# Patient Record
Sex: MALE | Race: WHITE | NOT HISPANIC OR LATINO | Employment: OTHER | URBAN - NONMETROPOLITAN AREA
[De-identification: names, ages, dates, MRNs, and addresses within clinical notes are randomized per-mention and may not be internally consistent; named-entity substitution may affect disease eponyms.]

---

## 2018-06-14 ENCOUNTER — APPOINTMENT (OUTPATIENT)
Dept: CT IMAGING | Facility: HOSPITAL | Age: 78
End: 2018-06-14

## 2018-06-14 ENCOUNTER — HOSPITAL ENCOUNTER (EMERGENCY)
Facility: HOSPITAL | Age: 78
Discharge: HOME | End: 2018-06-14
Attending: EMERGENCY MEDICINE | Admitting: EMERGENCY MEDICINE

## 2018-06-14 VITALS
RESPIRATION RATE: 18 BRPM | DIASTOLIC BLOOD PRESSURE: 83 MMHG | HEIGHT: 69 IN | WEIGHT: 185 LBS | SYSTOLIC BLOOD PRESSURE: 142 MMHG | TEMPERATURE: 97.8 F | BODY MASS INDEX: 27.4 KG/M2 | HEART RATE: 73 BPM | OXYGEN SATURATION: 96 %

## 2018-06-14 DIAGNOSIS — S32.030A CLOSED COMPRESSION FRACTURE OF THIRD LUMBAR VERTEBRA, INITIAL ENCOUNTER: Primary | ICD-10-CM

## 2018-06-14 PROCEDURE — 99283 EMERGENCY DEPT VISIT LOW MDM: CPT

## 2018-06-14 PROCEDURE — 72131 CT LUMBAR SPINE W/O DYE: CPT

## 2018-06-14 RX ORDER — HYDROCODONE BITARTRATE AND ACETAMINOPHEN 7.5; 325 MG/1; MG/1
1 TABLET ORAL EVERY 4 HOURS PRN
Qty: 20 TABLET | Refills: 0 | Status: ON HOLD | OUTPATIENT
Start: 2018-06-14 | End: 2018-07-17

## 2018-06-14 NOTE — ED NOTES
Pt states last monday he bent over to lift a trailer and felt a pop in his back. Pt had x rays a pcp and was told he needed an MRI. Pt is in great pain, and there is note bruising to his lower lumbar region. Tender to touch.      Joi Francois RN  06/14/18 [id number]

## 2018-06-15 NOTE — ED PROVIDER NOTES
"Subjective   Patient says he was lifting tongue of trailer 5 days ago when he felt something \"snap in his lower back and felt like something rolled over\".  Had x-rays by PCP and told they were normal and he may need MRI.        History provided by:  Patient   used: No    Back Pain   Location:  Lumbar spine  Quality:  Aching  Radiates to:  Does not radiate  Pain severity:  Moderate  Pain is:  Same all the time  Onset quality:  Sudden  Duration:  5 days  Timing:  Constant  Progression:  Unchanged  Chronicity:  New  Context: lifting heavy objects    Context: not emotional stress, not falling, not jumping from heights, not MCA, not MVA, not occupational injury, not pedestrian accident, not physical stress, not recent illness, not recent injury and not twisting    Relieved by:  Nothing  Worsened by:  Nothing  Ineffective treatments:  None tried  Associated symptoms: no abdominal pain, no bladder incontinence, no bowel incontinence, no chest pain, no fever, no headaches, no leg pain, no numbness, no paresthesias, no pelvic pain, no perianal numbness, no weakness and no weight loss    Risk factors: no hx of cancer, no hx of osteoporosis, no menopause, not obese, not pregnant, no recent surgery and no steroid use        Review of Systems   Constitutional: Negative.  Negative for fever and weight loss.   HENT: Negative.    Respiratory: Negative.    Cardiovascular: Negative.  Negative for chest pain.   Gastrointestinal: Negative.  Negative for abdominal pain and bowel incontinence.   Genitourinary: Negative.  Negative for bladder incontinence and pelvic pain.   Musculoskeletal: Positive for back pain.   Skin: Negative.    Neurological: Negative.  Negative for weakness, numbness, headaches and paresthesias.   Hematological: Negative.    Psychiatric/Behavioral: Negative.    All other systems reviewed and are negative.      No past medical history on file.    No Known Allergies    No past surgical history on " file.    No family history on file.    Social History     Social History   • Marital status:      Social History Main Topics   • Drug use: Unknown     Other Topics Concern   • Not on file       Prior to Admission medications    Not on File       Medications - No data to display    Vitals:    06/14/18 1819   BP: (!) 144/108   Pulse: 92   Resp: 18   Temp: 98.7 °F (37.1 °C)   SpO2: 97%         Objective   Physical Exam   Constitutional: He is oriented to person, place, and time. He appears well-developed and well-nourished.   Neck: Normal range of motion. Neck supple.   Cardiovascular: Normal rate and regular rhythm.    Pulmonary/Chest: Effort normal and breath sounds normal.   Musculoskeletal: Normal range of motion. He exhibits tenderness.   Tender in LS area of back in midline but no deformity.  SLR neg.   Neurological: He is alert and oriented to person, place, and time.   Psychiatric: He has a normal mood and affect. His behavior is normal.   Nursing note and vitals reviewed.      Procedures         Lab Results (last 24 hours)     ** No results found for the last 24 hours. **          CT Lumbar Spine Without Contrast   Final Result   Impression:    1. Mild superior endplate compression fracturing at L3, this is likely   acute.           This report was finalized on 06/14/2018 20:59 by Dr Willi Stephenson, .          ED Course  ED Course as of Jun 14 2131   Thu Jun 14, 2018 2130 Told patient of compression fracture but it is mild and no immediate treatment needed except pain relief.  [TR]      ED Course User Index  [TR] Jhonny Iniguez Jr., MD          MDM  Number of Diagnoses or Management Options  Closed compression fracture of third lumbar vertebra, initial encounter: new and requires workup     Amount and/or Complexity of Data Reviewed  Tests in the radiology section of CPT®: ordered and reviewed    Risk of Complications, Morbidity, and/or Mortality  Presenting problems: moderate  Diagnostic procedures:  moderate  Management options: moderate    Patient Progress  Patient progress: stable      Final diagnoses:   Closed compression fracture of third lumbar vertebra, initial encounter          Jhonny Iniguez Jr., MD  06/14/18 7098

## 2018-06-25 ENCOUNTER — OFFICE VISIT (OUTPATIENT)
Dept: NEUROSURGERY | Facility: CLINIC | Age: 78
End: 2018-06-25

## 2018-06-25 ENCOUNTER — PREP FOR SURGERY (OUTPATIENT)
Dept: OTHER | Facility: HOSPITAL | Age: 78
End: 2018-06-25

## 2018-06-25 VITALS
DIASTOLIC BLOOD PRESSURE: 60 MMHG | WEIGHT: 185 LBS | HEIGHT: 69 IN | SYSTOLIC BLOOD PRESSURE: 118 MMHG | BODY MASS INDEX: 27.4 KG/M2

## 2018-06-25 DIAGNOSIS — S32.000A LUMBAR COMPRESSION FRACTURE, CLOSED, INITIAL ENCOUNTER (HCC): Primary | ICD-10-CM

## 2018-06-25 DIAGNOSIS — S32.010A CLOSED COMPRESSION FRACTURE OF FIRST LUMBAR VERTEBRA, INITIAL ENCOUNTER: Primary | ICD-10-CM

## 2018-06-25 DIAGNOSIS — Z78.9 CURRENT NON-SMOKER: ICD-10-CM

## 2018-06-25 PROCEDURE — 99203 OFFICE O/P NEW LOW 30 MIN: CPT | Performed by: NEUROLOGICAL SURGERY

## 2018-06-25 NOTE — PATIENT INSTRUCTIONS

## 2018-06-25 NOTE — PROGRESS NOTES
"    Chief complaint   Chief Complaint   Patient presents with   • Back Pain     Compression fx        Subjective     HPI:     This patient is an active 77-year-old male who injured his back on June 11 while lifting a 12 foot trailer torque.  The quality of his pain is constant, the severity is 10 out 10.  Timing is all the time.  It is associated with numbness in his right thigh region.  He has been treated with an LSO brace and oxycodone without relief of the symptoms.  He presents for discussion of kyphoplasty.  His wife is present with him.    Review of Systems     A 12 point review of systems is obtained and is negative except for as described in HPI    Past Medical History:   Diagnosis Date   • Arthritis    • Cancer    • Hypertension      No past surgical history on file.  History reviewed. No pertinent family history.  Social History   Substance Use Topics   • Smoking status: Never Smoker   • Smokeless tobacco: Never Used   • Alcohol use Yes       (Not in a hospital admission)  Allergies:  Patient has no known allergies.    Objective      Vital Signs  /60   Ht 175.3 cm (69\")   Wt 83.9 kg (185 lb)   BMI 27.32 kg/m²     Physical Exam    No acute distress  Awake alert oriented ×3  HEENT atraumatic normocephalic  Neck supple  Neurologic exam  Cranial nerves II through XII grossly intact  No pronator drift  Patient moves all extremities 5 out 5 strength  Sensation is intact light touch in upper and lower extremities  No long tract signs  Gait normal  Lumbar spine tender to palpation in the mid lumbar region    Results Review:     Ct Lumbar Spine Without Contrast    Result Date: 6/14/2018  Narrative: CT lumbar spine without contrast 6/14/2018 8:23 PM CDT  History: Low back pain, minor trauma. Lifting injury.  Comparison: CT scan dated 3/2/2009  DLP: 695 mGy cm  Technique: Serial helical tomographic images of the lumbar spine were obtained without the use of intravenous contrast. Additionally, sagittal and " coronal reformatted images were provided for review.  Findings:  Counting assumes 5 lumbar-type vertebrae. There appears to be a lumbarized S1 with S1-S2 disc. There is a mild L3 superior endplate compression deformity with fracture lines identified along the endplate. Vertebral body heights are otherwise well-maintained. The bones are diffusely osteopenic. Posterior elements are intact. Facet arthropathy with neuroforaminal narrowing at L5-S1. Paraspinal soft tissues are unremarkable. The included portion of the osseous pelvis is intact.      Impression: Impression: 1. Mild superior endplate compression fracturing at L3, this is likely acute.   This report was finalized on 06/14/2018 20:59 by Dr Willi Stephenson, .              Assessment/Plan:     77-year-old male with osteoporotic L3 compression fracture that causes severe pain refractory to LSO brace and oxycodone treatment.  I discuss risks, benefits, alternatives of a L3 kyphoplasty with the patient.  I discuss risks include but are not limited to infection, hematoma, nerve injury, paralysis, spinal fluid leak, need for reoperation, death.  Patient voices understanding and wishes to proceed.  We will obtain a MRI prior to surgery.  Thank you for this consultation.    I discussed the patients findings and my recommendations with patient    Vega Pandey MD  06/25/18  10:47 AM

## 2018-06-26 ENCOUNTER — HOSPITAL ENCOUNTER (OUTPATIENT)
Dept: MRI IMAGING | Facility: HOSPITAL | Age: 78
Discharge: HOME | End: 2018-06-26
Attending: NEUROLOGICAL SURGERY

## 2018-06-26 ENCOUNTER — APPOINTMENT (OUTPATIENT)
Dept: MRI IMAGING | Facility: HOSPITAL | Age: 78
End: 2018-06-26
Attending: NEUROLOGICAL SURGERY

## 2018-06-26 DIAGNOSIS — S32.010A CLOSED COMPRESSION FRACTURE OF FIRST LUMBAR VERTEBRA, INITIAL ENCOUNTER: ICD-10-CM

## 2018-06-26 NOTE — TELEPHONE ENCOUNTER
Patient was scheduled today for mri, however he was unable to complete the test due to anxiety. I sent in a script for  Xanax. Patient stated that he would go back to get the testing resc.

## 2018-06-27 RX ORDER — ALPRAZOLAM 1 MG/1
TABLET ORAL
Qty: 2 TABLET | Refills: 0 | Status: SHIPPED | OUTPATIENT
Start: 2018-06-27 | End: 2018-07-06

## 2018-06-28 ENCOUNTER — HOSPITAL ENCOUNTER (OUTPATIENT)
Dept: MRI IMAGING | Facility: HOSPITAL | Age: 78
Discharge: HOME | End: 2018-06-28
Attending: NEUROLOGICAL SURGERY | Admitting: NEUROLOGICAL SURGERY

## 2018-06-28 PROCEDURE — 72148 MRI LUMBAR SPINE W/O DYE: CPT

## 2018-07-06 ENCOUNTER — APPOINTMENT (OUTPATIENT)
Dept: PREADMISSION TESTING | Facility: HOSPITAL | Age: 78
End: 2018-07-06

## 2018-07-06 ENCOUNTER — HOSPITAL ENCOUNTER (OUTPATIENT)
Dept: GENERAL RADIOLOGY | Facility: HOSPITAL | Age: 78
Discharge: HOME | End: 2018-07-06
Admitting: NEUROLOGICAL SURGERY

## 2018-07-06 VITALS
DIASTOLIC BLOOD PRESSURE: 53 MMHG | SYSTOLIC BLOOD PRESSURE: 118 MMHG | RESPIRATION RATE: 20 BRPM | HEART RATE: 83 BPM | WEIGHT: 180.34 LBS | OXYGEN SATURATION: 96 % | BODY MASS INDEX: 30.05 KG/M2 | HEIGHT: 65 IN

## 2018-07-06 DIAGNOSIS — S32.000A LUMBAR COMPRESSION FRACTURE, CLOSED, INITIAL ENCOUNTER (HCC): ICD-10-CM

## 2018-07-06 LAB
ANION GAP SERPL CALCULATED.3IONS-SCNC: 13 MMOL/L (ref 4–13)
BILIRUB UR QL STRIP: NEGATIVE
BUN BLD-MCNC: 42 MG/DL (ref 5–21)
BUN/CREAT SERPL: 26.8 (ref 7–25)
CALCIUM SPEC-SCNC: 10 MG/DL (ref 8.4–10.4)
CHLORIDE SERPL-SCNC: 100 MMOL/L (ref 98–110)
CLARITY UR: CLEAR
CO2 SERPL-SCNC: 27 MMOL/L (ref 24–31)
COLOR UR: ABNORMAL
CREAT BLD-MCNC: 1.57 MG/DL (ref 0.5–1.4)
DEPRECATED RDW RBC AUTO: 41.1 FL (ref 40–54)
ERYTHROCYTE [DISTWIDTH] IN BLOOD BY AUTOMATED COUNT: 12.1 % (ref 12–15)
GFR SERPL CREATININE-BSD FRML MDRD: 43 ML/MIN/1.73
GLUCOSE BLD-MCNC: 92 MG/DL (ref 70–100)
GLUCOSE UR STRIP-MCNC: NEGATIVE MG/DL
HCT VFR BLD AUTO: 41.6 % (ref 40–52)
HGB BLD-MCNC: 14.2 G/DL (ref 14–18)
HGB UR QL STRIP.AUTO: NEGATIVE
INR PPP: 1 (ref 0.91–1.09)
KETONES UR QL STRIP: ABNORMAL
LEUKOCYTE ESTERASE UR QL STRIP.AUTO: NEGATIVE
MCH RBC QN AUTO: 31.6 PG (ref 28–32)
MCHC RBC AUTO-ENTMCNC: 34.1 G/DL (ref 33–36)
MCV RBC AUTO: 92.4 FL (ref 82–95)
NITRITE UR QL STRIP: NEGATIVE
PH UR STRIP.AUTO: 5.5 [PH] (ref 5–8)
PLATELET # BLD AUTO: 257 10*3/MM3 (ref 130–400)
PMV BLD AUTO: 10.1 FL (ref 6–12)
POTASSIUM BLD-SCNC: 5.2 MMOL/L (ref 3.5–5.3)
PROT UR QL STRIP: NEGATIVE
PROTHROMBIN TIME: 13.5 SECONDS (ref 11.9–14.6)
RBC # BLD AUTO: 4.5 10*6/MM3 (ref 4.8–5.9)
SODIUM BLD-SCNC: 140 MMOL/L (ref 135–145)
SP GR UR STRIP: 1.02 (ref 1–1.03)
UROBILINOGEN UR QL STRIP: ABNORMAL
WBC NRBC COR # BLD: 5.79 10*3/MM3 (ref 4.8–10.8)

## 2018-07-06 PROCEDURE — 85027 COMPLETE CBC AUTOMATED: CPT | Performed by: NEUROLOGICAL SURGERY

## 2018-07-06 PROCEDURE — 81003 URINALYSIS AUTO W/O SCOPE: CPT | Performed by: NEUROLOGICAL SURGERY

## 2018-07-06 PROCEDURE — 93005 ELECTROCARDIOGRAM TRACING: CPT

## 2018-07-06 PROCEDURE — 36415 COLL VENOUS BLD VENIPUNCTURE: CPT

## 2018-07-06 PROCEDURE — 85610 PROTHROMBIN TIME: CPT | Performed by: NEUROLOGICAL SURGERY

## 2018-07-06 PROCEDURE — 71045 X-RAY EXAM CHEST 1 VIEW: CPT

## 2018-07-06 PROCEDURE — 80048 BASIC METABOLIC PNL TOTAL CA: CPT | Performed by: NEUROLOGICAL SURGERY

## 2018-07-06 PROCEDURE — 93010 ELECTROCARDIOGRAM REPORT: CPT | Performed by: INTERNAL MEDICINE

## 2018-07-06 RX ORDER — OLMESARTAN MEDOXOMIL 40 MG/1
40 TABLET ORAL DAILY
COMMUNITY
End: 2020-04-06 | Stop reason: SDUPTHER

## 2018-07-06 RX ORDER — HYDROCHLOROTHIAZIDE 12.5 MG/1
12.5 TABLET ORAL DAILY
COMMUNITY
End: 2020-04-06 | Stop reason: SDUPTHER

## 2018-07-06 NOTE — DISCHARGE INSTRUCTIONS
DAY OF SURGERY INSTRUCTIONS        YOUR SURGEON: dr richardson    PROCEDURE: ***L3 KYPHOPLASTY 1-2 LEVELS    DATE OF SURGERY: ***7/17/2018    ARRIVAL TIME: AS DIRECTED BY OFFICE    DAY OF SURGERY TAKE ONLY THESE MEDICATIONS UNLESS OTHERWISE INSTRUCTED BY YOUR PHYSICIAN: ***none        MANAGING PAIN AFTER SURGERY    We know you are probably wondering what your pain will be like after surgery.  Following surgery it is unrealistic to expect you will not have pain.   Pain is how our bodies let us know that something is wrong or cautions us to be careful.  That said, our goal is to make your pain tolerable.    Methods we may use to treat your pain include (oral or IV medications, PCAs, epidurals, nerve blocks, etc.)   While some procedures require IV pain medications for a short time after surgery, transitioning to pain medications by mouth allows for better management of pain.   Your nurse will encourage you to take oral pain medications whenever possible.  IV medications work almost immediately, but only last a short while.  Taking medications by mouth allows for a more constant level of medication in your blood stream for a longer period of time.      Once your pain is out of control it is harder to get back under control.  It is important you are aware when your next dose of pain medication is due.  If you are admitted, your nurse may write the time of your next dose on the white board in your room to help you remember.      We are interested in your pain and encourage you to inform us about aggravating factors during your visit.   Many times a simple repositioning every few hours can make a big difference.    If your physician says it is okay, do not let your pain prevent you from getting out of bed. Be sure to call your nurse for assistance prior to getting up so you do not fall.      Before surgery, please decide your tolerable pain goal.  These faces help describe the pain ratings we use on a 0-10 scale.   Be  prepared to tell us your goal and whether or not you take pain or anxiety medications at home.          BEFORE YOU COME TO THE HOSPITAL  (Pre-op instructions)  • Do not eat, drink, smoke or chew gum after midnight the night before surgery.  This also includes no mints.  • Morning of surgery take only the medicines you have been instructed with a sip of water unless otherwise instructed  by your physician.  • Do not shave, wear makeup or dark nail polish.  • Remove all jewelry including rings.  • Leave anything you consider valuable at home.  • Leave your suitcase in the car until after your surgery.  • Bring the following with you if applicable:  o Picture ID and insurance, Medicare or Medicaid cards  o Co-pay/deductible required by insurance (cash, check, credit card)  o Copy of advance directive, living will or power-of- documents if not brought to PAT  o CPAP or BIPAP mask and tubing  o Relaxation aids (MP3 player, book, magazine)  • On the day of surgery check in at registration located at the main entrance of the hospital.       Outpatient Surgery Guidelines, Adult  Outpatient procedures are those for which the person having the procedure is allowed to go home the same day as the procedure. Various procedures are done on an outpatient basis. You should follow some general guidelines if you will be having an outpatient procedure.  LET YOUR HEALTH CARE PROVIDER KNOW ABOUT:  · Any allergies you have.  · All medicines you are taking, including vitamins, herbs, eye drops, creams, and over-the-counter medicines.  · Previous problems you or members of your family have had with the use of anesthetics.  · Any blood disorders you have.  · Previous surgeries you have had.  · Medical conditions you have.  RISKS AND COMPLICATIONS  Your health care provider will discuss possible risks and complications with you before surgery. Common risks and complications include:    · Problems due to the use of  anesthetics.  · Blood loss and replacement (does not apply to minor surgical procedures).  · Temporary increase in pain due to surgery.  · Uncorrected pain or problems that the surgery was meant to correct.  · Infection.  · New damage.  BEFORE THE PROCEDURE  · Ask your health care provider about changing or stopping your regular medicines. You may need to stop taking certain medicines in the days or weeks before the procedure.  · Stop smoking at least 2 weeks before surgery. This lowers your risk for complications during and after surgery. Ask your health care provider for help with this if needed.  · Eat your usual meals and a light supper the day before surgery. Continue fluid intake. Do not drink alcohol.  · Do not eat or drink after midnight the night before your surgery.   · Arrange for someone to take you home and to stay with you for 24 hours after the procedure. Medicine given for your procedure may affect your ability to drive or to care for yourself.  · Call your health care provider's office if you develop an illness or problem that may prevent you from safely having your procedure.  AFTER THE PROCEDURE  After surgery, you will be taken to a recovery area, where your progress will be monitored. If there are no complications, you will be allowed to go home when you are awake, stable, and taking fluids well. You may have numbness around the surgical site. Healing will take some time. You will have tenderness at the surgical site and may have some swelling and bruising. You may also have some nausea.  HOME CARE INSTRUCTIONS  · Do not drive for 24 hours, or as directed by your health care provider. Do not drive while taking prescription pain medicines.  · Do not drink alcohol for 24 hours.  · Do not make important decisions or sign legal documents for 24 hours.  · You may resume a normal diet and activities as directed.  · Do not lift anything heavier than 10 pounds (4.5 kg) or play contact sports until your  health care provider says it is okay.  · Change your bandages (dressings) as directed.  · Only take over-the-counter or prescription medicines as directed by your health care provider.  · Follow up with your health care provider as directed.  SEEK MEDICAL CARE IF:  · You have increased bleeding (more than a small spot) from the surgical site.  · You have redness, swelling, or increasing pain in the wound.  · You see pus coming from the wound.  · You have a fever.  · You notice a bad smell coming from the wound or dressing.  · You feel lightheaded or faint.  · You develop a rash.  · You have trouble breathing.  · You develop allergies.  MAKE SURE YOU:  · Understand these instructions.  · Will watch your condition.  · Will get help right away if you are not doing well or get worse.     This information is not intended to replace advice given to you by your health care provider. Make sure you discuss any questions you have with your health care provider.     Document Released: 09/12/2002 Document Revised: 05/03/2016 Document Reviewed: 05/22/2014  [employer] Interactive Patient Education ©2016 [employer]       Fall Prevention in Hospitals, Adult  As a hospital patient, your condition and the treatments you receive can increase your risk for falls. Some additional risk factors for falls in a hospital include:  · Being in an unfamiliar environment.  · Being on bed rest.  · Your surgery.  · Taking certain medicines.  · Your tubing requirements, such as intravenous (IV) therapy or catheters.  It is important that you learn how to decrease fall risks while at the hospital. Below are important tips that can help prevent falls.  SAFETY TIPS FOR PREVENTING FALLS  Talk about your risk of falling.  · Ask your health care provider why you are at risk for falling. Is it your medicine, illness, tubing placement, or something else?  · Make a plan with your health care provider to keep you safe from falls.  · Ask your health care  provider or pharmacist about side effects of your medicines. Some medicines can make you dizzy or affect your coordination.  Ask for help.  · Ask for help before getting out of bed. You may need to press your call button.  · Ask for assistance in getting safely to the toilet.  · Ask for a walker or cane to be put at your bedside. Ask that most of the side rails on your bed be placed up before your health care provider leaves the room.  · Ask family or friends to sit with you.  · Ask for things that are out of your reach, such as your glasses, hearing aids, telephone, bedside table, or call button.  Follow these tips to avoid falling:  · Stay lying or seated, rather than standing, while waiting for help.  · Wear rubber-soled slippers or shoes whenever you walk in the hospital.  · Avoid quick, sudden movements.  ¨ Change positions slowly.  ¨ Sit on the side of your bed before standing.  ¨ Stand up slowly and wait before you start to walk.  · Let your health care provider know if there is a spill on the floor.  · Pay careful attention to the medical equipment, electrical cords, and tubes around you.  · When you need help, use your call button by your bed or in the bathroom. Wait for one of your health care providers to help you.  · If you feel dizzy or unsure of your footing, return to bed and wait for assistance.  · Avoid being distracted by the TV, telephone, or another person in your room.  · Do not lean or support yourself on rolling objects, such as IV poles or bedside tables.     This information is not intended to replace advice given to you by your health care provider. Make sure you discuss any questions you have with your health care provider.     Document Released: 12/15/2001 Document Revised: 01/08/2016 Document Reviewed: 08/25/2013  [employer] Interactive Patient Education ©2016 [employer]       Surgical Site Infections FAQs  What is a Surgical Site Infection (SSI)?  A surgical site infection is an  infection that occurs after surgery in the part of the body where the surgery took place. Most patients who have surgery do not develop an infection. However, infections develop in about 1 to 3 out of every 100 patients who have surgery.  Some of the common symptoms of a surgical site infection are:  · Redness and pain around the area where you had surgery  · Drainage of cloudy fluid from your surgical wound  · Fever  Can SSIs be treated?  Yes. Most surgical site infections can be treated with antibiotics. The antibiotic given to you depends on the bacteria (germs) causing the infection. Sometimes patients with SSIs also need another surgery to treat the infection.  What are some of the things that hospitals are doing to prevent SSIs?  To prevent SSIs, doctors, nurses, and other healthcare providers:  · Clean their hands and arms up to their elbows with an antiseptic agent just before the surgery.  · Clean their hands with soap and water or an alcohol-based hand rub before and after caring for each patient.  · May remove some of your hair immediately before your surgery using electric clippers if the hair is in the same area where the procedure will occur. They should not shave you with a razor.  · Wear special hair covers, masks, gowns, and gloves during surgery to keep the surgery area clean.  · Give you antibiotics before your surgery starts. In most cases, you should get antibiotics within 60 minutes before the surgery starts and the antibiotics should be stopped within 24 hours after surgery.  · Clean the skin at the site of your surgery with a special soap that kills germs.  What can I do to help prevent SSIs?  Before your surgery:  · Tell your doctor about other medical problems you may have. Health problems such as allergies, diabetes, and obesity could affect your surgery and your treatment.  · Quit smoking. Patients who smoke get more infections. Talk to your doctor about how you can quit before your  surgery.  · Do not shave near where you will have surgery. Shaving with a razor can irritate your skin and make it easier to develop an infection.  At the time of your surgery:  · Speak up if someone tries to shave you with a razor before surgery. Ask why you need to be shaved and talk with your surgeon if you have any concerns.  · Ask if you will get antibiotics before surgery.  After your surgery:  · Make sure that your healthcare providers clean their hands before examining you, either with soap and water or an alcohol-based hand rub.  · If you do not see your providers clean their hands, please ask them to do so.  · Family and friends who visit you should not touch the surgical wound or dressings.  · Family and friends should clean their hands with soap and water or an alcohol-based hand rub before and after visiting you. If you do not see them clean their hands, ask them to clean their hands.  What do I need to do when I go home from the hospital?  · Before you go home, your doctor or nurse should explain everything you need to know about taking care of your wound. Make sure you understand how to care for your wound before you leave the hospital.  · Always clean your hands before and after caring for your wound.  · Before you go home, make sure you know who to contact if you have questions or problems after you get home.  · If you have any symptoms of an infection, such as redness and pain at the surgery site, drainage, or fever, call your doctor immediately.  If you have additional questions, please ask your doctor or nurse.  Developed and co-sponsored by The Society for Healthcare Epidemiology of [place] (SHEA); Infectious Diseases Society of [place] (IDSA); American Hospital Association; Association for Professionals in Infection Control and Epidemiology (APIC); Centers for Disease Control and Prevention (CDC); and The Joint Commission.     This information is not intended to replace advice given to you by  your health care provider. Make sure you discuss any questions you have with your health care provider.     Document Released: 12/23/2014 Document Revised: 01/08/2016 Document Reviewed: 03/02/2016  [employer] Interactive Patient Education ©2016 [employer]       [facility]  CHG 4% Patient Instruction Sheet    Preparing the Skin Before Surgery  Preparing or “prepping” skin before surgery can reduce the risk of infection at the surgical site. To make the process easier,[facility] has chosen 4% Chlorhexidine Gluconate (CHG) antiseptic solution.   The steps below outline the prepping process and should be carefully followed.                                                                                                                                                      Prep the skin at the following time(s):                                                      We recommend you shower the night before surgery, and again the morning of surgery with the 4% CHG antiseptic solution using half of the bottle and a cloth each time.  Dress in clean clothes/sleepwear after showering.  See instructions below for application.          Do not apply any lotions or moisturizers.       Do not shave the area to be prepped for at least 2 days prior to surgery.    Clipping the hair may be done immediately prior to your surgery at the hospital    if needed.    Directions:  Thoroughly rinse your body with water.  Apply 4% CHG to a cloth and wash skin gently, paying special attention to the operative site.  Rinse again thoroughly.  Once you have begun using this product do not apply anything else to your skin. If itching or redness persists, rinse affected areas and discontinue use.    When using this product:  • Keep out of eyes, ears, and mouth.  • If solution should contact these areas, rinse out promptly and thoroughly with water.  • For external use only.  • Do not use in genital area, on your face or  head.      PATIENT/FAMILY/RESPONSIBLE PARTY VERBALIZES UNDERSTANDING OF ABOVE EDUCATION.  COPY OF PAIN SCALE GIVEN AND REVIEWED WITH VERBALIZED UNDERSTANDING.

## 2018-07-17 ENCOUNTER — HOSPITAL ENCOUNTER (OUTPATIENT)
Facility: HOSPITAL | Age: 78
Discharge: HOME | End: 2018-07-17
Attending: NEUROLOGICAL SURGERY | Admitting: NEUROLOGICAL SURGERY

## 2018-07-17 ENCOUNTER — APPOINTMENT (OUTPATIENT)
Dept: GENERAL RADIOLOGY | Facility: HOSPITAL | Age: 78
End: 2018-07-17

## 2018-07-17 ENCOUNTER — ANESTHESIA EVENT (OUTPATIENT)
Dept: PERIOP | Facility: HOSPITAL | Age: 78
End: 2018-07-17

## 2018-07-17 ENCOUNTER — ANESTHESIA (OUTPATIENT)
Dept: PERIOP | Facility: HOSPITAL | Age: 78
End: 2018-07-17

## 2018-07-17 VITALS
TEMPERATURE: 97.3 F | RESPIRATION RATE: 16 BRPM | SYSTOLIC BLOOD PRESSURE: 140 MMHG | DIASTOLIC BLOOD PRESSURE: 85 MMHG | OXYGEN SATURATION: 96 % | HEART RATE: 78 BPM

## 2018-07-17 DIAGNOSIS — S32.000A LUMBAR COMPRESSION FRACTURE, CLOSED, INITIAL ENCOUNTER (HCC): ICD-10-CM

## 2018-07-17 PROCEDURE — 25010000002 ONDANSETRON PER 1 MG: Performed by: ANESTHESIOLOGY

## 2018-07-17 PROCEDURE — 25010000002 IOPAMIDOL 61 % SOLUTION: Performed by: NEUROLOGICAL SURGERY

## 2018-07-17 PROCEDURE — 25010000002 FENTANYL CITRATE (PF) 100 MCG/2ML SOLUTION: Performed by: NURSE ANESTHETIST, CERTIFIED REGISTERED

## 2018-07-17 PROCEDURE — C1713 ANCHOR/SCREW BN/BN,TIS/BN: HCPCS | Performed by: NEUROLOGICAL SURGERY

## 2018-07-17 PROCEDURE — 72100 X-RAY EXAM L-S SPINE 2/3 VWS: CPT

## 2018-07-17 PROCEDURE — 25010000002 DEXAMETHASONE PER 1 MG: Performed by: ANESTHESIOLOGY

## 2018-07-17 PROCEDURE — 63710000001 OXYCODONE-ACETAMINOPHEN 7.5-325 MG TABLET: Performed by: ANESTHESIOLOGY

## 2018-07-17 PROCEDURE — A9270 NON-COVERED ITEM OR SERVICE: HCPCS | Performed by: ANESTHESIOLOGY

## 2018-07-17 PROCEDURE — 63710000001 ACETAMINOPHEN 500 MG TABLET: Performed by: ANESTHESIOLOGY

## 2018-07-17 PROCEDURE — 25010000002 PROPOFOL 10 MG/ML EMULSION: Performed by: NURSE ANESTHETIST, CERTIFIED REGISTERED

## 2018-07-17 PROCEDURE — 76000 FLUOROSCOPY <1 HR PHYS/QHP: CPT

## 2018-07-17 PROCEDURE — 88307 TISSUE EXAM BY PATHOLOGIST: CPT | Performed by: NEUROLOGICAL SURGERY

## 2018-07-17 PROCEDURE — 22514 PERQ VERTEBRAL AUGMENTATION: CPT | Performed by: NEUROLOGICAL SURGERY

## 2018-07-17 PROCEDURE — 88311 DECALCIFY TISSUE: CPT | Performed by: NEUROLOGICAL SURGERY

## 2018-07-17 DEVICE — BONE CEMENT C01B HV-R WITH MIXER  US
Type: IMPLANTABLE DEVICE | Site: SPINE LUMBAR | Status: FUNCTIONAL
Brand: KYPHON® HV-R® BONE CEMENT AND KYPHON® MIXER PACK

## 2018-07-17 RX ORDER — LIDOCAINE HYDROCHLORIDE 40 MG/ML
SOLUTION TOPICAL AS NEEDED
Status: DISCONTINUED | OUTPATIENT
Start: 2018-07-17 | End: 2018-07-17 | Stop reason: SURG

## 2018-07-17 RX ORDER — LIDOCAINE HYDROCHLORIDE 20 MG/ML
INJECTION, SOLUTION INFILTRATION; PERINEURAL AS NEEDED
Status: DISCONTINUED | OUTPATIENT
Start: 2018-07-17 | End: 2018-07-17 | Stop reason: SURG

## 2018-07-17 RX ORDER — IPRATROPIUM BROMIDE AND ALBUTEROL SULFATE 2.5; .5 MG/3ML; MG/3ML
3 SOLUTION RESPIRATORY (INHALATION) ONCE AS NEEDED
Status: DISCONTINUED | OUTPATIENT
Start: 2018-07-17 | End: 2018-07-17 | Stop reason: HOSPADM

## 2018-07-17 RX ORDER — DEXAMETHASONE SODIUM PHOSPHATE 4 MG/ML
4 INJECTION, SOLUTION INTRA-ARTICULAR; INTRALESIONAL; INTRAMUSCULAR; INTRAVENOUS; SOFT TISSUE ONCE AS NEEDED
Status: COMPLETED | OUTPATIENT
Start: 2018-07-17 | End: 2018-07-17

## 2018-07-17 RX ORDER — FENTANYL CITRATE 50 UG/ML
25 INJECTION, SOLUTION INTRAMUSCULAR; INTRAVENOUS AS NEEDED
Status: DISCONTINUED | OUTPATIENT
Start: 2018-07-17 | End: 2018-07-17 | Stop reason: HOSPADM

## 2018-07-17 RX ORDER — IBUPROFEN 600 MG/1
600 TABLET ORAL ONCE AS NEEDED
Status: DISCONTINUED | OUTPATIENT
Start: 2018-07-17 | End: 2018-07-17 | Stop reason: HOSPADM

## 2018-07-17 RX ORDER — LABETALOL HYDROCHLORIDE 5 MG/ML
5 INJECTION, SOLUTION INTRAVENOUS
Status: DISCONTINUED | OUTPATIENT
Start: 2018-07-17 | End: 2018-07-17 | Stop reason: HOSPADM

## 2018-07-17 RX ORDER — ACETAMINOPHEN 500 MG
1000 TABLET ORAL ONCE
Status: COMPLETED | OUTPATIENT
Start: 2018-07-17 | End: 2018-07-17

## 2018-07-17 RX ORDER — SODIUM CHLORIDE, SODIUM LACTATE, POTASSIUM CHLORIDE, CALCIUM CHLORIDE 600; 310; 30; 20 MG/100ML; MG/100ML; MG/100ML; MG/100ML
100 INJECTION, SOLUTION INTRAVENOUS CONTINUOUS
Status: DISCONTINUED | OUTPATIENT
Start: 2018-07-17 | End: 2018-07-17 | Stop reason: HOSPADM

## 2018-07-17 RX ORDER — HYDROCODONE BITARTRATE AND ACETAMINOPHEN 7.5; 325 MG/1; MG/1
1 TABLET ORAL EVERY 4 HOURS PRN
Qty: 50 TABLET | Refills: 0 | Status: SHIPPED | OUTPATIENT
Start: 2018-07-17 | End: 2020-04-06

## 2018-07-17 RX ORDER — SODIUM CHLORIDE, SODIUM LACTATE, POTASSIUM CHLORIDE, CALCIUM CHLORIDE 600; 310; 30; 20 MG/100ML; MG/100ML; MG/100ML; MG/100ML
1000 INJECTION, SOLUTION INTRAVENOUS CONTINUOUS
Status: DISCONTINUED | OUTPATIENT
Start: 2018-07-17 | End: 2018-07-17 | Stop reason: HOSPADM

## 2018-07-17 RX ORDER — ROCURONIUM BROMIDE 10 MG/ML
INJECTION, SOLUTION INTRAVENOUS AS NEEDED
Status: DISCONTINUED | OUTPATIENT
Start: 2018-07-17 | End: 2018-07-17 | Stop reason: SURG

## 2018-07-17 RX ORDER — MAGNESIUM HYDROXIDE 1200 MG/15ML
LIQUID ORAL AS NEEDED
Status: DISCONTINUED | OUTPATIENT
Start: 2018-07-17 | End: 2018-07-17 | Stop reason: HOSPADM

## 2018-07-17 RX ORDER — FAMOTIDINE 10 MG/ML
20 INJECTION, SOLUTION INTRAVENOUS
Status: DISCONTINUED | OUTPATIENT
Start: 2018-07-17 | End: 2018-07-17 | Stop reason: HOSPADM

## 2018-07-17 RX ORDER — MIDAZOLAM HYDROCHLORIDE 1 MG/ML
2 INJECTION INTRAMUSCULAR; INTRAVENOUS
Status: DISCONTINUED | OUTPATIENT
Start: 2018-07-17 | End: 2018-07-17 | Stop reason: HOSPADM

## 2018-07-17 RX ORDER — ONDANSETRON 2 MG/ML
4 INJECTION INTRAMUSCULAR; INTRAVENOUS ONCE AS NEEDED
Status: COMPLETED | OUTPATIENT
Start: 2018-07-17 | End: 2018-07-17

## 2018-07-17 RX ORDER — SODIUM CHLORIDE 0.9 % (FLUSH) 0.9 %
1-10 SYRINGE (ML) INJECTION AS NEEDED
Status: DISCONTINUED | OUTPATIENT
Start: 2018-07-17 | End: 2018-07-17 | Stop reason: HOSPADM

## 2018-07-17 RX ORDER — SODIUM CHLORIDE 0.9 % (FLUSH) 0.9 %
3 SYRINGE (ML) INJECTION AS NEEDED
Status: DISCONTINUED | OUTPATIENT
Start: 2018-07-17 | End: 2018-07-17 | Stop reason: HOSPADM

## 2018-07-17 RX ORDER — BUPIVACAINE HYDROCHLORIDE AND EPINEPHRINE 5; 5 MG/ML; UG/ML
INJECTION, SOLUTION PERINEURAL AS NEEDED
Status: DISCONTINUED | OUTPATIENT
Start: 2018-07-17 | End: 2018-07-17 | Stop reason: HOSPADM

## 2018-07-17 RX ORDER — NALOXONE HCL 0.4 MG/ML
0.4 VIAL (ML) INJECTION AS NEEDED
Status: DISCONTINUED | OUTPATIENT
Start: 2018-07-17 | End: 2018-07-17 | Stop reason: HOSPADM

## 2018-07-17 RX ORDER — PROPOFOL 10 MG/ML
VIAL (ML) INTRAVENOUS AS NEEDED
Status: DISCONTINUED | OUTPATIENT
Start: 2018-07-17 | End: 2018-07-17 | Stop reason: SURG

## 2018-07-17 RX ORDER — OXYCODONE AND ACETAMINOPHEN 10; 325 MG/1; MG/1
1 TABLET ORAL ONCE AS NEEDED
Status: DISCONTINUED | OUTPATIENT
Start: 2018-07-17 | End: 2018-07-17 | Stop reason: HOSPADM

## 2018-07-17 RX ORDER — MEPERIDINE HYDROCHLORIDE 50 MG/ML
12.5 INJECTION INTRAMUSCULAR; INTRAVENOUS; SUBCUTANEOUS
Status: DISCONTINUED | OUTPATIENT
Start: 2018-07-17 | End: 2018-07-17 | Stop reason: HOSPADM

## 2018-07-17 RX ORDER — METOCLOPRAMIDE HYDROCHLORIDE 5 MG/ML
5 INJECTION INTRAMUSCULAR; INTRAVENOUS
Status: DISCONTINUED | OUTPATIENT
Start: 2018-07-17 | End: 2018-07-17 | Stop reason: HOSPADM

## 2018-07-17 RX ORDER — FENTANYL CITRATE 50 UG/ML
INJECTION, SOLUTION INTRAMUSCULAR; INTRAVENOUS AS NEEDED
Status: DISCONTINUED | OUTPATIENT
Start: 2018-07-17 | End: 2018-07-17 | Stop reason: SURG

## 2018-07-17 RX ORDER — OXYCODONE AND ACETAMINOPHEN 7.5; 325 MG/1; MG/1
2 TABLET ORAL ONCE AS NEEDED
Status: COMPLETED | OUTPATIENT
Start: 2018-07-17 | End: 2018-07-17

## 2018-07-17 RX ORDER — MIDAZOLAM HYDROCHLORIDE 1 MG/ML
1 INJECTION INTRAMUSCULAR; INTRAVENOUS
Status: DISCONTINUED | OUTPATIENT
Start: 2018-07-17 | End: 2018-07-17 | Stop reason: HOSPADM

## 2018-07-17 RX ADMIN — FAMOTIDINE 20 MG: 10 INJECTION, SOLUTION INTRAVENOUS at 12:34

## 2018-07-17 RX ADMIN — ONDANSETRON 4 MG: 2 INJECTION INTRAMUSCULAR; INTRAVENOUS at 13:52

## 2018-07-17 RX ADMIN — OXYCODONE HYDROCHLORIDE AND ACETAMINOPHEN 2 TABLET: 7.5; 325 TABLET ORAL at 13:53

## 2018-07-17 RX ADMIN — PROPOFOL 150 MG: 10 INJECTION, EMULSION INTRAVENOUS at 12:56

## 2018-07-17 RX ADMIN — FENTANYL CITRATE 100 MCG: 50 INJECTION, SOLUTION INTRAMUSCULAR; INTRAVENOUS at 12:56

## 2018-07-17 RX ADMIN — LIDOCAINE HYDROCHLORIDE 0.5 ML: 10 INJECTION, SOLUTION EPIDURAL; INFILTRATION; INTRACAUDAL; PERINEURAL at 11:54

## 2018-07-17 RX ADMIN — ACETAMINOPHEN 1000 MG: 500 TABLET, FILM COATED ORAL at 12:34

## 2018-07-17 RX ADMIN — DEXAMETHASONE SODIUM PHOSPHATE 4 MG: 4 INJECTION, SOLUTION INTRA-ARTICULAR; INTRALESIONAL; INTRAMUSCULAR; INTRAVENOUS; SOFT TISSUE at 12:34

## 2018-07-17 RX ADMIN — ROCURONIUM BROMIDE 20 MG: 10 INJECTION INTRAVENOUS at 12:56

## 2018-07-17 RX ADMIN — SODIUM CHLORIDE, POTASSIUM CHLORIDE, SODIUM LACTATE AND CALCIUM CHLORIDE 1000 ML: 600; 310; 30; 20 INJECTION, SOLUTION INTRAVENOUS at 11:55

## 2018-07-17 RX ADMIN — LIDOCAINE HYDROCHLORIDE 1 EACH: 40 SOLUTION TOPICAL at 12:56

## 2018-07-17 RX ADMIN — LIDOCAINE HYDROCHLORIDE 80 MG: 20 INJECTION, SOLUTION INFILTRATION; PERINEURAL at 12:56

## 2018-07-17 NOTE — ANESTHESIA PROCEDURE NOTES
Airway  Urgency: elective      General Information and Staff    Patient location during procedure: OR  CRNA: ANDREA SALEEM    Indications and Patient Condition  Indications for airway management: airway protection    Preoxygenated: yes  MILS maintained throughout  Mask difficulty assessment: 1 - vent by mask    Final Airway Details  Final airway type: endotracheal airway      Successful airway: ETT  Cuffed: yes   Successful intubation technique: direct laryngoscopy  Endotracheal tube insertion site: oral  Blade: Higgins  Blade size: #2  ETT size: 7.5 mm  Cormack-Lehane Classification: grade I - full view of glottis  Placement verified by: chest auscultation and capnometry   Cuff volume (mL): 10  Measured from: lips  Number of attempts at approach: 1

## 2018-07-17 NOTE — OP NOTE
KYPHOPLASTY  Procedure Note    Juan Luis Collazo  7/17/2018    Pre-op Diagnosis:   Lumbar compression fracture, closed, initial encounter [S32.000A]    Post-op Diagnosis:     Post-Op Diagnosis Codes:     * Lumbar compression fracture, closed, initial encounter (CMS/[facility]) [S32.000A]    Procedure/CPT® Codes:      Procedure(s):  L3 kyphoplasty  L3 vertebral body biopsy    Surgeon(s):  Vega Pandey MD    Anesthesia: General    Staff:   Circulator: Azael Gastelum RN  Scrub Person: Sarika Betancur; Barrington Segovia      Specimens:                ID Type Source Tests Collected by Time   A : L3 bone Bone Spine, Lumbar SURGICAL PATHOLOGY EXAM eVga Pandey MD 7/17/2018 1311         Drains:      Indication for procedure    This patient is a 77-year-old male who presents with L3 compression fracture that remained with severe pain intractable to conservative management.  Risks, benefits, alternatives of the procedure were discussed with the patient, and he wished to proceed.    Description of procedure    Informed consent was obtained.  General tracheal anesthesia was administered.  Timeout was performed.  Preoperative prophylactic antibiotic were given.  Patient was placed in prone position prepped and draped in normal sterile fashion.  Using AP and lateral fluoroscopy, Jamshidi needles were inserted into the L3 vertebral body.  Biopsy specimens were taken.  Balloons were inserted and inflated, deflated under AP and lateral fluoroscopy.  Kyphoplasty cement was injected into the L3 vertebral body until adequate filling was seen.  Jamshidi needles were removed.  Skin was closed with Monocryl and skin affix.  Local anesthetic was injected.  Patient was extubated in the operating room and brought to recovery.    Vega Pandey MD     Date: 7/17/2018  Time: 1:35 PM

## 2018-07-17 NOTE — ANESTHESIA POSTPROCEDURE EVALUATION
Patient: Juan Luis Collazo    Procedure Summary     Date:  07/17/18 Room / Location:   [facility] OR  /  [facility] OR    Anesthesia Start:  1254 Anesthesia Stop:  1333    Procedure:  L3 KYPHOPLASTY 1-2 LEVELS (Bilateral Spine Lumbar) Diagnosis:       Lumbar compression fracture, closed, initial encounter (CMS/[facility])      (Lumbar compression fracture, closed, initial encounter [S32.000A])    Surgeon:  Vega Pandey MD Provider:  Santiago Arechiga CRNA    Anesthesia Type:  general ASA Status:  2          Anesthesia Type: general  Last vitals  BP   128/66 (07/17/18 1405)   Temp   97.3 °F (36.3 °C) (07/17/18 1400)   Pulse   75 (07/17/18 1405)   Resp   16 (07/17/18 1405)     SpO2   95 % (07/17/18 1405)     Post Anesthesia Care and Evaluation    Patient location during evaluation: PACU  Patient participation: complete - patient participated  Level of consciousness: awake and alert  Pain management: adequate  Airway patency: patent  Anesthetic complications: No anesthetic complications  PONV Status: none  Cardiovascular status: acceptable and hemodynamically stable  Respiratory status: acceptable  Hydration status: acceptable    Comments: Blood pressure 128/66, pulse 75, temperature 97.3 °F (36.3 °C), temperature source Temporal Artery , resp. rate 16, SpO2 95 %.    Patient discharged from PACU based upon Mignon score. Please see RN notes for further details

## 2018-07-17 NOTE — ANESTHESIA PREPROCEDURE EVALUATION
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   Substance History - negative use     OB/GYN negative ob/gyn ROS         Other   (+) arthritis   history of cancer (prostate cancer, on coumadin) remission                    Anesthesia Plan    ASA 2     general     intravenous induction   Anesthetic plan and risks discussed with patient.

## 2018-07-17 NOTE — DISCHARGE INSTRUCTIONS

## 2018-07-19 LAB
CYTO UR: NORMAL
LAB AP CASE REPORT: NORMAL
PATH REPORT.FINAL DX SPEC: NORMAL
PATH REPORT.GROSS SPEC: NORMAL

## 2018-07-30 ENCOUNTER — HOSPITAL ENCOUNTER (OUTPATIENT)
Dept: GENERAL RADIOLOGY | Facility: HOSPITAL | Age: 78
Discharge: HOME | End: 2018-07-30
Admitting: NURSE PRACTITIONER

## 2018-07-30 ENCOUNTER — OFFICE VISIT (OUTPATIENT)
Dept: NEUROSURGERY | Facility: CLINIC | Age: 78
End: 2018-07-30

## 2018-07-30 ENCOUNTER — TELEPHONE (OUTPATIENT)
Dept: NEUROSURGERY | Facility: CLINIC | Age: 78
End: 2018-07-30

## 2018-07-30 ENCOUNTER — APPOINTMENT (OUTPATIENT)
Dept: GENERAL RADIOLOGY | Facility: HOSPITAL | Age: 78
End: 2018-07-30

## 2018-07-30 VITALS — BODY MASS INDEX: 27.74 KG/M2 | HEIGHT: 68 IN | WEIGHT: 183 LBS

## 2018-07-30 DIAGNOSIS — Z98.890 S/P KYPHOPLASTY: ICD-10-CM

## 2018-07-30 DIAGNOSIS — R20.0 NUMBNESS AND TINGLING OF RIGHT LEG: ICD-10-CM

## 2018-07-30 DIAGNOSIS — R20.2 NUMBNESS AND TINGLING OF RIGHT LEG: ICD-10-CM

## 2018-07-30 DIAGNOSIS — Z78.9 CURRENT NON-SMOKER: ICD-10-CM

## 2018-07-30 DIAGNOSIS — S32.000A LUMBAR COMPRESSION FRACTURE, CLOSED, INITIAL ENCOUNTER (HCC): Primary | ICD-10-CM

## 2018-07-30 PROCEDURE — 99213 OFFICE O/P EST LOW 20 MIN: CPT | Performed by: NURSE PRACTITIONER

## 2018-07-30 PROCEDURE — 72100 X-RAY EXAM L-S SPINE 2/3 VWS: CPT

## 2018-07-30 NOTE — PROGRESS NOTES
"Neurosurgery Follow Up Office Visit      Patient Name:  Juan Luis Collazo  Age:  77 y.o.  YOB: 1940  MR#:  [id number]    Ht 172.7 cm (68\")   Wt 83 kg (183 lb)   BMI 27.83 kg/m²     Social History   Substance Use Topics   • Smoking status: Former Smoker   • Smokeless tobacco: Never Used      Comment: 2quit 1991   • Alcohol use Yes      Comment: occasional drink       Chief Complaint   Patient presents with   • Post-op     2wk hfu.  Pt states that he continues to have a lot of pain, but it isn't in the area where his surgery was performed         History  Chief Complaint:  He and his wife return to the office for postop follow-up from kyphoplasty of an L3 fracture.  The patient had a lifting injury on June 11 and struggled with intractable pain.  He underwent kyphoplasty on the 17th.  He has a long-standing history of chronic back pain and it was a former patient of Dr. Villanueva.  I have not seen him now in several years, I believe since 2012.  He tells me that he has been having a fair amount of pain.  His wife mentions that he has complained about it quite a bit.  He does not really feel it is from the surgery.  He describes it more in the lower back, transversely, and then traveling down into the buttock and right leg.  He feels that there is numbness in the leg and this is all been ongoing since his injury.  He had an MRI of the lumbar prior to surgery.  He was noted to have multiple degenerative changes, leading an old L2 compression fracture.  No other acute findings were noted.      Physical Examination:  Upon exam, he looks really good.  He is awake and alert, very pleasant and cooperative.  He is up and moving about the exam room relatively well.  His lumbar incision looks very good with a small amount of skin adhesive and Monocryl sutures present.  No signs of infection.  Straight leg raising is positive on the right.  He is areflexic, even with reinforcement.  There is good, symmetric strength " of both lower extremities.  No tenderness upon palpation of the distal lumbar spine or the right SI joint.      Medical Decision Making  Treatment Options:   In the office we have discussed his care.  I think that likely he is having an exacerbation of his chronic pain that is also related to his injury.  He seems to be doing well from recent kyphoplasty.  We have talked about the idea of physical therapy.  His wife is interested in him starting therapy now.  I had planned to consider this in the next couple of weeks and the patient feels that he would like to wait.  I think that his pain will settle down.  He is typically taking his chronic pain medicine of Ultram and occasionally Norco that was prescribed for postoperative pain.  He is getting anxious to get more active and I have cautioned him to do this gradually.  We will see him back in another couple of weeks, hopefully to advance his activities considerably and possibly get him into some physical therapy.  They will call sooner with problems or concerns.    I have later discussed the case with Dr. Pandey and we will check a lumbar x-ray with AP and lateral views prior to the patient's next office visit.  I have called to let the patient know this and possibly he will do the x-ray sometime today, but definitely before coming back for his next appointment.    Information regarding BMI has been given in an effort to help support overall health and wellness.  I do not consider this an accurate reflection.      Assessment and Plan  Juan Luis was seen today for post-op.    Diagnoses and all orders for this visit:    Lumbar compression fracture, closed, initial encounter (CMS/[facility])    S/P kyphoplasty    Numbness and tingling of right leg    Current non-smoker    BMI 27.0-27.9,adult        Return in about 2 weeks (around 8/13/2018) for Post Op.      DIEGO Bucio

## 2018-07-30 NOTE — TELEPHONE ENCOUNTER
Pt called and stated that he stopped and got the xr done while they were in [place].  He would like to know his results.

## 2018-08-13 ENCOUNTER — OFFICE VISIT (OUTPATIENT)
Dept: NEUROSURGERY | Facility: CLINIC | Age: 78
End: 2018-08-13

## 2018-08-13 VITALS — WEIGHT: 182 LBS | HEIGHT: 67 IN | BODY MASS INDEX: 28.56 KG/M2

## 2018-08-13 DIAGNOSIS — Z78.9 CURRENT NON-SMOKER: ICD-10-CM

## 2018-08-13 DIAGNOSIS — S32.010D CLOSED COMPRESSION FRACTURE OF L1 LUMBAR VERTEBRA WITH ROUTINE HEALING, SUBSEQUENT ENCOUNTER: ICD-10-CM

## 2018-08-13 DIAGNOSIS — Z98.890 S/P KYPHOPLASTY: Primary | ICD-10-CM

## 2018-08-13 PROCEDURE — 99213 OFFICE O/P EST LOW 20 MIN: CPT | Performed by: NURSE PRACTITIONER

## 2018-08-13 NOTE — PROGRESS NOTES
"Neurosurgery Follow Up Office Visit      Patient Name:  Juan Luis Collazo  Age:  77 y.o.  YOB: 1940  MR#:  [id number]    Ht 170.2 cm (67\")   Wt 82.6 kg (182 lb)   BMI 28.51 kg/m²     Social History   Substance Use Topics   • Smoking status: Former Smoker   • Smokeless tobacco: Never Used      Comment: 2quit 1991   • Alcohol use Yes      Comment: occasional drink       Chief Complaint   Patient presents with   • Follow-up     2 wk po kyphoplasty.  Pt states that he is doing well.         History  Chief Complaint:  He returns today in follow-up of kyphoplasty for an L3 compression fracture almost a month ago.  This resulted from a lifting injury on June 11.  Due to intractable pain, the patient underwent kyphoplasty on the 17th.  When I last saw him, he was still having a fair amount of pain and discomfort.  He was feeling some pain into the right hip and but not and slightly down into the right posterior thigh.  His wife seem concerned and we had all talked about the option of physical therapy if he could not improve over the next couple of weeks.  We had also checked an AP and lateral x-ray and I had called the patient with the results.  This show good, stable appearance.  Today, he tells me that he is doing much better.  Any symptoms down into the right leg is only occurring intermittently and usually when he has been a lot more active.  Overall his pain is much better and he uses a pain pill only on occasion.  He is getting anxious to get back to doing a lot of things around the house, in particular mowing the yard.      Physical Examination:  On exam, he looks really good.  His incisions still have Monocryl intact.  They are healing well without sign of infection or complication.  Straight leg raising is done well and is unrestricted bilaterally.  He continues essentially areflexic.  Good, symmetric strength bilaterally.  He moves about quite well.      Medical Decision Making  Treatment Options:  "  In the office we have discussed his care.  Again, we have gotten an x-ray following his last office visit that looked good.  He has a little bit of irritation from the Monocryl sutures and so I have removed those without difficulty.  We have talked about his activities and that he should gradually resume them.  He does not need any medication.  We will see him back in a month with a follow-up AP and lateral lumbar x-ray.  He will call us sooner with any problems or concerns and is agreeable.    Information regarding BMI has been given in an effort to help support overall health and wellness.      Assessment and Plan  Juan Luis was seen today for follow-up.    Diagnoses and all orders for this visit:    S/P kyphoplasty  -     XR Spine Lumbar AP & Lateral; Future    Closed compression fracture of L1 lumbar vertebra with routine healing, subsequent encounter    BMI 28.0-28.9,adult    Current non-smoker    Other orders  -     Cancel: XR Spine Lumbar Lateral; Future        Return in about 4 weeks (around 9/10/2018).      DIEGO Bucio

## 2018-09-04 ENCOUNTER — HOSPITAL ENCOUNTER (OUTPATIENT)
Dept: GENERAL RADIOLOGY | Facility: HOSPITAL | Age: 78
Discharge: HOME | End: 2018-09-04
Admitting: NURSE PRACTITIONER

## 2018-09-04 DIAGNOSIS — S32.010A CLOSED COMPRESSION FRACTURE OF FIRST LUMBAR VERTEBRA, INITIAL ENCOUNTER: Primary | ICD-10-CM

## 2018-09-04 DIAGNOSIS — Z98.890 S/P KYPHOPLASTY: ICD-10-CM

## 2018-09-04 PROCEDURE — 72100 X-RAY EXAM L-S SPINE 2/3 VWS: CPT

## 2018-09-06 RX ORDER — LORAZEPAM 1 MG/1
TABLET ORAL
Qty: 1 TABLET | Refills: 0 | Status: SHIPPED | OUTPATIENT
Start: 2018-09-06 | End: 2018-09-10

## 2018-09-06 NOTE — TELEPHONE ENCOUNTER
I called the patient to remind him of his appt on Monday.  He said that he hadn't heard anything regarding his MRI and they were to go over the results for this.  I checked in his chart and it showed it was authorized, however, nothing was scheduled yet.  I gave him the number for scheduling and told him that he could check with them to see what else needed to be done.  He called and they scheduled him to have this done tomorrow.  He also stated that he would need something called in to have the MRI done (not sure what the preference is).  In the message that he left, he asked that the medication be sent to [facility] at  and he would stop by to pick it up on the way in the morning .

## 2018-09-07 ENCOUNTER — HOSPITAL ENCOUNTER (OUTPATIENT)
Dept: MRI IMAGING | Facility: HOSPITAL | Age: 78
Discharge: HOME | End: 2018-09-07
Attending: NEUROLOGICAL SURGERY | Admitting: NEUROLOGICAL SURGERY

## 2018-09-07 DIAGNOSIS — S32.010A CLOSED COMPRESSION FRACTURE OF FIRST LUMBAR VERTEBRA, INITIAL ENCOUNTER: ICD-10-CM

## 2018-09-07 PROCEDURE — 72148 MRI LUMBAR SPINE W/O DYE: CPT

## 2018-09-10 ENCOUNTER — OFFICE VISIT (OUTPATIENT)
Dept: NEUROSURGERY | Facility: CLINIC | Age: 78
End: 2018-09-10

## 2018-09-10 ENCOUNTER — PREP FOR SURGERY (OUTPATIENT)
Dept: OTHER | Facility: HOSPITAL | Age: 78
End: 2018-09-10

## 2018-09-10 ENCOUNTER — APPOINTMENT (OUTPATIENT)
Dept: PREADMISSION TESTING | Facility: HOSPITAL | Age: 78
End: 2018-09-10

## 2018-09-10 VITALS
HEART RATE: 88 BPM | DIASTOLIC BLOOD PRESSURE: 58 MMHG | BODY MASS INDEX: 29.41 KG/M2 | HEIGHT: 66 IN | SYSTOLIC BLOOD PRESSURE: 124 MMHG | WEIGHT: 182.98 LBS | RESPIRATION RATE: 15 BRPM | OXYGEN SATURATION: 97 %

## 2018-09-10 VITALS
BODY MASS INDEX: 28.56 KG/M2 | DIASTOLIC BLOOD PRESSURE: 68 MMHG | WEIGHT: 182 LBS | HEIGHT: 67 IN | SYSTOLIC BLOOD PRESSURE: 120 MMHG

## 2018-09-10 DIAGNOSIS — S32.000A LUMBAR COMPRESSION FRACTURE (HCC): ICD-10-CM

## 2018-09-10 DIAGNOSIS — S32.000A LUMBAR COMPRESSION FRACTURE (HCC): Primary | ICD-10-CM

## 2018-09-10 DIAGNOSIS — S32.040G COMPRESSION FRACTURE OF FOURTH LUMBAR VERTEBRA WITH DELAYED HEALING: Primary | ICD-10-CM

## 2018-09-10 DIAGNOSIS — Z78.9 CURRENT NON-SMOKER: ICD-10-CM

## 2018-09-10 LAB
ANION GAP SERPL CALCULATED.3IONS-SCNC: 10 MMOL/L (ref 4–13)
BACTERIA UR QL AUTO: ABNORMAL /HPF
BASOPHILS # BLD AUTO: 0.04 10*3/MM3 (ref 0–0.2)
BASOPHILS NFR BLD AUTO: 0.4 % (ref 0–2)
BILIRUB UR QL STRIP: NEGATIVE
BUN BLD-MCNC: 19 MG/DL (ref 5–21)
BUN/CREAT SERPL: 19.2 (ref 7–25)
CALCIUM SPEC-SCNC: 9.5 MG/DL (ref 8.4–10.4)
CHLORIDE SERPL-SCNC: 99 MMOL/L (ref 98–110)
CLARITY UR: CLEAR
CO2 SERPL-SCNC: 31 MMOL/L (ref 24–31)
COLOR UR: ABNORMAL
CREAT BLD-MCNC: 0.99 MG/DL (ref 0.5–1.4)
DEPRECATED RDW RBC AUTO: 45.4 FL (ref 40–54)
EOSINOPHIL # BLD AUTO: 0.16 10*3/MM3 (ref 0–0.7)
EOSINOPHIL NFR BLD AUTO: 1.7 % (ref 0–4)
ERYTHROCYTE [DISTWIDTH] IN BLOOD BY AUTOMATED COUNT: 13.2 % (ref 12–15)
GFR SERPL CREATININE-BSD FRML MDRD: 73 ML/MIN/1.73
GLUCOSE BLD-MCNC: 117 MG/DL (ref 70–100)
GLUCOSE UR STRIP-MCNC: NEGATIVE MG/DL
HCT VFR BLD AUTO: 36.7 % (ref 40–52)
HGB BLD-MCNC: 12.6 G/DL (ref 14–18)
HGB UR QL STRIP.AUTO: NEGATIVE
HYALINE CASTS UR QL AUTO: ABNORMAL /LPF
IMM GRANULOCYTES # BLD: 0.06 10*3/MM3 (ref 0–0.03)
IMM GRANULOCYTES NFR BLD: 0.6 % (ref 0–5)
KETONES UR QL STRIP: ABNORMAL
LEUKOCYTE ESTERASE UR QL STRIP.AUTO: ABNORMAL
LYMPHOCYTES # BLD AUTO: 1.3 10*3/MM3 (ref 0.72–4.86)
LYMPHOCYTES NFR BLD AUTO: 13.4 % (ref 15–45)
MCH RBC QN AUTO: 32 PG (ref 28–32)
MCHC RBC AUTO-ENTMCNC: 34.3 G/DL (ref 33–36)
MCV RBC AUTO: 93.1 FL (ref 82–95)
MONOCYTES # BLD AUTO: 0.79 10*3/MM3 (ref 0.19–1.3)
MONOCYTES NFR BLD AUTO: 8.2 % (ref 4–12)
NEUTROPHILS # BLD AUTO: 7.33 10*3/MM3 (ref 1.87–8.4)
NEUTROPHILS NFR BLD AUTO: 75.7 % (ref 39–78)
NITRITE UR QL STRIP: NEGATIVE
NRBC BLD MANUAL-RTO: 0 /100 WBC (ref 0–0)
PH UR STRIP.AUTO: 7 [PH] (ref 5–8)
PLATELET # BLD AUTO: 315 10*3/MM3 (ref 130–400)
PMV BLD AUTO: 10.3 FL (ref 6–12)
POTASSIUM BLD-SCNC: 4.5 MMOL/L (ref 3.5–5.3)
PROT UR QL STRIP: NEGATIVE
RBC # BLD AUTO: 3.94 10*6/MM3 (ref 4.8–5.9)
RBC # UR: ABNORMAL /HPF
REF LAB TEST METHOD: ABNORMAL
SODIUM BLD-SCNC: 140 MMOL/L (ref 135–145)
SP GR UR STRIP: 1.02 (ref 1–1.03)
SQUAMOUS #/AREA URNS HPF: ABNORMAL /HPF
UROBILINOGEN UR QL STRIP: ABNORMAL
WBC NRBC COR # BLD: 9.68 10*3/MM3 (ref 4.8–10.8)
WBC UR QL AUTO: ABNORMAL /HPF

## 2018-09-10 PROCEDURE — 99213 OFFICE O/P EST LOW 20 MIN: CPT | Performed by: NEUROLOGICAL SURGERY

## 2018-09-10 PROCEDURE — 85025 COMPLETE CBC W/AUTO DIFF WBC: CPT | Performed by: NEUROLOGICAL SURGERY

## 2018-09-10 PROCEDURE — 81001 URINALYSIS AUTO W/SCOPE: CPT | Performed by: NEUROLOGICAL SURGERY

## 2018-09-10 PROCEDURE — 80048 BASIC METABOLIC PNL TOTAL CA: CPT | Performed by: NEUROLOGICAL SURGERY

## 2018-09-10 PROCEDURE — 36415 COLL VENOUS BLD VENIPUNCTURE: CPT

## 2018-09-10 NOTE — PATIENT INSTRUCTIONS
PATIENT TO CONTINUE TO FOLLOW UP WITH HIS PRIMARY CARE PROVIDER FOR YEARLY PHYSICAL EXAMS TO ENSURE COMPLETE HEALTH MAINTENANCE.    BMI for Adults  Body mass index (BMI) is a number that is calculated from a person's weight and height. In most adults, the number is used to find how much of an adult's weight is made up of fat. BMI is not as accurate as a direct measure of body fat.  How is BMI calculated?  BMI is calculated by dividing weight in kilograms by height in meters squared. It can also be calculated by dividing weight in pounds by height in inches squared, then multiplying the resulting number by 703. Charts are available to help you find your BMI quickly and easily without doing this calculation.  How is BMI interpreted?  Health care professionals use BMI charts to identify whether an adult is underweight, at a normal weight, or overweight based on the following guidelines:  · Underweight: BMI less than 18.5.  · Normal weight: BMI between 18.5 and 24.9.  · Overweight: BMI between 25 and 29.9.  · Obese: BMI of 30 and above.    BMI is usually interpreted the same for males and females.  Weight includes both fat and muscle, so someone with a muscular build, such as an athlete, may have a BMI that is higher than 24.9. In cases like these, BMI may not accurately depict body fat. To determine if excess body fat is the cause of a BMI of 25 or higher, further assessments may need to be done by a health care provider.  Why is BMI a useful tool?  BMI is used to identify a possible weight problem that may be related to a medical problem or may increase the risk for medical problems. BMI can also be used to promote changes to reach a healthy weight.  This information is not intended to replace advice given to you by your health care provider. Make sure you discuss any questions you have with your health care provider.  Document Released: 08/29/2005 Document Revised: 04/27/2017 Document Reviewed: 05/15/2015  [employer] Patient Education © 2018 [employer]

## 2018-09-10 NOTE — DISCHARGE INSTRUCTIONS
DAY OF SURGERY INSTRUCTIONS        YOUR SURGEON: ***dr richardson     PROCEDURE: ***kyphoplasty     DATE OF SURGERY: ***sept 11/ 2018    ARRIVAL TIME: AS DIRECTED BY OFFICE    DAY OF SURGERY TAKE ONLY THESE MEDICATIONS UNLESS OTHERWISE INSTRUCTED BY YOUR PHYSICIAN: ***none        MANAGING PAIN AFTER SURGERY    We know you are probably wondering what your pain will be like after surgery.  Following surgery it is unrealistic to expect you will not have pain.   Pain is how our bodies let us know that something is wrong or cautions us to be careful.  That said, our goal is to make your pain tolerable.    Methods we may use to treat your pain include (oral or IV medications, PCAs, epidurals, nerve blocks, etc.)   While some procedures require IV pain medications for a short time after surgery, transitioning to pain medications by mouth allows for better management of pain.   Your nurse will encourage you to take oral pain medications whenever possible.  IV medications work almost immediately, but only last a short while.  Taking medications by mouth allows for a more constant level of medication in your blood stream for a longer period of time.      Once your pain is out of control it is harder to get back under control.  It is important you are aware when your next dose of pain medication is due.  If you are admitted, your nurse may write the time of your next dose on the white board in your room to help you remember.      We are interested in your pain and encourage you to inform us about aggravating factors during your visit.   Many times a simple repositioning every few hours can make a big difference.    If your physician says it is okay, do not let your pain prevent you from getting out of bed. Be sure to call your nurse for assistance prior to getting up so you do not fall.      Before surgery, please decide your tolerable pain goal.  These faces help describe the pain ratings we use on a 0-10 scale.   Be prepared to  tell us your goal and whether or not you take pain or anxiety medications at home.          BEFORE YOU COME TO THE HOSPITAL  (Pre-op instructions)  • Do not eat, drink, smoke or chew gum after midnight the night before surgery.  This also includes no mints.  • Morning of surgery take only the medicines you have been instructed with a sip of water unless otherwise instructed  by your physician.  • Do not shave, wear makeup or dark nail polish.  • Remove all jewelry including rings.  • Leave anything you consider valuable at home.  • Leave your suitcase in the car until after your surgery.  • Bring the following with you if applicable:  o Picture ID and insurance, Medicare or Medicaid cards  o Co-pay/deductible required by insurance (cash, check, credit card)  o Copy of advance directive, living will or power-of- documents if not brought to PAT  o CPAP or BIPAP mask and tubing  o Relaxation aids (MP3 player, book, magazine)  • On the day of surgery check in at registration located at the main entrance of the hospital.       Outpatient Surgery Guidelines, Adult  Outpatient procedures are those for which the person having the procedure is allowed to go home the same day as the procedure. Various procedures are done on an outpatient basis. You should follow some general guidelines if you will be having an outpatient procedure.  LET YOUR HEALTH CARE PROVIDER KNOW ABOUT:  · Any allergies you have.  · All medicines you are taking, including vitamins, herbs, eye drops, creams, and over-the-counter medicines.  · Previous problems you or members of your family have had with the use of anesthetics.  · Any blood disorders you have.  · Previous surgeries you have had.  · Medical conditions you have.  RISKS AND COMPLICATIONS  Your health care provider will discuss possible risks and complications with you before surgery. Common risks and complications include:    · Problems due to the use of anesthetics.  · Blood loss and  replacement (does not apply to minor surgical procedures).  · Temporary increase in pain due to surgery.  · Uncorrected pain or problems that the surgery was meant to correct.  · Infection.  · New damage.  BEFORE THE PROCEDURE  · Ask your health care provider about changing or stopping your regular medicines. You may need to stop taking certain medicines in the days or weeks before the procedure.  · Stop smoking at least 2 weeks before surgery. This lowers your risk for complications during and after surgery. Ask your health care provider for help with this if needed.  · Eat your usual meals and a light supper the day before surgery. Continue fluid intake. Do not drink alcohol.  · Do not eat or drink after midnight the night before your surgery.   · Arrange for someone to take you home and to stay with you for 24 hours after the procedure. Medicine given for your procedure may affect your ability to drive or to care for yourself.  · Call your health care provider's office if you develop an illness or problem that may prevent you from safely having your procedure.  AFTER THE PROCEDURE  After surgery, you will be taken to a recovery area, where your progress will be monitored. If there are no complications, you will be allowed to go home when you are awake, stable, and taking fluids well. You may have numbness around the surgical site. Healing will take some time. You will have tenderness at the surgical site and may have some swelling and bruising. You may also have some nausea.  HOME CARE INSTRUCTIONS  · Do not drive for 24 hours, or as directed by your health care provider. Do not drive while taking prescription pain medicines.  · Do not drink alcohol for 24 hours.  · Do not make important decisions or sign legal documents for 24 hours.  · You may resume a normal diet and activities as directed.  · Do not lift anything heavier than 10 pounds (4.5 kg) or play contact sports until your health care provider says it is  okay.  · Change your bandages (dressings) as directed.  · Only take over-the-counter or prescription medicines as directed by your health care provider.  · Follow up with your health care provider as directed.  SEEK MEDICAL CARE IF:  · You have increased bleeding (more than a small spot) from the surgical site.  · You have redness, swelling, or increasing pain in the wound.  · You see pus coming from the wound.  · You have a fever.  · You notice a bad smell coming from the wound or dressing.  · You feel lightheaded or faint.  · You develop a rash.  · You have trouble breathing.  · You develop allergies.  MAKE SURE YOU:  · Understand these instructions.  · Will watch your condition.  · Will get help right away if you are not doing well or get worse.     This information is not intended to replace advice given to you by your health care provider. Make sure you discuss any questions you have with your health care provider.     Document Released: 09/12/2002 Document Revised: 05/03/2016 Document Reviewed: 05/22/2014  [employer] Interactive Patient Education ©2016 [employer]       Fall Prevention in Hospitals, Adult  As a hospital patient, your condition and the treatments you receive can increase your risk for falls. Some additional risk factors for falls in a hospital include:  · Being in an unfamiliar environment.  · Being on bed rest.  · Your surgery.  · Taking certain medicines.  · Your tubing requirements, such as intravenous (IV) therapy or catheters.  It is important that you learn how to decrease fall risks while at the hospital. Below are important tips that can help prevent falls.  SAFETY TIPS FOR PREVENTING FALLS  Talk about your risk of falling.  · Ask your health care provider why you are at risk for falling. Is it your medicine, illness, tubing placement, or something else?  · Make a plan with your health care provider to keep you safe from falls.  · Ask your health care provider or pharmacist about side  effects of your medicines. Some medicines can make you dizzy or affect your coordination.  Ask for help.  · Ask for help before getting out of bed. You may need to press your call button.  · Ask for assistance in getting safely to the toilet.  · Ask for a walker or cane to be put at your bedside. Ask that most of the side rails on your bed be placed up before your health care provider leaves the room.  · Ask family or friends to sit with you.  · Ask for things that are out of your reach, such as your glasses, hearing aids, telephone, bedside table, or call button.  Follow these tips to avoid falling:  · Stay lying or seated, rather than standing, while waiting for help.  · Wear rubber-soled slippers or shoes whenever you walk in the hospital.  · Avoid quick, sudden movements.  ¨ Change positions slowly.  ¨ Sit on the side of your bed before standing.  ¨ Stand up slowly and wait before you start to walk.  · Let your health care provider know if there is a spill on the floor.  · Pay careful attention to the medical equipment, electrical cords, and tubes around you.  · When you need help, use your call button by your bed or in the bathroom. Wait for one of your health care providers to help you.  · If you feel dizzy or unsure of your footing, return to bed and wait for assistance.  · Avoid being distracted by the TV, telephone, or another person in your room.  · Do not lean or support yourself on rolling objects, such as IV poles or bedside tables.     This information is not intended to replace advice given to you by your health care provider. Make sure you discuss any questions you have with your health care provider.     Document Released: 12/15/2001 Document Revised: 01/08/2016 Document Reviewed: 08/25/2013  [employer] Interactive Patient Education ©2016 [employer]       Surgical Site Infections FAQs  What is a Surgical Site Infection (SSI)?  A surgical site infection is an infection that occurs after surgery in  the part of the body where the surgery took place. Most patients who have surgery do not develop an infection. However, infections develop in about 1 to 3 out of every 100 patients who have surgery.  Some of the common symptoms of a surgical site infection are:  · Redness and pain around the area where you had surgery  · Drainage of cloudy fluid from your surgical wound  · Fever  Can SSIs be treated?  Yes. Most surgical site infections can be treated with antibiotics. The antibiotic given to you depends on the bacteria (germs) causing the infection. Sometimes patients with SSIs also need another surgery to treat the infection.  What are some of the things that hospitals are doing to prevent SSIs?  To prevent SSIs, doctors, nurses, and other healthcare providers:  · Clean their hands and arms up to their elbows with an antiseptic agent just before the surgery.  · Clean their hands with soap and water or an alcohol-based hand rub before and after caring for each patient.  · May remove some of your hair immediately before your surgery using electric clippers if the hair is in the same area where the procedure will occur. They should not shave you with a razor.  · Wear special hair covers, masks, gowns, and gloves during surgery to keep the surgery area clean.  · Give you antibiotics before your surgery starts. In most cases, you should get antibiotics within 60 minutes before the surgery starts and the antibiotics should be stopped within 24 hours after surgery.  · Clean the skin at the site of your surgery with a special soap that kills germs.  What can I do to help prevent SSIs?  Before your surgery:  · Tell your doctor about other medical problems you may have. Health problems such as allergies, diabetes, and obesity could affect your surgery and your treatment.  · Quit smoking. Patients who smoke get more infections. Talk to your doctor about how you can quit before your surgery.  · Do not shave near where you will  have surgery. Shaving with a razor can irritate your skin and make it easier to develop an infection.  At the time of your surgery:  · Speak up if someone tries to shave you with a razor before surgery. Ask why you need to be shaved and talk with your surgeon if you have any concerns.  · Ask if you will get antibiotics before surgery.  After your surgery:  · Make sure that your healthcare providers clean their hands before examining you, either with soap and water or an alcohol-based hand rub.  · If you do not see your providers clean their hands, please ask them to do so.  · Family and friends who visit you should not touch the surgical wound or dressings.  · Family and friends should clean their hands with soap and water or an alcohol-based hand rub before and after visiting you. If you do not see them clean their hands, ask them to clean their hands.  What do I need to do when I go home from the hospital?  · Before you go home, your doctor or nurse should explain everything you need to know about taking care of your wound. Make sure you understand how to care for your wound before you leave the hospital.  · Always clean your hands before and after caring for your wound.  · Before you go home, make sure you know who to contact if you have questions or problems after you get home.  · If you have any symptoms of an infection, such as redness and pain at the surgery site, drainage, or fever, call your doctor immediately.  If you have additional questions, please ask your doctor or nurse.  Developed and co-sponsored by The Society for Healthcare Epidemiology of [place] (SHEA); Infectious Diseases Society of [place] (IDSA); American Hospital Association; Association for Professionals in Infection Control and Epidemiology (APIC); Centers for Disease Control and Prevention (CDC); and The Joint Commission.     This information is not intended to replace advice given to you by your health care provider. Make sure you  discuss any questions you have with your health care provider.     Document Released: 12/23/2014 Document Revised: 01/08/2016 Document Reviewed: 03/02/2016  [employer] Interactive Patient Education ©2016 [employer] Inc.       [facility]  CHG 4% Patient Instruction Sheet    Preparing the Skin Before Surgery  Preparing or “prepping” skin before surgery can reduce the risk of infection at the surgical site. To make the process easier,[facility] has chosen 4% Chlorhexidine Gluconate (CHG) antiseptic solution.   The steps below outline the prepping process and should be carefully followed.                                                                                                                                                      Prep the skin at the following time(s):                                                      We recommend you shower the night before surgery, and again the morning of surgery with the 4% CHG antiseptic solution using half of the bottle and a cloth each time.  Dress in clean clothes/sleepwear after showering.  See instructions below for application.          Do not apply any lotions or moisturizers.       Do not shave the area to be prepped for at least 2 days prior to surgery.    Clipping the hair may be done immediately prior to your surgery at the hospital    if needed.    Directions:  Thoroughly rinse your body with water.  Apply 4% CHG to a cloth and wash skin gently, paying special attention to the operative site.  Rinse again thoroughly.  Once you have begun using this product do not apply anything else to your skin. If itching or redness persists, rinse affected areas and discontinue use.    When using this product:  • Keep out of eyes, ears, and mouth.  • If solution should contact these areas, rinse out promptly and thoroughly with water.  • For external use only.  • Do not use in genital area, on your face or head.      PATIENT/FAMILY/RESPONSIBLE PARTY VERBALIZES UNDERSTANDING  OF ABOVE EDUCATION.  COPY OF PAIN SCALE GIVEN AND REVIEWED WITH VERBALIZED UNDERSTANDING.

## 2018-09-10 NOTE — PROGRESS NOTES
"    Chief complaint   Chief Complaint   Patient presents with   • Back Pain     /patient underwent L3 kyphoplasty on 7/17/18; patient had MRI on 9/7/18 @ [facility] and is here to discuss results; patient states his pain is worse and it goes down into his right hip.        Subjective     HPI:     This patient is a 77-year-old male who was treated with an L3 kyphoplasty for compression fracture with resolution of back pain.  Over the past 6-8 weeks, he is developed slow progression of his back pain without any specific event.  He now states that his back pain is intolerable despite use of a LSO brace and low-dose narcotics.  He presents for follow-up after obtaining a new MRI of his lumbar spine.  His wife is present with him today.    Review of Systems     A 12 point review of systems is obtained and is negative except for as described in HPI    Past Medical History:   Diagnosis Date   • Arthritis    • Cancer (CMS/HCC)     prostate   • Hypertension      Past Surgical History:   Procedure Laterality Date   • APPENDECTOMY     • FOOT SURGERY Right    • KYPHOPLASTY Bilateral 7/17/2018    Procedure: L3 KYPHOPLASTY 1-2 LEVELS;  Surgeon: Vega Pandey MD;  [facility]: [facility];  Service: Neurosurgery   • PROSTATECTOMY     • TONSILLECTOMY     • TOTAL SHOULDER REPLACEMENT Bilateral     at different times     History reviewed. No pertinent family history.  Social History   Substance Use Topics   • Smoking status: Former Smoker   • Smokeless tobacco: Never Used      Comment: 2quit 1991   • Alcohol use Yes      Comment: occasional drink       (Not in a hospital admission)  Allergies:  Patient has no known allergies.    Objective      Vital Signs  /68   Ht 170.2 cm (67\")   Wt 82.6 kg (182 lb)   BMI 28.51 kg/m²     Physical Exam    No acute distress  Awake alert oriented ×3  HEENT atraumatic normocephalic  Neck supple  Neurologic exam  Cranial nerves II through XII grossly intact  Patient moves all extremities 5 out 5 " strength  Lumbar spine tender palpation  Gait normal    Results Review:     Mri Lumbar Spine Without Contrast    Result Date: 9/7/2018  Narrative: EXAM: MR LUMBOSACRAL SPINE WITHOUT IV CONTRAST 9/7/2018  COMPARISON: MRI lumbar spine dated 6/28/2018.  INDICATION: 77 years-old Male.  BACK PAIN; S32.010A-Wedge compression fracture of first lumbar vertebra, initial encounter for closed fracture  TECHNIQUE: Routine pulse sequences of the lumbar spine were obtained without IV contrast.  FINDINGS: Interval at that similar body augmentation at L3. Height loss at L3 since recent MR lumbar spine has not significantly changed. There is now edema at L4, with subtle less than 10% height loss. He may Central posterior cortex, without retropulsion. Grade 1 anterolisthesis of L5 on S1 and L4 on L5. No marrow replacing process. The conus terminates at L1. There are no significant spinal cord signal abnormalities.  Disc desiccation and height loss at multiple levels. The prevertebral and paraspinal soft tissues are unremarkable.  Degenerative changes:   L1-L2 : No spinal canal stenosis. No foraminal stenosis.  L2-L3 : Disc bulge, ligamentum flavum hypertrophy, without significant thecal sac stenosis. Mild bilateral foraminal stenosis.  L3-L4 : No significant spinal canal stenosis. Disc bulge. Facet arthropathy contributing to mild bilateral foraminal stenosis.  L4-L5 : Uncovering of the disc, ligamentum flavum hypertrophy, without significant spinal canal stenosis. Facet arthropathy contributing to mild to moderate bilateral foraminal stenosis.  L5-S1 : No significant spinal canal stenosis. Disc bulge and ligamentum flavum hypertrophy, with resulting mild left and moderate right foraminal stenosis. Facet arthropathy contributes to foraminal stenosis.       Impression: 1.  Interval slight height loss and edema involving the L4 vertebral body, consistent with interval compression deformity. Approximation of the posterior cortex,  concerning for a middle column component to the fracture. 2.  Vertebral body augmentation at L3, positive interval change in height. Persistent edema in the vertebral body. 3.  Multilevel degenerative changes in alignment as described above. This report was finalized on 09/07/2018 17:17 by Dr. Angeli Rosa MD.    Xr Spine Lumbar Ap & Lateral    Result Date: 9/4/2018  Narrative: XR SPINE LUMBAR AP AND LATERAL- 9/4/2018 9:54 AM CDT  HISTORY: Follow-up L3 kyphoplasty; Z98.890-Other specified postprocedural states  COMPARISON: 7/30/2018  FINDINGS: Frontal and lateral radiographs of the lumbar spine were obtained.  Kyphoplasty cement is seen in the L3 vertebral body. There has been no change in the vertebral body heights since the previous study. Mild degenerative changes are noted at L5-S1. Atherosclerosis is seen in the aorta and iliac arteries.      Impression: 1. Stable L3 compression fracture status post kyphoplasty.   This report was finalized on 09/04/2018 10:03 by Dr. Hernán Trejo MD.              Assessment/Plan:     77-year-old male with spontaneous L4 compression fractures.  I discuss treatment options including also brace and narcotics versus L4 kyphoplasty.  I discuss risks kyphoplasty that include but are not limited to pain not improving, pain worsening, infection, hematoma, need for further surgery, nerve injury, paralysis, bowel bladder incontinence, death.  Patient voices understanding and wishes to proceed.    I discussed the patients findings and my recommendations with patient    Vega Pandey MD  09/10/18  9:32 AM

## 2018-09-11 ENCOUNTER — ANESTHESIA (OUTPATIENT)
Dept: PERIOP | Facility: HOSPITAL | Age: 78
End: 2018-09-11

## 2018-09-11 ENCOUNTER — HOSPITAL ENCOUNTER (OUTPATIENT)
Facility: HOSPITAL | Age: 78
Discharge: HOME | End: 2018-09-11
Attending: NEUROLOGICAL SURGERY | Admitting: NEUROLOGICAL SURGERY

## 2018-09-11 ENCOUNTER — APPOINTMENT (OUTPATIENT)
Dept: GENERAL RADIOLOGY | Facility: HOSPITAL | Age: 78
End: 2018-09-11

## 2018-09-11 ENCOUNTER — ANESTHESIA EVENT (OUTPATIENT)
Dept: PERIOP | Facility: HOSPITAL | Age: 78
End: 2018-09-11

## 2018-09-11 VITALS
TEMPERATURE: 98.1 F | SYSTOLIC BLOOD PRESSURE: 110 MMHG | DIASTOLIC BLOOD PRESSURE: 56 MMHG | OXYGEN SATURATION: 92 % | HEART RATE: 84 BPM | RESPIRATION RATE: 16 BRPM

## 2018-09-11 DIAGNOSIS — S32.000A LUMBAR COMPRESSION FRACTURE (HCC): ICD-10-CM

## 2018-09-11 PROCEDURE — 76000 FLUOROSCOPY <1 HR PHYS/QHP: CPT

## 2018-09-11 PROCEDURE — 94640 AIRWAY INHALATION TREATMENT: CPT

## 2018-09-11 PROCEDURE — 25010000002 DEXAMETHASONE PER 1 MG: Performed by: NURSE ANESTHETIST, CERTIFIED REGISTERED

## 2018-09-11 PROCEDURE — C1713 ANCHOR/SCREW BN/BN,TIS/BN: HCPCS | Performed by: NEUROLOGICAL SURGERY

## 2018-09-11 PROCEDURE — 88311 DECALCIFY TISSUE: CPT | Performed by: NEUROLOGICAL SURGERY

## 2018-09-11 PROCEDURE — 72100 X-RAY EXAM L-S SPINE 2/3 VWS: CPT

## 2018-09-11 PROCEDURE — 25010000002 FENTANYL CITRATE (PF) 250 MCG/5ML SOLUTION: Performed by: NURSE ANESTHETIST, CERTIFIED REGISTERED

## 2018-09-11 PROCEDURE — 25010000002 FENTANYL CITRATE (PF) 100 MCG/2ML SOLUTION: Performed by: ANESTHESIOLOGY

## 2018-09-11 PROCEDURE — 25010000002 ONDANSETRON PER 1 MG: Performed by: NURSE ANESTHETIST, CERTIFIED REGISTERED

## 2018-09-11 PROCEDURE — 25010000002 NEOSTIGMINE PER 0.5 MG: Performed by: NURSE ANESTHETIST, CERTIFIED REGISTERED

## 2018-09-11 PROCEDURE — 25010000002 IOPAMIDOL 61 % SOLUTION: Performed by: NEUROLOGICAL SURGERY

## 2018-09-11 PROCEDURE — 88307 TISSUE EXAM BY PATHOLOGIST: CPT | Performed by: NEUROLOGICAL SURGERY

## 2018-09-11 PROCEDURE — 63710000001 OXYCODONE-ACETAMINOPHEN 7.5-325 MG TABLET: Performed by: ANESTHESIOLOGY

## 2018-09-11 PROCEDURE — A9270 NON-COVERED ITEM OR SERVICE: HCPCS | Performed by: ANESTHESIOLOGY

## 2018-09-11 PROCEDURE — 63710000001 ACETAMINOPHEN 500 MG TABLET: Performed by: ANESTHESIOLOGY

## 2018-09-11 PROCEDURE — 22514 PERQ VERTEBRAL AUGMENTATION: CPT | Performed by: NEUROLOGICAL SURGERY

## 2018-09-11 RX ORDER — FENTANYL CITRATE 50 UG/ML
25 INJECTION, SOLUTION INTRAMUSCULAR; INTRAVENOUS AS NEEDED
Status: DISCONTINUED | OUTPATIENT
Start: 2018-09-11 | End: 2018-09-11 | Stop reason: HOSPADM

## 2018-09-11 RX ORDER — FENTANYL CITRATE 50 UG/ML
INJECTION, SOLUTION INTRAMUSCULAR; INTRAVENOUS AS NEEDED
Status: DISCONTINUED | OUTPATIENT
Start: 2018-09-11 | End: 2018-09-11 | Stop reason: SURG

## 2018-09-11 RX ORDER — ONDANSETRON 2 MG/ML
INJECTION INTRAMUSCULAR; INTRAVENOUS AS NEEDED
Status: DISCONTINUED | OUTPATIENT
Start: 2018-09-11 | End: 2018-09-11 | Stop reason: SURG

## 2018-09-11 RX ORDER — SODIUM CHLORIDE, SODIUM LACTATE, POTASSIUM CHLORIDE, CALCIUM CHLORIDE 600; 310; 30; 20 MG/100ML; MG/100ML; MG/100ML; MG/100ML
1000 INJECTION, SOLUTION INTRAVENOUS CONTINUOUS
Status: DISCONTINUED | OUTPATIENT
Start: 2018-09-11 | End: 2018-09-11 | Stop reason: HOSPADM

## 2018-09-11 RX ORDER — NALOXONE HCL 0.4 MG/ML
0.4 VIAL (ML) INJECTION AS NEEDED
Status: DISCONTINUED | OUTPATIENT
Start: 2018-09-11 | End: 2018-09-11 | Stop reason: HOSPADM

## 2018-09-11 RX ORDER — MAGNESIUM HYDROXIDE 1200 MG/15ML
LIQUID ORAL AS NEEDED
Status: DISCONTINUED | OUTPATIENT
Start: 2018-09-11 | End: 2018-09-11 | Stop reason: HOSPADM

## 2018-09-11 RX ORDER — SODIUM CHLORIDE 0.9 % (FLUSH) 0.9 %
3 SYRINGE (ML) INJECTION AS NEEDED
Status: DISCONTINUED | OUTPATIENT
Start: 2018-09-11 | End: 2018-09-11 | Stop reason: HOSPADM

## 2018-09-11 RX ORDER — ATRACURIUM BESYLATE 10 MG/ML
INJECTION, SOLUTION INTRAVENOUS AS NEEDED
Status: DISCONTINUED | OUTPATIENT
Start: 2018-09-11 | End: 2018-09-11 | Stop reason: SURG

## 2018-09-11 RX ORDER — PHENYLEPHRINE HCL IN 0.9% NACL 0.8MG/10ML
SYRINGE (ML) INTRAVENOUS AS NEEDED
Status: DISCONTINUED | OUTPATIENT
Start: 2018-09-11 | End: 2018-09-11 | Stop reason: SURG

## 2018-09-11 RX ORDER — DEXAMETHASONE SODIUM PHOSPHATE 4 MG/ML
INJECTION, SOLUTION INTRA-ARTICULAR; INTRALESIONAL; INTRAMUSCULAR; INTRAVENOUS; SOFT TISSUE AS NEEDED
Status: DISCONTINUED | OUTPATIENT
Start: 2018-09-11 | End: 2018-09-11 | Stop reason: SURG

## 2018-09-11 RX ORDER — ETOMIDATE 2 MG/ML
INJECTION INTRAVENOUS AS NEEDED
Status: DISCONTINUED | OUTPATIENT
Start: 2018-09-11 | End: 2018-09-11 | Stop reason: SURG

## 2018-09-11 RX ORDER — IBUPROFEN 600 MG/1
600 TABLET ORAL ONCE AS NEEDED
Status: DISCONTINUED | OUTPATIENT
Start: 2018-09-11 | End: 2018-09-11 | Stop reason: HOSPADM

## 2018-09-11 RX ORDER — ACETAMINOPHEN 500 MG
1000 TABLET ORAL ONCE
Status: COMPLETED | OUTPATIENT
Start: 2018-09-11 | End: 2018-09-11

## 2018-09-11 RX ORDER — SODIUM CHLORIDE, SODIUM LACTATE, POTASSIUM CHLORIDE, CALCIUM CHLORIDE 600; 310; 30; 20 MG/100ML; MG/100ML; MG/100ML; MG/100ML
100 INJECTION, SOLUTION INTRAVENOUS CONTINUOUS
Status: DISCONTINUED | OUTPATIENT
Start: 2018-09-11 | End: 2018-09-11 | Stop reason: HOSPADM

## 2018-09-11 RX ORDER — OXYCODONE AND ACETAMINOPHEN 10; 325 MG/1; MG/1
1 TABLET ORAL ONCE AS NEEDED
Status: DISCONTINUED | OUTPATIENT
Start: 2018-09-11 | End: 2018-09-11 | Stop reason: HOSPADM

## 2018-09-11 RX ORDER — LABETALOL HYDROCHLORIDE 5 MG/ML
5 INJECTION, SOLUTION INTRAVENOUS
Status: DISCONTINUED | OUTPATIENT
Start: 2018-09-11 | End: 2018-09-11 | Stop reason: HOSPADM

## 2018-09-11 RX ORDER — BUPIVACAINE HYDROCHLORIDE AND EPINEPHRINE 5; 5 MG/ML; UG/ML
INJECTION, SOLUTION EPIDURAL; INTRACAUDAL; PERINEURAL AS NEEDED
Status: DISCONTINUED | OUTPATIENT
Start: 2018-09-11 | End: 2018-09-11 | Stop reason: HOSPADM

## 2018-09-11 RX ORDER — OXYCODONE AND ACETAMINOPHEN 7.5; 325 MG/1; MG/1
2 TABLET ORAL ONCE AS NEEDED
Status: COMPLETED | OUTPATIENT
Start: 2018-09-11 | End: 2018-09-11

## 2018-09-11 RX ORDER — MEPERIDINE HYDROCHLORIDE 25 MG/ML
12.5 INJECTION INTRAMUSCULAR; INTRAVENOUS; SUBCUTANEOUS
Status: DISCONTINUED | OUTPATIENT
Start: 2018-09-11 | End: 2018-09-11 | Stop reason: HOSPADM

## 2018-09-11 RX ORDER — METOCLOPRAMIDE HYDROCHLORIDE 5 MG/ML
5 INJECTION INTRAMUSCULAR; INTRAVENOUS
Status: DISCONTINUED | OUTPATIENT
Start: 2018-09-11 | End: 2018-09-11 | Stop reason: HOSPADM

## 2018-09-11 RX ORDER — SODIUM CHLORIDE 0.9 % (FLUSH) 0.9 %
1-10 SYRINGE (ML) INJECTION AS NEEDED
Status: DISCONTINUED | OUTPATIENT
Start: 2018-09-11 | End: 2018-09-11 | Stop reason: HOSPADM

## 2018-09-11 RX ORDER — LIDOCAINE HYDROCHLORIDE 20 MG/ML
INJECTION, SOLUTION INFILTRATION; PERINEURAL AS NEEDED
Status: DISCONTINUED | OUTPATIENT
Start: 2018-09-11 | End: 2018-09-11 | Stop reason: SURG

## 2018-09-11 RX ORDER — GLYCOPYRROLATE 0.2 MG/ML
INJECTION INTRAMUSCULAR; INTRAVENOUS AS NEEDED
Status: DISCONTINUED | OUTPATIENT
Start: 2018-09-11 | End: 2018-09-11 | Stop reason: SURG

## 2018-09-11 RX ORDER — ONDANSETRON 2 MG/ML
4 INJECTION INTRAMUSCULAR; INTRAVENOUS ONCE AS NEEDED
Status: DISCONTINUED | OUTPATIENT
Start: 2018-09-11 | End: 2018-09-11 | Stop reason: HOSPADM

## 2018-09-11 RX ORDER — IPRATROPIUM BROMIDE AND ALBUTEROL SULFATE 2.5; .5 MG/3ML; MG/3ML
3 SOLUTION RESPIRATORY (INHALATION) ONCE AS NEEDED
Status: COMPLETED | OUTPATIENT
Start: 2018-09-11 | End: 2018-09-11

## 2018-09-11 RX ADMIN — ACETAMINOPHEN 1000 MG: 500 TABLET, FILM COATED ORAL at 07:10

## 2018-09-11 RX ADMIN — ONDANSETRON HYDROCHLORIDE 4 MG: 2 SOLUTION INTRAMUSCULAR; INTRAVENOUS at 08:55

## 2018-09-11 RX ADMIN — GLYCOPYRROLATE 0.4 MG: 0.2 INJECTION, SOLUTION INTRAMUSCULAR; INTRAVENOUS at 08:52

## 2018-09-11 RX ADMIN — SODIUM CHLORIDE, POTASSIUM CHLORIDE, SODIUM LACTATE AND CALCIUM CHLORIDE: 600; 310; 30; 20 INJECTION, SOLUTION INTRAVENOUS at 07:58

## 2018-09-11 RX ADMIN — SODIUM CHLORIDE, POTASSIUM CHLORIDE, SODIUM LACTATE AND CALCIUM CHLORIDE: 600; 310; 30; 20 INJECTION, SOLUTION INTRAVENOUS at 08:45

## 2018-09-11 RX ADMIN — DEXAMETHASONE SODIUM PHOSPHATE 4 MG: 4 INJECTION, SOLUTION INTRAMUSCULAR; INTRAVENOUS at 08:55

## 2018-09-11 RX ADMIN — CEFAZOLIN 2 G: 330 INJECTION, POWDER, FOR SOLUTION INTRAMUSCULAR; INTRAVENOUS at 07:58

## 2018-09-11 RX ADMIN — IPRATROPIUM BROMIDE AND ALBUTEROL SULFATE 3 ML: 2.5; .5 SOLUTION RESPIRATORY (INHALATION) at 09:33

## 2018-09-11 RX ADMIN — OXYCODONE HYDROCHLORIDE AND ACETAMINOPHEN 2 TABLET: 7.5; 325 TABLET ORAL at 09:07

## 2018-09-11 RX ADMIN — ETOMIDATE 20 MG: 2 INJECTION, SOLUTION INTRAVENOUS at 08:00

## 2018-09-11 RX ADMIN — Medication 400 MCG: at 08:35

## 2018-09-11 RX ADMIN — LIDOCAINE HYDROCHLORIDE 100 MG: 20 INJECTION, SOLUTION INFILTRATION; PERINEURAL at 08:00

## 2018-09-11 RX ADMIN — FENTANYL CITRATE 25 MCG: 50 INJECTION, SOLUTION INTRAMUSCULAR; INTRAVENOUS at 09:05

## 2018-09-11 RX ADMIN — FENTANYL CITRATE 250 MCG: 50 INJECTION INTRAMUSCULAR; INTRAVENOUS at 08:00

## 2018-09-11 RX ADMIN — Medication 3.5 MG: at 08:52

## 2018-09-11 RX ADMIN — ATRACURIUM BESYLATE 40 MG: 10 INJECTION, SOLUTION INTRAVENOUS at 08:00

## 2018-09-11 NOTE — DISCHARGE INSTRUCTIONS

## 2018-09-11 NOTE — OP NOTE
KYPHOPLASTY  Procedure Note    Juan Luis Collazo  9/11/2018    Pre-op Diagnosis:   Lumbar compression fracture (CMS/HCC) [S32.000A]    Post-op Diagnosis:     Post-Op Diagnosis Codes:     * Lumbar compression fracture (CMS/HCC) [S32.000A]    Procedure/CPT® Codes:    Procedure(s):  L4 KYPHOPLASTY WITH BIOPSY    Surgeon(s):  Vega Pandey MD    Anesthesia: General    Staff:   Circulator: Azael Gastelum RN  Scrub Person: Gerson Ibrahim; Jemma Wong  Vendor Representative: Romaine Soliman    Specimens:                ID Type Source Tests Collected by Time   A : L4 bone Bone Spine, Lumbar SURGICAL PATHOLOGY EXAM Vega Pandey MD 9/11/2018 [id number]         Drains:      Indication for procedure    This patient is a 77-year-old male who suffered a L4 osteoporotic compression fracture.  Risks, benefits, alternatives of the above procedure discussed with patient and he wished to proceed.    Description of procedure    Informed consent was signed.  General endotracheal anesthesia was administered.  Timeout was performed.  Preoperative prophylactic antibiotics were given.  Patient was placed in prone position, and pressure points were carefully padded.  The patient was prepped and draped in normal sterile fashion.  Small stab incisions were made.  Using AP and lateral fluoroscopy, Jamshidi needles were used to insert into the L4 vertebral body traversing the pedicles.  Bony specimens of L4 vertebral body were taken and sent to pathology.  Balloons were inserted and inflated and deflated under AP and lateral fluoroscopy.  Kyphoplasty cement was inserted into the L4 vertebral bodies until adequate filling of the vertebral bodies was seen through AP and lateral fluoroscopy.  Jamshidi needles were removed.  Skin was closed with Monocryl and skin affix.  Local anesthetic was injected.  Patient was extubated in the operating room and brought to recovery.    Vega Pandey MD     Date: 9/11/2018  Time: 9:22 AM

## 2018-09-11 NOTE — ANESTHESIA PREPROCEDURE EVALUATION
Anesthesia Evaluation     Patient summary reviewed and Nursing notes reviewed   no history of anesthetic complications:  NPO Solid Status: > 8 hours  NPO Liquid Status: > 8 hours           Airway   Mallampati: I  TM distance: >3 FB  Neck ROM: full  No difficulty expected  Dental - normal exam     Pulmonary - normal exam   (+) a smoker Former,   (-) asthma, sleep apnea  Cardiovascular - normal exam  Exercise tolerance: good (4-7 METS)    ECG reviewed    (+) hypertension,   (-) past MI, CAD, angina      Neuro/Psych- negative ROS  (-) seizures, TIA, CVA  GI/Hepatic/Renal/Endo    (+)   renal disease,   (-) liver disease, diabetes    Musculoskeletal     (+) back pain,       ROS comment: Lumbar compression fracture  Abdominal     Bowel sounds: normal.   Substance History - negative use     OB/GYN negative ob/gyn ROS         Other   (+) arthritis   history of cancer (prostate cancer)                      Anesthesia Plan    ASA 2     general     intravenous induction   Anesthetic plan, all risks, benefits, and alternatives have been provided, discussed and informed consent has been obtained with: patient.

## 2018-09-11 NOTE — ANESTHESIA POSTPROCEDURE EVALUATION
Patient: Juan Luis Collazo    Procedure Summary     Date:  09/11/18 Room / Location:  [facility] OR  /  [facility] OR    Anesthesia Start:  0757 Anesthesia Stop:  0901    Procedure:  L4 KYPHOPLASTY WITH BIOPSY (Bilateral Spine Lumbar) Diagnosis:       Lumbar compression fracture (CMS/HCC)      (Lumbar compression fracture (CMS/HCC) [S32.000A])    Surgeon:  Vega Pandey MD Provider:  Heri Pacheco CRNA    Anesthesia Type:  general ASA Status:  2          Anesthesia Type: general  Last vitals  BP   150/75 (09/11/18 0546)   Temp   98.1 °F (36.7 °C) (09/11/18 0546)   Pulse   82 (09/11/18 0704)   Resp   18 (09/11/18 0546)     SpO2   92 % (09/11/18 0704)     Post Anesthesia Care and Evaluation    Patient location during evaluation: PACU  Patient participation: complete - patient participated  Level of consciousness: awake and alert  Pain management: adequate  Airway patency: patent  Anesthetic complications: No anesthetic complications    Cardiovascular status: acceptable  Respiratory status: acceptable  Hydration status: acceptable    Comments: Blood pressure 150/75, pulse 82, temperature 98.1 °F (36.7 °C), temperature source Temporal Artery , resp. rate 18, SpO2 92 %.    Pt discharged from PACU based on estrella score >8

## 2018-09-11 NOTE — ANESTHESIA PROCEDURE NOTES
Airway  Urgency: elective    Airway not difficult    General Information and Staff    Patient location during procedure: OR  CRNA: TIM EVANGELISTA    Indications and Patient Condition  Indications for airway management: airway protection    Preoxygenated: yes  MILS maintained throughout  Mask difficulty assessment: 1 - vent by mask    Final Airway Details  Final airway type: endotracheal airway      Successful airway: ETT  Cuffed: yes   Successful intubation technique: direct laryngoscopy  Endotracheal tube insertion site: oral  Blade: Higgins  Blade size: 2  ETT size: 7.5 mm  Cormack-Lehane Classification: grade I - full view of glottis  Placement verified by: chest auscultation and capnometry   Cuff volume (mL): 6  Measured from: lips  ETT to lips (cm): 21  Number of attempts at approach: 1

## 2018-09-11 NOTE — H&P (VIEW-ONLY)
"    Chief complaint   Chief Complaint   Patient presents with   • Back Pain     /patient underwent L3 kyphoplasty on 7/17/18; patient had MRI on 9/7/18 @ [facility] and is here to discuss results; patient states his pain is worse and it goes down into his right hip.        Subjective     HPI:     This patient is a 77-year-old male who was treated with an L3 kyphoplasty for compression fracture with resolution of back pain.  Over the past 6-8 weeks, he is developed slow progression of his back pain without any specific event.  He now states that his back pain is intolerable despite use of a LSO brace and low-dose narcotics.  He presents for follow-up after obtaining a new MRI of his lumbar spine.  His wife is present with him today.    Review of Systems     A 12 point review of systems is obtained and is negative except for as described in HPI    Past Medical History:   Diagnosis Date   • Arthritis    • Cancer (CMS/HCC)     prostate   • Hypertension      Past Surgical History:   Procedure Laterality Date   • APPENDECTOMY     • FOOT SURGERY Right    • KYPHOPLASTY Bilateral 7/17/2018    Procedure: L3 KYPHOPLASTY 1-2 LEVELS;  Surgeon: Vega Pandey MD;  Location: [facility];  Service: Neurosurgery   • PROSTATECTOMY     • TONSILLECTOMY     • TOTAL SHOULDER REPLACEMENT Bilateral     at different times     History reviewed. No pertinent family history.  Social History   Substance Use Topics   • Smoking status: Former Smoker   • Smokeless tobacco: Never Used      Comment: 2quit 1991   • Alcohol use Yes      Comment: occasional drink       (Not in a hospital admission)  Allergies:  Patient has no known allergies.    Objective      Vital Signs  /68   Ht 170.2 cm (67\")   Wt 82.6 kg (182 lb)   BMI 28.51 kg/m²     Physical Exam    No acute distress  Awake alert oriented ×3  HEENT atraumatic normocephalic  Neck supple  Neurologic exam  Cranial nerves II through XII grossly intact  Patient moves all extremities 5 out 5 " strength  Lumbar spine tender palpation  Gait normal    Results Review:     Mri Lumbar Spine Without Contrast    Result Date: 9/7/2018  Narrative: EXAM: MR LUMBOSACRAL SPINE WITHOUT IV CONTRAST 9/7/2018  COMPARISON: MRI lumbar spine dated 6/28/2018.  INDICATION: 77 years-old Male.  BACK PAIN; S32.010A-Wedge compression fracture of first lumbar vertebra, initial encounter for closed fracture  TECHNIQUE: Routine pulse sequences of the lumbar spine were obtained without IV contrast.  FINDINGS: Interval at that similar body augmentation at L3. Height loss at L3 since recent MR lumbar spine has not significantly changed. There is now edema at L4, with subtle less than 10% height loss. He may Central posterior cortex, without retropulsion. Grade 1 anterolisthesis of L5 on S1 and L4 on L5. No marrow replacing process. The conus terminates at L1. There are no significant spinal cord signal abnormalities.  Disc desiccation and height loss at multiple levels. The prevertebral and paraspinal soft tissues are unremarkable.  Degenerative changes:   L1-L2 : No spinal canal stenosis. No foraminal stenosis.  L2-L3 : Disc bulge, ligamentum flavum hypertrophy, without significant thecal sac stenosis. Mild bilateral foraminal stenosis.  L3-L4 : No significant spinal canal stenosis. Disc bulge. Facet arthropathy contributing to mild bilateral foraminal stenosis.  L4-L5 : Uncovering of the disc, ligamentum flavum hypertrophy, without significant spinal canal stenosis. Facet arthropathy contributing to mild to moderate bilateral foraminal stenosis.  L5-S1 : No significant spinal canal stenosis. Disc bulge and ligamentum flavum hypertrophy, with resulting mild left and moderate right foraminal stenosis. Facet arthropathy contributes to foraminal stenosis.       Impression: 1.  Interval slight height loss and edema involving the L4 vertebral body, consistent with interval compression deformity. Approximation of the posterior cortex,  concerning for a middle column component to the fracture. 2.  Vertebral body augmentation at L3, positive interval change in height. Persistent edema in the vertebral body. 3.  Multilevel degenerative changes in alignment as described above. This report was finalized on 09/07/2018 17:17 by Dr. Angeli Rosa MD.    Xr Spine Lumbar Ap & Lateral    Result Date: 9/4/2018  Narrative: XR SPINE LUMBAR AP AND LATERAL- 9/4/2018 9:54 AM CDT  HISTORY: Follow-up L3 kyphoplasty; Z98.890-Other specified postprocedural states  COMPARISON: 7/30/2018  FINDINGS: Frontal and lateral radiographs of the lumbar spine were obtained.  Kyphoplasty cement is seen in the L3 vertebral body. There has been no change in the vertebral body heights since the previous study. Mild degenerative changes are noted at L5-S1. Atherosclerosis is seen in the aorta and iliac arteries.      Impression: 1. Stable L3 compression fracture status post kyphoplasty.   This report was finalized on 09/04/2018 10:03 by Dr. Hernán Trejo MD.              Assessment/Plan:     77-year-old male with spontaneous L4 compression fractures.  I discuss treatment options including also brace and narcotics versus L4 kyphoplasty.  I discuss risks kyphoplasty that include but are not limited to pain not improving, pain worsening, infection, hematoma, need for further surgery, nerve injury, paralysis, bowel bladder incontinence, death.  Patient voices understanding and wishes to proceed.    I discussed the patients findings and my recommendations with patient    Vega Pandey MD  09/10/18  9:32 AM

## 2018-09-24 ENCOUNTER — TRANSCRIBE ORDERS (OUTPATIENT)
Dept: ADMINISTRATIVE | Facility: HOSPITAL | Age: 78
End: 2018-09-24

## 2018-09-24 DIAGNOSIS — S32.000A LUMBAR COMPRESSION FRACTURE, CLOSED, INITIAL ENCOUNTER (HCC): Primary | ICD-10-CM

## 2018-09-25 ENCOUNTER — HOSPITAL ENCOUNTER (OUTPATIENT)
Dept: BONE DENSITY | Facility: HOSPITAL | Age: 78
Discharge: HOME | End: 2018-09-25
Attending: INTERNAL MEDICINE | Admitting: INTERNAL MEDICINE

## 2018-09-25 ENCOUNTER — OFFICE VISIT (OUTPATIENT)
Dept: NEUROSURGERY | Facility: CLINIC | Age: 78
End: 2018-09-25

## 2018-09-25 VITALS — BODY MASS INDEX: 30.32 KG/M2 | HEIGHT: 65 IN | WEIGHT: 182 LBS

## 2018-09-25 DIAGNOSIS — S32.000A LUMBAR COMPRESSION FRACTURE, CLOSED, INITIAL ENCOUNTER (HCC): ICD-10-CM

## 2018-09-25 DIAGNOSIS — Z78.9 CURRENT NON-SMOKER: ICD-10-CM

## 2018-09-25 DIAGNOSIS — S32.040G COMPRESSION FRACTURE OF FOURTH LUMBAR VERTEBRA WITH DELAYED HEALING: Primary | ICD-10-CM

## 2018-09-25 DIAGNOSIS — Z98.890 S/P KYPHOPLASTY: ICD-10-CM

## 2018-09-25 PROCEDURE — 77080 DXA BONE DENSITY AXIAL: CPT

## 2018-09-25 PROCEDURE — 99212 OFFICE O/P EST SF 10 MIN: CPT | Performed by: NEUROLOGICAL SURGERY

## 2018-09-25 NOTE — PROGRESS NOTES
"    Chief complaint   Chief Complaint   Patient presents with   • Post-op     Underwent kyphoplasty L4 on 09/11/18. Patient is doing much better. Pain is almost completely resolved.         Subjective     HPI:     This patient is a 77-year-old male who underwent a L4 kyphoplasty.  He is 2 weeks out from the procedure, and he reports that his back pain is currently resolved.    Review of Systems     Past Medical History:   Diagnosis Date   • Arthritis    • Back pain    • Cancer (CMS/HCC)     prostate   • Hypertension      Past Surgical History:   Procedure Laterality Date   • APPENDECTOMY     • FOOT SURGERY Right    • KYPHOPLASTY Bilateral 7/17/2018    Procedure: L3 KYPHOPLASTY 1-2 LEVELS;  Surgeon: Vega Pandey MD;  Location:  [facility] OR;  Service: Neurosurgery   • KYPHOPLASTY Bilateral 9/11/2018    Procedure: L4 KYPHOPLASTY WITH BIOPSY;  Surgeon: Vega Pandey MD;  Location: [facility] OR;  Service: Neurosurgery   • PROSTATECTOMY     • TONSILLECTOMY     • TOTAL SHOULDER REPLACEMENT Bilateral     at different times     No family history on file.  Social History   Substance Use Topics   • Smoking status: Former Smoker     Quit date: 9/10/1991   • Smokeless tobacco: Never Used      Comment: 2quit 1991   • Alcohol use Yes      Comment: occasional drink       (Not in a hospital admission)  Allergies:  Patient has no known allergies.    Objective      Vital Signs  Ht 165.1 cm (65\")   Wt 82.6 kg (182 lb)   BMI 30.29 kg/m²     Physical Exam    Her incision healed  No acute distress  Awake alert oriented ×3  Moves all extremities well  Gait normal    Results Review: Mri Lumbar Spine Without Contrast    Result Date: 9/7/2018  Narrative: EXAM: MR LUMBOSACRAL SPINE WITHOUT IV CONTRAST 9/7/2018  COMPARISON: MRI lumbar spine dated 6/28/2018.  INDICATION: 77 years-old Male.  BACK PAIN; S32.010A-Wedge compression fracture of first lumbar vertebra, initial encounter for closed fracture  TECHNIQUE: Routine pulse " sequences of the lumbar spine were obtained without IV contrast.  FINDINGS: Interval at that similar body augmentation at L3. Height loss at L3 since recent MR lumbar spine has not significantly changed. There is now edema at L4, with subtle less than 10% height loss. He may Central posterior cortex, without retropulsion. Grade 1 anterolisthesis of L5 on S1 and L4 on L5. No marrow replacing process. The conus terminates at L1. There are no significant spinal cord signal abnormalities.  Disc desiccation and height loss at multiple levels. The prevertebral and paraspinal soft tissues are unremarkable.  Degenerative changes:   L1-L2 : No spinal canal stenosis. No foraminal stenosis.  L2-L3 : Disc bulge, ligamentum flavum hypertrophy, without significant thecal sac stenosis. Mild bilateral foraminal stenosis.  L3-L4 : No significant spinal canal stenosis. Disc bulge. Facet arthropathy contributing to mild bilateral foraminal stenosis.  L4-L5 : Uncovering of the disc, ligamentum flavum hypertrophy, without significant spinal canal stenosis. Facet arthropathy contributing to mild to moderate bilateral foraminal stenosis.  L5-S1 : No significant spinal canal stenosis. Disc bulge and ligamentum flavum hypertrophy, with resulting mild left and moderate right foraminal stenosis. Facet arthropathy contributes to foraminal stenosis.       Impression: 1.  Interval slight height loss and edema involving the L4 vertebral body, consistent with interval compression deformity. Approximation of the posterior cortex, concerning for a middle column component to the fracture. 2.  Vertebral body augmentation at L3, positive interval change in height. Persistent edema in the vertebral body. 3.  Multilevel degenerative changes in alignment as described above. This report was finalized on 09/07/2018 17:17 by Dr. Angeli Rosa MD.    Fl C Arm During Surgery    Result Date: 9/11/2018  Narrative: EXAMINATION:  XR SPINE LUMBAR AP AND LATERAL-   9/11/2018 8:25 AM CDT  HISTORY: Compression Fracture; S32.000A-Wedge compression fracture of unspecified lumbar vertebra, initial encounter for closed fracture  COMPARISON: No comparison study.  TECHNIQUE:  Image number: 2  Fluoroscopy time: 32 seconds  FINDINGS:  AP and lateral C-arm images demonstrate methylmethacrylate within 2 lumbar vertebral body levels.  Examinations available for review. This report was finalized on 09/11/2018 09:55 by Dr. Chuck Lisa MD.    Fl C Arm During Surgery    Result Date: 9/11/2018  Narrative: EXAMINATION:  XR SPINE LUMBAR AP AND LATERAL-  9/11/2018 8:25 AM CDT  HISTORY: Compression Fracture; S32.000A-Wedge compression fracture of unspecified lumbar vertebra, initial encounter for closed fracture  COMPARISON: No comparison study.  TECHNIQUE:  Image number: 2  Fluoroscopy time: 32 seconds  FINDINGS:  AP and lateral C-arm images demonstrate methylmethacrylate within 2 lumbar vertebral body levels.  Examinations available for review. This report was finalized on 09/11/2018 09:55 by Dr. Chuck Lisa MD.    Xr Spine Lumbar Ap & Lateral    Result Date: 9/11/2018  Narrative: EXAMINATION:  XR SPINE LUMBAR AP AND LATERAL-  9/11/2018 8:25 AM CDT  HISTORY: Compression Fracture; S32.000A-Wedge compression fracture of unspecified lumbar vertebra, initial encounter for closed fracture  COMPARISON: No comparison study.  TECHNIQUE:  Image number: 2  Fluoroscopy time: 32 seconds  FINDINGS:  AP and lateral C-arm images demonstrate methylmethacrylate within 2 lumbar vertebral body levels.  Examinations available for review. This report was finalized on 09/11/2018 09:55 by Dr. Chuck Lisa MD.    Xr Spine Lumbar Ap & Lateral    Result Date: 9/4/2018  Narrative: XR SPINE LUMBAR AP AND LATERAL- 9/4/2018 9:54 AM CDT  HISTORY: Follow-up L3 kyphoplasty; Z98.890-Other specified postprocedural states  COMPARISON: 7/30/2018  FINDINGS: Frontal and lateral radiographs of the lumbar spine were obtained.   Kyphoplasty cement is seen in the L3 vertebral body. There has been no change in the vertebral body heights since the previous study. Mild degenerative changes are noted at L5-S1. Atherosclerosis is seen in the aorta and iliac arteries.      Impression: 1. Stable L3 compression fracture status post kyphoplasty.   This report was finalized on 09/04/2018 10:03 by Dr. Hernán Trejo MD.              Assessment/Plan:     77-year-old male status post L4 kyphoplasty.  Pathology shows no evidence of neoplastic disease.  Back pain currently resolved.  We will plan to follow up with him in 2 months or sooner with any questions or concerns.    I discussed the patients findings and my recommendations with patient    Vega Pandey MD  09/25/18  9:57 AM

## 2018-09-27 ENCOUNTER — APPOINTMENT (OUTPATIENT)
Dept: BONE DENSITY | Facility: HOSPITAL | Age: 78
End: 2018-09-27
Attending: INTERNAL MEDICINE

## 2018-11-26 ENCOUNTER — HOSPITAL ENCOUNTER (OUTPATIENT)
Dept: GENERAL RADIOLOGY | Facility: HOSPITAL | Age: 78
Discharge: HOME | End: 2018-11-26
Attending: NEUROLOGICAL SURGERY | Admitting: NEUROLOGICAL SURGERY

## 2018-11-26 ENCOUNTER — OFFICE VISIT (OUTPATIENT)
Dept: NEUROSURGERY | Facility: CLINIC | Age: 78
End: 2018-11-26

## 2018-11-26 DIAGNOSIS — Z78.9 CURRENT NON-SMOKER: ICD-10-CM

## 2018-11-26 DIAGNOSIS — S32.040G COMPRESSION FRACTURE OF FOURTH LUMBAR VERTEBRA WITH DELAYED HEALING: ICD-10-CM

## 2018-11-26 DIAGNOSIS — S32.040G COMPRESSION FRACTURE OF FOURTH LUMBAR VERTEBRA WITH DELAYED HEALING: Primary | ICD-10-CM

## 2018-11-26 DIAGNOSIS — Z98.890 S/P KYPHOPLASTY: ICD-10-CM

## 2018-11-26 PROCEDURE — 72100 X-RAY EXAM L-S SPINE 2/3 VWS: CPT

## 2018-11-26 PROCEDURE — 99213 OFFICE O/P EST LOW 20 MIN: CPT | Performed by: NEUROLOGICAL SURGERY
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
## 2020-04-06 ENCOUNTER — OFFICE VISIT (OUTPATIENT)
Dept: INTERNAL MEDICINE | Facility: CLINIC | Age: 80
End: 2020-04-06

## 2020-04-06 VITALS — HEIGHT: 69 IN | WEIGHT: 170 LBS | BODY MASS INDEX: 25.18 KG/M2

## 2020-04-06 DIAGNOSIS — S32.010D CLOSED COMPRESSION FRACTURE OF L1 LUMBAR VERTEBRA WITH ROUTINE HEALING, SUBSEQUENT ENCOUNTER: Primary | ICD-10-CM

## 2020-04-06 DIAGNOSIS — M54.50 CHRONIC BILATERAL LOW BACK PAIN WITHOUT SCIATICA: ICD-10-CM

## 2020-04-06 DIAGNOSIS — G89.29 CHRONIC BILATERAL LOW BACK PAIN WITHOUT SCIATICA: ICD-10-CM

## 2020-04-06 PROBLEM — J44.1 ACUTE EXACERBATION OF CHRONIC OBSTRUCTIVE AIRWAYS DISEASE: Status: ACTIVE | Noted: 2020-04-06

## 2020-04-06 PROBLEM — M20.41 HAMMER TOE OF RIGHT FOOT: Status: ACTIVE | Noted: 2020-04-06

## 2020-04-06 PROBLEM — R73.01 IMPAIRED FASTING GLUCOSE: Status: ACTIVE | Noted: 2020-04-06

## 2020-04-06 PROBLEM — I10 BENIGN HYPERTENSION: Status: ACTIVE | Noted: 2020-04-06

## 2020-04-06 PROBLEM — E78.00 HIGH CHOLESTEROL: Status: ACTIVE | Noted: 2020-04-06

## 2020-04-06 PROBLEM — M85.80 OSTEOPENIA: Status: ACTIVE | Noted: 2020-04-06

## 2020-04-06 PROBLEM — C61 MALIGNANT NEOPLASM OF PROSTATE: Status: ACTIVE | Noted: 2020-04-06

## 2020-04-06 PROBLEM — M40.36 FLATBACK SYNDROME OF LUMBAR REGION: Status: ACTIVE | Noted: 2020-04-06

## 2020-04-06 PROCEDURE — 99213 OFFICE O/P EST LOW 20 MIN: CPT | Performed by: INTERNAL MEDICINE

## 2020-04-06 RX ORDER — OLMESARTAN MEDOXOMIL 40 MG/1
40 TABLET ORAL DAILY
Qty: 90 TABLET | Refills: 3 | Status: SHIPPED | OUTPATIENT
Start: 2020-04-06 | End: 2020-10-26 | Stop reason: SDUPTHER

## 2020-04-06 RX ORDER — HYDROCHLOROTHIAZIDE 12.5 MG/1
12.5 TABLET ORAL DAILY
Qty: 90 TABLET | Refills: 3 | Status: SHIPPED | OUTPATIENT
Start: 2020-04-06 | End: 2020-10-26 | Stop reason: SDUPTHER

## 2020-04-06 NOTE — PROGRESS NOTES
Subjective   Juan Luis Collazo is a 79 y.o. male.   Chief Complaint   Patient presents with   • Annual Exam       You have chosen to receive care through a telephone visit today. Do you consent to use a telephone visit for your medical care today? Yes    This is a follow-up on patient for hypertension mild COPD and chronic back pain.  He is not monitoring his blood pressures he states that his wife cannot do that and I have encouraged him since we are not seeing him in the office if he would check his blood pressures and call us if he is getting abnormal readings we discussed with the normal ranges.  In regard to his COPD he does have a mild cough it is productive only of clear phlegm.  As far as his low back pain it is well controlled with current medications       The following portions of the patient's history were reviewed and updated as appropriate: allergies, current medications, past family history, past medical history, past social history, past surgical history and problem list.    Review of Systems   Constitutional: Negative for activity change, appetite change, fatigue, fever, unexpected weight gain and unexpected weight loss.   HENT: Negative for swollen glands, trouble swallowing and voice change.    Eyes: Negative for blurred vision and visual disturbance.   Respiratory: Positive for cough ( White phlegm). Negative for shortness of breath.    Cardiovascular: Negative for chest pain, palpitations and leg swelling.   Gastrointestinal: Negative for abdominal pain, constipation, diarrhea, nausea, vomiting and indigestion.   Endocrine: Negative for cold intolerance, heat intolerance, polydipsia and polyphagia.   Genitourinary: Negative for dysuria and frequency.   Musculoskeletal: Positive for back pain. Negative for arthralgias, joint swelling and neck pain.   Skin: Negative for color change, rash and skin lesions.   Neurological: Negative for dizziness, weakness, headache, memory problem and confusion.    Hematological: Does not bruise/bleed easily.   Psychiatric/Behavioral: Negative for agitation, hallucinations and suicidal ideas. The patient is not nervous/anxious.        Objective   Past Medical History:   Diagnosis Date   • Arthritis    • Back pain    • Cancer (CMS/HCC)     prostate   • Hypertension    • Osteopenia       Past Surgical History:   Procedure Laterality Date   • APPENDECTOMY     • FOOT SURGERY Right    • KYPHOPLASTY Bilateral 7/17/2018    Procedure: L3 KYPHOPLASTY 1-2 LEVELS;  Surgeon: Vega Pandey MD;  Location:  [facility] OR;  Service: Neurosurgery   • KYPHOPLASTY Bilateral 9/11/2018    Procedure: L4 KYPHOPLASTY WITH BIOPSY;  Surgeon: Vega Pandey MD;  Location:  [facility] OR;  Service: Neurosurgery   • PROSTATECTOMY     • TONSILLECTOMY     • TOTAL SHOULDER REPLACEMENT Bilateral     at different times        Current Outpatient Medications:   •  hydrochlorothiazide (HYDRODIURIL) 12.5 MG tablet, Take 12.5 mg by mouth Daily., Disp: , Rfl:   •  Multiple Vitamins-Minerals (ICAPS AREDS 2) capsule, Take 1 capsule by mouth Daily., Disp: , Rfl:   •  olmesartan (BENICAR) 40 MG tablet, Take 40 mg by mouth Daily., Disp: , Rfl:   •  traMADol-acetaminophen (ULTRACET) 37.5-325 MG per tablet, Take 1 tablet by mouth 3 (Three) Times a Day As Needed., Disp: , Rfl:      There were no vitals filed for this visit.      04/06/20  1055   Weight: 77.1 kg (170 lb)     Patient's Body mass index is 25.1 kg/m². BMI is within normal parameters. No follow-up required..      Physical Exam   Psychiatric: He has a normal mood and affect. His behavior is normal. Judgment and thought content normal.       This visit has been rescheduled as a phone visit to comply with patient safety concerns in accordance with CDC recommendations. Total time of discussion was 16 minutes.        Assessment/Plan   Diagnoses and all orders for this visit:    1. Closed compression fracture of L1 lumbar vertebra with routine healing,  subsequent encounter (Primary)    2. Chronic bilateral low back pain without sciatica  -     traMADol-acetaminophen (ULTRACET) 37.5-325 MG per tablet; Take 1 tablet by mouth 3 (Three) Times a Day As Needed for Moderate Pain .  Dispense: 270 tablet; Refill: 1    Other orders  -     hydroCHLOROthiazide (HYDRODIURIL) 12.5 MG tablet; Take 1 tablet by mouth Daily.  Dispense: 90 tablet; Refill: 3  -     olmesartan (BENICAR) 40 MG tablet; Take 1 tablet by mouth Daily.  Dispense: 90 tablet; Refill: 3    Patient's low back pain is stable and well controlled with Ultracet.  In regard to his blood pressure he is not monitoring we have asked him to have his wife check his blood pressures and call us he is aware of the normal ranges.  In regard to his COPD he is stable he is not using any medication at this point we did discuss the possibility of prescribing an antibiotic if his phlegm change from the clear sputum that he currently has 2 more of a colored secretion.

## 2020-06-13 DIAGNOSIS — G89.29 CHRONIC BILATERAL LOW BACK PAIN WITHOUT SCIATICA: ICD-10-CM

## 2020-06-13 DIAGNOSIS — M54.50 CHRONIC BILATERAL LOW BACK PAIN WITHOUT SCIATICA: ICD-10-CM

## 2020-09-16 DIAGNOSIS — M54.50 CHRONIC BILATERAL LOW BACK PAIN WITHOUT SCIATICA: ICD-10-CM

## 2020-09-16 DIAGNOSIS — G89.29 CHRONIC BILATERAL LOW BACK PAIN WITHOUT SCIATICA: ICD-10-CM

## 2020-09-17 NOTE — TELEPHONE ENCOUNTER
This was refilled for #270 x 1 on 6/15 to [facility] pharmacy, so should have a refill remaining.

## 2020-10-19 DIAGNOSIS — M54.50 CHRONIC BILATERAL LOW BACK PAIN WITHOUT SCIATICA: ICD-10-CM

## 2020-10-19 DIAGNOSIS — G89.29 CHRONIC BILATERAL LOW BACK PAIN WITHOUT SCIATICA: ICD-10-CM

## 2020-10-26 ENCOUNTER — OFFICE VISIT (OUTPATIENT)
Dept: INTERNAL MEDICINE | Facility: CLINIC | Age: 80
End: 2020-10-26

## 2020-10-26 VITALS
HEIGHT: 69 IN | BODY MASS INDEX: 25.48 KG/M2 | WEIGHT: 172 LBS | SYSTOLIC BLOOD PRESSURE: 130 MMHG | DIASTOLIC BLOOD PRESSURE: 78 MMHG

## 2020-10-26 DIAGNOSIS — C61 PROSTATE CANCER (HCC): ICD-10-CM

## 2020-10-26 DIAGNOSIS — E78.00 HIGH CHOLESTEROL: ICD-10-CM

## 2020-10-26 DIAGNOSIS — Z23 NEED FOR INFLUENZA VACCINATION: ICD-10-CM

## 2020-10-26 DIAGNOSIS — L72.0 EPIDERMAL INCLUSION CYST: ICD-10-CM

## 2020-10-26 DIAGNOSIS — M85.80 OSTEOPENIA, UNSPECIFIED LOCATION: ICD-10-CM

## 2020-10-26 DIAGNOSIS — Z12.5 SCREENING PSA (PROSTATE SPECIFIC ANTIGEN): Primary | ICD-10-CM

## 2020-10-26 DIAGNOSIS — M54.50 CHRONIC BILATERAL LOW BACK PAIN WITHOUT SCIATICA: ICD-10-CM

## 2020-10-26 DIAGNOSIS — G89.29 CHRONIC BILATERAL LOW BACK PAIN WITHOUT SCIATICA: ICD-10-CM

## 2020-10-26 DIAGNOSIS — S32.000D COMPRESSION FRACTURE OF LUMBAR VERTEBRA WITH ROUTINE HEALING, UNSPECIFIED LUMBAR VERTEBRAL LEVEL, SUBSEQUENT ENCOUNTER: ICD-10-CM

## 2020-10-26 DIAGNOSIS — R73.01 IMPAIRED FASTING GLUCOSE: ICD-10-CM

## 2020-10-26 DIAGNOSIS — I10 BENIGN HYPERTENSION: ICD-10-CM

## 2020-10-26 LAB — HBA1C MFR BLD: 5.9 %

## 2020-10-26 PROCEDURE — G0008 ADMIN INFLUENZA VIRUS VAC: HCPCS | Performed by: INTERNAL MEDICINE

## 2020-10-26 PROCEDURE — 10060 I&D ABSCESS SIMPLE/SINGLE: CPT | Performed by: INTERNAL MEDICINE

## 2020-10-26 PROCEDURE — G0439 PPPS, SUBSEQ VISIT: HCPCS | Performed by: INTERNAL MEDICINE

## 2020-10-26 PROCEDURE — 90694 VACC AIIV4 NO PRSRV 0.5ML IM: CPT | Performed by: INTERNAL MEDICINE

## 2020-10-26 PROCEDURE — 83036 HEMOGLOBIN GLYCOSYLATED A1C: CPT | Performed by: INTERNAL MEDICINE

## 2020-10-26 RX ORDER — OLMESARTAN MEDOXOMIL 40 MG/1
40 TABLET ORAL DAILY
Qty: 90 TABLET | Refills: 3 | Status: SHIPPED | OUTPATIENT
Start: 2020-10-26 | End: 2021-06-11

## 2020-10-26 RX ORDER — AZITHROMYCIN 250 MG/1
TABLET, FILM COATED ORAL
Qty: 6 TABLET | Refills: 0 | Status: SHIPPED | OUTPATIENT
Start: 2020-10-26 | End: 2021-01-29

## 2020-10-26 RX ORDER — METHYLPREDNISOLONE 4 MG/1
TABLET ORAL
Qty: 1 TABLET | Refills: 0 | Status: SHIPPED | OUTPATIENT
Start: 2020-10-26 | End: 2021-01-29

## 2020-10-26 RX ORDER — HYDROCHLOROTHIAZIDE 12.5 MG/1
12.5 TABLET ORAL DAILY
Qty: 90 TABLET | Refills: 3 | Status: SHIPPED | OUTPATIENT
Start: 2020-10-26 | End: 2022-01-24

## 2020-10-26 NOTE — PROGRESS NOTES
The ABCs of the Annual Wellness Visit  Initial Medicare Wellness Visit    Chief Complaint   Patient presents with   • Medicare Wellness-Initial Visit       Subjective   History of Present Illness:  Juan Luis Collazo is a 79 y.o. male who presents for an Initial Medicare Wellness Visit.  Patient complains of a cyst on his forehead otherwise he is doing quite well with no complaints    HEALTH RISK ASSESSMENT    Recent Hospitalizations:  No hospitalization(s) within the last year.    Current Medical Providers:  Patient Care Team:  Juan Luis Aguilar MD as PCP - General (Internal Medicine)    Smoking Status:  Social History     Tobacco Use   Smoking Status Former Smoker   • Quit date: 9/10/1991   • Years since quittin.1   Smokeless Tobacco Never Used   Tobacco Comment    2quit 1991       Alcohol Consumption:  Social History     Substance and Sexual Activity   Alcohol Use Yes   • Frequency: Monthly or less    Comment: occasional drink       Depression Screen:   PHQ-2/PHQ-9 Depression Screening 10/26/2020   Little interest or pleasure in doing things 0   Feeling down, depressed, or hopeless 0   Total Score 0       Fall Risk Screen:  LITZY Fall Risk Assessment was completed, and patient is at MODERATE risk for falls. Assessment completed on:10/26/2020    Health Habits and Functional and Cognitive Screening:  No flowsheet data found.      Does the patient have evidence of cognitive impairment? No    Asprin use counseling:Does not need ASA (and currently is not on it)    Age-appropriate Screening Schedule:  Refer to the list below for future screening recommendations based on patient's age, sex and/or medical conditions. Orders for these recommended tests are listed in the plan section. The patient has been provided with a written plan.    Health Maintenance   Topic Date Due   • TDAP/TD VACCINES (1 - Tdap) 1957   • ZOSTER VACCINE (1 of 2) 1988   • LIPID PANEL  2020   • INFLUENZA VACCINE  2020   •  DXA SCAN  09/25/2020          The following portions of the patient's history were reviewed and updated as appropriate: allergies, current medications, past family history, past medical history, past social history, past surgical history and problem list.    Outpatient Medications Prior to Visit   Medication Sig Dispense Refill   • hydroCHLOROthiazide (HYDRODIURIL) 12.5 MG tablet Take 1 tablet by mouth Daily. 90 tablet 3   • Multiple Vitamins-Minerals (ICAPS AREDS 2) capsule Take 1 capsule by mouth Daily.     • olmesartan (BENICAR) 40 MG tablet Take 1 tablet by mouth Daily. 90 tablet 3   • traMADol-acetaminophen (ULTRACET) 37.5-325 MG per tablet TAKE 1 TABLET BY MOUTH 3 (THREE) TIMES A DAY AS NEEDED FOR MODERATE PAIN . 270 tablet 1     No facility-administered medications prior to visit.        Patient Active Problem List   Diagnosis   • Closed compression fracture of first lumbar vertebra (CMS/HCC)   • Current non-smoker   • BMI 28.0-28.9,adult   • Lumbar compression fracture, closed, initial encounter (CMS/[facility])   • Lumbar compression fracture (CMS/[facility])   • S/P kyphoplasty   • Numbness and tingling of right leg   • Compression fracture of fourth lumbar vertebra with delayed healing   • Benign hypertension   • Flatback syndrome of lumbar region   • Hammer toe of right foot   • High cholesterol   • Impaired fasting glucose   • Malignant neoplasm of prostate (CMS/[facility])   • Osteopenia   • Acute exacerbation of chronic obstructive airways disease (CMS/[facility])       Advanced Care Planning:  ACP discussion was held with the patient during this visit. Patient does not have an advance directive, information provided.    Review of Systems   Constitutional: Negative for activity change, appetite change and chills.   HENT: Negative for congestion, ear pain and facial swelling.    Eyes: Negative for pain, discharge and itching.   Respiratory: Negative for apnea, cough and shortness of breath.    Cardiovascular: Negative for chest  "pain, palpitations and leg swelling.   Gastrointestinal: Negative for abdominal distention, abdominal pain and anal bleeding.   Endocrine: Negative for cold intolerance and heat intolerance.   Genitourinary: Negative for difficulty urinating, dysuria and flank pain.   Musculoskeletal: Negative for arthralgias, back pain and joint swelling.   Skin: Negative for color change, pallor and rash.   Allergic/Immunologic: Negative for environmental allergies and food allergies.   Neurological: Negative for dizziness and facial asymmetry.   Hematological: Negative for adenopathy. Does not bruise/bleed easily.   Psychiatric/Behavioral: Negative for agitation, behavioral problems and confusion.       Compared to one year ago, the patient feels his physical health is the same.  Compared to one year ago, the patient feels his mental health is the same.    Reviewed chart for potential of high risk medication in the elderly: yes  Reviewed chart for potential of harmful drug interactions in the elderly:yes    Objective         Vitals:    10/26/20 1354   BP: 130/78   BP Location: Left arm   Patient Position: Sitting   Cuff Size: Adult   Weight: 78 kg (172 lb)   Height: 175.3 cm (69\")   PainSc: 0-No pain       Body mass index is 25.4 kg/m².  Discussed the patient's BMI with him. The BMI is above average; BMI management plan is completed.    Physical Exam  Vitals signs and nursing note reviewed.   Constitutional:       General: He is not in acute distress.     Appearance: Normal appearance. He is well-developed.   HENT:      Head: Normocephalic and atraumatic.      Right Ear: External ear normal.      Left Ear: External ear normal.      Nose: Nose normal.   Eyes:      Extraocular Movements: Extraocular movements intact.      Conjunctiva/sclera: Conjunctivae normal.      Pupils: Pupils are equal, round, and reactive to light.   Neck:      Musculoskeletal: Normal range of motion and neck supple. No neck rigidity or muscular tenderness. "   Cardiovascular:      Rate and Rhythm: Normal rate and regular rhythm.      Pulses: Normal pulses.      Heart sounds: Normal heart sounds.   Pulmonary:      Effort: Pulmonary effort is normal.      Breath sounds: Normal breath sounds.   Abdominal:      General: Bowel sounds are normal.      Palpations: Abdomen is soft.   Musculoskeletal: Normal range of motion.   Skin:     General: Skin is warm and dry.   Neurological:      General: No focal deficit present.      Mental Status: He is alert and oriented to person, place, and time.   Psychiatric:         Mood and Affect: Mood normal.         Behavior: Behavior normal.               Assessment/Plan   Medicare Risks and Personalized Health Plan  CMS Preventative Services Quick Reference  Advance Directive Discussion  Cardiovascular risk  Colon Cancer Screening  Diabetic Lab Screening   Immunizations Discussed/Encouraged (specific immunizations; adacel Tdap, Influenza and Shingrix )  Prostate Cancer Screening     The above risks/problems have been discussed with the patient.  Pertinent information has been shared with the patient in the After Visit Summary.  Follow up plans and orders are seen below in the Assessment/Plan Section.    Diagnoses and all orders for this visit:    1. Screening PSA (prostate specific antigen) (Primary)  -     PSA Screen    2. Benign hypertension  -     Comprehensive Metabolic Panel  -     CBC & Differential  -     Lipid Panel  -     TSH  -     Uric Acid  -     Urinalysis With Microscopic - Urine, Clean Catch    3. High cholesterol  -     Comprehensive Metabolic Panel  -     CBC & Differential  -     Lipid Panel  -     TSH  -     Uric Acid  -     Urinalysis With Microscopic - Urine, Clean Catch    4. Impaired fasting glucose  -     POC Glycosylated Hemoglobin (Hb A1C)    5. Osteopenia, unspecified location    6. Chronic bilateral low back pain without sciatica  -     traMADol-acetaminophen (ULTRACET) 37.5-325 MG per tablet; Take 1 tablet by  mouth 3 (Three) Times a Day As Needed for Moderate Pain .  Dispense: 270 tablet; Refill: 1    7. Prostate cancer (CMS/HCC)    8. Compression fracture of lumbar vertebra with routine healing, unspecified lumbar vertebral level, subsequent encounter    Other orders  -     hydroCHLOROthiazide (HYDRODIURIL) 12.5 MG tablet; Take 1 tablet by mouth Daily.  Dispense: 90 tablet; Refill: 3  -     olmesartan (BENICAR) 40 MG tablet; Take 1 tablet by mouth Daily.  Dispense: 90 tablet; Refill: 3  -     methylPREDNISolone (MEDROL) 4 MG dose pack; Take as directed on package instructions.  Dispense: 1 tablet; Refill: 0  -     azithromycin (Zithromax) 250 MG tablet; Take 2 tablets the first day, then 1 tablet daily for 4 days.  Dispense: 6 tablet; Refill: 0  -     Incision & Drainage    Incision & Drainage    Date/Time: 10/26/2020 2:28 PM  Performed by: Juan Luis Aguilar MD  Authorized by: Juan Luis Aguilar MD   Type: cyst  Body area: head/neck  Location details: face    Sedation:  Patient sedated: no    Scalpel size: 11  Incision type: single straight  Complexity: simple  Drainage: purulent  Drainage amount: scant  Wound treatment: wound left open  Packing material: none  Patient tolerance: patient tolerated the procedure well with no immediate complications  Comments: Patient has an inflamed sebaceous cyst in the center of his right forehead.  This was drained with a #11 blade simple Band-Aid was placed no anesthesia was used to the patient tolerated procedure extremely well          Follow Up:  No follow-ups on file.  I removed cyst from patient's forehead via I&D.  He did well with that he is also having some sinusitis azithromycin and Medrol Dosepak was given for that I refilled some of his medications.  See him back in 6 months for routine follow-up.    An After Visit Summary and PPPS were given to the patient.

## 2020-10-27 LAB
ALBUMIN SERPL-MCNC: 4.3 G/DL (ref 3.5–5.2)
ALBUMIN/GLOB SERPL: 1.2 G/DL
ALP SERPL-CCNC: 79 U/L (ref 39–117)
ALT SERPL-CCNC: 15 U/L (ref 1–41)
APPEARANCE UR: CLEAR
AST SERPL-CCNC: 16 U/L (ref 1–40)
BACTERIA #/AREA URNS HPF: NORMAL /HPF
BASOPHILS # BLD AUTO: 0.03 10*3/MM3 (ref 0–0.2)
BASOPHILS NFR BLD AUTO: 0.4 % (ref 0–1.5)
BILIRUB SERPL-MCNC: 0.6 MG/DL (ref 0–1.2)
BILIRUB UR QL STRIP: NEGATIVE
BUN SERPL-MCNC: 22 MG/DL (ref 8–23)
BUN/CREAT SERPL: 25.3 (ref 7–25)
CALCIUM SERPL-MCNC: 9.1 MG/DL (ref 8.6–10.5)
CASTS URNS MICRO: NORMAL
CHLORIDE SERPL-SCNC: 99 MMOL/L (ref 98–107)
CHOLEST SERPL-MCNC: 161 MG/DL (ref 0–200)
CO2 SERPL-SCNC: 29.3 MMOL/L (ref 22–29)
COLOR UR: YELLOW
CREAT SERPL-MCNC: 0.87 MG/DL (ref 0.76–1.27)
EOSINOPHIL # BLD AUTO: 0.39 10*3/MM3 (ref 0–0.4)
EOSINOPHIL NFR BLD AUTO: 5.7 % (ref 0.3–6.2)
EPI CELLS #/AREA URNS HPF: NORMAL /HPF
ERYTHROCYTE [DISTWIDTH] IN BLOOD BY AUTOMATED COUNT: 13.2 % (ref 12.3–15.4)
GLOBULIN SER CALC-MCNC: 3.5 GM/DL
GLUCOSE SERPL-MCNC: 82 MG/DL (ref 65–99)
GLUCOSE UR QL: NEGATIVE
HCT VFR BLD AUTO: 36.2 % (ref 37.5–51)
HDLC SERPL-MCNC: 39 MG/DL (ref 40–60)
HGB BLD-MCNC: 12 G/DL (ref 13–17.7)
HGB UR QL STRIP: NEGATIVE
IMM GRANULOCYTES # BLD AUTO: 0.01 10*3/MM3 (ref 0–0.05)
IMM GRANULOCYTES NFR BLD AUTO: 0.1 % (ref 0–0.5)
KETONES UR QL STRIP: NEGATIVE
LDLC SERPL CALC-MCNC: 87 MG/DL (ref 0–100)
LEUKOCYTE ESTERASE UR QL STRIP: NEGATIVE
LYMPHOCYTES # BLD AUTO: 1.9 10*3/MM3 (ref 0.7–3.1)
LYMPHOCYTES NFR BLD AUTO: 27.7 % (ref 19.6–45.3)
MCH RBC QN AUTO: 31.7 PG (ref 26.6–33)
MCHC RBC AUTO-ENTMCNC: 33.1 G/DL (ref 31.5–35.7)
MCV RBC AUTO: 95.5 FL (ref 79–97)
MONOCYTES # BLD AUTO: 0.55 10*3/MM3 (ref 0.1–0.9)
MONOCYTES NFR BLD AUTO: 8 % (ref 5–12)
NEUTROPHILS # BLD AUTO: 3.97 10*3/MM3 (ref 1.7–7)
NEUTROPHILS NFR BLD AUTO: 58.1 % (ref 42.7–76)
NITRITE UR QL STRIP: NEGATIVE
NRBC BLD AUTO-RTO: 0 /100 WBC (ref 0–0.2)
PH UR STRIP: 6 [PH] (ref 5–8)
PLATELET # BLD AUTO: 251 10*3/MM3 (ref 140–450)
POTASSIUM SERPL-SCNC: 4.5 MMOL/L (ref 3.5–5.2)
PROT SERPL-MCNC: 7.8 G/DL (ref 6–8.5)
PROT UR QL STRIP: NEGATIVE
PSA SERPL-MCNC: <0.014 NG/ML (ref 0–4)
RBC # BLD AUTO: 3.79 10*6/MM3 (ref 4.14–5.8)
RBC #/AREA URNS HPF: NORMAL /HPF
SODIUM SERPL-SCNC: 139 MMOL/L (ref 136–145)
SP GR UR: 1.02 (ref 1–1.03)
TRIGL SERPL-MCNC: 204 MG/DL (ref 0–150)
TSH SERPL DL<=0.005 MIU/L-ACNC: 1.5 UIU/ML (ref 0.27–4.2)
URATE SERPL-MCNC: 6.1 MG/DL (ref 3.4–7)
UROBILINOGEN UR STRIP-MCNC: NORMAL MG/DL
VLDLC SERPL CALC-MCNC: 35 MG/DL (ref 5–40)
WBC # BLD AUTO: 6.85 10*3/MM3 (ref 3.4–10.8)
WBC #/AREA URNS HPF: NORMAL /HPF

## 2021-01-05 RX ORDER — SILDENAFIL 50 MG/1
TABLET, FILM COATED ORAL
Qty: 15 TABLET | Refills: 3 | OUTPATIENT
Start: 2021-01-05

## 2021-01-28 RX ORDER — AZITHROMYCIN 250 MG/1
TABLET, FILM COATED ORAL
Qty: 6 TABLET | Refills: 0 | OUTPATIENT
Start: 2021-01-28

## 2021-01-29 ENCOUNTER — OFFICE VISIT (OUTPATIENT)
Dept: INTERNAL MEDICINE | Facility: CLINIC | Age: 81
End: 2021-01-29

## 2021-01-29 DIAGNOSIS — J20.9 ACUTE BRONCHITIS, UNSPECIFIED ORGANISM: Primary | ICD-10-CM

## 2021-01-29 PROCEDURE — 99442 PR PHYS/QHP TELEPHONE EVALUATION 11-20 MIN: CPT | Performed by: NURSE PRACTITIONER

## 2021-01-29 RX ORDER — BENZONATATE 100 MG/1
100 CAPSULE ORAL 3 TIMES DAILY PRN
Qty: 15 CAPSULE | Refills: 0 | Status: SHIPPED | OUTPATIENT
Start: 2021-01-29 | End: 2021-04-19

## 2021-01-29 RX ORDER — AZITHROMYCIN 250 MG/1
TABLET, FILM COATED ORAL
Qty: 6 TABLET | Refills: 0 | Status: SHIPPED | OUTPATIENT
Start: 2021-01-29 | End: 2021-04-19

## 2021-01-29 RX ORDER — METHYLPREDNISOLONE 4 MG/1
TABLET ORAL
Qty: 1 EACH | Refills: 0 | Status: SHIPPED | OUTPATIENT
Start: 2021-01-29 | End: 2021-04-19

## 2021-01-29 NOTE — PROGRESS NOTES
"Subjective   Juan Luis Collazo is a 80 y.o. male.   Chief Complaint   Patient presents with   • Illness     fever x 1 day, fatigue, productive cough- green, body aches and chills x 6 days   You have chosen to receive care through a telephone visit. Do you consent to use a telephone visit for your medical care today? Yes    Juan Luis is present via telephone visit for complaints of cough.  He reports he developed symptoms about 5-6 days ago that are similar to \"what I get 2-3x per year\".  He states he did have COVID-19 testing done on Friday that was negative.  He states cough is productive of green sputum at times.  He denies known exposure to COVID or changes in taste or smell.  He denies fever other than a 99 reading.  He denies shortness of breath or wheezing.  He reports he has been using an incentive spirometer that he got from the hospital in the past.  He states he is typically given 2 medications from his PCP to take care of these symptoms.        The following portions of the patient's history were reviewed and updated as appropriate: allergies, current medications, past family history, past medical history, past social history, past surgical history and problem list.    Review of Systems   Constitutional: Negative for activity change, appetite change, fatigue, fever, unexpected weight gain and unexpected weight loss.   HENT: Negative for congestion, swollen glands, trouble swallowing and voice change.    Eyes: Negative for blurred vision and visual disturbance.   Respiratory: Positive for cough (productive- green). Negative for shortness of breath and wheezing.    Cardiovascular: Negative for chest pain, palpitations and leg swelling.   Gastrointestinal: Negative for abdominal pain, constipation, diarrhea, nausea, vomiting and indigestion.   Endocrine: Negative for cold intolerance, heat intolerance, polydipsia and polyphagia.   Genitourinary: Negative for dysuria and frequency.   Musculoskeletal: Negative for " arthralgias, back pain, joint swelling and neck pain.   Skin: Negative for color change, rash and skin lesions.   Neurological: Negative for dizziness, weakness, headache, memory problem and confusion.   Hematological: Does not bruise/bleed easily.   Psychiatric/Behavioral: Negative for agitation, hallucinations and suicidal ideas. The patient is not nervous/anxious.        Objective   Past Medical History:   Diagnosis Date   • Arthritis    • Back pain    • Cancer (CMS/HCC)     prostate   • Hypertension    • Osteopenia       Past Surgical History:   Procedure Laterality Date   • APPENDECTOMY     • FOOT SURGERY Right    • KYPHOPLASTY Bilateral 7/17/2018    Procedure: L3 KYPHOPLASTY 1-2 LEVELS;  Surgeon: Vega Pandey MD;  Location: [facility] OR;  Service: Neurosurgery   • KYPHOPLASTY Bilateral 9/11/2018    Procedure: L4 KYPHOPLASTY WITH BIOPSY;  Surgeon: Vega Pandey MD;  Location: [facility] OR;  Service: Neurosurgery   • PROSTATECTOMY     • TONSILLECTOMY     • TOTAL SHOULDER REPLACEMENT Bilateral     at different times        Current Outpatient Medications:   •  hydroCHLOROthiazide (HYDRODIURIL) 12.5 MG tablet, Take 1 tablet by mouth Daily., Disp: 90 tablet, Rfl: 3  •  olmesartan (BENICAR) 40 MG tablet, Take 1 tablet by mouth Daily., Disp: 90 tablet, Rfl: 3  •  traMADol-acetaminophen (ULTRACET) 37.5-325 MG per tablet, Take 1 tablet by mouth 3 (Three) Times a Day As Needed for Moderate Pain ., Disp: 270 tablet, Rfl: 1  •  azithromycin (Zithromax Z-Moises) 250 MG tablet, Take 2 tablets the first day, then 1 tablet daily for 4 days., Disp: 6 tablet, Rfl: 0  •  benzonatate (Tessalon Perles) 100 MG capsule, Take 1 capsule by mouth 3 (Three) Times a Day As Needed for Cough., Disp: 15 capsule, Rfl: 0  •  methylPREDNISolone (MEDROL) 4 MG dose pack, Take as directed on package instructions., Disp: 1 each, Rfl: 0  •  Multiple Vitamins-Minerals (ICAPS AREDS 2) capsule, Take 1 capsule by mouth Daily., Disp: , Rfl:       There were no vitals filed for this visit.  There were no vitals filed for this visit.        Physical Exam     No physical exam- telephone visit        Assessment/Plan   Diagnoses and all orders for this visit:    1. Acute bronchitis, unspecified organism (Primary)  -     azithromycin (Zithromax Z-Moises) 250 MG tablet; Take 2 tablets the first day, then 1 tablet daily for 4 days.  Dispense: 6 tablet; Refill: 0  -     methylPREDNISolone (MEDROL) 4 MG dose pack; Take as directed on package instructions.  Dispense: 1 each; Refill: 0  -     benzonatate (Tessalon Perles) 100 MG capsule; Take 1 capsule by mouth 3 (Three) Times a Day As Needed for Cough.  Dispense: 15 capsule; Refill: 0        This visit has been rescheduled as a phone visit to comply with patient safety concerns in accordance with CDC recommendations. Total time of discussion was 11 minutes.    This sounds like an acute bronchitis at this point.  I have advised continued use of IS as well we medication sent to the pharmacy.  Instructed not to over suppress his cough and to increase fluid intake.  If no improvement or symptoms worsen, will need evaluation.  He verbalized understanding.

## 2021-03-13 DIAGNOSIS — M54.50 CHRONIC BILATERAL LOW BACK PAIN WITHOUT SCIATICA: ICD-10-CM

## 2021-03-13 DIAGNOSIS — G89.29 CHRONIC BILATERAL LOW BACK PAIN WITHOUT SCIATICA: ICD-10-CM

## 2021-04-19 ENCOUNTER — OFFICE VISIT (OUTPATIENT)
Dept: INTERNAL MEDICINE | Facility: CLINIC | Age: 81
End: 2021-04-19

## 2021-04-19 VITALS
HEIGHT: 69 IN | DIASTOLIC BLOOD PRESSURE: 79 MMHG | WEIGHT: 178 LBS | SYSTOLIC BLOOD PRESSURE: 128 MMHG | OXYGEN SATURATION: 98 % | BODY MASS INDEX: 26.36 KG/M2 | TEMPERATURE: 97.1 F | HEART RATE: 72 BPM

## 2021-04-19 DIAGNOSIS — E11.9 ENCOUNTER FOR DIABETIC FOOT EXAM (HCC): Primary | ICD-10-CM

## 2021-04-19 DIAGNOSIS — I10 BENIGN HYPERTENSION: ICD-10-CM

## 2021-04-19 DIAGNOSIS — S32.010D CLOSED COMPRESSION FRACTURE OF L1 LUMBAR VERTEBRA WITH ROUTINE HEALING, SUBSEQUENT ENCOUNTER: ICD-10-CM

## 2021-04-19 DIAGNOSIS — R73.01 IMPAIRED FASTING GLUCOSE: ICD-10-CM

## 2021-04-19 LAB — HBA1C MFR BLD: 5.6 %

## 2021-04-19 PROCEDURE — 99213 OFFICE O/P EST LOW 20 MIN: CPT | Performed by: INTERNAL MEDICINE

## 2021-04-19 PROCEDURE — 83036 HEMOGLOBIN GLYCOSYLATED A1C: CPT | Performed by: INTERNAL MEDICINE

## 2021-04-19 NOTE — PROGRESS NOTES
Subjective   Juan Luis Collazo is a 80 y.o. male.   Chief Complaint   Patient presents with   • Hypertension     6 month follow up    • Prediabetes     a1c: 5.6       History of Present Illness patient is here for treatment of hypertension prediabetes.  He is not having any problems he is leading a very active lifestyle.    The following portions of the patient's history were reviewed and updated as appropriate: allergies, current medications, past family history, past medical history, past social history, past surgical history and problem list.    Review of Systems   Constitutional: Negative for activity change, appetite change, fatigue, fever, unexpected weight gain and unexpected weight loss.   HENT: Negative for swollen glands, trouble swallowing and voice change.    Eyes: Negative for blurred vision and visual disturbance.   Respiratory: Negative for cough and shortness of breath.    Cardiovascular: Negative for chest pain, palpitations and leg swelling.   Gastrointestinal: Negative for abdominal pain, constipation, diarrhea, nausea, vomiting and indigestion.   Endocrine: Negative for cold intolerance, heat intolerance, polydipsia and polyphagia.   Genitourinary: Negative for dysuria and frequency.   Musculoskeletal: Negative for arthralgias, back pain, joint swelling and neck pain.   Skin: Negative for color change, rash and skin lesions.   Neurological: Negative for dizziness, weakness, headache, memory problem and confusion.   Hematological: Does not bruise/bleed easily.   Psychiatric/Behavioral: Negative for agitation, hallucinations and suicidal ideas. The patient is not nervous/anxious.        Objective   Past Medical History:   Diagnosis Date   • Arthritis    • Back pain    • Cancer (CMS/HCC)     prostate   • Hypertension    • Osteopenia       Past Surgical History:   Procedure Laterality Date   • APPENDECTOMY     • FOOT SURGERY Right    • KYPHOPLASTY Bilateral 7/17/2018    Procedure: L3 KYPHOPLASTY 1-2  LEVELS;  Surgeon: Vega Pandey MD;  Location: [facility] OR;  Service: Neurosurgery   • KYPHOPLASTY Bilateral 9/11/2018    Procedure: L4 KYPHOPLASTY WITH BIOPSY;  Surgeon: Vega Pandey MD;  Location: [facility] OR;  Service: Neurosurgery   • PROSTATECTOMY     • TONSILLECTOMY     • TOTAL SHOULDER REPLACEMENT Bilateral     at different times        Current Outpatient Medications:   •  hydroCHLOROthiazide (HYDRODIURIL) 12.5 MG tablet, Take 1 tablet by mouth Daily., Disp: 90 tablet, Rfl: 3  •  Multiple Vitamins-Minerals (ICAPS AREDS 2) capsule, Take 1 capsule by mouth Daily., Disp: , Rfl:   •  olmesartan (BENICAR) 40 MG tablet, Take 1 tablet by mouth Daily., Disp: 90 tablet, Rfl: 3  •  traMADol-acetaminophen (ULTRACET) 37.5-325 MG per tablet, TAKE 1 TABLET BY MOUTH 3 (THREE) TIMES A DAY AS NEEDED FOR MODERATE PAIN . , Disp: 270 tablet, Rfl: 1     Vitals:    04/19/21 1307   BP: 128/79   Pulse: 72   Temp: 97.1 °F (36.2 °C)   SpO2: 98%         04/19/21  1307   Weight: 80.7 kg (178 lb)     Patient's Body mass index is 26.29 kg/m². BMI is .      Physical Exam  Vitals and nursing note reviewed.   Constitutional:       General: He is not in acute distress.     Appearance: Normal appearance. He is well-developed.   HENT:      Head: Normocephalic and atraumatic.      Right Ear: External ear normal.      Left Ear: External ear normal.      Nose: Nose normal.   Eyes:      Extraocular Movements: Extraocular movements intact.      Conjunctiva/sclera: Conjunctivae normal.      Pupils: Pupils are equal, round, and reactive to light.   Cardiovascular:      Rate and Rhythm: Normal rate and regular rhythm.      Pulses: Normal pulses.      Heart sounds: Normal heart sounds.   Pulmonary:      Effort: Pulmonary effort is normal.      Breath sounds: Normal breath sounds.   Abdominal:      General: Bowel sounds are normal.      Palpations: Abdomen is soft.   Musculoskeletal:         General: Normal range of motion.      Cervical  back: Normal range of motion and neck supple. No rigidity. No muscular tenderness.   Skin:     General: Skin is warm and dry.   Neurological:      General: No focal deficit present.      Mental Status: He is alert and oriented to person, place, and time.   Psychiatric:         Mood and Affect: Mood normal.         Behavior: Behavior normal.               Assessment/Plan   Diagnoses and all orders for this visit:    1. Encounter for diabetic foot exam (CMS/[facility]) (Primary)  -     POC Glycosylated Hemoglobin (Hb A1C)    2. Benign hypertension  -     Basic Metabolic Panel    3. Impaired fasting glucose    4. Closed compression fracture of L1 lumbar vertebra with routine healing, subsequent encounter    Patient has well-controlled hypertension remains in the impaired fasting glucose range.  He continues to have chronic back pain but he is very functional and able to tolerate the pain that he has with minimal medication.

## 2021-04-20 ENCOUNTER — TELEPHONE (OUTPATIENT)
Dept: INTERNAL MEDICINE | Facility: CLINIC | Age: 81
End: 2021-04-20

## 2021-04-20 LAB
BUN SERPL-MCNC: 18 MG/DL (ref 8–23)
BUN/CREAT SERPL: 18.9 (ref 7–25)
CALCIUM SERPL-MCNC: 9.9 MG/DL (ref 8.6–10.5)
CHLORIDE SERPL-SCNC: 102 MMOL/L (ref 98–107)
CO2 SERPL-SCNC: 28.4 MMOL/L (ref 22–29)
CREAT SERPL-MCNC: 0.95 MG/DL (ref 0.76–1.27)
GLUCOSE SERPL-MCNC: 81 MG/DL (ref 65–99)
POTASSIUM SERPL-SCNC: 4.6 MMOL/L (ref 3.5–5.2)
SODIUM SERPL-SCNC: 140 MMOL/L (ref 136–145)

## 2021-06-11 RX ORDER — OLMESARTAN MEDOXOMIL 40 MG/1
40 TABLET ORAL DAILY
Qty: 90 TABLET | Refills: 3 | Status: SHIPPED | OUTPATIENT
Start: 2021-06-11 | End: 2021-10-27

## 2021-06-23 DIAGNOSIS — M54.50 CHRONIC BILATERAL LOW BACK PAIN WITHOUT SCIATICA: ICD-10-CM

## 2021-06-23 DIAGNOSIS — G89.29 CHRONIC BILATERAL LOW BACK PAIN WITHOUT SCIATICA: ICD-10-CM

## 2021-07-23 DIAGNOSIS — G89.29 CHRONIC BILATERAL LOW BACK PAIN WITHOUT SCIATICA: ICD-10-CM

## 2021-07-23 DIAGNOSIS — M54.50 CHRONIC BILATERAL LOW BACK PAIN WITHOUT SCIATICA: ICD-10-CM

## 2021-10-27 ENCOUNTER — OFFICE VISIT (OUTPATIENT)
Dept: INTERNAL MEDICINE | Facility: CLINIC | Age: 81
End: 2021-10-27

## 2021-10-27 VITALS
SYSTOLIC BLOOD PRESSURE: 120 MMHG | DIASTOLIC BLOOD PRESSURE: 68 MMHG | OXYGEN SATURATION: 98 % | HEIGHT: 69 IN | HEART RATE: 83 BPM | TEMPERATURE: 97.1 F | BODY MASS INDEX: 26.36 KG/M2 | WEIGHT: 178 LBS

## 2021-10-27 DIAGNOSIS — I10 BENIGN HYPERTENSION: Primary | ICD-10-CM

## 2021-10-27 DIAGNOSIS — M54.42 ACUTE BILATERAL LOW BACK PAIN WITH BILATERAL SCIATICA: ICD-10-CM

## 2021-10-27 DIAGNOSIS — M54.41 ACUTE BILATERAL LOW BACK PAIN WITH BILATERAL SCIATICA: ICD-10-CM

## 2021-10-27 DIAGNOSIS — E78.00 HIGH CHOLESTEROL: ICD-10-CM

## 2021-10-27 DIAGNOSIS — Z79.899 ENCOUNTER FOR LONG-TERM CURRENT USE OF MEDICATION: ICD-10-CM

## 2021-10-27 DIAGNOSIS — R73.01 IMPAIRED FASTING GLUCOSE: ICD-10-CM

## 2021-10-27 LAB
EXPIRATION DATE: NORMAL
HBA1C MFR BLD: 5.5 %
Lab: NORMAL

## 2021-10-27 PROCEDURE — 99214 OFFICE O/P EST MOD 30 MIN: CPT | Performed by: INTERNAL MEDICINE

## 2021-10-27 PROCEDURE — 1125F AMNT PAIN NOTED PAIN PRSNT: CPT | Performed by: INTERNAL MEDICINE

## 2021-10-27 PROCEDURE — G0439 PPPS, SUBSEQ VISIT: HCPCS | Performed by: INTERNAL MEDICINE

## 2021-10-27 PROCEDURE — 1170F FXNL STATUS ASSESSED: CPT | Performed by: INTERNAL MEDICINE

## 2021-10-27 PROCEDURE — 1159F MED LIST DOCD IN RCRD: CPT | Performed by: INTERNAL MEDICINE

## 2021-10-27 PROCEDURE — 3044F HG A1C LEVEL LT 7.0%: CPT | Performed by: INTERNAL MEDICINE

## 2021-10-27 PROCEDURE — 83036 HEMOGLOBIN GLYCOSYLATED A1C: CPT | Performed by: INTERNAL MEDICINE

## 2021-10-27 RX ORDER — HYDROCODONE BITARTRATE AND ACETAMINOPHEN 5; 325 MG/1; MG/1
1 TABLET ORAL EVERY 6 HOURS PRN
Qty: 20 TABLET | Refills: 0 | Status: SHIPPED | OUTPATIENT
Start: 2021-10-27 | End: 2021-11-18 | Stop reason: DRUGHIGH

## 2021-10-27 RX ORDER — METHYLPREDNISOLONE 4 MG/1
TABLET ORAL
Qty: 21 TABLET | Refills: 0 | Status: SHIPPED | OUTPATIENT
Start: 2021-10-27 | End: 2021-11-18

## 2021-10-27 NOTE — PROGRESS NOTES
The ABCs of the Annual Wellness Visit  Subsequent Medicare Wellness Visit    Chief Complaint   Patient presents with   • Medicare Wellness-subsequent     A1C: 5.5   • Back Pain     LOW BACK PAIN, IS SEEING PAIN MANAGEMENT AT [facility], but is not scheulded until Monday to have an injection there.  is wondering about getting somethign to get him through until then.  Pt stats they had not given him any other pain medicaiton paperwork       Subjective    History of Present Illness:  Juan Luis Collazo is a 80 y.o. male who presents for a Subsequent Medicare Wellness Visit.  Patient is here for follow-up of recently discovered lower back pain he has been seen by orthopedic surgery and scheduled for pain management on Monday he is in excruciating pain at this point he states he is got better for couple weeks after steroid injection.  He does not remember having injured his back in any way he had an MRI scan done at the orthopedic [place] and I have called for a report on that.    The following portions of the patient's history were reviewed and   updated as appropriate: allergies, current medications, past family history, past medical history, past social history, past surgical history and problem list.    Compared to one year ago, the patient feels his physical   health is worse.    Compared to one year ago, the patient feels his mental   health is the same.    Recent Hospitalizations:  He was not admitted to the hospital during the last year.       Current Medical Providers:  Patient Care Team:  Juan Luis Aguilar MD as PCP - General (Internal Medicine)    Outpatient Medications Prior to Visit   Medication Sig Dispense Refill   • hydroCHLOROthiazide (HYDRODIURIL) 12.5 MG tablet Take 1 tablet by mouth Daily. 90 tablet 3   • Multiple Vitamins-Minerals (ICAPS AREDS 2) capsule Take 1 capsule by mouth Daily.     • traMADol-acetaminophen (ULTRACET) 37.5-325 MG per tablet TAKE 1 TABLET BY MOUTH 3 (THREE) TIMES A DAY AS NEEDED FOR  MODERATE PAIN .  90 tablet 5   • olmesartan (BENICAR) 40 MG tablet TAKE 1 TABLET BY MOUTH DAILY.  90 tablet 3     No facility-administered medications prior to visit.       Opioid medication/s are on active medication list.  and I have evaluated his active treatment plan and pain score trends (see table).  Vitals:    10/27/21 1339   PainSc:   8   PainLoc: Back     I have reviewed the chart for potential of high risk medication and harmful drug interactions in the elderly.            Aspirin is not on active medication list.  Aspirin use is not indicated based on review of current medical condition/s. Risk of harm outweighs potential benefits.  .    Patient Active Problem List   Diagnosis   • Closed compression fracture of first lumbar vertebra (HCC)   • Current non-smoker   • BMI 28.0-28.9,adult   • Lumbar compression fracture, closed, initial encounter (HCC)   • Lumbar compression fracture (HCC)   • S/P kyphoplasty   • Numbness and tingling of right leg   • Compression fracture of fourth lumbar vertebra with delayed healing   • Benign hypertension   • Flatback syndrome of lumbar region   • Hammer toe of right foot   • High cholesterol   • Impaired fasting glucose   • Malignant neoplasm of prostate (HCC)   • Osteopenia   • Acute exacerbation of chronic obstructive airways disease (HCC)     Advance Care Planning  Advance Directive is not on file.  ACP discussion was held with the patient during this visit. Patient has an advance directive (not in EMR), copy requested.    Review of Systems   Constitutional: Negative for appetite change, chills and fever.   HENT: Negative for congestion and ear pain.    Eyes: Negative for pain and discharge.   Respiratory: Negative for cough, chest tightness and shortness of breath.    Cardiovascular: Negative for chest pain, palpitations and leg swelling.   Gastrointestinal: Negative for abdominal distention and abdominal pain.   Endocrine: Negative for cold intolerance.  "  Genitourinary: Negative for difficulty urinating, dysuria and flank pain.   Musculoskeletal: Positive for arthralgias and back pain. Negative for gait problem.   Skin: Negative for color change and rash.   Neurological: Negative for dizziness and seizures.   Psychiatric/Behavioral: Negative for agitation, decreased concentration and dysphoric mood.        Objective    Vitals:    10/27/21 1339   BP: 120/68   BP Location: Left arm   Patient Position: Sitting   Cuff Size: Adult   Pulse: 83   Temp: 97.1 °F (36.2 °C)   TempSrc: Temporal   SpO2: 98%   Weight: 80.7 kg (178 lb)   Height: 175.3 cm (69\")   PainSc:   8   PainLoc: Back     BMI Readings from Last 1 Encounters:   10/27/21 26.29 kg/m²   BMI is above normal parameters. Recommendations include: educational material and exercise counseling    Does the patient have evidence of cognitive impairment? No    Physical Exam  Vitals and nursing note reviewed.   Constitutional:       General: He is not in acute distress.     Appearance: Normal appearance. He is well-developed.   HENT:      Head: Normocephalic and atraumatic.      Right Ear: External ear normal.      Left Ear: External ear normal.      Nose: Nose normal.   Eyes:      Extraocular Movements: Extraocular movements intact.      Conjunctiva/sclera: Conjunctivae normal.      Pupils: Pupils are equal, round, and reactive to light.   Cardiovascular:      Rate and Rhythm: Normal rate and regular rhythm.      Pulses: Normal pulses.      Heart sounds: Normal heart sounds.   Pulmonary:      Effort: Pulmonary effort is normal.      Breath sounds: Normal breath sounds.   Abdominal:      General: Bowel sounds are normal.      Palpations: Abdomen is soft.   Musculoskeletal:      Cervical back: Normal range of motion and neck supple. No rigidity. No muscular tenderness.   Skin:     General: Skin is warm and dry.   Neurological:      General: No focal deficit present.      Mental Status: He is alert and oriented to person, " place, and time.   Psychiatric:         Mood and Affect: Mood normal.         Behavior: Behavior normal.       Lab Results   Component Value Date    HGBA1C 5.5 10/27/2021            HEALTH RISK ASSESSMENT    Smoking Status:  Social History     Tobacco Use   Smoking Status Former Smoker   • Quit date: 9/10/1991   • Years since quittin.1   Smokeless Tobacco Never Used   Tobacco Comment    1991     Alcohol Consumption:  Social History     Substance and Sexual Activity   Alcohol Use Yes    Comment: occasional drink     Fall Risk Screen:    IFEOMAADI Fall Risk Assessment was completed, and patient is at HIGH risk for falls. Assessment completed on:10/27/2021    Depression Screening:  PHQ-2/PHQ-9 Depression Screening 10/27/2021   Little interest or pleasure in doing things 0   Feeling down, depressed, or hopeless 0   Total Score 0       Health Habits and Functional and Cognitive Screening:  Functional & Cognitive Status 10/27/2021   Do you have difficulty preparing food and eating? No   Do you have difficulty bathing yourself, getting dressed or grooming yourself? No   Do you have difficulty using the toilet? No   Do you have difficulty moving around from place to place? No   Do you have trouble with steps or getting out of a bed or a chair? No   Current Diet Well Balanced Diet   Dental Exam Up to date   Eye Exam Up to date   Exercise (times per week) 3 times per week   Current Exercises Include Yard Work;Walking   Do you need help using the phone?  No   Are you deaf or do you have serious difficulty hearing?  No   Do you need help with transportation? No   Do you need help shopping? No   Do you need help preparing meals?  No   Do you need help with housework?  No   Do you need help with laundry? No   Do you need help taking your medications? No   Do you need help managing money? No   Do you ever drive or ride in a car without wearing a seat belt? No   Have you felt unusual stress, anger or loneliness in the last  month? No   Who do you live with? Spouse   If you need help, do you have trouble finding someone available to you? No   Have you been bothered in the last four weeks by sexual problems? No   Do you have difficulty concentrating, remembering or making decisions? No       Age-appropriate Screening Schedule:  Refer to the list below for future screening recommendations based on patient's age, sex and/or medical conditions. Orders for these recommended tests are listed in the plan section. The patient has been provided with a written plan.    Health Maintenance   Topic Date Due   • URINE MICROALBUMIN  Never done   • TDAP/TD VACCINES (1 - Tdap) Never done   • DXA SCAN  09/25/2020   • ZOSTER VACCINE (2 of 2) 01/13/2021   • INFLUENZA VACCINE  08/01/2021   • LIPID PANEL  10/26/2021   • HEMOGLOBIN A1C  04/27/2022   • DIABETIC EYE EXAM  10/27/2022              Assessment/Plan   CMS Preventative Services Quick Reference  Risk Factors Identified During Encounter  Immunizations Discussed/Encouraged (specific Immunizations; Tdap, Influenza, Pneumococcal 23, Shingrix and COVID19  The above risks/problems have been discussed with the patient.  Follow up actions/plans if indicated are seen below in the Assessment/Plan Section.  Pertinent information has been shared with the patient in the After Visit Summary.    Diagnoses and all orders for this visit:    1. Benign hypertension (Primary)  -     Comprehensive Metabolic Panel  -     Urinalysis With Microscopic - Urine, Clean Catch  -     Uric Acid    2. High cholesterol  -     TSH  -     Lipid Panel    3. Encounter for long-term current use of medication  -     CBC & Differential    4. Impaired fasting glucose  -     POC Glycosylated Hemoglobin (Hb A1C)    5. Acute bilateral low back pain with bilateral sciatica  -     HYDROcodone-acetaminophen (NORCO) 5-325 MG per tablet; Take 1 tablet by mouth Every 6 (Six) Hours As Needed for Moderate Pain .  Dispense: 20 tablet; Refill: 0    Other  orders  -     methylPREDNISolone (MEDROL) 4 MG dose pack; Take as directed on package instructions.  Dispense: 21 tablet; Refill: 0        Follow Up:   No follow-ups on file.     An After Visit Summary and PPPS were made available to the patient.    Patient has high blood pressure hypercholesterolemia is also experiencing severe lower back pain with radiation to both legs.  I am going to give patient a Medrol Dosepak to start up in the morning also going to give him some hydrocodone for short-term use.  Normally I would do some physical therapy but since he is having pain management at this point on Monday we will hold off on that of asked him to call back if he continues to have problems.

## 2021-11-14 ENCOUNTER — APPOINTMENT (OUTPATIENT)
Dept: CT IMAGING | Age: 81
DRG: 552 | End: 2021-11-14
Payer: MEDICARE

## 2021-11-14 ENCOUNTER — HOSPITAL ENCOUNTER (INPATIENT)
Age: 81
LOS: 1 days | Discharge: HOME HEALTH | DRG: 552 | End: 2021-11-16
Attending: EMERGENCY MEDICINE | Admitting: INTERNAL MEDICINE
Payer: MEDICARE

## 2021-11-14 DIAGNOSIS — S22.009A CLOSED FRACTURE OF THORACIC VERTEBRA, UNSPECIFIED FRACTURE MORPHOLOGY, UNSPECIFIED THORACIC VERTEBRAL LEVEL, INITIAL ENCOUNTER (HCC): ICD-10-CM

## 2021-11-14 DIAGNOSIS — S09.90XA CLOSED HEAD INJURY, INITIAL ENCOUNTER: ICD-10-CM

## 2021-11-14 DIAGNOSIS — S32.000A LUMBAR COMPRESSION FRACTURE, CLOSED, INITIAL ENCOUNTER (HCC): Primary | ICD-10-CM

## 2021-11-14 DIAGNOSIS — S32.000A CLOSED WEDGE FRACTURE OF LUMBAR VERTEBRA, UNSPECIFIED LUMBAR VERTEBRAL LEVEL, INITIAL ENCOUNTER (HCC): ICD-10-CM

## 2021-11-14 DIAGNOSIS — S22.49XA CLOSED FRACTURE OF MULTIPLE RIBS, UNSPECIFIED LATERALITY, INITIAL ENCOUNTER: ICD-10-CM

## 2021-11-14 LAB
BASOPHILS ABSOLUTE: 0 K/UL (ref 0–0.2)
BASOPHILS RELATIVE PERCENT: 0.2 % (ref 0–1)
EOSINOPHILS ABSOLUTE: 0 K/UL (ref 0–0.6)
EOSINOPHILS RELATIVE PERCENT: 0.3 % (ref 0–5)
HCT VFR BLD CALC: 36.6 % (ref 42–52)
HEMOGLOBIN: 11.6 G/DL (ref 14–18)
IMMATURE GRANULOCYTES #: 0 K/UL
LYMPHOCYTES ABSOLUTE: 1.5 K/UL (ref 1.1–4.5)
LYMPHOCYTES RELATIVE PERCENT: 16.3 % (ref 20–40)
MCH RBC QN AUTO: 30.7 PG (ref 27–31)
MCHC RBC AUTO-ENTMCNC: 31.7 G/DL (ref 33–37)
MCV RBC AUTO: 96.8 FL (ref 80–94)
MONOCYTES ABSOLUTE: 0.8 K/UL (ref 0–0.9)
MONOCYTES RELATIVE PERCENT: 8.8 % (ref 0–10)
NEUTROPHILS ABSOLUTE: 6.6 K/UL (ref 1.5–7.5)
NEUTROPHILS RELATIVE PERCENT: 74 % (ref 50–65)
PDW BLD-RTO: 13.7 % (ref 11.5–14.5)
PLATELET # BLD: 232 K/UL (ref 130–400)
PMV BLD AUTO: 10.2 FL (ref 9.4–12.4)
RBC # BLD: 3.78 M/UL (ref 4.7–6.1)
WBC # BLD: 9 K/UL (ref 4.8–10.8)

## 2021-11-14 PROCEDURE — 71250 CT THORAX DX C-: CPT

## 2021-11-14 PROCEDURE — 72131 CT LUMBAR SPINE W/O DYE: CPT

## 2021-11-14 PROCEDURE — 72128 CT CHEST SPINE W/O DYE: CPT

## 2021-11-14 PROCEDURE — 70450 CT HEAD/BRAIN W/O DYE: CPT

## 2021-11-14 PROCEDURE — 72125 CT NECK SPINE W/O DYE: CPT

## 2021-11-14 PROCEDURE — 96374 THER/PROPH/DIAG INJ IV PUSH: CPT

## 2021-11-14 PROCEDURE — 6360000002 HC RX W HCPCS: Performed by: EMERGENCY MEDICINE

## 2021-11-14 PROCEDURE — 99284 EMERGENCY DEPT VISIT MOD MDM: CPT

## 2021-11-14 RX ORDER — MORPHINE SULFATE 4 MG/ML
4 INJECTION, SOLUTION INTRAMUSCULAR; INTRAVENOUS ONCE
Status: COMPLETED | OUTPATIENT
Start: 2021-11-14 | End: 2021-11-14

## 2021-11-14 RX ADMIN — MORPHINE SULFATE 4 MG: 4 INJECTION INTRAVENOUS at 22:08

## 2021-11-14 ASSESSMENT — ENCOUNTER SYMPTOMS
NAUSEA: 0
SHORTNESS OF BREATH: 1
DIARRHEA: 0
BACK PAIN: 1
SORE THROAT: 0
RHINORRHEA: 0
VOMITING: 0
COUGH: 0
ABDOMINAL PAIN: 0

## 2021-11-14 ASSESSMENT — PAIN SCALES - GENERAL: PAINLEVEL_OUTOF10: 10

## 2021-11-15 ENCOUNTER — APPOINTMENT (OUTPATIENT)
Dept: GENERAL RADIOLOGY | Age: 81
DRG: 552 | End: 2021-11-15
Payer: MEDICARE

## 2021-11-15 PROBLEM — S32.000A LUMBAR COMPRESSION FRACTURE, CLOSED, INITIAL ENCOUNTER (HCC): Status: ACTIVE | Noted: 2021-11-15

## 2021-11-15 PROBLEM — M54.9 BACK PAIN: Status: ACTIVE | Noted: 2021-11-15

## 2021-11-15 PROBLEM — Z74.09 IMPAIRED FUNCTIONAL MOBILITY AND ENDURANCE: Status: ACTIVE | Noted: 2021-11-15

## 2021-11-15 PROBLEM — I10 HYPERTENSION: Status: ACTIVE | Noted: 2021-11-15

## 2021-11-15 PROBLEM — N18.31 STAGE 3A CHRONIC KIDNEY DISEASE (HCC): Status: ACTIVE | Noted: 2021-11-15

## 2021-11-15 PROBLEM — S22.000A THORACIC COMPRESSION FRACTURE (HCC): Status: ACTIVE | Noted: 2021-11-15

## 2021-11-15 PROBLEM — S32.000G COMPRESSION FRACTURE OF LUMBAR VERTEBRA WITH DELAYED HEALING: Status: ACTIVE | Noted: 2021-11-15

## 2021-11-15 LAB
ALBUMIN SERPL-MCNC: 4.2 G/DL (ref 3.5–5.2)
ALP BLD-CCNC: 98 U/L (ref 40–130)
ALT SERPL-CCNC: 13 U/L (ref 5–41)
ANION GAP SERPL CALCULATED.3IONS-SCNC: 13 MMOL/L (ref 7–19)
ANION GAP SERPL CALCULATED.3IONS-SCNC: 9 MMOL/L (ref 7–19)
AST SERPL-CCNC: 13 U/L (ref 5–40)
BILIRUB SERPL-MCNC: 0.5 MG/DL (ref 0.2–1.2)
BUN BLDV-MCNC: 27 MG/DL (ref 8–23)
BUN BLDV-MCNC: 29 MG/DL (ref 8–23)
CALCIUM SERPL-MCNC: 9.3 MG/DL (ref 8.8–10.2)
CALCIUM SERPL-MCNC: 9.9 MG/DL (ref 8.8–10.2)
CHLORIDE BLD-SCNC: 98 MMOL/L (ref 98–111)
CHLORIDE BLD-SCNC: 98 MMOL/L (ref 98–111)
CO2: 27 MMOL/L (ref 22–29)
CO2: 28 MMOL/L (ref 22–29)
CREAT SERPL-MCNC: 1.4 MG/DL (ref 0.5–1.2)
CREAT SERPL-MCNC: 1.6 MG/DL (ref 0.5–1.2)
GFR AFRICAN AMERICAN: 50
GFR AFRICAN AMERICAN: 59
GFR NON-AFRICAN AMERICAN: 41
GFR NON-AFRICAN AMERICAN: 48
GLUCOSE BLD-MCNC: 117 MG/DL (ref 74–109)
GLUCOSE BLD-MCNC: 120 MG/DL (ref 74–109)
HCT VFR BLD CALC: 40.6 % (ref 42–52)
HEMOGLOBIN: 12.4 G/DL (ref 14–18)
MCH RBC QN AUTO: 30.1 PG (ref 27–31)
MCHC RBC AUTO-ENTMCNC: 30.5 G/DL (ref 33–37)
MCV RBC AUTO: 98.5 FL (ref 80–94)
PDW BLD-RTO: 13.7 % (ref 11.5–14.5)
PLATELET # BLD: 273 K/UL (ref 130–400)
PMV BLD AUTO: 10.7 FL (ref 9.4–12.4)
POTASSIUM SERPL-SCNC: 5 MMOL/L (ref 3.5–5)
POTASSIUM SERPL-SCNC: 5.2 MMOL/L (ref 3.5–5)
RBC # BLD: 4.12 M/UL (ref 4.7–6.1)
SARS-COV-2, NAAT: NOT DETECTED
SODIUM BLD-SCNC: 135 MMOL/L (ref 136–145)
SODIUM BLD-SCNC: 138 MMOL/L (ref 136–145)
TOTAL PROTEIN: 7.9 G/DL (ref 6.6–8.7)
WBC # BLD: 8.5 K/UL (ref 4.8–10.8)

## 2021-11-15 PROCEDURE — 97116 GAIT TRAINING THERAPY: CPT

## 2021-11-15 PROCEDURE — 2580000003 HC RX 258: Performed by: INTERNAL MEDICINE

## 2021-11-15 PROCEDURE — 72072 X-RAY EXAM THORAC SPINE 3VWS: CPT

## 2021-11-15 PROCEDURE — 97530 THERAPEUTIC ACTIVITIES: CPT

## 2021-11-15 PROCEDURE — 85027 COMPLETE CBC AUTOMATED: CPT

## 2021-11-15 PROCEDURE — 85025 COMPLETE CBC W/AUTO DIFF WBC: CPT

## 2021-11-15 PROCEDURE — 80053 COMPREHEN METABOLIC PANEL: CPT

## 2021-11-15 PROCEDURE — 97161 PT EVAL LOW COMPLEX 20 MIN: CPT

## 2021-11-15 PROCEDURE — 2500000003 HC RX 250 WO HCPCS: Performed by: INTERNAL MEDICINE

## 2021-11-15 PROCEDURE — 6370000000 HC RX 637 (ALT 250 FOR IP): Performed by: NEUROLOGICAL SURGERY

## 2021-11-15 PROCEDURE — 36415 COLL VENOUS BLD VENIPUNCTURE: CPT

## 2021-11-15 PROCEDURE — 6370000000 HC RX 637 (ALT 250 FOR IP): Performed by: INTERNAL MEDICINE

## 2021-11-15 PROCEDURE — 72100 X-RAY EXAM L-S SPINE 2/3 VWS: CPT

## 2021-11-15 PROCEDURE — 6360000002 HC RX W HCPCS: Performed by: INTERNAL MEDICINE

## 2021-11-15 PROCEDURE — 1210000000 HC MED SURG R&B

## 2021-11-15 PROCEDURE — 6360000002 HC RX W HCPCS: Performed by: NEUROLOGICAL SURGERY

## 2021-11-15 PROCEDURE — 99222 1ST HOSP IP/OBS MODERATE 55: CPT | Performed by: NEUROLOGICAL SURGERY

## 2021-11-15 PROCEDURE — 87635 SARS-COV-2 COVID-19 AMP PRB: CPT

## 2021-11-15 RX ORDER — HYDROCODONE BITARTRATE AND ACETAMINOPHEN 5; 325 MG/1; MG/1
1 TABLET ORAL EVERY 6 HOURS PRN
Status: ON HOLD | COMMUNITY
End: 2021-11-16 | Stop reason: HOSPADM

## 2021-11-15 RX ORDER — LOSARTAN POTASSIUM 25 MG/1
50 TABLET ORAL DAILY
Status: DISCONTINUED | OUTPATIENT
Start: 2021-11-15 | End: 2021-11-16 | Stop reason: HOSPADM

## 2021-11-15 RX ORDER — HYDROCODONE BITARTRATE AND ACETAMINOPHEN 5; 325 MG/1; MG/1
1 TABLET ORAL EVERY 4 HOURS PRN
Status: DISCONTINUED | OUTPATIENT
Start: 2021-11-15 | End: 2021-11-16 | Stop reason: HOSPADM

## 2021-11-15 RX ORDER — HYDROCHLOROTHIAZIDE 12.5 MG/1
12.5 CAPSULE, GELATIN COATED ORAL DAILY
Status: DISCONTINUED | OUTPATIENT
Start: 2021-11-15 | End: 2021-11-15

## 2021-11-15 RX ORDER — HYDROCODONE BITARTRATE AND ACETAMINOPHEN 5; 325 MG/1; MG/1
1 TABLET ORAL EVERY 4 HOURS PRN
Status: DISCONTINUED | OUTPATIENT
Start: 2021-11-15 | End: 2021-11-15

## 2021-11-15 RX ORDER — OLMESARTAN MEDOXOMIL 5 MG/1
20 TABLET ORAL DAILY
Status: DISCONTINUED | OUTPATIENT
Start: 2021-11-15 | End: 2021-11-15 | Stop reason: CLARIF

## 2021-11-15 RX ORDER — CYCLOBENZAPRINE HCL 10 MG
5 TABLET ORAL 3 TIMES DAILY PRN
Status: DISCONTINUED | OUTPATIENT
Start: 2021-11-15 | End: 2021-11-16 | Stop reason: HOSPADM

## 2021-11-15 RX ORDER — HYDROCODONE BITARTRATE AND ACETAMINOPHEN 5; 325 MG/1; MG/1
2 TABLET ORAL EVERY 4 HOURS PRN
Status: DISCONTINUED | OUTPATIENT
Start: 2021-11-15 | End: 2021-11-16 | Stop reason: HOSPADM

## 2021-11-15 RX ORDER — 0.9 % SODIUM CHLORIDE 0.9 %
500 INTRAVENOUS SOLUTION INTRAVENOUS ONCE
Status: COMPLETED | OUTPATIENT
Start: 2021-11-15 | End: 2021-11-15

## 2021-11-15 RX ORDER — HYDROMORPHONE HYDROCHLORIDE 1 MG/ML
1 INJECTION, SOLUTION INTRAMUSCULAR; INTRAVENOUS; SUBCUTANEOUS EVERY 4 HOURS PRN
Status: DISCONTINUED | OUTPATIENT
Start: 2021-11-15 | End: 2021-11-15

## 2021-11-15 RX ORDER — HYDROMORPHONE HYDROCHLORIDE 1 MG/ML
0.25 INJECTION, SOLUTION INTRAMUSCULAR; INTRAVENOUS; SUBCUTANEOUS
Status: DISCONTINUED | OUTPATIENT
Start: 2021-11-15 | End: 2021-11-16 | Stop reason: HOSPADM

## 2021-11-15 RX ORDER — HYDROCHLOROTHIAZIDE 12.5 MG/1
12.5 CAPSULE, GELATIN COATED ORAL DAILY
Status: DISCONTINUED | OUTPATIENT
Start: 2021-11-16 | End: 2021-11-16 | Stop reason: HOSPADM

## 2021-11-15 RX ORDER — HYDROCHLOROTHIAZIDE 12.5 MG/1
12.5 CAPSULE, GELATIN COATED ORAL DAILY
COMMUNITY

## 2021-11-15 RX ORDER — HYDROMORPHONE HYDROCHLORIDE 1 MG/ML
0.5 INJECTION, SOLUTION INTRAMUSCULAR; INTRAVENOUS; SUBCUTANEOUS
Status: DISCONTINUED | OUTPATIENT
Start: 2021-11-15 | End: 2021-11-16 | Stop reason: HOSPADM

## 2021-11-15 RX ADMIN — HYDROMORPHONE HYDROCHLORIDE 0.5 MG: 1 INJECTION, SOLUTION INTRAMUSCULAR; INTRAVENOUS; SUBCUTANEOUS at 19:19

## 2021-11-15 RX ADMIN — HYDROCODONE BITARTRATE AND ACETAMINOPHEN 1 TABLET: 5; 325 TABLET ORAL at 04:40

## 2021-11-15 RX ADMIN — FAMOTIDINE 20 MG: 10 INJECTION, SOLUTION INTRAVENOUS at 09:00

## 2021-11-15 RX ADMIN — HYDROMORPHONE HYDROCHLORIDE 1 MG: 1 INJECTION, SOLUTION INTRAMUSCULAR; INTRAVENOUS; SUBCUTANEOUS at 03:17

## 2021-11-15 RX ADMIN — HYDROCODONE BITARTRATE AND ACETAMINOPHEN 2 TABLET: 5; 325 TABLET ORAL at 15:00

## 2021-11-15 RX ADMIN — SODIUM CHLORIDE 500 ML: 9 INJECTION, SOLUTION INTRAVENOUS at 03:18

## 2021-11-15 RX ADMIN — LOSARTAN POTASSIUM 50 MG: 25 TABLET, FILM COATED ORAL at 03:21

## 2021-11-15 RX ADMIN — HYDROCODONE BITARTRATE AND ACETAMINOPHEN 2 TABLET: 5; 325 TABLET ORAL at 23:49

## 2021-11-15 RX ADMIN — HYDROCODONE BITARTRATE AND ACETAMINOPHEN 2 TABLET: 5; 325 TABLET ORAL at 09:00

## 2021-11-15 RX ADMIN — CYCLOBENZAPRINE 5 MG: 10 TABLET, FILM COATED ORAL at 23:49

## 2021-11-15 RX ADMIN — HYDROMORPHONE HYDROCHLORIDE 0.5 MG: 1 INJECTION, SOLUTION INTRAMUSCULAR; INTRAVENOUS; SUBCUTANEOUS at 11:39

## 2021-11-15 ASSESSMENT — PAIN - FUNCTIONAL ASSESSMENT
PAIN_FUNCTIONAL_ASSESSMENT: ACTIVITIES ARE NOT PREVENTED
PAIN_FUNCTIONAL_ASSESSMENT: PREVENTS OR INTERFERES WITH MANY ACTIVE NOT PASSIVE ACTIVITIES
PAIN_FUNCTIONAL_ASSESSMENT: PREVENTS OR INTERFERES SOME ACTIVE ACTIVITIES AND ADLS

## 2021-11-15 ASSESSMENT — PAIN SCALES - GENERAL
PAINLEVEL_OUTOF10: 7
PAINLEVEL_OUTOF10: 9
PAINLEVEL_OUTOF10: 6
PAINLEVEL_OUTOF10: 10
PAINLEVEL_OUTOF10: 7
PAINLEVEL_OUTOF10: 5
PAINLEVEL_OUTOF10: 8
PAINLEVEL_OUTOF10: 8
PAINLEVEL_OUTOF10: 4
PAINLEVEL_OUTOF10: 9
PAINLEVEL_OUTOF10: 4
PAINLEVEL_OUTOF10: 10

## 2021-11-15 ASSESSMENT — PAIN DESCRIPTION - DESCRIPTORS
DESCRIPTORS: SHARP;DISCOMFORT
DESCRIPTORS: ACHING;SORE
DESCRIPTORS: ACHING;SORE;SPASM

## 2021-11-15 ASSESSMENT — ENCOUNTER SYMPTOMS
ALLERGIC/IMMUNOLOGIC NEGATIVE: 1
GASTROINTESTINAL NEGATIVE: 1
BACK PAIN: 1
EYES NEGATIVE: 1
SHORTNESS OF BREATH: 1

## 2021-11-15 ASSESSMENT — PAIN DESCRIPTION - FREQUENCY
FREQUENCY: INTERMITTENT

## 2021-11-15 ASSESSMENT — PAIN DESCRIPTION - LOCATION
LOCATION: BACK

## 2021-11-15 ASSESSMENT — PAIN DESCRIPTION - PAIN TYPE
TYPE: ACUTE PAIN

## 2021-11-15 ASSESSMENT — PAIN DESCRIPTION - PROGRESSION
CLINICAL_PROGRESSION: NOT CHANGED
CLINICAL_PROGRESSION: NOT CHANGED

## 2021-11-15 NOTE — PROGRESS NOTES
Physical Therapy  Name: Pooja Valles  MRN:  [id number]  Date of service:  11/15/2021     11/15/21 1542   Restrictions/Precautions   Required Braces or Orthoses? Yes   Required Braces or Orthoses   Spinal Thoracic Lumbar Sacral Orthotics   Position Activity Restriction   Spinal Precautions No Bending; No Lifting; No Twisting   General   Chart Reviewed Yes   Subjective   Subjective Pt up to recliner, agrees to walk with therapy. Pain Screening   Patient Currently in Pain Yes   Pain Assessment   Pain Assessment 0-10   Pain Level 8  (RN notified of pt's request for pain meds)   Pain Type Acute pain   Pain Location Back   Pain Descriptors Aching; Sore   Pain Frequency Intermittent   Functional Pain Assessment Activities are not prevented   Non-Pharmaceutical Pain Intervention(s) Ambulation/Increased Activity; Repositioned; Rest   Response to Pain Intervention Patient Satisfied   Transfers   Sit to Stand Stand by assistance   Stand to sit Stand by assistance   Ambulation   Ambulation? Yes   Ambulation 1   Surface level tile   Device No Device   Other Apparatus   (TLSO)   Assistance Contact guard assistance   Quality of Gait SLOW, MILDLY GUARDED. NO LOB   Distance 250'   Comments one minor LOB d/t coughing fit but able to self-correct   Short term goals   Time Frame for Short term goals 2 WKS   Short term goal 1 Pt AND FAMILY IND WITH DON/DOFF/ADJUSTMENT OF TLSO   Short term goal 2  FT WITHOUT AD WITH SBA   Conditions Requiring Skilled Therapeutic Intervention   Body structures, Functions, Activity limitations Decreased functional mobility ; Decreased endurance; Decreased posture; Increased pain   Assessment Pt able to tolerate increased amb distance, requires CGA at this time d/t slight unsteadiness. Pt would cont to benefit from skilled therapy services to increase strength and endurance, returned to recliner and positioned for comfort with all needs in reach post gait.    Activity Tolerance   Activity Tolerance Patient Tolerated treatment well   Safety Devices   Type of devices Call light within reach;  Left in chair         Electronically signed by Paul Christine PTA on 11/15/2021 at 3:48 PM

## 2021-11-15 NOTE — PROGRESS NOTES
Rayo Ayoub arrived to room # 311. Presented with: compression fracture   Mental Status: Patient is oriented, alert, coherent, logical, thought processes intact and able to concentrate and follow conversation. Vitals:    11/15/21 0227   BP: (!) 148/90   Pulse: 97   Resp: 17   Temp: 97.5 °F (36.4 °C)   SpO2: 92%     Patient safety contract and falls prevention contract reviewed with patient Yes. Oriented Patient to room. Call light within reach. Yes.   Needs, issues or concerns expressed at this time: no.      Electronically signed by Lis Angeles RN on 11/15/2021 at 6:19 AM

## 2021-11-15 NOTE — PROGRESS NOTES
Physical Therapy    Facility/Department: [facility]/VAS/MED  Initial Assessment    NAME: Wing Guzman  : 1940  MRN: [id number]    Date of Service: 11/15/2021    Discharge Recommendations:  Continue to assess pending progress (pt MAY POSSIBLY BENEFIT FROM HOME SAFETY EVAL BY PT OR OT)        Assessment   Body structures, Functions, Activity limitations: Decreased functional mobility ; Decreased endurance; Decreased posture; Increased pain  Assessment: pT WOULD BENEFIT FROM SKILLED PT IN THIS SETTING TO ADDRESS HIS MOBILITY  Prognosis: Good  Decision Making: Low Complexity  PT Education: General Safety; Precautions; Transfer Training; Functional Mobility Training; Gait Training; PT Role; Plan of Care; Equipment  Patient Education: WORKED WITH pt ON DON/DOFF AND ADJUSTMENT OF LSO. REQUIRES PT FOLLOW UP: Yes  Activity Tolerance  Activity Tolerance: Patient Tolerated treatment well       Patient Diagnosis(es): The primary encounter diagnosis was Closed head injury, initial encounter. Diagnoses of Closed fracture of thoracic vertebra, unspecified fracture morphology, unspecified thoracic vertebral level, initial encounter [facility]), Closed wedge fracture of lumbar vertebra, unspecified lumbar vertebral level, initial encounter ([facility] [address] and Closed fracture of multiple ribs, unspecified laterality, initial encounter were also pertinent to this visit. has a past medical history of Cancer [facility]), Chronic back pain, Chronic leg pain, Colon polyps, Hypertension, and Seasonal allergies. has a past surgical history that includes Prostate surgery; Appendectomy; Tonsillectomy; shoulder surgery; and Colonoscopy (2010).     Restrictions  Restrictions/Precautions  Required Braces or Orthoses?: Yes  Required Braces or Orthoses  Spinal: Thoracic Lumbar Sacral Orthotics  Position Activity Restriction  Spinal Precautions: No Bending, No Lifting, No Twisting  Vision/Hearing  Vision: Within Functional Limits  Hearing: Within functional limits     Subjective  General  Diagnosis: FALL WITH COMPRESSION FX T2-6, L3-4  Follows Commands: Within Functional Limits  Subjective  Subjective: Pt STATES HE PLANS TO D/C HOME WHEN MEDICALLY STABLE. Pain Screening  Patient Currently in Pain: Yes  Pain Assessment  Pain Assessment: 0-10  Pain Level: 5  Pain Type: Acute pain  Pain Location: Back  Pain Descriptors: Sharp; Discomfort  Pain Frequency: Intermittent  Clinical Progression: Not changed  Functional Pain Assessment: Prevents or interferes some active activities and ADLs  Non-Pharmaceutical Pain Intervention(s): Repositioned  Response to Pain Intervention: Patient Satisfied  Vital Signs  Patient Currently in Pain: Yes  Pre Treatment Pain Screening  Intervention List: Patient able to continue with treatment; Nurse/physician notified    Orientation  Orientation  Overall Orientation Status: Within Functional Limits  Social/Functional History  Social/Functional History  Lives With: Spouse  Home Access: Stairs to enter without rails  Entrance Stairs - Number of Steps: 1  ADL Assistance: Independent  Ambulation Assistance: Independent  Transfer Assistance: Independent  Additional Comments: STATES HE HAS ACCESS TO DME AND WILL USE AS NEEDED. Cognition        Objective     Observation/Palpation  Posture: Poor  Observation: UP IN RECLINER WEARING TLSO    AROM RLE (degrees)  RLE AROM: WFL  AROM LLE (degrees)  LLE AROM : WFL  Strength Other  Other: ABLE TO STAND AND SUPPORT WEIGHT THRU LE'S WITHOUT KNEE BUCKLING     Sensation  Overall Sensation Status: Impaired (REPORT NUMBNESS IN R FOOT FROM PREVIOUS FOOT SX)  Bed mobility  Comment: pT WAS UP IN RECLINER.  REVIEWED SUP<>SIT TECHNIQUES WITH pt THAT UTILIZES SPINAL PREC AND pT STATES UNDERSTANDING AND DID NOT FEEL LIKE HE NEEDED TO PERFORM WITH PT  Transfers  Sit to Stand: Stand by assistance  Stand to sit: Stand by assistance  Bed to Chair: Stand by assistance; Contact guard assistance  Ambulation  Ambulation?: Yes  Ambulation 1  Surface: level tile  Device: No Device  Other Apparatus:  (TLSO)  Assistance: Stand by assistance; Contact guard assistance  Quality of Gait: SLOW, MILDLY GUARDED. NO LOB  Distance: 30 FT  Comments: pt DID NOT FEEL LIKE HE NEEDS A RW AT THIS TIME.  DOES REPORT \"I FALL ALL THE TIME\" WHICH HE ATTRIBUTES TO NUMBNESS AND DEC STRENGTH IN R FOOT AFTER SX              Plan   Plan  Times per week: 5-6  Plan weeks: 2  Current Treatment Recommendations: Strengthening, Balance Training, Functional Mobility Training, Transfer Training, Gait Training, Pain Management, Positioning, Patient/Caregiver Education & Training, Safety Education & Training, Equipment Evaluation, Education, & procurement  Safety Devices  Type of devices: Call light within reach, Chair alarm in place, Nurse notified, Left in chair         Goals  Short term goals  Time Frame for Short term goals: 2 WKS  Short term goal 1: Pt AND FAMILY IND WITH DON/DOFF/ADJUSTMENT OF TLSO  Short term goal 2:  FT WITHOUT AD WITH SBA       Therapy Time   Individual Concurrent Group Co-treatment   Time In           Time Out           Minutes                   Kerri Apgar, PT    Electronically signed by Kerri Apgar, PT on 11/15/2021 at 11:01 AM

## 2021-11-15 NOTE — PROGRESS NOTES
4 Eyes Skin Assessment    Tin Gilman is being assessed upon: Admission    I agree that I, Mariama Hdz RN, along with [facility] LPN (either 2 RN's or 1 LPN and 1 RN) have performed a thorough Head to Toe Skin Assessment on the patient. ALL assessment sites listed below have been assessed. Areas assessed by both nurses:     [x]   Head, Face, and Ears   [x]   Shoulders, Back, and Chest  [x]   Arms, Elbows, and Hands   [x]   Coccyx, Sacrum, and Ischium  [x]   Legs, Feet, and Heels    Does the Patient have Skin Breakdown? No    Hayes Prevention initiated: Yes  Wound Care Orders initiated: No    WO nurse consulted for Pressure Injury (Stage 3,4, Unstageable, DTI, NWPT, and Complex wounds) and New or Established Ostomies: No        Primary Nurse eSignature:  Mariama Hdz RN on 11/15/2021 at 6:24 AM      Co-Signer eSignature: Electronically signed by Dee Riggins LPN on 90/02/20 at 2:66 AM CST

## 2021-11-15 NOTE — ED PROVIDER NOTES
[facility]  eMERGENCY dEPARTMENT eNCOUnter      Pt Name: Kristi Ortega  MRN: [id number]  [place] 1940  Date of evaluation: 11/14/2021  Provider: Tera Langford MD    [address] [place] [address]       Chief Complaint   Patient presents with    Fall     100 mcg fentnyl 4 mg zofran given per EMS; pt fell yesterday pain worse today- pain in back and left side; worse with inspiration. Pain in lower back per pt. HISTORY OF PRESENT ILLNESS   (Location/Symptom, Timing/Onset,Context/Setting, Quality, Duration, Modifying Factors, Severity)  Note limiting factors. Kristi Ortega is a 78 y.o. male who presents to the emergency department after a fall yesterday around 1 PM. Patient reports the grass was somewhat slick and he fell backwards onto his back. Does admit he hit his head mildly but did not lose consciousness. He is not on anticoagulants. Denies any neck pain but complains of thoracic and lumbar back pain. He also complains of left chest wall pain. No shortness of breath. Tells me he is able to ambulate and move but it is very painful. HPI    NursingNotes were reviewed. REVIEW OF SYSTEMS    (2-9 systems for level 4, 10 or more for level 5)     Review of Systems   Constitutional: Negative for chills and fever. HENT: Negative for rhinorrhea and sore throat. Respiratory: Positive for shortness of breath. Negative for cough. Cardiovascular: Negative for chest pain. Gastrointestinal: Negative for abdominal pain, diarrhea, nausea and vomiting. Genitourinary: Negative for dysuria and frequency. Musculoskeletal: Positive for back pain. Negative for neck pain. Neurological: Negative for dizziness and headaches. All other systems reviewed and are negative.            PAST MEDICALHISTORY     Past Medical History:   Diagnosis Date    Cancer [facility])     PROSTATE CANCER    Chronic back pain     Chronic leg pain     Colon polyps     Hypertension     Seasonal allergies          SURGICAL HISTORY Past Surgical History:   Procedure Laterality Date    APPENDECTOMY      COLONOSCOPY  4-8-2010    [facility]    PROSTATE SURGERY      SHOULDER SURGERY      RIGHT TOTAL SHOULDER    TONSILLECTOMY           CURRENT MEDICATIONS     Previous Medications    OLMESARTAN MEDOXOMIL (BENICAR PO)    Take  by mouth. TRAMADOL-ACETAMINOPHEN (ULTRACET PO)    Take  by mouth. ALLERGIES     Patient has no known allergies. FAMILY HISTORY       Family History   Problem Relation Age of Onset    Prostate Cancer Father     Prostate Cancer Brother     Colon Cancer Daughter     Colon Polyps Daughter           SOCIAL HISTORY       Social History     Socioeconomic History    Marital status:      Spouse name: None    Number of children: None    Years of education: None    Highest education level: None   Occupational History    None   Tobacco Use    Smoking status: Former Smoker     Start date: 8/19/1989    Smokeless tobacco: None   Substance and Sexual Activity    Alcohol use: No    Drug use: No    Sexual activity: None   Other Topics Concern    None   Social History Narrative    None     Social Determinants of Health     Financial Resource Strain:     Difficulty of Paying Living Expenses: Not on file   Food Insecurity:     Worried About Running Out of Food in the Last Year: Not on file    Brandt of Food in the Last Year: Not on file   Transportation Needs:     Lack of Transportation (Medical): Not on file    Lack of Transportation (Non-Medical):  Not on file   Physical Activity:     Days of Exercise per Week: Not on file    Minutes of Exercise per Session: Not on file   Stress:     Feeling of Stress : Not on file   Social Connections:     Frequency of Communication with Friends and Family: Not on file    Frequency of Social Gatherings with Friends and Family: Not on file    Attends Shinto Services: Not on file    Active Member of Clubs or Organizations: Not on file    Attends Club or RADIOLOGY:  Non-plain film images such as CT, Ultrasound and MRI are read by the radiologist. Plain radiographic images are visualized and preliminarily interpreted bythe emergency physician with the below findings:      [address]    (Results Pending)   CT CERVICAL SPINE WO CONTRAST    (Results Pending)   CT THORACIC SPINE WO CONTRAST    (Results Pending)   [place]    (Results Pending)   CT CHEST WO CONTRAST    (Results Pending)     PreliminaryFindingsOnly See Final Report For Complete Findings  CT HEAD:  No acute intracranial hemorrhage. No evidence of intracranial mass, extra-axial fluid collection, or  acute territorial infarct. White matter hypodensities, which are nonspecific but most likelyrelated to moderate chronic small  vessel ischemic changes. Global cerebral volume loss. Visualized paranasal sinuses and mastoid air cells are clear. PreliminaryFindingsOnly See Final Report For Complete Findings  CT C SPINE:  Evaluation is limited bybeamhardening artifact inferiorly. Moderate central height loss of C7  vertebral bodysuperiorlyis favored to be chronic. No other evidence of fracture. No malalignment. Degenerative changes    PreliminaryFindingsOnly See Final Report For Complete Findings  CT T SPINE:  Mild to moderate anterior compression deformities of T2 and T6 vertebral bodies are age  indeterminant. Further evaluation with MRI could be considered as clinicallywarranted. Beam  hardening artifact limits evaluation at T1 and T2. Scoliosis and exaggerated kyphosis. Degenerative changes. Bibasilar atelectasis. Coronaryarterycalcifications. Atherosclerotic calcifications of aorta and its branches. PreliminaryFindingsOnly See Final Report For Complete Findings  CT L SPINE:  Mild height loss of L5 vertebral bodywith lucencies at its superior aspect, suspicious for acute  fracture and there is mild retropulsion.   Status post vertebroplastyof L3 and L4 vertebral bodies. Mild height loss of L3 vertebral bodyand  mild height loss at of L4 vertebral body. Multilevel degenerative changes. Atherosclerotic calcifications of aorta and its branches. Right renal hypodensities, likelycysts. Colonic  Diverticulosis. PreliminaryFindingsOnly See Final Report For Complete Findings  CT CHEST Without Contrast:  No pneumothorax or effusion. CT T spine is separatelyreported. No evidence of displaced rib fracture. Slight contour deformityof  left third and right fourth and fifth ribs anteriorlyare favored to represent healed fractures. Correlate  with focal tenderness. Chronic fracture of right third rib anteriorly. Bilateral shoulder arthroplasties and resulting beamhardening artifact limits evaluation superiorly. Bibasilar atelectasis. Coronaryarterycalcifications. Atherosclerotic calcifications of aorta and its branches. Small sliding hiatal hernia. LABS:  Labs Reviewed   COVID-19, RAPID   CBC WITH AUTO DIFFERENTIAL   BASIC METABOLIC PANEL       All other labs were within normal range or not returned as of this dictation.     EMERGENCY DEPARTMENT COURSE and DIFFERENTIAL DIAGNOSIS/MDM:   Vitals:    Vitals:    11/14/21 1908   BP: 118/66   Pulse: 96   Resp: 17   Temp: 97.8 °F (36.6 °C)   SpO2: 90%   Weight: 180 lb (81.6 kg)   Height: 5' 8\" (1.727 m)       MDM  Number of Diagnoses or Management Options     Amount and/or Complexity of Data Reviewed  Tests in the radiology section of CPT®: ordered and reviewed  Independent visualization of images, tracings, or specimens: yes      Given patients age and degree of pain with multiple compression fx and likely rib fx have d/w Dr. Yue Mora for admission, pt placed in tlso brace, nsgy consult, pt lives at home with elderly wife and requested to be admitted which I think is very reasonable given age and injuries          CONSULTS:  IP CONSULT TO NEUROSURGERY    PROCEDURES:  Unless otherwise noted below, none Procedures    FINAL IMPRESSION      1. Closed head injury, initial encounter    2. Closed fracture of thoracic vertebra, unspecified fracture morphology, unspecified thoracic vertebral level, initial encounter ([facility] [address]    3. Closed wedge fracture of lumbar vertebra, unspecified lumbar vertebral level, initial encounter ([facility] [address]    4. Closed fracture of multiple ribs, unspecified laterality, initial encounter          DISPOSITION/PLAN   DISPOSITION        PATIENT REFERRED TO:  No follow-up provider specified. DISCHARGE MEDICATIONS:  New Prescriptions    No medications on file     Attestation: The Prescription Monitoring Report for this patient was reviewed today.  Deanne Munoz MD)    (Please note that portions of this note were completed with a voice recognition program.  Efforts were made to edit thedictations but occasionally words are mis-transcribed.)    Kun Sorto MD (electronically signed)  Attending Emergency Physician        Deanne Munoz MD  11/14/21 1130

## 2021-11-15 NOTE — PROGRESS NOTES
Pharmacy Renal Adjustment    Tera Lyle is a 78 y.o. male. Pharmacy has renally adjusted medications per protocol. Recent Labs     11/14/21  2345   BUN 27*       Recent Labs     11/14/21  2345   CREATININE 1.4*       Estimated Creatinine Clearance: 39 mL/min (A) (based on SCr of 1.4 mg/dL (H)).     Height:   Ht Readings from Last 1 Encounters:   11/14/21 5' 8\" (1.727 m)     Weight:  Wt Readings from Last 1 Encounters:   11/14/21 180 lb (81.6 kg)     Plan: Adjust the following medications based on renal function:           Famotidine 20 mg IV twice daily to once daily    Electronically signed by Ila Duran, [facility] on 11/15/2021 at 2:38 AM

## 2021-11-15 NOTE — CARE COORDINATION
Date / Time of Evaluation:   11/15/2021    12:48 PM  Assessment Completed by:   JAMARCUS Cowart      Patient Name:   Noam Oliveira  MRN:   [id number]  YOB: 1940    Patient Admission Status:   Inpatient [101]    Patient Contact Information:    [place] [address]  [phone] (home)   Telephone Information:   Mobile [phone]     Above information verified? [x]   Yes  []   No    (Best Practice:   Have patient/caregiver verify above address and phone number by stating out loud their current address and reachable phone number. Initial Assessment Completed at bedside with:   Spouse, at bedside; Pt in chair sleeping soundly    []   Patient  [x]   Family/Caregiver/Guardian   []   Other:      Current PCP:    Eric Roa MD    PCP verified? [x]   Yes  []   No    Emergency Contacts:    Extended Emergency Contact Information  Primary Emergency Contact: Valerie Marie  Address: [address] [place] [address] Phone: [phone]  Mobile Phone: [phone]  Relation: Spouse  Secondary Emergency Contact: [address] [place] [address] Phone: [phone]  Relation: Child    Advance Directives:    Does Mr. Denisa De Guzman have an advance directive in his electronic medical record? []   Yes  [x]   No    Code Status:   No Order      Have you been vaccinated for COVID-19 (SARS-CoV-2)? []   Yes  []   No                   If so, when?     Which :         []   [email or web address]  [x]   Modern[employer]  []   [address]  []   Other:       Do you have any of the following unmet social needs that would keep you from returning home safely:    []   Yes  [x]   No                    Unmet Social Needs:           []   Living Situation/Housing  []   Food  []   Stroke Education   []   Utilities  []   Personal Safety  []   Financial Strain  []   Employment  []   Mental Health  []   Substance Abuse  []   Transportation Barriers    Additional Unmet Social Needs Notes:           Financial:    Payor: Roger France / Plan: [facility] MEDICARE COMPLETE / Product Type: *No Product type* /     Pre-Cert required for SNF:     [x]   Yes  []   No    Have Long Term Care Insurance:      []   Yes  [x]   No      Pharmacy:    [address] [place] Jocelynn COTO [phone]  [address] [place] [address] [address]  Phone: [phone] Fax: [phone]    Potential assistance purchasing medications? []   Yes  [x]   No      ADLS:       Support System:   Spouse/Significant Other, Children      Current Home Environment:    Home with spouse     Steps:       [x]   Yes  []   No    If yes, how many?one at entrance    Plans to RETURN to current housing:     [x]   Yes  []   No    Barriers to RETURNING to current housing:        Currently ACTIVE with Home Health CARE:      []   Yes  [x]   No    Home Health Care Agency:        DME Provider:   none      Had HOME OXYGEN prior to admission:      []   Yes  [x]   No      Active with HD/PD prior to admission:             []   Yes  [x]   No    Nephrologist:     HD Center:         Transition Plan:    back to home with spouse     Transportation PLAN for Discharge:  Private vehicle     Factors facilitating achievement of predicted outcomes:     Barriers to discharge:               Additional CM/SW Notes: SW visited with Pt (asleep in chair), and spouse, at his side, re: d/c planning; Pt's spouse indicated she plans on taking him home at d/c; agreeable to [facility]; she indicated they have access to multiple items of DME; TLSO brace in place;         Edgar Maldonado, [address] [facility] [address] [facility]  Phone:   [phone]          Fax:   [phone]  Electronically signed by JAMARCUS Baca on 11/15/2021 at 4:14 PM

## 2021-11-15 NOTE — CONSULTS
[id number] [facility] Neurosurgery Consultation        REASON FOR CONSULTATION:  Fall, multiple compression fractures      HISTORY OF PRESENT ILLNESS:      The patient is a 78 y.o. male who slipped and fell in his yard on 11/13/2021. He states he was walking down a hill and slipped on wet grass and fell, landing \"flat on his back\". He was able to stand and walk afterwards and did not notice much pain but states he awoke yesterday morning with severe pain in his back and pain with inspiration and movement. He presented to the [facility] ED and CT scans of his head, chest, cervical, thoracic and lumbar spine were obtained. The official reads for the thoracic and lumbar scans are pending but the [facility] reads mention compression fractures of L5, multiple possible rib fractures (that may be chronic) and age-indeterminate compression fractures of T2 and T6 as well as a chronic-appearing compression deformity of C7. His bones are severely demineralized and he does have a history of prior kyphoplasty for compression fractures at L3 and L4 performed in 2018 by Dr. Sierra Villalobos at [facility].    On questioning, he endorses mostly thoracic back pain and chest wall pain with movement and breathing. He does not endorse much lower back pain. He denies any radicular pain and he denies any numbness or weakness in his extremities. He has been placed in a TLSO brace and is sitting up in a chair.       MEDICAL HISTORY:             Past Medical History:   Diagnosis Date    Cancer [facility])     PROSTATE CANCER    Chronic back pain     Chronic leg pain     Colon polyps     Hypertension     Seasonal allergies        Past Surgical History:   Procedure Laterality Date    APPENDECTOMY      [facility]    PROSTATE SURGERY      SHOULDER SURGERY      RIGHT TOTAL SHOULDER    TONSILLECTOMY           Current Facility-Administered Medications:     cyclobenzaprine (FLEXERIL) tablet 5 mg, 5 mg, Oral, TID PRN, Regla Jain, MD    losartan (COZAAR) tablet 50 mg, 50 mg, Oral, Daily, Claudette Furrow, MD, 50 mg at 11/15/21 0321    famotidine (PEPCID) injection 20 mg, 20 mg, IntraVENous, Daily, Claudette Furrow, MD    influenza quadrivalent split vaccine (FLUZONE;FLUARIX;FLULAVAL;AFLURIA) injection 0.5 mL, 0.5 mL, IntraMUSCular, Prior to discharge, Claudette Furrow, MD    HYDROcodone-acetaminophen (NORCO) 5-325 MG per tablet 1 tablet, 1 tablet, Oral, Q4H PRN **OR** HYDROcodone-acetaminophen (NORCO) 5-325 MG per tablet 2 tablet, 2 tablet, Oral, Q4H PRN, Fela Khan MD    HYDROmorphone HCl PF (DILAUDID) injection 0.25 mg, 0.25 mg, IntraVENous, Q3H PRN **OR** HYDROmorphone HCl PF (DILAUDID) injection 0.5 mg, 0.5 mg, IntraVENous, Q3H PRN, Fela Khan MD    Allergies:  Patient has no known allergies. Social History:   Social History     Tobacco Use   Smoking Status Former Smoker    Start date: 8/19/1989   Smokeless Tobacco Not on file     Social History     Substance and Sexual Activity   Alcohol Use No         Family History:   Family History   Problem Relation Age of Onset    Prostate Cancer Father     Prostate Cancer Brother     Colon Cancer Daughter     Colon Polyps Daughter        REVIEW OF SYSTEMS:  Review of Systems   Constitutional: Negative. HENT: Negative. Eyes: Negative. Respiratory:        Pain with inspiration and expiration   Cardiovascular: Negative. Gastrointestinal: Negative. Endocrine: Negative. Genitourinary: Negative. Musculoskeletal: Positive for back pain. Skin: Negative. Allergic/Immunologic: Negative. Neurological: Negative. Hematological: Negative. Psychiatric/Behavioral: Negative. PHYSICAL EXAM:    Vitals:    11/15/21 0632   BP: 116/88   Pulse: 101   Resp: 17   Temp: 98.2 °F (36.8 °C)   SpO2: 98%       Constitutional: Appears well-developed and well-nourished.    Eyes  conjunctiva normal.  Pupils react to light  Ear, nose,throat -Normal pinna and tragus, No scars, or lesions over external nose or ears, no obvious atrophy of tongue  Neck- symmetric, trachea midline, no jugular vein distension, no thyromegaly  Respiration- chest wall symmetric, normal effort without use of accessory muscles  Musculoskeletal  no significant wasting of muscles noted, no bony deformities, symmetric bulk. Extremities- no clubbing, cyanosis or edema  Skin  warm, dry, and intact. No rash,erythema, or pallor.   Psychiatric  mood, affect, and behavior appear normal.       NEUROLOGIC EXAM:    MENTAL STATUS: Alert, oriented, thought content appropriate    CRANIAL NERVES: PERRL, EOMI, symmetric facies, tongue midline      MOTOR:     Right Upper Extremity:    Deltoid: 5/5  Biceps: 5/5  Triceps: 5/5  : 5/5    Left Upper Extremity:    Deltoid: 5/5  Biceps: 5/5  Triceps: 5/5  : 5/5    Right Lower Extremity:    Hip Flexion: 5/5  Knee Extension: 5/5  Ankle Plantarflexion: 5/5  Ankle Dorsiflexion: 5/5      Left Lower Extremity:    Hip Flexion: 5/5  Knee Extension: 5/5  Ankle Plantarflexion: 5/5  Ankle Dorsiflexion: 5/5      SOMATOSENSORY:     Right Upper Extremity: normal light touch sensation  Left Upper Extremity: normal light touch sensation  Right Lower Extremity: normal light touch sensation  Left Lower Extremity: normal light touch sensation      REFLEXES:    Biceps: 2+  Patella: 2+      Goodman's: Negative  Clonus: Negative        DATA:    Height: 5' 8\" (1.727 m), Weight: 180 lb (81.6 kg), BP: 116/88      Lab Results   Component Value Date    WBC 8.5 11/15/2021    HGB 12.4 (L) 11/15/2021    HCT 40.6 (L) 11/15/2021    MCV 98.5 (H) 11/15/2021     11/15/2021     Lab Results   Component Value Date     11/15/2021    K 5.2 (H) 11/15/2021    CL 98 11/15/2021    CO2 27 11/15/2021    BUN 29 (H) 11/15/2021    CREATININE 1.6 (H) 11/15/2021    GLUCOSE 120 (H) 11/15/2021    CALCIUM 9.9 11/15/2021    PROT 7.9 11/15/2021    LABALBU 4.2 11/15/2021    BILITOT 0.5 11/15/2021    ALKPHOS 98 11/15/2021    AST 13 11/15/2021    ALT 13 11/15/2021    LABGLOM 41 (A) 11/15/2021    GFRAA 50 (L) 11/15/2021   No results found for: INR, PROTIME    CT HEAD WO CONTRAST    Result Date: 11/15/2021  No acute intracranial abnormality. No evidence of skull fracture. The above study was initially reviewed and reported by [facility]. I do not find any discrepancies. Signed by Dr Juan Pablo Alarcon    Result Date: 11/15/2021  No acute intrathoracic abnormality, particularly, no evidence of pneumothorax or finding to suggest lung contusion. Old healed rib fractures bilaterally. No acute displaced rib fracture noted. Other findings as above. The above study was initially reviewed and reported by [facility]. I do not find any discrepancies. Signed by Dr Cecy Estrada    Result Date: 11/15/2021  A very limited diagnostic study due to extensive artifacts and diffuse demineralization the bony structures. Moderate superior endplate compression of vertebral C7 probably chronic process. However if symptomatic further evaluation with MR imaging of the cervical spine may be obtained. Chronic degenerative changes. The neural foramina spinal canal are patent. The above study was initially reviewed and reported by [facility]. I do not find any discrepancies. Signed by Dr Lula Ash:    My interpretation of imaging studies:  I agree with the radiologist.  Diffuse demineralization/osteoporosis throughout the spine. Images from the upper thoracic spine are nearly un-interpretable due to artifact from shoulder hardware. There is exaggerated thoracic kyphosis. There are likely-chronic compression deformities of T2 and T6. There are post-kyphoplasty changes at L3 and L4. There appears to be acute height loss at L5 with ~30% height loss and minimal retropulsion of the superior endplate.   There does not appear to be any significant associated canal stenosis. ASSESSMENT AND PLAN:  This is a 78 y.o. male who presents with back pain and chest wall pain following a fall in his yard. His imaging reveals severe demineralization of his spine consistent with osteoporosis. He likely has an acute compression fracture at L5, however he does not endorse much pain in this area. The fractures at T2 and T6 noted by [facility] appear more chronic in nature and he also appears to have several rib fractures. At this point, I do not feel that he is a good candidate for kyphoplasty. I recommend that his pain be controlled medically and that he attempt to mobilize with PT/OT in his TLSO brace. I will plan to obtain standing thoracic and lumbar x-rays tomorrow to ensure there has not been any additional height loss from his fractures.             Ed Lanier MD

## 2021-11-15 NOTE — H&P
HISTORY AND PHYSICAL             Date: 11/15/2021        Patient Name: Rashida Lopez     YOB: 1940      Age:  78 y.o. Chief Complaint     Chief Complaint   Patient presents with    Fall     100 mcg fentnyl 4 mg zofran given per EMS; pt fell yesterday pain worse today- pain in back and left side; worse with inspiration. Pain in lower back per pt. He fell in his yard yesterday and since then has had increased pain along his back. The whole of his back is in pain and he cannot move without pain. He cannot take a deep breath without pain and evaluated today in [facility] ER     History Obtained From   Patient and ER doc     History of Present Illness   78year old male who appears younger than stated age   He has been reasonably healthy and remains active. He lives an active life with his wife, here locally. He was in his yard last pm and lost his balance and fell on his back and since then has been in excruciating pain   He cannot take a deep breath without pain   He cannot move anywhere , any side or even readjust himself in bed without excruciating pain     He does not think that he can go home like this and manage the pain at home     He does not think that his wife can take care of him like this. Chest pain across entire chest with radiation to abdominal area.      Back pain that is sharp and piercing and nearly takes his breath away     occ numbness and tingling in hands and feet     Dizziness   Vertigo   Unable to sit up to eat and scared to eat, incase he needs to throw up     Weak and lethargic and needs help with each movement     He is not able to handle this at home         Past Medical History     Past Medical History:   Diagnosis Date    Cancer [facility])     PROSTATE CANCER    Chronic back pain     Chronic leg pain     Colon polyps     Hypertension     Seasonal allergies         Past Surgical History     Past Surgical History:   Procedure Laterality Date    APPENDECTOMY  [facility]8-[facility]    PROSTATE SURGERY      SHOULDER SURGERY      RIGHT TOTAL SHOULDER    TONSILLECTOMY          Medications Prior to Admission     Prior to Admission medications    Medication Sig Start Date End Date Taking? Authorizing Provider   Olmesartan Medoxomil (BENICAR PO) Take  by mouth. Historical Provider, MD   Tramadol-Acetaminophen (ULTRACET PO) Take  by mouth. Historical Provider, MD        Allergies   Patient has no known allergies. Social History     Social History     Tobacco History     Smoking Status  Former Smoker Smoking Start Date  8/19/1989    Smokeless Tobacco Use  Unknown          Alcohol History     Alcohol Use Status  No          Drug Use     Drug Use Status  No          Sexual Activity     Sexually Active  Not Asked                Family History     Family History   Problem Relation Age of Onset    Prostate Cancer Father     Prostate Cancer Brother     Colon Cancer Daughter     Colon Polyps Daughter        Review of Systems   Review of Systems   Constitutional: Positive for activity change. HENT: Negative. Eyes: Negative. Respiratory: Positive for shortness of breath. Cardiovascular: Positive for palpitations and leg swelling. Gastrointestinal: Negative. Endocrine: Negative. Genitourinary: Negative. Musculoskeletal: Positive for arthralgias, back pain, gait problem, joint swelling, myalgias and neck pain. Allergic/Immunologic: Negative. Neurological: Positive for dizziness, tremors, weakness and light-headedness. Hematological: Negative. Psychiatric/Behavioral: Negative. All other systems reviewed and are negative. Physical Exam   /81   Pulse 101   Temp 97.8 °F (36.6 °C)   Resp 18   Ht 5' 8\" (1.727 m)   Wt 180 lb (81.6 kg)   SpO2 90%   BMI 27.37 kg/m²     Physical Exam  Vitals and nursing note reviewed. Constitutional:       Appearance: He is ill-appearing.    HENT:      Head: Normocephalic and atraumatic. Nose: Nose normal.      Mouth/Throat:      Mouth: Mucous membranes are moist.   Eyes:      Conjunctiva/sclera: Conjunctivae normal.      Pupils: Pupils are equal, round, and reactive to light. Cardiovascular:      Rate and Rhythm: Normal rate and regular rhythm. Pulses: Normal pulses. Heart sounds: Normal heart sounds. Pulmonary:      Effort: Pulmonary effort is normal.      Breath sounds: Normal breath sounds. Abdominal:      General: Abdomen is flat. Bowel sounds are normal.      Palpations: Abdomen is soft. Musculoskeletal:      Right lower leg: Edema present. Left lower leg: Edema present. Skin:     General: Skin is warm. Neurological:      General: No focal deficit present. Mental Status: He is alert.    Psychiatric:         Mood and Affect: Mood normal.         Labs      Recent Results (from the past 24 hour(s))   CBC Auto Differential    Collection Time: 11/14/21 11:45 PM   Result Value Ref Range    WBC 9.0 4.8 - 10.8 K/uL    RBC 3.78 (L) 4.70 - 6.10 M/uL    Hemoglobin 11.6 (L) 14.0 - 18.0 g/dL    Hematocrit 36.6 (L) 42.0 - 52.0 %    MCV 96.8 (H) 80.0 - 94.0 fL    MCH 30.7 27.0 - 31.0 pg    MCHC 31.7 (L) 33.0 - 37.0 g/dL    RDW 13.7 11.5 - 14.5 %    Platelets 240 241 - 182 K/uL    MPV 10.2 9.4 - 12.4 fL    Neutrophils % 74.0 (H) 50.0 - 65.0 %    Lymphocytes % 16.3 (L) 20.0 - 40.0 %    Monocytes % 8.8 0.0 - 10.0 %    Eosinophils % 0.3 0.0 - 5.0 %    Basophils % 0.2 0.0 - 1.0 %    Neutrophils Absolute 6.6 1.5 - 7.5 K/uL    Immature Granulocytes # 0.0 K/uL    Lymphocytes Absolute 1.5 1.1 - 4.5 K/uL    Monocytes Absolute 0.80 0.00 - 0.90 K/uL    Eosinophils Absolute 0.00 0.00 - 0.60 K/uL    Basophils Absolute 0.00 0.00 - 0.20 K/uL   Basic Metabolic Panel    Collection Time: 11/14/21 11:45 PM   Result Value Ref Range    Sodium 135 (L) 136 - 145 mmol/L    Potassium 5.0 3.5 - 5.0 mmol/L    Chloride 98 98 - 111 mmol/L    CO2 28 22 - 29 mmol/L    Anion Gap 9 7 - 19 mmol/L    Glucose 117 (H) 74 - 109 mg/dL    BUN 27 (H) 8 - 23 mg/dL    CREATININE 1.4 (H) 0.5 - 1.2 mg/dL    GFR Non- 48 (A) >60    GFR  59 (L) >59    Calcium 9.3 8.8 - 10.2 mg/dL   COVID-19, Rapid    Collection Time: 11/14/21 11:50 PM    Specimen: Nasopharyngeal Swab   Result Value Ref Range    SARS-CoV-2, NAAT Not Detected Not Detected        Imaging/Diagnostics Last 24 Hours   No results found. Assessment      Hospital Problems           Last Modified POA    * (Principal) Compression fracture of lumbar vertebra with delayed healing 11/15/2021 Yes    Hypertension 11/15/2021 Yes          Plan   1. Patient is being admitted post fall   2. Multiple thoracic and lumbar vertebral compression fractures and rib fractures noted and being admitted for pain control and sx management and overall stabilization of vertebrae and then consideration for pt and ot and pain control   3. May need  Vertebral compression fractures to be addressed for \"cementing ' and improvement of overall pain and overall functioning ability and conditioning . 4. Hypertension control    5.  scd for dvt prophylaxis     6. Neurochecks.      Consultations Ordered:  IP CONSULT TO NEUROSURGERY    Electronically signed by Brent Peng MD on 11/15/21 at 12:33 AM CST

## 2021-11-16 VITALS
HEART RATE: 81 BPM | TEMPERATURE: 97.7 F | BODY MASS INDEX: 26.4 KG/M2 | SYSTOLIC BLOOD PRESSURE: 117 MMHG | HEIGHT: 68 IN | WEIGHT: 174.19 LBS | DIASTOLIC BLOOD PRESSURE: 57 MMHG | OXYGEN SATURATION: 92 % | RESPIRATION RATE: 18 BRPM

## 2021-11-16 PROCEDURE — 6370000000 HC RX 637 (ALT 250 FOR IP): Performed by: NEUROLOGICAL SURGERY

## 2021-11-16 PROCEDURE — 6360000002 HC RX W HCPCS: Performed by: NEUROLOGICAL SURGERY

## 2021-11-16 PROCEDURE — 6370000000 HC RX 637 (ALT 250 FOR IP): Performed by: INTERNAL MEDICINE

## 2021-11-16 PROCEDURE — 6370000000 HC RX 637 (ALT 250 FOR IP): Performed by: STUDENT IN AN ORGANIZED HEALTH CARE EDUCATION/TRAINING PROGRAM

## 2021-11-16 PROCEDURE — G0008 ADMIN INFLUENZA VIRUS VAC: HCPCS | Performed by: INTERNAL MEDICINE

## 2021-11-16 PROCEDURE — 97116 GAIT TRAINING THERAPY: CPT

## 2021-11-16 PROCEDURE — 90686 IIV4 VACC NO PRSV 0.5 ML IM: CPT | Performed by: INTERNAL MEDICINE

## 2021-11-16 PROCEDURE — 97535 SELF CARE MNGMENT TRAINING: CPT

## 2021-11-16 PROCEDURE — 97165 OT EVAL LOW COMPLEX 30 MIN: CPT

## 2021-11-16 PROCEDURE — 99233 SBSQ HOSP IP/OBS HIGH 50: CPT | Performed by: NEUROLOGICAL SURGERY

## 2021-11-16 PROCEDURE — 6360000002 HC RX W HCPCS: Performed by: INTERNAL MEDICINE

## 2021-11-16 RX ORDER — CYCLOBENZAPRINE HCL 5 MG
5 TABLET ORAL 3 TIMES DAILY PRN
Qty: 30 TABLET | Refills: 0 | Status: SHIPPED | OUTPATIENT
Start: 2021-11-16 | End: 2021-11-26

## 2021-11-16 RX ORDER — HYDROCODONE BITARTRATE AND ACETAMINOPHEN 10; 325 MG/1; MG/1
1 TABLET ORAL EVERY 4 HOURS PRN
Qty: 18 TABLET | Refills: 0 | Status: SHIPPED | OUTPATIENT
Start: 2021-11-16 | End: 2021-11-19

## 2021-11-16 RX ORDER — HYDROCODONE BITARTRATE AND ACETAMINOPHEN 10; 325 MG/1; MG/1
1 TABLET ORAL EVERY 4 HOURS PRN
Qty: 18 TABLET | Refills: 0 | Status: SHIPPED | OUTPATIENT
Start: 2021-11-16 | End: 2021-11-16 | Stop reason: SDUPTHER

## 2021-11-16 RX ADMIN — HYDROCODONE BITARTRATE AND ACETAMINOPHEN 2 TABLET: 5; 325 TABLET ORAL at 15:26

## 2021-11-16 RX ADMIN — HYDROCHLOROTHIAZIDE 12.5 MG: 12.5 CAPSULE ORAL at 09:22

## 2021-11-16 RX ADMIN — CYCLOBENZAPRINE 5 MG: 10 TABLET, FILM COATED ORAL at 12:22

## 2021-11-16 RX ADMIN — INFLUENZA A VIRUS A/VICTORIA/2570/2019 IVR-215 (H1N1) ANTIGEN (PROPIOLACTONE INACTIVATED), INFLUENZA A VIRUS A/CAMBODIA/E0826360/2020 IVR-224 (H3N2) ANTIGEN (PROPIOLACTONE INACTIVATED), INFLUENZA B VIRUS B/VICTORIA/705/2018 BVR-11 ANTIGEN (PROPIOLACTONE INACTIVATED), INFLUENZA B VIRUS B/PHUKET/3073/2013 BVR-1B ANTIGEN (PROPIOLACTONE INACTIVATED) 0.5 ML: 15; 15; 15; 15 INJECTION, SUSPENSION INTRAMUSCULAR at 15:26

## 2021-11-16 RX ADMIN — HYDROMORPHONE HYDROCHLORIDE 0.5 MG: 1 INJECTION, SOLUTION INTRAMUSCULAR; INTRAVENOUS; SUBCUTANEOUS at 13:21

## 2021-11-16 RX ADMIN — CYCLOBENZAPRINE 5 MG: 10 TABLET, FILM COATED ORAL at 05:26

## 2021-11-16 RX ADMIN — HYDROCODONE BITARTRATE AND ACETAMINOPHEN 2 TABLET: 5; 325 TABLET ORAL at 05:26

## 2021-11-16 RX ADMIN — HYDROCODONE BITARTRATE AND ACETAMINOPHEN 2 TABLET: 5; 325 TABLET ORAL at 09:28

## 2021-11-16 ASSESSMENT — PAIN DESCRIPTION - PAIN TYPE
TYPE: ACUTE PAIN
TYPE: ACUTE PAIN

## 2021-11-16 ASSESSMENT — PAIN DESCRIPTION - DESCRIPTORS
DESCRIPTORS: ACHING;SORE

## 2021-11-16 ASSESSMENT — PAIN - FUNCTIONAL ASSESSMENT
PAIN_FUNCTIONAL_ASSESSMENT: PREVENTS OR INTERFERES SOME ACTIVE ACTIVITIES AND ADLS

## 2021-11-16 ASSESSMENT — PAIN DESCRIPTION - PROGRESSION
CLINICAL_PROGRESSION: NOT CHANGED
CLINICAL_PROGRESSION: NOT CHANGED

## 2021-11-16 ASSESSMENT — PAIN SCALES - GENERAL
PAINLEVEL_OUTOF10: 8
PAINLEVEL_OUTOF10: 3
PAINLEVEL_OUTOF10: 10

## 2021-11-16 ASSESSMENT — PAIN DESCRIPTION - LOCATION
LOCATION: BACK

## 2021-11-16 ASSESSMENT — PAIN DESCRIPTION - FREQUENCY
FREQUENCY: INTERMITTENT
FREQUENCY: INTERMITTENT

## 2021-11-16 NOTE — PROGRESS NOTES
Physical Therapy  Name: Radha Nur  MRN:  [id number]  Date of service:  11/16/2021 11/16/21 0958   Restrictions/Precautions   Required Braces or Orthoses? Yes   Required Braces or Orthoses   Spinal Thoracic Lumbar Sacral Orthotics   Position Activity Restriction   Spinal Precautions No Bending; No Lifting; No Twisting   General   Chart Reviewed Yes   Subjective   Subjective Pt sitting EOB, agrees to work with therapy. Pain Screening   Patient Currently in Pain Yes   Pain Assessment   Pain Assessment 0-10   Pain Level 8   Pain Type Acute pain   Pain Location Back   Pain Descriptors Aching; Sore   Pain Frequency Intermittent   Clinical Progression Not changed   Functional Pain Assessment Prevents or interferes some active activities and ADLs   Non-Pharmaceutical Pain Intervention(s) Ambulation/Increased Activity; Repositioned; Rest   Response to Pain Intervention None   Transfers   Sit to Stand Contact guard assistance   Stand to sit Contact guard assistance   Ambulation   Ambulation? Yes   Ambulation 1   Surface level tile   Device No Device   Other Apparatus   (TLSO)   Assistance Contact guard assistance; Minimal assistance   Quality of Gait slow, guarded, slight knee buckling intermittently d/t increased pain   Gait Deviations Slow Mayela; Decreased step length; Decreased step height   Distance 250'   Short term goals   Time Frame for Short term goals 2 WKS   Short term goal 1 Pt AND FAMILY IND WITH DON/DOFF/ADJUSTMENT OF TLSO   Short term goal 2  FT WITHOUT AD WITH SBA   Conditions Requiring Skilled Therapeutic Intervention   Body structures, Functions, Activity limitations Decreased functional mobility ; Decreased endurance; Decreased posture; Increased pain   Assessment Assisted pt with donning TLSO, able to amb in hallway but limited by increased pain and weakness with unsteadiness and intermittent knee buckling present.  Pt up to recliner and positioned for comfort with all needs in reach post gait.   Activity Tolerance   Activity Tolerance Patient Tolerated treatment well   Safety Devices   Type of devices Call light within reach;  Chair alarm in place; Gait belt; Left in chair         Electronically signed by Tung Munoz PTA on 11/16/2021 at 10:05 AM

## 2021-11-16 NOTE — DISCHARGE SUMMARY
Discharge Summary    NAME: Rik Kearney  :  1940  MRN:  [id number]    Admit date:  2021  Discharge date:  2021    Admitting Physician:  Niki Miguel MD    Consults: Neurosurgery    Primary Care Physician:  Jose Manuel Mckenna MD    Discharge Diagnoses:      Thoracic compression fracture [facility])    Lumbar compression fracture, closed, initial encounter [facility])    Hypertension    Stage 3a chronic kidney disease ([address]    Back pain    Impaired functional mobility and endurance        Significant Diagnostic Studies:   XR THORACIC SPINE (3 VIEWS)    Result Date: 11/15/2021  XR THORACIC SPINE (3 VIEWS) 11/15/2021 4:10 PM History: Compression fractures. 5 view thoracic spine series. Severe osteopenia. Limited positioning. Images obtained standing with brace in place. The AP view shows 22 degrees right convex scoliosis. Kyphoplasty treatment of L2 and L3 noted. Thoracic compression fractures previously described on CT as T2, T6, and T9. No definite malalignment is seen. 1. Limited detail. 2. Scoliosis and compression fractures. Signed by Dr Mukund Mulligan (2-3 VIEWS)    Result Date: 11/15/2021  XR LUMBAR SPINE (2-3 VIEWS) 11/15/2021 4:14 PM History: Compression fractures. Three-view lumbar spine series. Comparison is made with lumbar spine CT performed yesterday at 10:42 PM. Kyphoplasty treatment of L3 and L4 compression fractures. No significant lumbar scoliosis. Moderate L5 compression fracture with L5 with approximately 20% loss of height. No malalignment is seen. 1. Kyphoplasty treatment of L3 and L4 compression fractures. 2. L5 compression 20% loss of height. Acute appearance noted on recent CT. Signed by Dr Albert Both    Result Date: 11/15/2021  Examination. CT HEAD WO CONTRAST 2021 10:49 PM History: The patient fell and complains of pain. DLP: 831 mGycm. The CT scan of the head is performed without intravenous contrast enhancement.  The images are acquired in axial plane with subsequent reconstruction in coronal and sagittal planes. There is no previous study for comparison. There is no evidence of an intracranial hemorrhage or hematoma. There is no evidence of mass or midline shift. Moderately prominent ventricles, basal cistern and the cortical sulci represent chronic volume loss. The scattered areas of white matter ischemia is seen in the centrum semiovale bilaterally. An area of decreased attenuation in the right frontal lobe may represent a prior infarction. Gray-white matter differentiation the remaining brain maintained. The images reviewed in bone window show no evidence of fracture. Visualized paranasal sinuses and mastoid air cells are clear. No acute intracranial abnormality. No evidence of skull fracture. The above study was initially reviewed and reported by [facility]. I do not find any discrepancies. Signed by Dr Bishop Dawson    Result Date: 11/15/2021  Examination. CT CHEST WO CONTRAST 11/14/2021 10:55 PM History: The patient fell and complains of chest wall pain. DLP: 971 mGycm. The CT scan of the chest is performed without intravenous contrast enhancement. The images are acquired in axial plane with subsequent reconstruction in coronal and sagittal planes. There are extensive artifacts produced by the shoulder arthroplasty hardware which limit the evaluation of the chest bilaterally. The lungs are poorly expanded. No evidence of a pneumothorax. No finding to suggest consolidation. There are atelectatic changes in the lower lung bilaterally. There are scattered nodular infiltrate in the lungs bilaterally which may represent distal/small airway disease/respiratory bronchiolitis. No dominant nodules are seen. The tracheobronchial structures as visualized appear unremarkable.  There is a ill-defined linear density which extends from the anterior aspect of the distal trachea and to the right mainstem bronchus and subsequently into the right bronchus intermedius and lower lobe bronchus. This may represent mucous stranding? Brittny Ricardokins No mucoid impaction. The soft tissues of the neck are completely obscured by the artifacts. No axillary lymphadenopathy. Severe atheromatous changes of the thoracic aorta. No aneurysmal dilatation. There are severe atheromatous changes of the coronary arteries. A moderate dilatation of the main pulmonary artery, right and left pulmonary arteries suggesting pulmonary arterial hypertension. There is no evidence of mediastinal lymphadenopathy. Limited visualized liver and spleen are unremarkable. An exophytic low-density nodule from the right kidney measures 2.8 cm in AP dimension. CT density suggest a cyst. Kidneys are incompletely included in the study. There is moderate lobulation of the adrenal glands. No discrete mass. The limited visualized unenhanced pancreas is unremarkable. Limited included gallbladder show no radiopaque stone. The images reviewed in bone window show bilateral hip deformities representing old healed fractures. No evidence of an acute displaced rib fracture. There is compression deformity of several thoracic vertebrae. The thoracic spine is separately reviewed and reported. No acute intrathoracic abnormality, particularly, no evidence of pneumothorax or finding to suggest lung contusion. Old healed rib fractures bilaterally. No acute displaced rib fracture noted. Other findings as above. The above study was initially reviewed and reported by [facility]. I do not find any discrepancies. Signed by Dr Isidro Vigil    Result Date: 11/15/2021  Examination. CT CERVICAL SPINE WO CONTRAST 11/14/2021 10:51 PM History: The patient fell and complains of neck pain. DLP: 536 mGycm. The CT scan of the cervical spine is performed without intravenous or intrathecal contrast enhancement.  The images are acquired in axial plane and subsequent reconstruction in coronal and sagittal planes. There is no previous study for comparison. There are imaging artifacts and patient body habitus which lowers the diagnostic sheath of the study. The lower cervical spine is suboptimally visualized and evaluated. There is superior endplate compression of vertebra C7 which is poorly visualized. This may represent a chronic process? Malu Duckworth The remaining vertebral body heights are grossly normal. No obvious fracture or compression. There is moderate diffuse osteopenia. Chronic degenerative changes are seen in the form of osteophytes, narrowing of the disc spaces representing degenerative disc disease and facet arthropathy. The neural foramina spinal canal are patent. Moderately severe arthropathy of the atlantoaxial joint is noted. The limited visualized lungs are unremarkable. The prevertebral soft tissues are unremarkable. Severe atheromatous changes of the carotid arteries bilaterally are noted, right worse than the left. A very limited diagnostic study due to extensive artifacts and diffuse demineralization the bony structures. Moderate superior endplate compression of vertebral C7 probably chronic process. However if symptomatic further evaluation with MR imaging of the cervical spine may be obtained. Chronic degenerative changes. The neural foramina spinal canal are patent. The above study was initially reviewed and reported by [facility]. I do not find any discrepancies. Signed by Dr Bisi Perez    Result Date: 11/15/2021  EXAMINATION:  CT THORACIC SPINE WO CONTRAST  11/15/2021 9:42 AM HISTORY: Back pain post trauma/fall COMPARISON: No comparison study. TECHNIQUE: Radiation dose equals DLP 1363 mGy cm. AUTOMATED EXPOSURE CONTROL dose reduction technique was implemented. Thin section axial images were obtained without intravenous contrast. Multi projection reconstruction images were generated.  FINDINGS: Examination was sent to [facility] for preliminary interpretation. There is osteoporosis. There is kyphosis and scoliosis with S-shaped configuration of the thoracic spine. There is spondylosis changes identified with endplate sclerosis and osteophyte formation. The upper thoracic spine is suboptimally imaged due to artifact and poor visualization. There are age indeterminant superior endplate depression deformities of T6 and T2. T2-weighted particularly is poorly visualized due to artifact. There are also mild superior plate depression deformities of T9 associated with scoliosis. Suspect chronic changes associated with the kyphoscoliosis however MR could further characterize and exclude acute fracture is recommended clinically indicated. No definite posttraumatic disc herniation observed. There is no significant canal stenosis. Evaluation of the lungs is suboptimal due to breathing motion. No consolidating airspace opacities observed with probable atelectasis in the lower lobes. Extensive aortic calcifications identified. 1. Kyphoscoliosis and osteoporosis. 2. Diffuse spondylosis. 3. Suboptimal imaging, particularly, of the upper thoracic spine with age indeterminant superior endplate compression deformities of particularly T6 and T2 and to lesser degree T9 as described above. Signed by Dr Queta Ordaz    Result Date: 11/15/2021  EXAMINATION:  CT LUMBAR SPINE WO CONTRAST  11/15/2021 9:51 AM HISTORY: Back pain post fall COMPARISON: No comparison study. TECHNIQUE: Radiation dose equals DLP 1294 mGy cm. AUTOMATED EXPOSURE CONTROL dose reduction technique was implemented. Thin section axial images were obtained without intravenous contrast. Multi projection reconstruction images were generated. FINDINGS: Examination was sent to [facility] for preliminary interpretation. There is diffuse osteoporosis.  There is a acute appearing superior plate compression deformity of L5 with linear fractures involving the superior endplate, two column with superior endplate depression and anterior wedging with an estimated 20% loss vertebral body height. There is very mild retropulsion of the superior endplate suspected with less than 10% compromise the bony spinal canal. There are kyphoplasty changes at L3 and L4. There is no CT evidence of additional acute fractures. There is no CT evidence of posttraumatic disc herniations are significant canal stenosis. There is advanced disc degeneration at L5-S1. Facet arthropathy with hypertrophic changes noted diffusely particularly in the lower lumbar spine. There is aortoiliac calcifications. There is a approximately 1 cm pedunculated questionable mass in the interpolar region of the right kidney, indeterminate. In addition there is a approximately 2.5 cm pedunculated mass arising from the upper to interpolar region the right kidney which may represent a cyst, further Imaging characterization suggested. Scattered diverticula noted in the colon. 1. Acute appearing superior endplate compression deformity of the L5 vertebral body described above. 2. Osteoporosis and kyphoplasty changes at L3 and L4. 3. Degenerative changes. No CT evidence of additional acute posttraumatic change of the lumbar spine. 4. Indeterminate right renal lesions, follow-up recommended. Signed by Dr Abbe Kumar:   CBC:   Recent Labs     11/14/21  2345 11/15/21  0244   WBC 9.0 8.5   HGB 11.6* 12.4*    273     BMP:    Recent Labs     11/14/21  2345 11/15/21  0244   * 138   K 5.0 5.2*   CL 98 98   CO2 28 27   BUN 27* 29*   CREATININE 1.4* 1.6*   GLUCOSE 117* 120*           Hospital Course: 23-year-old male with hypertension presented to the emergency department after he slipped and sustained fall in his yard landing on his back on 11/13/2021. The following morning he noted severe back pain with movement and presented.   CT scan demonstrated compression fracture of L5, also noted chronic appearing compression deformities involving T2, T6. Also noted to have several rib fractures on imaging. Patient admitted and evaluated by neurosurgery, who felt patient not a good candidate for kyphoplasty, recommended pain control and mobilization with PT in TLSO brace. Repeat standing x-rays of T/L-spine were obtained that showed a stable L5 compression fracture and stable compression fractures of thoracic spine. Given stability on repeat x-rays, expected to improve with time. Patient advised by neurosurgery to continue to wear TLSO brace for all out of bed activity and avoid lifting anything greater than 5 pounds and avoid bending or twisting at the waist.  Patient worked with PT and able to ambulate significant distance. Patient stable for discharge home with home health arranged, prescription for analgesics, follow-up with PCP this week where he can consider DEXA scan to confirm any underlying osteoporosis. Will follow up with neurosurgery in 2 weeks. Physical Exam:  Vital Signs: BP (!) 117/57   Pulse 81   Temp 97.7 °F (36.5 °C) (Temporal)   Resp 18   Ht 5' 8\" (1.727 m)   Wt 174 lb 3 oz (79 kg)   SpO2 92%   BMI 26.49 kg/m²   General appearance:. Alert and Cooperative   HEENT: Normocephalic. Atraumatic  Chest: clear to auscultation bilaterally without wheezes or rhonchi. Cardiac: Normal heart tones with regular rate and rhythm, S1, S2 normal.   Abdomen:soft, non-tender; normal bowel sounds  Extremities: No clubbing or cyanosis. No peripheral edema. Peripheral pulses palpable. Neurologic: Neurovascularly intact in distal extremities. Alert, oriented, follows commands, no lateralizing deficit    Discharge Medications:      Current Discharge Medication List           Details   HYDROcodone-acetaminophen (NORCO)  MG per tablet Take 1 tablet by mouth every 4 hours as needed for Pain for up to 3 days.   Qty: 18 tablet, Refills: 0    Comments: Reduce doses taken as pain becomes manageable  Associated Diagnoses: Lumbar compression fracture, closed, initial encounter ([facility] [address]      cyclobenzaprine (FLEXERIL) 5 MG tablet Take 1 tablet by mouth 3 times daily as needed for Muscle spasms  Qty: 30 tablet, Refills: 0              Details   hydroCHLOROthiazide (MICROZIDE) 12.5 MG capsule Take 12.5 mg by mouth daily      Olmesartan Medoxomil (BENICAR PO) Take  by mouth. Tramadol-Acetaminophen (ULTRACET PO) Take 37.5 mg by mouth daily as needed              Discharge Instructions: Follow up with Jose Manuel Mckenna MD this week. Take medications as directed. Resume activity as tolerated. Disposition: Patient is medically stable and will be discharged with home health. Time spent on discharge 35 minutes.      Signed:  Niki Miguel MD  11/16/2021 12:16 PM

## 2021-11-16 NOTE — PROGRESS NOTES
Occupational Therapy   Occupational Therapy Initial Assessment  Date: 2021   Patient Name: Rashida Lopez  MRN: [id number]     : 1940    Date of Service: 2021    Discharge Recommendations:  Home with assist PRN  OT Equipment Recommendations  Equipment Needed: No    Assessment   Assessment: Evaluation and tx initiated. All education completed this visit. No overt barriers to home. Treatment Diagnosis: Thoracic and lumbar compression fx  REQUIRES OT FOLLOW UP: No  Activity Tolerance  Activity Tolerance: Patient Tolerated treatment well  Safety Devices  Safety Devices in place: Yes  Type of devices: Call light within reach; Chair alarm in place           Patient Diagnosis(es): The primary encounter diagnosis was Lumbar compression fracture, closed, [facility] Diagnoses of Closed head injury, initial encounter, Closed fracture of thoracic vertebra, unspecified fracture morphology, unspecified thoracic vertebral level, initial encounter [facility]), Closed wedge fracture of lumbar vertebra, unspecified lumbar vertebral level, initial encounter ([facility] and Closed fracture of multiple ribs, unspecified laterality, initial encounter were also pertinent to this visit. has a past medical history of Cancer [facility]), Chronic back pain, Chronic leg pain, Colon polyps, Hypertension, and Seasonal allergies. has a past surgical history that includes Prostate surgery; Appendectomy; Tonsillectomy; shoulder surgery; and Colonoscopy (2010).     Treatment Diagnosis: Thoracic and lumbar compression fx      Restrictions  Restrictions/Precautions  Required Braces or Orthoses?: Yes  Required Braces or Orthoses  Spinal: Thoracic Lumbar Sacral Orthotics  Spinal Other: wear brace for all OOB activities  Position Activity Restriction  Spinal Precautions: No Bending, No Lifting, No Twisting  Sternal Precautions: 5# Lifting Restrictions    Subjective   General  Chart Reviewed: Yes  Patient assessed for rehabilitation services?: Yes  Family / Caregiver Present: Yes (spouse)  Patient Currently in Pain: Yes  Pain Assessment  Pain Assessment: 0-10  Pain Level: 3  Pain Location: Back  Pain Descriptors: Aching; Sore  Functional Pain Assessment: Prevents or interferes some active activities and ADLs  Non-Pharmaceutical Pain Intervention(s): Ambulation/Increased Activity; Repositioned  Response to Pain Intervention: Patient Satisfied  Vital Signs  Temp: 97.7 °F (36.5 °C)  Temp Source: Temporal  Pulse: 81  Heart Rate Source: Monitor  Resp: 18  BP: (!) 117/57  BP Location: Left upper arm  MAP (mmHg): 75  Patient Position: Sitting  Level of Consciousness: Alert (0)  Patient Currently in Pain: Yes  Oxygen Therapy  SpO2: 92 %  O2 Device: None (Room air)  O2 Flow Rate (L/min): 0 L/min  Social/Functional History  Social/Functional History  Lives With: Spouse  Home Access: Stairs to enter without rails  Entrance Stairs - Number of Steps: 1  ADL Assistance: Independent  Ambulation Assistance: Independent  Transfer Assistance: Independent  Additional Comments: STATES HE HAS ACCESS TO DME AND WILL USE AS NEEDED.        Objective              Balance  Sitting Balance: Independent  Standing Balance: Supervision  Functional Mobility  Functional - Mobility Device: No device  Assist Level: Supervision  Toilet Transfers  Toilet - Technique: Ambulating  Toilet Transfer: Supervision  ADL  Feeding: Independent  Grooming: Independent; Setup  UE Bathing: Supervision  LE Bathing: Supervision  UE Dressing: Independent  LE Dressing: Modified independent ; Supervision  Toileting: Modified independent ; Supervision        Bed mobility  Rolling to Right: Supervision  Supine to Sit: Minimal assistance  Sit to Supine: Minimal assistance  Transfers  Stand Step Transfers: Supervision     Cognition  Overall Cognitive Status: WNL                                      Tx initiated:  Shower and dressing (40 mins)  Plan   Plan  Plan Comment: No further visits planned    G-Code     OutComes Score                                                  AM-PAC Score             Goals  Short term goals  Short term goal 1: Patient and spouse will be independent with back protocols and don/doff brace (met)  Short term goal 2: Patient will verbalize AE/DME options (met)       Therapy Time   Individual Concurrent Group Co-treatment   Time In           Time Out           Minutes                   Martinez Rodriguez OT Electronically signed by Martinez Rodriguez OT on 11/16/2021 at 12:43 PM

## 2021-11-16 NOTE — PLAN OF CARE
Problem: Falls - Risk of:  Goal: Will remain free from falls  Description: Will remain free from falls  11/16/2021 0825 by Kevon Diaz RN  Outcome: Ongoing  11/16/2021 0031 by Marybeth Woody RN  Outcome: Ongoing  11/16/2021 0022 by Marybeth Woody RN  Outcome: Ongoing  Goal: Absence of physical injury  Description: Absence of physical injury  11/16/2021 0825 by Kevon Diaz RN  Outcome: Ongoing  11/16/2021 0031 by Marybeth Woody RN  Outcome: Ongoing  11/16/2021 0022 by Marybeth Woody RN  Outcome: Ongoing     Problem: Skin Integrity:  Goal: Will show no infection signs and symptoms  Description: Will show no infection signs and symptoms  11/16/2021 0825 by Kevon Diaz RN  Outcome: Ongoing  11/16/2021 0031 by Marybeth Woody RN  Outcome: Ongoing  11/16/2021 0022 by Marybeth Woody RN  Outcome: Ongoing  Goal: Absence of new skin breakdown  Description: Absence of new skin breakdown  11/16/2021 0825 by Kevon Diaz RN  Outcome: Ongoing  11/16/2021 0031 by Marybeth Woody RN  Outcome: Ongoing  11/16/2021 0022 by Marybeth Woody RN  Outcome: Ongoing  Goal: Risk for impaired skin integrity will decrease  Description: Risk for impaired skin integrity will decrease  Outcome: Ongoing     Problem: Pain:  Goal: Pain level will decrease  Description: Pain level will decrease  11/16/2021 0825 by Kevon Diaz RN  Outcome: Ongoing  11/16/2021 0031 by Marybeth Woody RN  Outcome: Ongoing  11/16/2021 0022 by Marybeth Woody RN  Outcome: Ongoing  Goal: Control of acute pain  Description: Control of acute pain  11/16/2021 0825 by Kevon Diaz RN  Outcome: Ongoing  11/16/2021 0031 by Marybeth Woody RN  Outcome: Ongoing  11/16/2021 0022 by Marybeth Woody RN  Outcome: Ongoing  Goal: Control of chronic pain  Description: Control of chronic pain  11/16/2021 0825 by Kevon Diaz RN  Outcome: Ongoing  11/16/2021 0031 by Marybeth Woody RN  Outcome: Ongoing  11/16/2021 0022 by Marybeth Woody RN  Outcome: Ongoing  Goal: Patient's pain/discomfort is manageable  Description: Patient's pain/discomfort is manageable  Outcome: Ongoing     Problem: Infection:  Goal: Will remain free from infection  Description: Will remain free from infection  Outcome: Ongoing     Problem: Safety:  Goal: Free from accidental physical injury  Description: Free from accidental physical injury  Outcome: Ongoing  Goal: Free from intentional harm  Description: Free from intentional harm  Outcome: Ongoing  Goal: Ability to remain free from injury will improve  Description: Ability to remain free from injury will improve  Outcome: Ongoing  Goal: Ability to correctly utilize injury-preventing devices as appropriate will improve  Description: Ability to correctly utilize injury-preventing devices as appropriate will improve  Outcome: Ongoing     Problem: Daily Care:  Goal: Daily care needs are met  Description: Daily care needs are met  Outcome: Ongoing     Problem: Skin Integrity:  Goal: Skin integrity will stabilize  Description: Skin integrity will stabilize  Outcome: Ongoing     Problem: Discharge Planning:  Goal: Patients continuum of care needs are met  Description: Patients continuum of care needs are met  Outcome: Ongoing     Problem: Discharge Planning:  Goal: Discharged to appropriate level of care  Description: Discharged to appropriate level of care  Outcome: Ongoing     Problem:  Activity:  Goal: Ability to tolerate increased activity will improve  Description: Ability to tolerate increased activity will improve  Outcome: Ongoing  Goal: Mobility will improve  Description: Mobility will improve  Outcome: Ongoing     Problem: Mobility - Impaired:  Goal: Mobility will improve  Description: Mobility will improve  Outcome: Ongoing     Problem: Health Behavior:  Goal: Identification of resources available to assist in meeting health care needs will improve  Description: Identification of resources available to assist in meeting health care needs will improve  Outcome: Ongoing     Problem: Nutritional:  Goal: Ability to identify appropriate dietary choices will improve  Description: Ability to identify appropriate dietary choices will improve  Outcome: Ongoing  Goal: Dietary intake will improve  Description: Dietary intake will improve  Outcome: Ongoing     Problem: Physical Regulation:  Goal: Complications related to the disease process, condition or treatment will be avoided or minimized  Description: Complications related to the disease process, condition or treatment will be avoided or minimized  Outcome: Ongoing

## 2021-11-16 NOTE — PROGRESS NOTES
NEUROSURGERY PROGRESS NOTE      Chief Complaint:   Chief Complaint   Patient presents with    Fall     100 mcg fentnyl 4 mg zofran given per EMS; pt fell yesterday pain worse today- pain in back and left side; worse with inspiration. Pain in lower back per pt. Interval Update:  No overnight events. He continues to endorse back pain but says the pain medications do help. He has been able to ambulate with PT with stand by assistance without any assistive devices. He denies any radicular pain or new numbness or weakness in his arms or legs. Standing thoracic and lumbar x-rays were obtained yesterday afternoon in his brace and these showed a stable L5 compression fracture and stable compression fractures of T2, T6 and T9. HPI: The patient is a 78 y.o. male who slipped and fell in his yard on 11/13/2021. He states he was walking down a hill and slipped on wet grass and fell, landing \"flat on his back\". He was able to stand and walk afterwards and did not notice much pain but states he awoke yesterday morning with severe pain in his back and pain with inspiration and movement. He presented to [facility] ED and CT scans of his head, chest, cervical, thoracic and lumbar spine were obtained. The official reads for the thoracic and lumbar scans are pending but the [employer] reads mention compression fractures of L5, multiple possible rib fractures (that may be chronic) and age-indeterminate compression fractures of T2 and T6 as well as a chronic-appearing compression deformity of C7. His bones are severely demineralized and he does have a history of prior kyphoplasty for compression fractures at L3 and L4 performed in 2018 by Dr. Yuliya Houston at [facility].     On questioning, he endorses mostly thoracic back pain and chest wall pain with movement and breathing. He does not endorse much lower back pain. He denies any radicular pain and he denies any numbness or weakness in his extremities.   He has been placed in a TLSO brace and is sitting up in a chair. Objective:    /64   Pulse 78   Temp 97.1 °F (36.2 °C) (Temporal)   Resp 16   Ht 5' 8\" (1.727 m)   Wt 174 lb 3 oz (79 kg)   SpO2 92%   BMI 26.49 kg/m²       Intake/Output Summary (Last 24 hours) at 11/16/2021 1012  Last data filed at 11/16/2021 1081  Gross per 24 hour   Intake 810 ml   Output    Net 810 ml           Physical Exam:    General: alert, cooperative, no distress  Cardiorespiratory: unlabored breathing  Abdomen: soft and nondistended        Neurologic Exam:    Mental Status: Alert, oriented, thought content appropriate  Cranial Nerves: PERRL, EOMI, symmetric facies, tongue midline  Motor: Motor exam is symmetrical 5 out of 5 all extremities bilaterally  Somatosensory: normal light touch sensation except for baseline numbness in his right foot (prior surgery)            Pertinent Labs:  Lab Results   Component Value Date    WBC 8.5 11/15/2021    HGB 12.4 (L) 11/15/2021    HCT 40.6 (L) 11/15/2021    MCV 98.5 (H) 11/15/2021     11/15/2021     Lab Results   Component Value Date     11/15/2021    K 5.2 11/15/2021    CL 98 11/15/2021    CO2 27 11/15/2021    BUN 29 11/15/2021    CREATININE 1.6 11/15/2021    GLUCOSE 120 11/15/2021    CALCIUM 9.9 11/15/2021              Assessment and Plan:  78 y.o. M presented to [facility] after a fall in his yard complaining of back pain. His CT scans revealed multiple age-indeterminate thoracic compression fractures as well as an acute-appearing compression fracture of L5 with prior kyphoplasty at L3 and L4 in 2018. He is neurologically intact and ambulating with only eyazo-iy-xndolofqwv in a TLSO brace. I advised him again that I did not feel that he was a good candidate for any surgical intervention and with stable x-rays I expect the pain from his fractures to improve with time.   I advised him that he should continue to wear his brace for all out-of-bed activity and avoid any lifting >5 lbs and avoid bending and twisting at the waist.  He will also need to follow up with his PCP for a DEXA scan and if osteoporosis is confirmed, begin treatment. I will plan to see him again in two weeks as an outpatient with new standing thoracic and lumbar x-rays. I will be available on an as-needed basis, please call with further questions.         Electronically signed by Pattie Avilez MD on 11/16/2021 at 10:12 AM

## 2021-11-16 NOTE — CARE COORDINATION
Spoke with patient regarding MD orders for [place] services. Patient agreeable and has chosen [facility]. Referral Faxed. [address] [place] [address] [phone]. [phone]. Please notify [facility] when patient discharges and fax DC Summary,  DC med list and any new [place] orders. The Patient and/or patient representative was provided with a choice of provider and agrees   with the discharge plan. [x] Yes [] No    Freedom of choice list was provided with basic dialogue that supports the patient's individualized plan of care/goals, treatment preferences and shares the quality data associated with the providers.  [x] Yes [] No  Electronically signed by Yenifer Gonzalez on 11/16/2021 at 1:21 PM

## 2021-11-16 NOTE — PLAN OF CARE
Problem: Falls - Risk of:  Goal: Will remain free from falls  Description: Will remain free from falls  11/16/2021 0031 by Daniel Brasher RN  Outcome: Ongoing  11/16/2021 0022 by Daniel Brasher RN  Outcome: Ongoing  Goal: Absence of physical injury  Description: Absence of physical injury  11/16/2021 0031 by Daniel Brasher RN  Outcome: Ongoing  11/16/2021 0022 by Daniel Brasher RN  Outcome: Ongoing     Problem: Skin Integrity:  Goal: Will show no infection signs and symptoms  Description: Will show no infection signs and symptoms  11/16/2021 0031 by Daniel Brasher RN  Outcome: Ongoing  11/16/2021 0022 by Daniel Brasher RN  Outcome: Ongoing  Goal: Absence of new skin breakdown  Description: Absence of new skin breakdown  11/16/2021 0031 by Daniel Brasher RN  Outcome: Ongoing  11/16/2021 0022 by Daniel Brasher RN  Outcome: Ongoing     Problem: Pain:  Goal: Pain level will decrease  Description: Pain level will decrease  11/16/2021 0031 by Daniel Brasher RN  Outcome: Ongoing  11/16/2021 0022 by Daniel Brasher RN  Outcome: Ongoing  Goal: Control of acute pain  Description: Control of acute pain  11/16/2021 0031 by Daniel Brasher RN  Outcome: Ongoing  11/16/2021 0022 by Daniel Brasher RN  Outcome: Ongoing  Goal: Control of chronic pain  Description: Control of chronic pain  11/16/2021 0031 by Daniel Brasher RN  Outcome: Ongoing  11/16/2021 0022 by Daniel Brasher RN  Outcome: Ongoing

## 2021-11-17 ENCOUNTER — CARE COORDINATION (OUTPATIENT)
Dept: CASE MANAGEMENT | Age: 81
End: 2021-11-17

## 2021-11-17 DIAGNOSIS — S32.000A LUMBAR COMPRESSION FRACTURE, CLOSED, INITIAL ENCOUNTER (HCC): ICD-10-CM

## 2021-11-17 DIAGNOSIS — S32.050D COMPRESSION FRACTURE OF L5 VERTEBRA WITH ROUTINE HEALING, SUBSEQUENT ENCOUNTER: ICD-10-CM

## 2021-11-17 DIAGNOSIS — S22.000D COMPRESSION FRACTURE OF THORACIC VERTEBRA WITH ROUTINE HEALING, UNSPECIFIED THORACIC VERTEBRAL LEVEL, SUBSEQUENT ENCOUNTER: Primary | ICD-10-CM

## 2021-11-17 NOTE — CARE COORDINATION
[facility] Transitions Initial Follow Up Call    Call within 2 business days of discharge: Yes    Patient: Noam Oliveira Patient : 1940   MRN: [id number]  Reason for Admission:   Discharge Date: 21 RARS: Readmission Risk Score: 13.3 ( )      Last Discharge [facility]       Complaint Diagnosis Description Type Department Provider    21 Fall Lumbar compression fracture, closed, initial encounter ([facility] . .. ED to Hosp-Admission (Discharged) (ADMITTED) [facility] Karol East MD; Roberto Simmons, ... Spoke with: Noam Oliveira and wife with his permission for calls    Facility: [place] [address]    Non-face-to-face services provided:  Reviewed encounter information for continuity of care prior to follow up phone call - chart notes, consults, progress notes, test results, med list, appointments, AVS, other information. Advance Care Planning   Healthcare Decision Maker:    Primary Decision Maker: Rishi Garduno  [phone]    Transitions of Care Initial Call      Challenges to be reviewed by the provider   Additional needs identified to be addressed with provider: No  none             Method of communication with provider : none      Advance Care Planning:   Does patient have an Advance Directive: not on file; education provided. Was this a readmission? No  Patient stated reason for admission: fall  Patients top risk factors for readmission: functional physical ability, falls, medical condition-fall, compression fx, rib fx and medication management    Care Transition Nurse (CTN) contacted the patient by telephone to perform post hospital discharge assessment. Verified name and  with patient as identifiers. Provided introduction to self, and explanation of the CTN role. CTN reviewed discharge instructions, medical action plan and red flags with patient who verbalized understanding.  Patient given an opportunity to ask questions and does not have any further questions or concerns at this time. Were discharge instructions available to patient? Yes. Reviewed appropriate site of care based on symptoms and resources available to patient including: PCP, Specialist and Home health. The patient agrees to contact the PCP office for questions related to their healthcare. Medication reconciliation was performed with family, who verbalizes understanding of administration of home medications. Advised obtaining a 90-day supply of all daily and as-needed medications. Covid Risk Education     Educated patient about risk for severe COVID-19 due to risk factors according to CDC guidelines. CTN reviewed discharge instructions, medical action plan and red flag symptoms with the patient who verbalized understanding. Discussed COVID vaccination status: Yes. Education provided on COVID-19 vaccination as appropriate. Discussed exposure protocols and quarantine with CDC Guidelines. Patient was given an opportunity to verbalize any questions and concerns and agrees to contact CTN or health care provider for questions related to their healthcare. Reviewed and educated family on any new and changed medications related to discharge diagnosis. Was patient discharged with a pulse oximeter? No Discussed and confirmed pulse oximeter discharge instructions and when to notify provider or seek emergency care. CTN provided contact information. Plan for follow-up call in 5-7 days based on severity of symptoms and risk factors.   Plan for next call: HH, HFU, med changes, pain, GI assessment            Care Transitions 24 Hour Call    Do you have any ongoing symptoms?: No  Do you have a copy of your discharge instructions?: Yes  Do you have all of your prescriptions and are they filled?: Yes  Have you been contacted by a [facility] Pharmacist?: No  Have you scheduled your follow up appointment?: Yes  How are you going to get to your appointment?: Car - family or friend to transport  Were you discharged with any Home Care or [address]: Yes  Post Acute Services: [address] you feel like you have everything you need to keep you well at home?: Yes  Care Transitions Interventions         Follow Up : Spoke with Josefina Diego and wife today for initial CT call after discharge from [facility]. He gives permission to speak to wife for calls. He has his medications, taking them, reviewed them. HE is wearing his TSLO brace. He is in some pain, has pain medications. He was advised about watching for constipation, eating fresh fruits and vegetables to keep BM going, taking a stool softener and to stop for loose stools. He states understanding as does wife. Encouraged nutritious food choices and keeping hydrated. HE is trying to find positional comfort at night, a challenge. Discussed pillow between knee, log roll into bed with wife lifting legs as he goes to lay down. He has his HFU with PCP and NS. CT advised them to call about xrays, if they wanted before he went to his appt. Idania Mejia has been ordered, not heard from them yet, she says. CTN will verify. Patient has had the Covid vaccine, listed wife as PHCDM, and does not have LW, CTN counseled. Discussed CTN calls and f/u and they are accepting. Contact info for both CTN's shared with patient and wife. Will follow up at a later time. CTN placed call to [facility] and spoke with Genevieve Adkins. She reports Dandy Garzon will be going out tomorrow to admit patient to their services.    Future Appointments   Date Time Provider Mojgan Palacio   11/29/2021 11:00 AM MD KAUSHAL Torres-CARROLL Preston RN

## 2021-11-18 ENCOUNTER — TELEPHONE (OUTPATIENT)
Dept: INTERNAL MEDICINE | Facility: CLINIC | Age: 81
End: 2021-11-18

## 2021-11-18 ENCOUNTER — OFFICE VISIT (OUTPATIENT)
Dept: INTERNAL MEDICINE | Facility: CLINIC | Age: 81
End: 2021-11-18

## 2021-11-18 VITALS
DIASTOLIC BLOOD PRESSURE: 60 MMHG | HEIGHT: 69 IN | BODY MASS INDEX: 25.92 KG/M2 | OXYGEN SATURATION: 90 % | SYSTOLIC BLOOD PRESSURE: 100 MMHG | HEART RATE: 97 BPM | WEIGHT: 175 LBS | TEMPERATURE: 97.1 F

## 2021-11-18 DIAGNOSIS — S22.000G COMPRESSION FRACTURE OF THORACIC VERTEBRA WITH DELAYED HEALING, UNSPECIFIED THORACIC VERTEBRAL LEVEL, SUBSEQUENT ENCOUNTER: Primary | ICD-10-CM

## 2021-11-18 DIAGNOSIS — F11.90 OPIOID USE: ICD-10-CM

## 2021-11-18 PROBLEM — M54.9 BACK PAIN: Status: ACTIVE | Noted: 2021-11-15

## 2021-11-18 PROCEDURE — 99214 OFFICE O/P EST MOD 30 MIN: CPT | Performed by: INTERNAL MEDICINE

## 2021-11-18 RX ORDER — CYCLOBENZAPRINE HCL 5 MG
5 TABLET ORAL 3 TIMES DAILY
COMMUNITY
Start: 2021-11-16 | End: 2021-11-26

## 2021-11-18 RX ORDER — HYDROCODONE BITARTRATE AND ACETAMINOPHEN 10; 325 MG/1; MG/1
1 TABLET ORAL EVERY 4 HOURS PRN
COMMUNITY
End: 2021-11-18 | Stop reason: SDUPTHER

## 2021-11-18 RX ORDER — HYDROCODONE BITARTRATE AND ACETAMINOPHEN 10; 325 MG/1; MG/1
1 TABLET ORAL EVERY 4 HOURS PRN
Qty: 60 TABLET | Refills: 0 | Status: SHIPPED | OUTPATIENT
Start: 2021-11-18 | End: 2021-11-29

## 2021-11-18 NOTE — PROGRESS NOTES
Physician Progress Note      Chapis JAIME #:                  [id number]  :                       1940  ADMIT DATE:       2021 7:01 PM  [address] DATE:        2021 4:02 PM  RESPONDING  PROVIDER #:        Michael Ray          QUERY TEXT:    Pt admitted with thoracic and lumbar vertebral compression fractures. Pt noted   to have severe osteopenia documented throughout the chart. If possible,   please document in progress notes and discharge summary if you are evaluating   and/or treating any of the following: The medical record reflects the following:  Risk Factors: Multiple Falls, Osteopenia  Clinical Indicators: CT thoracic notes \"there is osteoporosis\" with impression   noted as \"kyphoscoliosis and osteoporosis\"  Treatment: PT/OT, TLSO Brace    Thank you in advance,    Didi Viera RN-BSN, [facility]  Clinical   [facility]  [email or web address]  [email or web address]  Options provided:  -- Pathological Thoracic Compression fracture  -- Osteoporotic thoracic Fracture  -- Osteoporotic thoracic fracture following fall which would not usually break   a normal, healthy bone  -- Traumatic thoracic fracture  -- Other - I will add my own diagnosis  -- Disagree - Not applicable / Not valid  -- Disagree - Clinically unable to determine / Unknown  -- Refer to Clinical Documentation Reviewer    PROVIDER RESPONSE TEXT:    This patient has a traumatic fracture of thoracic.     Query created by: Melchor Jose on 2021 10:23 AM      Electronically signed by:  Michael Ray 2021 5:46 PM

## 2021-11-18 NOTE — TELEPHONE ENCOUNTER
Mariana with [facility] called stating the orders for admission for HH  today. Pt states they don't know when he will be available due to appts today. So she needs ok to extend the order til tomorrow. She can be reached at [phone]

## 2021-11-18 NOTE — PROGRESS NOTES
Subjective   Juan Luis Collazo is a 81 y.o. male.   Chief Complaint   Patient presents with   • Transitional Care Management     Went to  [facility] on 11/14, after falling on his back on 11/13 and dx'd with compression fracture.  He had been seeing Dr. Humphrey and pain mgmt prior to his fall.  He was only given 18 Norco 10mg, so will need refill.       History of Present Illness 5 days ago patient fell sustaining 3 compression fractures to his thoracic spine.  He had previously had compressions fractures to his lumbar spine and those are evident on x-rays.  He was hospitalized briefly for pain management at [facility].  During that hospitalization they did not offer any interventional therapy.  Today he is a little bit loopy on hydrocodone.  His wife is with him is able to give a good history.    The following portions of the patient's history were reviewed and updated as appropriate: allergies, current medications, past family history, past medical history, past social history, past surgical history and problem list.    Review of Systems   Constitutional: Negative for activity change, appetite change, fatigue, fever, unexpected weight gain and unexpected weight loss.   HENT: Negative for swollen glands, trouble swallowing and voice change.    Eyes: Negative for blurred vision and visual disturbance.   Respiratory: Negative for cough and shortness of breath.    Cardiovascular: Negative for chest pain, palpitations and leg swelling.   Gastrointestinal: Negative for abdominal pain, constipation, diarrhea, nausea, vomiting and indigestion.   Endocrine: Negative for cold intolerance, heat intolerance, polydipsia and polyphagia.   Genitourinary: Negative for dysuria and frequency.   Musculoskeletal: Negative for arthralgias, back pain, joint swelling and neck pain.   Skin: Negative for color change, rash and skin lesions.   Neurological: Negative for dizziness, weakness, headache, memory problem and confusion.    Hematological: Does not bruise/bleed easily.   Psychiatric/Behavioral: Negative for agitation, hallucinations and suicidal ideas. The patient is not nervous/anxious.        Objective   Past Medical History:   Diagnosis Date   • Arthritis    • Back pain    • Cancer (HCC)     prostate   • Hypertension    • Osteopenia       Past Surgical History:   Procedure Laterality Date   • APPENDECTOMY     • FOOT SURGERY Right    • KYPHOPLASTY Bilateral 7/17/2018    Procedure: L3 KYPHOPLASTY 1-2 LEVELS;  Surgeon: Vega Pandey MD;  Location:  [facility] OR;  Service: Neurosurgery   • KYPHOPLASTY Bilateral 9/11/2018    Procedure: L4 KYPHOPLASTY WITH BIOPSY;  Surgeon: Vega Pandey MD;  Location:  [facility] OR;  Service: Neurosurgery   • PROSTATECTOMY     • TONSILLECTOMY     • TOTAL SHOULDER REPLACEMENT Bilateral     at different times        Current Outpatient Medications:   •  cyclobenzaprine (FLEXERIL) 5 MG tablet, Take 5 mg by mouth 3 (Three) Times a Day., Disp: , Rfl:   •  hydroCHLOROthiazide (HYDRODIURIL) 12.5 MG tablet, Take 1 tablet by mouth Daily., Disp: 90 tablet, Rfl: 3  •  HYDROcodone-acetaminophen (NORCO)  MG per tablet, Take 1 tablet by mouth Every 4 (Four) Hours As Needed for Moderate Pain ., Disp: 60 tablet, Rfl: 0  •  Multiple Vitamins-Minerals (ICAPS AREDS 2) capsule, Take 1 capsule by mouth Daily., Disp: , Rfl:       Vitals:    11/18/21 1336   BP: 100/60   Pulse: 97   Temp: 97.1 °F (36.2 °C)   SpO2: 90%         11/18/21  1336   Weight: 79.4 kg (175 lb)       Body mass index is 25.84 kg/m².    Physical Exam  Vitals and nursing note reviewed.   Constitutional:       General: He is not in acute distress.     Appearance: Normal appearance. He is well-developed.   HENT:      Head: Normocephalic and atraumatic.      Right Ear: External ear normal.      Left Ear: External ear normal.      Nose: Nose normal.   Eyes:      Extraocular Movements: Extraocular movements intact.      Conjunctiva/sclera:  Conjunctivae normal.      Pupils: Pupils are equal, round, and reactive to light.   Cardiovascular:      Rate and Rhythm: Normal rate and regular rhythm.      Pulses: Normal pulses.      Heart sounds: Normal heart sounds.   Pulmonary:      Effort: Pulmonary effort is normal.      Breath sounds: Normal breath sounds.   Abdominal:      General: Bowel sounds are normal.      Palpations: Abdomen is soft.   Musculoskeletal:         General: Normal range of motion.      Cervical back: Normal range of motion and neck supple. No rigidity. No muscular tenderness.   Skin:     General: Skin is warm and dry.   Neurological:      General: No focal deficit present.      Mental Status: He is alert and oriented to person, place, and time.   Psychiatric:         Mood and Affect: Mood normal.         Behavior: Behavior normal.               Assessment/Plan   Diagnoses and all orders for this visit:    1. Compression fracture of thoracic vertebra with delayed healing, unspecified thoracic vertebral level, subsequent encounter (Primary)  -     Ambulatory Referral to Neurosurgery  -     HYDROcodone-acetaminophen (NORCO)  MG per tablet; Take 1 tablet by mouth Every 4 (Four) Hours As Needed for Moderate Pain .  Dispense: 60 tablet; Refill: 0    2. Opioid use          Patient is here to follow-up from recent hospitalization at [facility] for 3-week compression fractures in the thoracic spine.  He was given pain medication no mention of doing kyphoplasty.  We are going to go ahead and contact neurosurgery today and see if we can get him in reevaluate for potential vertebroplasty he did excellent last time I am not sure why that was not offered this time it may be that the number of vertebral fractures are factor.  He is wearing a brace appropriately.  Fractures include T to T6 and T9

## 2021-11-19 ENCOUNTER — TELEPHONE (OUTPATIENT)
Dept: INTERNAL MEDICINE | Facility: CLINIC | Age: 81
End: 2021-11-19

## 2021-11-19 NOTE — TELEPHONE ENCOUNTER
Mariana with [facility] started eval today. She will be doing 1 x per week for 3 weeks for pain control and blood pressure. No need to call her back unless you want to make changes.

## 2021-11-24 ENCOUNTER — TELEPHONE (OUTPATIENT)
Dept: INTERNAL MEDICINE | Facility: CLINIC | Age: 81
End: 2021-11-24

## 2021-11-24 ENCOUNTER — CARE COORDINATION (OUTPATIENT)
Dept: CASE MANAGEMENT | Age: 81
End: 2021-11-24

## 2021-11-24 NOTE — CARE COORDINATION
[facility] Transitions Follow Up Call    2021    Patient: Tosha Payne  Patient : 1940   MRN: [id number]    Discharge Date: 21 RARS: Readmission Risk Score: 13.3 ( )         Spoke with: [address] [facility] Transitions Subsequent and Final Call    Schedule Follow Up Appointment with PCP: Completed  Subsequent and Final Calls  Have your medications changed?: No  Do you have any questions related to your medications?: No  Do you currently have any active services?: Yes  Are you currently active with any services?: Home Health  Do you have any needs or concerns that I can assist you with?: No  Identified Barriers: None  Care Transitions Interventions  Other Interventions:         Spoke with patient for a follow up call. He reported that he is doing fine. He said the home health PT just left not too long ago. He said therapy is going well, he is getting stronger. Fabien John he is still having a great deal of pain, taking pain meds. Said they are constipating him. He is having to watch what he eats, drink plenty and has had to use a laxative a couple of times he said. Said appetite is good. Sleeping better, getting into position a little better. He said he has all of his meds, taking them as ordered. Has had follow up with PCP. No med or treatment changes. No new needs noted. Stated he does not feel like he needs any further follow up. CTN to sign off at this time.         Follow Up  Future Appointments   Date Time Provider Mojgan Palacio   2021 11:00 AM MD KAUSHAL [facility]       Bebeto Alamo RN

## 2021-11-29 ENCOUNTER — OFFICE VISIT (OUTPATIENT)
Dept: NEUROSURGERY | Age: 81
End: 2021-11-29
Payer: MEDICARE

## 2021-11-29 ENCOUNTER — HOSPITAL ENCOUNTER (OUTPATIENT)
Dept: GENERAL RADIOLOGY | Age: 81
Discharge: HOME | End: 2021-11-29
Payer: MEDICARE

## 2021-11-29 VITALS
DIASTOLIC BLOOD PRESSURE: 83 MMHG | WEIGHT: 174 LBS | HEIGHT: 68 IN | BODY MASS INDEX: 26.37 KG/M2 | SYSTOLIC BLOOD PRESSURE: 158 MMHG | HEART RATE: 105 BPM

## 2021-11-29 DIAGNOSIS — S22.000G COMPRESSION FRACTURE OF THORACIC VERTEBRA WITH DELAYED HEALING, UNSPECIFIED THORACIC VERTEBRAL LEVEL, SUBSEQUENT ENCOUNTER: ICD-10-CM

## 2021-11-29 DIAGNOSIS — S22.000D COMPRESSION FRACTURE OF THORACIC VERTEBRA WITH ROUTINE HEALING, UNSPECIFIED THORACIC VERTEBRAL LEVEL, SUBSEQUENT ENCOUNTER: ICD-10-CM

## 2021-11-29 DIAGNOSIS — S32.050D COMPRESSION FRACTURE OF L5 VERTEBRA WITH ROUTINE HEALING, SUBSEQUENT ENCOUNTER: ICD-10-CM

## 2021-11-29 DIAGNOSIS — S22.000D COMPRESSION FRACTURE OF THORACIC VERTEBRA WITH ROUTINE HEALING, UNSPECIFIED THORACIC VERTEBRAL LEVEL, SUBSEQUENT ENCOUNTER: Primary | ICD-10-CM

## 2021-11-29 PROCEDURE — 4040F PNEUMOC VAC/ADMIN/RCVD: CPT | Performed by: NEUROLOGICAL SURGERY

## 2021-11-29 PROCEDURE — 72072 X-RAY EXAM THORAC SPINE 3VWS: CPT

## 2021-11-29 PROCEDURE — 1123F ACP DISCUSS/DSCN MKR DOCD: CPT | Performed by: NEUROLOGICAL SURGERY

## 2021-11-29 PROCEDURE — 72100 X-RAY EXAM L-S SPINE 2/3 VWS: CPT

## 2021-11-29 PROCEDURE — G8427 DOCREV CUR MEDS BY ELIG CLIN: HCPCS | Performed by: NEUROLOGICAL SURGERY

## 2021-11-29 PROCEDURE — G8482 FLU IMMUNIZE ORDER/ADMIN: HCPCS | Performed by: NEUROLOGICAL SURGERY

## 2021-11-29 PROCEDURE — 1111F DSCHRG MED/CURRENT MED MERGE: CPT | Performed by: NEUROLOGICAL SURGERY

## 2021-11-29 PROCEDURE — 99214 OFFICE O/P EST MOD 30 MIN: CPT | Performed by: NEUROLOGICAL SURGERY

## 2021-11-29 PROCEDURE — 1036F TOBACCO NON-USER: CPT | Performed by: NEUROLOGICAL SURGERY

## 2021-11-29 PROCEDURE — G8417 CALC BMI ABV UP PARAM F/U: HCPCS | Performed by: NEUROLOGICAL SURGERY

## 2021-11-29 RX ORDER — HYDROCODONE BITARTRATE AND ACETAMINOPHEN 10; 325 MG/1; MG/1
1 TABLET ORAL EVERY 4 HOURS PRN
Qty: 60 TABLET | Refills: 0 | Status: SHIPPED | OUTPATIENT
Start: 2021-11-29 | End: 2021-12-22

## 2021-11-29 ASSESSMENT — ENCOUNTER SYMPTOMS
EYES NEGATIVE: 1
RESPIRATORY NEGATIVE: 1
BACK PAIN: 1
GASTROINTESTINAL NEGATIVE: 1

## 2021-11-29 NOTE — PROGRESS NOTES
NEUROSURGERY FOLLOW-UP NOTE      Chief Complaint:   Chief Complaint   Patient presents with    Follow-Up from Hospital     back pain. Interval Update:  Planned follow-up after hospital discharge. He continues to report thoracic back pain and chest wall pain. He reports pain mostly when turning his head and lying down to sleep. He denies any upper or lower extremity numbness or weakness. He reports that he has been compliant with wearing his TLSO brace but he is not wearing it currently and states he left it in his car. He is taking 10mg norco q4h for pain and states he is making a follow up appointment with pain management who he has seen in the past.      HPI: The patient is a 78 y.o. male who slipped and fell in his yard on 11/13/2021. He states he was walking down a hill and slipped on wet grass and fell, landing \"flat on his back\". He was able to stand and walk afterwards and did not notice much pain but states he awoke yesterday morning with severe pain in his back and pain with inspiration and movement. He presented to [facility] ED and CT scans of his head, chest, cervical, thoracic and lumbar spine were obtained. The official reads for the thoracic and lumbar scans are pending but the [employer] reads mention compression fractures of L5, multiple possible rib fractures (that may be chronic) and age-indeterminate compression fractures of T2 and T6 as well as a chronic-appearing compression deformity of C7. His bones are severely demineralized and he does have a history of prior kyphoplasty for compression fractures at L3 and L4 performed in 2018 by Dr. Marianela Cruz at [facility].     On questioning, he endorses mostly thoracic back pain and chest wall pain with movement and breathing. He does not endorse much lower back pain. He denies any radicular pain and he denies any numbness or weakness in his extremities.   He has been placed in a TLSO brace and is sitting up in a chair.      Objective:    BP (!) 158/83   Pulse 105   Ht 5' 8\" (1.727 m)   Wt 174 lb (78.9 kg)   BMI 26.46 kg/m²     No intake or output data in the 24 hours ending 11/29/21 1134        Physical Exam:    General: alert, cooperative, no distress  Cardiorespiratory: unlabored breathing  Abdomen: soft and nondistended    Imaging:    Standing x-rays of the thoracic and lumbar spine reviewed. There is severe demineralization of all thoracic and lumbar vertebrae. There is a cervicothoracic kyphotic deformity that appears unchanged compared to prior x-rays while hospitalized. There does not appear to have been any progressive height loss at L5. Neurologic Exam:    Mental Status: Alert, oriented, thought content appropriate  Cranial Nerves: PERRL, EOMI, symmetric facies, tongue midline  Motor: Motor exam is symmetrical 5 out of 5 all extremities bilaterally  Somatosensory: normal light touch sensation except for baseline numbness in his right foot (prior surgery)            Pertinent Labs:  Lab Results   Component Value Date    WBC 8.5 11/15/2021    HGB 12.4 (L) 11/15/2021    HCT 40.6 (L) 11/15/2021    MCV 98.5 (H) 11/15/2021     11/15/2021     Lab Results   Component Value Date     11/15/2021    K 5.2 11/15/2021    CL 98 11/15/2021    CO2 27 11/15/2021    BUN 29 11/15/2021    CREATININE 1.6 11/15/2021    GLUCOSE 120 11/15/2021    CALCIUM 9.9 11/15/2021              Assessment and Plan:  78 y.o. M presented to [facility] after a fall in his yard complaining of back pain. His CT scans revealed multiple age-indeterminate thoracic compression fractures as well as an acute-appearing compression fracture of L5 with prior kyphoplasty at L3 and L4 in 2018. He appears to have advanced osteoporosis with a severely demineralized spine, multiple compression fractures and a significant cervicothoracic kyphotic deformity.   I advised him that with his poor bone quality, I did not feel that he would be a good candidate for any surgical intervention, though the malalignment and chronic compression fractures in his upper thoracic spine likely do explain his ongoing pain. I recommended that he continue to wear his brace and discuss a further workup for osteoporosis with his PCP. I also encouraged him to seek follow up with pain management to discuss injections and adjustments to his medication regimen for improved symptom control. If his deformity does progress further, he may need to be evaluated at a tertiary care center as the surgery to address his deformity in the cervicothoracic spine is beyond the capability we have at [facility]. I will plan to see him again in six weeks with updated x-rays.         Electronically signed by Pattie Avilez MD on 11/29/2021 at 11:34 AM

## 2021-11-30 RX ORDER — CYCLOBENZAPRINE HCL 5 MG
TABLET ORAL
Qty: 30 TABLET | Refills: 1 | Status: SHIPPED | OUTPATIENT
Start: 2021-11-30 | End: 2021-12-02

## 2021-12-02 ENCOUNTER — OFFICE VISIT (OUTPATIENT)
Dept: NEUROSURGERY | Facility: CLINIC | Age: 81
End: 2021-12-02

## 2021-12-02 ENCOUNTER — HOSPITAL ENCOUNTER (OUTPATIENT)
Dept: GENERAL RADIOLOGY | Facility: HOSPITAL | Age: 81
Discharge: HOME | End: 2021-12-02
Admitting: NURSE PRACTITIONER

## 2021-12-02 VITALS — HEIGHT: 68 IN | BODY MASS INDEX: 27.55 KG/M2 | WEIGHT: 181.8 LBS

## 2021-12-02 DIAGNOSIS — M48.52XA COLLAPSED VERTEBRA, NOT ELSEWHERE CLASSIFIED, CERVICAL REGION, INITIAL ENCOUNTER FOR FRACTURE (HCC): ICD-10-CM

## 2021-12-02 DIAGNOSIS — M54.50 LUMBAR BACK PAIN: ICD-10-CM

## 2021-12-02 DIAGNOSIS — S22.050A COMPRESSION FRACTURE OF T6 VERTEBRA, INITIAL ENCOUNTER (HCC): ICD-10-CM

## 2021-12-02 DIAGNOSIS — M54.6 THORACIC BACK PAIN, UNSPECIFIED BACK PAIN LATERALITY, UNSPECIFIED CHRONICITY: ICD-10-CM

## 2021-12-02 DIAGNOSIS — S22.070A COMPRESSION FRACTURE OF T9 VERTEBRA, INITIAL ENCOUNTER (HCC): ICD-10-CM

## 2021-12-02 DIAGNOSIS — S32.050A COMPRESSION FRACTURE OF L5 VERTEBRA, INITIAL ENCOUNTER (HCC): ICD-10-CM

## 2021-12-02 DIAGNOSIS — S22.020A COMPRESSION FRACTURE OF T2 VERTEBRA, INITIAL ENCOUNTER (HCC): ICD-10-CM

## 2021-12-02 DIAGNOSIS — M79.604 PAIN OF RIGHT LOWER EXTREMITY: ICD-10-CM

## 2021-12-02 DIAGNOSIS — S12.690A COMPRESSION FRACTURE OF C7 VERTEBRA, INITIAL ENCOUNTER (HCC): Primary | ICD-10-CM

## 2021-12-02 DIAGNOSIS — Z91.89 AT HIGH RISK FOR FRACTURE: ICD-10-CM

## 2021-12-02 DIAGNOSIS — M85.80 OSTEOPENIA, UNSPECIFIED LOCATION: ICD-10-CM

## 2021-12-02 DIAGNOSIS — E66.3 OVERWEIGHT (BMI 25.0-29.9): ICD-10-CM

## 2021-12-02 PROCEDURE — 72100 X-RAY EXAM L-S SPINE 2/3 VWS: CPT

## 2021-12-02 PROCEDURE — 99214 OFFICE O/P EST MOD 30 MIN: CPT | Performed by: NURSE PRACTITIONER

## 2021-12-02 RX ORDER — CALCITONIN SALMON 200 [IU]/.09ML
1 SPRAY, METERED NASAL DAILY
Qty: 1 EACH | Refills: 12 | Status: SHIPPED | OUTPATIENT
Start: 2021-12-02 | End: 2022-01-11
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
## 2021-12-02 NOTE — PATIENT INSTRUCTIONS
"https://www.nhlbi.nih.gov/files/docs/public/heart/dash_brief.pdf\">   DASH Eating Plan  DASH stands for Dietary Approaches to Stop Hypertension. The DASH eating plan is a healthy eating plan that has been shown to:  · Reduce high blood pressure (hypertension).  · Reduce your risk for type 2 diabetes, heart disease, and stroke.  · Help with weight loss.  What are tips for following this plan?  Reading food labels  · Check food labels for the amount of salt (sodium) per serving. Choose foods with less than 5 percent of the Daily Value of sodium. Generally, foods with less than 300 milligrams (mg) of sodium per serving fit into this eating plan.  · To find whole grains, look for the word \"whole\" as the first word in the ingredient list.  Shopping  · Buy products labeled as \"low-sodium\" or \"no salt added.\"  · Buy fresh foods. Avoid canned foods and pre-made or frozen meals.  Cooking  · Avoid adding salt when cooking. Use salt-free seasonings or herbs instead of table salt or sea salt. Check with your health care provider or pharmacist before using salt substitutes.  · Do not garcia foods. Cook foods using healthy methods such as baking, boiling, grilling, roasting, and broiling instead.  · Cook with heart-healthy oils, such as olive, canola, avocado, soybean, or sunflower oil.  Meal planning    · Eat a balanced diet that includes:  ? 4 or more servings of fruits and 4 or more servings of vegetables each day. Try to fill one-half of your plate with fruits and vegetables.  ? 6-8 servings of whole grains each day.  ? Less than 6 oz (170 g) of lean meat, poultry, or fish each day. A 3-oz (85-g) serving of meat is about the same size as a deck of cards. One egg equals 1 oz (28 g).  ? 2-3 servings of low-fat dairy each day. One serving is 1 cup (237 mL).  ? 1 serving of nuts, seeds, or beans 5 times each week.  ? 2-3 servings of heart-healthy fats. Healthy fats called omega-3 fatty acids are found in foods such as walnuts, " flaxseeds, fortified milks, and eggs. These fats are also found in cold-water fish, such as sardines, salmon, and mackerel.  · Limit how much you eat of:  ? Canned or prepackaged foods.  ? Food that is high in trans fat, such as some fried foods.  ? Food that is high in saturated fat, such as fatty meat.  ? Desserts and other sweets, sugary drinks, and other foods with added sugar.  ? Full-fat dairy products.  · Do not salt foods before eating.  · Do not eat more than 4 egg yolks a week.  · Try to eat at least 2 vegetarian meals a week.  · Eat more home-cooked food and less restaurant, buffet, and fast food.    Lifestyle  · When eating at a restaurant, ask that your food be prepared with less salt or no salt, if possible.  · If you drink alcohol:  ? Limit how much you use to:  § 0-1 drink a day for women who are not pregnant.  § 0-2 drinks a day for men.  ? Be aware of how much alcohol is in your drink. In the [place] one drink equals one 12 oz bottle of beer (355 mL), one 5 oz glass of wine (148 mL), or one 1½ oz glass of hard liquor (44 mL).  General information  · Avoid eating more than 2,300 mg of salt a day. If you have hypertension, you may need to reduce your sodium intake to 1,500 mg a day.  · Work with your health care provider to maintain a healthy body weight or to lose weight. Ask what an ideal weight is for you.  · Get at least 30 minutes of exercise that causes your heart to beat faster (aerobic exercise) most days of the week. Activities may include walking, swimming, or biking.  · Work with your health care provider or dietitian to adjust your eating plan to your individual calorie needs.  What foods should I eat?  Fruits  All fresh, dried, or frozen fruit. Canned fruit in natural juice (without added sugar).  Vegetables  Fresh or frozen vegetables (raw, steamed, roasted, or grilled). Low-sodium or reduced-sodium tomato and vegetable juice. Low-sodium or reduced-sodium tomato sauce and tomato paste.  Low-sodium or reduced-sodium canned vegetables.  Grains  Whole-grain or whole-wheat bread. Whole-grain or whole-wheat pasta. Brown rice. Oatmeal. Quinoa. Bulgur. Whole-grain and low-sodium cereals. Jerrica bread. Low-fat, low-sodium crackers. Whole-wheat flour tortillas.  Meats and other proteins  Skinless chicken or turkey. Ground chicken or turkey. Pork with fat trimmed off. Fish and seafood. Egg whites. Dried beans, peas, or lentils. Unsalted nuts, nut butters, and seeds. Unsalted canned beans. Lean cuts of beef with fat trimmed off. Low-sodium, lean precooked or cured meat, such as sausages or meat loaves.  Dairy  Low-fat (1%) or fat-free (skim) milk. Reduced-fat, low-fat, or fat-free cheeses. Nonfat, low-sodium ricotta or cottage cheese. Low-fat or nonfat yogurt. Low-fat, low-sodium cheese.  Fats and oils  Soft margarine without trans fats. Vegetable oil. Reduced-fat, low-fat, or light mayonnaise and salad dressings (reduced-sodium). Canola, safflower, olive, avocado, soybean, and sunflower oils. Avocado.  Seasonings and condiments  Herbs. Spices. Seasoning mixes without salt.  Other foods  Unsalted popcorn and pretzels. Fat-free sweets.  The items listed above may not be a complete list of foods and beverages you can eat. Contact a dietitian for more information.  What foods should I avoid?  Fruits  Canned fruit in a light or heavy syrup. Fried fruit. Fruit in cream or butter sauce.  Vegetables  Creamed or fried vegetables. Vegetables in a cheese sauce. Regular canned vegetables (not low-sodium or reduced-sodium). Regular canned tomato sauce and paste (not low-sodium or reduced-sodium). Regular tomato and vegetable juice (not low-sodium or reduced-sodium). Pickles. Olives.  Grains  Baked goods made with fat, such as croissants, muffins, or some breads. Dry pasta or rice meal packs.  Meats and other proteins  Fatty cuts of meat. Ribs. Fried meat. May. Bologna, salami, and other precooked or cured meats, such as  sausages or meat loaves. Fat from the back of a pig (fatback). Bratwurst. Salted nuts and seeds. Canned beans with added salt. Canned or smoked fish. Whole eggs or egg yolks. Chicken or turkey with skin.  Dairy  Whole or 2% milk, cream, and half-and-half. Whole or full-fat cream cheese. Whole-fat or sweetened yogurt. Full-fat cheese. Nondairy creamers. Whipped toppings. Processed cheese and cheese spreads.  Fats and oils  Butter. Stick margarine. Lard. Shortening. Ghee. May fat. Tropical oils, such as coconut, palm kernel, or palm oil.  Seasonings and condiments  Onion salt, garlic salt, seasoned salt, table salt, and sea salt. Worcestershire sauce. Tartar sauce. Barbecue sauce. Teriyaki sauce. Soy sauce, including reduced-sodium. Steak sauce. Canned and packaged gravies. Fish sauce. Oyster sauce. Cocktail sauce. Store-bought horseradish. Ketchup. Mustard. Meat flavorings and tenderizers. Bouillon cubes. Hot sauces. Pre-made or packaged marinades. Pre-made or packaged taco seasonings. Relishes. Regular salad dressings.  Other foods  Salted popcorn and pretzels.  The items listed above may not be a complete list of foods and beverages you should avoid. Contact a dietitian for more information.  Where to find more information  · National Heart, Lung, and Blood [place]: www.nhlbi.nih.gov  · American Heart Association: www.heart.org  · Academy of Nutrition and Dietetics: www.eatright.org  · National Kidney Foundation: www.kidney.org  Summary  · The DASH eating plan is a healthy eating plan that has been shown to reduce high blood pressure (hypertension). It may also reduce your risk for type 2 diabetes, heart disease, and stroke.  · When on the DASH eating plan, aim to eat more fresh fruits and vegetables, whole grains, lean proteins, low-fat dairy, and heart-healthy fats.  · With the DASH eating plan, you should limit salt (sodium) intake to 2,300 mg a day. If you have hypertension, you may need to reduce your  sodium intake to 1,500 mg a day.  · Work with your health care provider or dietitian to adjust your eating plan to your individual calorie needs.  This information is not intended to replace advice given to you by your health care provider. Make sure you discuss any questions you have with your health care provider.  Document Revised: 11/20/2020 Document Reviewed: 11/20/2020  [employer] Patient Education © 2021 [employer]      Tobacco Use Disorder  Tobacco use disorder (TUD) occurs when a person craves, seeks, and uses tobacco, regardless of the consequences. This disorder can cause problems with mental and physical health. It can affect your ability to have healthy relationships, and it can keep you from meeting your responsibilities at work, home, or school.  Tobacco may be:  · Smoked as a cigarette or cigar.  · Inhaled using e-cigarettes.  · Smoked in a pipe or hookah.  · Chewed as smokeless tobacco.  · Inhaled into the nostrils as snuff.  Tobacco products contain a dangerous chemical called nicotine, which is very addictive. Nicotine triggers hormones that make the body feel stimulated and works on areas of the brain that make you feel good. These effects can make it hard for people to quit nicotine.  Tobacco contains many other unsafe chemicals that can damage almost every organ in the body. Smoking tobacco also puts others in danger due to fire risk and possible health problems caused by breathing in secondhand smoke.  What are the signs or symptoms?  Symptoms of TUD may include:  · Being unable to slow down or stop your tobacco use.  · Spending an abnormal amount of time getting or using tobacco.  · Craving tobacco. Cravings may last for up to 6 months after quitting.  · Tobacco use that:  ? Interferes with your work, school, or home life.  ? Interferes with your personal and social relationships.  ? Makes you give up activities that you once enjoyed or found important.  · Using tobacco even though you know  that it is:  ? Dangerous or bad for your health or someone else's health.  ? Causing problems in your life.  · Needing more and more of the substance to get the same effect (developing tolerance).  · Experiencing unpleasant symptoms if you do not use the substance (withdrawal). Withdrawal symptoms may include:  ? Depressed, anxious, or irritable mood.  ? Difficulty concentrating.  ? Increased appetite.  ? Restlessness or trouble sleeping.  · Using the substance to avoid withdrawal.  How is this diagnosed?  This condition may be diagnosed based on:  · Your current and past tobacco use. Your health care provider may ask questions about how your tobacco use affects your life.  · A physical exam.  You may be diagnosed with TUD if you have at least two symptoms within a 12-month period.  How is this treated?  This condition is treated by stopping tobacco use. Many people are unable to quit on their own and need help. Treatment may include:  · Nicotine replacement therapy (NRT). NRT provides nicotine without the other harmful chemicals in tobacco. NRT gradually lowers the dosage of nicotine in the body and reduces withdrawal symptoms. NRT is available as:  ? Over-the-counter gums, lozenges, and skin patches.  ? Prescription mouth inhalers and nasal sprays.  · Medicine that acts on the brain to reduce cravings and withdrawal symptoms.  · A type of talk therapy that examines your triggers for tobacco use, how to avoid them, and how to cope with cravings (behavioral therapy).  · Hypnosis. This may help with withdrawal symptoms.  · Joining a support group for others coping with TUD.  The best treatment for TUD is usually a combination of medicine, talk therapy, and support groups. Recovery can be a long process. Many people start using tobacco again after stopping (relapse). If you relapse, it does not mean that treatment will not work.  Follow these instructions at home:    Lifestyle  · Do not use any products that contain  nicotine or tobacco, such as cigarettes and e-cigarettes.  · Avoid things that trigger tobacco use as much as you can. Triggers include people and situations that usually cause you to use tobacco.  · Avoid drinks that contain caffeine, including coffee. These may worsen some withdrawal symptoms.  · Find ways to manage stress. Wanting to smoke may cause stress, and stress can make you want to smoke. Relaxation techniques such as deep breathing, meditation, and yoga may help.  · Attend support groups as needed. These groups are an important part of long-term recovery for many people.  General instructions  · Take over-the-counter and prescription medicines only as told by your health care provider.  · Check with your health care provider before taking any new prescription or over-the-counter medicines.  · Decide on a friend, family member, or smoking quit-line (such as 1-800-QUIT-NOW in the [place] that you can call or text when you feel the urge to smoke or when you need help coping with cravings.  · Keep all follow-up visits as told by your health care provider and therapist. This is important.  Contact a health care provider if:  · You are not able to take your medicines as prescribed.  · Your symptoms get worse, even with treatment.  Summary  · Tobacco use disorder (TUD) occurs when a person craves, seeks, and uses tobacco regardless of the consequences.  · This condition may be diagnosed based on your current and past tobacco use and a physical exam.  · Many people are unable to quit on their own and need help. Recovery can be a long process.  · The most effective treatment for TUD is usually a combination of medicine, talk therapy, and support groups.  This information is not intended to replace advice given to you by your health care provider. Make sure you discuss any questions you have with your health care provider.  Document Revised: 12/05/2018 Document Reviewed: 12/05/2018  [employer] Patient Education © 2021  [employer]      Advance Care Planning and Advance Directives     You make decisions on a daily basis - decisions about where you want to live, your career, your home, your life. Perhaps one of the most important decisions you face is your choice for future medical care. Take time to talk with your family and your healthcare team and start planning today.  Advance Care Planning is a process that can help you:  · Understand possible future healthcare decisions in light of your own experiences  · Reflect on those decision in light of your goals and values  · Discuss your decisions with those closest to you and the healthcare professionals that care for you  · Make a plan by creating a document that reflects your wishes    Surrogate Decision Maker  In the event of a medical emergency, which has left you unable to communicate or to make your own decisions, you would need someone to make decisions for you.  It is important to discuss your preferences for medical treatment with this person while you are in good health.     Qualities of a surrogate decision maker:  • Willing to take on this role and responsibility  • Knows what you want for future medical care  • Willing to follow your wishes even if they don't agree with them  • Able to make difficult medical decisions under stressful circumstances    Advance Directives  These are legal documents you can create that will guide your healthcare team and decision maker(s) when needed. These documents can be stored in the electronic medical record.    · Living Will - a legal document to guide your care if you have a terminal condition or a serious illness and are unable to communicate. States vary by statute in document names/types, but most forms may include one or more of the following:        -  Directions regarding life-prolonging treatments        -  Directions regarding artificially provided nutrition/hydration        -  Choosing a healthcare decision maker        -   Direction regarding organ/tissue donation    · Durable Power of  for Healthcare - this document names an -in-fact to make medical decisions for you, but it may also allow this person to make personal and financial decisions for you. Please seek the advice of an  if you need this type of document.    **Advance Directives are not required and no one may discriminate against you if you do not sign one.    Medical Orders  Many states allow specific forms/orders signed by your physician to record your wishes for medical treatment in your current state of health. This form, signed in personal communication with your physician, addresses resuscitation and other medical interventions that you may or may not want.      For more information or to schedule a time with a [facility] Advance Care Planning Facilitator contact: [email or web address] or call [phone] and someone will contact you directly.

## 2021-12-03 ENCOUNTER — TELEPHONE (OUTPATIENT)
Dept: INTERNAL MEDICINE | Facility: CLINIC | Age: 81
End: 2021-12-03

## 2021-12-03 NOTE — TELEPHONE ENCOUNTER
Mariana with [facility] called stating she D/C him 1 week early per pt request. He didn't feel like he needed it. She gave him information for pain control, ambulatory safety. [facility] physical therapy did not pick him up.

## 2021-12-07 ENCOUNTER — TELEPHONE (OUTPATIENT)
Dept: NEUROSURGERY | Facility: CLINIC | Age: 81
End: 2021-12-07

## 2021-12-07 DIAGNOSIS — F40.240 CLAUSTROPHOBIA: Primary | ICD-10-CM

## 2021-12-07 RX ORDER — DIAZEPAM 2 MG/1
TABLET ORAL
Qty: 1 TABLET | Refills: 0 | Status: SHIPPED | OUTPATIENT
Start: 2021-12-07 | End: 2022-01-20

## 2021-12-07 NOTE — TELEPHONE ENCOUNTER
Caller:  ROWAN     Relationship: SELF   Best call back number: [phone]    What is your medical concern?     How long has this issue been going on?       Is your provider already aware of this issue?     PT CALLED TO INFORM HE NEEDS TO HAVE MEDICINE FOR HIS THREE MRIS.    [facility]   [phone]      PLEASE CALL PT AND ADVISE    THANK YOU

## 2021-12-08 NOTE — TELEPHONE ENCOUNTER
Called and spoke to pt.   We have sent in script for his MRIs on the 9th and I told her they would need to call a few days before the one on the 14th so that we could call in another tab.   Wife voiced understanding.

## 2021-12-09 ENCOUNTER — HOSPITAL ENCOUNTER (OUTPATIENT)
Dept: BONE DENSITY | Facility: HOSPITAL | Age: 81
Discharge: HOME | End: 2021-12-09

## 2021-12-09 DIAGNOSIS — M85.80 OSTEOPENIA, UNSPECIFIED LOCATION: ICD-10-CM

## 2021-12-09 DIAGNOSIS — M48.52XA COLLAPSED VERTEBRA, NOT ELSEWHERE CLASSIFIED, CERVICAL REGION, INITIAL ENCOUNTER FOR FRACTURE (HCC): ICD-10-CM

## 2021-12-09 DIAGNOSIS — Z91.89 AT HIGH RISK FOR FRACTURE: ICD-10-CM

## 2021-12-09 PROCEDURE — 77080 DXA BONE DENSITY AXIAL: CPT

## 2021-12-22 ENCOUNTER — OFFICE VISIT (OUTPATIENT)
Dept: NEUROSURGERY | Facility: CLINIC | Age: 81
End: 2021-12-22

## 2021-12-22 ENCOUNTER — HOSPITAL ENCOUNTER (OUTPATIENT)
Dept: GENERAL RADIOLOGY | Facility: HOSPITAL | Age: 81
Discharge: HOME | End: 2021-12-22
Admitting: NURSE PRACTITIONER

## 2021-12-22 VITALS — BODY MASS INDEX: 27.43 KG/M2 | WEIGHT: 181 LBS | HEIGHT: 68 IN

## 2021-12-22 DIAGNOSIS — M85.80 OSTEOPENIA, UNSPECIFIED LOCATION: ICD-10-CM

## 2021-12-22 DIAGNOSIS — E66.3 OVERWEIGHT (BMI 25.0-29.9): ICD-10-CM

## 2021-12-22 DIAGNOSIS — Z91.89 AT HIGH RISK FOR FRACTURE: ICD-10-CM

## 2021-12-22 DIAGNOSIS — M54.6 THORACIC BACK PAIN, UNSPECIFIED BACK PAIN LATERALITY, UNSPECIFIED CHRONICITY: ICD-10-CM

## 2021-12-22 DIAGNOSIS — S22.070D COMPRESSION FRACTURE OF T9 VERTEBRA WITH ROUTINE HEALING, SUBSEQUENT ENCOUNTER: ICD-10-CM

## 2021-12-22 DIAGNOSIS — S32.050A COMPRESSION FRACTURE OF L5 VERTEBRA, INITIAL ENCOUNTER (HCC): ICD-10-CM

## 2021-12-22 DIAGNOSIS — M54.50 LUMBAR BACK PAIN: ICD-10-CM

## 2021-12-22 DIAGNOSIS — S32.050D COMPRESSION FRACTURE OF L5 VERTEBRA WITH ROUTINE HEALING, SUBSEQUENT ENCOUNTER: ICD-10-CM

## 2021-12-22 DIAGNOSIS — S12.690D COMPRESSION FRACTURE OF C7 VERTEBRA WITH ROUTINE HEALING, SUBSEQUENT ENCOUNTER: Primary | ICD-10-CM

## 2021-12-22 DIAGNOSIS — S22.050D COMPRESSION FRACTURE OF T6 VERTEBRA WITH ROUTINE HEALING, SUBSEQUENT ENCOUNTER: ICD-10-CM

## 2021-12-22 DIAGNOSIS — S22.020D COMPRESSION FRACTURE OF T2 VERTEBRA WITH ROUTINE HEALING, SUBSEQUENT ENCOUNTER: ICD-10-CM

## 2021-12-22 PROCEDURE — 72100 X-RAY EXAM L-S SPINE 2/3 VWS: CPT

## 2021-12-22 PROCEDURE — 99214 OFFICE O/P EST MOD 30 MIN: CPT | Performed by: NURSE PRACTITIONER

## 2022-01-03 DIAGNOSIS — F40.240 CLAUSTROPHOBIA: Primary | ICD-10-CM

## 2022-01-03 RX ORDER — DIAZEPAM 5 MG/1
TABLET ORAL
Qty: 2 TABLET | Refills: 0 | Status: SHIPPED | OUTPATIENT
Start: 2022-01-03 | End: 2022-01-20

## 2022-01-04 ENCOUNTER — HOSPITAL ENCOUNTER (OUTPATIENT)
Dept: MRI IMAGING | Facility: HOSPITAL | Age: 82
Discharge: HOME | End: 2022-01-04

## 2022-01-04 DIAGNOSIS — S32.050A COMPRESSION FRACTURE OF L5 VERTEBRA, INITIAL ENCOUNTER: ICD-10-CM

## 2022-01-04 DIAGNOSIS — M85.80 OSTEOPENIA, UNSPECIFIED LOCATION: ICD-10-CM

## 2022-01-04 DIAGNOSIS — M54.50 LUMBAR BACK PAIN: ICD-10-CM

## 2022-01-04 PROCEDURE — 72148 MRI LUMBAR SPINE W/O DYE: CPT

## 2022-01-04 PROCEDURE — 72146 MRI CHEST SPINE W/O DYE: CPT

## 2022-01-05 ENCOUNTER — HOSPITAL ENCOUNTER (OUTPATIENT)
Dept: MRI IMAGING | Facility: HOSPITAL | Age: 82
Discharge: HOME | End: 2022-01-05
Admitting: NURSE PRACTITIONER

## 2022-01-05 PROCEDURE — 72141 MRI NECK SPINE W/O DYE: CPT

## 2022-01-10 ENCOUNTER — OFFICE VISIT (OUTPATIENT)
Dept: NEUROSURGERY | Facility: CLINIC | Age: 82
End: 2022-01-10

## 2022-01-10 VITALS — HEIGHT: 68 IN | WEIGHT: 182 LBS | BODY MASS INDEX: 27.58 KG/M2

## 2022-01-10 DIAGNOSIS — M54.6 THORACIC BACK PAIN, UNSPECIFIED BACK PAIN LATERALITY, UNSPECIFIED CHRONICITY: ICD-10-CM

## 2022-01-10 DIAGNOSIS — S32.050D COMPRESSION FRACTURE OF L5 VERTEBRA WITH ROUTINE HEALING, SUBSEQUENT ENCOUNTER: ICD-10-CM

## 2022-01-10 DIAGNOSIS — Z91.89 AT HIGH RISK FOR FRACTURE: ICD-10-CM

## 2022-01-10 DIAGNOSIS — M54.50 LUMBAR BACK PAIN: ICD-10-CM

## 2022-01-10 DIAGNOSIS — M85.80 OSTEOPENIA, UNSPECIFIED LOCATION: ICD-10-CM

## 2022-01-10 DIAGNOSIS — E66.3 OVERWEIGHT (BMI 25.0-29.9): ICD-10-CM

## 2022-01-10 DIAGNOSIS — S22.050D COMPRESSION FRACTURE OF T6 VERTEBRA WITH ROUTINE HEALING, SUBSEQUENT ENCOUNTER: Primary | ICD-10-CM

## 2022-01-10 PROCEDURE — 99214 OFFICE O/P EST MOD 30 MIN: CPT | Performed by: NURSE PRACTITIONER

## 2022-01-10 NOTE — PATIENT INSTRUCTIONS
"https://www.nhlbi.nih.gov/files/docs/public/heart/dash_brief.pdf\">   DASH Eating Plan  DASH stands for Dietary Approaches to Stop Hypertension. The DASH eating plan is a healthy eating plan that has been shown to:  · Reduce high blood pressure (hypertension).  · Reduce your risk for type 2 diabetes, heart disease, and stroke.  · Help with weight loss.  What are tips for following this plan?  Reading food labels  · Check food labels for the amount of salt (sodium) per serving. Choose foods with less than 5 percent of the Daily Value of sodium. Generally, foods with less than 300 milligrams (mg) of sodium per serving fit into this eating plan.  · To find whole grains, look for the word \"whole\" as the first word in the ingredient list.  Shopping  · Buy products labeled as \"low-sodium\" or \"no salt added.\"  · Buy fresh foods. Avoid canned foods and pre-made or frozen meals.  Cooking  · Avoid adding salt when cooking. Use salt-free seasonings or herbs instead of table salt or sea salt. Check with your health care provider or pharmacist before using salt substitutes.  · Do not garcia foods. Cook foods using healthy methods such as baking, boiling, grilling, roasting, and broiling instead.  · Cook with heart-healthy oils, such as olive, canola, avocado, soybean, or sunflower oil.  Meal planning    · Eat a balanced diet that includes:  ? 4 or more servings of fruits and 4 or more servings of vegetables each day. Try to fill one-half of your plate with fruits and vegetables.  ? 6-8 servings of whole grains each day.  ? Less than 6 oz (170 g) of lean meat, poultry, or fish each day. A 3-oz (85-g) serving of meat is about the same size as a deck of cards. One egg equals 1 oz (28 g).  ? 2-3 servings of low-fat dairy each day. One serving is 1 cup (237 mL).  ? 1 serving of nuts, seeds, or beans 5 times each week.  ? 2-3 servings of heart-healthy fats. Healthy fats called omega-3 fatty acids are found in foods such as walnuts, " flaxseeds, fortified milks, and eggs. These fats are also found in cold-water fish, such as sardines, salmon, and mackerel.  · Limit how much you eat of:  ? Canned or prepackaged foods.  ? Food that is high in trans fat, such as some fried foods.  ? Food that is high in saturated fat, such as fatty meat.  ? Desserts and other sweets, sugary drinks, and other foods with added sugar.  ? Full-fat dairy products.  · Do not salt foods before eating.  · Do not eat more than 4 egg yolks a week.  · Try to eat at least 2 vegetarian meals a week.  · Eat more home-cooked food and less restaurant, buffet, and fast food.    Lifestyle  · When eating at a restaurant, ask that your food be prepared with less salt or no salt, if possible.  · If you drink alcohol:  ? Limit how much you use to:  § 0-1 drink a day for women who are not pregnant.  § 0-2 drinks a day for men.  ? Be aware of how much alcohol is in your drink. In the [place] one drink equals one 12 oz bottle of beer (355 mL), one 5 oz glass of wine (148 mL), or one 1½ oz glass of hard liquor (44 mL).  General information  · Avoid eating more than 2,300 mg of salt a day. If you have hypertension, you may need to reduce your sodium intake to 1,500 mg a day.  · Work with your health care provider to maintain a healthy body weight or to lose weight. Ask what an ideal weight is for you.  · Get at least 30 minutes of exercise that causes your heart to beat faster (aerobic exercise) most days of the week. Activities may include walking, swimming, or biking.  · Work with your health care provider or dietitian to adjust your eating plan to your individual calorie needs.  What foods should I eat?  Fruits  All fresh, dried, or frozen fruit. Canned fruit in natural juice (without added sugar).  Vegetables  Fresh or frozen vegetables (raw, steamed, roasted, or grilled). Low-sodium or reduced-sodium tomato and vegetable juice. Low-sodium or reduced-sodium tomato sauce and tomato paste.  Low-sodium or reduced-sodium canned vegetables.  Grains  Whole-grain or whole-wheat bread. Whole-grain or whole-wheat pasta. Brown rice. Oatmeal. Quinoa. Bulgur. Whole-grain and low-sodium cereals. Jerrica bread. Low-fat, low-sodium crackers. Whole-wheat flour tortillas.  Meats and other proteins  Skinless chicken or turkey. Ground chicken or turkey. Pork with fat trimmed off. Fish and seafood. Egg whites. Dried beans, peas, or lentils. Unsalted nuts, nut butters, and seeds. Unsalted canned beans. Lean cuts of beef with fat trimmed off. Low-sodium, lean precooked or cured meat, such as sausages or meat loaves.  Dairy  Low-fat (1%) or fat-free (skim) milk. Reduced-fat, low-fat, or fat-free cheeses. Nonfat, low-sodium ricotta or cottage cheese. Low-fat or nonfat yogurt. Low-fat, low-sodium cheese.  Fats and oils  Soft margarine without trans fats. Vegetable oil. Reduced-fat, low-fat, or light mayonnaise and salad dressings (reduced-sodium). Canola, safflower, olive, avocado, soybean, and sunflower oils. Avocado.  Seasonings and condiments  Herbs. Spices. Seasoning mixes without salt.  Other foods  Unsalted popcorn and pretzels. Fat-free sweets.  The items listed above may not be a complete list of foods and beverages you can eat. Contact a dietitian for more information.  What foods should I avoid?  Fruits  Canned fruit in a light or heavy syrup. Fried fruit. Fruit in cream or butter sauce.  Vegetables  Creamed or fried vegetables. Vegetables in a cheese sauce. Regular canned vegetables (not low-sodium or reduced-sodium). Regular canned tomato sauce and paste (not low-sodium or reduced-sodium). Regular tomato and vegetable juice (not low-sodium or reduced-sodium). Pickles. Olives.  Grains  Baked goods made with fat, such as croissants, muffins, or some breads. Dry pasta or rice meal packs.  Meats and other proteins  Fatty cuts of meat. Ribs. Fried meat. May. Bologna, salami, and other precooked or cured meats, such as  sausages or meat loaves. Fat from the back of a pig (fatback). Bratwurst. Salted nuts and seeds. Canned beans with added salt. Canned or smoked fish. Whole eggs or egg yolks. Chicken or turkey with skin.  Dairy  Whole or 2% milk, cream, and half-and-half. Whole or full-fat cream cheese. Whole-fat or sweetened yogurt. Full-fat cheese. Nondairy creamers. Whipped toppings. Processed cheese and cheese spreads.  Fats and oils  Butter. Stick margarine. Lard. Shortening. Ghee. May fat. Tropical oils, such as coconut, palm kernel, or palm oil.  Seasonings and condiments  Onion salt, garlic salt, seasoned salt, table salt, and sea salt. Worcestershire sauce. Tartar sauce. Barbecue sauce. Teriyaki sauce. Soy sauce, including reduced-sodium. Steak sauce. Canned and packaged gravies. Fish sauce. Oyster sauce. Cocktail sauce. Store-bought horseradish. Ketchup. Mustard. Meat flavorings and tenderizers. Bouillon cubes. Hot sauces. Pre-made or packaged marinades. Pre-made or packaged taco seasonings. Relishes. Regular salad dressings.  Other foods  Salted popcorn and pretzels.  The items listed above may not be a complete list of foods and beverages you should avoid. Contact a dietitian for more information.  Where to find more information  · National Heart, Lung, and Blood [place]: www.nhlbi.nih.gov  · American Heart Association: www.heart.org  · Academy of Nutrition and Dietetics: www.eatright.org  · National Kidney Foundation: www.kidney.org  Summary  · The DASH eating plan is a healthy eating plan that has been shown to reduce high blood pressure (hypertension). It may also reduce your risk for type 2 diabetes, heart disease, and stroke.  · When on the DASH eating plan, aim to eat more fresh fruits and vegetables, whole grains, lean proteins, low-fat dairy, and heart-healthy fats.  · With the DASH eating plan, you should limit salt (sodium) intake to 2,300 mg a day. If you have hypertension, you may need to reduce your  sodium intake to 1,500 mg a day.  · Work with your health care provider or dietitian to adjust your eating plan to your individual calorie needs.  This information is not intended to replace advice given to you by your health care provider. Make sure you discuss any questions you have with your health care provider.  Document Revised: 11/20/2020 Document Reviewed: 11/20/2020  [employer] Patient Education © 2021 [employer]      Tobacco Use Disorder  Tobacco use disorder (TUD) occurs when a person craves, seeks, and uses tobacco, regardless of the consequences. This disorder can cause problems with mental and physical health. It can affect your ability to have healthy relationships, and it can keep you from meeting your responsibilities at work, home, or school.  Tobacco may be:  · Smoked as a cigarette or cigar.  · Inhaled using e-cigarettes.  · Smoked in a pipe or hookah.  · Chewed as smokeless tobacco.  · Inhaled into the nostrils as snuff.  Tobacco products contain a dangerous chemical called nicotine, which is very addictive. Nicotine triggers hormones that make the body feel stimulated and works on areas of the brain that make you feel good. These effects can make it hard for people to quit nicotine.  Tobacco contains many other unsafe chemicals that can damage almost every organ in the body. Smoking tobacco also puts others in danger due to fire risk and possible health problems caused by breathing in secondhand smoke.  What are the signs or symptoms?  Symptoms of TUD may include:  · Being unable to slow down or stop your tobacco use.  · Spending an abnormal amount of time getting or using tobacco.  · Craving tobacco. Cravings may last for up to 6 months after quitting.  · Tobacco use that:  ? Interferes with your work, school, or home life.  ? Interferes with your personal and social relationships.  ? Makes you give up activities that you once enjoyed or found important.  · Using tobacco even though you know  that it is:  ? Dangerous or bad for your health or someone else's health.  ? Causing problems in your life.  · Needing more and more of the substance to get the same effect (developing tolerance).  · Experiencing unpleasant symptoms if you do not use the substance (withdrawal). Withdrawal symptoms may include:  ? Depressed, anxious, or irritable mood.  ? Difficulty concentrating.  ? Increased appetite.  ? Restlessness or trouble sleeping.  · Using the substance to avoid withdrawal.  How is this diagnosed?  This condition may be diagnosed based on:  · Your current and past tobacco use. Your health care provider may ask questions about how your tobacco use affects your life.  · A physical exam.  You may be diagnosed with TUD if you have at least two symptoms within a 12-month period.  How is this treated?  This condition is treated by stopping tobacco use. Many people are unable to quit on their own and need help. Treatment may include:  · Nicotine replacement therapy (NRT). NRT provides nicotine without the other harmful chemicals in tobacco. NRT gradually lowers the dosage of nicotine in the body and reduces withdrawal symptoms. NRT is available as:  ? Over-the-counter gums, lozenges, and skin patches.  ? Prescription mouth inhalers and nasal sprays.  · Medicine that acts on the brain to reduce cravings and withdrawal symptoms.  · A type of talk therapy that examines your triggers for tobacco use, how to avoid them, and how to cope with cravings (behavioral therapy).  · Hypnosis. This may help with withdrawal symptoms.  · Joining a support group for others coping with TUD.  The best treatment for TUD is usually a combination of medicine, talk therapy, and support groups. Recovery can be a long process. Many people start using tobacco again after stopping (relapse). If you relapse, it does not mean that treatment will not work.  Follow these instructions at home:    Lifestyle  · Do not use any products that contain  nicotine or tobacco, such as cigarettes and e-cigarettes.  · Avoid things that trigger tobacco use as much as you can. Triggers include people and situations that usually cause you to use tobacco.  · Avoid drinks that contain caffeine, including coffee. These may worsen some withdrawal symptoms.  · Find ways to manage stress. Wanting to smoke may cause stress, and stress can make you want to smoke. Relaxation techniques such as deep breathing, meditation, and yoga may help.  · Attend support groups as needed. These groups are an important part of long-term recovery for many people.  General instructions  · Take over-the-counter and prescription medicines only as told by your health care provider.  · Check with your health care provider before taking any new prescription or over-the-counter medicines.  · Decide on a friend, family member, or smoking quit-line (such as 1-800-QUIT-NOW in the [place] that you can call or text when you feel the urge to smoke or when you need help coping with cravings.  · Keep all follow-up visits as told by your health care provider and therapist. This is important.  Contact a health care provider if:  · You are not able to take your medicines as prescribed.  · Your symptoms get worse, even with treatment.  Summary  · Tobacco use disorder (TUD) occurs when a person craves, seeks, and uses tobacco regardless of the consequences.  · This condition may be diagnosed based on your current and past tobacco use and a physical exam.  · Many people are unable to quit on their own and need help. Recovery can be a long process.  · The most effective treatment for TUD is usually a combination of medicine, talk therapy, and support groups.  This information is not intended to replace advice given to you by your health care provider. Make sure you discuss any questions you have with your health care provider.  Document Revised: 12/05/2018 Document Reviewed: 12/05/2018  [employer] Patient Education © 2021  [employer]      Advance Care Planning and Advance Directives     You make decisions on a daily basis - decisions about where you want to live, your career, your home, your life. Perhaps one of the most important decisions you face is your choice for future medical care. Take time to talk with your family and your healthcare team and start planning today.  Advance Care Planning is a process that can help you:  · Understand possible future healthcare decisions in light of your own experiences  · Reflect on those decision in light of your goals and values  · Discuss your decisions with those closest to you and the healthcare professionals that care for you  · Make a plan by creating a document that reflects your wishes    Surrogate Decision Maker  In the event of a medical emergency, which has left you unable to communicate or to make your own decisions, you would need someone to make decisions for you.  It is important to discuss your preferences for medical treatment with this person while you are in good health.     Qualities of a surrogate decision maker:  • Willing to take on this role and responsibility  • Knows what you want for future medical care  • Willing to follow your wishes even if they don't agree with them  • Able to make difficult medical decisions under stressful circumstances    Advance Directives  These are legal documents you can create that will guide your healthcare team and decision maker(s) when needed. These documents can be stored in the electronic medical record.    · Living Will - a legal document to guide your care if you have a terminal condition or a serious illness and are unable to communicate. States vary by statute in document names/types, but most forms may include one or more of the following:        -  Directions regarding life-prolonging treatments        -  Directions regarding artificially provided nutrition/hydration        -  Choosing a healthcare decision maker        -   Direction regarding organ/tissue donation    · Durable Power of  for Healthcare - this document names an -in-fact to make medical decisions for you, but it may also allow this person to make personal and financial decisions for you. Please seek the advice of an  if you need this type of document.    **Advance Directives are not required and no one may discriminate against you if you do not sign one.    Medical Orders  Many states allow specific forms/orders signed by your physician to record your wishes for medical treatment in your current state of health. This form, signed in personal communication with your physician, addresses resuscitation and other medical interventions that you may or may not want.      For more information or to schedule a time with a [facility] Advance Care Planning Facilitator contact: [facility]"[employer] or call [phone] and someone will contact you directly.    Balloon Kyphoplasty  T6 and L5  Balloon kyphoplasty is a procedure to treat a spinal compression fracture, which is a collapse of the bones that form the spine (vertebrae). With this type of fracture, the vertebrae are squeezed (compressed) into a wedge shape, and this causes pain. In this procedure, the collapsed vertebrae are expanded with a balloon. Bone cement is then injected into the vertebrae to strengthen them.  Tell a health care provider about:  · Any allergies you have.  · All medicines you are taking, including vitamins, herbs, eye drops, creams, and over-the-counter medicines.  · Any problems you or family members have had with anesthetic medicines.  · Any blood disorders you have.  · Any surgeries you have had.  · Any medical conditions you have or have had.  · Whether you are pregnant or may be pregnant.  What are the risks?  Generally, this is a safe procedure. However, problems may occur, including:  · Infection.  · Bleeding.  · Increased back pain.  · Allergic reactions to  medicines.  · Damage to nearby structures, nerves, or organs.  · Leaking of bone cement into other parts of the body.  What happens before the procedure?  Staying hydrated  Follow instructions from your health care provider about hydration, which may include:  · Up to 2 hours before the procedure - you may continue to drink clear liquids, such as water, clear fruit juice, black coffee, and plain tea.    Eating and drinking restrictions  Follow instructions from your health care provider about eating and drinking, which may include:  · 8 hours before the procedure - stop eating heavy meals or foods, such as meat, fried foods, or fatty foods.  · 6 hours before the procedure - stop eating light meals or foods, such as toast or cereal.  · 6 hours before the procedure - stop drinking milk or drinks that contain milk.  · 2 hours before the procedure - stop drinking clear liquids.  Medicines  Ask your health care provider about:  · Changing or stopping your regular medicines. This is especially important if you are taking diabetes medicines or blood thinners.  · Taking medicines such as aspirin and ibuprofen. These medicines can thin your blood. Do not take these medicines unless your health care provider tells you to take them.  · Taking over-the-counter medicines, vitamins, herbs, and supplements.  General instructions  · Follow instructions from your health care provider about eating or drinking restrictions.  · Do not use any products that contain nicotine or tobacco for at least 4 weeks before the procedure. These products include cigarettes, e-cigarettes, and chewing tobacco. If you need help quitting, ask your health care provider.  · Ask your health care provider:  ? How your surgery site will be marked.  ? What steps will be taken to help prevent infection. These may include:  § Removing hair at the surgery site.  § Washing skin with a germ-killing soap.  § Taking antibiotic medicine.  · Plan to have someone take  you home from the hospital or clinic.  · If you will be going home right after the procedure, plan to have someone with you for 24 hours.  What happens during the procedure?    · An IV will be inserted into one of your veins.  · You will be given one or more of the following:  ? A medicine to help you relax (sedative).  ? A medicine to numb the area (local anesthetic).  ? A medicine to make you fall asleep (general anesthetic).  · Your surgeon will use an X-ray machine to see your spinal compression fracture.  · A hollow needle will be inserted through the skin and into the spine.  · Through this needle, the balloon will be placed in your spine where the fractures are located.  · The balloon will be inflated. This will create space and push the bone back toward its normal height and shape.  · The balloon will be removed.  · The newly created space in your spine will be filled with bone cement that will harden quickly.  · When the cement hardens, the hollow needle will be removed.  · The needle puncture site will be closed with skin glue or adhesive tape.  · A bandage (dressing) may be used to cover the needle puncture site.  The procedure may vary among health care providers and hospitals.  What happens after the procedure?  · Your blood pressure, heart rate, breathing rate, and blood oxygen level will be monitored until you leave the hospital or clinic.  · You will have some pain. Pain medicine will be available to help you.  · An ice pack may be placed over the needle site.  · If you were given a sedative during the procedure, it can affect you for several hours. Do not drive or operate machinery until your health care provider says that it is safe.  Summary  · Balloon kyphoplasty is a procedure to treat a spinal compression fracture, which is a collapse of the bones that form the spine (vertebrae).  · Before the procedure, follow instructions from your health care provider about eating or drinking  restrictions.  · Ask your health care provider about changing or stopping your regular medicines. This is especially important if you are taking diabetes medicines or blood thinners.  · Plan to have someone take you home from the hospital or clinic. If you were given a sedative during the procedure, do not drive or operate machinery until your health care provider says that it is safe.  · If you will be going home right after the procedure, plan to have someone with you for 24 hours.  This information is not intended to replace advice given to you by your health care provider. Make sure you discuss any questions you have with your health care provider.  Document Revised: 04/07/2021 Document Reviewed: 04/07/2021  [employer] Patient Education © 2021 [employer]
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
## 2022-01-11 RX ORDER — CALCITONIN SALMON 200 [IU]/.09ML
1 SPRAY, METERED NASAL DAILY
Qty: 1 EACH | Refills: 12 | Status: SHIPPED | OUTPATIENT
Start: 2022-01-11 | End: 2022-02-08

## 2022-01-13 DIAGNOSIS — S32.050D COMPRESSION FRACTURE OF L5 VERTEBRA WITH ROUTINE HEALING, SUBSEQUENT ENCOUNTER: ICD-10-CM

## 2022-01-13 DIAGNOSIS — S22.000G COMPRESSION FRACTURE OF THORACIC VERTEBRA WITH DELAYED HEALING, UNSPECIFIED THORACIC VERTEBRAL LEVEL, SUBSEQUENT ENCOUNTER: Primary | ICD-10-CM

## 2022-01-18 PROBLEM — S22.000D COMPRESSION FRACTURE OF THORACIC VERTEBRA WITH ROUTINE HEALING: Status: ACTIVE | Noted: 2022-01-18

## 2022-01-20 ENCOUNTER — PRE-ADMISSION TESTING (OUTPATIENT)
Dept: PREADMISSION TESTING | Facility: HOSPITAL | Age: 82
End: 2022-01-20

## 2022-01-20 VITALS
DIASTOLIC BLOOD PRESSURE: 69 MMHG | RESPIRATION RATE: 18 BRPM | SYSTOLIC BLOOD PRESSURE: 140 MMHG | HEIGHT: 64 IN | BODY MASS INDEX: 29.32 KG/M2 | OXYGEN SATURATION: 96 % | HEART RATE: 89 BPM | WEIGHT: 171.74 LBS

## 2022-01-20 LAB
ANION GAP SERPL CALCULATED.3IONS-SCNC: 8 MMOL/L (ref 5–15)
BUN SERPL-MCNC: 20 MG/DL (ref 8–23)
BUN/CREAT SERPL: 24.1 (ref 7–25)
CALCIUM SPEC-SCNC: 9.4 MG/DL (ref 8.6–10.5)
CHLORIDE SERPL-SCNC: 102 MMOL/L (ref 98–107)
CO2 SERPL-SCNC: 31 MMOL/L (ref 22–29)
CREAT SERPL-MCNC: 0.83 MG/DL (ref 0.76–1.27)
DEPRECATED RDW RBC AUTO: 45 FL (ref 37–54)
ERYTHROCYTE [DISTWIDTH] IN BLOOD BY AUTOMATED COUNT: 13.5 % (ref 12.3–15.4)
GFR SERPL CREATININE-BSD FRML MDRD: 88 ML/MIN/1.73
GLUCOSE SERPL-MCNC: 114 MG/DL (ref 65–99)
HCT VFR BLD AUTO: 38.6 % (ref 37.5–51)
HGB BLD-MCNC: 12.6 G/DL (ref 13–17.7)
MCH RBC QN AUTO: 29.8 PG (ref 26.6–33)
MCHC RBC AUTO-ENTMCNC: 32.6 G/DL (ref 31.5–35.7)
MCV RBC AUTO: 91.3 FL (ref 79–97)
PLATELET # BLD AUTO: 272 10*3/MM3 (ref 140–450)
PMV BLD AUTO: 10.4 FL (ref 6–12)
POTASSIUM SERPL-SCNC: 4.3 MMOL/L (ref 3.5–5.2)
RBC # BLD AUTO: 4.23 10*6/MM3 (ref 4.14–5.8)
SODIUM SERPL-SCNC: 141 MMOL/L (ref 136–145)
WBC NRBC COR # BLD: 6.63 10*3/MM3 (ref 3.4–10.8)

## 2022-01-20 PROCEDURE — 80048 BASIC METABOLIC PNL TOTAL CA: CPT

## 2022-01-20 PROCEDURE — 85027 COMPLETE CBC AUTOMATED: CPT

## 2022-01-20 PROCEDURE — 36415 COLL VENOUS BLD VENIPUNCTURE: CPT

## 2022-01-20 PROCEDURE — 93010 ELECTROCARDIOGRAM REPORT: CPT | Performed by: EMERGENCY MEDICINE

## 2022-01-20 PROCEDURE — 93005 ELECTROCARDIOGRAM TRACING: CPT

## 2022-01-20 NOTE — DISCHARGE INSTRUCTIONS
DAY OF SURGERY INSTRUCTIONS          ARRIVAL TIME: AS DIRECTED BY OFFICE    YOU MAY TAKE THE FOLLOWING MEDICATION(S) THE MORNING OF SURGERY WITH A SIP OF WATER: Tramadol      ALL OTHER HOME MEDICATION CHECK WITH YOUR PHYSICIAN (ask your health care provider about changing or stopping your regular medicines, especially if you are taking diabetes medicines or blood thinners)      DO NOT TAKE ANY ERECTILE DYSFUNCTION MEDICATIONS (EX: CIALIS, VIAGRA) 24 HOURS PRIOR TO SURGERY                      MANAGING PAIN AFTER SURGERY    We know you are probably wondering what your pain will be like after surgery.  Following surgery it is unrealistic to expect you will not have pain.   Pain is how our bodies let us know that something is wrong or cautions us to be careful.  That said, our goal is to make your pain tolerable.    Methods we may use to treat your pain include (oral or IV medications, PCAs, epidurals, nerve blocks, etc.)   While some procedures require IV pain medications for a short time after surgery, transitioning to pain medications by mouth allows for better management of pain.   Your nurse will encourage you to take oral pain medications whenever possible.  IV medications work almost immediately, but only last a short while.  Taking medications by mouth allows for a more constant level of medication in your blood stream for a longer period of time.      Once your pain is out of control it is harder to get back under control.  It is important you are aware when your next dose of pain medication is due.  If you are admitted, your nurse may write the time of your next dose on the white board in your room to help you remember.      We are interested in your pain and encourage you to inform us about aggravating factors during your visit.   Many times a simple repositioning every few hours can make a big difference.    If your physician says it is okay, do not let your pain prevent you from getting out of bed. Be  sure to call your nurse for assistance prior to getting up so you do not fall.      Before surgery, please decide your tolerable pain goal.  These faces help describe the pain ratings we use on a 0-10 scale.   Be prepared to tell us your goal and whether or not you take pain or anxiety medications at home.          BEFORE YOU COME TO THE HOSPITAL  (Pre-op instructions)  • Do not eat, drink, smoke or chew gum after midnight the night before surgery.  This also includes no mints.  • Morning of surgery take only the medicines you have been instructed with a sip of water unless otherwise instructed  by your physician.  • Do not shave, wear makeup or dark nail polish.  • Remove all jewelry including rings.  • Leave anything you consider valuable at home.  • Leave your suitcase in the car until after your surgery.  • Bring the following with you if applicable:  o Picture ID and insurance, Medicare or Medicaid cards  o Co-pay/deductible required by insurance (cash, check, credit card)  o Copy of advance directive, living will or power-of- documents if not brought to pre-work  o CPAP or BIPAP mask and tubing  o Relaxation aids ( book, magazine), etc.  o Hearing aids                        ON THE DAY OF SURGERY  · On the day of surgery check in at registration located at the main entrance of the hospital. Only one family member or friend are allowed per patient.  ? You will be registered and given a beeper with instructions where to wait in the main lobby.  ? When your beeper lights up and vibrates a member of the Outpatient Surgery staff will meet you at the double doors under the stair steps and escort you to your preoperative room.   · You may have cloth compression devices placed on your legs. These help to prevent blood clots and reduce swelling in your legs.  · An IV may be inserted into one of your veins.  · In the operating room, you may be given one or more of the following:  ? A medicine to help you relax  "(sedative).  ? A medicine to numb the area (local anesthetic).  ? A medicine to make you fall asleep (general anesthetic).  ? A medicine that is injected into an area of your body to numb everything below the injection site (regional anesthetic).  · Your surgical site will be marked or identified.  · You may be given an antibiotic through your IV to help prevent infection.  Contact a health care provider if you:  · Develop a fever of more than 100.4°F (38°C) or other feelings of illness during the 48 hours before your surgery.  · Have symptoms that get worse.  Have questions or concerns about your surgery    General Anesthesia/Surgery, Adult  General anesthesia is the use of medicines to make a person \"go to sleep\" (unconscious) for a medical procedure. General anesthesia must be used for certain procedures, and is often recommended for procedures that:  · Last a long time.  · Require you to be still or in an unusual position.  · Are major and can cause blood loss.  The medicines used for general anesthesia are called general anesthetics. As well as making you unconscious for a certain amount of time, these medicines:  · Prevent pain.  · Control your blood pressure.  · Relax your muscles.  Tell a health care provider about:  · Any allergies you have.  · All medicines you are taking, including vitamins, herbs, eye drops, creams, and over-the-counter medicines.  · Any problems you or family members have had with anesthetic medicines.  · Types of anesthetics you have had in the past.  · Any blood disorders you have.  · Any surgeries you have had.  · Any medical conditions you have.  · Any recent upper respiratory, chest, or ear infections.  · Any history of:  ? Heart or lung conditions, such as heart failure, sleep apnea, asthma, or chronic obstructive pulmonary disease (COPD).  ?  service.  ? Depression or anxiety.  · Any tobacco or drug use, including marijuana or alcohol use.  · Whether you are pregnant or " may be pregnant.  What are the risks?  Generally, this is a safe procedure. However, problems may occur, including:  · Allergic reaction.  · Lung and heart problems.  · Inhaling food or liquid from the stomach into the lungs (aspiration).  · Nerve injury.  · Air in the bloodstream, which can lead to stroke.  · Extreme agitation or confusion (delirium) when you wake up from the anesthetic.  · Waking up during your procedure and being unable to move. This is rare.  These problems are more likely to develop if you are having a major surgery or if you have an advanced or serious medical condition. You can prevent some of these complications by answering all of your health care provider's questions thoroughly and by following all instructions before your procedure.  General anesthesia can cause side effects, including:  · Nausea or vomiting.  · A sore throat from the breathing tube.  · Hoarseness.  · Wheezing or coughing.  · Shaking chills.  · Tiredness.  · Body aches.  · Anxiety.  · Sleepiness or drowsiness.  · Confusion or agitation.  RISKS AND COMPLICATIONS OF SURGERY  Your health care provider will discuss possible risks and complications with you before surgery. Common risks and complications include:    · Problems due to the use of anesthetics.  · Blood loss and replacement (does not apply to minor surgical procedures).  · Temporary increase in pain due to surgery.  · Uncorrected pain or problems that the surgery was meant to correct.  · Infection.  · New damage.    What happens before the procedure?    Medicines  Ask your health care provider about:  · Changing or stopping your regular medicines. This is especially important if you are taking diabetes medicines or blood thinners.  · Taking medicines such as aspirin and ibuprofen. These medicines can thin your blood. Do not take these medicines unless your health care provider tells you to take them.  · Taking over-the-counter medicines, vitamins, herbs, and  supplements. Do not take these during the week before your procedure unless your health care provider approves them.  General instructions  · Starting 3-6 weeks before the procedure, do not use any products that contain nicotine or tobacco, such as cigarettes and e-cigarettes. If you need help quitting, ask your health care provider.  · If you brush your teeth on the morning of the procedure, make sure to spit out all of the toothpaste.  · Tell your health care provider if you become ill or develop a cold, cough, or fever.  · If instructed by your health care provider, bring your sleep apnea device with you on the day of your surgery (if applicable).  · Ask your health care provider if you will be going home the same day, the following day, or after a longer hospital stay.  ? Plan to have someone take you home from the hospital or clinic.  ? Plan to have a responsible adult care for you for at least 24 hours after you leave the hospital or clinic. This is important.  What happens during the procedure?  · You will be given anesthetics through both of the following:  ? A mask placed over your nose and mouth.  ? An IV in one of your veins.  · You may receive a medicine to help you relax (sedative).  · After you are unconscious, a breathing tube may be inserted down your throat to help you breathe. This will be removed before you wake up.  · An anesthesia specialist will stay with you throughout your procedure. He or she will:  ? Keep you comfortable and safe by continuing to give you medicines and adjusting the amount of medicine that you get.  ? Monitor your blood pressure, pulse, and oxygen levels to make sure that the anesthetics do not cause any problems.  The procedure may vary among health care providers and hospitals.  What happens after the procedure?  · Your blood pressure, temperature, heart rate, breathing rate, and blood oxygen level will be monitored until the medicines you were given have worn off.  · You  will wake up in a recovery area. You may wake up slowly.  · If you feel anxious or agitated, you may be given medicine to help you calm down.  · If you will be going home the same day, your health care provider may check to make sure you can walk, drink, and urinate.  · Your health care provider will treat any pain or side effects you have before you go home.  · Do not drive for 24 hours if you were given a sedative.  Summary  · General anesthesia is used to keep you still and prevent pain during a procedure.  · It is important to tell your healthcare provider about your medical history and any surgeries you have had, and previous experience with anesthesia.  · Follow your healthcare provider’s instructions about when to stop eating, drinking, or taking certain medicines before your procedure.  · Plan to have someone take you home from the hospital or clinic.  This information is not intended to replace advice given to you by your health care provider. Make sure you discuss any questions you have with your health care provider.  Document Released: 03/26/2009 Document Revised: 08/03/2018 Document Reviewed: 08/03/2018  [employer] Interactive Patient Education © 2019 [employer]       Fall Prevention in Hospitals, Adult  As a hospital patient, your condition and the treatments you receive can increase your risk for falls. Some additional risk factors for falls in a hospital include:  · Being in an unfamiliar environment.  · Being on bed rest.  · Your surgery.  · Taking certain medicines.  · Your tubing requirements, such as intravenous (IV) therapy or catheters.  It is important that you learn how to decrease fall risks while at the hospital. Below are important tips that can help prevent falls.  SAFETY TIPS FOR PREVENTING FALLS  Talk about your risk of falling.  · Ask your health care provider why you are at risk for falling. Is it your medicine, illness, tubing placement, or something else?  · Make a plan with your  health care provider to keep you safe from falls.  · Ask your health care provider or pharmacist about side effects of your medicines. Some medicines can make you dizzy or affect your coordination.  Ask for help.  · Ask for help before getting out of bed. You may need to press your call button.  · Ask for assistance in getting safely to the toilet.  · Ask for a walker or cane to be put at your bedside. Ask that most of the side rails on your bed be placed up before your health care provider leaves the room.  · Ask family or friends to sit with you.  · Ask for things that are out of your reach, such as your glasses, hearing aids, telephone, bedside table, or call button.  Follow these tips to avoid falling:  · Stay lying or seated, rather than standing, while waiting for help.  · Wear rubber-soled slippers or shoes whenever you walk in the hospital.  · Avoid quick, sudden movements.  ¨ Change positions slowly.  ¨ Sit on the side of your bed before standing.  ¨ Stand up slowly and wait before you start to walk.  · Let your health care provider know if there is a spill on the floor.  · Pay careful attention to the medical equipment, electrical cords, and tubes around you.  · When you need help, use your call button by your bed or in the bathroom. Wait for one of your health care providers to help you.  · If you feel dizzy or unsure of your footing, return to bed and wait for assistance.  · Avoid being distracted by the TV, telephone, or another person in your room.  · Do not lean or support yourself on rolling objects, such as IV poles or bedside tables.     This information is not intended to replace advice given to you by your health care provider. Make sure you discuss any questions you have with your health care provider.     Document Released: 12/15/2001 Document Revised: 01/08/2016 Document Reviewed: 08/25/2013  [employer] Interactive Patient Education ©2016 [employer]       [facility]  Patient Instruction Sheet    Chlorhexidine Before Surgery  Chlorhexidine gluconate (CHG) is a germ-killing (antiseptic) solution that is used to clean the skin. It gets rid of the bacteria that normally live on the skin. Cleaning your skin with CHG before surgery helps lower the risk for infection after surgery.    How to use CHG solution  · You will take 2 showers, one shower the night before surgery, the second shower the morning of surgery before coming to the hospital.  · Use CHG only as told by your health care provider, and follow the instructions on the label.  · Use CHG solution while taking a shower. Follow these steps when using CHG solution (unless your health care provider gives you different instructions):  1. Start the shower.  2. Use your normal soap and shampoo to wash your face and hair.  3. Turn off the shower or move out of the shower stream.  4. Pour the CHG onto a clean washcloth. Do not use any type of brush or rough-edged sponge.  5. Starting at your neck, lather your body down to your toes. Make sure you:  6. Pay special attention to the part of your body where you will be having surgery. Scrub this area for at least 1 minute.  7. Use the full amount of CHG as directed. Usually, this is one half bottle for each shower.  8. Do not use CHG on your head or face. If the solution gets into your ears or eyes, rinse them well with water.  9. Avoid your genital area.  10. Avoid any areas of skin that have broken skin, cuts, or scrapes.  11. Scrub your back and under your arms. Make sure to wash skin folds.  12. Let the lather sit on your skin for 1-2 minutes or as long as told by your health care  provider.  13. Thoroughly rinse your entire body in the shower. Make sure that all body creases and crevices are rinsed well.  14. Dry off with a clean towel. Do not put any substances on your body afterward, such as powder, lotion, or perfume.  15. Put on clean clothes or pajamas.  16. If it is the night  before your surgery, sleep in clean sheets.    What are the risks?  Risks of using CHG include:  · A skin reaction.  · Hearing loss, if CHG gets in your ears.  · Eye injury, if CHG gets in your eyes and is not rinsed out.  · The CHG product catching fire.  Make sure that you avoid smoking and flames after applying CHG to your skin.  Do not use CHG:  · If you have a chlorhexidine allergy or have previously reacted to chlorhexidine.  · On babies younger than 2 months of age.      On the day of surgery, when you are taken to your room in Outpatient Surgery you will be given a CHG prepackaged cloth to wipe the site for your surgery.  How to use CHG prepackaged cloths  · Follow the instructions on the label.  · Use the CHG cloth on clean, dry skin. Follow these steps when using a CHG cloth (unless your health care provider gives you different instructions):  1. Using the CHG cloth, vigorously scrub the part of your body where you will be having surgery. Scrub using a back-and-forth motion for 3 minutes. The area on your body should be completely wet with CHG when you are finished scrubbing.  2. Do not rinse. Discard the cloth and let the area air-dry for 1 minute. Do not put any substances on your body afterward, such as powder, lotion, or perfume.  Contact a health care provider if:  · Your skin gets irritated after scrubbing.  · You have questions about using your solution or cloth.  Get help right away if:  · Your eyes become very red or swollen.  · Your eyes itch badly.  · Your skin itches badly and is red or swollen.  · Your hearing changes.  · You have trouble seeing.  · You have swelling or tingling in your mouth or throat.  · You have trouble breathing.  · You swallow any chlorhexidine.  Summary  · Chlorhexidine gluconate (CHG) is a germ-killing (antiseptic) solution that is used to clean the skin. Cleaning your skin with CHG before surgery helps lower the risk for infection after surgery.  · You may be given CHG  to use at home. It may be in a bottle or in a prepackaged cloth to use on your skin. Carefully follow your health care provider's instructions and the instructions on the product label.  · Do not use CHG if you have a chlorhexidine allergy.  · Contact your health care provider if your skin gets irritated after scrubbing.  This information is not intended to replace advice given to you by your health care provider. Make sure you discuss any questions you have with your health care provider.  Document Released: 09/11/2013 Document Revised: 11/15/2018 Document Reviewed: 11/15/2018  [employer] Interactive Patient Education © 2019 [employer]          PATIENT/FAMILY/RESPONSIBLE PARTY VERBALIZES UNDERSTANDING OF ABOVE EDUCATION.  COPY OF PAIN SCALE GIVEN AND REVIEWED WITH VERBALIZED UNDERSTANDING.

## 2022-01-21 ENCOUNTER — LAB (OUTPATIENT)
Dept: LAB | Facility: HOSPITAL | Age: 82
End: 2022-01-21

## 2022-01-21 ENCOUNTER — OFFICE VISIT (OUTPATIENT)
Dept: INTERNAL MEDICINE | Facility: CLINIC | Age: 82
End: 2022-01-21

## 2022-01-21 VITALS
SYSTOLIC BLOOD PRESSURE: 124 MMHG | HEART RATE: 85 BPM | WEIGHT: 172 LBS | OXYGEN SATURATION: 95 % | HEIGHT: 69 IN | TEMPERATURE: 96.9 F | BODY MASS INDEX: 25.48 KG/M2 | DIASTOLIC BLOOD PRESSURE: 70 MMHG

## 2022-01-21 DIAGNOSIS — S32.040G COMPRESSION FRACTURE OF FOURTH LUMBAR VERTEBRA WITH DELAYED HEALING: Primary | ICD-10-CM

## 2022-01-21 DIAGNOSIS — S22.050D COMPRESSION FRACTURE OF T6 VERTEBRA WITH ROUTINE HEALING, SUBSEQUENT ENCOUNTER: ICD-10-CM

## 2022-01-21 DIAGNOSIS — S32.050D COMPRESSION FRACTURE OF L5 VERTEBRA WITH ROUTINE HEALING, SUBSEQUENT ENCOUNTER: ICD-10-CM

## 2022-01-21 LAB
QT INTERVAL: 358 MS
QTC INTERVAL: 418 MS
SARS-COV-2 ORF1AB RESP QL NAA+PROBE: NOT DETECTED

## 2022-01-21 PROCEDURE — 99213 OFFICE O/P EST LOW 20 MIN: CPT | Performed by: FAMILY MEDICINE

## 2022-01-21 PROCEDURE — U0004 COV-19 TEST NON-CDC HGH THRU: HCPCS

## 2022-01-21 PROCEDURE — C9803 HOPD COVID-19 SPEC COLLECT: HCPCS

## 2022-01-22 ENCOUNTER — APPOINTMENT (OUTPATIENT)
Dept: LAB | Facility: HOSPITAL | Age: 82
End: 2022-01-22

## 2022-01-24 ENCOUNTER — TELEPHONE (OUTPATIENT)
Dept: NEUROSURGERY | Facility: CLINIC | Age: 82
End: 2022-01-24

## 2022-01-24 RX ORDER — HYDROCHLOROTHIAZIDE 12.5 MG/1
12.5 TABLET ORAL DAILY
Qty: 90 TABLET | Refills: 0 | Status: SHIPPED | OUTPATIENT
Start: 2022-01-24 | End: 2022-04-14 | Stop reason: SDUPTHER

## 2022-01-25 ENCOUNTER — APPOINTMENT (OUTPATIENT)
Dept: GENERAL RADIOLOGY | Facility: HOSPITAL | Age: 82
End: 2022-01-25

## 2022-01-25 ENCOUNTER — HOSPITAL ENCOUNTER (OUTPATIENT)
Facility: HOSPITAL | Age: 82
Setting detail: HOSPITAL OUTPATIENT SURGERY
Discharge: HOME | End: 2022-01-25
Attending: NEUROLOGICAL SURGERY | Admitting: NEUROLOGICAL SURGERY

## 2022-01-25 ENCOUNTER — ANESTHESIA EVENT (OUTPATIENT)
Dept: PERIOP | Facility: HOSPITAL | Age: 82
End: 2022-01-25

## 2022-01-25 ENCOUNTER — ANESTHESIA (OUTPATIENT)
Dept: PERIOP | Facility: HOSPITAL | Age: 82
End: 2022-01-25

## 2022-01-25 VITALS
SYSTOLIC BLOOD PRESSURE: 117 MMHG | DIASTOLIC BLOOD PRESSURE: 64 MMHG | OXYGEN SATURATION: 94 % | RESPIRATION RATE: 18 BRPM | TEMPERATURE: 97.4 F | HEART RATE: 79 BPM

## 2022-01-25 DIAGNOSIS — S32.050D COMPRESSION FRACTURE OF L5 VERTEBRA WITH ROUTINE HEALING, SUBSEQUENT ENCOUNTER: ICD-10-CM

## 2022-01-25 DIAGNOSIS — S22.050D COMPRESSION FRACTURE OF T6 VERTEBRA WITH ROUTINE HEALING, SUBSEQUENT ENCOUNTER: ICD-10-CM

## 2022-01-25 DIAGNOSIS — S22.000G COMPRESSION FRACTURE OF THORACIC VERTEBRA WITH DELAYED HEALING, UNSPECIFIED THORACIC VERTEBRAL LEVEL, SUBSEQUENT ENCOUNTER: ICD-10-CM

## 2022-01-25 PROCEDURE — 25010000002 FENTANYL CITRATE (PF) 100 MCG/2ML SOLUTION: Performed by: NURSE ANESTHETIST, CERTIFIED REGISTERED

## 2022-01-25 PROCEDURE — 72070 X-RAY EXAM THORAC SPINE 2VWS: CPT

## 2022-01-25 PROCEDURE — 25010000002 CEFAZOLIN PER 500 MG: Performed by: NURSE PRACTITIONER

## 2022-01-25 PROCEDURE — 76000 FLUOROSCOPY <1 HR PHYS/QHP: CPT

## 2022-01-25 PROCEDURE — 88342 IMHCHEM/IMCYTCHM 1ST ANTB: CPT | Performed by: NEUROLOGICAL SURGERY

## 2022-01-25 PROCEDURE — 99024 POSTOP FOLLOW-UP VISIT: CPT | Performed by: NURSE PRACTITIONER

## 2022-01-25 PROCEDURE — 88341 IMHCHEM/IMCYTCHM EA ADD ANTB: CPT | Performed by: NEUROLOGICAL SURGERY

## 2022-01-25 PROCEDURE — 25010000002 ONDANSETRON PER 1 MG: Performed by: NURSE ANESTHETIST, CERTIFIED REGISTERED

## 2022-01-25 PROCEDURE — 88307 TISSUE EXAM BY PATHOLOGIST: CPT | Performed by: NEUROLOGICAL SURGERY

## 2022-01-25 PROCEDURE — 25010000002 PROPOFOL 10 MG/ML EMULSION: Performed by: NURSE ANESTHETIST, CERTIFIED REGISTERED

## 2022-01-25 PROCEDURE — 88311 DECALCIFY TISSUE: CPT | Performed by: NEUROLOGICAL SURGERY

## 2022-01-25 PROCEDURE — 25010000002 IOPAMIDOL 61 % SOLUTION: Performed by: NEUROLOGICAL SURGERY

## 2022-01-25 PROCEDURE — C1713 ANCHOR/SCREW BN/BN,TIS/BN: HCPCS | Performed by: NEUROLOGICAL SURGERY

## 2022-01-25 PROCEDURE — 22513 PERQ VERTEBRAL AUGMENTATION: CPT | Performed by: NEUROLOGICAL SURGERY

## 2022-01-25 PROCEDURE — 22515 PERQ VERTEBRAL AUGMENTATION: CPT | Performed by: NEUROLOGICAL SURGERY

## 2022-01-25 RX ORDER — LABETALOL HYDROCHLORIDE 5 MG/ML
5 INJECTION, SOLUTION INTRAVENOUS
Status: DISCONTINUED | OUTPATIENT
Start: 2022-01-25 | End: 2022-01-25 | Stop reason: HOSPADM

## 2022-01-25 RX ORDER — ACETAMINOPHEN 500 MG
500 TABLET ORAL ONCE
Status: COMPLETED | OUTPATIENT
Start: 2022-01-25 | End: 2022-01-25

## 2022-01-25 RX ORDER — MAGNESIUM HYDROXIDE 1200 MG/15ML
LIQUID ORAL AS NEEDED
Status: DISCONTINUED | OUTPATIENT
Start: 2022-01-25 | End: 2022-01-25 | Stop reason: HOSPADM

## 2022-01-25 RX ORDER — BUPIVACAINE HCL/0.9 % NACL/PF 0.1 %
2 PLASTIC BAG, INJECTION (ML) EPIDURAL ONCE
Status: COMPLETED | OUTPATIENT
Start: 2022-01-25 | End: 2022-01-25

## 2022-01-25 RX ORDER — ROCURONIUM BROMIDE 10 MG/ML
INJECTION, SOLUTION INTRAVENOUS AS NEEDED
Status: DISCONTINUED | OUTPATIENT
Start: 2022-01-25 | End: 2022-01-25 | Stop reason: SURG

## 2022-01-25 RX ORDER — OXYCODONE AND ACETAMINOPHEN 10; 325 MG/1; MG/1
1 TABLET ORAL ONCE AS NEEDED
Status: DISCONTINUED | OUTPATIENT
Start: 2022-01-25 | End: 2022-01-25 | Stop reason: HOSPADM

## 2022-01-25 RX ORDER — LIDOCAINE HYDROCHLORIDE 10 MG/ML
0.5 INJECTION, SOLUTION EPIDURAL; INFILTRATION; INTRACAUDAL; PERINEURAL ONCE AS NEEDED
Status: DISCONTINUED | OUTPATIENT
Start: 2022-01-25 | End: 2022-01-25 | Stop reason: HOSPADM

## 2022-01-25 RX ORDER — SODIUM CHLORIDE 0.9 % (FLUSH) 0.9 %
3 SYRINGE (ML) INJECTION EVERY 12 HOURS SCHEDULED
Status: DISCONTINUED | OUTPATIENT
Start: 2022-01-25 | End: 2022-01-25 | Stop reason: HOSPADM

## 2022-01-25 RX ORDER — PROPOFOL 10 MG/ML
VIAL (ML) INTRAVENOUS AS NEEDED
Status: DISCONTINUED | OUTPATIENT
Start: 2022-01-25 | End: 2022-01-25 | Stop reason: SURG

## 2022-01-25 RX ORDER — NALOXONE HCL 0.4 MG/ML
0.4 VIAL (ML) INJECTION AS NEEDED
Status: DISCONTINUED | OUTPATIENT
Start: 2022-01-25 | End: 2022-01-25 | Stop reason: HOSPADM

## 2022-01-25 RX ORDER — SUCCINYLCHOLINE/SOD CL,ISO/PF 200MG/10ML
SYRINGE (ML) INTRAVENOUS AS NEEDED
Status: DISCONTINUED | OUTPATIENT
Start: 2022-01-25 | End: 2022-01-25 | Stop reason: SURG

## 2022-01-25 RX ORDER — LIDOCAINE HYDROCHLORIDE 20 MG/ML
INJECTION, SOLUTION EPIDURAL; INFILTRATION; INTRACAUDAL; PERINEURAL AS NEEDED
Status: DISCONTINUED | OUTPATIENT
Start: 2022-01-25 | End: 2022-01-25 | Stop reason: SURG

## 2022-01-25 RX ORDER — FENTANYL CITRATE 50 UG/ML
INJECTION, SOLUTION INTRAMUSCULAR; INTRAVENOUS AS NEEDED
Status: DISCONTINUED | OUTPATIENT
Start: 2022-01-25 | End: 2022-01-25 | Stop reason: SURG

## 2022-01-25 RX ORDER — ONDANSETRON 2 MG/ML
INJECTION INTRAMUSCULAR; INTRAVENOUS AS NEEDED
Status: DISCONTINUED | OUTPATIENT
Start: 2022-01-25 | End: 2022-01-25 | Stop reason: SURG

## 2022-01-25 RX ORDER — LIDOCAINE HYDROCHLORIDE 40 MG/ML
SOLUTION TOPICAL AS NEEDED
Status: DISCONTINUED | OUTPATIENT
Start: 2022-01-25 | End: 2022-01-25 | Stop reason: SURG

## 2022-01-25 RX ORDER — FENTANYL CITRATE 50 UG/ML
25 INJECTION, SOLUTION INTRAMUSCULAR; INTRAVENOUS
Status: DISCONTINUED | OUTPATIENT
Start: 2022-01-25 | End: 2022-01-25 | Stop reason: HOSPADM

## 2022-01-25 RX ORDER — SODIUM CHLORIDE, SODIUM LACTATE, POTASSIUM CHLORIDE, CALCIUM CHLORIDE 600; 310; 30; 20 MG/100ML; MG/100ML; MG/100ML; MG/100ML
100 INJECTION, SOLUTION INTRAVENOUS CONTINUOUS
Status: DISCONTINUED | OUTPATIENT
Start: 2022-01-25 | End: 2022-01-25 | Stop reason: HOSPADM

## 2022-01-25 RX ORDER — AMOXICILLIN 250 MG
2 CAPSULE ORAL DAILY
Qty: 60 TABLET | Refills: 0 | Status: SHIPPED | OUTPATIENT
Start: 2022-01-25

## 2022-01-25 RX ORDER — OXYCODONE AND ACETAMINOPHEN 7.5; 325 MG/1; MG/1
2 TABLET ORAL EVERY 4 HOURS PRN
Status: DISCONTINUED | OUTPATIENT
Start: 2022-01-25 | End: 2022-01-25 | Stop reason: HOSPADM

## 2022-01-25 RX ORDER — SODIUM CHLORIDE 0.9 % (FLUSH) 0.9 %
3-10 SYRINGE (ML) INJECTION AS NEEDED
Status: DISCONTINUED | OUTPATIENT
Start: 2022-01-25 | End: 2022-01-25 | Stop reason: HOSPADM

## 2022-01-25 RX ORDER — HYDROCODONE BITARTRATE AND ACETAMINOPHEN 7.5; 325 MG/1; MG/1
1 TABLET ORAL EVERY 4 HOURS PRN
Qty: 18 TABLET | Refills: 0 | Status: SHIPPED | OUTPATIENT
Start: 2022-01-25 | End: 2022-01-28

## 2022-01-25 RX ORDER — PHENYLEPHRINE HCL IN 0.9% NACL 1 MG/10 ML
SYRINGE (ML) INTRAVENOUS AS NEEDED
Status: DISCONTINUED | OUTPATIENT
Start: 2022-01-25 | End: 2022-01-25 | Stop reason: SURG

## 2022-01-25 RX ORDER — FLUMAZENIL 0.1 MG/ML
0.2 INJECTION INTRAVENOUS AS NEEDED
Status: DISCONTINUED | OUTPATIENT
Start: 2022-01-25 | End: 2022-01-25 | Stop reason: HOSPADM

## 2022-01-25 RX ORDER — ONDANSETRON 2 MG/ML
4 INJECTION INTRAMUSCULAR; INTRAVENOUS ONCE AS NEEDED
Status: DISCONTINUED | OUTPATIENT
Start: 2022-01-25 | End: 2022-01-25 | Stop reason: HOSPADM

## 2022-01-25 RX ADMIN — VASOPRESSIN 0.5 ML: 20 INJECTION INTRAVENOUS at 07:34

## 2022-01-25 RX ADMIN — SODIUM CHLORIDE, POTASSIUM CHLORIDE, SODIUM LACTATE AND CALCIUM CHLORIDE 100 ML/HR: 600; 310; 30; 20 INJECTION, SOLUTION INTRAVENOUS at 06:37

## 2022-01-25 RX ADMIN — LIDOCAINE HYDROCHLORIDE 1 EACH: 40 SOLUTION TOPICAL at 07:08

## 2022-01-25 RX ADMIN — VASOPRESSIN 1 ML: 20 INJECTION INTRAVENOUS at 07:43

## 2022-01-25 RX ADMIN — FENTANYL CITRATE 25 MCG: 50 INJECTION, SOLUTION INTRAMUSCULAR; INTRAVENOUS at 08:05

## 2022-01-25 RX ADMIN — LIDOCAINE HYDROCHLORIDE 100 MG: 20 INJECTION, SOLUTION EPIDURAL; INFILTRATION; INTRACAUDAL; PERINEURAL at 07:08

## 2022-01-25 RX ADMIN — ROCURONIUM BROMIDE 5 MG: 10 INJECTION INTRAVENOUS at 07:08

## 2022-01-25 RX ADMIN — Medication 200 MCG: at 07:25

## 2022-01-25 RX ADMIN — ACETAMINOPHEN 500 MG: 500 TABLET ORAL at 06:37

## 2022-01-25 RX ADMIN — ONDANSETRON 4 MG: 2 INJECTION INTRAMUSCULAR; INTRAVENOUS at 08:09

## 2022-01-25 RX ADMIN — PROPOFOL 140 MG: 10 INJECTION, EMULSION INTRAVENOUS at 07:08

## 2022-01-25 RX ADMIN — FENTANYL CITRATE 25 MCG: 50 INJECTION, SOLUTION INTRAMUSCULAR; INTRAVENOUS at 07:45

## 2022-01-25 RX ADMIN — Medication 200 MCG: at 07:59

## 2022-01-25 RX ADMIN — Medication 2 G: at 07:11

## 2022-01-25 RX ADMIN — Medication 200 MCG: at 07:43

## 2022-01-25 RX ADMIN — FENTANYL CITRATE 50 MCG: 50 INJECTION, SOLUTION INTRAMUSCULAR; INTRAVENOUS at 07:08

## 2022-01-25 RX ADMIN — Medication 160 MG: at 07:08

## 2022-01-25 RX ADMIN — Medication 200 MCG: at 07:18

## 2022-01-25 NOTE — ANESTHESIA PREPROCEDURE EVALUATION
Anesthesia Evaluation     Patient summary reviewed and Nursing notes reviewed   no history of anesthetic complications:  NPO Solid Status: > 8 hours  NPO Liquid Status: > 8 hours           Airway   Mallampati: I  TM distance: >3 FB  Neck ROM: full  No difficulty expected  Dental - normal exam     Pulmonary    (+) a smoker Former,   (-) asthma, sleep apnea  Cardiovascular   Exercise tolerance: good (4-7 METS)    ECG reviewed    (+) hypertension, hyperlipidemia,   (-) pacemaker, past MI, CAD, angina      Neuro/Psych- negative ROS  (-) seizures, TIA, CVA  GI/Hepatic/Renal/Endo    (+)   renal disease,   (-) liver disease, diabetes    Musculoskeletal     (+) back pain,       ROS comment: Lumbar compression fracture  Abdominal    Substance History - negative use     OB/GYN negative ob/gyn ROS         Other   arthritis,    history of cancer (prostate cancer)                      Anesthesia Plan    ASA 2     general     intravenous induction     Anesthetic plan, all risks, benefits, and alternatives have been provided, discussed and informed consent has been obtained with: patient.

## 2022-01-25 NOTE — ANESTHESIA PROCEDURE NOTES
Airway  Urgency: elective    Date/Time: 1/25/2022 7:09 AM  Airway not difficult    General Information and Staff    Patient location during procedure: OR  CRNA: Haresh Hope CRNA    Indications and Patient Condition  Indications for airway management: airway protection    Preoxygenated: yes  Mask difficulty assessment: 1 - vent by mask    Final Airway Details  Final airway type: endotracheal airway      Successful airway: ETT  Cuffed: yes   Successful intubation technique: direct laryngoscopy  Endotracheal tube insertion site: oral  Blade: Lilia  Blade size: 3.5  ETT size (mm): 7.5  Cormack-Lehane Classification: grade I - full view of glottis  Placement verified by: chest auscultation, capnometry and palpation of cuff   Cuff volume (mL): 7  Measured from: lips  ETT/EBT  to lips (cm): 21  Number of attempts at approach: 1  Assessment: lips, teeth, and gum same as pre-op and atraumatic intubation

## 2022-01-25 NOTE — ANESTHESIA POSTPROCEDURE EVALUATION
Patient: Juan Luis Collazo    Procedure Summary     Date: 01/25/22 Room / Location: [facility] OR [address] [place] [address] PAD OR    Anesthesia Start: 0704 Anesthesia Stop: 0833    Procedure: KYPHOPLASTY WITH BIOPSY, THORACIC 6 and LUMBAR 5 (N/A Spine Lumbar) Diagnosis:       Compression fracture of T6 vertebra with routine healing, subsequent encounter      Compression fracture of L5 vertebra with routine healing, subsequent encounter      (Compression fracture of T6 vertebra with routine healing, subsequent encounter [S22.050D])      (Compression fracture of L5 vertebra with routine healing, subsequent encounter [S32.050D])    Surgeons: Nick Martínez MD Provider: Haresh Hope CRNA    Anesthesia Type: general ASA Status: 2          Anesthesia Type: general    Vitals  Vitals Value Taken Time   /66 01/25/22 0915   Temp 97.4 °F (36.3 °C) 01/25/22 0915   Pulse 81 01/25/22 0917   Resp 16 01/25/22 0915   SpO2 99 % 01/25/22 0917   Vitals shown include unvalidated device data.        Post Anesthesia Care and Evaluation    Patient location during evaluation: PACU  Patient participation: complete - patient participated  Level of consciousness: awake and alert  Pain management: adequate  Airway patency: patent  Anesthetic complications: No anesthetic complications  PONV Status: none  Cardiovascular status: acceptable and hemodynamically stable  Respiratory status: acceptable  Hydration status: acceptable    Comments: Blood pressure 117/64, pulse 79, temperature 97.4 °F (36.3 °C), temperature source Temporal, resp. rate 18, SpO2 94 %.    Patient discharged from PACU based upon Mignon score. Please see RN notes for further details

## 2022-01-27 ENCOUNTER — TELEPHONE (OUTPATIENT)
Dept: NEUROSURGERY | Facility: CLINIC | Age: 82
End: 2022-01-27

## 2022-01-27 DIAGNOSIS — R07.89 CHEST PAIN, ATYPICAL: ICD-10-CM

## 2022-01-27 DIAGNOSIS — R07.81 RIB PAIN: Primary | ICD-10-CM

## 2022-01-27 NOTE — TELEPHONE ENCOUNTER
He has osteoporosis and could have a rib fracture from coughing.  See Curtis and get CT scan of the chest to look for rib fracture or new compression fracture.

## 2022-01-27 NOTE — TELEPHONE ENCOUNTER
Spoke with patient and advised of what Dr Martínez said and he is going to call me in the morning and let me know if its not better and he wants to proceed with scan.

## 2022-01-27 NOTE — TELEPHONE ENCOUNTER
"Patient calling in, he had a kyphoplasty 2 days ago (T6 and L5). He is c/o left sided chest pain when he takes a breath. It is worse in the morning and he gets relief if he lays down and when he holds a pillow to his chest. He says after being awake 1-2 hours it will \"settle down some\". When he goes from sitting to standing, he has to stand for a moment and let the pain subside. When he coughs it is very painful. He would like to know if this is related to the surgery? Please advise  "

## 2022-01-28 ENCOUNTER — HOSPITAL ENCOUNTER (OUTPATIENT)
Dept: CT IMAGING | Facility: HOSPITAL | Age: 82
Discharge: HOME | End: 2022-01-28
Admitting: NURSE PRACTITIONER

## 2022-01-28 DIAGNOSIS — R07.89 CHEST PAIN, ATYPICAL: ICD-10-CM

## 2022-01-28 DIAGNOSIS — R07.81 RIB PAIN: ICD-10-CM

## 2022-01-28 LAB
CYTO UR: NORMAL
CYTO UR: NORMAL
LAB AP CASE REPORT: NORMAL
LAB AP CASE REPORT: NORMAL
PATH REPORT.FINAL DX SPEC: NORMAL
PATH REPORT.FINAL DX SPEC: NORMAL
PATH REPORT.GROSS SPEC: NORMAL
PATH REPORT.GROSS SPEC: NORMAL

## 2022-01-28 PROCEDURE — 25010000002 IOPAMIDOL 61 % SOLUTION: Performed by: NURSE PRACTITIONER

## 2022-01-28 PROCEDURE — 71270 CT THORAX DX C-/C+: CPT

## 2022-01-28 RX ADMIN — IOPAMIDOL 100 ML: 612 INJECTION, SOLUTION INTRAVENOUS at 15:05

## 2022-01-28 NOTE — TELEPHONE ENCOUNTER
Patient calling in and would like to proceed with ordering CT scan. He states he is still having pain in left side of his chest. Order pended to Curtis

## 2022-01-31 NOTE — PROGRESS NOTES
"Patients spouse given the results and states he is \"about the same\". I told her he needs to f/u with PCP or local ER. She understood and said she would let him know"

## 2022-02-08 ENCOUNTER — OFFICE VISIT (OUTPATIENT)
Dept: NEUROSURGERY | Facility: CLINIC | Age: 82
End: 2022-02-08

## 2022-02-08 VITALS — BODY MASS INDEX: 25.48 KG/M2 | WEIGHT: 172 LBS | HEIGHT: 69 IN

## 2022-02-08 DIAGNOSIS — Z98.890 S/P KYPHOPLASTY: ICD-10-CM

## 2022-02-08 DIAGNOSIS — R07.89 CHEST PAIN, ATYPICAL: ICD-10-CM

## 2022-02-08 DIAGNOSIS — E66.3 OVERWEIGHT (BMI 25.0-29.9): ICD-10-CM

## 2022-02-08 DIAGNOSIS — S22.050S COMPRESSION FRACTURE OF T6 VERTEBRA, SEQUELA: Primary | ICD-10-CM

## 2022-02-08 DIAGNOSIS — M54.50 LUMBAR BACK PAIN: ICD-10-CM

## 2022-02-08 DIAGNOSIS — M85.80 OSTEOPENIA, UNSPECIFIED LOCATION: ICD-10-CM

## 2022-02-08 DIAGNOSIS — M54.6 THORACIC BACK PAIN, UNSPECIFIED BACK PAIN LATERALITY, UNSPECIFIED CHRONICITY: ICD-10-CM

## 2022-02-08 DIAGNOSIS — S32.050S COMPRESSION FRACTURE OF L5 VERTEBRA, SEQUELA: ICD-10-CM

## 2022-02-08 PROCEDURE — 99214 OFFICE O/P EST MOD 30 MIN: CPT | Performed by: NURSE PRACTITIONER

## 2022-02-08 RX ORDER — ALENDRONATE SODIUM 70 MG/1
70 TABLET ORAL
Qty: 4 TABLET | Refills: 11 | Status: SHIPPED | OUTPATIENT
Start: 2022-02-08 | End: 2022-04-14

## 2022-02-08 RX ORDER — MELOXICAM 15 MG/1
15 TABLET ORAL DAILY
Qty: 30 TABLET | Refills: 0 | Status: ON HOLD | OUTPATIENT
Start: 2022-02-08 | End: 2022-12-07

## 2022-02-08 NOTE — PATIENT INSTRUCTIONS
"https://www.nhlbi.nih.gov/files/docs/public/heart/dash_brief.pdf\">   DASH Eating Plan  DASH stands for Dietary Approaches to Stop Hypertension. The DASH eating plan is a healthy eating plan that has been shown to:  · Reduce high blood pressure (hypertension).  · Reduce your risk for type 2 diabetes, heart disease, and stroke.  · Help with weight loss.  What are tips for following this plan?  Reading food labels  · Check food labels for the amount of salt (sodium) per serving. Choose foods with less than 5 percent of the Daily Value of sodium. Generally, foods with less than 300 milligrams (mg) of sodium per serving fit into this eating plan.  · To find whole grains, look for the word \"whole\" as the first word in the ingredient list.  Shopping  · Buy products labeled as \"low-sodium\" or \"no salt added.\"  · Buy fresh foods. Avoid canned foods and pre-made or frozen meals.  Cooking  · Avoid adding salt when cooking. Use salt-free seasonings or herbs instead of table salt or sea salt. Check with your health care provider or pharmacist before using salt substitutes.  · Do not garcia foods. Cook foods using healthy methods such as baking, boiling, grilling, roasting, and broiling instead.  · Cook with heart-healthy oils, such as olive, canola, avocado, soybean, or sunflower oil.  Meal planning    · Eat a balanced diet that includes:  ? 4 or more servings of fruits and 4 or more servings of vegetables each day. Try to fill one-half of your plate with fruits and vegetables.  ? 6-8 servings of whole grains each day.  ? Less than 6 oz (170 g) of lean meat, poultry, or fish each day. A 3-oz (85-g) serving of meat is about the same size as a deck of cards. One egg equals 1 oz (28 g).  ? 2-3 servings of low-fat dairy each day. One serving is 1 cup (237 mL).  ? 1 serving of nuts, seeds, or beans 5 times each week.  ? 2-3 servings of heart-healthy fats. Healthy fats called omega-3 fatty acids are found in foods such as walnuts, " flaxseeds, fortified milks, and eggs. These fats are also found in cold-water fish, such as sardines, salmon, and mackerel.  · Limit how much you eat of:  ? Canned or prepackaged foods.  ? Food that is high in trans fat, such as some fried foods.  ? Food that is high in saturated fat, such as fatty meat.  ? Desserts and other sweets, sugary drinks, and other foods with added sugar.  ? Full-fat dairy products.  · Do not salt foods before eating.  · Do not eat more than 4 egg yolks a week.  · Try to eat at least 2 vegetarian meals a week.  · Eat more home-cooked food and less restaurant, buffet, and fast food.    Lifestyle  · When eating at a restaurant, ask that your food be prepared with less salt or no salt, if possible.  · If you drink alcohol:  ? Limit how much you use to:  § 0-1 drink a day for women who are not pregnant.  § 0-2 drinks a day for men.  ? Be aware of how much alcohol is in your drink. In the [place] one drink equals one 12 oz bottle of beer (355 mL), one 5 oz glass of wine (148 mL), or one 1½ oz glass of hard liquor (44 mL).  General information  · Avoid eating more than 2,300 mg of salt a day. If you have hypertension, you may need to reduce your sodium intake to 1,500 mg a day.  · Work with your health care provider to maintain a healthy body weight or to lose weight. Ask what an ideal weight is for you.  · Get at least 30 minutes of exercise that causes your heart to beat faster (aerobic exercise) most days of the week. Activities may include walking, swimming, or biking.  · Work with your health care provider or dietitian to adjust your eating plan to your individual calorie needs.  What foods should I eat?  Fruits  All fresh, dried, or frozen fruit. Canned fruit in natural juice (without added sugar).  Vegetables  Fresh or frozen vegetables (raw, steamed, roasted, or grilled). Low-sodium or reduced-sodium tomato and vegetable juice. Low-sodium or reduced-sodium tomato sauce and tomato paste.  Low-sodium or reduced-sodium canned vegetables.  Grains  Whole-grain or whole-wheat bread. Whole-grain or whole-wheat pasta. Brown rice. Oatmeal. Quinoa. Bulgur. Whole-grain and low-sodium cereals. Jerrica bread. Low-fat, low-sodium crackers. Whole-wheat flour tortillas.  Meats and other proteins  Skinless chicken or turkey. Ground chicken or turkey. Pork with fat trimmed off. Fish and seafood. Egg whites. Dried beans, peas, or lentils. Unsalted nuts, nut butters, and seeds. Unsalted canned beans. Lean cuts of beef with fat trimmed off. Low-sodium, lean precooked or cured meat, such as sausages or meat loaves.  Dairy  Low-fat (1%) or fat-free (skim) milk. Reduced-fat, low-fat, or fat-free cheeses. Nonfat, low-sodium ricotta or cottage cheese. Low-fat or nonfat yogurt. Low-fat, low-sodium cheese.  Fats and oils  Soft margarine without trans fats. Vegetable oil. Reduced-fat, low-fat, or light mayonnaise and salad dressings (reduced-sodium). Canola, safflower, olive, avocado, soybean, and sunflower oils. Avocado.  Seasonings and condiments  Herbs. Spices. Seasoning mixes without salt.  Other foods  Unsalted popcorn and pretzels. Fat-free sweets.  The items listed above may not be a complete list of foods and beverages you can eat. Contact a dietitian for more information.  What foods should I avoid?  Fruits  Canned fruit in a light or heavy syrup. Fried fruit. Fruit in cream or butter sauce.  Vegetables  Creamed or fried vegetables. Vegetables in a cheese sauce. Regular canned vegetables (not low-sodium or reduced-sodium). Regular canned tomato sauce and paste (not low-sodium or reduced-sodium). Regular tomato and vegetable juice (not low-sodium or reduced-sodium). Pickles. Olives.  Grains  Baked goods made with fat, such as croissants, muffins, or some breads. Dry pasta or rice meal packs.  Meats and other proteins  Fatty cuts of meat. Ribs. Fried meat. May. Bologna, salami, and other precooked or cured meats, such as  sausages or meat loaves. Fat from the back of a pig (fatback). Bratwurst. Salted nuts and seeds. Canned beans with added salt. Canned or smoked fish. Whole eggs or egg yolks. Chicken or turkey with skin.  Dairy  Whole or 2% milk, cream, and half-and-half. Whole or full-fat cream cheese. Whole-fat or sweetened yogurt. Full-fat cheese. Nondairy creamers. Whipped toppings. Processed cheese and cheese spreads.  Fats and oils  Butter. Stick margarine. Lard. Shortening. Ghee. May fat. Tropical oils, such as coconut, palm kernel, or palm oil.  Seasonings and condiments  Onion salt, garlic salt, seasoned salt, table salt, and sea salt. Worcestershire sauce. Tartar sauce. Barbecue sauce. Teriyaki sauce. Soy sauce, including reduced-sodium. Steak sauce. Canned and packaged gravies. Fish sauce. Oyster sauce. Cocktail sauce. Store-bought horseradish. Ketchup. Mustard. Meat flavorings and tenderizers. Bouillon cubes. Hot sauces. Pre-made or packaged marinades. Pre-made or packaged taco seasonings. Relishes. Regular salad dressings.  Other foods  Salted popcorn and pretzels.  The items listed above may not be a complete list of foods and beverages you should avoid. Contact a dietitian for more information.  Where to find more information  · National Heart, Lung, and Blood [place]: www.nhlbi.nih.gov  · American Heart Association: www.heart.org  · Academy of Nutrition and Dietetics: www.eatright.org  · National Kidney Foundation: www.kidney.org  Summary  · The DASH eating plan is a healthy eating plan that has been shown to reduce high blood pressure (hypertension). It may also reduce your risk for type 2 diabetes, heart disease, and stroke.  · When on the DASH eating plan, aim to eat more fresh fruits and vegetables, whole grains, lean proteins, low-fat dairy, and heart-healthy fats.  · With the DASH eating plan, you should limit salt (sodium) intake to 2,300 mg a day. If you have hypertension, you may need to reduce your  sodium intake to 1,500 mg a day.  · Work with your health care provider or dietitian to adjust your eating plan to your individual calorie needs.  This information is not intended to replace advice given to you by your health care provider. Make sure you discuss any questions you have with your health care provider.  Document Revised: 11/20/2020 Document Reviewed: 11/20/2020  [employer] Patient Education © 2021 [employer]      Tobacco Use Disorder  Tobacco use disorder (TUD) occurs when a person craves, seeks, and uses tobacco, regardless of the consequences. This disorder can cause problems with mental and physical health. It can affect your ability to have healthy relationships, and it can keep you from meeting your responsibilities at work, home, or school.  Tobacco may be:  · Smoked as a cigarette or cigar.  · Inhaled using e-cigarettes.  · Smoked in a pipe or hookah.  · Chewed as smokeless tobacco.  · Inhaled into the nostrils as snuff.  Tobacco products contain a dangerous chemical called nicotine, which is very addictive. Nicotine triggers hormones that make the body feel stimulated and works on areas of the brain that make you feel good. These effects can make it hard for people to quit nicotine.  Tobacco contains many other unsafe chemicals that can damage almost every organ in the body. Smoking tobacco also puts others in danger due to fire risk and possible health problems caused by breathing in secondhand smoke.  What are the signs or symptoms?  Symptoms of TUD may include:  · Being unable to slow down or stop your tobacco use.  · Spending an abnormal amount of time getting or using tobacco.  · Craving tobacco. Cravings may last for up to 6 months after quitting.  · Tobacco use that:  ? Interferes with your work, school, or home life.  ? Interferes with your personal and social relationships.  ? Makes you give up activities that you once enjoyed or found important.  · Using tobacco even though you know  that it is:  ? Dangerous or bad for your health or someone else's health.  ? Causing problems in your life.  · Needing more and more of the substance to get the same effect (developing tolerance).  · Experiencing unpleasant symptoms if you do not use the substance (withdrawal). Withdrawal symptoms may include:  ? Depressed, anxious, or irritable mood.  ? Difficulty concentrating.  ? Increased appetite.  ? Restlessness or trouble sleeping.  · Using the substance to avoid withdrawal.  How is this diagnosed?  This condition may be diagnosed based on:  · Your current and past tobacco use. Your health care provider may ask questions about how your tobacco use affects your life.  · A physical exam.  You may be diagnosed with TUD if you have at least two symptoms within a 12-month period.  How is this treated?  This condition is treated by stopping tobacco use. Many people are unable to quit on their own and need help. Treatment may include:  · Nicotine replacement therapy (NRT). NRT provides nicotine without the other harmful chemicals in tobacco. NRT gradually lowers the dosage of nicotine in the body and reduces withdrawal symptoms. NRT is available as:  ? Over-the-counter gums, lozenges, and skin patches.  ? Prescription mouth inhalers and nasal sprays.  · Medicine that acts on the brain to reduce cravings and withdrawal symptoms.  · A type of talk therapy that examines your triggers for tobacco use, how to avoid them, and how to cope with cravings (behavioral therapy).  · Hypnosis. This may help with withdrawal symptoms.  · Joining a support group for others coping with TUD.  The best treatment for TUD is usually a combination of medicine, talk therapy, and support groups. Recovery can be a long process. Many people start using tobacco again after stopping (relapse). If you relapse, it does not mean that treatment will not work.  Follow these instructions at home:    Lifestyle  · Do not use any products that contain  nicotine or tobacco, such as cigarettes and e-cigarettes.  · Avoid things that trigger tobacco use as much as you can. Triggers include people and situations that usually cause you to use tobacco.  · Avoid drinks that contain caffeine, including coffee. These may worsen some withdrawal symptoms.  · Find ways to manage stress. Wanting to smoke may cause stress, and stress can make you want to smoke. Relaxation techniques such as deep breathing, meditation, and yoga may help.  · Attend support groups as needed. These groups are an important part of long-term recovery for many people.  General instructions  · Take over-the-counter and prescription medicines only as told by your health care provider.  · Check with your health care provider before taking any new prescription or over-the-counter medicines.  · Decide on a friend, family member, or smoking quit-line (such as 1-800-QUIT-NOW in the [place] that you can call or text when you feel the urge to smoke or when you need help coping with cravings.  · Keep all follow-up visits as told by your health care provider and therapist. This is important.  Contact a health care provider if:  · You are not able to take your medicines as prescribed.  · Your symptoms get worse, even with treatment.  Summary  · Tobacco use disorder (TUD) occurs when a person craves, seeks, and uses tobacco regardless of the consequences.  · This condition may be diagnosed based on your current and past tobacco use and a physical exam.  · Many people are unable to quit on their own and need help. Recovery can be a long process.  · The most effective treatment for TUD is usually a combination of medicine, talk therapy, and support groups.  This information is not intended to replace advice given to you by your health care provider. Make sure you discuss any questions you have with your health care provider.  Document Revised: 12/05/2018 Document Reviewed: 12/05/2018  [employer] Patient Education © 2021  [employer]      Advance Care Planning and Advance Directives     You make decisions on a daily basis - decisions about where you want to live, your career, your home, your life. Perhaps one of the most important decisions you face is your choice for future medical care. Take time to talk with your family and your healthcare team and start planning today.  Advance Care Planning is a process that can help you:  · Understand possible future healthcare decisions in light of your own experiences  · Reflect on those decision in light of your goals and values  · Discuss your decisions with those closest to you and the healthcare professionals that care for you  · Make a plan by creating a document that reflects your wishes    Surrogate Decision Maker  In the event of a medical emergency, which has left you unable to communicate or to make your own decisions, you would need someone to make decisions for you.  It is important to discuss your preferences for medical treatment with this person while you are in good health.     Qualities of a surrogate decision maker:  • Willing to take on this role and responsibility  • Knows what you want for future medical care  • Willing to follow your wishes even if they don't agree with them  • Able to make difficult medical decisions under stressful circumstances    Advance Directives  These are legal documents you can create that will guide your healthcare team and decision maker(s) when needed. These documents can be stored in the electronic medical record.    · Living Will - a legal document to guide your care if you have a terminal condition or a serious illness and are unable to communicate. States vary by statute in document names/types, but most forms may include one or more of the following:        -  Directions regarding life-prolonging treatments        -  Directions regarding artificially provided nutrition/hydration        -  Choosing a healthcare decision maker        -   Direction regarding organ/tissue donation    · Durable Power of  for Healthcare - this document names an -in-fact to make medical decisions for you, but it may also allow this person to make personal and financial decisions for you. Please seek the advice of an  if you need this type of document.    **Advance Directives are not required and no one may discriminate against you if you do not sign one.    Medical Orders  Many states allow specific forms/orders signed by your physician to record your wishes for medical treatment in your current state of health. This form, signed in personal communication with your physician, addresses resuscitation and other medical interventions that you may or may not want.      For more information or to schedule a time with a [facility] Advance Care Planning Facilitator contact: [email or web address] or call [phone] and someone will contact you directly.

## 2022-02-08 NOTE — PROGRESS NOTES
"Chief complaint:   Chief Complaint   Patient presents with   • Back Pain     Pt is here for 2 wk PO from kyphoplasty.          Subjective     HPI:   Interval History: Juan Luis Collazo is a 81 y.o.  male who presents today for post operative follow-up from a KYPHOPLASTY WITH BIOPSY, THORACIC 6 and LUMBAR 5 on 1/25/2022.  Mr. Collazo has done since we last saw him.  He continues to complain of mild upper thoracic and lower lumbar back pain, however denies lower extremity radicular pain, weakness, numbness, or tingling.  He additionally reports intermittent midsternal chest pain that resolves with physical activity.  He denies dyspnea, pain with deep breathing, or diaphoresis.  He additionally denies fevers, chills, concern for a postoperative incision infection, saddle anesthesia, bowel bladder dysfunction.  Overall he is very happy with the outcome of the surgery and states he is \"60% improved\" from his preoperative state.  He currently rates the severity of his symptoms 3/10.  No additional concerns at this time.    Review of Systems - Negative except chest and back pain    PFSH:  Past Medical History:   Diagnosis Date   • Arthritis    • Back pain    • Cancer (HCC)     prostate   • Hypertension    • Macular degeneration    • Osteopenia      Past Surgical History:   Procedure Laterality Date   • APPENDECTOMY     • CATARACT EXTRACTION, BILATERAL     • FOOT SURGERY Right    • KYPHOPLASTY Bilateral 7/17/2018    Procedure: L3 KYPHOPLASTY 1-2 LEVELS;  Surgeon: Vega Pandey MD;  Location:  [facility];  Service: Neurosurgery   • KYPHOPLASTY Bilateral 9/11/2018    Procedure: L4 KYPHOPLASTY WITH BIOPSY;  Surgeon: Vega Pandey MD;  Location:  [facility];  Service: Neurosurgery   • KYPHOPLASTY WITH BIOPSY N/A 1/25/2022    Procedure: KYPHOPLASTY WITH BIOPSY, THORACIC 6 and LUMBAR 5;  Surgeon: Nick Martínez MD;  Location:  [facility];  Service: Neurosurgery;  Laterality: N/A;   • PROSTATECTOMY     • " "TONSILLECTOMY     • TOTAL SHOULDER REPLACEMENT Bilateral     at different times     Objective      Current Outpatient Medications   Medication Sig Dispense Refill   • calcitonin, salmon, (Miacalcin) 200 UNIT/ACT nasal spray 1 spray by Alternating Nares route Daily. 1 each 12   • hydroCHLOROthiazide (HYDRODIURIL) 12.5 MG tablet TAKE 1 TABLET BY MOUTH DAILY. 90 tablet 0   • Multiple Vitamins-Minerals (ICAPS AREDS 2) capsule Take 1 capsule by mouth Daily.     • sennosides-docusate (PERICOLACE) 8.6-50 MG per tablet Take 2 tablets by mouth Daily. 60 tablet 0   • traMADol-acetaminophen (ULTRACET) 37.5-325 MG per tablet Take 1 tablet by mouth 3 (Three) Times a Day. 90 tablet 5     No current facility-administered medications for this visit.     Vital Signs  Ht 175.3 cm (69\")   Wt 78 kg (172 lb)   BMI 25.40 kg/m²   Physical Exam  Vitals and nursing note reviewed.   Constitutional:       General: He is not in acute distress.     Appearance: Normal appearance. He is well-developed, well-groomed and overweight. He is not ill-appearing, toxic-appearing or diaphoretic.          Comments: BMI 25.40   HENT:      Head: Normocephalic and atraumatic.      Right Ear: Hearing normal.      Left Ear: Hearing normal.   Eyes:      Extraocular Movements: EOM normal.      Conjunctiva/sclera: Conjunctivae normal.      Pupils: Pupils are equal, round, and reactive to light.   Neck:      Trachea: Trachea normal.   Cardiovascular:      Rate and Rhythm: Normal rate and regular rhythm.   Pulmonary:      Effort: Pulmonary effort is normal. No tachypnea, bradypnea, accessory muscle usage or respiratory distress.   Abdominal:      Palpations: Abdomen is soft.   Musculoskeletal:      Cervical back: Full passive range of motion without pain and neck supple.   Skin:     General: Skin is warm and dry.   Neurological:      Mental Status: He is alert and oriented to person, place, and time.      GCS: GCS eye subscore is 4. GCS verbal subscore is 5. GCS " motor subscore is 6.      Gait: Gait is intact.      Deep Tendon Reflexes:      Reflex Scores:       Tricep reflexes are 1+ on the right side and 1+ on the left side.       Bicep reflexes are 1+ on the right side and 1+ on the left side.       Brachioradialis reflexes are 1+ on the right side and 1+ on the left side.       Patellar reflexes are 1+ on the right side and 1+ on the left side.       Achilles reflexes are 0 on the right side and 0 on the left side.  Psychiatric:         Speech: Speech normal.         Behavior: Behavior normal. Behavior is cooperative.       Neurologic Exam     Mental Status   Oriented to person, place, and time.   Attention: normal. Concentration: normal.   Speech: speech is normal   Level of consciousness: alert    Cranial Nerves     CN II   Visual fields full to confrontation.     CN III, IV, VI   Pupils are equal, round, and reactive to light.  Extraocular motions are normal.     CN V   Facial sensation intact.     CN VII   Facial expression full, symmetric.     CN VIII   CN VIII normal.     CN IX, X   CN IX normal.     CN XI   CN XI normal.     Motor Exam   Right arm tone: normal  Left arm tone: normal  Right leg tone: normal  Left leg tone: normal    Strength   Right deltoid: 5/5  Left deltoid: 5/5  Right biceps: 5/5  Left biceps: 5/5  Right triceps: 5/5  Left triceps: 5/5  Right wrist extension: 5/5  Left wrist extension: 5/5  Right iliopsoas: 5/5  Left iliopsoas: 5/5  Right quadriceps: 5/5  Left quadriceps: 5/5  Right anterior tibial: 4/5  Left anterior tibial: 5/5  Right gastroc: 5/5  Left gastroc: 5/5  Right EHL 5/5  Left EHL 5/5       Sensory Exam   Right arm light touch: normal  Left arm light touch: normal  Right leg light touch: normal  Left leg light touch: normal    Gait, Coordination, and Reflexes     Gait  Gait: normal    Tremor   Resting tremor: absent  Intention tremor: absent  Action tremor: absent    Reflexes   Right brachioradialis: 1+  Left brachioradialis:  1+  Right biceps: 1+  Left biceps: 1+  Right triceps: 1+  Left triceps: 1+  Right patellar: 1+  Left patellar: 1+  Right achilles: 0  Left achilles: 0  Right plantar: normal  Left plantar: normal  Right Villalta: absent  Left Villalta: absent  Right ankle clonus: absent  Left ankle clonus: absent  Right pendular knee jerk: absent  Left pendular knee jerk: absent    Incision: CLINICAL PHOTOGRAPHS - SCAN - Postop (02/08/2022)  (Consent to obtain photo of postoperative site for documentation purposes only obtained verbally by Mr. Collazo)    Results Review:   CT Chest With & Without Contrast Diagnostic    Result Date: 1/28/2022   1.  No acute findings. 2.  Lungs are clear other than for scattered areas of atelectasis, most pronounced in both lung bases. 3.  Multiple chronic thoracolumbar compression deformities with kyphoplasty change noted at T7, L3, and L4. No new compression fracture identified. This report was finalized on 01/28/2022 15:48 by Dr. Audie Au MD.    SURGICAL PATHOLOGY RESULTS  Lab Results   Component Value Date    FINALDX  01/25/2022     1.  Bone, L5 vertebra, biopsy:  A.  Blood with bone marrow particles with increased numbers of plasma cells in a background of delicate fibrosis.  B.  Extremely scant, minute fragments of bone.  C.  No histologic evidence of metastatic malignancy.    Comment: A reactive plasmacytosis secondary to the fracture is favored.  Recommend that serum protein electrophoresis be considered to exclude the presence of abnormal proteins in the blood.  Clinical correlation, including correlation with radiologic studies, is advised.    2.  Bone, T6 vertebra, biopsy:  A.  Changes consistent with healing fracture site including delicate marrow fibrosis and reactive new bone formation.  B.  A few osteopenic bone spicules seen.  C.  No increase in plasma cells.  D.  No histologic evidence of metastatic malignancy.      FINALDX  01/25/2022     1.  Bone, L5 vertebra, biopsy:  A.  Blood  with bone marrow particles with increased numbers of plasma cells in a background of delicate fibrosis.  B.  Extremely scant, minute fragments of bone.  C.  No histologic evidence of metastatic malignancy.    Comment: A reactive plasmacytosis secondary to the fracture is favored.  Recommend that serum protein electrophoresis be considered to exclude the presence of abnormal proteins in the blood.  Clinical correlation, including correlation with radiologic studies, is advised.    2.  Bone, T6 vertebra, biopsy:  A.  Changes consistent with healing fracture site including delicate marrow fibrosis and reactive new bone formation.  B.  A few osteopenic bone spicules seen.  C.  No increase in plasma cells.  D.  No histologic evidence of metastatic malignancy.      [facility]  01/25/2022     1.  Submitted in formalin in a container labeled with the patient's name and date of birth and designated L5 is a 1 x 0.3 x 0.2 cm aggregate of bloody material which is submitted in toto in block 1A.    2.  Submitted in formalin in a container labeled with the patient's name and date of birth and designated T6 is a cylindrical segment of bony tan tissue which has a length of 0.3 cm in a diameter of 0.2 cm.  The specimen is submitted in toto in block 2A for decalcification in Immun[facility].      [facility]  [facility]     1.  Submitted in formalin in a container labeled with the patient's name and date of birth and designated L5 is a 1 x 0.3 x 0.2 cm aggregate of bloody material which is submitted in toto in block 1A.    2.  Submitted in formalin in a container labeled with the patient's name and date of birth and designated T6 is a cylindrical segment of bony tan tissue which has a length of 0.3 cm in a diameter of 0.2 cm.  The specimen is submitted in toto in block 2A for decalcification in Immunocal.      [facility]  01/25/2022     1. Sections of the L5 vertebral biopsy reveal blood with entrapped bone marrow particles. Extremely scant, minute  fragments of trabecular bone are seen. Maturing trilineage hematopoiesis is seen. There are fragments of marrow with a delicate fibrous background demonstrating many mature plasma cells. An occasional binucleated plasma cell is seen.  The plasmacytic infiltrate is not dense.  A reactive plasmacytosis is favored secondary to the fracture.  Because a few epithelioid cells were seen, immunohistochemical stains for pankeratin and CD68 were performed.  Pankeratin is negative and CD68 highlights the cells in question, supporting a diagnosis of macrophages in these cells and mitigating against metastatic carcinoma.  Only single antibody stains were used.  No cocktail stains were used.  The controls stain appropriately.  Granulomata are not seen.    2.  Sections of the T6 vertebral biopsy reveal cancellous bone with changes of a healing fracture site.  New bone formation is seen.  A few of the spicules appear attenuated and osteopenic.  There is delicate marrow fibrosis.  The marrow is somewhat paucicellular with scattered lymphocytes, scattered plasma cells and scattered eosinophils seen.  A few macrophages are present containing hemosiderin.  There is no evidence of metastatic malignancy.      MI[facility]  01/25/2022     1. Sections of the L5 vertebral biopsy reveal blood with entrapped bone marrow particles. Extremely scant, minute fragments of trabecular bone are seen. Maturing trilineage hematopoiesis is seen. There are fragments of marrow with a delicate fibrous background demonstrating many mature plasma cells. An occasional binucleated plasma cell is seen.  The plasmacytic infiltrate is not dense.  A reactive plasmacytosis is favored secondary to the fracture.  Because a few epithelioid cells were seen, immunohistochemical stains for pankeratin and CD68 were performed.  Pankeratin is negative and CD68 highlights the cells in question, supporting a diagnosis of macrophages in these cells and mitigating against metastatic  carcinoma.  Only single antibody stains were used.  No cocktail stains were used.  The controls stain appropriately.  Granulomata are not seen.    2.  Sections of the T6 vertebral biopsy reveal cancellous bone with changes of a healing fracture site.  New bone formation is seen.  A few of the spicules appear attenuated and osteopenic.  There is delicate marrow fibrosis.  The marrow is somewhat paucicellular with scattered lymphocytes, scattered plasma cells and scattered eosinophils seen.  A few macrophages are present containing hemosiderin.  There is no evidence of metastatic malignancy.       Assessment/Plan:   Compression fracture T6  Compression fracture L5  Thoracic and lumbar back pain, secondary to above  Status post T6 and L5 kyphoplasty with biopsy  Juan Luis Collazo is a 81 y.o. male who presents today for post operative wound check following a KYPHOPLASTY WITH BIOPSY, THORACIC 6 and LUMBAR 5 on 1/25/2022.  Mr. Collazo has done well since we last saw him.   His symptoms are stable.  His post operative incision is clean, dry, intact, and well approximated without signs of soft tissue infection.  We discussed the signs and symptoms of a soft tissue infection and I recommended they call immediately for any concerns.  He is currently taking intermittent tramadol for pain.  Family questions use of NSAIDs.  Rx provided for a trial of Mobic.  B/R/AE discussed.  I recommended slow and steady progression back to normal daily activities.  We will have him return for reassessment with Dr. Martínez as scheduled on 3/23/2022.  Juan Luis knows to call the neurosurgical clinic to return sooner for any new or additional concerns.      Atypical chest pain  Mr. Collazo expresses concern for midsternal chest pain that resolved with physical activity.  He explicitly denies dyspnea, pain with deep breathing, or diaphoresis.  His CT of the chest showed no acute abnormalities.  In general, his discomfort has improved since onset.   Given his resolve of symptoms I am inclined to believe that his chest discomfort is as a result of intraoperative positioning.  We additionally discussed that chest pain could be as a result of an acute cardiac and/or pulmonary process and in the event of his symptoms continuing and/or worsening he was once again encouraged to follow-up with his primary medical doctor and/or seek emergent medical care.    Osteopenia with fragility fracture  At high risk for fragility fracture  The  following recommendations are based on the AACE clinical practice guidelines for the diagnostic and treatment of postmenopausal osteoporosis  AM[employer]ICAN ASSOC[employer]ATION OF CL[employer]ICAL ENDOCRINOLOGISTS/ AMERICAN COLLEGE OF ENDOCRINOLOGY CLINICAL PRACTICE GUIDELINES FOR THE DIAGNOSIS AND TREATMENT OF POSTMENOPAUSAL OSTEOPOROSIS-- 2020 UPDATE      DEXA scan obtained on 12/9/2021 is positive for osteopenia.       FRAX Fracture Risk Assessment Tool (https://www.rene.ac.uk/FRAX/tool.aspx?country=9)  Based on the FRAX score Juan Luis has a ten year probability of a major osteoporotic fracture of 22% and a hip fracture of 12%.     *FRAX scores ?3% for hip fracture or ?20% for major osteoporotic fracture in the [place] are recommended to consider osteoporosis treatment.  *Osteoporosis may be diagnosed based on presence of fragility fractures in the absence of other metabolic bone disorders and even with a normal bone mineral density (T-score). (Grade B)     Mr. Collazo continues to take calcitonin as prescribed, however expresses concern for cost of said medication.  We discussed alternative treatments to osteoporosis with recurrent compression deformities…     Bisphosphonates   Bisphosphonates, oral alendronate (Fosamax), oral or IV ibandronate (Boniva), oral risedronate (Actonel), and IV zoledronate (Reclast), bind to hydroxyapatite in bone, particularly at sites of active bone remodeling, and reduce the activity of bone-resorbing osteoclasts. Three of the  four, alendronate (Fosamax), risedronate (Actonel), and zoledronate (Reclast) have evidence for broad-spectrum antifracture efficacy.      Juan Luis has the following contraindications for use of a bisphosphonate: None    Administration: Bisphosphonates must be taken after a prolonged fast (usually fasting overnight and taken in the morning soon after arising) and swallowed with a full glass of water (with at least a 30-minute wait after ingestion before other medications, food, or beverages other than water).   The patient was instructed not to lie down for at least 30 minutes after taking this medication.    AE: The most common adverse reactions include, but are not limited to abdominal pain, acid regurgitation, constipation, diarrhea, bone, joint or muscle pain, and nausea.      Benefits, risk, use, and adverse effects of alendronate (Fosamax) were acknowledged by Vale and he requesting to proceed with this medication.  I recommended he continue current prescription for calcitonin until complete and then discard and begin Fosamax as prescribed.  For any concerns of an adverse reaction please discontinue this medication immediately; if needed seek emergent medical evaluation, contact the clinic immediately, or follow-up with your PCP.      Rx for treatment provided for: alendronate (Fosamax) 70 mg PO weekly     Overweight: 25.0-29.9kg/m2   Body mass index is 25.4 kg/m².  Information on the DASH diet provided in the AVS.  We will continue to provided diet and exercise information with the goal of weight loss at each scheduled appointment.     Diagnoses and all orders for this visit:    1. Compression fracture of T6 vertebra, sequela (Primary)    2. Compression fracture of L5 vertebra, sequela    3. Thoracic back pain, unspecified back pain laterality, unspecified chronicity  -     meloxicam (MOBIC) 15 MG tablet; Take 1 tablet by mouth Daily.  Dispense: 30 tablet; Refill: 0    4. Lumbar back pain  -     meloxicam  (MOBIC) 15 MG tablet; Take 1 tablet by mouth Daily.  Dispense: 30 tablet; Refill: 0    5. S/P kyphoplasty    6. Chest pain, atypical    7. Osteopenia, unspecified location  -     alendronate (Fosamax) 70 MG tablet; Take 1 tablet by mouth Every 7 (Seven) Days.  Dispense: 4 tablet; Refill: 11    8. Overweight (BMI 25.0-29.9)      Return for KEEP SCHEDULED APPT WITH DR. MOFFETT.    I discussed the patients findings and my recommendations with patient    DIEGO Dominguez

## 2022-03-23 ENCOUNTER — OFFICE VISIT (OUTPATIENT)
Dept: NEUROSURGERY | Facility: CLINIC | Age: 82
End: 2022-03-23

## 2022-03-23 VITALS — HEIGHT: 69 IN | WEIGHT: 172 LBS | BODY MASS INDEX: 25.48 KG/M2

## 2022-03-23 DIAGNOSIS — Z78.9 NONSMOKER: ICD-10-CM

## 2022-03-23 DIAGNOSIS — E66.3 OVERWEIGHT (BMI 25.0-29.9): ICD-10-CM

## 2022-03-23 DIAGNOSIS — S22.050S COMPRESSION FRACTURE OF T6 VERTEBRA, SEQUELA: Primary | ICD-10-CM

## 2022-03-23 DIAGNOSIS — Z98.890 S/P KYPHOPLASTY: ICD-10-CM

## 2022-03-23 DIAGNOSIS — S32.050S COMPRESSION FRACTURE OF L5 VERTEBRA, SEQUELA: ICD-10-CM

## 2022-03-23 PROCEDURE — 99024 POSTOP FOLLOW-UP VISIT: CPT | Performed by: NEUROLOGICAL SURGERY

## 2022-03-23 NOTE — PROGRESS NOTES
"Chief Complaint   Patient presents with   • Post-op     Patient had kypho on 1/25. Only complaint is burning in between shoulder blades.        HPI:   Interval History: Returns today following up after kyphoplasty for compression fracture T6 and L5.  He is doing well.  He felt like the kyphoplasty helped a lot.  Took well of his pain.  He has 0-10 pain today.  He does find that he has to steady himself once he stands up but no new neurological deficits.  Incisions have healed fine without consequence    Current Outpatient Medications   Medication Sig Dispense Refill   • alendronate (Fosamax) 70 MG tablet Take 1 tablet by mouth Every 7 (Seven) Days. 4 tablet 11   • hydroCHLOROthiazide (HYDRODIURIL) 12.5 MG tablet TAKE 1 TABLET BY MOUTH DAILY. 90 tablet 0   • meloxicam (MOBIC) 15 MG tablet Take 1 tablet by mouth Daily. 30 tablet 0   • Multiple Vitamins-Minerals (ICAPS AREDS 2) capsule Take 1 capsule by mouth Daily.     • sennosides-docusate (PERICOLACE) 8.6-50 MG per tablet Take 2 tablets by mouth Daily. 60 tablet 0   • traMADol-acetaminophen (ULTRACET) 37.5-325 MG per tablet Take 1 tablet by mouth 3 (Three) Times a Day. 90 tablet 5     No current facility-administered medications for this visit.       Vital Signs  Ht 175.3 cm (69\")   Wt 78 kg (172 lb)   BMI 25.40 kg/m²   Physical Exam  Eyes:      Extraocular Movements: EOM normal.      Pupils: Pupils are equal, round, and reactive to light.   Neurological:      Mental Status: He is oriented to person, place, and time.      Gait: Gait is intact.      Deep Tendon Reflexes:      Reflex Scores:       Tricep reflexes are 1+ on the right side and 1+ on the left side.       Bicep reflexes are 1+ on the right side and 1+ on the left side.       Brachioradialis reflexes are 1+ on the right side and 1+ on the left side.       Patellar reflexes are 1+ on the right side and 1+ on the left side.       Achilles reflexes are 0 on the right side and 0 on the left " side.  Psychiatric:         Speech: Speech normal.       Neurologic Exam     Mental Status   Oriented to person, place, and time.   Attention: normal. Concentration: normal.   Speech: speech is normal   Level of consciousness: alert    Cranial Nerves     CN II   Visual fields full to confrontation.     CN III, IV, VI   Pupils are equal, round, and reactive to light.  Extraocular motions are normal.     CN V   Facial sensation intact.     CN VII   Facial expression full, symmetric.     CN VIII   CN VIII normal.     CN IX, X   CN IX normal.     CN XI   CN XI normal.     Motor Exam   Right arm tone: normal  Left arm tone: normal  Right leg tone: normal  Left leg tone: normal    Strength   Right deltoid: 5/5  Left deltoid: 5/5  Right biceps: 5/5  Left biceps: 5/5  Right triceps: 5/5  Left triceps: 5/5  Right wrist extension: 5/5  Left wrist extension: 5/5  Right iliopsoas: 5/5  Left iliopsoas: 5/5  Right quadriceps: 5/5  Left quadriceps: 5/5  Right anterior tibial: 4/5  Left anterior tibial: 5/5  Right gastroc: 5/5  Left gastroc: 5/5  Right EHL 5/5  Left EHL 5/5       Sensory Exam   Right arm light touch: normal  Left arm light touch: normal  Right leg light touch: normal  Left leg light touch: normal    Gait, Coordination, and Reflexes     Gait  Gait: normal    Tremor   Resting tremor: absent  Intention tremor: absent  Action tremor: absent    Reflexes   Right brachioradialis: 1+  Left brachioradialis: 1+  Right biceps: 1+  Left biceps: 1+  Right triceps: 1+  Left triceps: 1+  Right patellar: 1+  Left patellar: 1+  Right achilles: 0  Left achilles: 0  Right plantar: normal  Left plantar: normal  Right Villalta: absent  Left Villalta: absent  Right ankle clonus: absent  Left ankle clonus: absent  Right pendular knee jerk: absent  Left pendular knee jerk: absent      Incision: Clean dry and intact      Results Review:   No radiology results for the last 30 days.    AP and lateral fluoroscopy of the thoracic and lumbar  spine reviewed with patient showing kyphoplasty.      Assessment/Plan:   Juan Luis Collazo is a 81 y.o. male with a significant medical history of prior L3 kyphoplasty on 7/17/2018 and L4 kyphoplasty on 9/11/2018 per Dr. Vega Pandey, as well as T6 and L5 kyphoplasty by myself in January 2022, hypertension, hyperlipidemia, prostate cancer, osteopenia, former smoker, and he is overweight. He presents today for follow-up after kyphoplasty.  ANUJ: 56,14,46> not obtained postop.  Physical exam findings of +4/5 right anterior tibial weakness and gross hyporeflexia.       Recommendations:  Acute compression fractures of T6 and L5  S/p kyphoplasty (L3, L4 Dannie and T6, L5 Tanya)  Juan Luis has done very well from his KYPHOPLASTY WITH BIOPSY, THORACIC 6 and LUMBAR 5 on 1/25/2022.  Incision is healing well without complications., Neuro exam is stable from preoperative state. , Pain is well controlled off all medications., PT/OT services are not needed. and Other recommendations I would like him to return on as-needed basis        1. Compression fracture of T6 vertebra, sequela    2. Compression fracture of L5 vertebra, sequela    3. S/P kyphoplasty    4. Overweight (BMI 25.0-29.9)    5. Nonsmoker        Diagnoses and all orders for this visit:    1. Compression fracture of T6 vertebra, sequela (Primary)    2. Compression fracture of L5 vertebra, sequela    3. S/P kyphoplasty    4. Overweight (BMI 25.0-29.9)    5. Nonsmoker        I discussed the patients findings and my recommendations with patient    Nick Martínez MD

## 2022-04-14 ENCOUNTER — OFFICE VISIT (OUTPATIENT)
Dept: INTERNAL MEDICINE | Facility: CLINIC | Age: 82
End: 2022-04-14

## 2022-04-14 VITALS
DIASTOLIC BLOOD PRESSURE: 80 MMHG | HEIGHT: 69 IN | WEIGHT: 164 LBS | OXYGEN SATURATION: 98 % | BODY MASS INDEX: 24.29 KG/M2 | TEMPERATURE: 98 F | SYSTOLIC BLOOD PRESSURE: 138 MMHG | HEART RATE: 71 BPM
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
## 2022-04-14 DIAGNOSIS — I10 BENIGN HYPERTENSION: ICD-10-CM

## 2022-04-14 DIAGNOSIS — L57.0 ACTINIC KERATOSIS: Primary | ICD-10-CM
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
## 2022-04-14 DIAGNOSIS — R73.01 IMPAIRED FASTING GLUCOSE: ICD-10-CM

## 2022-04-14 LAB — HBA1C MFR BLD: 5.6 %

## 2022-04-14 PROCEDURE — 83036 HEMOGLOBIN GLYCOSYLATED A1C: CPT | Performed by: FAMILY MEDICINE

## 2022-04-14 PROCEDURE — 99214 OFFICE O/P EST MOD 30 MIN: CPT | Performed by: FAMILY MEDICINE

## 2022-04-14 PROCEDURE — 3044F HG A1C LEVEL LT 7.0%: CPT | Performed by: FAMILY MEDICINE

## 2022-04-14 RX ORDER — HYDROCHLOROTHIAZIDE 12.5 MG/1
12.5 TABLET ORAL DAILY
Qty: 90 TABLET | Refills: 3 | Status: SHIPPED | OUTPATIENT
Start: 2022-04-14

## 2022-04-14 NOTE — PROGRESS NOTES
Subjective     Chief Complaint   Patient presents with   • Hypertension     6 month follow up    • Prediabetes     A1c:  5.6   • Balance Issues     Pt states he has issues with balance since having surgery on his back about 2 months ago   • Cyst     Cyst on back        History of Present Illness    Patient's PMR from outside medical facility reviewed and noted.    Review of Systems     Otherwise complete ROS reviewed and negative except as mentioned in the HPI.    Past Medical History:   Past Medical History:   Diagnosis Date   • Arthritis    • Back pain    • Cancer (HCC)     prostate   • Hypertension    • Macular degeneration    • Osteopenia      Past Surgical History:  Past Surgical History:   Procedure Laterality Date   • APPENDECTOMY     • CATARACT EXTRACTION, BILATERAL     • FOOT SURGERY Right    • KYPHOPLASTY Bilateral 7/17/2018    Procedure: L3 KYPHOPLASTY 1-2 LEVELS;  Surgeon: Vega Pandey MD;  Location:  [facility];  Service: Neurosurgery   • KYPHOPLASTY Bilateral 9/11/2018    Procedure: L4 KYPHOPLASTY WITH BIOPSY;  Surgeon: Vega Pandey MD;  Location:  [facility];  Service: Neurosurgery   • KYPHOPLASTY WITH BIOPSY N/A 1/25/2022    Procedure: KYPHOPLASTY WITH BIOPSY, THORACIC 6 and LUMBAR 5;  Surgeon: Nick Martínez MD;  Location:  [facility];  Service: Neurosurgery;  Laterality: N/A;   • PROSTATECTOMY     • TONSILLECTOMY     • TOTAL SHOULDER REPLACEMENT Bilateral     at different times     Social History:  reports that he quit smoking about 30 years ago. He has never used smokeless tobacco. He reports current alcohol use. He reports that he does not use drugs.    Family History: family history includes No Known Problems in his father and mother.      Allergies:  No Known Allergies  Medications:  Prior to Admission medications    Medication Sig Start Date End Date Taking? Authorizing Provider   hydroCHLOROthiazide (HYDRODIURIL) 12.5 MG tablet TAKE 1 TABLET BY MOUTH DAILY. 1/24/22  " Yes Juan Luis Aguilar MD   meloxicam (MOBIC) 15 MG tablet Take 1 tablet by mouth Daily. 2/8/22  Yes Curtis Woodward APRN   Multiple Vitamins-Minerals (ICAPS AREDS 2) capsule Take 1 capsule by mouth Daily.   Yes Rosa Elena Slade MD   sennosides-docusate (PERICOLACE) 8.6-50 MG per tablet Take 2 tablets by mouth Daily. 1/25/22  Yes Curtis Woodward APRN   traMADol-acetaminophen (ULTRACET) 37.5-325 MG per tablet Take 1 tablet by mouth 3 (Three) Times a Day. 1/28/22  Yes Curtis Woodward APRN   alendronate (Fosamax) 70 MG tablet Take 1 tablet by mouth Every 7 (Seven) Days. 2/8/22 4/14/22  Curtis Woodward APRN       Objective     Vital Signs: /80 (BP Location: Left arm, Patient Position: Sitting, Cuff Size: Adult)   Pulse 71   Temp 98 °F (36.7 °C) (Temporal)   Ht 175.3 cm (69\")   Wt 74.4 kg (164 lb)   SpO2 98%   BMI 24.22 kg/m²   Physical Exam    Patient's Body mass index is 24.22 kg/m². indicating that he is within normal range (BMI 18.5-24.9). No BMI management plan needed..      Results Reviewed:  Glucose   Date Value Ref Range Status   01/20/2022 114 (H) 65 - 99 mg/dL Final   11/15/2021 120 (H) 74 - 109 mg/dL Final     BUN   Date Value Ref Range Status   01/20/2022 20 8 - 23 mg/dL Final   11/15/2021 29 (H) 8 - 23 mg/dL Final     Creatinine   Date Value Ref Range Status   01/20/2022 0.83 0.76 - 1.27 mg/dL Final   11/15/2021 1.6 (H) 0.5 - 1.2 mg/dL Final     Sodium   Date Value Ref Range Status   01/20/2022 141 136 - 145 mmol/L Final   11/15/2021 138 136 - 145 mmol/L Final     Potassium   Date Value Ref Range Status   01/20/2022 4.3 3.5 - 5.2 mmol/L Final   11/15/2021 5.2 (H) 3.5 - 5.0 mmol/L Final     Chloride   Date Value Ref Range Status   01/20/2022 102 98 - 107 mmol/L Final   11/15/2021 98 98 - 111 mmol/L Final     CO2   Date Value Ref Range Status   01/20/2022 31.0 (H) 22.0 - 29.0 mmol/L Final   11/15/2021 27 22 - 29 mmol/L Final     Calcium   Date Value Ref Range Status   01/20/2022 9.4 8.6 - 10.5 " mg/dL Final   11/15/2021 9.9 8.8 - 10.2 mg/dL Final     ALT (SGPT)   Date Value Ref Range Status   11/15/2021 13 5 - 41 U/L Final     AST (SGOT)   Date Value Ref Range Status   11/15/2021 13 5 - 40 U/L Final     WBC   Date Value Ref Range Status   01/20/2022 6.63 3.40 - 10.80 10*3/mm3 Final   11/15/2021 8.5 4.8 - 10.8 K/uL Final     Hematocrit   Date Value Ref Range Status   01/20/2022 38.6 37.5 - 51.0 % Final   11/15/2021 40.6 (L) 42.0 - 52.0 % Final     Platelets   Date Value Ref Range Status   01/20/2022 272 140 - 450 10*3/mm3 Final   11/15/2021 273 130 - 400 K/uL Final     Triglycerides   Date Value Ref Range Status   10/26/2020 204 (H) 0 - 150 mg/dL Final     HDL Cholesterol   Date Value Ref Range Status   10/26/2020 39 (L) 40 - 60 mg/dL Final     LDL Chol Calc ([facility])   Date Value Ref Range Status   10/26/2020 87 0 - 100 mg/dL Final     Hemoglobin A1C   Date Value Ref Range Status   10/27/2021 5.5 % Final         Assessment / Plan     Assessment/Plan:  There are no diagnoses linked to this encounter.      No follow-ups on file. unless patient needs to be seen sooner or acute issues arise.      I have discussed the patient results/orders and and plan/recommendation with them at today's visit.      Leslye Mccarthy DO   04/14/2022

## 2022-04-14 NOTE — PROGRESS NOTES
"        Subjective     Chief Complaint   Patient presents with   • Hypertension     6 month follow up    • Prediabetes     A1c:  5.6   • Balance Issues     Pt states he has issues with balance since having surgery on his back about 2 months ago   • Cyst     Cyst on back        History of Present Illness    He has fallen at home twice in the mud and following his back surgery.     Cyst on back  He reports it is not too bothersome but he would like to have it removed and is agreeable to waiting until later this year.     Scoliosis  He had recent back surgery to include \"2 vertebrae up and 3 below.\" Juan Luis reports he \"broke his neck in the 1960s.\"     Hypertension  He does not monitor his blood pressure at home, but continues taking medication.     Imbalance  He reports balance issues since recent back surgery. Juan Luis says he did not have any balance training following that surgery. The patient states he has had 2 recent falls in the mud and now ambulates with a cane for extra support. He is agreeable to a referral for physical therapy.     Actinic keratoses on bilateral ears  Juan Luis has some keratoses on his bilateral ears with some crusting. Left ear is more effected than right. The patient is agreeable to referral for dermatology.     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 He denies bowel complaints. No urinary symptoms. Juan Luis says he has a new set of hearing aides that he is struggling to get comfortable with.     Patient's PMR from outside medical facility reviewed and noted.    Review of Systems     Otherwise complete ROS reviewed and negative except as mentioned in the HPI.    Past Medical History:   Past Medical History:   Diagnosis Date   • Arthritis    • Back pain    • Cancer (HCC)     prostate   • Hypertension    • Macular degeneration    • Osteopenia      Past Surgical History:  Past Surgical History:   Procedure Laterality " Date   • APPENDECTOMY     • CATARACT EXTRACTION, BILATERAL     • FOOT SURGERY Right    • KYPHOPLASTY Bilateral 7/17/2018    Procedure: L3 KYPHOPLASTY 1-2 LEVELS;  Surgeon: Vega Pandey MD;  Location:  [facility];  Service: Neurosurgery   • KYPHOPLASTY Bilateral 9/11/2018    Procedure: L4 KYPHOPLASTY WITH BIOPSY;  Surgeon: Vega Pandey MD;  Location:  [facility];  Service: Neurosurgery   • KYPHOPLASTY WITH BIOPSY N/A 1/25/2022    Procedure: KYPHOPLASTY WITH BIOPSY, THORACIC 6 and LUMBAR 5;  Surgeon: Nick Martínez MD;  Location:  [facility];  Service: Neurosurgery;  Laterality: N/A;   • PROSTATECTOMY     • TONSILLECTOMY     • TOTAL SHOULDER REPLACEMENT Bilateral     at different times     Social History:  reports that he quit smoking about 30 years ago. He has never used smokeless tobacco. He reports current alcohol use. He reports that he does not use drugs.    Family History: family history includes No Known Problems in his father and mother.      Allergies:  No Known Allergies  Medications:  Prior to Admission medications    Medication Sig Start Date End Date Taking? Authorizing Provider   hydroCHLOROthiazide (HYDRODIURIL) 12.5 MG tablet TAKE 1 TABLET BY MOUTH DAILY. 1/24/22  Yes Juan Luis Aguilar MD   meloxicam (MOBIC) 15 MG tablet Take 1 tablet by mouth Daily. 2/8/22  Yes Curtis Woodward APRN   Multiple Vitamins-Minerals (ICAPS AREDS 2) capsule Take 1 capsule by mouth Daily.   Yes Provider, MD Rosa Elena   sennosides-docusate (PERICOLACE) 8.6-50 MG per tablet Take 2 tablets by mouth Daily. 1/25/22  Yes Curtis Woodward APRN   traMADol-acetaminophen (ULTRACET) 37.5-325 MG per tablet Take 1 tablet by mouth 3 (Three) Times a Day. 1/28/22  Yes Curtis Woodward APRN   alendronate (Fosamax) 70 MG tablet Take 1 tablet by mouth Every 7 (Seven) Days. 2/8/22 4/14/22  Curtis Woodward APRN       Objective     Vital Signs: /80 (BP Location: Left arm, Patient Position: Sitting, Cuff Size: Adult)   Pulse  "71   Temp 98 °F (36.7 °C) (Temporal)   Ht 175.3 cm (69\")   Wt 74.4 kg (164 lb)   SpO2 98%   BMI 24.22 kg/m²   Physical Exam  Vitals and nursing note reviewed.   Constitutional:       Appearance: Normal appearance.   HENT:      Head: Normocephalic and atraumatic.      Right Ear: External ear normal.      Left Ear: External ear normal.      Nose: Nose normal.      Mouth/Throat:      Mouth: Mucous membranes are moist.   Eyes:      Extraocular Movements: Extraocular movements intact.      Conjunctiva/sclera: Conjunctivae normal.      Pupils: Pupils are equal, round, and reactive to light.   Cardiovascular:      Rate and Rhythm: Normal rate and regular rhythm.      Pulses: Normal pulses.      Heart sounds: No murmur heard.    No friction rub. No gallop.   Pulmonary:      Effort: Pulmonary effort is normal.      Breath sounds: Normal breath sounds.   Abdominal:      General: Bowel sounds are normal.      Palpations: Abdomen is soft.   Musculoskeletal:         General: Normal range of motion.      Cervical back: Normal range of motion and neck supple.   Skin:     General: Skin is warm and dry.      Capillary Refill: Capillary refill takes less than 2 seconds.      Comments: He has a 2 cm cyst on his right mid back. It does bother him when he lays down on it. At this point it is not inflamed and he is amenable to wait for treatment.   Neurological:      General: No focal deficit present.      Mental Status: He is alert and oriented to person, place, and time.      Cranial Nerves: No cranial nerve deficit.   Psychiatric:         Mood and Affect: Mood normal.         Behavior: Behavior normal.         Patient's Body mass index is 24.22 kg/m². indicating that he is within normal range (BMI 18.5-24.9). No BMI management plan needed..      Results Reviewed:  Glucose   Date Value Ref Range Status   01/20/2022 114 (H) 65 - 99 mg/dL Final   11/15/2021 120 (H) 74 - 109 mg/dL Final     BUN   Date Value Ref Range Status "   01/20/2022 20 8 - 23 mg/dL Final   11/15/2021 29 (H) 8 - 23 mg/dL Final     Creatinine   Date Value Ref Range Status   01/20/2022 0.83 0.76 - 1.27 mg/dL Final   11/15/2021 1.6 (H) 0.5 - 1.2 mg/dL Final     Sodium   Date Value Ref Range Status   01/20/2022 141 136 - 145 mmol/L Final   11/15/2021 138 136 - 145 mmol/L Final     Potassium   Date Value Ref Range Status   01/20/2022 4.3 3.5 - 5.2 mmol/L Final   11/15/2021 5.2 (H) 3.5 - 5.0 mmol/L Final     Chloride   Date Value Ref Range Status   01/20/2022 102 98 - 107 mmol/L Final   11/15/2021 98 98 - 111 mmol/L Final     CO2   Date Value Ref Range Status   01/20/2022 31.0 (H) 22.0 - 29.0 mmol/L Final   11/15/2021 27 22 - 29 mmol/L Final     Calcium   Date Value Ref Range Status   01/20/2022 9.4 8.6 - 10.5 mg/dL Final   11/15/2021 9.9 8.8 - 10.2 mg/dL Final     ALT (SGPT)   Date Value Ref Range Status   11/15/2021 13 5 - 41 U/L Final     AST (SGOT)   Date Value Ref Range Status   11/15/2021 13 5 - 40 U/L Final     WBC   Date Value Ref Range Status   01/20/2022 6.63 3.40 - 10.80 10*3/mm3 Final   11/15/2021 8.5 4.8 - 10.8 K/uL Final     Hematocrit   Date Value Ref Range Status   01/20/2022 38.6 37.5 - 51.0 % Final   11/15/2021 40.6 (L) 42.0 - 52.0 % Final     Platelets   Date Value Ref Range Status   01/20/2022 272 140 - 450 10*3/mm3 Final   11/15/2021 273 130 - 400 K/uL Final     Triglycerides   Date Value Ref Range Status   10/26/2020 204 (H) 0 - 150 mg/dL Final     HDL Cholesterol   Date Value Ref Range Status   10/26/2020 39 (L) 40 - 60 mg/dL Final     LDL Chol Calc ([facility])   Date Value Ref Range Status   10/26/2020 87 0 - 100 mg/dL Final     Hemoglobin A1C   Date Value Ref Range Status   04/14/2022 5.6 % Final         Assessment / Plan     Assessment/Plan:  1. Actinic keratosis    - Ambulatory Referral to Dermatology    2. Impaired fasting glucose    - POC Glycated Hemoglobin, Total    3. Fall in home, initial encounter    - Ambulatory Referral to Physical Therapy  Evaluate and treat    4. Benign hypertension    - Comprehensive metabolic panel; Future    Continue current medication.      Return in about 6 months (around 10/14/2022). unless patient needs to be seen sooner or acute issues arise.      I have discussed the patient results/orders and and plan/recommendation with them at today's visit.      Kellie Chamberlain, Wander Rep   04/14/2022      Transcribed from ambient dictation for Leslye Mccarthy DO by Kellie Chamberlain, Wander Rep.  04/14/22   15:29 CDT    Patient verbalized consent to the visit recording.

## 2022-04-15 LAB
ALBUMIN SERPL-MCNC: 4.1 G/DL (ref 3.5–5.2)
ALBUMIN/GLOB SERPL: 1.1 G/DL
ALP SERPL-CCNC: 74 U/L (ref 39–117)
ALT SERPL-CCNC: 8 U/L (ref 1–41)
AST SERPL-CCNC: 10 U/L (ref 1–40)
BILIRUB SERPL-MCNC: 0.5 MG/DL (ref 0–1.2)
BUN SERPL-MCNC: 19 MG/DL (ref 8–23)
BUN/CREAT SERPL: 22.1 (ref 7–25)
CALCIUM SERPL-MCNC: 9.4 MG/DL (ref 8.6–10.5)
CHLORIDE SERPL-SCNC: 104 MMOL/L (ref 98–107)
CO2 SERPL-SCNC: 30.6 MMOL/L (ref 22–29)
CREAT SERPL-MCNC: 0.86 MG/DL (ref 0.76–1.27)
EGFRCR SERPLBLD CKD-EPI 2021: 85.9 ML/MIN/1.73
GLOBULIN SER CALC-MCNC: 3.8 GM/DL
GLUCOSE SERPL-MCNC: 91 MG/DL (ref 65–99)
POTASSIUM SERPL-SCNC: 4.3 MMOL/L (ref 3.5–5.2)
PROT SERPL-MCNC: 7.9 G/DL (ref 6–8.5)
SODIUM SERPL-SCNC: 142 MMOL/L (ref 136–145)

## 2022-08-18 ENCOUNTER — TELEPHONE (OUTPATIENT)
Dept: INTERNAL MEDICINE | Facility: CLINIC | Age: 82
End: 2022-08-18

## 2022-08-18 NOTE — TELEPHONE ENCOUNTER
Pt called stating the referral for Dermatology never was sent that he never got called. He just call Dr. Garcia office and they made him an appt for a couple months out. And the office said he would need the referral. Please refax referral and let pt know it has been done.

## 2022-10-24 ENCOUNTER — LAB (OUTPATIENT)
Dept: INTERNAL MEDICINE | Facility: CLINIC | Age: 82
End: 2022-10-24

## 2022-10-24 DIAGNOSIS — Z79.899 ENCOUNTER FOR LONG-TERM CURRENT USE OF MEDICATION: ICD-10-CM

## 2022-10-24 DIAGNOSIS — I10 BENIGN HYPERTENSION: Primary | ICD-10-CM

## 2022-10-24 DIAGNOSIS — E78.00 HIGH CHOLESTEROL: ICD-10-CM

## 2022-10-24 DIAGNOSIS — R73.01 IMPAIRED FASTING GLUCOSE: ICD-10-CM

## 2022-10-25 LAB
ALBUMIN SERPL-MCNC: 3.5 G/DL (ref 3.5–5.2)
ALBUMIN/GLOB SERPL: 1 G/DL
ALP SERPL-CCNC: 68 U/L (ref 39–117)
ALT SERPL-CCNC: 12 U/L (ref 1–41)
APPEARANCE UR: CLEAR
AST SERPL-CCNC: 14 U/L (ref 1–40)
BACTERIA #/AREA URNS HPF: ABNORMAL /HPF
BASOPHILS # BLD AUTO: 0.03 10*3/MM3 (ref 0–0.2)
BASOPHILS NFR BLD AUTO: 0.6 % (ref 0–1.5)
BILIRUB SERPL-MCNC: 0.5 MG/DL (ref 0–1.2)
BILIRUB UR QL STRIP: NEGATIVE
BUN SERPL-MCNC: 14 MG/DL (ref 8–23)
BUN/CREAT SERPL: 20.6 (ref 7–25)
CALCIUM SERPL-MCNC: 8.7 MG/DL (ref 8.6–10.5)
CASTS URNS MICRO: ABNORMAL
CHLORIDE SERPL-SCNC: 104 MMOL/L (ref 98–107)
CHOLEST SERPL-MCNC: 157 MG/DL (ref 0–200)
CO2 SERPL-SCNC: 30 MMOL/L (ref 22–29)
COLOR UR: YELLOW
CREAT SERPL-MCNC: 0.68 MG/DL (ref 0.76–1.27)
EGFRCR SERPLBLD CKD-EPI 2021: 92.2 ML/MIN/1.73
EOSINOPHIL # BLD AUTO: 0.16 10*3/MM3 (ref 0–0.4)
EOSINOPHIL NFR BLD AUTO: 3.1 % (ref 0.3–6.2)
EPI CELLS #/AREA URNS HPF: ABNORMAL /HPF
ERYTHROCYTE [DISTWIDTH] IN BLOOD BY AUTOMATED COUNT: 13.3 % (ref 12.3–15.4)
GLOBULIN SER CALC-MCNC: 3.6 GM/DL
GLUCOSE SERPL-MCNC: 79 MG/DL (ref 65–99)
GLUCOSE UR QL STRIP: NEGATIVE
HBA1C MFR BLD: 5.3 % (ref 4.8–5.6)
HCT VFR BLD AUTO: 33.1 % (ref 37.5–51)
HDLC SERPL-MCNC: 42 MG/DL (ref 40–60)
HGB BLD-MCNC: 11 G/DL (ref 13–17.7)
HGB UR QL STRIP: NEGATIVE
IMM GRANULOCYTES # BLD AUTO: 0.02 10*3/MM3 (ref 0–0.05)
IMM GRANULOCYTES NFR BLD AUTO: 0.4 % (ref 0–0.5)
KETONES UR QL STRIP: NEGATIVE
LDLC SERPL CALC-MCNC: 92 MG/DL (ref 0–100)
LEUKOCYTE ESTERASE UR QL STRIP: ABNORMAL
LYMPHOCYTES # BLD AUTO: 1.86 10*3/MM3 (ref 0.7–3.1)
LYMPHOCYTES NFR BLD AUTO: 36.3 % (ref 19.6–45.3)
MCH RBC QN AUTO: 31.5 PG (ref 26.6–33)
MCHC RBC AUTO-ENTMCNC: 33.2 G/DL (ref 31.5–35.7)
MCV RBC AUTO: 94.8 FL (ref 79–97)
MONOCYTES # BLD AUTO: 0.5 10*3/MM3 (ref 0.1–0.9)
MONOCYTES NFR BLD AUTO: 9.7 % (ref 5–12)
NEUTROPHILS # BLD AUTO: 2.56 10*3/MM3 (ref 1.7–7)
NEUTROPHILS NFR BLD AUTO: 49.9 % (ref 42.7–76)
NITRITE UR QL STRIP: NEGATIVE
NRBC BLD AUTO-RTO: 0 /100 WBC (ref 0–0.2)
PH UR STRIP: 6.5 [PH] (ref 5–8)
PLATELET # BLD AUTO: 246 10*3/MM3 (ref 140–450)
POTASSIUM SERPL-SCNC: 3.9 MMOL/L (ref 3.5–5.2)
PROT SERPL-MCNC: 7.1 G/DL (ref 6–8.5)
PROT UR QL STRIP: ABNORMAL
RBC # BLD AUTO: 3.49 10*6/MM3 (ref 4.14–5.8)
RBC #/AREA URNS HPF: ABNORMAL /HPF
SODIUM SERPL-SCNC: 140 MMOL/L (ref 136–145)
SP GR UR STRIP: 1.02 (ref 1–1.03)
TRIGL SERPL-MCNC: 127 MG/DL (ref 0–150)
TSH SERPL DL<=0.005 MIU/L-ACNC: 1.28 UIU/ML (ref 0.27–4.2)
URATE SERPL-MCNC: 4.8 MG/DL (ref 3.4–7)
UROBILINOGEN UR STRIP-MCNC: ABNORMAL MG/DL
VLDLC SERPL CALC-MCNC: 23 MG/DL (ref 5–40)
WBC # BLD AUTO: 5.13 10*3/MM3 (ref 3.4–10.8)
WBC #/AREA URNS HPF: ABNORMAL /HPF

## 2022-10-25 NOTE — PROGRESS NOTES
Discuss in upcoming appointment  A1c is great  CMP good  Cholesterol is good  TSH normal  CBC shows worsening anemia? Has dropped 1.6 in 9 months

## 2022-10-28 ENCOUNTER — OFFICE VISIT (OUTPATIENT)
Dept: INTERNAL MEDICINE | Facility: CLINIC | Age: 82
End: 2022-10-28

## 2022-10-28 VITALS
SYSTOLIC BLOOD PRESSURE: 108 MMHG | TEMPERATURE: 97.1 F | OXYGEN SATURATION: 98 % | HEIGHT: 69 IN | DIASTOLIC BLOOD PRESSURE: 80 MMHG | WEIGHT: 165 LBS | HEART RATE: 72 BPM | BODY MASS INDEX: 24.44 KG/M2

## 2022-10-28 DIAGNOSIS — Z00.00 MEDICARE ANNUAL WELLNESS VISIT, SUBSEQUENT: Primary | ICD-10-CM

## 2022-10-28 DIAGNOSIS — D64.9 ANEMIA, UNSPECIFIED TYPE: ICD-10-CM

## 2022-10-28 DIAGNOSIS — E78.00 HIGH CHOLESTEROL: ICD-10-CM

## 2022-10-28 DIAGNOSIS — I10 BENIGN HYPERTENSION: ICD-10-CM

## 2022-10-28 DIAGNOSIS — L57.0 ACTINIC KERATOSIS: ICD-10-CM

## 2022-10-28 DIAGNOSIS — L98.9 SKIN LESION: ICD-10-CM

## 2022-10-28 PROCEDURE — 1126F AMNT PAIN NOTED NONE PRSNT: CPT | Performed by: NURSE PRACTITIONER

## 2022-10-28 PROCEDURE — 1170F FXNL STATUS ASSESSED: CPT | Performed by: NURSE PRACTITIONER

## 2022-10-28 PROCEDURE — 1159F MED LIST DOCD IN RCRD: CPT | Performed by: NURSE PRACTITIONER

## 2022-10-28 PROCEDURE — G0439 PPPS, SUBSEQ VISIT: HCPCS | Performed by: NURSE PRACTITIONER

## 2022-10-28 PROCEDURE — 1160F RVW MEDS BY RX/DR IN RCRD: CPT | Performed by: NURSE PRACTITIONER

## 2022-10-28 RX ORDER — OLMESARTAN MEDOXOMIL 40 MG/1
TABLET ORAL
Status: ON HOLD | COMMUNITY
Start: 2022-09-06 | End: 2022-12-07

## 2022-10-28 NOTE — ASSESSMENT & PLAN NOTE
Hgb has dropped 1.6 in 9 months. Patient denies bloody stool. He is due for a colonoscopy.   Will place a referral for this and add on an iron profile

## 2022-10-28 NOTE — ASSESSMENT & PLAN NOTE
Patient has skin lesions to bilat arms. Will refer to derm to make sure there is nothing abnormal going on

## 2022-10-28 NOTE — PROGRESS NOTES
The ABCs of the Annual Wellness Visit  Subsequent Medicare Wellness Visit    Chief Complaint   Patient presents with   • Medicare Wellness-subsequent     Review labs      Subjective    History of Present Illness:  Juan Luis Collazo is a 81 y.o. male who presents for a Subsequent Medicare Wellness Visit.    The following portions of the patient's history were reviewed and   updated as appropriate: allergies, current medications, past family history, past medical history, past social history, past surgical history and problem list.    Compared to one year ago, the patient feels his physical   health is the same.    Compared to one year ago, the patient feels his mental   health is the same.    Recent Hospitalizations:  He was not admitted to the hospital during the last year.       Current Medical Providers:  Patient Care Team:  Leslye Mccarthy DO as PCP - General (Family Medicine)    Outpatient Medications Prior to Visit   Medication Sig Dispense Refill   • hydroCHLOROthiazide (HYDRODIURIL) 12.5 MG tablet Take 1 tablet by mouth Daily. 90 tablet 3   • meloxicam (MOBIC) 15 MG tablet Take 1 tablet by mouth Daily. 30 tablet 0   • Multiple Vitamins-Minerals (ICAPS AREDS 2) capsule Take 1 capsule by mouth Daily.     • sennosides-docusate (PERICOLACE) 8.6-50 MG per tablet Take 2 tablets by mouth Daily. 60 tablet 0   • traMADol-acetaminophen (ULTRACET) 37.5-325 MG per tablet Take 1 tablet by mouth 3 (Three) Times a Day. 90 tablet 5   • olmesartan (BENICAR) 40 MG tablet        No facility-administered medications prior to visit.       Opioid medication/s are on active medication list.  and I have evaluated his active treatment plan and pain score trends (see table).  Vitals:    10/28/22 1300   PainSc: 0-No pain     I have reviewed the chart for potential of high risk medication and harmful drug interactions in the elderly.            Aspirin is not on active medication list.  Aspirin use is indicated based on review of  "current medical condition/s. Pros and cons of this therapy have been discussed with this patient. Benefits of this medication outweigh potential harm.  Patient has been instructed to start taking this medication..    Patient Active Problem List   Diagnosis   • Closed compression fracture of first lumbar vertebra (HCC)   • Current non-smoker   • BMI 28.0-28.9,adult   • Lumbar compression fracture, closed, initial encounter ([facility])   • Lumbar compression fracture (HCC)   • S/P kyphoplasty   • Numbness and tingling of right leg   • Compression fracture of fourth lumbar vertebra with delayed healing   • Benign hypertension   • Flatback syndrome of lumbar region   • Hammer toe of right foot   • High cholesterol   • Impaired fasting glucose   • Malignant neoplasm of prostate (HCC)   • Osteopenia   • Acute exacerbation of chronic obstructive airways disease (HCC)   • Back pain   • Compression fracture of thoracic vertebra with routine healing   • Anemia   • Actinic keratosis     Advance Care Planning  Advance Directive is not on file.  ACP discussion was held with the patient during this visit. Patient has an advance directive (not in EMR), copy requested.          Objective    Vitals:    10/28/22 1300   BP: 108/80   BP Location: Left arm   Patient Position: Sitting   Cuff Size: Adult   Pulse: 72   Temp: 97.1 °F (36.2 °C)   TempSrc: Temporal   SpO2: 98%   Weight: 74.8 kg (165 lb)   Height: 175.3 cm (69\")   PainSc: 0-No pain     Estimated body mass index is 24.37 kg/m² as calculated from the following:    Height as of this encounter: 175.3 cm (69\").    Weight as of this encounter: 74.8 kg (165 lb).    BMI is within normal parameters. No other follow-up for BMI required.      Does the patient have evidence of cognitive impairment? No    Physical Exam  Vitals and nursing note reviewed.   Constitutional:       Appearance: Normal appearance. He is well-developed.   HENT:      Head: Normocephalic and atraumatic.      Right Ear: " Tympanic membrane, ear canal and external ear normal.      Left Ear: Tympanic membrane, ear canal and external ear normal.      Nose: Nose normal. No septal deviation, nasal tenderness or congestion.      Mouth/Throat:      Lips: Pink. No lesions.      Mouth: Mucous membranes are moist. No oral lesions.      Dentition: Normal dentition.      Pharynx: Oropharynx is clear. No pharyngeal swelling, oropharyngeal exudate or posterior oropharyngeal erythema.   Eyes:      General: Lids are normal. Vision grossly intact. No scleral icterus.        Right eye: No discharge.         Left eye: No discharge.      Extraocular Movements: Extraocular movements intact.      Conjunctiva/sclera: Conjunctivae normal.      Right eye: Right conjunctiva is not injected.      Left eye: Left conjunctiva is not injected.      Pupils: Pupils are equal, round, and reactive to light.   Neck:      Thyroid: No thyroid mass.      Trachea: Trachea normal.   Cardiovascular:      Rate and Rhythm: Normal rate and regular rhythm.      Heart sounds: Normal heart sounds. No murmur heard.    No gallop.   Pulmonary:      Effort: Pulmonary effort is normal.      Breath sounds: Normal breath sounds and air entry. No wheezing, rhonchi or rales.   Musculoskeletal:         General: No tenderness. Normal range of motion.      Cervical back: Full passive range of motion without pain, normal range of motion and neck supple.      Thoracic back: Scoliosis present.      Lumbar back: Deformity present.      Right lower leg: No edema.      Left lower leg: No edema.   Skin:     General: Skin is warm and dry.      Coloration: Skin is not jaundiced.      Findings: No rash.      Comments: Dry crusty skin lesions to arms   Neurological:      Mental Status: He is alert and oriented to person, place, and time.      Cranial Nerves: Cranial nerves are intact.      Sensory: Sensation is intact.      Motor: Motor function is intact.      Coordination: Coordination is intact.       Gait: Gait is intact.      Deep Tendon Reflexes: Reflexes are normal and symmetric.   Psychiatric:         Mood and Affect: Mood and affect normal.         Behavior: Behavior normal.         Judgment: Judgment normal.       Lab Results   Component Value Date    CHLPL 157 10/24/2022    TRIG 127 10/24/2022    HDL 42 10/24/2022    LDL 92 10/24/2022    VLDL 23 10/24/2022    HGBA1C 5.30 10/24/2022            HEALTH RISK ASSESSMENT    Smoking Status:  Social History     Tobacco Use   Smoking Status Former   • Packs/day: 1.00   • Years: 30.00   • Pack years: 30.00   • Types: Cigarettes   • Quit date: 9/10/1991   • Years since quittin.1   Smokeless Tobacco Never   Tobacco Comments    1991     Alcohol Consumption:  Social History     Substance and Sexual Activity   Alcohol Use Yes    Comment: occasional drink     Fall Risk Screen:    LITZY Fall Risk Assessment was completed, and patient is at LOW risk for falls.Assessment completed on:10/28/2022    Depression Screening:  PHQ-2/PHQ-9 Depression Screening 10/28/2022   Retired PHQ-9 Total Score -   Retired Total Score -   Little Interest or Pleasure in Doing Things 0-->not at all   Feeling Down, Depressed or Hopeless 0-->not at all   PHQ-9: Brief Depression Severity Measure Score 0       Health Habits and Functional and Cognitive Screening:  Functional & Cognitive Status 10/28/2022   Do you have difficulty preparing food and eating? No   Do you have difficulty bathing yourself, getting dressed or grooming yourself? No   Do you have difficulty using the toilet? No   Do you have difficulty moving around from place to place? No   Do you have trouble with steps or getting out of a bed or a chair? No   Current Diet Well Balanced Diet   Dental Exam Up to date   Eye Exam Up to date   Exercise (times per week) 0 times per week   Current Exercises Include No Regular Exercise   Do you need help using the phone?  No   Are you deaf or do you have serious difficulty hearing?  No    Do you need help with transportation? No   Do you need help shopping? No   Do you need help preparing meals?  No   Do you need help with housework?  No   Do you need help with laundry? No   Do you need help taking your medications? No   Do you need help managing money? No   Do you ever drive or ride in a car without wearing a seat belt? No   Have you felt unusual stress, anger or loneliness in the last month? No   Who do you live with? Spouse   If you need help, do you have trouble finding someone available to you? No   Have you been bothered in the last four weeks by sexual problems? No   Do you have difficulty concentrating, remembering or making decisions? No       Age-appropriate Screening Schedule:  Refer to the list below for future screening recommendations based on patient's age, sex and/or medical conditions. Orders for these recommended tests are listed in the plan section. The patient has been provided with a written plan.    Health Maintenance   Topic Date Due   • TDAP/TD VACCINES (1 - Tdap) Never done   • ZOSTER VACCINE (2 of 2) 01/13/2021   • INFLUENZA VACCINE  08/01/2022   • DIABETIC EYE EXAM  10/27/2022   • URINE MICROALBUMIN  11/11/2022 (Originally 1940)   • HEMOGLOBIN A1C  04/24/2023   • LIPID PANEL  10/24/2023   • DXA SCAN  12/09/2023              Assessment & Plan   CMS Preventative Services Quick Reference  Risk Factors Identified During Encounter  Chronic Pain   Fall Risk-High or Moderate  Immunizations Discussed/Encouraged (specific Immunizations; declined at this time  The above risks/problems have been discussed with the patient.  Follow up actions/plans if indicated are seen below in the Assessment/Plan Section.  Pertinent information has been shared with the patient in the After Visit Summary.    Diagnoses and all orders for this visit:    1. Medicare annual wellness visit, subsequent (Primary)    2. Skin lesion  -     Ambulatory Referral to Dermatology    3. Anemia, unspecified  type  Assessment & Plan:  Hgb has dropped 1.6 in 9 months. Patient denies bloody stool. He is due for a colonoscopy.   Will place a referral for this and add on an iron profile    Orders:  -     Iron Profile  -     Ambulatory Referral to Gastroenterology    4. High cholesterol  Assessment & Plan:  Cholesterol is great      5. Benign hypertension  Assessment & Plan:  Blood pressure is great today.       6. Actinic keratosis  Assessment & Plan:  Patient has skin lesions to bilat arms. Will refer to derm to make sure there is nothing abnormal going on        Follow Up:   Return in about 6 months (around 4/28/2023) for Recheck.     An After Visit Summary and PPPS were made available to the patient.

## 2022-10-29 LAB
IRON SATN MFR SERPL: 15 % (ref 20–50)
IRON SERPL-MCNC: 52 MCG/DL (ref 59–158)
TIBC SERPL-MCNC: 347 MCG/DL
UIBC SERPL-MCNC: 295 MCG/DL (ref 112–346)

## 2022-12-05 ENCOUNTER — OFFICE VISIT (OUTPATIENT)
Dept: GASTROENTEROLOGY | Facility: CLINIC | Age: 82
End: 2022-12-05

## 2022-12-05 ENCOUNTER — PATIENT ROUNDING (BHMG ONLY) (OUTPATIENT)
Dept: GASTROENTEROLOGY | Facility: CLINIC | Age: 82
End: 2022-12-05

## 2022-12-05 VITALS
BODY MASS INDEX: 25.39 KG/M2 | TEMPERATURE: 96.8 F | HEART RATE: 92 BPM | HEIGHT: 67 IN | SYSTOLIC BLOOD PRESSURE: 130 MMHG | DIASTOLIC BLOOD PRESSURE: 70 MMHG | OXYGEN SATURATION: 96 % | WEIGHT: 161.8 LBS

## 2022-12-05 DIAGNOSIS — D64.9 ANEMIA, UNSPECIFIED TYPE: ICD-10-CM

## 2022-12-05 DIAGNOSIS — Z80.0 FH: COLON CANCER IN FIRST DEGREE RELATIVE <60 YEARS OLD: ICD-10-CM

## 2022-12-05 DIAGNOSIS — K63.5 HYPERPLASTIC COLONIC POLYP, UNSPECIFIED PART OF COLON: Primary | ICD-10-CM

## 2022-12-05 DIAGNOSIS — R63.4 WEIGHT LOSS: ICD-10-CM

## 2022-12-05 PROCEDURE — 99214 OFFICE O/P EST MOD 30 MIN: CPT | Performed by: NURSE PRACTITIONER

## 2022-12-05 NOTE — PROGRESS NOTES
"December 5, 2022    Hello, may I speak with Juan Luis Collazo?    My name is       I am  with [facility]  [address], [address]  [place] [address] [phone].    Before we get started may I verify your date of birth? 1940    I am calling to officially welcome you to our practice and ask about your recent visit. Is this a good time to talk? yes    Tell me about your visit with us. What things went well?  He said he was \"grateful for the service in our office.\"        We're always looking for ways to make our patients' experiences even better. Do you have recommendations on ways we may improve?  no    Overall were you satisfied with your first visit to our practice? yes       I appreciate you taking the time to speak with me today. Is there anything else I can do for you? no      Thank you, and have a great day.      "

## 2022-12-05 NOTE — PROGRESS NOTES
Primary Physician: Leslye Mccarthy DO    Chief Complaint   Patient presents with   • GI Problem     Anemia        Subjective     Juan Luis Collazo is a 82 y.o. male.    HPI   Anemia-  82-year-old gentleman referred to us for iron deficiency.  Labs 10/28/2022 revealed serum iron low at 52, iron saturation low at 15%.  CBC 10/24/22: Hemoglobin 11/hematocrit 33.1    Patient with a history of hyperplastic colon polyps.  Last colonoscopy 2013.  Daughter  of colon cancer at age 26.  Mother had colon polyps.    Pt denies any NSAIDS.  She denies using meloxicam although is on his home medication list.    Patient's had no change in his bowel pattern.  Does have chronic diarrhea but nothing new.  No abdominal pain.  No blood bright red or black sticky tarry per rectum.  Lost about 10 pounds in the past 1 year due to poor appetite.    Patient is accompanied by his wife.  They believe he has had other colonoscopies since  may be in [facility].  We are going to try to get those records.  Patient reports having had colon polyps many times before.    Past Medical History:   Diagnosis Date   • Arthritis    • Back pain    • History of colon polyps    • History of prostate cancer    • Hypertension    • Macular degeneration    • Osteopenia        Past Surgical History:   Procedure Laterality Date   • APPENDECTOMY     • CATARACT EXTRACTION, BILATERAL     • COLONOSCOPY  2013    Dr. José-One 2-3mm polyp at 20cm; Otherwise normal; Repeat 3 years   • FOOT SURGERY Right    • KYPHOPLASTY Bilateral 2018    Procedure: L3 KYPHOPLASTY 1-2 LEVELS;  Surgeon: Vega Pandey MD;  Location:  [facility] OR;  Service: Neurosurgery   • KYPHOPLASTY Bilateral 2018    Procedure: L4 KYPHOPLASTY WITH BIOPSY;  Surgeon: Vega Pandey MD;  Location: [facility] OR;  Service: Neurosurgery   • KYPHOPLASTY WITH BIOPSY N/A 2022    Procedure: KYPHOPLASTY WITH BIOPSY, THORACIC 6 and LUMBAR 5;  Surgeon:  "Nick Martínez MD;  Location: [facility] OR;  Service: Neurosurgery;  Laterality: N/A;   • PROSTATECTOMY     • TONSILLECTOMY     • TOTAL SHOULDER REPLACEMENT Bilateral         Current Outpatient Medications:   •  hydroCHLOROthiazide (HYDRODIURIL) 12.5 MG tablet, Take 1 tablet by mouth Daily., Disp: 90 tablet, Rfl: 3  •  Multiple Vitamins-Minerals (ICAPS AREDS 2) capsule, Take 1 capsule by mouth Daily., Disp: , Rfl:   •  olmesartan (BENICAR) 40 MG tablet, , Disp: , Rfl:   •  sennosides-docusate (PERICOLACE) 8.6-50 MG per tablet, Take 2 tablets by mouth Daily., Disp: 60 tablet, Rfl: 0  •  traMADol-acetaminophen (ULTRACET) 37.5-325 MG per tablet, Take 1 tablet by mouth 3 (Three) Times a Day., Disp: 90 tablet, Rfl: 5  •  meloxicam (MOBIC) 15 MG tablet, Take 1 tablet by mouth Daily., Disp: 30 tablet, Rfl: 0    No Known Allergies    Social History     Socioeconomic History   • Marital status:    Tobacco Use   • Smoking status: Former     Packs/day: 1.00     Years: 30.00     Pack years: 30.00     Types: Cigarettes     Quit date: 9/10/1991     Years since quittin.2   • Smokeless tobacco: Never   • Tobacco comments:     quit    Substance and Sexual Activity   • Alcohol use: Yes     Comment: occasional drink   • Drug use: No   • Sexual activity: Not Currently     Partners: Female     Birth control/protection: Post-menopausal       Family History   Problem Relation Age of Onset   • No Known Problems Mother    • No Known Problems Father        Review of Systems   Constitutional: Positive for unexpected weight change. Negative for fever.        10 pound weight loss over 1 kentrell   Respiratory: Negative for shortness of breath.    Cardiovascular: Negative for chest pain.       Objective     /70   Pulse 92   Temp 96.8 °F (36 °C)   Ht 170.2 cm (67\")   Wt 73.4 kg (161 lb 12.8 oz)   SpO2 96%   BMI 25.34 kg/m²     Physical Exam  Vitals reviewed.   Constitutional:       Appearance: Normal appearance. "   Cardiovascular:      Rate and Rhythm: Normal rate and regular rhythm.      Heart sounds: Normal heart sounds.   Pulmonary:      Effort: Pulmonary effort is normal.      Comments: Diminished worse L>R  Neurological:      Mental Status: He is alert.         Lab Results - Last 18 Months   Lab Units 10/24/22  1303 04/14/22  1309 01/20/22  0952 11/15/21  0244 11/14/21  2345   GLUCOSE mg/dL 79 91 114* 120* 117*   BUN mg/dL 14 19 20 29* 27*   CREATININE mg/dL 0.68* 0.86 0.83 1.6* 1.4*   SODIUM mmol/L 140 142 141 138 135*   POTASSIUM mmol/L 3.9 4.3 4.3 5.2* 5.0   CHLORIDE mmol/L 104 104 102 98 98   TOTAL CO2 mmol/L 30.0* 30.6*  --  27 28   CO2 mmol/L  --   --  31.0*  --   --    TOTAL PROTEIN g/dL  --   --   --  7.9  --    ALBUMIN g/dL 3.50 4.10  --  4.2  --    ALT (SGPT) U/L 12 8  --  13  --    AST (SGOT) U/L 14 10  --  13  --    ALK PHOS U/L 68 74  --  98  --    BILIRUBIN mg/dL 0.5 0.5  --  0.5  --        Lab Results - Last 18 Months   Lab Units 10/24/22  1303 01/20/22  0952 11/15/21  0244 11/14/21  2345   HEMOGLOBIN g/dL 11.0* 12.6* 12.4* 11.6*   HEMATOCRIT % 33.1* 38.6 40.6* 36.6*   MCV fL 94.8 91.3 98.5* 96.8*   WBC 10*3/mm3 5.13 6.63 8.5 9.0   RDW % 13.3 13.5 13.7 13.7   MPV fL  --  10.4 10.7 10.2   PLATELETS 10*3/mm3 246 272 273 232       Lab Results - Last 18 Months   Lab Units 10/28/22  1242 10/24/22  1303   TIBC mcg/dL 347  --    IRON SATURATION % 15*  --    TSH uIU/mL  --  1.280            IMPRESSION/PLAN:    Assessment & Plan      Problem List Items Addressed This Visit        Endocrine and Metabolic    Weight loss    Overview     10 pounds in 1 year            Family History    FH: colon cancer in first degree relative <60 years old    Overview     Daughter had colon cancer            Gastrointestinal Abdominal     Hyperplastic colonic polyp - Primary    Overview     Hyperplastic polypectomy at 20 cm per Dr. Fawad José- September 2013            Hematology and Neoplasia    Anemia    Overview     Iron  deficiency serum Iron 52, Iron Sat 15%  H&H  11/33.1          Endoscopy/Colonoscopy per Dr Reuben Alexis Prep             The risks, benefits, and alternatives of colonoscopy were reviewed with the patient today.  Risks including perforation of the colon possibly requiring surgery or colostomy.  Additional risks include risk of bleeding from biopsies or removal of colon tissue.  There is also the risk of a drug reaction or problems with anesthesia.  This will be discussed with the further by the anesthesia team on the day of the procedure.  Lastly there is a possibility of missing a colon polyp or cancer.  The benefits include the diagnosis and management of disease of the colon and rectum.  Alternatives to colonoscopy include barium enema, laboratory testing, radiographic evaluation, or no intervention.  The patient verbalizes understanding and agrees.    In accordance with requirements under the Affordable Care Act, [facility] has provided pricing for all hospital services and items on each of its websites. However, a patient's actual cost may differ based on the services the patient receives to meet individual healthcare needs and based on the benefits provided under the patient’s insurance coverage.      Aurora Torre, APRN  12/05/22  14:31 CST    Part of this note may be an electronic transcription/translation of spoken language to printed text.

## 2022-12-05 NOTE — H&P (VIEW-ONLY)
Primary Physician: Leslye Mccarthy DO    Chief Complaint   Patient presents with   • GI Problem     Anemia        Subjective     Juan Luis Collazo is a 82 y.o. male.    HPI   Anemia-  82-year-old gentleman referred to us for iron deficiency.  Labs 10/28/2022 revealed serum iron low at 52, iron saturation low at 15%.  CBC 10/24/22: Hemoglobin 11/hematocrit 33.1    Patient with a history of hyperplastic colon polyps.  Last colonoscopy 2013.  Daughter  of colon cancer at age 26.  Mother had colon polyps.    Pt denies any NSAIDS.  She denies using meloxicam although is on his home medication list.    Patient's had no change in his bowel pattern.  Does have chronic diarrhea but nothing new.  No abdominal pain.  No blood bright red or black sticky tarry per rectum.  Lost about 10 pounds in the past 1 year due to poor appetite.    Patient is accompanied by his wife.  They believe he has had other colonoscopies since  may be in [facility].  We are going to try to get those records.  Patient reports having had colon polyps many times before.    Past Medical History:   Diagnosis Date   • Arthritis    • Back pain    • History of colon polyps    • History of prostate cancer    • Hypertension    • Macular degeneration    • Osteopenia        Past Surgical History:   Procedure Laterality Date   • APPENDECTOMY     • CATARACT EXTRACTION, BILATERAL     • COLONOSCOPY  2013    Dr. José-One 2-3mm polyp at 20cm; Otherwise normal; Repeat 3 years   • FOOT SURGERY Right    • KYPHOPLASTY Bilateral 2018    Procedure: L3 KYPHOPLASTY 1-2 LEVELS;  Surgeon: Vega Pandey MD;  Location:  [facility] OR;  Service: Neurosurgery   • KYPHOPLASTY Bilateral 2018    Procedure: L4 KYPHOPLASTY WITH BIOPSY;  Surgeon: Vega Pandey MD;  Location: [facility] OR;  Service: Neurosurgery   • KYPHOPLASTY WITH BIOPSY N/A 2022    Procedure: KYPHOPLASTY WITH BIOPSY, THORACIC 6 and LUMBAR 5;  Surgeon:  "Nick Martínez MD;  Location: [facility] OR;  Service: Neurosurgery;  Laterality: N/A;   • PROSTATECTOMY     • TONSILLECTOMY     • TOTAL SHOULDER REPLACEMENT Bilateral         Current Outpatient Medications:   •  hydroCHLOROthiazide (HYDRODIURIL) 12.5 MG tablet, Take 1 tablet by mouth Daily., Disp: 90 tablet, Rfl: 3  •  Multiple Vitamins-Minerals (ICAPS AREDS 2) capsule, Take 1 capsule by mouth Daily., Disp: , Rfl:   •  olmesartan (BENICAR) 40 MG tablet, , Disp: , Rfl:   •  sennosides-docusate (PERICOLACE) 8.6-50 MG per tablet, Take 2 tablets by mouth Daily., Disp: 60 tablet, Rfl: 0  •  traMADol-acetaminophen (ULTRACET) 37.5-325 MG per tablet, Take 1 tablet by mouth 3 (Three) Times a Day., Disp: 90 tablet, Rfl: 5  •  meloxicam (MOBIC) 15 MG tablet, Take 1 tablet by mouth Daily., Disp: 30 tablet, Rfl: 0    No Known Allergies    Social History     Socioeconomic History   • Marital status:    Tobacco Use   • Smoking status: Former     Packs/day: 1.00     Years: 30.00     Pack years: 30.00     Types: Cigarettes     Quit date: 9/10/1991     Years since quittin.2   • Smokeless tobacco: Never   • Tobacco comments:     quit    Substance and Sexual Activity   • Alcohol use: Yes     Comment: occasional drink   • Drug use: No   • Sexual activity: Not Currently     Partners: Female     Birth control/protection: Post-menopausal       Family History   Problem Relation Age of Onset   • No Known Problems Mother    • No Known Problems Father        Review of Systems   Constitutional: Positive for unexpected weight change. Negative for fever.        10 pound weight loss over 1 kentrell   Respiratory: Negative for shortness of breath.    Cardiovascular: Negative for chest pain.       Objective     /70   Pulse 92   Temp 96.8 °F (36 °C)   Ht 170.2 cm (67\")   Wt 73.4 kg (161 lb 12.8 oz)   SpO2 96%   BMI 25.34 kg/m²     Physical Exam  Vitals reviewed.   Constitutional:       Appearance: Normal appearance. "   Cardiovascular:      Rate and Rhythm: Normal rate and regular rhythm.      Heart sounds: Normal heart sounds.   Pulmonary:      Effort: Pulmonary effort is normal.      Comments: Diminished worse L>R  Neurological:      Mental Status: He is alert.         Lab Results - Last 18 Months   Lab Units 10/24/22  1303 04/14/22  1309 01/20/22  0952 11/15/21  0244 11/14/21  2345   GLUCOSE mg/dL 79 91 114* 120* 117*   BUN mg/dL 14 19 20 29* 27*   CREATININE mg/dL 0.68* 0.86 0.83 1.6* 1.4*   SODIUM mmol/L 140 142 141 138 135*   POTASSIUM mmol/L 3.9 4.3 4.3 5.2* 5.0   CHLORIDE mmol/L 104 104 102 98 98   TOTAL CO2 mmol/L 30.0* 30.6*  --  27 28   CO2 mmol/L  --   --  31.0*  --   --    TOTAL PROTEIN g/dL  --   --   --  7.9  --    ALBUMIN g/dL 3.50 4.10  --  4.2  --    ALT (SGPT) U/L 12 8  --  13  --    AST (SGOT) U/L 14 10  --  13  --    ALK PHOS U/L 68 74  --  98  --    BILIRUBIN mg/dL 0.5 0.5  --  0.5  --        Lab Results - Last 18 Months   Lab Units 10/24/22  1303 01/20/22  0952 11/15/21  0244 11/14/21  2345   HEMOGLOBIN g/dL 11.0* 12.6* 12.4* 11.6*   HEMATOCRIT % 33.1* 38.6 40.6* 36.6*   MCV fL 94.8 91.3 98.5* 96.8*   WBC 10*3/mm3 5.13 6.63 8.5 9.0   RDW % 13.3 13.5 13.7 13.7   MPV fL  --  10.4 10.7 10.2   PLATELETS 10*3/mm3 246 272 273 232       Lab Results - Last 18 Months   Lab Units 10/28/22  1242 10/24/22  1303   TIBC mcg/dL 347  --    IRON SATURATION % 15*  --    TSH uIU/mL  --  1.280            IMPRESSION/PLAN:    Assessment & Plan      Problem List Items Addressed This Visit        Endocrine and Metabolic    Weight loss    Overview     10 pounds in 1 year            Family History    FH: colon cancer in first degree relative <60 years old    Overview     Daughter had colon cancer            Gastrointestinal Abdominal     Hyperplastic colonic polyp - Primary    Overview     Hyperplastic polypectomy at 20 cm per Dr. Fawad José- September 2013            Hematology and Neoplasia    Anemia    Overview     Iron  deficiency serum Iron 52, Iron Sat 15%  H&H  11/33.1          Endoscopy/Colonoscopy per Dr Reuben Alexis Prep             The risks, benefits, and alternatives of colonoscopy were reviewed with the patient today.  Risks including perforation of the colon possibly requiring surgery or colostomy.  Additional risks include risk of bleeding from biopsies or removal of colon tissue.  There is also the risk of a drug reaction or problems with anesthesia.  This will be discussed with the further by the anesthesia team on the day of the procedure.  Lastly there is a possibility of missing a colon polyp or cancer.  The benefits include the diagnosis and management of disease of the colon and rectum.  Alternatives to colonoscopy include barium enema, laboratory testing, radiographic evaluation, or no intervention.  The patient verbalizes understanding and agrees.    In accordance with requirements under the Affordable Care Act, [facility] has provided pricing for all hospital services and items on each of its websites. However, a patient's actual cost may differ based on the services the patient receives to meet individual healthcare needs and based on the benefits provided under the patient’s insurance coverage.      Aurora Torre, APRN  12/05/22  14:31 CST    Part of this note may be an electronic transcription/translation of spoken language to printed text.

## 2022-12-06 ENCOUNTER — TELEPHONE (OUTPATIENT)
Dept: GASTROENTEROLOGY | Facility: CLINIC | Age: 82
End: 2022-12-06

## 2022-12-06 NOTE — TELEPHONE ENCOUNTER
Pt's wife called and said [facility] hadn't received a script for Golytely. Spoke with Sharri at the pharmacy and called in Golytely #1 as directed with no refills.

## 2022-12-07 ENCOUNTER — ANESTHESIA EVENT (OUTPATIENT)
Dept: GASTROENTEROLOGY | Facility: HOSPITAL | Age: 82
End: 2022-12-07

## 2022-12-07 ENCOUNTER — HOSPITAL ENCOUNTER (OUTPATIENT)
Facility: HOSPITAL | Age: 82
Setting detail: HOSPITAL OUTPATIENT SURGERY
Discharge: HOME | End: 2022-12-07
Attending: INTERNAL MEDICINE | Admitting: INTERNAL MEDICINE

## 2022-12-07 ENCOUNTER — ANESTHESIA (OUTPATIENT)
Dept: GASTROENTEROLOGY | Facility: HOSPITAL | Age: 82
End: 2022-12-07

## 2022-12-07 VITALS
SYSTOLIC BLOOD PRESSURE: 119 MMHG | TEMPERATURE: 97.5 F | RESPIRATION RATE: 17 BRPM | WEIGHT: 157 LBS | OXYGEN SATURATION: 97 % | HEART RATE: 83 BPM | BODY MASS INDEX: 26.16 KG/M2 | HEIGHT: 65 IN | DIASTOLIC BLOOD PRESSURE: 69 MMHG

## 2022-12-07 DIAGNOSIS — K63.5 HYPERPLASTIC COLONIC POLYP, UNSPECIFIED PART OF COLON: ICD-10-CM

## 2022-12-07 DIAGNOSIS — D64.9 ANEMIA, UNSPECIFIED TYPE: ICD-10-CM

## 2022-12-07 DIAGNOSIS — Z80.0 FH: COLON CANCER IN FIRST DEGREE RELATIVE <60 YEARS OLD: ICD-10-CM

## 2022-12-07 PROBLEM — Z86.010 HISTORY OF ADENOMATOUS POLYP OF COLON: Status: ACTIVE | Noted: 2022-12-07

## 2022-12-07 PROBLEM — Z86.0101 HISTORY OF ADENOMATOUS POLYP OF COLON: Status: ACTIVE | Noted: 2022-12-07

## 2022-12-07 PROCEDURE — 88305 TISSUE EXAM BY PATHOLOGIST: CPT | Performed by: INTERNAL MEDICINE

## 2022-12-07 PROCEDURE — 87081 CULTURE SCREEN ONLY: CPT | Performed by: INTERNAL MEDICINE

## 2022-12-07 PROCEDURE — 45385 COLONOSCOPY W/LESION REMOVAL: CPT | Performed by: INTERNAL MEDICINE

## 2022-12-07 PROCEDURE — 43239 EGD BIOPSY SINGLE/MULTIPLE: CPT | Performed by: INTERNAL MEDICINE

## 2022-12-07 PROCEDURE — 25010000002 PROPOFOL 10 MG/ML EMULSION: Performed by: NURSE ANESTHETIST, CERTIFIED REGISTERED

## 2022-12-07 RX ORDER — SODIUM CHLORIDE 0.9 % (FLUSH) 0.9 %
10 SYRINGE (ML) INJECTION AS NEEDED
Status: DISCONTINUED | OUTPATIENT
Start: 2022-12-07 | End: 2022-12-07 | Stop reason: HOSPADM

## 2022-12-07 RX ORDER — PANTOPRAZOLE SODIUM 20 MG/1
20 TABLET, DELAYED RELEASE ORAL DAILY
Qty: 30 TABLET | Refills: 11 | Status: SHIPPED | OUTPATIENT
Start: 2022-12-07

## 2022-12-07 RX ORDER — PROPOFOL 10 MG/ML
VIAL (ML) INTRAVENOUS AS NEEDED
Status: DISCONTINUED | OUTPATIENT
Start: 2022-12-07 | End: 2022-12-07 | Stop reason: SURG

## 2022-12-07 RX ORDER — SODIUM CHLORIDE 9 MG/ML
500 INJECTION, SOLUTION INTRAVENOUS CONTINUOUS PRN
Status: DISCONTINUED | OUTPATIENT
Start: 2022-12-07 | End: 2022-12-07 | Stop reason: HOSPADM

## 2022-12-07 RX ORDER — LIDOCAINE HYDROCHLORIDE 20 MG/ML
INJECTION, SOLUTION EPIDURAL; INFILTRATION; INTRACAUDAL; PERINEURAL AS NEEDED
Status: DISCONTINUED | OUTPATIENT
Start: 2022-12-07 | End: 2022-12-07 | Stop reason: SURG

## 2022-12-07 RX ORDER — LIDOCAINE HYDROCHLORIDE 10 MG/ML
0.5 INJECTION, SOLUTION EPIDURAL; INFILTRATION; INTRACAUDAL; PERINEURAL ONCE AS NEEDED
Status: DISCONTINUED | OUTPATIENT
Start: 2022-12-07 | End: 2022-12-07 | Stop reason: HOSPADM

## 2022-12-07 RX ADMIN — PROPOFOL 50 MG: 10 INJECTION, EMULSION INTRAVENOUS at 08:40

## 2022-12-07 RX ADMIN — PROPOFOL 100 MG: 10 INJECTION, EMULSION INTRAVENOUS at 08:33

## 2022-12-07 RX ADMIN — PROPOFOL 50 MG: 10 INJECTION, EMULSION INTRAVENOUS at 08:47

## 2022-12-07 RX ADMIN — PROPOFOL 50 MG: 10 INJECTION, EMULSION INTRAVENOUS at 08:57

## 2022-12-07 RX ADMIN — PROPOFOL 50 MG: 10 INJECTION, EMULSION INTRAVENOUS at 08:50

## 2022-12-07 RX ADMIN — GLYCOPYRROLATE 0.2 MG: 0.2 INJECTION INTRAMUSCULAR; INTRAVENOUS at 08:33

## 2022-12-07 RX ADMIN — PROPOFOL 50 MG: 10 INJECTION, EMULSION INTRAVENOUS at 08:53

## 2022-12-07 RX ADMIN — PROPOFOL 50 MG: 10 INJECTION, EMULSION INTRAVENOUS at 09:01

## 2022-12-07 RX ADMIN — PROPOFOL 50 MG: 10 INJECTION, EMULSION INTRAVENOUS at 08:36

## 2022-12-07 RX ADMIN — LIDOCAINE HYDROCHLORIDE 100 MG: 20 INJECTION, SOLUTION EPIDURAL; INFILTRATION; INTRACAUDAL; PERINEURAL at 08:33

## 2022-12-07 RX ADMIN — PROPOFOL 50 MG: 10 INJECTION, EMULSION INTRAVENOUS at 08:43

## 2022-12-07 RX ADMIN — LIDOCAINE HYDROCHLORIDE 100 MG: 20 INJECTION, SOLUTION EPIDURAL; INFILTRATION; INTRACAUDAL; PERINEURAL at 08:36

## 2022-12-07 RX ADMIN — SODIUM CHLORIDE 500 ML: 9 INJECTION, SOLUTION INTRAVENOUS at 07:58

## 2022-12-07 NOTE — ANESTHESIA POSTPROCEDURE EVALUATION
Patient: Juan Luis Collazo    Procedure Summary     Date: 12/07/22 Room / Location: [facility] ENDOSCOPY 2 / [facility]    Anesthesia Start: 0832 Anesthesia Stop: 0916    Procedures:       ESOPHAGOGASTRODUODENOSCOPY WITH ANESTHESIA      COLONOSCOPY WITH ANESTHESIA Diagnosis:       Hyperplastic colonic polyp, unspecified part of colon      Anemia, unspecified type      FH: colon cancer in first degree relative <60 years old      (Hyperplastic colonic polyp, unspecified part of colon [K63.5])      (Anemia, unspecified type [D64.9])      (FH: colon cancer in first degree relative <60 years old [Z80.0])    Surgeons: Bri Alexis MD Provider: NATHAN Hodgson CRNA    Anesthesia Type: MAC ASA Status: 2          Anesthesia Type: MAC    Vitals  No vitals data found for the desired time range.          Post Anesthesia Care and Evaluation    Patient location during evaluation: PACU  Patient participation: complete - patient participated  Level of consciousness: awake and alert  Pain score: 0  Pain management: adequate    Airway patency: patent  Anesthetic complications: No anesthetic complications    Cardiovascular status: acceptable and stable  Respiratory status: acceptable and unassisted  Hydration status: acceptable

## 2022-12-07 NOTE — ANESTHESIA PREPROCEDURE EVALUATION
Anesthesia Evaluation     Patient summary reviewed   no history of anesthetic complications:  NPO Solid Status: > 8 hours             Airway   Mallampati: II  Dental      Pulmonary - negative pulmonary ROS   Cardiovascular   Exercise tolerance: excellent (>7 METS)    (+) hypertension,       Neuro/Psych- negative ROS  GI/Hepatic/Renal/Endo - negative ROS     Musculoskeletal     Abdominal    Substance History      OB/GYN          Other                        Anesthesia Plan    ASA 2     MAC       Anesthetic plan, risks, benefits, and alternatives have been provided, discussed and informed consent has been obtained with: patient.        CODE STATUS:

## 2022-12-08 LAB
CYTO UR: NORMAL
LAB AP CASE REPORT: NORMAL
Lab: NORMAL
PATH REPORT.FINAL DX SPEC: NORMAL
PATH REPORT.GROSS SPEC: NORMAL
UREASE TISS QL: NEGATIVE

## 2022-12-09 ENCOUNTER — TELEPHONE (OUTPATIENT)
Dept: GASTROENTEROLOGY | Facility: CLINIC | Age: 82
End: 2022-12-09

## 2022-12-09 NOTE — TELEPHONE ENCOUNTER
Called [facility] at [phone] and was transferred to medical records. Left vm on , Rena's, voicemail requesting pts most recent colonoscopy report and path completed by Dr. Pickard.     Dr Pickard's office number is [phone].

## 2022-12-13 NOTE — TELEPHONE ENCOUNTER
"Received fax from [facility]. Pt has no record of having a colonoscopy done there. Called pt's wife, Jyoti, and asked her if there was a different place that the pt may have had one done at. She tells me \"they do not remember, but I had one done by Dr. Baltazar.\" She does tell me they have always lived in [place] and if she remembers where the pt had his last colonoscopy, she will call us and let up know. Will update Radhika.  "

## 2022-12-14 ENCOUNTER — HOSPITAL ENCOUNTER (EMERGENCY)
Facility: HOSPITAL | Age: 82
Discharge: HOME | End: 2022-12-14
Attending: FAMILY MEDICINE | Admitting: FAMILY MEDICINE

## 2022-12-14 ENCOUNTER — APPOINTMENT (OUTPATIENT)
Dept: MRI IMAGING | Facility: HOSPITAL | Age: 82
End: 2022-12-14

## 2022-12-14 ENCOUNTER — APPOINTMENT (OUTPATIENT)
Dept: CT IMAGING | Facility: HOSPITAL | Age: 82
End: 2022-12-14

## 2022-12-14 ENCOUNTER — APPOINTMENT (OUTPATIENT)
Dept: GENERAL RADIOLOGY | Facility: HOSPITAL | Age: 82
End: 2022-12-14

## 2022-12-14 VITALS
SYSTOLIC BLOOD PRESSURE: 132 MMHG | BODY MASS INDEX: 24.55 KG/M2 | OXYGEN SATURATION: 99 % | RESPIRATION RATE: 21 BRPM | HEART RATE: 78 BPM | DIASTOLIC BLOOD PRESSURE: 76 MMHG | TEMPERATURE: 98 F | WEIGHT: 162 LBS | HEIGHT: 68 IN

## 2022-12-14 DIAGNOSIS — R06.02 SHORTNESS OF BREATH: Primary | ICD-10-CM

## 2022-12-14 DIAGNOSIS — R20.0 NUMBNESS OF EXTREMITY: ICD-10-CM

## 2022-12-14 LAB
ALBUMIN SERPL-MCNC: 3.9 G/DL (ref 3.5–5.2)
ALBUMIN/GLOB SERPL: 0.9 G/DL
ALP SERPL-CCNC: 90 U/L (ref 39–117)
ALT SERPL W P-5'-P-CCNC: 15 U/L (ref 1–41)
ANION GAP SERPL CALCULATED.3IONS-SCNC: 7 MMOL/L (ref 5–15)
AST SERPL-CCNC: 20 U/L (ref 1–40)
BACTERIA UR QL AUTO: NORMAL /HPF
BASOPHILS # BLD AUTO: 0.03 10*3/MM3 (ref 0–0.2)
BASOPHILS NFR BLD AUTO: 0.4 % (ref 0–1.5)
BILIRUB SERPL-MCNC: 0.5 MG/DL (ref 0–1.2)
BILIRUB UR QL STRIP: NEGATIVE
BUN SERPL-MCNC: 14 MG/DL (ref 8–23)
BUN/CREAT SERPL: 17.3 (ref 7–25)
CALCIUM SPEC-SCNC: 9.4 MG/DL (ref 8.6–10.5)
CHLORIDE SERPL-SCNC: 99 MMOL/L (ref 98–107)
CK SERPL-CCNC: 30 U/L (ref 20–200)
CLARITY UR: CLEAR
CO2 SERPL-SCNC: 33 MMOL/L (ref 22–29)
COLOR UR: YELLOW
CREAT SERPL-MCNC: 0.81 MG/DL (ref 0.76–1.27)
CRP SERPL-MCNC: <0.3 MG/DL (ref 0–0.5)
D DIMER PPP FEU-MCNC: 0.78 MCGFEU/ML (ref 0–0.84)
DEPRECATED RDW RBC AUTO: 46.3 FL (ref 37–54)
EGFRCR SERPLBLD CKD-EPI 2021: 86.9 ML/MIN/1.73
EOSINOPHIL # BLD AUTO: 0.43 10*3/MM3 (ref 0–0.4)
EOSINOPHIL NFR BLD AUTO: 5.7 % (ref 0.3–6.2)
ERYTHROCYTE [DISTWIDTH] IN BLOOD BY AUTOMATED COUNT: 13.2 % (ref 12.3–15.4)
ERYTHROCYTE [SEDIMENTATION RATE] IN BLOOD: 38 MM/HR (ref 0–20)
GLOBULIN UR ELPH-MCNC: 4.4 GM/DL
GLUCOSE SERPL-MCNC: 107 MG/DL (ref 65–99)
GLUCOSE UR STRIP-MCNC: NEGATIVE MG/DL
HCT VFR BLD AUTO: 40.3 % (ref 37.5–51)
HGB BLD-MCNC: 12.6 G/DL (ref 13–17.7)
HGB UR QL STRIP.AUTO: NEGATIVE
HOLD SPECIMEN: NORMAL
HOLD SPECIMEN: NORMAL
HYALINE CASTS UR QL AUTO: NORMAL /LPF
IMM GRANULOCYTES # BLD AUTO: 0.02 10*3/MM3 (ref 0–0.05)
IMM GRANULOCYTES NFR BLD AUTO: 0.3 % (ref 0–0.5)
KETONES UR QL STRIP: NEGATIVE
LEUKOCYTE ESTERASE UR QL STRIP.AUTO: ABNORMAL
LYMPHOCYTES # BLD AUTO: 1.11 10*3/MM3 (ref 0.7–3.1)
LYMPHOCYTES NFR BLD AUTO: 14.7 % (ref 19.6–45.3)
MAGNESIUM SERPL-MCNC: 1.9 MG/DL (ref 1.6–2.4)
MCH RBC QN AUTO: 30.1 PG (ref 26.6–33)
MCHC RBC AUTO-ENTMCNC: 31.3 G/DL (ref 31.5–35.7)
MCV RBC AUTO: 96.2 FL (ref 79–97)
MONOCYTES # BLD AUTO: 0.52 10*3/MM3 (ref 0.1–0.9)
MONOCYTES NFR BLD AUTO: 6.9 % (ref 5–12)
NEUTROPHILS NFR BLD AUTO: 5.45 10*3/MM3 (ref 1.7–7)
NEUTROPHILS NFR BLD AUTO: 72 % (ref 42.7–76)
NITRITE UR QL STRIP: NEGATIVE
NRBC BLD AUTO-RTO: 0 /100 WBC (ref 0–0.2)
NT-PROBNP SERPL-MCNC: 214.9 PG/ML (ref 0–1800)
PH UR STRIP.AUTO: 6.5 [PH] (ref 5–8)
PLATELET # BLD AUTO: 257 10*3/MM3 (ref 140–450)
PMV BLD AUTO: 10.2 FL (ref 6–12)
POTASSIUM SERPL-SCNC: 4.8 MMOL/L (ref 3.5–5.2)
PROT SERPL-MCNC: 8.3 G/DL (ref 6–8.5)
PROT UR QL STRIP: NEGATIVE
RBC # BLD AUTO: 4.19 10*6/MM3 (ref 4.14–5.8)
RBC # UR STRIP: NORMAL /HPF
REF LAB TEST METHOD: NORMAL
SODIUM SERPL-SCNC: 139 MMOL/L (ref 136–145)
SP GR UR STRIP: 1.01 (ref 1–1.03)
SQUAMOUS #/AREA URNS HPF: NORMAL /HPF
TROPONIN T SERPL-MCNC: <0.01 NG/ML (ref 0–0.03)
UROBILINOGEN UR QL STRIP: ABNORMAL
WBC # UR STRIP: NORMAL /HPF
WBC NRBC COR # BLD: 7.56 10*3/MM3 (ref 3.4–10.8)
WHOLE BLOOD HOLD COAG: NORMAL
WHOLE BLOOD HOLD SPECIMEN: NORMAL

## 2022-12-14 PROCEDURE — 85025 COMPLETE CBC W/AUTO DIFF WBC: CPT

## 2022-12-14 PROCEDURE — 71045 X-RAY EXAM CHEST 1 VIEW: CPT

## 2022-12-14 PROCEDURE — 84484 ASSAY OF TROPONIN QUANT: CPT

## 2022-12-14 PROCEDURE — 93010 ELECTROCARDIOGRAM REPORT: CPT | Performed by: INTERNAL MEDICINE

## 2022-12-14 PROCEDURE — 85652 RBC SED RATE AUTOMATED: CPT | Performed by: FAMILY MEDICINE

## 2022-12-14 PROCEDURE — 85379 FIBRIN DEGRADATION QUANT: CPT | Performed by: FAMILY MEDICINE

## 2022-12-14 PROCEDURE — 81001 URINALYSIS AUTO W/SCOPE: CPT

## 2022-12-14 PROCEDURE — 82550 ASSAY OF CK (CPK): CPT | Performed by: FAMILY MEDICINE

## 2022-12-14 PROCEDURE — 83735 ASSAY OF MAGNESIUM: CPT

## 2022-12-14 PROCEDURE — 83880 ASSAY OF NATRIURETIC PEPTIDE: CPT | Performed by: FAMILY MEDICINE

## 2022-12-14 PROCEDURE — 99284 EMERGENCY DEPT VISIT MOD MDM: CPT

## 2022-12-14 PROCEDURE — 36415 COLL VENOUS BLD VENIPUNCTURE: CPT

## 2022-12-14 PROCEDURE — 93005 ELECTROCARDIOGRAM TRACING: CPT

## 2022-12-14 PROCEDURE — 86140 C-REACTIVE PROTEIN: CPT | Performed by: FAMILY MEDICINE

## 2022-12-14 PROCEDURE — 80053 COMPREHEN METABOLIC PANEL: CPT

## 2022-12-14 PROCEDURE — 70450 CT HEAD/BRAIN W/O DYE: CPT

## 2022-12-14 RX ORDER — SODIUM CHLORIDE 0.9 % (FLUSH) 0.9 %
10 SYRINGE (ML) INJECTION AS NEEDED
Status: DISCONTINUED | OUTPATIENT
Start: 2022-12-14 | End: 2022-12-14 | Stop reason: HOSPADM

## 2022-12-14 NOTE — DISCHARGE INSTRUCTIONS
Follow up with one of the [facility] physician groups below to setup primary care. If you have trouble making an appointment, please call the [facility] Nurse Line at [phone]    [facility] - [address]  [id number] [phone]    [facility] - [address], [place], [place] [address] [phone]    [facility] - [address] [place] [address], [address], [place] [address] [phone]     [facility] ([facility]) - [place] [id number] [facility], [address], [place] [address] [phone]    [facility] - [address], [address] [phone]    [facility] - [address], [place] [address] [phone]    [facility] - [place]  [address] [place], [place], [address] [phone]    [facility] - [place]  [address], [address] [phone]    [facility] - [address] [address] [phone]    [facility] - [place]  [address] [place] [address], [place], [address] [phone]        PEDIATRIC:    [facility] - [address] [address], [address], [place] [address] [phone]

## 2022-12-14 NOTE — ED NOTES
Patient ambulated to the bathroom with steady gait with out assistance and back to his cart.  No s/s of pain or discomfort. Call light within reach.

## 2022-12-14 NOTE — ED PROVIDER NOTES
Subjective   History of Present Illness  Patient reports having shortness of breath and cough going on for a couple weeks now.  Patient states that he has been having a productive cough with yellow sputum.  Patient denies any fevers or chills.  Patient states that since yesterday he is also been having some numbness in his arms and legs.  Patient denies any headache or dizziness.  Patient denies any slurred speech.  Patient also has been having some swelling of his upper lip going on since yesterday.  Patient states that he has also been feeling weak.  Patient has no chest pain.  Patient has no nausea, vomiting, diarrhea.  Patient has no abdominal pain.  Patient has no dysuria or hematuria.  Patient has no increased urinary frequency or urgency.        Review of Systems   All other systems reviewed and are negative.      Past Medical History:   Diagnosis Date   • Arthritis    • Back pain    • History of colon polyps    • History of prostate cancer    • Hypertension    • Macular degeneration    • Osteopenia        No Known Allergies    Past Surgical History:   Procedure Laterality Date   • APPENDECTOMY     • CATARACT EXTRACTION, BILATERAL     • COLONOSCOPY  09/17/2013    Dr. José-One 2-3mm polyp at 20cm; Otherwise normal; Repeat 3 years   • COLONOSCOPY N/A 12/7/2022    Procedure: COLONOSCOPY WITH ANESTHESIA;  Surgeon: Bri Alexis MD;  Location: [facility] ENDOSCOPY;  Service: Gastroenterology;  Laterality: N/A;  pre: anemia. hx polyps.  post: polyps. diverticulosis.  no PCP   • ENDOSCOPY N/A 12/7/2022    Procedure: ESOPHAGOGASTRODUODENOSCOPY WITH ANESTHESIA;  Surgeon: Bri Alexis MD;  Location: [facility] ENDOSCOPY;  Service: Gastroenterology;  Laterality: N/A;  pre: anemia  post: hiatal hernia. esophagitis.gastric erosions.  no PCP   • FOOT SURGERY Right    • KYPHOPLASTY Bilateral 07/17/2018    Procedure: L3 KYPHOPLASTY 1-2 LEVELS;  Surgeon: Vega Pandey MD;  Location: [facility] OR;  Service: Neurosurgery    • KYPHOPLASTY Bilateral 2018    Procedure: L4 KYPHOPLASTY WITH BIOPSY;  Surgeon: Vega Pandey MD;  Location:  [facility] OR;  Service: Neurosurgery   • KYPHOPLASTY WITH BIOPSY N/A 2022    Procedure: KYPHOPLASTY WITH BIOPSY, THORACIC 6 and LUMBAR 5;  Surgeon: Nick Martínez MD;  Location:  [facility] OR;  Service: Neurosurgery;  Laterality: N/A;   • PROSTATECTOMY     • TONSILLECTOMY     • TOTAL SHOULDER REPLACEMENT Bilateral        Family History   Problem Relation Age of Onset   • No Known Problems Mother    • Prostate cancer Father        Social History     Socioeconomic History   • Marital status:    Tobacco Use   • Smoking status: Former     Packs/day: 1.00     Years: 30.00     Pack years: 30.00     Types: Cigarettes     Quit date: 9/10/1991     Years since quittin.2   • Smokeless tobacco: Never   • Tobacco comments:     1991   Vaping Use   • Vaping Use: Never used   Substance and Sexual Activity   • Alcohol use: Yes     Comment: occasional drink   • Drug use: No   • Sexual activity: Not Currently     Partners: Female     Birth control/protection: Post-menopausal           Objective   Physical Exam  Vitals and nursing note reviewed.   Constitutional:       General: He is not in acute distress.     Appearance: Normal appearance. He is not ill-appearing or toxic-appearing.   HENT:      Head: Normocephalic and atraumatic.      Mouth/Throat:      Mouth: Mucous membranes are moist.   Eyes:      Extraocular Movements: Extraocular movements intact.      Pupils: Pupils are equal, round, and reactive to light.   Cardiovascular:      Rate and Rhythm: Normal rate and regular rhythm.      Heart sounds: Normal heart sounds.   Pulmonary:      Effort: Pulmonary effort is normal.      Breath sounds: Normal breath sounds.   Abdominal:      General: Bowel sounds are normal.      Palpations: Abdomen is soft.      Tenderness: There is no abdominal tenderness.   Musculoskeletal:          General: No tenderness or deformity. Normal range of motion.      Cervical back: Normal range of motion and neck supple. No tenderness.   Skin:     General: Skin is warm and dry.   Neurological:      General: No focal deficit present.      Mental Status: He is alert and oriented to person, place, and time.      Cranial Nerves: No cranial nerve deficit.   Psychiatric:         Mood and Affect: Mood normal.         Behavior: Behavior normal.         Procedures           ED Course  ED Course as of 12/14/22 1603   Wed Dec 14, 2022   1332 EKG rate 84  Normal sinus rhythm  No ST changes [RP]   1451 Done on arrival    NIH Stroke Scale/Score (NIHSS) - MDCalc  Calculated on Dec 14 2022 3:51 PM  0 points -> NIH Stroke Scale [RP]      ED Course User Index  [RP] Nikolas Tellez MD                                           CT Head Without Contrast   Final Result   1. No hemorrhage, edema or mass effect.   2. Low density in the hemispheric white matter is nonspecific and likely   due to chronic small vessel disease.   3. Area of asymmetric low density in the right frontal subcortical white   matter likely represents chronic ischemic change. However, given the   asymmetry, other etiologies are considered, including acute infarct.   4. Mild atrophy with associated ventricular prominence.       The full report of this exam was immediately signed and available to the   emergency room. The patient is currently in the emergency room.       This report was finalized on 12/14/2022 14:35 by Dr. Randy Chatterjee MD.      XR Chest 1 View   Final Result   1. Stable chest exam without acute process.           This report was finalized on 12/14/2022 13:40 by Dr Willi Stephenson, .            Lab Results (last 24 hours)     Procedure Component Value Units Date/Time    CBC & Differential [[id number]]  (Abnormal) Collected: 12/14/22 1215    Specimen: Blood Updated: 12/14/22 1233    Narrative:      The following orders were created for panel order CBC &  Differential.  Procedure                               Abnormality         Status                     ---------                               -----------         ------                     CBC Auto Differential[[id number]]        Abnormal            Final result                 Please view results for these tests on the individual orders.    Comprehensive Metabolic Panel [[id number]]  (Abnormal) Collected: 12/14/22 1215    Specimen: Blood Updated: 12/14/22 1258     Glucose 107 mg/dL      BUN 14 mg/dL      Creatinine 0.81 mg/dL      Sodium 139 mmol/L      Potassium 4.8 mmol/L      Chloride 99 mmol/L      CO2 33.0 mmol/L      Calcium 9.4 mg/dL      Total Protein 8.3 g/dL      Albumin 3.90 g/dL      ALT (SGPT) 15 U/L      AST (SGOT) 20 U/L      Alkaline Phosphatase 90 U/L      Total Bilirubin 0.5 mg/dL      Globulin 4.4 gm/dL      A/G Ratio 0.9 g/dL      BUN/Creatinine Ratio 17.3     Anion Gap 7.0 mmol/L      eGFR 86.9 mL/min/1.73      Comment: National Kidney Foundation and American Society of Nephrology (ASN) Task Force recommended calculation based on the Chronic Kidney Disease Epidemiology Collaboration (CKD-EPI) equation refit without adjustment for race.       Narrative:      GFR Normal >60  Chronic Kidney Disease <60  Kidney Failure <15    The GFR formula is only valid for adults with stable renal function between ages 18 and 70.    Troponin [[id number]]  (Normal) Collected: 12/14/22 1215    Specimen: Blood Updated: 12/14/22 1251     Troponin T <0.010 ng/mL     Narrative:      Troponin T Reference Range:  <= 0.03 ng/mL-   Negative for AMI  >0.03 ng/mL-     Abnormal for myocardial necrosis.  Clinicians would have to utilize clinical acumen, EKG, Troponin and serial changes to determine if it is an Acute Myocardial Infarction or myocardial injury due to an underlying chronic condition.       Results may be falsely decreased if patient taking Biotin.      Magnesium [[id number]]  (Normal) Collected: 12/14/22 1215     "Specimen: Blood Updated: 12/14/22 1252     Magnesium 1.9 mg/dL     CBC Auto Differential [[id number]]  (Abnormal) Collected: 12/14/22 1215    Specimen: Blood Updated: 12/14/22 1233     WBC 7.56 10*3/mm3      RBC 4.19 10*6/mm3      Hemoglobin 12.6 g/dL      Hematocrit 40.3 %      MCV 96.2 fL      MCH 30.1 pg      MCHC 31.3 g/dL      RDW 13.2 %      RDW-SD 46.3 fl      MPV 10.2 fL      Platelets 257 10*3/mm3      Neutrophil % 72.0 %      Lymphocyte % 14.7 %      Monocyte % 6.9 %      Eosinophil % 5.7 %      Basophil % 0.4 %      Immature Grans % 0.3 %      Neutrophils, Absolute 5.45 10*3/mm3      Lymphocytes, Absolute 1.11 10*3/mm3      Monocytes, Absolute 0.52 10*3/mm3      Eosinophils, Absolute 0.43 10*3/mm3      Basophils, Absolute 0.03 10*3/mm3      Immature Grans, Absolute 0.02 10*3/mm3      nRBC 0.0 /100 WBC     D-dimer, Quantitative [[id number]]  (Normal) Collected: 12/14/22 1215    Specimen: Blood Updated: 12/14/22 1406     D-Dimer, Quantitative 0.78 MCGFEU/mL     Narrative:      According to the assay 's published package insert, a normal (<0.50 MCGFEU/mL) D-dimer result in conjunction with a non-high clinical probability assessment, excludes deep vein thrombosis (DVT) and pulmonary embolism (PE) with high sensitivity.    D-dimer values increase with age and this can make VTE exclusion of an older population difficult. To address this, the American College of Physicians, based on best available evidence and recent guidelines, recommends that clinicians use age-adjusted D-dimer thresholds in patients greater than 50 years of age with: a) a low probability of PE who do not meet all Pulmonary Embolism Rule Out Criteria, or b) in those with intermediate probability of PE.   The formula for an age-adjusted D-dimer cut-off is \"age/100\".  For example, a 60 year old patient would have an age-adjusted cut-off of 0.60 MCGFEU/mL and an 80 year old 0.80 MCGFEU/mL.    BNP [[id number]]  (Normal) Collected: " 12/14/22 1215    Specimen: Blood Updated: 12/14/22 1404     proBNP 214.9 pg/mL     Narrative:      Among patients with dyspnea, NT-proBNP is highly sensitive for the detection of acute congestive heart failure. In addition NT-proBNP of <300 pg/ml effectively rules out acute congestive heart failure with 99% negative predictive value.      Sedimentation Rate [[id number]]  (Abnormal) Collected: 12/14/22 1215    Specimen: Blood Updated: 12/14/22 1353     Sed Rate 38 mm/hr     C-reactive Protein [[id number]]  (Normal) Collected: 12/14/22 1215    Specimen: Blood Updated: 12/14/22 1406     C-Reactive Protein <0.30 mg/dL     CK [[id number]]  (Normal) Collected: 12/14/22 1215    Specimen: Blood Updated: 12/14/22 1406     Creatine Kinase 30 U/L     Urinalysis With Culture If Indicated - Urine, Clean Catch [[id number]]  (Abnormal) Collected: 12/14/22 1407    Specimen: Urine, Clean Catch Updated: 12/14/22 1428     Color, UA Yellow     Appearance, UA Clear     pH, UA 6.5     Specific Gravity, UA 1.012     Glucose, UA Negative     Ketones, UA Negative     Bilirubin, UA Negative     Blood, UA Negative     Protein, UA Negative     Leuk Esterase, UA Trace     Nitrite, UA Negative     Urobilinogen, UA 0.2 E.U./dL    Narrative:      In absence of clinical symptoms, the presence of pyuria, bacteria, and/or nitrites on the urinalysis result does not correlate with infection.    Urinalysis, Microscopic Only - Urine, Clean Catch [[id number]] Collected: 12/14/22 1407    Specimen: Urine, Clean Catch Updated: 12/14/22 1428     RBC, UA None Seen /HPF      WBC, UA None Seen /HPF      Comment: Urine culture not indicated.        Bacteria, UA None Seen /HPF      Squamous Epithelial Cells, UA None Seen /HPF      Hyaline Casts, UA None Seen /LPF      Methodology Automated Microscopy              MDM    Patient is resting comfortably nontoxic-appearing.  Patient's x-ray was negative for any acute findings.  Patient shortness of breath could be more  exertional shortness of breath might be of cardiac origin.  I discussed with patient about following up with his primary care provider and seeing cardiology on outpatient basis for possible stress test.  Patient does not have any active chest pain here in the emergency room.  I did do a CT scan of the head for the patient.  It was negative for any acute findings.  I discussed the patient's case with Dr. Maria the neurologist.  Patient's numbness of his upper extremities and lower extremities is less likely to do with an acute infarct.    Final diagnoses:   Shortness of breath   Numbness of extremity       ED Disposition  ED Disposition     ED Disposition   Discharge    Condition   Stable    Comment   --             [facility] Emergency Department  [address] [place] [id number] [phone]  Call            Medication List      No changes were made to your prescriptions during this visit.          Nikolas Tellez MD  12/14/22 4581

## 2022-12-16 NOTE — PROGRESS NOTES
I am writing to inform you that the polyp(s) removed from the colon did not have any cancer. It no longer pose a threat to you since it was completely removed. I have no plans to repeat colonoscopy due to advance age and/or medical problems as we discussed after the procedure.  Also, your stomach biopsies were negative for infection and small bowel biopsies were normal.    If you have any questions, please call our office.     Sincerely,         Bri Alexis MD

## 2022-12-17 LAB
QT INTERVAL: 364 MS
QTC INTERVAL: 430 MS

## 2023-01-11 DIAGNOSIS — S32.050D COMPRESSION FRACTURE OF L5 VERTEBRA WITH ROUTINE HEALING, SUBSEQUENT ENCOUNTER: ICD-10-CM

## 2023-01-11 DIAGNOSIS — S22.000G COMPRESSION FRACTURE OF THORACIC VERTEBRA WITH DELAYED HEALING, UNSPECIFIED THORACIC VERTEBRAL LEVEL, SUBSEQUENT ENCOUNTER: ICD-10-CM

## 2023-01-11 NOTE — TELEPHONE ENCOUNTER
Caller: Juan Luis Collazo    Relationship: Self    Best call back number:[phone]    Requested Prescriptions:   Requested Prescriptions     Pending Prescriptions Disp Refills   • traMADol-acetaminophen (ULTRACET) 37.5-325 MG per tablet 90 tablet 5     Sig: Take 1 tablet by mouth 3 (Three) Times a Day.        Pharmacy where request should be sent: [facility] [address] [phone] [facility] [phone]     Does the patient have less than a 3 day supply:  [x] Yes  [] No    Would you like a call back once the refill request has been completed: [x] Yes [] No    If the office needs to give you a call back, can they leave a voicemail: [x] Yes [] No    Wander Patiño Rep   01/11/23 14:05 CST

## 2023-01-12 NOTE — TELEPHONE ENCOUNTER
Last OV 10/28 with Belia  Next OV 4/28 with Christie  No UDS on file but have put note on April appt to collect.

## 2023-02-13 DIAGNOSIS — S32.050D COMPRESSION FRACTURE OF L5 VERTEBRA WITH ROUTINE HEALING, SUBSEQUENT ENCOUNTER: ICD-10-CM

## 2023-02-13 DIAGNOSIS — S22.000G COMPRESSION FRACTURE OF THORACIC VERTEBRA WITH DELAYED HEALING, UNSPECIFIED THORACIC VERTEBRAL LEVEL, SUBSEQUENT ENCOUNTER: ICD-10-CM

## 2023-02-13 NOTE — TELEPHONE ENCOUNTER
Last OV 10/28 with Belia  Next OV 4/28 with Christie  No UDS on file  Patient has taken this since at least 2019.  Last filled for #90, but he is asking for a 90 day supply, so have pended for #270 unless you do not wish to give this amount.

## 2023-02-13 NOTE — TELEPHONE ENCOUNTER
Caller: Juan Luis Collazo    Relationship: Self    Best call back number: [phone]     Requested Prescriptions:   Requested Prescriptions     Pending Prescriptions Disp Refills   • traMADol-acetaminophen (ULTRACET) 37.5-325 MG per tablet 90 tablet 3     Sig: Take 1 tablet by mouth 3 (Three) Times a Day.        Pharmacy where request should be sent: [facility] - [address] [phone] [facility] [phone]      Additional details provided by patient: THE PATIENT STATES THAT HE NEEDS A 90 DAY SUPPLY    Does the patient have less than a 3 day supply:  [x] Yes  [] No        Wander Villalobos Rep   02/13/23 09:54 CST

## 2023-04-13 ENCOUNTER — TELEPHONE (OUTPATIENT)
Dept: NEUROSURGERY | Facility: CLINIC | Age: 83
End: 2023-04-13
Payer: COMMERCIAL

## 2023-04-13 NOTE — TELEPHONE ENCOUNTER
Provider: RUST / T[facility]SWORTH  Caller: PATIENT  Pharmacy: [facility]  Phone Number: [phone]  Reason for Call: RETURNING SYMPTOMS  When was the patient last seen: 3/3/22 (FOLLOWING KYPHOPLASTY 1/25/22)  When did it start: 4-3 DAYS AGO (GRADUALLY WORSENING)  Where is it located: BACK  Characteristics of symptom/severity: 8/10 PAIN  Timing- Is it constant or intermittent: CONSTANT  What makes it worse: MOVEMENT  What makes it better: LAYING STILL  What therapies/medications have you tried: SEVERAL, BUT NOTHING WORKS    PLEASE CALL PATIENT TO ADVISE AND/OR SCHEDULE ASAP, AS HE IS CONCERNED HE HAS RE-INJURED HIS VERTEBRAE.

## 2023-04-14 ENCOUNTER — APPOINTMENT (OUTPATIENT)
Dept: GENERAL RADIOLOGY | Facility: HOSPITAL | Age: 83
End: 2023-04-14
Payer: COMMERCIAL

## 2023-04-14 ENCOUNTER — HOSPITAL ENCOUNTER (EMERGENCY)
Facility: HOSPITAL | Age: 83
Discharge: HOME | End: 2023-04-15
Attending: STUDENT IN AN ORGANIZED HEALTH CARE EDUCATION/TRAINING PROGRAM | Admitting: STUDENT IN AN ORGANIZED HEALTH CARE EDUCATION/TRAINING PROGRAM
Payer: COMMERCIAL

## 2023-04-14 DIAGNOSIS — R09.02 HYPOXIA: ICD-10-CM

## 2023-04-14 DIAGNOSIS — Z13.9 ENCOUNTER FOR MEDICAL SCREENING EXAMINATION: Primary | ICD-10-CM

## 2023-04-14 LAB
ANION GAP SERPL CALCULATED.3IONS-SCNC: 6 MMOL/L (ref 5–15)
ARTERIAL PATENCY WRIST A: POSITIVE
ATMOSPHERIC PRESS: 745 MMHG
BASE EXCESS BLDA CALC-SCNC: 5.5 MMOL/L (ref 0–2)
BASOPHILS # BLD AUTO: 0.02 10*3/MM3 (ref 0–0.2)
BASOPHILS NFR BLD AUTO: 0.4 % (ref 0–1.5)
BDY SITE: ABNORMAL
BODY TEMPERATURE: 37 C
BUN SERPL-MCNC: 19 MG/DL (ref 8–23)
BUN/CREAT SERPL: 23.2 (ref 7–25)
CALCIUM SPEC-SCNC: 9.2 MG/DL (ref 8.6–10.5)
CHLORIDE SERPL-SCNC: 102 MMOL/L (ref 98–107)
CO2 SERPL-SCNC: 32 MMOL/L (ref 22–29)
CREAT SERPL-MCNC: 0.82 MG/DL (ref 0.76–1.27)
DEPRECATED RDW RBC AUTO: 47.6 FL (ref 37–54)
EGFRCR SERPLBLD CKD-EPI 2021: 86.6 ML/MIN/1.73
EOSINOPHIL # BLD AUTO: 0.13 10*3/MM3 (ref 0–0.4)
EOSINOPHIL NFR BLD AUTO: 2.5 % (ref 0.3–6.2)
ERYTHROCYTE [DISTWIDTH] IN BLOOD BY AUTOMATED COUNT: 14 % (ref 12.3–15.4)
GLUCOSE SERPL-MCNC: 92 MG/DL (ref 65–99)
HCO3 BLDA-SCNC: 31.3 MMOL/L (ref 20–26)
HCT VFR BLD AUTO: 35.1 % (ref 37.5–51)
HGB BLD-MCNC: 11.2 G/DL (ref 13–17.7)
IMM GRANULOCYTES # BLD AUTO: 0.01 10*3/MM3 (ref 0–0.05)
IMM GRANULOCYTES NFR BLD AUTO: 0.2 % (ref 0–0.5)
LYMPHOCYTES # BLD AUTO: 1.92 10*3/MM3 (ref 0.7–3.1)
LYMPHOCYTES NFR BLD AUTO: 37.2 % (ref 19.6–45.3)
Lab: ABNORMAL
MCH RBC QN AUTO: 29.9 PG (ref 26.6–33)
MCHC RBC AUTO-ENTMCNC: 31.9 G/DL (ref 31.5–35.7)
MCV RBC AUTO: 93.6 FL (ref 79–97)
MODALITY: ABNORMAL
MONOCYTES # BLD AUTO: 0.64 10*3/MM3 (ref 0.1–0.9)
MONOCYTES NFR BLD AUTO: 12.4 % (ref 5–12)
NEUTROPHILS NFR BLD AUTO: 2.44 10*3/MM3 (ref 1.7–7)
NEUTROPHILS NFR BLD AUTO: 47.3 % (ref 42.7–76)
NRBC BLD AUTO-RTO: 0 /100 WBC (ref 0–0.2)
PCO2 BLDA: 50.5 MM HG (ref 35–45)
PCO2 TEMP ADJ BLD: 50.5 MM HG (ref 35–45)
PH BLDA: 7.4 PH UNITS (ref 7.35–7.45)
PH, TEMP CORRECTED: 7.4 PH UNITS (ref 7.35–7.45)
PLATELET # BLD AUTO: 245 10*3/MM3 (ref 140–450)
PMV BLD AUTO: 10.1 FL (ref 6–12)
PO2 BLDA: 58.7 MM HG (ref 83–108)
PO2 TEMP ADJ BLD: 58.7 MM HG (ref 83–108)
POTASSIUM SERPL-SCNC: 4.5 MMOL/L (ref 3.5–5.2)
RBC # BLD AUTO: 3.75 10*6/MM3 (ref 4.14–5.8)
SAO2 % BLDCOA: 91.1 % (ref 94–99)
SODIUM SERPL-SCNC: 140 MMOL/L (ref 136–145)
VENTILATOR MODE: ABNORMAL
WBC NRBC COR # BLD: 5.16 10*3/MM3 (ref 3.4–10.8)

## 2023-04-14 PROCEDURE — 36600 WITHDRAWAL OF ARTERIAL BLOOD: CPT

## 2023-04-14 PROCEDURE — 99284 EMERGENCY DEPT VISIT MOD MDM: CPT

## 2023-04-14 PROCEDURE — 71046 X-RAY EXAM CHEST 2 VIEWS: CPT

## 2023-04-14 PROCEDURE — 80048 BASIC METABOLIC PNL TOTAL CA: CPT | Performed by: STUDENT IN AN ORGANIZED HEALTH CARE EDUCATION/TRAINING PROGRAM

## 2023-04-14 PROCEDURE — 93005 ELECTROCARDIOGRAM TRACING: CPT | Performed by: STUDENT IN AN ORGANIZED HEALTH CARE EDUCATION/TRAINING PROGRAM

## 2023-04-14 PROCEDURE — 82803 BLOOD GASES ANY COMBINATION: CPT

## 2023-04-14 PROCEDURE — 93010 ELECTROCARDIOGRAM REPORT: CPT | Performed by: INTERNAL MEDICINE

## 2023-04-14 PROCEDURE — 85025 COMPLETE CBC W/AUTO DIFF WBC: CPT | Performed by: STUDENT IN AN ORGANIZED HEALTH CARE EDUCATION/TRAINING PROGRAM

## 2023-04-15 VITALS
RESPIRATION RATE: 20 BRPM | HEART RATE: 72 BPM | WEIGHT: 160 LBS | OXYGEN SATURATION: 97 % | DIASTOLIC BLOOD PRESSURE: 89 MMHG | TEMPERATURE: 98.9 F | HEIGHT: 66 IN | BODY MASS INDEX: 25.71 KG/M2 | SYSTOLIC BLOOD PRESSURE: 135 MMHG

## 2023-04-15 LAB
QT INTERVAL: 386 MS
QTC INTERVAL: 404 MS

## 2023-04-15 NOTE — DISCHARGE INSTRUCTIONS
It was very nice to meet you, Juan Luis. Thank you for allowing us to take care of you today at [facility].    Like we discussed please monitor your symptoms at home and come back if you have any new changes or other concerns.    Please understand that an ER evaluation is just the start of your evaluation. We will do what we can, but we are often unable to fully figure out what is causing your symptoms from one evaluation. Thus, our primary goal is to determine whether you need to be evaluated in the hospital or if it is safe for you to go home and see other doctors such as a primary care physician or a specialist on an outpatient basis.     Like we discussed, it is VERY IMPORTANT that you follow up with your primary care doctor (call them to set up an appointment) within the next few days or as soon as possible so that you can be re-evaluated for improvement in your symptoms or for any other questions.     A copy of your results should be included in your paperwork. If you were prescribed any medications, please take them as directed or call us back with any questions.    Please return to the emergency room within 12-48 hours if you experience fever, chills, chest pain or shortness of breath, pain with inspiration/expiration, pain that travels to your arms, neck or back, nausea, vomiting, severe headache, tearing pain in your chest, dizziness, feel as though you are about to pass out, have any worsening symptoms, or any other concerns.

## 2023-04-19 NOTE — ED PROVIDER NOTES
Subjective   History of Present Illness  Patients spouse states that the patient was having some confusion and generalized weakness. States that they checked his O2 and noticed it was low and so confirmed it with their own oxygen which was normal. Patient states that he has felt fine and currently feels fine. Denies any shortness of breath or chest pain or abdominal pain. Denies any recent fevers or chills.         Review of Systems   All other systems reviewed and are negative.      Past Medical History:   Diagnosis Date   • Arthritis    • Back pain    • History of colon polyps    • History of prostate cancer    • Hypertension    • Macular degeneration    • Osteopenia        No Known Allergies    Past Surgical History:   Procedure Laterality Date   • APPENDECTOMY     • CATARACT EXTRACTION, BILATERAL     • COLONOSCOPY  09/17/2013    Dr. José-One 2-3mm polyp at 20cm; Otherwise normal; Repeat 3 years   • COLONOSCOPY N/A 12/7/2022    Procedure: COLONOSCOPY WITH ANESTHESIA;  Surgeon: Bri Alexis MD;  Location: [facility] ENDOSCOPY;  Service: Gastroenterology;  Laterality: N/A;  pre: anemia. hx polyps.  post: polyps. diverticulosis.  no PCP   • ENDOSCOPY N/A 12/7/2022    Procedure: ESOPHAGOGASTRODUODENOSCOPY WITH ANESTHESIA;  Surgeon: Bri Alexis MD;  Location: [facility] ENDOSCOPY;  Service: Gastroenterology;  Laterality: N/A;  pre: anemia  post: hiatal hernia. esophagitis.gastric erosions.  no PCP   • FOOT SURGERY Right    • KYPHOPLASTY Bilateral 07/17/2018    Procedure: L3 KYPHOPLASTY 1-2 LEVELS;  Surgeon: Vega Pandey MD;  Location: [facility] OR;  Service: Neurosurgery   • KYPHOPLASTY Bilateral 09/11/2018    Procedure: L4 KYPHOPLASTY WITH BIOPSY;  Surgeon: Vega Pandey MD;  Location: [facility] OR;  Service: Neurosurgery   • KYPHOPLASTY WITH BIOPSY N/A 01/25/2022    Procedure: KYPHOPLASTY WITH BIOPSY, THORACIC 6 and LUMBAR 5;  Surgeon: Nick Martínez MD;  Location: [facility] OR;  Service:  Neurosurgery;  Laterality: N/A;   • PROSTATECTOMY     • TONSILLECTOMY     • TOTAL SHOULDER REPLACEMENT Bilateral        Family History   Problem Relation Age of Onset   • No Known Problems Mother    • Prostate cancer Father        Social History     Socioeconomic History   • Marital status:    Tobacco Use   • Smoking status: Former     Packs/day: 1.00     Years: 30.00     Pack years: 30.00     Types: Cigarettes     Quit date: 9/10/1991     Years since quittin.6   • Smokeless tobacco: Never   • Tobacco comments:     1991   Vaping Use   • Vaping Use: Never used   Substance and Sexual Activity   • Alcohol use: Yes     Comment: occasional drink   • Drug use: No   • Sexual activity: Not Currently     Partners: Female     Birth control/protection: Post-menopausal           Objective   Physical Exam  Vitals and nursing note reviewed.   Constitutional:       General: He is not in acute distress.     Appearance: Normal appearance. He is not toxic-appearing or diaphoretic.   HENT:      Head: Normocephalic and atraumatic.      Right Ear: External ear normal.      Left Ear: External ear normal.      Nose: Nose normal.      Mouth/Throat:      Mouth: Mucous membranes are moist.   Eyes:      General:         Right eye: No discharge.         Left eye: No discharge.      Extraocular Movements: Extraocular movements intact.      Conjunctiva/sclera: Conjunctivae normal.   Cardiovascular:      Rate and Rhythm: Normal rate.      Pulses: Normal pulses.      Heart sounds: Normal heart sounds.   Pulmonary:      Effort: Pulmonary effort is normal. No respiratory distress.      Breath sounds: No wheezing or rhonchi.   Abdominal:      General: Abdomen is flat.      Tenderness: There is no abdominal tenderness. There is no guarding or rebound.   Musculoskeletal:         General: No deformity or signs of injury.      Cervical back: Normal range of motion.   Skin:     General: Skin is warm.      Coloration: Skin is not  jaundiced.   Neurological:      Mental Status: He is alert and oriented to person, place, and time. Mental status is at baseline.   Psychiatric:         Mood and Affect: Mood normal.         Behavior: Behavior normal.         Thought Content: Thought content normal.         Judgment: Judgment normal.         Procedures           ED Course                                           Medical Decision Making  Juan Luis Collazo is a very pleasant 82 y.o. male who presents to the ED for AMS.     Patient was non-toxic and not-ill appearing on arrival. Vital signs stable on arrival. He is not altered. He is oriented and able to have a pleasant conversation. Wife states that he is currently not altered. She states that he was given a breathing treatment prior to arrival and since then his oxygen has been normal.    Patient's presentation raises suspicion for differentials including, but not limited to, hypoxia, hypercarbia, .     Given this, Juan Luis was placed on the monitor. Laboratory studies and imaging studies were ordered including cbc, cmp, CXR.     ____    EKG:  All EKGs are interpreted by the Emergency Department Physician who either signs or Co-signs this chart in the absence of a cardiologist.    ECG 12 Lead Dyspnea   Final Result    Test Reason : Dyspnea    Blood Pressure :   */*   mmHG    Vent. Rate :  66 BPM     Atrial Rate :  66 BPM       P-R Int : 166 ms          QRS Dur :  88 ms        QT Int : 386 ms       P-R-T Axes :  47  51  36 degrees       QTc Int : 404 ms        Normal sinus rhythm    Normal ECG    When compared with ECG of 14-DEC-2022 11:06,    No significant change was found        Referred By: CHRIS           Confirmed By: Ajith Cai MD     SCANNED EKG   Final Result       My EKG interpretation:   I have reviewed and interpreted the EKG to show sinsu rhythm with a rate of 66. Normal axis and intervals.   No evidence of acute ischemia such as ST elevation, ST depression, or T wave inversion.   No  obvious signs of a malignant dysrhythmia or 3rd degree heart block.  ____    Imaging was personally reviewed and interpreted to be notable for no acute cardiopulmonary abnormality.    Labs were reviewed and pertinent for hypoxemia on ABG although I suspect this is a venous sample as the patient was ambulating around without any issues and no hypoxia noted. He has NO shortness of breath or chest pain or confusion or altered mental status and would really like to go home. On re-evaluation, patient remained hemodynamically stable and appeared to be comfortable and in no acute distress.    Given findings described above, patient's presentation is most likely related to possible reactive airway that caused his low oxygen or vasoconstriction. At this point, after reviewing the workup, I have a low suspicion for PE or ACS or PNA.    I had a discussion regarding the workup, results so far, and my impression so far. I said that he is more than welcome to be admitted for observation however the patient would like to go home as he feels completely fine.  His wife and is in agreement with this and will bring him back if there are any concerns. I said that based on the current workup, there is not an unstable medical condition requiring admission to the hospital.     I spent an appropriate amount of time necessary at the bedside preceding discharge so I could explain aftercare instructions, provide more patient eduction, give explanations of my interpretation of the evaluation, and to ensure that everyone understood the plan of care. I also discussed the fact that even after being discharged there could be an acute emergent condition that arises requiring further evaluation, admission, or even surgical intervention.     However, I said that it is VERY IMPORTANT that Juan Luis follows up, by CALLING as soon as possible to set up an appointment, with the primary care doctor within the next few days or as soon as reasonably possible  so that the symptoms can be re-evaluated for improvement or for any other questions.     I also gave Juan Luis common sense return precautions and encouraged a quick return to the emergency department within 24 - 48hrs if there are ANY concerns, worsening of condition, new complaints, or if unable to seek follow-up in a timely fashion.     The patient verbalized understanding of the discharge instructions and agreed with them.     Juan Luis was discharged in stable condition.      Signed by:   Jacobo Jefferson MD 4/18/2023 22:26 CDT   Emergency Medicine Physician    Dragon disclaimer:  Part of this note may be an electronic transcription/translation of spoken language to printed text using the Dragon Dictation System.       Encounter for medical screening examination: complicated acute illness or injury  Hypoxia: complicated acute illness or injury  Amount and/or Complexity of Data Reviewed  Labs: ordered.  Radiology: ordered.  ECG/medicine tests: ordered.          Final diagnoses:   Encounter for medical screening examination   Hypoxia       ED Disposition  ED Disposition     ED Disposition   Discharge    Condition   Stable    Comment   --             Provider, No Known  [facility] [id number]  [phone]    Call in 1 day  As needed, If symptoms worsen         Medication List      No changes were made to your prescriptions during this visit.          Jacobo Jefferson MD  04/18/23 [id number]

## 2023-04-20 ENCOUNTER — OFFICE VISIT (OUTPATIENT)
Dept: NEUROSURGERY | Facility: CLINIC | Age: 83
End: 2023-04-20
Payer: COMMERCIAL

## 2023-04-20 ENCOUNTER — HOSPITAL ENCOUNTER (OUTPATIENT)
Dept: GENERAL RADIOLOGY | Facility: HOSPITAL | Age: 83
Discharge: HOME | End: 2023-04-20
Payer: COMMERCIAL

## 2023-04-20 VITALS — HEIGHT: 66 IN | BODY MASS INDEX: 25.46 KG/M2 | WEIGHT: 158.4 LBS

## 2023-04-20 DIAGNOSIS — M54.50 LUMBAR BACK PAIN: ICD-10-CM

## 2023-04-20 DIAGNOSIS — E66.3 OVERWEIGHT (BMI 25.0-29.9): ICD-10-CM

## 2023-04-20 DIAGNOSIS — M54.6 PAIN IN THORACIC SPINE: Primary | ICD-10-CM

## 2023-04-20 DIAGNOSIS — M54.6 PAIN IN THORACIC SPINE: ICD-10-CM

## 2023-04-20 PROCEDURE — 72070 X-RAY EXAM THORAC SPINE 2VWS: CPT

## 2023-04-20 PROCEDURE — 72100 X-RAY EXAM L-S SPINE 2/3 VWS: CPT

## 2023-04-20 RX ORDER — OLMESARTAN MEDOXOMIL 40 MG/1
TABLET ORAL
COMMUNITY
Start: 2023-02-27

## 2023-04-20 NOTE — PATIENT INSTRUCTIONS
Fall Prevention in the Home, Adult  Falls can cause injuries and can happen to people of all ages. There are many things you can do to make your home safe and to help prevent falls. Ask for help when making these changes.  What actions can I take to prevent falls?  General Instructions  Use good lighting in all rooms. Replace any light bulbs that burn out.  Turn on the lights in dark areas. Use night-lights.  Keep items that you use often in easy-to-reach places. Lower the shelves around your home if needed.  Set up your furniture so you have a clear path. Avoid moving your furniture around.  Do not have throw rugs or other things on the floor that can make you trip.  Avoid walking on wet floors.  If any of your floors are uneven, fix them.  Add color or contrast paint or tape to clearly canelo and help you see:  Grab bars or handrails.  First and last steps of staircases.  Where the edge of each step is.  If you use a stepladder:  Make sure that it is fully opened. Do not climb a closed stepladder.  Make sure the sides of the stepladder are locked in place.  Ask someone to hold the stepladder while you use it.  Know where your pets are when moving through your home.  What can I do in the bathroom?     Keep the floor dry. Clean up any water on the floor right away.  Remove soap buildup in the tub or shower.  Use nonskid mats or decals on the floor of the tub or shower.  Attach bath mats securely with double-sided, nonslip rug tape.  If you need to sit down in the shower, use a plastic, nonslip stool.  Install grab bars by the toilet and in the tub and shower. Do not use towel bars as grab bars.  What can I do in the bedroom?  Make sure that you have a light by your bed that is easy to reach.  Do not use any sheets or blankets for your bed that hang to the floor.  Have a firm chair with side arms that you can use for support when you get dressed.  What can I do in the kitchen?  Clean up any spills right away.  If you  need to reach something above you, use a step stool with a grab bar.  Keep electrical cords out of the way.  Do not use floor polish or wax that makes floors slippery.  What can I do with my stairs?  Do not leave any items on the stairs.  Make sure that you have a light switch at the top and the bottom of the stairs.  Make sure that there are handrails on both sides of the stairs. Fix handrails that are broken or loose.  Install nonslip stair treads on all your stairs.  Avoid having throw rugs at the top or bottom of the stairs.  Choose a carpet that does not hide the edge of the steps on the stairs.  Check carpeting to make sure that it is firmly attached to the stairs. Fix carpet that is loose or worn.  What can I do on the outside of my home?  Use bright outdoor lighting.  Fix the edges of walkways and driveways and fix any cracks.  Remove anything that might make you trip as you walk through a door, such as a raised step or threshold.  Trim any bushes or trees on paths to your home.  Check to see if handrails are loose or broken and that both sides of all steps have handrails.  Install guardrails along the edges of any raised decks and porches.  Clear paths of anything that can make you trip, such as tools or rocks.  Have leaves, snow, or ice cleared regularly.  Use sand or salt on paths during winter.  Clean up any spills in your garage right away. This includes grease or oil spills.  What other actions can I take?  Wear shoes that:  Have a low heel. Do not wear high heels.  Have rubber bottoms.  Feel good on your feet and fit well.  Are closed at the toe. Do not wear open-toe sandals.  Use tools that help you move around if needed. These include:  Canes.  Walkers.  Scooters.  Crutches.  Review your medicines with your doctor. Some medicines can make you feel dizzy. This can increase your chance of falling.  Ask your doctor what else you can do to help prevent falls.  Where to find more information  Centers for  Disease Control and Prevention, [place]: www.cdc.gov  [place] [employer]: www.[place].nih.gov  Contact a doctor if:  You are afraid of falling at home.  You feel weak, drowsy, or dizzy at home.  You fall at home.  Summary  There are many simple things that you can do to make your home safe and to help prevent falls.  Ways to make your home safe include removing things that can make you trip and installing grab bars in the bathroom.  Ask for help when making these changes in your home.  This information is not intended to replace advice given to you by your health care provider. Make sure you discuss any questions you have with your health care provider.  Document Revised: 09/19/2022 Document Reviewed: 07/21/2021  Elsevier Patient Education © 2022 [employer]
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
## 2023-04-21 DIAGNOSIS — M54.50 THORACOLUMBAR BACK PAIN: Primary | ICD-10-CM

## 2023-04-21 DIAGNOSIS — S22.080A COMPRESSION FRACTURE OF T12 VERTEBRA, INITIAL ENCOUNTER: ICD-10-CM

## 2023-04-21 DIAGNOSIS — M54.6 THORACOLUMBAR BACK PAIN: Primary | ICD-10-CM

## 2023-04-25 ENCOUNTER — TELEPHONE (OUTPATIENT)
Dept: NEUROSURGERY | Facility: CLINIC | Age: 83
End: 2023-04-25
Payer: COMMERCIAL

## 2023-04-25 NOTE — TELEPHONE ENCOUNTER
Pt called wanting to know status of MRI schedule.   Called pt and advised that they were waiting for authorization.

## 2023-04-26 DIAGNOSIS — F40.240 CLAUSTROPHOBIA: Primary | ICD-10-CM

## 2023-04-26 RX ORDER — DIAZEPAM 5 MG/1
5 TABLET ORAL ONCE
COMMUNITY
End: 2023-04-26 | Stop reason: SDUPTHER

## 2023-04-26 RX ORDER — DIAZEPAM 5 MG/1
5 TABLET ORAL ONCE
Qty: 1 TABLET | Refills: 0 | Status: SHIPPED | OUTPATIENT
Start: 2023-04-26 | End: 2023-05-01

## 2023-04-28 ENCOUNTER — OFFICE VISIT (OUTPATIENT)
Dept: INTERNAL MEDICINE | Facility: CLINIC | Age: 83
End: 2023-04-28
Payer: COMMERCIAL

## 2023-04-28 VITALS
OXYGEN SATURATION: 95 % | BODY MASS INDEX: 25.23 KG/M2 | SYSTOLIC BLOOD PRESSURE: 144 MMHG | DIASTOLIC BLOOD PRESSURE: 80 MMHG | HEIGHT: 66 IN | HEART RATE: 63 BPM | RESPIRATION RATE: 18 BRPM | TEMPERATURE: 97.7 F | WEIGHT: 157 LBS

## 2023-04-28 DIAGNOSIS — G89.29 CHRONIC BILATERAL LOW BACK PAIN WITHOUT SCIATICA: ICD-10-CM

## 2023-04-28 DIAGNOSIS — R11.0 NAUSEA: ICD-10-CM

## 2023-04-28 DIAGNOSIS — I10 BENIGN HYPERTENSION: Primary | ICD-10-CM

## 2023-04-28 DIAGNOSIS — Z79.899 ENCOUNTER FOR LONG-TERM (CURRENT) USE OF OTHER MEDICATIONS: ICD-10-CM

## 2023-04-28 DIAGNOSIS — Z87.81 HX OF RECURRENT VERTEBRAL FRACTURES: ICD-10-CM

## 2023-04-28 DIAGNOSIS — R73.01 IMPAIRED FASTING GLUCOSE: ICD-10-CM

## 2023-04-28 DIAGNOSIS — L02.91 ABSCESS: ICD-10-CM

## 2023-04-28 DIAGNOSIS — M85.88 OSTEOPENIA OF LUMBAR SPINE: ICD-10-CM

## 2023-04-28 DIAGNOSIS — M54.50 CHRONIC BILATERAL LOW BACK PAIN WITHOUT SCIATICA: ICD-10-CM

## 2023-04-28 LAB — HBA1C MFR BLD: 5.5 %

## 2023-04-28 RX ORDER — ONDANSETRON 4 MG/1
4 TABLET, ORALLY DISINTEGRATING ORAL EVERY 8 HOURS PRN
Qty: 40 TABLET | Refills: 0 | Status: SHIPPED | OUTPATIENT
Start: 2023-04-28

## 2023-04-28 RX ORDER — ANTIOX #8/OM3/DHA/EPA/LUT/ZEAX 250-2.5 MG
1 CAPSULE ORAL 2 TIMES DAILY
COMMUNITY

## 2023-04-28 RX ORDER — DOXYCYCLINE HYCLATE 100 MG/1
100 CAPSULE ORAL 2 TIMES DAILY
Qty: 2 CAPSULE | Refills: 0 | Status: SHIPPED | OUTPATIENT
Start: 2023-04-28 | End: 2023-04-29

## 2023-04-28 NOTE — PROGRESS NOTES
Subjective   Juan Luis Collazo is a 82 y.o. male.   Chief Complaint   Patient presents with    Diabetes     A1C - 5.5%    Hypertension     6 month follow-up.    Back Pain     Medication refill, SAV Branch presents to the office today for a 6-month follow-up on management of hypertension, chronic low back pain, impaired fasting glucose and complaints of abscess on his back.  He reports that as far as he knows his blood pressures have been running normal, he states that readings are typically around 130 systolic, he denies any chest pain or shortness of breath.  He does mention that he went to the ER not too long ago, he states that he felt a little different and when they checked his oxygen saturation at home it was 50% and they proceeded to take him to the ER but once he got there everything was normal.  He states that this has never occurred before and has not recurred since.  He reports he has been seen by dermatology for cyst that has been on his back for quite some time, reports it is bothersome to him because when he wears his back brace it tends to rub, he states that he has had some steroid injections and most recently was prescribed some doxycycline of which he did not take/finish completely as it was making him sick to his stomach and he was vomiting.  His wife wants me to look at it today because she feels that it is looking more infected and states that there is a white spot forming.  He reports that he typically takes his tramadol 2-3 times daily, he states that some days he only has to take it once.  He reports typically if he does not stay on top of his pain medication his pain will be a 10 out of 10, the pain medication typically keeps it around a 4 out of 10.  He is wondering if he should have something stronger.  He states that he has been recommended to take it easy and not do much however he finds this very hard to do.  He notes overall though that he is less active than what he used to be and as  a result he feels that his appetite has lessened.  We discussed the recurrent fractures that he has sustained, has had L3 kyphoplasty, L4 kyphoplasty and T5 and T6 kyphoplasty, 3 different surgeries and continues to have worsening mid thoracic and lower lumbar back pain, he has had recurrent compression fracture post kyphoplasty's hence he has been wearing the brace and requiring the pain medication.  He is following with neurosurgery in relation to this.  We discussed the fact that he had had a bone density scan done back in December 2021, he was initially placed on Fosamax but then this was later discontinued after negative side effects.  He would be interested in trying a different agent to help with potentially lowering risk of recurrent fractures.    The following portions of the patient's history were reviewed and updated as appropriate: allergies, current medications, past family history, past medical history, past social history, past surgical history and problem list.    Review of Systems   Musculoskeletal:  Positive for back pain.     Objective   Past Medical History:   Diagnosis Date    Arthritis     Back pain     History of colon polyps     History of prostate cancer     Hypertension     Low back pain     Macular degeneration     Osteopenia       Past Surgical History:   Procedure Laterality Date    APPENDECTOMY      CATARACT EXTRACTION, BILATERAL      COLONOSCOPY  09/17/2013    Dr. José-One 2-3mm polyp at 20cm; Otherwise normal; Repeat 3 years    COLONOSCOPY N/A 12/7/2022    Procedure: COLONOSCOPY WITH ANESTHESIA;  Surgeon: Bri Alexis MD;  Location: [facility] ENDOSCOPY;  Service: Gastroenterology;  Laterality: N/A;  pre: anemia. hx polyps.  post: polyps. diverticulosis.  no PCP    ENDOSCOPY N/A 12/7/2022    Procedure: ESOPHAGOGASTRODUODENOSCOPY WITH ANESTHESIA;  Surgeon: Bri Alexis MD;  Location: [facility] ENDOSCOPY;  Service: Gastroenterology;  Laterality: N/A;  pre: anemia  post: hiatal  "hernia. esophagitis.gastric erosions.  no PCP    FOOT SURGERY Right     KYPHOPLASTY Bilateral 07/17/2018    Procedure: L3 KYPHOPLASTY 1-2 LEVELS;  Surgeon: Vega Pandey MD;  Location:  [facility] OR;  Service: Neurosurgery    KYPHOPLASTY Bilateral 09/11/2018    Procedure: L4 KYPHOPLASTY WITH BIOPSY;  Surgeon: Vega Pandey MD;  Location:  [facility] OR;  Service: Neurosurgery    KYPHOPLASTY WITH BIOPSY N/A 01/25/2022    Procedure: KYPHOPLASTY WITH BIOPSY, THORACIC 6 and LUMBAR 5;  Surgeon: Nick Martínez MD;  Location:  [facility] OR;  Service: Neurosurgery;  Laterality: N/A;    PROSTATECTOMY      TONSILLECTOMY      TOTAL SHOULDER REPLACEMENT Bilateral         Current Outpatient Medications:     multivitamins-minerals (PRESERVISION AREDS 2) capsule capsule, Take 1 capsule by mouth 2 (Two) Times a Day., Disp: , Rfl:     olmesartan (BENICAR) 40 MG tablet, , Disp: , Rfl:     pantoprazole (PROTONIX) 20 MG EC tablet, Take 1 tablet by mouth Daily., Disp: 30 tablet, Rfl: 11    traMADol-acetaminophen (ULTRACET) 37.5-325 MG per tablet, Take 1 tablet by mouth 3 (Three) Times a Day., Disp: 270 tablet, Rfl: 1    doxycycline (VIBRAMYCIN) 100 MG capsule, Take 1 capsule by mouth 2 (Two) Times a Day for 1 day., Disp: 2 capsule, Rfl: 0    ondansetron ODT (ZOFRAN-ODT) 4 MG disintegrating tablet, Place 1 tablet on the tongue Every 8 (Eight) Hours As Needed for Nausea or Vomiting., Disp: 40 tablet, Rfl: 0    sennosides-docusate (PERICOLACE) 8.6-50 MG per tablet, Take 2 tablets by mouth Daily. (Patient not taking: Reported on 4/28/2023), Disp: 60 tablet, Rfl: 0      /80 (BP Location: Left arm, Patient Position: Sitting, Cuff Size: Adult)   Pulse 63   Temp 97.7 °F (36.5 °C) (Infrared)   Resp 18   Ht 166.6 cm (65.6\")   Wt 71.2 kg (157 lb)   SpO2 95%   BMI 25.65 kg/m²      Body mass index is 25.65 kg/m².         Physical Exam  Vitals and nursing note reviewed.   Constitutional:       General: He is not in acute " distress.     Appearance: Normal appearance. He is normal weight. He is not ill-appearing, toxic-appearing or diaphoretic.   HENT:      Head: Normocephalic and atraumatic.   Eyes:      Extraocular Movements: Extraocular movements intact.      Conjunctiva/sclera: Conjunctivae normal.      Pupils: Pupils are equal, round, and reactive to light.   Cardiovascular:      Rate and Rhythm: Normal rate and regular rhythm.      Pulses: Normal pulses.      Heart sounds: Normal heart sounds.   Pulmonary:      Effort: Pulmonary effort is normal.      Breath sounds: Normal breath sounds.   Abdominal:      General: Bowel sounds are normal.      Palpations: Abdomen is soft.   Musculoskeletal:         General: Tenderness (back) present. Normal range of motion.      Cervical back: Normal range of motion and neck supple.   Skin:     General: Skin is warm and dry.      Findings: Erythema present.             Comments: Large cyst with forming pus to the right outer border noted, there is surrounding erythema and pain is noted on palpation, there is slight warmth to the area.   Neurological:      General: No focal deficit present.      Mental Status: He is alert and oriented to person, place, and time. Mental status is at baseline.   Psychiatric:         Mood and Affect: Mood normal.         Behavior: Behavior normal.         Thought Content: Thought content normal.         Judgment: Judgment normal.             Assessment & Plan   Diagnoses and all orders for this visit:    1. Benign hypertension (Primary)    2. Chronic bilateral low back pain without sciatica  -     ToxASSURE Select 13 (MW) - Urine, Clean Catch    3. Encounter for long-term (current) use of other medications  -     ToxASSURE Select 13 (MW) - Urine, Clean Catch    4. Impaired fasting glucose  -     POC Glycated Hemoglobin, Total    5. Abscess  -     doxycycline (VIBRAMYCIN) 100 MG capsule; Take 1 capsule by mouth 2 (Two) Times a Day for 1 day.  Dispense: 2 capsule;  "Refill: 0    6. Nausea  -     ondansetron ODT (ZOFRAN-ODT) 4 MG disintegrating tablet; Place 1 tablet on the tongue Every 8 (Eight) Hours As Needed for Nausea or Vomiting.  Dispense: 40 tablet; Refill: 0    7. Hx of recurrent vertebral fractures    8. Osteopenia of lumbar spine               Plan of care reviewed with Jose Carlos  Recent lab work from the ER reviewed, overall unremarkable, his A1c today is 5.5%, there is really no need for additional monitoring of this, he will continue with current lifestyle.  He reports compliance with taking his pain medication as prescribed and does report therapeutic effect, denies any negative side effects related to the use, we will update his UDS today, CSA currently up-to-date.   Continues to follow with neurosurgery concerning continued issues with recurrent compression fractures  His blood pressure today is slightly elevated 144/80, pulse rate is 63, he reports more normotensive readings home, I have encouraged him to continue monitoring and to let us know if he is consistently reading greater than 130/80, we will continue with current management for now.  The cyst/abscess on his back was examined, concerned that his infection has not been adequately treated which may be a result of him vomiting up some of the antibiotics he was prescribed and not completing him.  He reports he still has a 2-day supply of doxycycline at home, will send an additional day to the pharmacy as well as some Zofran, but instructed him to take the Zofran about 45 minutes prior to the antibiotic.  His wife seems to think that they will be able to get in with dermatology sooner than scheduled appointment as they were planning on \"cutting the cyst out \".  I have advised that if symptoms of infection increase in severity or if he is unable to get an appointment with dermatology early next week to please let us know we will have him return to the office and have Dr. Aaron evaluate the cyst.  Concerning his " osteopenia and recurrent vertebral fractures, has failed Fosamax related to side effects but is interested in trying a different agent, I do believe that he would benefit potentially from Prolia injections, we will proceed with getting this ordered for him, some information on this medication was provided in the AVS.  For now he will return in 6 months for his annual Medicare wellness, he is aware he can be seen prior to this as needed.  Please note that portions of this note were completed with a voice recognition program.     Electronically signed by DIEGO Anderson, 04/28/23, 17:13 CDT.

## 2023-05-01 DIAGNOSIS — F40.240 CLAUSTROPHOBIA: Primary | ICD-10-CM

## 2023-05-01 DIAGNOSIS — F40.240 CLAUSTROPHOBIA: ICD-10-CM

## 2023-05-01 RX ORDER — DIAZEPAM 5 MG/1
5 TABLET ORAL ONCE
Qty: 1 TABLET | Refills: 0 | Status: SHIPPED | OUTPATIENT
Start: 2023-05-01 | End: 2023-05-01

## 2023-05-01 RX ORDER — DIAZEPAM 5 MG/1
TABLET ORAL
Qty: 2 TABLET | Refills: 0 | Status: SHIPPED | OUTPATIENT
Start: 2023-05-01

## 2023-05-01 RX ORDER — DIAZEPAM 5 MG/1
5 TABLET ORAL ONCE
Qty: 1 TABLET | Refills: 0 | Status: CANCELLED | OUTPATIENT
Start: 2023-05-01 | End: 2023-05-01

## 2023-05-02 ENCOUNTER — HOSPITAL ENCOUNTER (OUTPATIENT)
Dept: MRI IMAGING | Facility: HOSPITAL | Age: 83
Discharge: HOME | End: 2023-05-02
Admitting: NURSE PRACTITIONER
Payer: COMMERCIAL

## 2023-05-02 DIAGNOSIS — S22.080A COMPRESSION FRACTURE OF T12 VERTEBRA, INITIAL ENCOUNTER: ICD-10-CM

## 2023-05-02 DIAGNOSIS — M54.50 THORACOLUMBAR BACK PAIN: ICD-10-CM

## 2023-05-02 DIAGNOSIS — M54.6 THORACOLUMBAR BACK PAIN: ICD-10-CM

## 2023-05-02 PROCEDURE — 72148 MRI LUMBAR SPINE W/O DYE: CPT

## 2023-05-04 ENCOUNTER — TRANSCRIBE ORDERS (OUTPATIENT)
Dept: ADMINISTRATIVE | Facility: HOSPITAL | Age: 83
End: 2023-05-04
Payer: COMMERCIAL

## 2023-05-04 DIAGNOSIS — M86.9 SAPHO SYNDROME: Primary | ICD-10-CM

## 2023-05-04 DIAGNOSIS — M85.80 DECREASED BONE MASS: Primary | ICD-10-CM

## 2023-05-04 DIAGNOSIS — L70.9 SAPHO SYNDROME: Primary | ICD-10-CM

## 2023-05-04 DIAGNOSIS — Z87.81 HEALED FRACTURES: ICD-10-CM

## 2023-05-04 DIAGNOSIS — M85.80 SAPHO SYNDROME: Primary | ICD-10-CM

## 2023-05-04 DIAGNOSIS — M65.9 SAPHO SYNDROME: Primary | ICD-10-CM

## 2023-05-04 DIAGNOSIS — L40.3 SAPHO SYNDROME: Primary | ICD-10-CM

## 2023-05-09 LAB — DRUGS UR: NORMAL

## 2023-05-10 ENCOUNTER — INFUSION (OUTPATIENT)
Dept: ONCOLOGY | Facility: HOSPITAL | Age: 83
End: 2023-05-10
Payer: COMMERCIAL

## 2023-05-10 ENCOUNTER — OFFICE VISIT (OUTPATIENT)
Dept: NEUROSURGERY | Facility: CLINIC | Age: 83
End: 2023-05-10
Payer: COMMERCIAL

## 2023-05-10 ENCOUNTER — LAB (OUTPATIENT)
Dept: LAB | Facility: HOSPITAL | Age: 83
End: 2023-05-10
Payer: COMMERCIAL

## 2023-05-10 VITALS
HEART RATE: 69 BPM | DIASTOLIC BLOOD PRESSURE: 61 MMHG | SYSTOLIC BLOOD PRESSURE: 140 MMHG | RESPIRATION RATE: 20 BRPM | BODY MASS INDEX: 25.23 KG/M2 | WEIGHT: 157 LBS | OXYGEN SATURATION: 99 % | HEIGHT: 66 IN | TEMPERATURE: 97.1 F

## 2023-05-10 VITALS — HEIGHT: 66 IN | WEIGHT: 157 LBS | BODY MASS INDEX: 25.23 KG/M2

## 2023-05-10 DIAGNOSIS — L70.9 SAPHO SYNDROME: ICD-10-CM

## 2023-05-10 DIAGNOSIS — L40.3 SAPHO SYNDROME: ICD-10-CM

## 2023-05-10 DIAGNOSIS — M86.9 SAPHO SYNDROME: ICD-10-CM

## 2023-05-10 DIAGNOSIS — M54.50 LUMBAR BACK PAIN: ICD-10-CM

## 2023-05-10 DIAGNOSIS — M85.80 DECREASED BONE MASS: ICD-10-CM

## 2023-05-10 DIAGNOSIS — E66.3 OVERWEIGHT (BMI 25.0-29.9): ICD-10-CM

## 2023-05-10 DIAGNOSIS — Z87.81 HEALED FRACTURES: ICD-10-CM

## 2023-05-10 DIAGNOSIS — M85.80 SAPHO SYNDROME: ICD-10-CM

## 2023-05-10 DIAGNOSIS — S22.080D COMPRESSION FRACTURE OF T12 VERTEBRA WITH ROUTINE HEALING, SUBSEQUENT ENCOUNTER: Primary | ICD-10-CM

## 2023-05-10 DIAGNOSIS — M85.80 DECREASED BONE MASS: Primary | ICD-10-CM

## 2023-05-10 DIAGNOSIS — M85.80 OSTEOPENIA, UNSPECIFIED LOCATION: ICD-10-CM

## 2023-05-10 DIAGNOSIS — R11.0 NAUSEA: ICD-10-CM

## 2023-05-10 DIAGNOSIS — S22.050D COMPRESSION FRACTURE OF T6 VERTEBRA WITH ROUTINE HEALING, SUBSEQUENT ENCOUNTER: Primary | ICD-10-CM

## 2023-05-10 DIAGNOSIS — M65.9 SAPHO SYNDROME: ICD-10-CM

## 2023-05-10 DIAGNOSIS — Z91.89 AT HIGH RISK FOR FRACTURE: ICD-10-CM

## 2023-05-10 LAB
CALCIUM SPEC-SCNC: 9 MG/DL (ref 8.6–10.5)
MAGNESIUM SERPL-MCNC: 1.8 MG/DL (ref 1.6–2.4)
PHOSPHATE SERPL-MCNC: 3 MG/DL (ref 2.5–4.5)
WHOLE BLOOD HOLD SPECIMEN: NORMAL

## 2023-05-10 PROCEDURE — 83735 ASSAY OF MAGNESIUM: CPT

## 2023-05-10 PROCEDURE — 25010000002 DENOSUMAB 60 MG/ML SOLUTION PREFILLED SYRINGE: Performed by: INTERNAL MEDICINE

## 2023-05-10 PROCEDURE — 96372 THER/PROPH/DIAG INJ SC/IM: CPT

## 2023-05-10 PROCEDURE — 36415 COLL VENOUS BLD VENIPUNCTURE: CPT

## 2023-05-10 PROCEDURE — 82310 ASSAY OF CALCIUM: CPT

## 2023-05-10 PROCEDURE — 84100 ASSAY OF PHOSPHORUS: CPT

## 2023-05-10 RX ADMIN — DENOSUMAB 60 MG: 60 INJECTION SUBCUTANEOUS at 13:21
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
## 2023-05-10 NOTE — PATIENT INSTRUCTIONS
"DASH Eating Plan  DASH stands for Dietary Approaches to Stop Hypertension. The DASH eating plan is a healthy eating plan that has been shown to:  Reduce high blood pressure (hypertension).  Reduce your risk for type 2 diabetes, heart disease, and stroke.  Help with weight loss.  What are tips for following this plan?  Reading food labels  Check food labels for the amount of salt (sodium) per serving. Choose foods with less than 5 percent of the Daily Value of sodium. Generally, foods with less than 300 milligrams (mg) of sodium per serving fit into this eating plan.  To find whole grains, look for the word \"whole\" as the first word in the ingredient list.  Shopping  Buy products labeled as \"low-sodium\" or \"no salt added.\"  Buy fresh foods. Avoid canned foods and pre-made or frozen meals.  Cooking  Avoid adding salt when cooking. Use salt-free seasonings or herbs instead of table salt or sea salt. Check with your health care provider or pharmacist before using salt substitutes.  Do not garcia foods. Cook foods using healthy methods such as baking, boiling, grilling, roasting, and broiling instead.  Cook with heart-healthy oils, such as olive, canola, avocado, soybean, or sunflower oil.  Meal planning    Eat a balanced diet that includes:  4 or more servings of fruits and 4 or more servings of vegetables each day. Try to fill one-half of your plate with fruits and vegetables.  6-8 servings of whole grains each day.  Less than 6 oz (170 g) of lean meat, poultry, or fish each day. A 3-oz (85-g) serving of meat is about the same size as a deck of cards. One egg equals 1 oz (28 g).  2-3 servings of low-fat dairy each day. One serving is 1 cup (237 mL).  1 serving of nuts, seeds, or beans 5 times each week.  2-3 servings of heart-healthy fats. Healthy fats called omega-3 fatty acids are found in foods such as walnuts, flaxseeds, fortified milks, and eggs. These fats are also found in cold-water fish, such as sardines, salmon, " and mackerel.  Limit how much you eat of:  Canned or prepackaged foods.  Food that is high in trans fat, such as some fried foods.  Food that is high in saturated fat, such as fatty meat.  Desserts and other sweets, sugary drinks, and other foods with added sugar.  Full-fat dairy products.  Do not salt foods before eating.  Do not eat more than 4 egg yolks a week.  Try to eat at least 2 vegetarian meals a week.  Eat more home-cooked food and less restaurant, buffet, and fast food.  Lifestyle  When eating at a restaurant, ask that your food be prepared with less salt or no salt, if possible.  If you drink alcohol:  Limit how much you use to:  0-1 drink a day for women who are not pregnant.  0-2 drinks a day for men.  Be aware of how much alcohol is in your drink. In the [place] one drink equals one 12 oz bottle of beer (355 mL), one 5 oz glass of wine (148 mL), or one 1½ oz glass of hard liquor (44 mL).  General information  Avoid eating more than 2,300 mg of salt a day. If you have hypertension, you may need to reduce your sodium intake to 1,500 mg a day.  Work with your health care provider to maintain a healthy body weight or to lose weight. Ask what an ideal weight is for you.  Get at least 30 minutes of exercise that causes your heart to beat faster (aerobic exercise) most days of the week. Activities may include walking, swimming, or biking.  Work with your health care provider or dietitian to adjust your eating plan to your individual calorie needs.  What foods should I eat?  Fruits  All fresh, dried, or frozen fruit. Canned fruit in natural juice (without added sugar).  Vegetables  Fresh or frozen vegetables (raw, steamed, roasted, or grilled). Low-sodium or reduced-sodium tomato and vegetable juice. Low-sodium or reduced-sodium tomato sauce and tomato paste. Low-sodium or reduced-sodium canned vegetables.  Grains  Whole-grain or whole-wheat bread. Whole-grain or whole-wheat pasta. Brown rice. Oatmeal. Quinoa.  Bulgur. Whole-grain and low-sodium cereals. Jerrica bread. Low-fat, low-sodium crackers. Whole-wheat flour tortillas.  Meats and other proteins  Skinless chicken or turkey. Ground chicken or turkey. Pork with fat trimmed off. Fish and seafood. Egg whites. Dried beans, peas, or lentils. Unsalted nuts, nut butters, and seeds. Unsalted canned beans. Lean cuts of beef with fat trimmed off. Low-sodium, lean precooked or cured meat, such as sausages or meat loaves.  Dairy  Low-fat (1%) or fat-free (skim) milk. Reduced-fat, low-fat, or fat-free cheeses. Nonfat, low-sodium ricotta or cottage cheese. Low-fat or nonfat yogurt. Low-fat, low-sodium cheese.  Fats and oils  Soft margarine without trans fats. Vegetable oil. Reduced-fat, low-fat, or light mayonnaise and salad dressings (reduced-sodium). Canola, safflower, olive, avocado, soybean, and sunflower oils. Avocado.  Seasonings and condiments  Herbs. Spices. Seasoning mixes without salt.  Other foods  Unsalted popcorn and pretzels. Fat-free sweets.  The items listed above may not be a complete list of foods and beverages you can eat. Contact a dietitian for more information.  What foods should I avoid?  Fruits  Canned fruit in a light or heavy syrup. Fried fruit. Fruit in cream or butter sauce.  Vegetables  Creamed or fried vegetables. Vegetables in a cheese sauce. Regular canned vegetables (not low-sodium or reduced-sodium). Regular canned tomato sauce and paste (not low-sodium or reduced-sodium). Regular tomato and vegetable juice (not low-sodium or reduced-sodium). Pickles. Olives.  Grains  Baked goods made with fat, such as croissants, muffins, or some breads. Dry pasta or rice meal packs.  Meats and other proteins  Fatty cuts of meat. Ribs. Fried meat. May. Bologna, salami, and other precooked or cured meats, such as sausages or meat loaves. Fat from the back of a pig (fatback). Bratwurst. Salted nuts and seeds. Canned beans with added salt. Canned or smoked fish.  Whole eggs or egg yolks. Chicken or turkey with skin.  Dairy  Whole or 2% milk, cream, and half-and-half. Whole or full-fat cream cheese. Whole-fat or sweetened yogurt. Full-fat cheese. Nondairy creamers. Whipped toppings. Processed cheese and cheese spreads.  Fats and oils  Butter. Stick margarine. Lard. Shortening. Ghee. May fat. Tropical oils, such as coconut, palm kernel, or palm oil.  Seasonings and condiments  Onion salt, garlic salt, seasoned salt, table salt, and sea salt. Worcestershire sauce. Tartar sauce. Barbecue sauce. Teriyaki sauce. Soy sauce, including reduced-sodium. Steak sauce. Canned and packaged gravies. Fish sauce. Oyster sauce. Cocktail sauce. Store-bought horseradish. Ketchup. Mustard. Meat flavorings and tenderizers. Bouillon cubes. Hot sauces. Pre-made or packaged marinades. Pre-made or packaged taco seasonings. Relishes. Regular salad dressings.  Other foods  Salted popcorn and pretzels.  The items listed above may not be a complete list of foods and beverages you should avoid. Contact a dietitian for more information.  Where to find more information  National Heart, Lung, and Blood [place]: www.nhlbi.nih.gov  American Heart Association: www.heart.org  Academy of Nutrition and Dietetics: www.eatright.org  National Kidney Foundation: www.kidney.org  Summary  The DASH eating plan is a healthy eating plan that has been shown to reduce high blood pressure (hypertension). It may also reduce your risk for type 2 diabetes, heart disease, and stroke.  When on the DASH eating plan, aim to eat more fresh fruits and vegetables, whole grains, lean proteins, low-fat dairy, and heart-healthy fats.  With the DASH eating plan, you should limit salt (sodium) intake to 2,300 mg a day. If you have hypertension, you may need to reduce your sodium intake to 1,500 mg a day.  Work with your health care provider or dietitian to adjust your eating plan to your individual calorie needs.  This information is not  intended to replace advice given to you by your health care provider. Make sure you discuss any questions you have with your health care provider.  Document Revised: 11/20/2020 Document Reviewed: 11/20/2020  Elsevier Patient Education © 2022 [employer]    Advance Care Planning and Advance Directives     You make decisions on a daily basis - decisions about where you want to live, your career, your home, your life. Perhaps one of the most important decisions you face is your choice for future medical care. Take time to talk with your family and your healthcare team and start planning today.  Advance Care Planning is a process that can help you:  Understand possible future healthcare decisions in light of your own experiences  Reflect on those decision in light of your goals and values  Discuss your decisions with those closest to you and the healthcare professionals that care for you  Make a plan by creating a document that reflects your wishes    Surrogate Decision Maker  In the event of a medical emergency, which has left you unable to communicate or to make your own decisions, you would need someone to make decisions for you.  It is important to discuss your preferences for medical treatment with this person while you are in good health.     Qualities of a surrogate decision maker:  Willing to take on this role and responsibility  Knows what you want for future medical care  Willing to follow your wishes even if they don't agree with them  Able to make difficult medical decisions under stressful circumstances    Advance Directives  These are legal documents you can create that will guide your healthcare team and decision maker(s) when needed. These documents can be stored in the electronic medical record.    Living Will - a legal document to guide your care if you have a terminal condition or a serious illness and are unable to communicate. States vary by statute in document names/types, but most forms may  include one or more of the following:        -  Directions regarding life-prolonging treatments        -  Directions regarding artificially provided nutrition/hydration        -  Choosing a healthcare decision maker        -  Direction regarding organ/tissue donation    Durable Power of  for Healthcare - this document names an -in-fact to make medical decisions for you, but it may also allow this person to make personal and financial decisions for you. Please seek the advice of an  if you need this type of document.    **Advance Directives are not required and no one may discriminate against you if you do not sign one.    Medical Orders  Many states allow specific forms/orders signed by your physician to record your wishes for medical treatment in your current state of health. This form, signed in personal communication with your physician, addresses resuscitation and other medical interventions that you may or may not want.      For more information or to schedule a time with a [facility] Advance Care Planning Facilitator contact: [facility] or call [phone] and someone will contact you directly.Fall Prevention in the Home, Adult  Falls can cause injuries and can happen to people of all ages. There are many things you can do to make your home safe and to help prevent falls. Ask for help when making these changes.  What actions can I take to prevent falls?  General Instructions  Use good lighting in all rooms. Replace any light bulbs that burn out.  Turn on the lights in dark areas. Use night-lights.  Keep items that you use often in easy-to-reach places. Lower the shelves around your home if needed.  Set up your furniture so you have a clear path. Avoid moving your furniture around.  Do not have throw rugs or other things on the floor that can make you trip.  Avoid walking on wet floors.  If any of your floors are uneven, fix them.  Add color or contrast paint or tape to  clearly canelo and help you see:  Grab bars or handrails.  First and last steps of staircases.  Where the edge of each step is.  If you use a stepladder:  Make sure that it is fully opened. Do not climb a closed stepladder.  Make sure the sides of the stepladder are locked in place.  Ask someone to hold the stepladder while you use it.  Know where your pets are when moving through your home.  What can I do in the bathroom?     Keep the floor dry. Clean up any water on the floor right away.  Remove soap buildup in the tub or shower.  Use nonskid mats or decals on the floor of the tub or shower.  Attach bath mats securely with double-sided, nonslip rug tape.  If you need to sit down in the shower, use a plastic, nonslip stool.  Install grab bars by the toilet and in the tub and shower. Do not use towel bars as grab bars.  What can I do in the bedroom?  Make sure that you have a light by your bed that is easy to reach.  Do not use any sheets or blankets for your bed that hang to the floor.  Have a firm chair with side arms that you can use for support when you get dressed.  What can I do in the kitchen?  Clean up any spills right away.  If you need to reach something above you, use a step stool with a grab bar.  Keep electrical cords out of the way.  Do not use floor polish or wax that makes floors slippery.  What can I do with my stairs?  Do not leave any items on the stairs.  Make sure that you have a light switch at the top and the bottom of the stairs.  Make sure that there are handrails on both sides of the stairs. Fix handrails that are broken or loose.  Install nonslip stair treads on all your stairs.  Avoid having throw rugs at the top or bottom of the stairs.  Choose a carpet that does not hide the edge of the steps on the stairs.  Check carpeting to make sure that it is firmly attached to the stairs. Fix carpet that is loose or worn.  What can I do on the outside of my home?  Use bright outdoor lighting.  Fix  the edges of walkways and driveways and fix any cracks.  Remove anything that might make you trip as you walk through a door, such as a raised step or threshold.  Trim any bushes or trees on paths to your home.  Check to see if handrails are loose or broken and that both sides of all steps have handrails.  Install guardrails along the edges of any raised decks and porches.  Clear paths of anything that can make you trip, such as tools or rocks.  Have leaves, snow, or ice cleared regularly.  Use sand or salt on paths during winter.  Clean up any spills in your garage right away. This includes grease or oil spills.  What other actions can I take?  Wear shoes that:  Have a low heel. Do not wear high heels.  Have rubber bottoms.  Feel good on your feet and fit well.  Are closed at the toe. Do not wear open-toe sandals.  Use tools that help you move around if needed. These include:  Canes.  Walkers.  Scooters.  Crutches.  Review your medicines with your doctor. Some medicines can make you feel dizzy. This can increase your chance of falling.  Ask your doctor what else you can do to help prevent falls.  Where to find more information  Centers for Disease Control and Prevention, [place]: www.cdc.gov  National [place] on Aging: www.stephenie.nih.gov  Contact a doctor if:  You are afraid of falling at home.  You feel weak, drowsy, or dizzy at home.  You fall at home.  Summary  There are many simple things that you can do to make your home safe and to help prevent falls.  Ways to make your home safe include removing things that can make you trip and installing grab bars in the bathroom.  Ask for help when making these changes in your home.  This information is not intended to replace advice given to you by your health care provider. Make sure you discuss any questions you have with your health care provider.  Document Revised: 09/19/2022 Document Reviewed: 07/21/2021  Elsevier Patient Education © 2022 [employer]

## 2023-05-11 RX ORDER — ONDANSETRON 4 MG/1
4 TABLET, ORALLY DISINTEGRATING ORAL EVERY 8 HOURS PRN
Qty: 40 TABLET | Refills: 0 | OUTPATIENT
Start: 2023-05-11

## 2023-05-16 ENCOUNTER — PRE-ADMISSION TESTING (OUTPATIENT)
Dept: PREADMISSION TESTING | Facility: HOSPITAL | Age: 83
End: 2023-05-16
Payer: COMMERCIAL

## 2023-05-16 VITALS
DIASTOLIC BLOOD PRESSURE: 69 MMHG | HEART RATE: 78 BPM | HEIGHT: 63 IN | SYSTOLIC BLOOD PRESSURE: 148 MMHG | RESPIRATION RATE: 18 BRPM | OXYGEN SATURATION: 97 % | WEIGHT: 157.63 LBS | BODY MASS INDEX: 27.93 KG/M2

## 2023-05-16 LAB
ANION GAP SERPL CALCULATED.3IONS-SCNC: 8 MMOL/L (ref 5–15)
BUN SERPL-MCNC: 15 MG/DL (ref 8–23)
BUN/CREAT SERPL: 23.1 (ref 7–25)
CALCIUM SPEC-SCNC: 7.9 MG/DL (ref 8.6–10.5)
CHLORIDE SERPL-SCNC: 104 MMOL/L (ref 98–107)
CO2 SERPL-SCNC: 27 MMOL/L (ref 22–29)
CREAT SERPL-MCNC: 0.65 MG/DL (ref 0.76–1.27)
DEPRECATED RDW RBC AUTO: 45.6 FL (ref 37–54)
EGFRCR SERPLBLD CKD-EPI 2021: 92.9 ML/MIN/1.73
ERYTHROCYTE [DISTWIDTH] IN BLOOD BY AUTOMATED COUNT: 13.2 % (ref 12.3–15.4)
GLUCOSE SERPL-MCNC: 92 MG/DL (ref 65–99)
HCT VFR BLD AUTO: 35.5 % (ref 37.5–51)
HGB BLD-MCNC: 11.2 G/DL (ref 13–17.7)
MCH RBC QN AUTO: 29.7 PG (ref 26.6–33)
MCHC RBC AUTO-ENTMCNC: 31.5 G/DL (ref 31.5–35.7)
MCV RBC AUTO: 94.2 FL (ref 79–97)
PLATELET # BLD AUTO: 350 10*3/MM3 (ref 140–450)
PMV BLD AUTO: 10.2 FL (ref 6–12)
POTASSIUM SERPL-SCNC: 4.9 MMOL/L (ref 3.5–5.2)
RBC # BLD AUTO: 3.77 10*6/MM3 (ref 4.14–5.8)
SODIUM SERPL-SCNC: 139 MMOL/L (ref 136–145)
WBC NRBC COR # BLD: 5.98 10*3/MM3 (ref 3.4–10.8)

## 2023-05-16 PROCEDURE — 85027 COMPLETE CBC AUTOMATED: CPT

## 2023-05-16 PROCEDURE — 36415 COLL VENOUS BLD VENIPUNCTURE: CPT

## 2023-05-16 PROCEDURE — 80048 BASIC METABOLIC PNL TOTAL CA: CPT

## 2023-05-16 NOTE — DISCHARGE INSTRUCTIONS
Before you come to the hospital        Arrival time: AS DIRECTED BY OFFICE     YOU MAY TAKE THE FOLLOWING MEDICATION(S) THE MORNING OF SURGERY WITH A SIP OF WATER:  TRAMADOL    DO NOT TAKE OLMESARTAN 24 HOURS PRIOR TO SURGERY           ALL OTHER HOME MEDICATION CHECK WITH YOUR PHYSICIAN (especially if   you are taking diabetes medicines or blood thinners)    Do not take any Erectile Dysfunction medications (EX: CIALIS, VIAGRA) 24 hours prior to surgery.      If you were given and instructed to use a germ- killing soap, use as directed the night before surgery and again the morning of surgery or as directed by your surgeon. (Use one-half of the bottle with each shower.)   See attached information for How to Use Chlorhexidine for Bathing if applicable.            Eating and drinking restrictions prior to scheduled arrival time    2 Hours before arrival time STOP   Drinking Clear liquids (water, black coffee-NO CREAM,  apple juice-no pulp)      8 Hours before arrival time STOP   All food, full liquids, and dairy products    (It is extremely important that you follow these guidelines to prevent delay or cancelation of your procedure)     Clear Liquids  Water and flavored water                                                                      Clear Fruit juices, such as cranberry juice and apple juice.  Black coffee (NO cream of any kind, including powdered).  Plain tea  Clear bouillon or broth.  Flavored gelatin.  Soda.  Gatorade or Powerade.  Full liquid examples  Juices that have pulp.  Frozen ice pops that contain fruit pieces.  Coffee with creamer  Milk.  Yogurt.                MANAGING PAIN AFTER SURGERY    We know you are probably wondering what your pain will be like after surgery.  Following surgery it is unrealistic to expect you will not have pain.   Pain is how our bodies let us know that something is wrong or cautions us to be careful.  That said, our goal is to make your pain tolerable.    Methods we may  use to treat your pain include (oral or IV medications, PCAs, epidurals, nerve blocks, etc.)   While some procedures require IV pain medications for a short time after surgery, transitioning to pain medications by mouth allows for better management of pain.   Your nurse will encourage you to take oral pain medications whenever possible.  IV medications work almost immediately, but only last a short while.  Taking medications by mouth allows for a more constant level of medication in your blood stream for a longer period of time.      Once your pain is out of control it is harder to get back under control.  It is important you are aware when your next dose of pain medication is due.  If you are admitted, your nurse may write the time of your next dose on the white board in your room to help you remember.      We are interested in your pain and encourage you to inform us about aggravating factors during your visit.   Many times a simple repositioning every few hours can make a big difference.    If your physician says it is okay, do not let your pain prevent you from getting out of bed. Be sure to call your nurse for assistance prior to getting up so you do not fall.      Before surgery, please decide your tolerable pain goal.  These faces help describe the pain ratings we use on a 0-10 scale.   Be prepared to tell us your goal and whether or not you take pain or anxiety medications at home.          Preparing for Surgery  Preparing for surgery is an important part of your care. It can make things go more smoothly and help you avoid complications. The steps leading up to surgery may vary among hospitals. Follow all instructions given to you by your health care providers. Ask questions if you do not understand something. Talk about any concerns that you have.  Here are some questions to consider asking before your surgery:  If my surgery is not an emergency (is elective), when would be the best time to have the  surgery?  What arrangements do I need to make for work, home, or school?  What will my recovery be like? How long will it be before I can return to normal activities?  Will I need to prepare my home? Will I need to arrange care for me or my children?  Should I expect to have pain after surgery? What are my pain management options? Are there nonmedical options that I can try for pain?  Tell a health care provider about:  Any allergies you have.  All medicines you are taking, including vitamins, herbs, eye drops, creams, and over-the-counter medicines.  Any problems you or family members have had with anesthetic medicines.  Any blood disorders you have.  Any surgeries you have had.  Any medical conditions you have.  Whether you are pregnant or may be pregnant.  What are the risks?  The risks and complications of surgery depend on the specific procedure that you have. Discuss all the risks with your health care providers before your surgery. Ask about common surgical complications, which may include:  Infection.  Bleeding or a need for blood replacement (transfusion).  Allergic reactions to medicines.  Damage to surrounding nerves, tissues, or structures.  A blood clot.  Scarring.  Failure of the surgery to correct the problem.  Follow these instructions before the procedure:  Several days or weeks before your procedure  You may have a physical exam by your primary health care provider to make sure it is safe for you to have surgery.  You may have testing. This may include a chest X-ray, blood and urine tests, electrocardiogram (ECG), or other testing.  Ask your health care provider about:  Changing or stopping your regular medicines. This is especially important if you are taking diabetes medicines or blood thinners.  Taking medicines such as aspirin and ibuprofen. These medicines can thin your blood. Do not take these medicines unless your health care provider tells you to take them.  Taking over-the-counter  medicines, vitamins, herbs, and supplements.  Do not use any products that contain nicotine or tobacco, such as cigarettes and e-cigarettes. If you need help quitting, ask your health care provider.  Avoid alcohol.  Ask your health care provider if there are exercises you can do to prepare for surgery.  Eat a healthy diet.   Plan to have someone 18 years of age or older to take you home from the hospital. We will need to verify your ride on the morning of surgery if you are being discharged home on the same day. Tell your ride to be expecting a call from the hospital prior to your procedure.   Plan to have a responsible adult care for you for at least 24 hours after you leave the hospital or clinic. This is important.  The day before your procedure  You may be given antibiotic medicine to take by mouth to help prevent infection. Take it as told by your health care provider.  You may be asked to shower with a germ-killing soap.  Follow instructions from your health care provider about eating and drinking restrictions. This includes gum, mints and hard candy.  Pack comfortable clothes according to your procedure.   The day of your procedure  You may need to take another shower with a germ-killing soap before you leave home in the morning.  With a small sip of water, take only the medicines that you are told to take.  Remove all jewelry including rings.   Leave anything you consider valuable at home except hearing aids if needed.  You do not need to bring your home medications into the hospital.   Do not wear any makeup, nail polish, powder, deodorant, lotion, hair accessories, or anything on your skin or body except your clothes.  If you will be staying in the hospital, bring a case to hold your glasses, contacts, or dentures. You may also want to bring your robe and non-skid footwear.       (Do not use denture adhesives since you will be asked to remove them during  surgery).   If you wear oxygen at home, bring it  with you the day of surgery.  If instructed by your health care provider, bring your sleep apnea device with you on the day of your surgery (if this applies to you).  You may want to leave your suitcase and sleep apnea device in the car until after surgery.   Arrive at the hospital as scheduled.  Bring a friend or family member with you who can help to answer questions and be present while you meet with your health care provider.  At the hospital  When you arrive at the hospital:  Go to registration located at the main entrance of the hospital. You will be registered and given a beeper and a sticker sheet. Take the stickers to the Outpatient nurses desk and place in the black tray. This is to notify staff that you have arrived. Then return to the lobby to wait.   When your beeper lights up and vibrates proceed through the double doors, under the stairs, and a member of the Outpatient Surgery staff will escort you to your preoperative room.  You may have to wear compression sleeves. These help to prevent blood clots and reduce swelling in your legs.  An IV may be inserted into one of your veins.              In the operating room, you may be given one or more of the following:        A medicine to help you relax (sedative).        A medicine to numb the area (local anesthetic).        A medicine to make you fall asleep (general anesthetic).        A medicine that is injected into an area of your body to numb everything below the                      injection site (regional anesthetic).  You may be given an antibiotic through your IV to help prevent infection.  Your surgical site will be marked or identified.    Contact a health care provider if you:  Develop a fever of more than 100.4°F (38°C) or other feelings of illness during the 48 hours before your surgery.  Have symptoms that get worse.  Have questions or concerns about your surgery.  Summary  Preparing for surgery can make the procedure go more smoothly and  lower your risk of complications.  Before surgery, make a list of questions and concerns to discuss with your surgeon. Ask about the risks and possible complications.  In the days or weeks before your surgery, follow all instructions from your health care provider. You may need to stop smoking, avoid alcohol, follow eating restrictions, and change or stop your regular medicines.  Contact your surgeon if you develop a fever or other signs of illness during the few days before your surgery.  This information is not intended to replace advice given to you by your health care provider. Make sure you discuss any questions you have with your health care provider.  Document Revised: 12/21/2018 Document Reviewed: 10/23/2018  Elsevier Patient Education © 2021 [employer]

## 2023-05-17 ENCOUNTER — OFFICE VISIT (OUTPATIENT)
Dept: INTERNAL MEDICINE | Facility: CLINIC | Age: 83
End: 2023-05-17
Payer: COMMERCIAL

## 2023-05-17 VITALS
HEIGHT: 63 IN | SYSTOLIC BLOOD PRESSURE: 138 MMHG | BODY MASS INDEX: 28.14 KG/M2 | HEART RATE: 74 BPM | TEMPERATURE: 97.3 F | WEIGHT: 158.8 LBS | DIASTOLIC BLOOD PRESSURE: 76 MMHG | OXYGEN SATURATION: 98 %

## 2023-05-17 DIAGNOSIS — S22.080D COMPRESSION FRACTURE OF T12 VERTEBRA WITH ROUTINE HEALING, SUBSEQUENT ENCOUNTER: ICD-10-CM

## 2023-05-17 DIAGNOSIS — Z01.818 PREOPERATIVE EVALUATION TO RULE OUT SURGICAL CONTRAINDICATION: ICD-10-CM

## 2023-05-17 DIAGNOSIS — E78.00 HIGH CHOLESTEROL: ICD-10-CM

## 2023-05-17 DIAGNOSIS — L72.3 SEBACEOUS CYST: ICD-10-CM

## 2023-05-17 DIAGNOSIS — M85.80 OSTEOPENIA, UNSPECIFIED LOCATION: ICD-10-CM

## 2023-05-17 DIAGNOSIS — M54.9 BACK PAIN, UNSPECIFIED BACK LOCATION, UNSPECIFIED BACK PAIN LATERALITY, UNSPECIFIED CHRONICITY: ICD-10-CM

## 2023-05-17 DIAGNOSIS — I10 BENIGN HYPERTENSION: Primary | ICD-10-CM

## 2023-05-17 NOTE — PROGRESS NOTES
"      Chief Complaint  Surgical Clearance  (Patient has surgery scheduled 05/23/2023 with Dr. Martínez; Kyphoplasty with biopsy; Thoracic 12. )    Subjective        Juan Luis Collazo presents to [facility]    HPI    Patient presents for surgical risk assessment.  The patient has no chest pain or shortness of breath.  Patient is able to complete 4 METS without have any chest pain.  Patient is having a low risk procedure with kyphoplasty.  Patient is at low risk for Mace.    Review of Systems    Objective   Vital Signs:  /76 (BP Location: Left arm, Patient Position: Sitting, Cuff Size: Adult)   Pulse 74   Temp 97.3 °F (36.3 °C) (Temporal)   Ht 160 cm (62.99\")   Wt 72 kg (158 lb 12.8 oz)   SpO2 98%   BMI 28.14 kg/m²   Estimated body mass index is 28.14 kg/m² as calculated from the following:    Height as of this encounter: 160 cm (62.99\").    Weight as of this encounter: 72 kg (158 lb 12.8 oz).      Physical Exam  Vitals reviewed.   Constitutional:       Appearance: He is not ill-appearing.   HENT:      Head: Normocephalic and atraumatic.      Mouth/Throat:      Mouth: Mucous membranes are dry.      Pharynx: Oropharynx is clear.   Eyes:      General: No scleral icterus.     Conjunctiva/sclera: Conjunctivae normal.   Cardiovascular:      Rate and Rhythm: Normal rate and regular rhythm.   Pulmonary:      Effort: Pulmonary effort is normal. No respiratory distress.   Skin:         Neurological:      General: No focal deficit present.      Mental Status: He is alert and oriented to person, place, and time.   Psychiatric:         Mood and Affect: Mood normal.         Behavior: Behavior normal.                   Assessment and Plan   Diagnoses and all orders for this visit:    1. Benign hypertension (Primary)    2. Osteopenia, unspecified location    3. Compression fracture of T12 vertebra with routine healing, subsequent encounter    4. Back pain, unspecified back location, " unspecified back pain laterality, unspecified chronicity    5. High cholesterol    6. Sebaceous cyst      BP well controlled.  Tolerating medications without any significant issues.  Patient denies any chest pain or shortness of breath.    Patient has osteopenia, the patient is on Prolia.  Patient recently had his Prolia injection.  Patient tolerating medication without any significant issues.  This medication should be continued for the next 2 years and we will recheck a bone mineral density test at that time.    Patient does have back pain and a compression fracture T12.  Patient is going to undergo kyphoplasty with biopsy.  Patient's RCRI is 0.  Patient has low risk of major adverse cardiac event.  The patient is noted to have acceptable risk to proceed to surgery.  I discussed with the patient that the biggest risk in his case is his age and anesthesia.  Patient understands the risks and the benefits associated with anesthesia and the procedure.  Patient wishes to proceed forward.  From a standpoint the patient is at an acceptable risk for undergoing this procedure.    Patient recently had a sebaceous cyst removed, he does have a large incision on his back with sutures.  There is a little bit of erythema surrounding, but it appears to be just an area where the bandage has been applied.  There is no purulence or fluctuance under the area.  Low suspicion for infection.      Revised Cardiac Risk Index for Pre-Operative Risk from [email or web address]  on 5/17/2023  ** All calculations should be rechecked by clinician prior to use **    RESULT SUMMARY:  0 points  Class I Risk    3.9 %  30-day risk of death, MI, or cardiac arrest    From [employer] 2017, based on pooled data from 5 high quality external validations (4 prospective). These numbers are higher than those often quoted from the now-outdated original study (Juan 1999). See Evidence for details.      INPUTS:  Elevated-risk surgery --> 0 = No  History of ischemic heart  disease --> 0 = No  History of congestive heart failure --> 0 = No  History of cerebrovascular disease --> 0 = No  Pre-operative treatment with insulin --> 0 = No  Pre-operative creatinine >2 mg/dL / 176.8 µmol/L --> 0 = No    Preoperative labs were reviewed.  Patient's kidney function is noted to be normal.  Patient has a little bit of anemia, but this is stable, hemoglobin was noted to be 11.2.  Patient has a normal MCV.  The patient does have some mildly reduced calcium, but it was recently normal prior to his Prolia injection.      Result Review :  The following data was reviewed by: Santiago Aaron MD on 05/17/2023:  CMP          4/14/2023    21:40 5/10/2023    12:43 5/16/2023    12:53   CMP   Glucose 92    92     BUN 19    15     Creatinine 0.82    0.65     EGFR 86.6    92.9     Sodium 140    139     Potassium 4.5    4.9     Chloride 102    104     Calcium 9.2   9.0   7.9     BUN/Creatinine Ratio 23.2    23.1     Anion Gap 6.0    8.0       CBC          12/14/2022    12:15 4/14/2023    21:40 5/16/2023    12:53   CBC   WBC 7.56   5.16   5.98     RBC 4.19   3.75   3.77     Hemoglobin 12.6   11.2   11.2     Hematocrit 40.3   35.1   35.5     MCV 96.2   93.6   94.2     MCH 30.1   29.9   29.7     MCHC 31.3   31.9   31.5     RDW 13.2   14.0   13.2     Platelets 257   245   350       Lipid Panel          10/24/2022    13:03   Lipid Panel   Total Cholesterol 157     Triglycerides 127     HDL Cholesterol 42     VLDL Cholesterol 23     LDL Cholesterol  92       TSH          10/24/2022    13:03   TSH   TSH 1.280       BMP          4/14/2023    21:40 5/10/2023    12:43 5/16/2023    12:53   BMP   BUN 19    15     Creatinine 0.82    0.65     Sodium 140    139     Potassium 4.5    4.9     Chloride 102    104     CO2 32.0    27.0     Calcium 9.2   9.0   7.9                       Follow Up   Return if symptoms worsen or fail to improve, for Next scheduled follow up.  Patient was given instructions and counseling regarding his  condition or for health maintenance advice. Please see specific information pulled into the AVS if appropriate.       NATHAN Aaron MD, FACP, FHM      Electronically signed by Santiago Aaron MD, 05/17/23, 11:03 AM CDT.              ECG 12 Lead    Date/Time: 4/14/2023 12:22 PM  Performed by: Santiago Aaron MD  Authorized by: Santiago Aaron MD   Comparison: compared with previous ECG from 12/14/2022  Similar to previous ECG  Rhythm: sinus rhythm  Rate: normal  Conduction: conduction normal  ST Segments: ST segments normal  T Waves: T waves normal  QRS axis: normal  Other: no other findings    Clinical impression: normal ECG  Comments: Most recent EKG was reviewed.  Noted to be normal, no significant change from previous.

## 2023-05-17 NOTE — LETTER
"            PRE-OP ASSESSMENT        Date: 05/17/23     Patient: Juan Luis Collazo     YOB: 1940     Type of Surgery: T12 Kyphoplasty with biopsy    Surgery Date: 5/23/2023        [x] Pre-op studies reviewed and patient is acceptable risk for planned procedure.      [] Pre-op studies reviewed and patient is NOT acceptable risk for planned procedure.                       Santiago Aaron MD            Chief Complaint  Surgical Clearance  (Patient has surgery scheduled 05/23/2023 with Dr. Martínez; Kyphoplasty with biopsy; Thoracic 12. )    Subjective        Juan Luis Collazo presents to [facility]    HPI    Patient presents for surgical risk assessment.  The patient has no chest pain or shortness of breath.  Patient is able to complete 4 METS without have any chest pain.  Patient is having a low risk procedure with kyphoplasty.  Patient is at low risk for Mace.    Review of Systems    Objective   Vital Signs:  /76 (BP Location: Left arm, Patient Position: Sitting, Cuff Size: Adult)   Pulse 74   Temp 97.3 °F (36.3 °C) (Temporal)   Ht 160 cm (62.99\")   Wt 72 kg (158 lb 12.8 oz)   SpO2 98%   BMI 28.14 kg/m²   Estimated body mass index is 28.14 kg/m² as calculated from the following:    Height as of this encounter: 160 cm (62.99\").    Weight as of this encounter: 72 kg (158 lb 12.8 oz).      Physical Exam  Vitals reviewed.   Constitutional:       Appearance: He is not ill-appearing.   HENT:      Head: Normocephalic and atraumatic.      Mouth/Throat:      Mouth: Mucous membranes are dry.      Pharynx: Oropharynx is clear.   Eyes:      General: No scleral icterus.     Conjunctiva/sclera: Conjunctivae normal.   Cardiovascular:      Rate and Rhythm: Normal rate and regular rhythm.   Pulmonary:      Effort: Pulmonary effort is normal. No respiratory distress.   Skin:         Neurological:      General: No focal deficit present.      Mental Status: He is alert and " oriented to person, place, and time.   Psychiatric:         Mood and Affect: Mood normal.         Behavior: Behavior normal.                   Assessment and Plan   Diagnoses and all orders for this visit:    1. Benign hypertension (Primary)    2. Osteopenia, unspecified location    3. Compression fracture of T12 vertebra with routine healing, subsequent encounter    4. Back pain, unspecified back location, unspecified back pain laterality, unspecified chronicity    5. High cholesterol    6. Sebaceous cyst      BP well controlled.  Tolerating medications without any significant issues.  Patient denies any chest pain or shortness of breath.    Patient has osteopenia, the patient is on Prolia.  Patient recently had his Prolia injection.  Patient tolerating medication without any significant issues.  This medication should be continued for the next 2 years and we will recheck a bone mineral density test at that time.    Patient does have back pain and a compression fracture T12.  Patient is going to undergo kyphoplasty with biopsy.  Patient's RCRI is 0.  Patient has low risk of major adverse cardiac event.  The patient is noted to have acceptable risk to proceed to surgery.  I discussed with the patient that the biggest risk in his case is his age and anesthesia.  Patient understands the risks and the benefits associated with anesthesia and the procedure.  Patient wishes to proceed forward.  From a standpoint the patient is at an acceptable risk for undergoing this procedure.    Patient recently had a sebaceous cyst removed, he does have a large incision on his back with sutures.  There is a little bit of erythema surrounding, but it appears to be just an area where the bandage has been applied.  There is no purulence or fluctuance under the area.  Low suspicion for infection.      Revised Cardiac Risk Index for Pre-Operative Risk from [email or web address]  on 5/17/2023  ** All calculations should be rechecked by clinician prior  to use **    RESULT SUMMARY:  0 points  Class I Risk    3.9 %  30-day risk of death, MI, or cardiac arrest    From [facility] 2017, based on pooled data from 5 high quality external validations (4 prospective). These numbers are higher than those often quoted from the now-outdated original study (Juan 1999). See Evidence for details.      INPUTS:  Elevated-risk surgery --> 0 = No  History of ischemic heart disease --> 0 = No  History of congestive heart failure --> 0 = No  History of cerebrovascular disease --> 0 = No  Pre-operative treatment with insulin --> 0 = No  Pre-operative creatinine >2 mg/dL / 176.8 µmol/L --> 0 = No    Preoperative labs were reviewed.  Patient's kidney function is noted to be normal.  Patient has a little bit of anemia, but this is stable, hemoglobin was noted to be 11.2.  Patient has a normal MCV.  The patient does have some mildly reduced calcium, but it was recently normal prior to his Prolia injection.      Result Review :  The following data was reviewed by: Santiago Aaron MD on 05/17/2023:  CMP          4/14/2023    21:40 5/10/2023    12:43 5/16/2023    12:53   CMP   Glucose 92    92     BUN 19    15     Creatinine 0.82    0.65     EGFR 86.6    92.9     Sodium 140    139     Potassium 4.5    4.9     Chloride 102    104     Calcium 9.2   9.0   7.9     BUN/Creatinine Ratio 23.2    23.1     Anion Gap 6.0    8.0       CBC          12/14/2022    12:15 4/14/2023    21:40 5/16/2023    12:53   CBC   WBC 7.56   5.16   5.98     RBC 4.19   3.75   3.77     Hemoglobin 12.6   11.2   11.2     Hematocrit 40.3   35.1   35.5     MCV 96.2   93.6   94.2     MCH 30.1   29.9   29.7     MCHC 31.3   31.9   31.5     RDW 13.2   14.0   13.2     Platelets 257   245   350       Lipid Panel          10/24/2022    13:03   Lipid Panel   Total Cholesterol 157     Triglycerides 127     HDL Cholesterol 42     VLDL Cholesterol 23     LDL Cholesterol  92       TSH          10/24/2022    13:03   TSH   TSH 1.280       BMP           4/14/2023    21:40 5/10/2023    12:43 5/16/2023    12:53   BMP   BUN 19    15     Creatinine 0.82    0.65     Sodium 140    139     Potassium 4.5    4.9     Chloride 102    104     CO2 32.0    27.0     Calcium 9.2   9.0   7.9                       Follow Up   Return if symptoms worsen or fail to improve, for Next scheduled follow up.  Patient was given instructions and counseling regarding his condition or for health maintenance advice. Please see specific information pulled into the AVS if appropriate.       NATHAN Aaron MD, FACP, FHM      Electronically signed by Santiago Aaron MD, 05/17/23, 11:03 AM CDT.              ECG 12 Lead    Date/Time: 4/14/2023 12:22 PM  Performed by: Santiago Aaron MD  Authorized by: Santiago Aaron MD   Comparison: compared with previous ECG from 12/14/2022  Similar to previous ECG  Rhythm: sinus rhythm  Rate: normal  Conduction: conduction normal  ST Segments: ST segments normal  T Waves: T waves normal  QRS axis: normal  Other: no other findings    Clinical impression: normal ECG  Comments: Most recent EKG was reviewed.  Noted to be normal, no significant change from previous.                                Santiago Aaron MD

## 2023-05-18 DIAGNOSIS — S22.080D COMPRESSION FRACTURE OF T12 VERTEBRA WITH ROUTINE HEALING, SUBSEQUENT ENCOUNTER: Primary | ICD-10-CM

## 2023-05-18 RX ORDER — TRAMADOL HYDROCHLORIDE 50 MG/1
50 TABLET ORAL EVERY 6 HOURS PRN
Qty: 28 TABLET | Refills: 0 | Status: SHIPPED | OUTPATIENT
Start: 2023-05-18

## 2023-05-18 NOTE — TELEPHONE ENCOUNTER
Patients wife calling, requesting pain medication since we are having to delay surgery (kypho) for insurance authorization.

## 2023-06-06 ENCOUNTER — HOSPITAL ENCOUNTER (OUTPATIENT)
Dept: GENERAL RADIOLOGY | Facility: HOSPITAL | Age: 83
Discharge: HOME | End: 2023-06-06
Admitting: NURSE PRACTITIONER
Payer: COMMERCIAL

## 2023-06-06 ENCOUNTER — OFFICE VISIT (OUTPATIENT)
Dept: NEUROSURGERY | Facility: CLINIC | Age: 83
End: 2023-06-06
Payer: COMMERCIAL

## 2023-06-06 VITALS — BODY MASS INDEX: 28 KG/M2 | HEIGHT: 63 IN | WEIGHT: 158 LBS

## 2023-06-06 DIAGNOSIS — S22.080D COMPRESSION FRACTURE OF T12 VERTEBRA WITH ROUTINE HEALING, SUBSEQUENT ENCOUNTER: Primary | ICD-10-CM

## 2023-06-06 DIAGNOSIS — M54.50 THORACOLUMBAR BACK PAIN: ICD-10-CM

## 2023-06-06 DIAGNOSIS — Z91.89 AT HIGH RISK FOR FRACTURE: ICD-10-CM

## 2023-06-06 DIAGNOSIS — M54.6 THORACOLUMBAR BACK PAIN: ICD-10-CM

## 2023-06-06 DIAGNOSIS — E66.3 OVERWEIGHT (BMI 25.0-29.9): ICD-10-CM

## 2023-06-06 DIAGNOSIS — M85.80 OSTEOPENIA, UNSPECIFIED LOCATION: ICD-10-CM

## 2023-06-06 DIAGNOSIS — Z91.81 AT HIGH RISK FOR FALLS: ICD-10-CM

## 2023-06-06 DIAGNOSIS — S22.080D COMPRESSION FRACTURE OF T12 VERTEBRA WITH ROUTINE HEALING, SUBSEQUENT ENCOUNTER: ICD-10-CM

## 2023-06-06 PROCEDURE — 72100 X-RAY EXAM L-S SPINE 2/3 VWS: CPT

## 2023-06-06 PROCEDURE — 99214 OFFICE O/P EST MOD 30 MIN: CPT | Performed by: NURSE PRACTITIONER

## 2023-06-06 NOTE — PROGRESS NOTES
Chief complaint:   Chief Complaint   Patient presents with    Back Pain     Pt is here for followup on his compression fracture T12.       Subjective     HPI:   Last encounter: 5/10/2023    Interval History: Juan Luis Collazo is a 82 y.o.  male who presents today for follow-up of worsening thoracolumbar back pain that began late March after lifting a 25-30 pound.  He was scheduled for a T12 kyphoplasty procedure scheduled for 5/23/2023, however canceled due to lack of insurance approval.    Mr. Collazo currently complains of constant thoracolumbar and lower lumbar back pain, bilateral hip pain, bilateral nondermatomal leg pain.  He denies lower extremity weakness, numbness, or tingling.  He has been noncompliant with bracing to date.  He is ambulating with a cane and denies falls.  His back pain worsens with position change, and with prolonged standing, and walking.  Alleviating factors include lying down, sitting, and use of tramadol.  Mr. Collazo  continues to deny fevers, chills, night sweats, unexplained weight loss, saddle anesthesia, or bowel bladder dysfunction. He currently rates the severity of his symptoms 10/10.  No additional concerns at this time.      Oswestry Disability Index = 57.99%   Score   Pain Intensity Very severe pain-4   Personal Care I can look after myself but it is slow and painful-2   Lifting Only very light weights-4   Walking Pain prevents > 100 yards-3   Sitting Pain prevents sitting > 30 min-3   Standing Pain limits standing to < 1/2 hr-3   Sleeping Can only sleep < 4 hrs-3   Sex Life (if applicable) Not applicable-0   Social Life I do not go out as often because of pain-3   Traveling Pain restricts to <30 min-4   (Leisa et al, 1980)    SCORE INTERPRETATION OF THE OSWESTRY LBP DISABILITY QUESTIONNAIRE     60-80% Crippled Back pain impinges on all aspects of these patients' lives both at home and at work. Positive intervention is required.     ROS  Review of Systems    Constitutional: Negative.    HENT: Negative.     Eyes: Negative.    Respiratory: Negative.     Cardiovascular: Negative.    Gastrointestinal: Negative.    Endocrine: Negative.    Genitourinary: Negative.    Musculoskeletal:  Positive for back pain.   Skin: Negative.    Allergic/Immunologic: Negative.    Neurological: Negative.    Hematological: Negative.    Psychiatric/Behavioral: Negative.     All other systems reviewed and are negative.    PFSH:  Past Medical History:   Diagnosis Date    Arthritis     Back pain     Cancer     CHEST, SKIN CANCER    GERD (gastroesophageal reflux disease)     History of colon polyps     History of prostate cancer     Hypertension     Low back pain     Macular degeneration     Osteopenia      Past Surgical History:   Procedure Laterality Date    APPENDECTOMY      CATARACT EXTRACTION, BILATERAL      COLONOSCOPY  09/17/2013    Dr. José-One 2-3mm polyp at 20cm; Otherwise normal; Repeat 3 years    COLONOSCOPY N/A 12/7/2022    Procedure: COLONOSCOPY WITH ANESTHESIA;  Surgeon: Bri Alexis MD;  Location: [facility] ENDOSCOPY;  Service: Gastroenterology;  Laterality: N/A;  pre: anemia. hx polyps.  post: polyps. diverticulosis.  no PCP    ENDOSCOPY N/A 12/7/2022    Procedure: ESOPHAGOGASTRODUODENOSCOPY WITH ANESTHESIA;  Surgeon: Bri Alexis MD;  Location: [facility] ENDOSCOPY;  Service: Gastroenterology;  Laterality: N/A;  pre: anemia  post: hiatal hernia. esophagitis.gastric erosions.  no PCP    FOOT SURGERY Right     KYPHOPLASTY Bilateral 07/17/2018    Procedure: L3 KYPHOPLASTY 1-2 LEVELS;  Surgeon: Vega Pandey MD;  Location: [facility] OR;  Service: Neurosurgery    KYPHOPLASTY Bilateral 09/11/2018    Procedure: L4 KYPHOPLASTY WITH BIOPSY;  Surgeon: Vega Pandey MD;  Location: [facility] OR;  Service: Neurosurgery    KYPHOPLASTY WITH BIOPSY N/A 01/25/2022    Procedure: KYPHOPLASTY WITH BIOPSY, THORACIC 6 and LUMBAR 5;  Surgeon: Nick Martínez MD;  Location: [facility]  "OR;  Service: Neurosurgery;  Laterality: N/A;    PROSTATECTOMY      TONSILLECTOMY      TOTAL SHOULDER REPLACEMENT Bilateral      Objective      Current Outpatient Medications   Medication Sig Dispense Refill    denosumab (PROLIA) 60 MG/ML solution prefilled syringe syringe Inject 1 mL under the skin into the appropriate area as directed Every 6 (Six) Months. 180 mL 1    multivitamins-minerals (PRESERVISION AREDS 2) capsule capsule Take 1 capsule by mouth 2 (Two) Times a Day.      olmesartan (BENICAR) 40 MG tablet       traMADol (ULTRAM) 50 MG tablet Take 1 tablet by mouth Every 6 (Six) Hours As Needed for Moderate Pain. 28 tablet 0    traMADol-acetaminophen (ULTRACET) 37.5-325 MG per tablet Take 1 tablet by mouth 3 (Three) Times a Day. 270 tablet 1     No current facility-administered medications for this visit.       Vital Signs  Ht 160 cm (62.99\")   Wt 71.7 kg (158 lb)   BMI 28.00 kg/m²   Physical Exam  Vitals and nursing note reviewed.   Constitutional:       General: He is not in acute distress.     Appearance: Normal appearance. He is well-developed, well-groomed and overweight. He is not ill-appearing, toxic-appearing or diaphoretic.      Comments: BMI 28.00   HENT:      Head: Normocephalic and atraumatic.      Right Ear: Hearing normal.      Left Ear: Hearing normal.   Eyes:      Extraocular Movements: EOM normal.      Conjunctiva/sclera: Conjunctivae normal.      Pupils: Pupils are equal, round, and reactive to light.   Neck:      Trachea: Trachea normal.   Cardiovascular:      Rate and Rhythm: Normal rate and regular rhythm.   Pulmonary:      Effort: Pulmonary effort is normal. No tachypnea, bradypnea, accessory muscle usage or respiratory distress.   Abdominal:      Palpations: Abdomen is soft.   Musculoskeletal:      Cervical back: Full passive range of motion without pain and neck supple.   Skin:     General: Skin is warm and dry.   Neurological:      Mental Status: He is alert and oriented to person, " place, and time.      GCS: GCS eye subscore is 4. GCS verbal subscore is 5. GCS motor subscore is 6.      Gait: Gait is intact.      Deep Tendon Reflexes:      Reflex Scores:       Tricep reflexes are 1+ on the right side and 1+ on the left side.       Bicep reflexes are 1+ on the right side and 1+ on the left side.       Brachioradialis reflexes are 1+ on the right side and 1+ on the left side.       Patellar reflexes are 1+ on the right side and 1+ on the left side.       Achilles reflexes are 0 on the right side and 0 on the left side.  Psychiatric:         Speech: Speech normal.         Behavior: Behavior normal. Behavior is cooperative.     Neurologic Exam     Mental Status   Oriented to person, place, and time.   Attention: normal. Concentration: normal.   Speech: speech is normal   Level of consciousness: alert    Cranial Nerves     CN II   Visual fields full to confrontation.     CN III, IV, VI   Pupils are equal, round, and reactive to light.  Extraocular motions are normal.     CN V   Facial sensation intact.     CN VII   Facial expression full, symmetric.     CN VIII   CN VIII normal.     CN IX, X   CN IX normal.     CN XI   CN XI normal.     Motor Exam   Right arm tone: normal  Left arm tone: normal  Right leg tone: normal  Left leg tone: normal    Strength   Right deltoid: 5/5  Left deltoid: 5/5  Right biceps: 5/5  Left biceps: 5/5  Right triceps: 5/5  Left triceps: 5/5  Right wrist extension: 5/5  Left wrist extension: 5/5  Right iliopsoas: 5/5  Left iliopsoas: 5/5  Right quadriceps: 5/5  Left quadriceps: 5/5  Right anterior tibial: 4/5  Left anterior tibial: 5/5  Right gastroc: 5/5  Left gastroc: 5/5  Right EHL 5/5  Left EHL 5/5       Sensory Exam   Right arm light touch: normal  Left arm light touch: normal  Right leg light touch: normal  Left leg light touch: normal    Gait, Coordination, and Reflexes     Gait  Gait: normal    Tremor   Resting tremor: absent  Intention tremor: absent  Action tremor:  absent    Reflexes   Right brachioradialis: 1+  Left brachioradialis: 1+  Right biceps: 1+  Left biceps: 1+  Right triceps: 1+  Left triceps: 1+  Right patellar: 1+  Left patellar: 1+  Right achilles: 0  Left achilles: 0  Right plantar: normal  Left plantar: normal  Right Villalta: absent  Left Villalta: absent  Right ankle clonus: absent  Left ankle clonus: absent  Right pendular knee jerk: absent  Left pendular knee jerk: absent  (12 bullet pts)    Results Review:   4/20/2023 4/20/2023 5/2/2023.  MRI of the lumbar spine shows STIR signal change within the vertebral body of T12 to suggest an acute fracture, prior kyphoplasty at L3, L4, L5, and moderate to severe degenerative disc disease without significant central stenosis.       Assessment/Plan:  Juan Luis Collazo is a 82 y.o. male with a significant medical history of osteopenia currently on Prolia, L3 kyphoplasty 7/17/2018, L4 kyphoplasty 9/11/2018, and L5 and T6 kyphoplasty on 1/25/2022, prostate cancer, hypertension, macular degeneration, and he is overweight.  He presents today for follow-up of worsening thoracolumbar back pain that began late March after lifting a 25-30 pound, subsequently scheduled for a T12 kyphoplasty procedure scheduled for 5/23/2023, however canceled due to lack of insurance approval. ANUJ: 44,50,57.99.  Physical exam findings of +4/5 right anterior tibial weakness and gross hyporeflexia.    Their imaging shows an acute/ subacute T12 compression fracture.     Recommendations:  Acute T12 compression fracture  Thoracolumbar and lower lumbar back pain, secondary to above  History of recurrent compression fracture status post kyphoplasty (L3 and L4 2018 and T5 and T6 2022)  For further evaluation we will send him for 2 view lateral x-rays of the lumbar spine upright and supine extended to T12.  I highly recommended he continue to wear his TLSO brace at all times while lying down.  I additionally recommended he continue to avoid  lifting greater than 8 pounds, bending, or twisting; allowing his pain to guide his physical mobility.  He may continue his current pain medication as prescribed by his PCP.  If within an appropriate timeframe, if and when his insurance company approves the kyphoplasty procedure at T12 we will notify patient and proceed accordingly.  Otherwise, I recommended he keep his previously scheduled appointment for reassessment with Dr. Martínez on 7/26/2023.  Mr. Collazo knows to contact the neurosurgical clinic to return sooner for any new or additional concerns and agrees with this plan of care.    Osteopenia, currently on Prolia  At high risk for fragility fracture  FRAX Fracture Risk Assessment Tool (https://www.rene.ac.uk/FRAX/tool.aspx?country=9)  Based on the FRAX score Juan Luis has a ten year probability of a major osteoporotic fracture of 44% and a hip fracture of 37%.     *FRAX scores ?3% for hip fracture or ?20% for major osteoporotic fracture in the [place] are recommended to consider osteoporosis treatment.  *Osteoporosis may be diagnosed based on presence of fragility fractures in the absence of other metabolic bone disorders and even with a normal bone mineral density (T-score). (Grade B)     DEXA scan impression:  Osteopenia. Low bone mass. The bone density is between 1.0 and 2.5 standard deviations below the mean for an age and gender matched individual. There is an increased risk of fracture in these patients.  This report was finalized on 12/09/2021 14:45 by Dr. Pa Higgins MD.     Mr. Collazo is scheduled to receive his initial injection of Prolia today.  Prolia should be continued for at least 2 years with medication adjustments following a repeat DEXA scan to assess medication efficacy biannually.     Overweight (BMI 25.0-29.9)  Body mass index is 28.00 kg/m²..  Information on the DASH diet provided in the AVS.  We will continue to provided diet and exercise information with the goal of weight loss  at each scheduled appointment.      LITZY Fall Risk Assessment was completed, and patient is at MODERATE risk for falls. Assessment completed on:6/6/2023  Fall risk information provided in AVS.    Diagnoses and all orders for this visit:    1. Compression fracture of T12 vertebra with routine healing, subsequent encounter (Primary)  -     XR Spine Lumbar 2 or 3 View; Future    2. Thoracolumbar back pain  -     XR Spine Lumbar 2 or 3 View; Future    3. Osteopenia, unspecified location    4. At high risk for fracture    5. Overweight (BMI 25.0-29.9)    6. At high risk for falls      Return for KEEP SCHEDULED APPT WITH DR. MOFFETT.    Medical Decision Making (2/3)  Problem Points (2,3,4 or more)  Established Problem, stable = 1x1  Data Points (2,3,4 or more)  Ordered Imaging (X-ray,CT, MRI) = 1x1.  Risk (Low, Mod, High)  Closed Treatment of Fracture (Moderate)    E/M = MDM 2 out of 3   or  Time  Est Level 4 - 80439 = Mod (3, 3, Mod)   or   30-39 minutes     Thank you, for allowing me to continue to participate in the care of this patient.    Sincerely,  DIEGO Dominguez

## 2023-06-13 ENCOUNTER — TELEPHONE (OUTPATIENT)
Dept: NEUROSURGERY | Facility: CLINIC | Age: 83
End: 2023-06-13
Payer: COMMERCIAL

## 2023-06-13 NOTE — TELEPHONE ENCOUNTER
Pt called wanting results of xray.  I returned his call and mobile phone not accepting calls and left msg on home phone.

## 2023-06-13 NOTE — TELEPHONE ENCOUNTER
Presents today for follow-up of worsening thoracolumbar back pain that began late March after lifting a 25-30 pound.  He was scheduled for a T12 kyphoplasty procedure scheduled for 5/23/2023, however canceled due to lack of insurance approval.       [facility] costa xray and pt is calling wanting results.

## 2023-06-15 NOTE — TELEPHONE ENCOUNTER
Spoke with patients spouse today and advised per dr pickens there is no worsening and he still is in need of kyphoplasty. We are awaiting appeal determination from insurance at this time.

## 2023-07-11 PROBLEM — M50.30 DEGENERATIVE DISC DISEASE, CERVICAL: Status: ACTIVE | Noted: 2023-07-11

## 2023-07-11 PROBLEM — M51.369 DEGENERATIVE DISC DISEASE, LUMBAR: Status: ACTIVE | Noted: 2023-07-11

## 2023-07-11 PROBLEM — M51.36 DEGENERATIVE DISC DISEASE, LUMBAR: Status: ACTIVE | Noted: 2023-07-11

## 2023-07-11 PROBLEM — M51.34 DEGENERATIVE DISC DISEASE, THORACIC: Status: ACTIVE | Noted: 2023-07-11

## 2023-07-26 ENCOUNTER — OFFICE VISIT (OUTPATIENT)
Dept: NEUROSURGERY | Facility: CLINIC | Age: 83
End: 2023-07-26
Payer: MEDICARE

## 2023-07-26 ENCOUNTER — HOSPITAL ENCOUNTER (OUTPATIENT)
Dept: GENERAL RADIOLOGY | Facility: HOSPITAL | Age: 83
Discharge: HOME | End: 2023-07-26
Payer: MEDICARE

## 2023-07-26 VITALS — HEIGHT: 63 IN | BODY MASS INDEX: 28.35 KG/M2 | WEIGHT: 160 LBS

## 2023-07-26 DIAGNOSIS — S22.080G COMPRESSION FRACTURE OF T12 VERTEBRA WITH DELAYED HEALING: ICD-10-CM

## 2023-07-26 DIAGNOSIS — M80.00XG AGE-RELATED OSTEOPOROSIS WITH CURRENT PATHOLOGICAL FRACTURE WITH DELAYED HEALING: ICD-10-CM

## 2023-07-26 DIAGNOSIS — E66.3 OVERWEIGHT (BMI 25.0-29.9): ICD-10-CM

## 2023-07-26 DIAGNOSIS — S22.080G COMPRESSION FRACTURE OF T12 VERTEBRA WITH DELAYED HEALING: Primary | ICD-10-CM

## 2023-07-26 DIAGNOSIS — Z78.9 CURRENT NON-SMOKER: ICD-10-CM

## 2023-07-26 PROCEDURE — 72100 X-RAY EXAM L-S SPINE 2/3 VWS: CPT

## 2023-07-26 PROCEDURE — 72070 X-RAY EXAM THORAC SPINE 2VWS: CPT

## 2023-07-26 NOTE — PROGRESS NOTES
"Chief Complaint   Patient presents with    Back Pain     Patient here for follow up.        HPI:   Interval History: Jewell returns today for discussion of low back pain.  He has a known history of an acute T12 compression fracture.  Was previously scheduled for a kyphoplasty.  He returns today with continued thoracolumbar junction pain.  10 out of 10 in severity.  This is despite bracing and activity modification lasting greater than 2 weeks.  He has age-related osteoporosis.  He denies any numbness, tingling, weakness, radiculopathy, gait disturbance, or bowel and bladder incontinence.    Alternative therapies include bracing, physical therapy, and activity modification, Flexeril, Prolia,    Current Outpatient Medications   Medication Sig Dispense Refill    cyclobenzaprine (FLEXERIL) 5 MG tablet Take 1 tablet by mouth 3 (Three) Times a Day As Needed for Muscle Spasms. 30 tablet 0    denosumab (PROLIA) 60 MG/ML solution prefilled syringe syringe Inject 1 mL under the skin into the appropriate area as directed Every 6 (Six) Months. 180 mL 1    multivitamins-minerals (PRESERVISION AREDS 2) capsule capsule Take 1 capsule by mouth 2 (Two) Times a Day.      olmesartan (BENICAR) 40 MG tablet        No current facility-administered medications for this visit.       Vital Signs  Ht 160 cm (63\")   Wt 72.6 kg (160 lb)   BMI 28.34 kg/m²   Physical Exam  Eyes:      Extraocular Movements: EOM normal.      Pupils: Pupils are equal, round, and reactive to light.   Neurological:      Mental Status: He is oriented to person, place, and time.      Gait: Gait is intact.      Deep Tendon Reflexes:      Reflex Scores:       Tricep reflexes are 1+ on the right side and 1+ on the left side.       Bicep reflexes are 1+ on the right side and 1+ on the left side.       Brachioradialis reflexes are 1+ on the right side and 1+ on the left side.       Patellar reflexes are 1+ on the right side and 1+ on the left side.       Achilles reflexes " are 0 on the right side and 0 on the left side.  Psychiatric:         Speech: Speech normal.     Neurologic Exam     Mental Status   Oriented to person, place, and time.   Attention: normal. Concentration: normal.   Speech: speech is normal   Level of consciousness: alert    Cranial Nerves     CN II   Visual fields full to confrontation.     CN III, IV, VI   Pupils are equal, round, and reactive to light.  Extraocular motions are normal.     CN V   Facial sensation intact.     CN VII   Facial expression full, symmetric.     CN VIII   CN VIII normal.     CN IX, X   CN IX normal.     CN XI   CN XI normal.     Motor Exam   Right arm tone: normal  Left arm tone: normal  Right leg tone: normal  Left leg tone: normal    Strength   Right deltoid: 5/5  Left deltoid: 5/5  Right biceps: 5/5  Left biceps: 5/5  Right triceps: 5/5  Left triceps: 5/5  Right wrist extension: 5/5  Left wrist extension: 5/5  Right iliopsoas: 5/5  Left iliopsoas: 5/5  Right quadriceps: 5/5  Left quadriceps: 5/5  Right anterior tibial: 4/5  Left anterior tibial: 5/5  Right gastroc: 5/5  Left gastroc: 5/5  Right EHL 5/5  Left EHL 5/5       Sensory Exam   Right arm light touch: normal  Left arm light touch: normal  Right leg light touch: normal  Left leg light touch: normal    Gait, Coordination, and Reflexes     Gait  Gait: normal    Tremor   Resting tremor: absent  Intention tremor: absent  Action tremor: absent    Reflexes   Right brachioradialis: 1+  Left brachioradialis: 1+  Right biceps: 1+  Left biceps: 1+  Right triceps: 1+  Left triceps: 1+  Right patellar: 1+  Left patellar: 1+  Right achilles: 0  Left achilles: 0  Right plantar: normal  Left plantar: normal  Right Villalta: absent  Left Villalta: absent  Right ankle clonus: absent  Left ankle clonus: absent  Right pendular knee jerk: absent  Left pendular knee jerk: absent          Results Review:   XR Spine Thoracic 2 View    Result Date: 7/26/2023   No definite new compression deformity,  which is markedly limited evaluation the upper thoracic spine due to overlying structures.  Unchanged T11 compression deformity.  T6, L2, and L3 kyphoplasty changes.  This report was finalized on 07/26/2023 16:26 by  Cayetano Arredondo DO.    XR Spine Lumbar 2 or 3 View    Result Date: 7/26/2023  1. Stable kyphoplasty changes of L3-L5 with similar compression deformities as described above. Osteopenia. No acute radiographic abnormality. Stable alignment. This report was finalized on 07/26/2023 16:57 by Dr. Pascale Cade MD.     4/20/2023 4/20/2023 5/2/2023.  MRI of the lumbar spine shows STIR signal change within the vertebral body of T12 to suggest an acute fracture, prior kyphoplasty at L3, L4, L5, and moderate to severe degenerative disc disease without significant central stenosis.     MRI radiology report.  Lumbosacral transitional anatomy. The transitional segment is labeled as  S1. There is an acute compression fracture at the T12 level.  Clarification requested regarding the severity of the fracture. Anterior  and middle column heights are measured and there is no significant loss  of height in the anterior or middle column. No significant posterior  cortical bone retropulsion or evidence of any resultant spinal stenosis  at this level. No ligamentous injury or evidence of traumatic  subluxation. The posterior elements are intact.         Assessment/Plan:  Juan Luis Collazo is a 82 y.o. male with a significant medical history of osteopenia currently on Prolia, L3 kyphoplasty 7/17/2018, L4 kyphoplasty 9/11/2018, and L5 and T6 kyphoplasty on 1/25/2022, prostate cancer, hypertension, macular degeneration, and he is overweight.  He presents today for follow-up of worsening thoracolumbar back pain that began late March after lifting a 25-30 pound, subsequently scheduled for a T12 kyphoplasty procedure scheduled for 5/23/2023, however canceled due to lack of insurance approval. ANUJ: 44,50,57.99.  Physical  exam findings of +4/5 right anterior tibial weakness and gross hyporeflexia.    Their imaging shows an acute/ subacute T12 compression fracture.     Recommendations:  Acute T12 compression fracture, based on MRI  Thoracolumbar and lower lumbar back pain, secondary to above  History of recurrent compression fracture status post kyphoplasty (L3 and L4 2018 and T5 and T6 2022)  The patient accepted and understood all potential risks and complications including extravasation of cement, vascular injury, air or fat embolus, nerve root compression, hematoma, damage to the spinal cord, bowel or bladder incontinence, difficulty walking or weakness.  He would like to proceed with kyphoplasty at T12.    Osteopenia, currently on Prolia  At high risk for fragility fracture  FRAX Fracture Risk Assessment Tool (https://www.rene.ac.uk/FRAX/tool.aspx?country=9)  Based on the FRAX score Juan Luis has a ten year probability of a major osteoporotic fracture of 44% and a hip fracture of 37%.     *FRAX scores ?3% for hip fracture or ?20% for major osteoporotic fracture in the [place] are recommended to consider osteoporosis treatment.  *Osteoporosis may be diagnosed based on presence of fragility fractures in the absence of other metabolic bone disorders and even with a normal bone mineral density (T-score). (Grade B)     DEXA scan impression:  Osteopenia. Low bone mass. The bone density is between 1.0 and 2.5 standard deviations below the mean for an age and gender matched individual. There is an increased risk of fracture in these patients.  This report was finalized on 12/09/2021 14:45 by Dr. Pa Higgins MD.     Mr. Collazo is currently on Prolia.  Prolia should be continued for at least 2 years with medication adjustments following a repeat DEXA scan to assess medication efficacy biannually.     Overweight (BMI 25.0-29.9)  Body mass index is 28.00 kg/m²..  Information on the DASH diet provided in the AVS.  We will continue to  provided diet and exercise information with the goal of weight loss at each scheduled appointment.     1. Compression fracture of T12 vertebra with delayed healing    2. Age-related osteoporosis with current pathological fracture with delayed healing    3. Overweight (BMI 25.0-29.9)    4. Current non-smoker        Diagnoses and all orders for this visit:    1. Compression fracture of T12 vertebra with delayed healing (Primary)  -     XR Spine Thoracic 2 View; Future  -     XR Spine Lumbar 2 or 3 View; Future  -     Case Request; Standing  -     CBC & Differential; Future  -     Comprehensive Metabolic Panel; Future  -     Type & Screen; Future  -     ECG 12 Lead; Future  -     ceFAZolin (ANCEF) 2 g in sodium chloride 0.9 % 100 mL IVPB  -     Case Request    2. Age-related osteoporosis with current pathological fracture with delayed healing  -     XR Spine Thoracic 2 View; Future  -     XR Spine Lumbar 2 or 3 View; Future  -     Case Request; Standing  -     CBC & Differential; Future  -     Comprehensive Metabolic Panel; Future  -     Type & Screen; Future  -     ECG 12 Lead; Future  -     ceFAZolin (ANCEF) 2 g in sodium chloride 0.9 % 100 mL IVPB  -     Case Request    3. Overweight (BMI 25.0-29.9)    4. Current non-smoker    Other orders  -     Outpatient In A Bed; Standing  -     Follow Anesthesia Guidelines / Protocol; Future  -     Follow Anesthesia Guidelines / Protocol; Standing  -     Verify NPO Status; Standing  -     Obtain Informed Consent; Standing  -     SCD (Sequential Compression Device) - Place on Patient in Pre-Op; Standing  -     Clip Operative Site; Standing  -     Have Patient Void Prior to Procedure; Standing  -     Verify / Perform Chlorhexidine Skin Prep; Standing  -     Obtain Informed Consent; Future  -     Provide NPO Instructions to Patient; Future  -     Chlorhexidine Skin Prep; Future  -     Provide Patient with Enhanced Recovery Booklet(s) or Handout; Future  -     CBC & Differential;  Standing  -     Basic Metabolic Panel; Standing        I discussed the patients findings and my recommendations with patient    Nick Martínez MD

## 2023-07-26 NOTE — H&P (VIEW-ONLY)
"Chief Complaint   Patient presents with    Back Pain     Patient here for follow up.        HPI:   Interval History: Jewell returns today for discussion of low back pain.  He has a known history of an acute T12 compression fracture.  Was previously scheduled for a kyphoplasty.  He returns today with continued thoracolumbar junction pain.  10 out of 10 in severity.  This is despite bracing and activity modification lasting greater than 2 weeks.  He has age-related osteoporosis.  He denies any numbness, tingling, weakness, radiculopathy, gait disturbance, or bowel and bladder incontinence.    Alternative therapies include bracing, physical therapy, and activity modification, Flexeril, Prolia,    Current Outpatient Medications   Medication Sig Dispense Refill    cyclobenzaprine (FLEXERIL) 5 MG tablet Take 1 tablet by mouth 3 (Three) Times a Day As Needed for Muscle Spasms. 30 tablet 0    denosumab (PROLIA) 60 MG/ML solution prefilled syringe syringe Inject 1 mL under the skin into the appropriate area as directed Every 6 (Six) Months. 180 mL 1    multivitamins-minerals (PRESERVISION AREDS 2) capsule capsule Take 1 capsule by mouth 2 (Two) Times a Day.      olmesartan (BENICAR) 40 MG tablet        No current facility-administered medications for this visit.       Vital Signs  Ht 160 cm (63\")   Wt 72.6 kg (160 lb)   BMI 28.34 kg/mý   Physical Exam  Eyes:      Extraocular Movements: EOM normal.      Pupils: Pupils are equal, round, and reactive to light.   Neurological:      Mental Status: He is oriented to person, place, and time.      Gait: Gait is intact.      Deep Tendon Reflexes:      Reflex Scores:       Tricep reflexes are 1+ on the right side and 1+ on the left side.       Bicep reflexes are 1+ on the right side and 1+ on the left side.       Brachioradialis reflexes are 1+ on the right side and 1+ on the left side.       Patellar reflexes are 1+ on the right side and 1+ on the left side.       Achilles reflexes " are 0 on the right side and 0 on the left side.  Psychiatric:         Speech: Speech normal.     Neurologic Exam     Mental Status   Oriented to person, place, and time.   Attention: normal. Concentration: normal.   Speech: speech is normal   Level of consciousness: alert    Cranial Nerves     CN II   Visual fields full to confrontation.     CN III, IV, VI   Pupils are equal, round, and reactive to light.  Extraocular motions are normal.     CN V   Facial sensation intact.     CN VII   Facial expression full, symmetric.     CN VIII   CN VIII normal.     CN IX, X   CN IX normal.     CN XI   CN XI normal.     Motor Exam   Right arm tone: normal  Left arm tone: normal  Right leg tone: normal  Left leg tone: normal    Strength   Right deltoid: 5/5  Left deltoid: 5/5  Right biceps: 5/5  Left biceps: 5/5  Right triceps: 5/5  Left triceps: 5/5  Right wrist extension: 5/5  Left wrist extension: 5/5  Right iliopsoas: 5/5  Left iliopsoas: 5/5  Right quadriceps: 5/5  Left quadriceps: 5/5  Right anterior tibial: 4/5  Left anterior tibial: 5/5  Right gastroc: 5/5  Left gastroc: 5/5  Right EHL 5/5  Left EHL 5/5       Sensory Exam   Right arm light touch: normal  Left arm light touch: normal  Right leg light touch: normal  Left leg light touch: normal    Gait, Coordination, and Reflexes     Gait  Gait: normal    Tremor   Resting tremor: absent  Intention tremor: absent  Action tremor: absent    Reflexes   Right brachioradialis: 1+  Left brachioradialis: 1+  Right biceps: 1+  Left biceps: 1+  Right triceps: 1+  Left triceps: 1+  Right patellar: 1+  Left patellar: 1+  Right achilles: 0  Left achilles: 0  Right plantar: normal  Left plantar: normal  Right Villalta: absent  Left Villalta: absent  Right ankle clonus: absent  Left ankle clonus: absent  Right pendular knee jerk: absent  Left pendular knee jerk: absent          Results Review:   XR Spine Thoracic 2 View    Result Date: 7/26/2023   No definite new compression deformity,  which is markedly limited evaluation the upper thoracic spine due to overlying structures.  Unchanged T11 compression deformity.  T6, L2, and L3 kyphoplasty changes.  This report was finalized on 07/26/2023 16:26 by  Cayetano Arredondo DO.    XR Spine Lumbar 2 or 3 View    Result Date: 7/26/2023  1. Stable kyphoplasty changes of L3-L5 with similar compression deformities as described above. Osteopenia. No acute radiographic abnormality. Stable alignment. This report was finalized on 07/26/2023 16:57 by Dr. Pascale Cade MD.     4/20/2023 4/20/2023 5/2/2023.  MRI of the lumbar spine shows STIR signal change within the vertebral body of T12 to suggest an acute fracture, prior kyphoplasty at L3, L4, L5, and moderate to severe degenerative disc disease without significant central stenosis.     MRI radiology report.  Lumbosacral transitional anatomy. The transitional segment is labeled as  S1. There is an acute compression fracture at the T12 level.  Clarification requested regarding the severity of the fracture. Anterior  and middle column heights are measured and there is no significant loss  of height in the anterior or middle column. No significant posterior  cortical bone retropulsion or evidence of any resultant spinal stenosis  at this level. No ligamentous injury or evidence of traumatic  subluxation. The posterior elements are intact.         Assessment/Plan:  Juan Luis Collazo is a 82 y.o. male with a significant medical history of osteopenia currently on Prolia, L3 kyphoplasty 7/17/2018, L4 kyphoplasty 9/11/2018, and L5 and T6 kyphoplasty on 1/25/2022, prostate cancer, hypertension, macular degeneration, and he is overweight.  He presents today for follow-up of worsening thoracolumbar back pain that began late March after lifting a 25-30 pound, subsequently scheduled for a T12 kyphoplasty procedure scheduled for 5/23/2023, however canceled due to lack of insurance approval. ANUJ: 44,50,57.99.  Physical  exam findings of +4/5 right anterior tibial weakness and gross hyporeflexia.    Their imaging shows an acute/ subacute T12 compression fracture.     Recommendations:  Acute T12 compression fracture, based on MRI  Thoracolumbar and lower lumbar back pain, secondary to above  History of recurrent compression fracture status post kyphoplasty (L3 and L4 2018 and T5 and T6 2022)  The patient accepted and understood all potential risks and complications including extravasation of cement, vascular injury, air or fat embolus, nerve root compression, hematoma, damage to the spinal cord, bowel or bladder incontinence, difficulty walking or weakness.  He would like to proceed with kyphoplasty at T12.    Osteopenia, currently on Prolia  At high risk for fragility fracture  FRAX Fracture Risk Assessment Tool (https://www.rene.ac.uk/FRAX/tool.aspx?country=9)  Based on the FRAX score Juan Luis has a ten year probability of a major osteoporotic fracture of 44% and a hip fracture of 37%.     *FRAX scores ?3% for hip fracture or ?20% for major osteoporotic fracture in the [place] are recommended to consider osteoporosis treatment.  *Osteoporosis may be diagnosed based on presence of fragility fractures in the absence of other metabolic bone disorders and even with a normal bone mineral density (T-score). (Grade B)     DEXA scan impression:  Osteopenia. Low bone mass. The bone density is between 1.0 and 2.5 standard deviations below the mean for an age and gender matched individual. There is an increased risk of fracture in these patients.  This report was finalized on 12/09/2021 14:45 by Dr. Pa Higgins MD.     Mr. Collazo is currently on Prolia.  Prolia should be continued for at least 2 years with medication adjustments following a repeat DEXA scan to assess medication efficacy biannually.     Overweight (BMI 25.0-29.9)  Body mass index is 28.00 kg/mý..  Information on the DASH diet provided in the AVS.  We will continue to  provided diet and exercise information with the goal of weight loss at each scheduled appointment.     1. Compression fracture of T12 vertebra with delayed healing    2. Age-related osteoporosis with current pathological fracture with delayed healing    3. Overweight (BMI 25.0-29.9)    4. Current non-smoker        Diagnoses and all orders for this visit:    1. Compression fracture of T12 vertebra with delayed healing (Primary)  -     XR Spine Thoracic 2 View; Future  -     XR Spine Lumbar 2 or 3 View; Future  -     Case Request; Standing  -     CBC & Differential; Future  -     Comprehensive Metabolic Panel; Future  -     Type & Screen; Future  -     ECG 12 Lead; Future  -     ceFAZolin (ANCEF) 2 g in sodium chloride 0.9 % 100 mL IVPB  -     Case Request    2. Age-related osteoporosis with current pathological fracture with delayed healing  -     XR Spine Thoracic 2 View; Future  -     XR Spine Lumbar 2 or 3 View; Future  -     Case Request; Standing  -     CBC & Differential; Future  -     Comprehensive Metabolic Panel; Future  -     Type & Screen; Future  -     ECG 12 Lead; Future  -     ceFAZolin (ANCEF) 2 g in sodium chloride 0.9 % 100 mL IVPB  -     Case Request    3. Overweight (BMI 25.0-29.9)    4. Current non-smoker    Other orders  -     Outpatient In A Bed; Standing  -     Follow Anesthesia Guidelines / Protocol; Future  -     Follow Anesthesia Guidelines / Protocol; Standing  -     Verify NPO Status; Standing  -     Obtain Informed Consent; Standing  -     SCD (Sequential Compression Device) - Place on Patient in Pre-Op; Standing  -     Clip Operative Site; Standing  -     Have Patient Void Prior to Procedure; Standing  -     Verify / Perform Chlorhexidine Skin Prep; Standing  -     Obtain Informed Consent; Future  -     Provide NPO Instructions to Patient; Future  -     Chlorhexidine Skin Prep; Future  -     Provide Patient with Enhanced Recovery Booklet(s) or Handout; Future  -     CBC & Differential;  Standing  -     Basic Metabolic Panel; Standing        I discussed the patients findings and my recommendations with patient    Nick Martínez MD

## 2023-07-28 PROBLEM — S22.000A THORACIC COMPRESSION FRACTURE: Status: ACTIVE | Noted: 2023-07-28

## 2023-07-31 DIAGNOSIS — S22.080G COMPRESSION FRACTURE OF T12 VERTEBRA WITH DELAYED HEALING: Primary | ICD-10-CM

## 2023-08-03 ENCOUNTER — OFFICE VISIT (OUTPATIENT)
Dept: INTERNAL MEDICINE | Facility: CLINIC | Age: 83
End: 2023-08-03
Payer: MEDICARE

## 2023-08-03 VITALS
SYSTOLIC BLOOD PRESSURE: 96 MMHG | HEART RATE: 64 BPM | TEMPERATURE: 97.7 F | DIASTOLIC BLOOD PRESSURE: 66 MMHG | BODY MASS INDEX: 27.29 KG/M2 | OXYGEN SATURATION: 95 % | WEIGHT: 154 LBS | HEIGHT: 63 IN | RESPIRATION RATE: 18 BRPM

## 2023-08-03 DIAGNOSIS — I10 BENIGN HYPERTENSION: ICD-10-CM

## 2023-08-03 DIAGNOSIS — S32.000A LUMBAR COMPRESSION FRACTURE, CLOSED, INITIAL ENCOUNTER: Primary | ICD-10-CM

## 2023-08-03 DIAGNOSIS — M54.9 BACK PAIN, UNSPECIFIED BACK LOCATION, UNSPECIFIED BACK PAIN LATERALITY, UNSPECIFIED CHRONICITY: ICD-10-CM

## 2023-08-03 DIAGNOSIS — Z98.890 S/P KYPHOPLASTY: ICD-10-CM

## 2023-08-03 DIAGNOSIS — M50.30 DEGENERATIVE DISC DISEASE, CERVICAL: ICD-10-CM

## 2023-08-03 DIAGNOSIS — S22.080G COMPRESSION FRACTURE OF T12 VERTEBRA WITH DELAYED HEALING: ICD-10-CM

## 2023-08-03 RX ORDER — TRIAMCINOLONE ACETONIDE 40 MG/ML
40 INJECTION, SUSPENSION INTRA-ARTICULAR; INTRAMUSCULAR ONCE
Status: COMPLETED | OUTPATIENT
Start: 2023-08-03 | End: 2023-08-03

## 2023-08-03 RX ORDER — KETOROLAC TROMETHAMINE 30 MG/ML
30 INJECTION, SOLUTION INTRAMUSCULAR; INTRAVENOUS ONCE
Status: COMPLETED | OUTPATIENT
Start: 2023-08-03 | End: 2023-08-03

## 2023-08-03 RX ORDER — TRAMADOL HYDROCHLORIDE 50 MG/1
50 TABLET ORAL EVERY 6 HOURS PRN
COMMUNITY
End: 2023-08-03 | Stop reason: SDUPTHER

## 2023-08-03 RX ORDER — TRAMADOL HYDROCHLORIDE 50 MG/1
50 TABLET ORAL EVERY 6 HOURS PRN
Qty: 60 TABLET | Refills: 0 | Status: SHIPPED | OUTPATIENT
Start: 2023-08-03

## 2023-08-03 RX ADMIN — KETOROLAC TROMETHAMINE 30 MG: 30 INJECTION, SOLUTION INTRAMUSCULAR; INTRAVENOUS at 13:48

## 2023-08-03 RX ADMIN — TRIAMCINOLONE ACETONIDE 40 MG: 40 INJECTION, SUSPENSION INTRA-ARTICULAR; INTRAMUSCULAR at 13:48

## 2023-08-04 PROBLEM — S22.000A THORACIC COMPRESSION FRACTURE: Status: RESOLVED | Noted: 2023-07-28 | Resolved: 2023-08-04

## 2023-08-08 ENCOUNTER — TELEPHONE (OUTPATIENT)
Dept: NEUROSURGERY | Facility: CLINIC | Age: 83
End: 2023-08-08
Payer: MEDICARE

## 2023-08-08 NOTE — TELEPHONE ENCOUNTER
LEFT MESSAGE FOR PT THAT SURGERY ARRIVAL TIME ON 8/18/23 HAS BEEN CHANGED TO 11:30AM.    IF PATIENT RETURNS CALL IT IS OK FOR HUB TO RELAY THIS INFORMATION.

## 2023-08-14 DIAGNOSIS — S32.050D COMPRESSION FRACTURE OF L5 VERTEBRA WITH ROUTINE HEALING, SUBSEQUENT ENCOUNTER: ICD-10-CM

## 2023-08-14 DIAGNOSIS — S22.000G COMPRESSION FRACTURE OF THORACIC VERTEBRA WITH DELAYED HEALING, UNSPECIFIED THORACIC VERTEBRAL LEVEL, SUBSEQUENT ENCOUNTER: ICD-10-CM

## 2023-08-15 ENCOUNTER — PRE-ADMISSION TESTING (OUTPATIENT)
Dept: PREADMISSION TESTING | Facility: HOSPITAL | Age: 83
End: 2023-08-15
Payer: MEDICARE

## 2023-08-15 VITALS
RESPIRATION RATE: 16 BRPM | SYSTOLIC BLOOD PRESSURE: 125 MMHG | BODY MASS INDEX: 27.34 KG/M2 | DIASTOLIC BLOOD PRESSURE: 58 MMHG | WEIGHT: 154.32 LBS | OXYGEN SATURATION: 97 % | HEART RATE: 70 BPM | HEIGHT: 63 IN

## 2023-08-15 DIAGNOSIS — M80.00XG AGE-RELATED OSTEOPOROSIS WITH CURRENT PATHOLOGICAL FRACTURE WITH DELAYED HEALING: ICD-10-CM

## 2023-08-15 DIAGNOSIS — S22.080G COMPRESSION FRACTURE OF T12 VERTEBRA WITH DELAYED HEALING: ICD-10-CM

## 2023-08-15 LAB
ALBUMIN SERPL-MCNC: 3.7 G/DL (ref 3.5–5.2)
ALBUMIN/GLOB SERPL: 0.8 G/DL
ALP SERPL-CCNC: 83 U/L (ref 39–117)
ALT SERPL W P-5'-P-CCNC: 9 U/L (ref 1–41)
ANION GAP SERPL CALCULATED.3IONS-SCNC: 8 MMOL/L (ref 5–15)
AST SERPL-CCNC: 15 U/L (ref 1–40)
BASOPHILS # BLD AUTO: 0.03 10*3/MM3 (ref 0–0.2)
BASOPHILS NFR BLD AUTO: 0.4 % (ref 0–1.5)
BILIRUB SERPL-MCNC: 0.4 MG/DL (ref 0–1.2)
BUN SERPL-MCNC: 21 MG/DL (ref 8–23)
BUN/CREAT SERPL: 28 (ref 7–25)
CALCIUM SPEC-SCNC: 8.7 MG/DL (ref 8.6–10.5)
CHLORIDE SERPL-SCNC: 100 MMOL/L (ref 98–107)
CO2 SERPL-SCNC: 29 MMOL/L (ref 22–29)
CREAT SERPL-MCNC: 0.75 MG/DL (ref 0.76–1.27)
DEPRECATED RDW RBC AUTO: 51 FL (ref 37–54)
EGFRCR SERPLBLD CKD-EPI 2021: 89 ML/MIN/1.73
EOSINOPHIL # BLD AUTO: 0.24 10*3/MM3 (ref 0–0.4)
EOSINOPHIL NFR BLD AUTO: 3.4 % (ref 0.3–6.2)
ERYTHROCYTE [DISTWIDTH] IN BLOOD BY AUTOMATED COUNT: 14.8 % (ref 12.3–15.4)
GLOBULIN UR ELPH-MCNC: 4.6 GM/DL
GLUCOSE SERPL-MCNC: 85 MG/DL (ref 65–99)
HCT VFR BLD AUTO: 34.7 % (ref 37.5–51)
HGB BLD-MCNC: 10.5 G/DL (ref 13–17.7)
IMM GRANULOCYTES # BLD AUTO: 0.09 10*3/MM3 (ref 0–0.05)
IMM GRANULOCYTES NFR BLD AUTO: 1.3 % (ref 0–0.5)
LYMPHOCYTES # BLD AUTO: 1.38 10*3/MM3 (ref 0.7–3.1)
LYMPHOCYTES NFR BLD AUTO: 19.5 % (ref 19.6–45.3)
MCH RBC QN AUTO: 28.5 PG (ref 26.6–33)
MCHC RBC AUTO-ENTMCNC: 30.3 G/DL (ref 31.5–35.7)
MCV RBC AUTO: 94.3 FL (ref 79–97)
MONOCYTES # BLD AUTO: 0.72 10*3/MM3 (ref 0.1–0.9)
MONOCYTES NFR BLD AUTO: 10.2 % (ref 5–12)
NEUTROPHILS NFR BLD AUTO: 4.61 10*3/MM3 (ref 1.7–7)
NEUTROPHILS NFR BLD AUTO: 65.2 % (ref 42.7–76)
NRBC BLD AUTO-RTO: 0 /100 WBC (ref 0–0.2)
PLATELET # BLD AUTO: 309 10*3/MM3 (ref 140–450)
PMV BLD AUTO: 10 FL (ref 6–12)
POTASSIUM SERPL-SCNC: 4.9 MMOL/L (ref 3.5–5.2)
PROT SERPL-MCNC: 8.3 G/DL (ref 6–8.5)
RBC # BLD AUTO: 3.68 10*6/MM3 (ref 4.14–5.8)
SODIUM SERPL-SCNC: 137 MMOL/L (ref 136–145)
WBC NRBC COR # BLD: 7.07 10*3/MM3 (ref 3.4–10.8)

## 2023-08-15 PROCEDURE — 93005 ELECTROCARDIOGRAM TRACING: CPT

## 2023-08-15 PROCEDURE — 80053 COMPREHEN METABOLIC PANEL: CPT

## 2023-08-15 PROCEDURE — 85025 COMPLETE CBC W/AUTO DIFF WBC: CPT

## 2023-08-15 PROCEDURE — 36415 COLL VENOUS BLD VENIPUNCTURE: CPT

## 2023-08-15 NOTE — DISCHARGE INSTRUCTIONS
Before you come to the hospital        Arrival time: AS DIRECTED BY OFFICE     YOU MAY TAKE THE FOLLOWING MEDICATION(S) THE MORNING OF SURGERY WITH A SIP OF WATER: ULTRAM    HOLD BENICAR FOR 24 HOURS PRIOR TO SURGERY           ALL OTHER HOME MEDICATION CHECK WITH YOUR PHYSICIAN (especially if   you are taking diabetes medicines or blood thinners)    Do not take any Erectile Dysfunction medications (EX: CIALIS, VIAGRA) 24 hours prior to surgery.      If you were given and instructed to use a germ- killing soap, use as directed the night before surgery and again the morning of surgery or as directed by your surgeon. (Use one-half of the bottle with each shower.)   See attached information for How to Use Chlorhexidine for Bathing if applicable.            Eating and drinking restrictions prior to scheduled arrival time    2 Hours before arrival time STOP   Drinking Clear liquids (water, black coffee-NO CREAM,  apple juice-no pulp)      8 Hours before arrival time STOP   All food, full liquids, and dairy products    (It is extremely important that you follow these guidelines to prevent delay or cancelation of your procedure)     Clear Liquids  Water and flavored water                                                                      Clear Fruit juices, such as cranberry juice and apple juice.  Black coffee (NO cream of any kind, including powdered).  Plain tea  Clear bouillon or broth.  Flavored gelatin.  Soda.  Gatorade or Powerade.  Full liquid examples  Juices that have pulp.  Frozen ice pops that contain fruit pieces.  Coffee with creamer  Milk.  Yogurt.                MANAGING PAIN AFTER SURGERY    We know you are probably wondering what your pain will be like after surgery.  Following surgery it is unrealistic to expect you will not have pain.   Pain is how our bodies let us know that something is wrong or cautions us to be careful.  That said, our goal is to make your pain tolerable.    Methods we may use to  treat your pain include (oral or IV medications, PCAs, epidurals, nerve blocks, etc.)   While some procedures require IV pain medications for a short time after surgery, transitioning to pain medications by mouth allows for better management of pain.   Your nurse will encourage you to take oral pain medications whenever possible.  IV medications work almost immediately, but only last a short while.  Taking medications by mouth allows for a more constant level of medication in your blood stream for a longer period of time.      Once your pain is out of control it is harder to get back under control.  It is important you are aware when your next dose of pain medication is due.  If you are admitted, your nurse may write the time of your next dose on the white board in your room to help you remember.      We are interested in your pain and encourage you to inform us about aggravating factors during your visit.   Many times a simple repositioning every few hours can make a big difference.    If your physician says it is okay, do not let your pain prevent you from getting out of bed. Be sure to call your nurse for assistance prior to getting up so you do not fall.      Before surgery, please decide your tolerable pain goal.  These faces help describe the pain ratings we use on a 0-10 scale.   Be prepared to tell us your goal and whether or not you take pain or anxiety medications at home.          Preparing for Surgery  Preparing for surgery is an important part of your care. It can make things go more smoothly and help you avoid complications. The steps leading up to surgery may vary among hospitals. Follow all instructions given to you by your health care providers. Ask questions if you do not understand something. Talk about any concerns that you have.  Here are some questions to consider asking before your surgery:  If my surgery is not an emergency (is elective), when would be the best time to have the surgery?  What  arrangements do I need to make for work, home, or school?  What will my recovery be like? How long will it be before I can return to normal activities?  Will I need to prepare my home? Will I need to arrange care for me or my children?  Should I expect to have pain after surgery? What are my pain management options? Are there nonmedical options that I can try for pain?  Tell a health care provider about:  Any allergies you have.  All medicines you are taking, including vitamins, herbs, eye drops, creams, and over-the-counter medicines.  Any problems you or family members have had with anesthetic medicines.  Any blood disorders you have.  Any surgeries you have had.  Any medical conditions you have.  Whether you are pregnant or may be pregnant.  What are the risks?  The risks and complications of surgery depend on the specific procedure that you have. Discuss all the risks with your health care providers before your surgery. Ask about common surgical complications, which may include:  Infection.  Bleeding or a need for blood replacement (transfusion).  Allergic reactions to medicines.  Damage to surrounding nerves, tissues, or structures.  A blood clot.  Scarring.  Failure of the surgery to correct the problem.  Follow these instructions before the procedure:  Several days or weeks before your procedure  You may have a physical exam by your primary health care provider to make sure it is safe for you to have surgery.  You may have testing. This may include a chest X-ray, blood and urine tests, electrocardiogram (ECG), or other testing.  Ask your health care provider about:  Changing or stopping your regular medicines. This is especially important if you are taking diabetes medicines or blood thinners.  Taking medicines such as aspirin and ibuprofen. These medicines can thin your blood. Do not take these medicines unless your health care provider tells you to take them.  Taking over-the-counter medicines, vitamins,  herbs, and supplements.  Do not use any products that contain nicotine or tobacco, such as cigarettes and e-cigarettes. If you need help quitting, ask your health care provider.  Avoid alcohol.  Ask your health care provider if there are exercises you can do to prepare for surgery.  Eat a healthy diet.   Plan to have someone 18 years of age or older to take you home from the hospital. We will need to verify your ride on the morning of surgery if you are being discharged home on the same day. Tell your ride to be expecting a call from the hospital prior to your procedure.   Plan to have a responsible adult care for you for at least 24 hours after you leave the hospital or clinic. This is important.  The day before your procedure  You may be given antibiotic medicine to take by mouth to help prevent infection. Take it as told by your health care provider.  You may be asked to shower with a germ-killing soap.  Follow instructions from your health care provider about eating and drinking restrictions. This includes gum, mints and hard candy.  Pack comfortable clothes according to your procedure.   The day of your procedure  You may need to take another shower with a germ-killing soap before you leave home in the morning.  With a small sip of water, take only the medicines that you are told to take.  Remove all jewelry including rings.   Leave anything you consider valuable at home except hearing aids if needed.  You do not need to bring your home medications into the hospital.   Do not wear any makeup, nail polish, powder, deodorant, lotion, hair accessories, or anything on your skin or body except your clothes.  If you will be staying in the hospital, bring a case to hold your glasses, contacts, or dentures. You may also want to bring your robe and non-skid footwear.       (Do not use denture adhesives since you will be asked to remove them during  surgery).   If you wear oxygen at home, bring it with you the day of  surgery.  If instructed by your health care provider, bring your sleep apnea device with you on the day of your surgery (if this applies to you).  You may want to leave your suitcase and sleep apnea device in the car until after surgery.   Arrive at the hospital as scheduled.  Bring a friend or family member with you who can help to answer questions and be present while you meet with your health care provider.  At the hospital  When you arrive at the hospital:  Go to registration located at the main entrance of the hospital. You will be registered and given a beeper and a sticker sheet. Take the stickers to the Outpatient nurses desk and place in the black tray. This is to notify staff that you have arrived. Then return to the lobby to wait.   When your beeper lights up and vibrates proceed through the double doors, under the stairs, and a member of the Outpatient Surgery staff will escort you to your preoperative room.  You may have to wear compression sleeves. These help to prevent blood clots and reduce swelling in your legs.  An IV may be inserted into one of your veins.              In the operating room, you may be given one or more of the following:        A medicine to help you relax (sedative).        A medicine to numb the area (local anesthetic).        A medicine to make you fall asleep (general anesthetic).        A medicine that is injected into an area of your body to numb everything below the                      injection site (regional anesthetic).  You may be given an antibiotic through your IV to help prevent infection.  Your surgical site will be marked or identified.    Contact a health care provider if you:  Develop a fever of more than 100.4øF (38øC) or other feelings of illness during the 48 hours before your surgery.  Have symptoms that get worse.  Have questions or concerns about your surgery.  Summary  Preparing for surgery can make the procedure go more smoothly and lower your risk of  complications.  Before surgery, make a list of questions and concerns to discuss with your surgeon. Ask about the risks and possible complications.  In the days or weeks before your surgery, follow all instructions from your health care provider. You may need to stop smoking, avoid alcohol, follow eating restrictions, and change or stop your regular medicines.  Contact your surgeon if you develop a fever or other signs of illness during the few days before your surgery.  This information is not intended to replace advice given to you by your health care provider. Make sure you discuss any questions you have with your health care provider.  Document Revised: 12/21/2018 Document Reviewed: 10/23/2018  Elsevier Patient Education c 2021 [employer]

## 2023-08-15 NOTE — TELEPHONE ENCOUNTER
Called wife - pt was not home at the time.  Explained that pharmacy had sent us a request for Tramadol w/acetaminophen, but he was just given plain Tramadol 2 weeks ago.  We feel that pharmacy sent this to us in error, but just making sure patient hadn't requested or had a need otherwise.  She is not aware, but will check with patient and let us know if he is in need of something.  We will decline this request to the pharmacy in the meantime.

## 2023-08-16 DIAGNOSIS — S32.050D COMPRESSION FRACTURE OF L5 VERTEBRA WITH ROUTINE HEALING, SUBSEQUENT ENCOUNTER: ICD-10-CM

## 2023-08-16 DIAGNOSIS — S22.000G COMPRESSION FRACTURE OF THORACIC VERTEBRA WITH DELAYED HEALING, UNSPECIFIED THORACIC VERTEBRAL LEVEL, SUBSEQUENT ENCOUNTER: ICD-10-CM

## 2023-08-16 LAB
QT INTERVAL: 376 MS
QTC INTERVAL: 394 MS

## 2023-08-18 ENCOUNTER — HOSPITAL ENCOUNTER (OUTPATIENT)
Facility: HOSPITAL | Age: 83
Setting detail: HOSPITAL OUTPATIENT SURGERY
Discharge: HOME | End: 2023-08-18
Attending: NEUROLOGICAL SURGERY | Admitting: NEUROLOGICAL SURGERY
Payer: MEDICARE

## 2023-08-18 ENCOUNTER — APPOINTMENT (OUTPATIENT)
Dept: GENERAL RADIOLOGY | Facility: HOSPITAL | Age: 83
End: 2023-08-18
Payer: MEDICARE

## 2023-08-18 ENCOUNTER — ANESTHESIA EVENT (OUTPATIENT)
Dept: PERIOP | Facility: HOSPITAL | Age: 83
End: 2023-08-18
Payer: MEDICARE

## 2023-08-18 ENCOUNTER — ANESTHESIA (OUTPATIENT)
Dept: PERIOP | Facility: HOSPITAL | Age: 83
End: 2023-08-18
Payer: MEDICARE

## 2023-08-18 VITALS
TEMPERATURE: 97.3 F | RESPIRATION RATE: 16 BRPM | HEART RATE: 59 BPM | OXYGEN SATURATION: 92 % | DIASTOLIC BLOOD PRESSURE: 52 MMHG | SYSTOLIC BLOOD PRESSURE: 114 MMHG

## 2023-08-18 DIAGNOSIS — M80.00XG AGE-RELATED OSTEOPOROSIS WITH CURRENT PATHOLOGICAL FRACTURE WITH DELAYED HEALING: ICD-10-CM

## 2023-08-18 DIAGNOSIS — M54.9 BACK PAIN, UNSPECIFIED BACK LOCATION, UNSPECIFIED BACK PAIN LATERALITY, UNSPECIFIED CHRONICITY: ICD-10-CM

## 2023-08-18 DIAGNOSIS — S22.080G COMPRESSION FRACTURE OF T12 VERTEBRA WITH DELAYED HEALING: ICD-10-CM

## 2023-08-18 DIAGNOSIS — M50.30 DEGENERATIVE DISC DISEASE, CERVICAL: ICD-10-CM

## 2023-08-18 LAB
ABO GROUP BLD: NORMAL
ANION GAP SERPL CALCULATED.3IONS-SCNC: 8 MMOL/L (ref 5–15)
BASOPHILS # BLD AUTO: 0.02 10*3/MM3 (ref 0–0.2)
BASOPHILS NFR BLD AUTO: 0.3 % (ref 0–1.5)
BLD GP AB SCN SERPL QL: NEGATIVE
BUN SERPL-MCNC: 25 MG/DL (ref 8–23)
BUN/CREAT SERPL: 33.3 (ref 7–25)
CALCIUM SPEC-SCNC: 8.7 MG/DL (ref 8.6–10.5)
CHLORIDE SERPL-SCNC: 101 MMOL/L (ref 98–107)
CO2 SERPL-SCNC: 28 MMOL/L (ref 22–29)
CREAT SERPL-MCNC: 0.75 MG/DL (ref 0.76–1.27)
DEPRECATED RDW RBC AUTO: 51.1 FL (ref 37–54)
EGFRCR SERPLBLD CKD-EPI 2021: 89 ML/MIN/1.73
EOSINOPHIL # BLD AUTO: 0.35 10*3/MM3 (ref 0–0.4)
EOSINOPHIL NFR BLD AUTO: 4.8 % (ref 0.3–6.2)
ERYTHROCYTE [DISTWIDTH] IN BLOOD BY AUTOMATED COUNT: 15 % (ref 12.3–15.4)
GLUCOSE SERPL-MCNC: 87 MG/DL (ref 65–99)
HCT VFR BLD AUTO: 34.5 % (ref 37.5–51)
HGB BLD-MCNC: 10.8 G/DL (ref 13–17.7)
IMM GRANULOCYTES # BLD AUTO: 0.06 10*3/MM3 (ref 0–0.05)
IMM GRANULOCYTES NFR BLD AUTO: 0.8 % (ref 0–0.5)
LYMPHOCYTES # BLD AUTO: 1.75 10*3/MM3 (ref 0.7–3.1)
LYMPHOCYTES NFR BLD AUTO: 23.8 % (ref 19.6–45.3)
MCH RBC QN AUTO: 29 PG (ref 26.6–33)
MCHC RBC AUTO-ENTMCNC: 31.3 G/DL (ref 31.5–35.7)
MCV RBC AUTO: 92.5 FL (ref 79–97)
MONOCYTES # BLD AUTO: 0.65 10*3/MM3 (ref 0.1–0.9)
MONOCYTES NFR BLD AUTO: 8.8 % (ref 5–12)
NEUTROPHILS NFR BLD AUTO: 4.53 10*3/MM3 (ref 1.7–7)
NEUTROPHILS NFR BLD AUTO: 61.5 % (ref 42.7–76)
NRBC BLD AUTO-RTO: 0 /100 WBC (ref 0–0.2)
PLATELET # BLD AUTO: 299 10*3/MM3 (ref 140–450)
PMV BLD AUTO: 9.9 FL (ref 6–12)
POTASSIUM SERPL-SCNC: 4.7 MMOL/L (ref 3.5–5.2)
RBC # BLD AUTO: 3.73 10*6/MM3 (ref 4.14–5.8)
RH BLD: POSITIVE
SODIUM SERPL-SCNC: 137 MMOL/L (ref 136–145)
T&S EXPIRATION DATE: NORMAL
WBC NRBC COR # BLD: 7.36 10*3/MM3 (ref 3.4–10.8)

## 2023-08-18 PROCEDURE — 86900 BLOOD TYPING SEROLOGIC ABO: CPT | Performed by: NEUROLOGICAL SURGERY

## 2023-08-18 PROCEDURE — 72070 X-RAY EXAM THORAC SPINE 2VWS: CPT

## 2023-08-18 PROCEDURE — 88307 TISSUE EXAM BY PATHOLOGIST: CPT | Performed by: NEUROLOGICAL SURGERY

## 2023-08-18 PROCEDURE — 86850 RBC ANTIBODY SCREEN: CPT | Performed by: NEUROLOGICAL SURGERY

## 2023-08-18 PROCEDURE — 25010000002 FENTANYL CITRATE (PF) 100 MCG/2ML SOLUTION: Performed by: NURSE ANESTHETIST, CERTIFIED REGISTERED

## 2023-08-18 PROCEDURE — 25510000001 IOPAMIDOL 61 % SOLUTION: Performed by: NEUROLOGICAL SURGERY

## 2023-08-18 PROCEDURE — C1713 ANCHOR/SCREW BN/BN,TIS/BN: HCPCS | Performed by: NEUROLOGICAL SURGERY

## 2023-08-18 PROCEDURE — 99024 POSTOP FOLLOW-UP VISIT: CPT | Performed by: NURSE PRACTITIONER

## 2023-08-18 PROCEDURE — 88311 DECALCIFY TISSUE: CPT | Performed by: NEUROLOGICAL SURGERY

## 2023-08-18 PROCEDURE — 80048 BASIC METABOLIC PNL TOTAL CA: CPT | Performed by: NEUROLOGICAL SURGERY

## 2023-08-18 PROCEDURE — 25010000002 CEFAZOLIN PER 500 MG: Performed by: NEUROLOGICAL SURGERY

## 2023-08-18 PROCEDURE — 25010000002 DEXAMETHASONE PER 1 MG: Performed by: NURSE ANESTHETIST, CERTIFIED REGISTERED

## 2023-08-18 PROCEDURE — 76000 FLUOROSCOPY <1 HR PHYS/QHP: CPT

## 2023-08-18 PROCEDURE — 25010000002 PROPOFOL 10 MG/ML EMULSION: Performed by: NURSE ANESTHETIST, CERTIFIED REGISTERED

## 2023-08-18 PROCEDURE — 25010000002 VASOPRESSIN 20 UNIT/ML SOLUTION: Performed by: NURSE ANESTHETIST, CERTIFIED REGISTERED

## 2023-08-18 PROCEDURE — 25010000002 ONDANSETRON PER 1 MG: Performed by: NURSE ANESTHETIST, CERTIFIED REGISTERED

## 2023-08-18 PROCEDURE — 85025 COMPLETE CBC W/AUTO DIFF WBC: CPT | Performed by: NEUROLOGICAL SURGERY

## 2023-08-18 PROCEDURE — 22513 PERQ VERTEBRAL AUGMENTATION: CPT | Performed by: NEUROLOGICAL SURGERY

## 2023-08-18 PROCEDURE — 86901 BLOOD TYPING SEROLOGIC RH(D): CPT | Performed by: NEUROLOGICAL SURGERY

## 2023-08-18 DEVICE — BONE CEMENT C01B HV-R WITH MIXER  US
Type: IMPLANTABLE DEVICE | Site: SPINE THORACIC | Status: FUNCTIONAL
Brand: KYPHON® HV-R® BONE CEMENT AND KYPHON® MIXER PACK

## 2023-08-18 RX ORDER — BUPIVACAINE HCL/0.9 % NACL/PF 0.125 %
PLASTIC BAG, INJECTION (ML) EPIDURAL AS NEEDED
Status: DISCONTINUED | OUTPATIENT
Start: 2023-08-18 | End: 2023-08-18 | Stop reason: SURG

## 2023-08-18 RX ORDER — HYDROCODONE BITARTRATE AND ACETAMINOPHEN 5; 325 MG/1; MG/1
1 TABLET ORAL ONCE AS NEEDED
Status: DISCONTINUED | OUTPATIENT
Start: 2023-08-18 | End: 2023-08-18 | Stop reason: HOSPADM

## 2023-08-18 RX ORDER — ONDANSETRON 2 MG/ML
INJECTION INTRAMUSCULAR; INTRAVENOUS AS NEEDED
Status: DISCONTINUED | OUTPATIENT
Start: 2023-08-18 | End: 2023-08-18 | Stop reason: SURG

## 2023-08-18 RX ORDER — ONDANSETRON 2 MG/ML
4 INJECTION INTRAMUSCULAR; INTRAVENOUS ONCE AS NEEDED
Status: DISCONTINUED | OUTPATIENT
Start: 2023-08-18 | End: 2023-08-18 | Stop reason: HOSPADM

## 2023-08-18 RX ORDER — SODIUM CHLORIDE 9 MG/ML
40 INJECTION, SOLUTION INTRAVENOUS AS NEEDED
Status: DISCONTINUED | OUTPATIENT
Start: 2023-08-18 | End: 2023-08-18 | Stop reason: HOSPADM

## 2023-08-18 RX ORDER — FENTANYL CITRATE 50 UG/ML
INJECTION, SOLUTION INTRAMUSCULAR; INTRAVENOUS AS NEEDED
Status: DISCONTINUED | OUTPATIENT
Start: 2023-08-18 | End: 2023-08-18 | Stop reason: SURG

## 2023-08-18 RX ORDER — FLUMAZENIL 0.1 MG/ML
0.2 INJECTION INTRAVENOUS AS NEEDED
Status: DISCONTINUED | OUTPATIENT
Start: 2023-08-18 | End: 2023-08-18 | Stop reason: HOSPADM

## 2023-08-18 RX ORDER — SODIUM CHLORIDE, SODIUM LACTATE, POTASSIUM CHLORIDE, CALCIUM CHLORIDE 600; 310; 30; 20 MG/100ML; MG/100ML; MG/100ML; MG/100ML
100 INJECTION, SOLUTION INTRAVENOUS CONTINUOUS
Status: DISCONTINUED | OUTPATIENT
Start: 2023-08-18 | End: 2023-08-18 | Stop reason: HOSPADM

## 2023-08-18 RX ORDER — SODIUM CHLORIDE, SODIUM LACTATE, POTASSIUM CHLORIDE, CALCIUM CHLORIDE 600; 310; 30; 20 MG/100ML; MG/100ML; MG/100ML; MG/100ML
1000 INJECTION, SOLUTION INTRAVENOUS CONTINUOUS
Status: DISCONTINUED | OUTPATIENT
Start: 2023-08-18 | End: 2023-08-18 | Stop reason: HOSPADM

## 2023-08-18 RX ORDER — TRAMADOL HYDROCHLORIDE 50 MG/1
50 TABLET ORAL EVERY 6 HOURS PRN
Qty: 60 TABLET | Refills: 0
Start: 2023-08-21

## 2023-08-18 RX ORDER — LIDOCAINE HYDROCHLORIDE 20 MG/ML
INJECTION, SOLUTION EPIDURAL; INFILTRATION; INTRACAUDAL; PERINEURAL AS NEEDED
Status: DISCONTINUED | OUTPATIENT
Start: 2023-08-18 | End: 2023-08-18 | Stop reason: SURG

## 2023-08-18 RX ORDER — DROPERIDOL 2.5 MG/ML
0.62 INJECTION, SOLUTION INTRAMUSCULAR; INTRAVENOUS ONCE AS NEEDED
Status: DISCONTINUED | OUTPATIENT
Start: 2023-08-18 | End: 2023-08-18 | Stop reason: HOSPADM

## 2023-08-18 RX ORDER — IBUPROFEN 600 MG/1
600 TABLET ORAL ONCE AS NEEDED
Status: DISCONTINUED | OUTPATIENT
Start: 2023-08-18 | End: 2023-08-18 | Stop reason: HOSPADM

## 2023-08-18 RX ORDER — NEOSTIGMINE METHYLSULFATE 5 MG/5 ML
SYRINGE (ML) INTRAVENOUS AS NEEDED
Status: DISCONTINUED | OUTPATIENT
Start: 2023-08-18 | End: 2023-08-18 | Stop reason: SURG

## 2023-08-18 RX ORDER — ROCURONIUM BROMIDE 10 MG/ML
INJECTION, SOLUTION INTRAVENOUS AS NEEDED
Status: DISCONTINUED | OUTPATIENT
Start: 2023-08-18 | End: 2023-08-18 | Stop reason: SURG

## 2023-08-18 RX ORDER — SODIUM CHLORIDE 0.9 % (FLUSH) 0.9 %
3-10 SYRINGE (ML) INJECTION AS NEEDED
Status: DISCONTINUED | OUTPATIENT
Start: 2023-08-18 | End: 2023-08-18 | Stop reason: HOSPADM

## 2023-08-18 RX ORDER — LIDOCAINE HYDROCHLORIDE 10 MG/ML
0.5 INJECTION, SOLUTION EPIDURAL; INFILTRATION; INTRACAUDAL; PERINEURAL ONCE AS NEEDED
Status: DISCONTINUED | OUTPATIENT
Start: 2023-08-18 | End: 2023-08-18 | Stop reason: HOSPADM

## 2023-08-18 RX ORDER — NALOXONE HYDROCHLORIDE 4 MG/.1ML
SPRAY NASAL
Qty: 2 EACH | Refills: 0 | Status: SHIPPED | OUTPATIENT
Start: 2023-08-18

## 2023-08-18 RX ORDER — HYDROCODONE BITARTRATE AND ACETAMINOPHEN 10; 325 MG/1; MG/1
1 TABLET ORAL EVERY 4 HOURS PRN
Status: DISCONTINUED | OUTPATIENT
Start: 2023-08-18 | End: 2023-08-18 | Stop reason: HOSPADM

## 2023-08-18 RX ORDER — SODIUM CHLORIDE 0.9 % (FLUSH) 0.9 %
3 SYRINGE (ML) INJECTION AS NEEDED
Status: DISCONTINUED | OUTPATIENT
Start: 2023-08-18 | End: 2023-08-18 | Stop reason: HOSPADM

## 2023-08-18 RX ORDER — HYDROCODONE BITARTRATE AND ACETAMINOPHEN 7.5; 325 MG/1; MG/1
1 TABLET ORAL EVERY 6 HOURS PRN
Qty: 12 TABLET | Refills: 0 | Status: SHIPPED | OUTPATIENT
Start: 2023-08-18 | End: 2023-08-21

## 2023-08-18 RX ORDER — FENTANYL CITRATE 50 UG/ML
25 INJECTION, SOLUTION INTRAMUSCULAR; INTRAVENOUS
Status: DISCONTINUED | OUTPATIENT
Start: 2023-08-18 | End: 2023-08-18 | Stop reason: HOSPADM

## 2023-08-18 RX ORDER — NALOXONE HCL 0.4 MG/ML
0.4 VIAL (ML) INJECTION AS NEEDED
Status: DISCONTINUED | OUTPATIENT
Start: 2023-08-18 | End: 2023-08-18 | Stop reason: HOSPADM

## 2023-08-18 RX ORDER — MAGNESIUM HYDROXIDE 1200 MG/15ML
LIQUID ORAL AS NEEDED
Status: DISCONTINUED | OUTPATIENT
Start: 2023-08-18 | End: 2023-08-18 | Stop reason: HOSPADM

## 2023-08-18 RX ORDER — ACETAMINOPHEN 500 MG
1000 TABLET ORAL ONCE
Status: COMPLETED | OUTPATIENT
Start: 2023-08-18 | End: 2023-08-18

## 2023-08-18 RX ORDER — PROPOFOL 10 MG/ML
VIAL (ML) INTRAVENOUS AS NEEDED
Status: DISCONTINUED | OUTPATIENT
Start: 2023-08-18 | End: 2023-08-18 | Stop reason: SURG

## 2023-08-18 RX ORDER — SODIUM CHLORIDE 0.9 % (FLUSH) 0.9 %
3 SYRINGE (ML) INJECTION EVERY 12 HOURS SCHEDULED
Status: DISCONTINUED | OUTPATIENT
Start: 2023-08-18 | End: 2023-08-18 | Stop reason: HOSPADM

## 2023-08-18 RX ORDER — BUPIVACAINE HYDROCHLORIDE AND EPINEPHRINE 2.5; 5 MG/ML; UG/ML
INJECTION, SOLUTION INFILTRATION; PERINEURAL AS NEEDED
Status: DISCONTINUED | OUTPATIENT
Start: 2023-08-18 | End: 2023-08-18 | Stop reason: HOSPADM

## 2023-08-18 RX ORDER — LABETALOL HYDROCHLORIDE 5 MG/ML
5 INJECTION, SOLUTION INTRAVENOUS
Status: DISCONTINUED | OUTPATIENT
Start: 2023-08-18 | End: 2023-08-18 | Stop reason: HOSPADM

## 2023-08-18 RX ORDER — DEXAMETHASONE SODIUM PHOSPHATE 4 MG/ML
INJECTION, SOLUTION INTRA-ARTICULAR; INTRALESIONAL; INTRAMUSCULAR; INTRAVENOUS; SOFT TISSUE AS NEEDED
Status: DISCONTINUED | OUTPATIENT
Start: 2023-08-18 | End: 2023-08-18 | Stop reason: SURG

## 2023-08-18 RX ORDER — AMOXICILLIN 250 MG
2 CAPSULE ORAL DAILY
Qty: 30 TABLET | Refills: 0 | Status: SHIPPED | OUTPATIENT
Start: 2023-08-18

## 2023-08-18 RX ADMIN — Medication 200 MCG: at 13:23

## 2023-08-18 RX ADMIN — LIDOCAINE HYDROCHLORIDE 100 MG: 20 INJECTION, SOLUTION EPIDURAL; INFILTRATION; INTRACAUDAL; PERINEURAL at 13:05

## 2023-08-18 RX ADMIN — Medication 3 MG: at 13:44

## 2023-08-18 RX ADMIN — ONDANSETRON 4 MG: 2 INJECTION INTRAMUSCULAR; INTRAVENOUS at 13:19

## 2023-08-18 RX ADMIN — VASOPRESSIN 1 ML: 20 INJECTION INTRAVENOUS at 13:35

## 2023-08-18 RX ADMIN — CEFAZOLIN 2 G: 2 INJECTION, POWDER, FOR SOLUTION INTRAMUSCULAR; INTRAVENOUS at 13:00

## 2023-08-18 RX ADMIN — VASOPRESSIN 1 ML: 20 INJECTION INTRAVENOUS at 13:22

## 2023-08-18 RX ADMIN — ACETAMINOPHEN 1000 MG: 500 TABLET, FILM COATED ORAL at 12:27

## 2023-08-18 RX ADMIN — PROPOFOL 120 MG: 10 INJECTION, EMULSION INTRAVENOUS at 13:05

## 2023-08-18 RX ADMIN — FENTANYL CITRATE 100 MCG: 0.05 INJECTION, SOLUTION INTRAMUSCULAR; INTRAVENOUS at 13:14

## 2023-08-18 RX ADMIN — GLYCOPYRROLATE 0.4 MG: 0.2 INJECTION INTRAMUSCULAR; INTRAVENOUS at 13:44

## 2023-08-18 RX ADMIN — SODIUM CHLORIDE, POTASSIUM CHLORIDE, SODIUM LACTATE AND CALCIUM CHLORIDE 1000 ML: 600; 310; 30; 20 INJECTION, SOLUTION INTRAVENOUS at 11:23

## 2023-08-18 RX ADMIN — Medication 200 MCG: at 13:37

## 2023-08-18 RX ADMIN — ROCURONIUM BROMIDE 25 MG: 10 SOLUTION INTRAVENOUS at 13:05

## 2023-08-18 RX ADMIN — DEXAMETHASONE SODIUM PHOSPHATE 4 MG: 4 INJECTION, SOLUTION INTRA-ARTICULAR; INTRALESIONAL; INTRAMUSCULAR; INTRAVENOUS; SOFT TISSUE at 13:19

## 2023-08-18 NOTE — ANESTHESIA POSTPROCEDURE EVALUATION
Patient: Juan Luis Collazo    Procedure Summary       Date: 08/18/23 Room / Location:  [facility] OR  /  [facility]    Anesthesia Start: 1300 Anesthesia Stop: 1350    Procedure: Thoracic 12, KYPHOPLASTY WITH BIOPSY (Spine Lumbar) Diagnosis:       Compression fracture of T12 vertebra with delayed healing      Age-related osteoporosis with current pathological fracture with delayed healing      (Compression fracture of T12 vertebra with delayed healing [S22.080G])      (Age-related osteoporosis with current pathological fracture with delayed healing [M80.00XG])    Surgeons: Nick Martínez MD Provider: NATHAN Hodgson CRNA    Anesthesia Type: general ASA Status: 2            Anesthesia Type: general    Vitals  Vitals Value Taken Time   /50 08/18/23 1418   Temp 97.3 øF (36.3 øC) 08/18/23 1418   Pulse 63 08/18/23 1420   Resp 16 08/18/23 1418   SpO2 96 % 08/18/23 1420   Vitals shown include unvalidated device data.        Post Anesthesia Care and Evaluation    Patient location during evaluation: PACU  Patient participation: complete - patient participated  Level of consciousness: awake and alert  Pain management: adequate    Airway patency: patent  Anesthetic complications: No anesthetic complications    Cardiovascular status: acceptable  Respiratory status: acceptable  Hydration status: acceptable    Comments: Blood pressure 103/58, pulse 61, temperature 97.3 øF (36.3 øC), resp. rate 16, SpO2 93 %.    Pt discharged from PACU based on estrella score >8

## 2023-08-18 NOTE — ANESTHESIA PROCEDURE NOTES
Airway  Urgency: elective    Date/Time: 8/18/2023 1:06 PM  Airway not difficult    General Information and Staff    Patient location during procedure: OR  CRNA/CAA: NATHAN Hodgson CRNA    Indications and Patient Condition  Indications for airway management: airway protection    Preoxygenated: yes  MILS maintained throughout  Mask difficulty assessment: 1 - vent by mask    Final Airway Details  Final airway type: endotracheal airway      Successful airway: ETT  Cuffed: yes   Successful intubation technique: direct laryngoscopy  Facilitating devices/methods: intubating stylet  Endotracheal tube insertion site: oral  Blade: Godinez  Blade size: 3  ETT size (mm): 7.5  Cormack-Lehane Classification: grade I - full view of glottis  Placement verified by: chest auscultation and capnometry   Cuff volume (mL): 5  Measured from: lips  ETT/EBT  to lips (cm): 21  Number of attempts at approach: 1  Assessment: lips, teeth, and gum same as pre-op and atraumatic intubation

## 2023-08-18 NOTE — DISCHARGE INSTRUCTIONS
[facility] Neurosurgery    Postoperative care following spine surgery  Dear Patient,  You have recently undergone spine surgery (T12 kyphoplasty with biopsy) and are now ready to go home. These written instructions are intended to help you to recover quickly.  If you have ANY QUESTIONS about your condition prior to discharge please ask Dr. Martínez. In particular, if you have concerns about going home discuss them now. We do not want you to go until you are completely comfortable leaving the hospital.   If you have ANY QUESTIONS about your condition after you go home call your doctor. The number is [phone] which is answered 24 hours a day. During regular working hours a  will connect you to your doctor, one of his partners, or one of our nurses. At night or on weekends the answering service will connect you with the physician on call. DO NOT HESITATE to call. We want to help you with any problems.     Deep vein thrombosis/ pulmonary embolus  Some patients who undergo surgery develop blood clots in the veins of the legs. These clots can cause pain or swelling in the legs, or may cause no obvious problem. They can break free from the legs and travel to the lungs causing shortness of breath and/or chest pain.you develop pain or swelling in your legs after surgery, call your doctor. If you develop breathing problems or chest pain after surgery, call [phone].    Neurological Deficit  Neurological deficits are problems with brain function like speech difficulty, weakness, numbness, imbalance, etc. These deficits may be present before or after spine surgery. Prior to discharge your doctor will make sure that all treatment needed to help you recover from such deficits has been instituted. He will also make sure that these deficits are stable or improving. After you go home, if you think any of your spine problems are getting worse, not better, it may be a sign of bleeding, infection, or other problems. Call  your doctor. He will order tests and prescribe treatment as needed.    Activity Restrictions  In general after surgery you will be on restricted activities for several weeks to several months depending on the nature of your operation. For six weeks you should avoid heavy lifting (over 8 lbs or roughly a gallon of milk), bending, or stooping. You may be released by Dr. Martínez earlier. You may return to driving when you feel comfortable enough that you can safely handle your vehicle AND you are not taking narcotic pain medications. This may be as early as 10 - 14 days, but could be longer as instructed by your neurosurgeon. After surgery you may gradually increase your activities, as you are able to tolerate. Walking is an excellent low impact exercise to begin after surgery. You should try to make regular walks of 15 to 30 minutes part of your postoperative recovery as you become able to do so. It typically requires a period of days to a few weeks to reach this level of activity and should be done in a gradual fashion. Your return to work will depend on your job requirements. In general, you may return to light duty work as soon as you feel comfortable and are not taking routine narcotic pain medications.    Medication  It is important to take your medication EXACTLY as prescribed. Some patients are reluctant to take pain medication. It is perfectly fine to take pain medication for several weeks after surgery. We want to eliminate pain whenever possible. Many pain medications can cause nausea (sick to your stomach), constipation (inability to poop), or itching. Nausea may be minimized by taking the medication with food. Constipation can be relieved by taking stool softeners and/ or laxatives that you can purchase over the counter as needed.    It is important to realize that no pain after surgery is an unrealistic expectation.  Pain medication will never reduce your pain score to zero.  The goal of pain medicine  is to reduce your pain to the point you can move, take care of yourself, and participate in therapy.  Make sure to work with your caregiver to determine what is an adequate level of pain control to promote healthy movement and then take your medication to reach this goal.      You have undergone a Kyphoplasty (1) surgery which you are expected to recover from over several weeks.     Use of opioids immediately following surgery is often necessary.  Pain after surgery results in decreased quality of life, surgical complications, and prolonged rehabilitation.  Thus in certain situations, the benefits of a limited course of opioids may outweigh the risk.      However, multiple studies have found that patients of all ages frequently take fewer opioid pills than the amount prescribed after common surgeries.  In some cases they do not take any of the prescribed medications at all.  This results in excess opioid pills that are accessible to others, raise a concern for misuse, can be stolen by family members, can result in later addiction, or can often be used in overdose.  Therefore we are going to make every effort to only prescribe as much pain medication as necessary to limit the amount of pills that are left over after you recover.      First-line treatment should include nonopioid analgesics.  Examples of these would be Tylenol or nonsteroidal anti-inflammatories such as ibuprofen or Aleve.  - Tylenol 1000 mg every six hours as needed    Second-line treatment. If the above first-line treatments are insufficient to control your pain you have also been provided a small dose of opioids.  In order to avoid addiction you should take the lowest amount possible.    Type I: Opioid prescription for 3 days or less (every 6 hours).  May consider an additional 3-day course (every 8 hours)    EXAMPLE IF APPLICABLE:  Please taper your pain medications to avoid long term addiction.  Week 1: Take your pain medication no more than ever  6 hours as needed for severe pain.  Week 2: Take your pain medication no more than every 8 hours as needed for severe pain.  Week 3: Take your pain medication no more than every 12 hours as needed for unresolved pain.    These recommendations are based on ...  CDC guidelines for control and prevention of postsurgical pain,   [place] opioid prescribing engagement network (OPEN),   Estefani hurt in [facility] medical directors group prescribing opioids for postoperative pain-supplemental guidance (2018)    Wound Care  Your incision is held together with dissolvable sutures that do not need to be removed.     Seventy-two hours after surgery it is OK to get the wound wet, so you can take a shower or bath.     You do not need to put any medication (like Neosporin or Vitamin E) on the wound. Scrubbing the wound should be avoided until the staples nondissolvable sutures come out.     Heating pads have the potential to cause very serious burns, especially in patients using narcotic pain medications (e.g. Oxycodone, Oxycontin, etc.) Do not use heating pads during your recovery.      No nicotine products, including second-hand smoke, gum or patches. Nicotine will delay healing and increase the likelihood of a surgical complication. For help quitting, call the Quitline: 1-534.926.6471     Potential wound problems include the following:  Infection--If the wound becomes red, tender, swollen, or warm it may be infected. Infection is often accompanied by fever. If you think your wound might be infected you should call your doctor. Often you can send us a picture of the wound so we can better evaluate it.   Drainage--Fluid should not drain from your wound. If it does, call your doctor. Colored fluid may indicate infection. Clear fluid may indicate leakage of spinal fluid.   Dehiscence--If the wound does not heal properly it may open up along the staple line. This is called dehiscence. Call us immediately.    Sutures--Occasionally, one of the buried threads (sutures) may work through the skin. If you think this has happened call your doctor.   Swelling--Spinal fluid or blood may collect under the skin. This is usually harmless, but needs to be evaluated. Call your doctor.     How to contact your doctor  Dr. Martínez and his team did your surgery and, therefore, are likely to know more about your condition than any other physicians. We are immediately available to help you with any problems after surgery. Please call us for any concerns at the following numbers:  Doctor's office:  [phone] (answered 24 hours a day)   [facility] : [phone] (alternative emergency number for on-call neurosurgeon)     Specific instructions related to your surgery  Diet: no restrictions, eat a heart healthy diet. If you are diabetic, tightly controlling your blood sugar over the next 2 weeks is crucial in controlling infection.     Activity: as tolerated, no heavy lifting or strenuous exercise for at least 2 weeks.    Brace/collar instructions: You should no longer require use of your LSO or TLSO brace.    Please do not use NSAIDs (Ibuprofen, Toradol, etc) for 4 weeks following spinal fusion, as this can prevent bone growth. Tylenol is acceptable as an over the counter pain management.     Follow up:   Follow up with Curtis CORTEZ on 8/31/2023 for post op wound check and with Dr. Martínez in the neurosurgery clinic ([phone]) on 10/23/2023.  We have scheduled these appointment(s) for you.            Sincerely,        Juan Martínez MD, PhD, MPH

## 2023-08-18 NOTE — DISCHARGE SUMMARY
DISCHARGE SUMMARY FROM HOSPITAL    Date of Discharge:  8/18/2023    Presenting Diagnosis/History of Present Illness  Compression fracture of T12 vertebra with delayed healing [S22.080G]  Age-related osteoporosis with current pathological fracture with delayed healing [M80.00XG]    Medical History   Patient Active Problem List   Diagnosis    Closed compression fracture of first lumbar vertebra    Current non-smoker    BMI 28.0-28.9,adult    Lumbar compression fracture, closed, initial encounter    Lumbar compression fracture    S/P kyphoplasty    Numbness and tingling of right leg    Compression fracture of fourth lumbar vertebra with delayed healing    Benign hypertension    Flatback syndrome of lumbar region    Hammer toe of right foot    High cholesterol    Impaired fasting glucose    Malignant neoplasm of prostate    Osteopenia    Acute exacerbation of chronic obstructive airways disease    Back pain    Compression fracture of thoracic vertebra with routine healing    Anemia    Actinic keratosis    Hyperplastic colonic polyp    FH: colon cancer in first degree relative <60 years old    Weight loss    History of adenomatous polyp of colon    Decreased bone mass    Sebaceous cyst    Degenerative disc disease, cervical    Degenerative disc disease, lumbar    Degenerative disc disease, thoracic    Compression fracture of T12 vertebra with delayed healing    Age-related osteoporosis with current pathological fracture with delayed healing     Discharge Diagnosis/ Active Hospital Problems:   Active Hospital Problems    Diagnosis     Compression fracture of T12 vertebra with delayed healing     Age-related osteoporosis with current pathological fracture with delayed healing      Hospital Course  Patient is a 82 y.o. male presented with a significant medical history of osteopenia currently on Prolia, L3 kyphoplasty 7/17/2018, L4 kyphoplasty 9/11/2018, and L5 and T6 kyphoplasty on 1/25/2022, prostate cancer, hypertension,  macular degeneration, and he is overweight.  He presents today for follow-up of worsening thoracolumbar back pain that began late March after lifting a 25-30 pound, subsequently scheduled for a T12 kyphoplasty procedure scheduled for 5/23/2023, however canceled due to lack of insurance approval. ANUJ: 44,50,57.99.  Physical exam findings of +4/5 right anterior tibial weakness and gross hyporeflexia.    Their imaging shows an acute/ subacute T12 compression fracture.      On 8/18/2023 the patient was brought to the main operating room where he underwent an uneventful T12 kyphoplasty with biopsy.  Postoperatively  he did extremely well and his neurological exam was unchanged.  has been able to tolerate oral diet, oral pain medications, and void.  He has been monitored postoperatively per hospital policy and deemed safe for discharge.  Postoperative education was performed at bedside which included wound care instructions, maximal physical activity, use of postoperative pain medication including tapering, and indications for contacting the neurosurgical office vs the emergency department.  Arrangements for postoperative follow-up should be provided prior to hospital discharge.  He will be provided with an appropriate course of oral medication for pain control.  Additional information provided in hospital discharge instructions.    Procedures Performed  Procedure(s):  Thoracic 12, KYPHOPLASTY WITH BIOPSY     Consults:   Consults       No orders found from 7/20/2023 to 8/19/2023.          Pertinent Test Results:   XR Spine Thoracic 2 View    Result Date: 7/26/2023   No definite new compression deformity, which is markedly limited evaluation the upper thoracic spine due to overlying structures.  Unchanged T11 compression deformity.  T6, L2, and L3 kyphoplasty changes.  This report was finalized on 07/26/2023 16:26 by  Cayetano Arredondo DO.    XR Spine Lumbar 2 or 3 View    Result Date: 7/26/2023  1. Stable  kyphoplasty changes of L3-L5 with similar compression deformities as described above. Osteopenia. No acute radiographic abnormality. Stable alignment. This report was finalized on 07/26/2023 16:57 by Dr. Pascale Cade MD.     CBC:   Results from last 7 days   Lab Units 08/18/23  1111 08/15/23  1053   WBC 10*3/mm3 7.36 7.07   HEMOGLOBIN g/dL 10.8* 10.5*   HEMATOCRIT % 34.5* 34.7*   PLATELETS 10*3/mm3 299 309     BMP:  Results from last 7 days   Lab Units 08/18/23  1111 08/15/23  1053   SODIUM mmol/L 137 137   POTASSIUM mmol/L 4.7 4.9   CHLORIDE mmol/L 101 100   CO2 mmol/L 28.0 29.0   BUN mg/dL 25* 21   CREATININE mg/dL 0.75* 0.75*   GLUCOSE mg/dL 87 85   CALCIUM mg/dL 8.7 8.7   ALT (SGPT) U/L  --  9     Culture Results: No results found for: BLOODCX, URINECX, WOUNDCX, MRSACX, RESPCX, STOOLCX    Condition on Discharge:  Stable    Vital Signs  Temp:  [97 øF (36.1 øC)] 97 øF (36.1 øC)  Heart Rate:  [62-74] 62  Resp:  [16-20] 16  BP: (147)/(75) 147/75    Physical Exam:   Physical Exam  Vitals and nursing note reviewed.   Constitutional:       General: He is not in acute distress.     Appearance: Normal appearance. He is well-developed, well-groomed and overweight. He is not ill-appearing, toxic-appearing or diaphoretic.          Comments: BMI 27.34   HENT:      Head: Normocephalic and atraumatic.      Right Ear: Hearing normal.      Left Ear: Hearing normal.   Eyes:      Extraocular Movements: EOM normal.      Conjunctiva/sclera: Conjunctivae normal.      Pupils: Pupils are equal, round, and reactive to light.   Neck:      Trachea: Trachea normal.   Cardiovascular:      Rate and Rhythm: Normal rate and regular rhythm.   Pulmonary:      Effort: Pulmonary effort is normal. No tachypnea, bradypnea, accessory muscle usage or respiratory distress.   Abdominal:      Palpations: Abdomen is soft.   Musculoskeletal:      Cervical back: Full passive range of motion without pain and neck supple.   Skin:     General: Skin is warm  and dry.   Neurological:      Mental Status: He is alert and oriented to person, place, and time.      GCS: GCS eye subscore is 4. GCS verbal subscore is 5. GCS motor subscore is 6.   Psychiatric:         Speech: Speech normal.         Behavior: Behavior normal. Behavior is cooperative.        Neurologic Exam     Mental Status   Oriented to person, place, and time.   Attention: normal. Concentration: normal.   Speech: speech is normal   Level of consciousness: alert    Cranial Nerves     CN II   Visual fields full to confrontation.     CN III, IV, VI   Pupils are equal, round, and reactive to light.  Extraocular motions are normal.     CN V   Facial sensation intact.     CN VII   Facial expression full, symmetric.     CN VIII   CN VIII normal.     CN IX, X   CN IX normal.     CN XI   CN XI normal.     Motor Exam   Right arm tone: normal  Left arm tone: normal  Right leg tone: normal  Left leg tone: normal    Strength   Right deltoid: 5/5  Left deltoid: 5/5  Right biceps: 5/5  Left biceps: 5/5  Right triceps: 5/5  Left triceps: 5/5  Right wrist extension: 5/5  Left wrist extension: 5/5  Right iliopsoas: 5/5  Left iliopsoas: 5/5  Right quadriceps: 5/5  Left quadriceps: 5/5  Right anterior tibial: 4/5  Left anterior tibial: 5/5  Right gastroc: 5/5  Left gastroc: 5/5  Right EHL 5/5  Left EHL 5/5       Sensory Exam   Right arm light touch: normal  Left arm light touch: normal  Right leg light touch: normal  Left leg light touch: normal    Gait, Coordination, and Reflexes     Tremor   Resting tremor: absent  Intention tremor: absent  Action tremor: absent    Discharge Disposition  Home or Self Care    Discharge Medications     Discharge Medications        New Medications        Instructions Start Date   HYDROcodone-acetaminophen 7.5-325 MG per tablet  Commonly known as: NORCO   1 tablet, Oral, Every 6 Hours PRN      naloxone 4 MG/0.1ML nasal spray  Commonly known as: NARCAN   Call 911. Don't prime. Spray in 1 nostril for  overdose. Repeat in 2-3 minutes in other nostril if no or minimal breathing/responsiveness.      sennosides-docusate 8.6-50 MG per tablet  Commonly known as: PERICOLACE   2 tablets, Oral, Daily             Changes to Medications        Instructions Start Date   traMADol 50 MG tablet  Commonly known as: ULTRAM  What changed: These instructions start on August 21, 2023. If you are unsure what to do until then, ask your doctor or other care provider.   50 mg, Oral, Every 6 Hours PRN, Patient is taking spouses prescription.   Start Date: August 21, 2023            Continue These Medications        Instructions Start Date   denosumab 60 MG/ML solution prefilled syringe syringe  Commonly known as: PROLIA   60 mg, Subcutaneous, Every 6 Months      multivitamins-minerals capsule capsule   1 capsule, Oral, 2 Times Daily      olmesartan 40 MG tablet  Commonly known as: BENICAR   40 mg, Oral, Daily             ASK your doctor about these medications        Instructions Start Date   cyclobenzaprine 5 MG tablet  Commonly known as: FLEXERIL   5 mg, Oral, 3 Times Daily PRN             Discharge Diet:   Diet Instructions       Advance Diet As Tolerated -Target Diet: Regular.  Advance as tolerated      Target Diet: Regular.  Advance as tolerated           Activity at Discharge:   Activity Instructions       Activity as Tolerated      Bathing Restrictions      May bathe in 72 hours.  Do not soak wound.    Type of Restriction: Bathing    Bathing Restrictions: No Tub Bath    Driving Restrictions      Type of Restriction: Driving    Driving Restrictions: No Driving While Taking Narcotics    Gradually Increase Activity Until at Pre-Hospitalization Level      Lifting Restrictions      Type of Restriction: Lifting    Lifting Restrictions: Lifting Restriction (Indicate Limit)    Weight Limit (Pounds): 8    Length of Lifting Restriction: 2-3 WEEKS           Follow-up Appointments  Future Appointments   Date Time Provider Department Center    8/31/2023  3:30 PM Curtis Woodward APRN [facility]   10/23/2023  4:30 PM Nick Martínez MD [facility]   11/3/2023  1:45 PM Santiago Aaron MD [facility]   11/6/2023  1:30 PM Santiago Aaron MD [facility]     Additional Instructions for the Follow-ups that You Need to Schedule       Call MD With Problems / Concerns   As directed      Instructions: CALL WITH ANY NEW OR ADDITIONAL CONCERNS.    Order Comments: Instructions: CALL WITH ANY NEW OR ADDITIONAL CONCERNS.         Discharge Follow-up with Specified Provider: Dr. Martínez; 6 Weeks   As directed      To: Dr. Martínez   Follow Up: 6 Weeks   Follow Up Details: 6-8 WEEKS        Discharge Follow-up with Specified Provider: DIEGO Alba; 2 Weeks   As directed      To: DIEGO Alba   Follow Up: 2 Weeks              Test Results Pending at Discharge  None     DIEGO Dominguez  08/18/23  13:31 [facility]    [id number]

## 2023-08-18 NOTE — OP NOTE
NEUROSURGERY OPERATIVE NOTE    Juan Luis Collazo  OR Date: 8/18/2023    Pre-op Diagnosis:   Compression fracture of T12 vertebra with delayed healing [S22.080G]  Age-related osteoporosis with current pathological fracture with delayed healing [M80.00XG]    Post-op Diagnosis:     Post-Op Diagnosis Codes:     * Compression fracture of T12 vertebra with delayed healing [S22.080G]     * Age-related osteoporosis with current pathological fracture with delayed healing [M80.00XG]          Surgeon(s):  Nick Martínez MD    Anesthesia: General    Staff:   Circulator: Grabiel Rangel RN  Scrub Person: Barrington Segovia    Procedure(s):  Thoracic 12, KYPHOPLASTY WITH BIOPSY    1. thoracic, percutaneous vertebral augmentation    INDICATIONS: Juan Luis Collazo is a 82 y.o. male who presents with intolerable back pain.  Radiographic imaging including MRI confirms acture T12 fracture. He has been unresponsive to nonoperative treatment modalities including bracing and pain medication.     These fractures were sustained with minimal trauma and he has low bone density consistent with osteoporosis.    Based on these findings, we have decided to perform KYPHON Balloon Kyphoplasty at T12.    The risks and benefits of the procedure were discussed. The patient accepted and understood all potential risks and complications including extravasation of cement, vascular injury, air or fat embolus, nerve root compression, hematoma, damage to the spinal cord, bowel or bladder incontinence, difficulty walking or weakness.  After considering options, the patient requested that we proceed with the procedure.    PROCEDURE: The patient was brought to the operating room suite and general anesthesia with endotracheal intubation was performed. The patient was positioned prone on the Igor table. The back was prepped and draped. The AP and lateral fluoroscopy was positioned over the T12 pedicles.  Localization was by counting ribs.  Care was taken  to ensure the endplates were square and the spinous processes were midline.  A small nick in the skin was made at the approximate entry point and the Jamshidi needle was advanced to the superior lateral quadrant of the pedicle.    A transpedicular approach to the T12 vertebral body was utilized.  Once just past the pedicle and into the vertebral body advancement was halted.  Positioning was confirmed on the AP and lateral plane. Once satisfactory positioning was achieved access was obtained on the contralateral side of the same pedicle.     The stylet was removed and a biopsy was performed.  This was sent for permanent pathology.      A drill was then used to core out the vertebral body to approximately the anterior cortex.  AP and lateral images were taken to verify position and trajectory. The anterior cortex was probed with the guide pin to ensure no perforations in the anterior cortex. After completing the entry into the vertebral body, a 20 mm inflatable Medtronic bone tamp was inserted bilaterally through the cannula and advanced under fluoroscopic guidance into the vertebral body near the anterior cortex. The radiopaque marker bands on the bone tamp were identified using lateral images. Once both bone tamps were in position, they were inflated slowly.  Expansion of the bone tamps was done sequentially in increments of 0.25 to 0.5 mL of contrast, with careful attention being paid to the inflation pressures and balloon position. The inflation was monitored with AP and lateral imaging. There was no breach of the lateral wall or anterior cortex of the vertebral body. Under fluoroscopic imaging, and the use of the bone void fillers, internal fixation was achieved through a low-pressure injection of bone cement. The cavity was filled with a total volume of 5 mL. Once the bone cement had hardened, the cannulas were then removed.    Post-procedure, all incisions were closed with Monocryl sutures. The patient was kept  in the prone position for approximately 10 minutes post cement injection. The patient was then turned supine, monitored briefly and returned to the floor.  The patient was moving both his lower extremities at this time.    Throughout the procedure, there were no intraoperative complications. Estimated blood loss was minimal.       Estimated Blood Loss: minimal    Complications: None    Implants:   Implant Name Type Inv. Item Serial No.  Lot No. LRB No. Used Action   KT MIXER KYPHON W/CMT BONE KYPHX HV R - [id number] Implant KT MIXER KYPHON W/CMT BONE KYPHX HV R  [employer] [id number] N/A 1 Implanted       Specimens:                Specimens       ID Source Type Tests Collected By Collected At Frozen?    A Spine, Thoracic Bone SURGICAL PATHOLOGY EXAM   Nick Martínez MD 8/18/23 1332 No    Description: T12              Drains: * No LDAs found *

## 2023-08-18 NOTE — ANESTHESIA PREPROCEDURE EVALUATION
Anesthesia Evaluation     no history of anesthetic complications:   NPO Solid Status: > 8 hours  NPO Liquid Status: > 8 hours           Airway   Mallampati: I  TM distance: >3 FB  Neck ROM: full  No difficulty expected  Dental      Pulmonary    (+) a smoker (quit 1991) Former,  Cardiovascular   Exercise tolerance: good (4-7 METS)    (+) hypertensionhyperlipidemia      Neuro/Psych  (+) numbness  (-) seizures, TIA, CVA  GI/Hepatic/Renal/Endo    (+) GERD  (-) no renal disease, diabetes    Musculoskeletal     Abdominal    Substance History      OB/GYN          Other   arthritis, blood dyscrasia anemia,   history of cancer (prostate cancer) remission                  Anesthesia Plan    ASA 2     general     intravenous induction     Anesthetic plan, risks, benefits, and alternatives have been provided, discussed and informed consent has been obtained with: patient.    CODE STATUS:

## 2023-08-22 LAB
CYTO UR: NORMAL
LAB AP CASE REPORT: NORMAL
Lab: NORMAL
PATH REPORT.FINAL DX SPEC: NORMAL
PATH REPORT.GROSS SPEC: NORMAL

## 2023-08-24 DIAGNOSIS — S22.000G COMPRESSION FRACTURE OF THORACIC VERTEBRA WITH DELAYED HEALING, UNSPECIFIED THORACIC VERTEBRAL LEVEL, SUBSEQUENT ENCOUNTER: ICD-10-CM

## 2023-08-24 DIAGNOSIS — S32.050D COMPRESSION FRACTURE OF L5 VERTEBRA WITH ROUTINE HEALING, SUBSEQUENT ENCOUNTER: ICD-10-CM

## 2023-08-31 ENCOUNTER — OFFICE VISIT (OUTPATIENT)
Dept: NEUROSURGERY | Facility: CLINIC | Age: 83
End: 2023-08-31
Payer: MEDICARE

## 2023-08-31 VITALS — HEIGHT: 63 IN | WEIGHT: 154 LBS | BODY MASS INDEX: 27.29 KG/M2

## 2023-08-31 DIAGNOSIS — Z98.890 S/P KYPHOPLASTY: ICD-10-CM

## 2023-08-31 DIAGNOSIS — S22.080G COMPRESSION FRACTURE OF T12 VERTEBRA WITH DELAYED HEALING: Primary | ICD-10-CM

## 2023-08-31 DIAGNOSIS — E66.3 OVERWEIGHT (BMI 25.0-29.9): ICD-10-CM

## 2023-08-31 NOTE — PROGRESS NOTES
Chief complaint:   Chief Complaint   Patient presents with    Post-op     Pt is here for 2 wk PO visit.        Subjective     HPI:   Interval History: Juan Luis Collazo is a 82 y.o.  male who presents today for post operative follow-up from a Thoracic 12, KYPHOPLASTY WITH BIOPSY on 8/18/2023.  Mr. Collazo has done well since we last saw him.  His overall back pain is improved by 90% since undergoing T12 kyphoplasty.  He denies fevers, chills, or concern for postoperative incision infection.  He currently rates the severity of his symptoms 3/10.  No additional concerns at this time.    No data recorded  No data recorded  No data recorded    PFSH:  Past Medical History:   Diagnosis Date    Arthritis     Back pain     Cancer     CHEST, SKIN CANCER    GERD (gastroesophageal reflux disease)     History of colon polyps     History of prostate cancer     Hypertension     Low back pain     Macular degeneration     Osteopenia      Past Surgical History:   Procedure Laterality Date    APPENDECTOMY      CATARACT EXTRACTION, BILATERAL      COLONOSCOPY  09/17/2013    Dr. José-One 2-3mm polyp at 20cm; Otherwise normal; Repeat 3 years    COLONOSCOPY N/A 12/07/2022    Procedure: COLONOSCOPY WITH ANESTHESIA;  Surgeon: Bri Alexis MD;  Location: [facility] ENDOSCOPY;  Service: Gastroenterology;  Laterality: N/A;  pre: anemia. hx polyps.  post: polyps. diverticulosis.  no PCP    ENDOSCOPY N/A 12/07/2022    Procedure: ESOPHAGOGASTRODUODENOSCOPY WITH ANESTHESIA;  Surgeon: Bri Alexis MD;  Location: [facility] ENDOSCOPY;  Service: Gastroenterology;  Laterality: N/A;  pre: anemia  post: hiatal hernia. esophagitis.gastric erosions.  no PCP    FOOT SURGERY Right     KYPHOPLASTY Bilateral 07/17/2018    Procedure: L3 KYPHOPLASTY 1-2 LEVELS;  Surgeon: Vega Pandey MD;  Location: [facility] OR;  Service: Neurosurgery    KYPHOPLASTY Bilateral 09/11/2018    Procedure: L4 KYPHOPLASTY WITH BIOPSY;  Surgeon: Vega Pandey MD;  " Location:  [facility] OR;  Service: Neurosurgery    KYPHOPLASTY WITH BIOPSY N/A 01/25/2022    Procedure: KYPHOPLASTY WITH BIOPSY, THORACIC 6 and LUMBAR 5;  Surgeon: Nick Martínez MD;  Location:  [facility] OR;  Service: Neurosurgery;  Laterality: N/A;    KYPHOPLASTY WITH BIOPSY N/A 8/18/2023    Procedure: Thoracic 12, KYPHOPLASTY WITH BIOPSY;  Surgeon: Nick Mratínez MD;  Location:  [facility] OR;  Service: Neurosurgery;  Laterality: N/A;    PROSTATECTOMY      TONSILLECTOMY      TOTAL SHOULDER REPLACEMENT Bilateral      Objective      Current Outpatient Medications   Medication Sig Dispense Refill    cyclobenzaprine (FLEXERIL) 5 MG tablet Take 1 tablet by mouth 3 (Three) Times a Day As Needed for Muscle Spasms. 30 tablet 0    denosumab (PROLIA) 60 MG/ML solution prefilled syringe syringe Inject 1 mL under the skin into the appropriate area as directed Every 6 (Six) Months. 180 mL 1    multivitamins-minerals (PRESERVISION AREDS 2) capsule capsule Take 1 capsule by mouth 2 (Two) Times a Day.      naloxone (NARCAN) 4 MG/0.1ML nasal spray Call 911. Don't prime. Rhodes in 1 nostril for overdose. Repeat in 2-3 minutes in other nostril if no or minimal breathing/responsiveness. 2 each 0    olmesartan (BENICAR) 40 MG tablet Take 1 tablet by mouth Daily.      sennosides-docusate (senna-docusate sodium) 8.6-50 MG per tablet Take 2 tablets by mouth Daily. 30 tablet 0    traMADol (ULTRAM) 50 MG tablet Take 1 tablet by mouth Every 6 (Six) Hours As Needed for Moderate Pain. Patient is taking spouses prescription. 60 tablet 0     No current facility-administered medications for this visit.     Vital Signs  Ht 160 cm (62.99\")   Wt 69.9 kg (154 lb)   BMI 27.29 kg/mý   Physical Exam  Vitals and nursing note reviewed.   Constitutional:       General: He is not in acute distress.     Appearance: Normal appearance. He is well-developed, well-groomed and overweight. He is not ill-appearing, toxic-appearing or diaphoretic.          " Comments: BMI 27.29   HENT:      Head: Normocephalic and atraumatic.      Right Ear: Hearing normal.      Left Ear: Hearing normal.   Eyes:      Extraocular Movements: EOM normal.      Conjunctiva/sclera: Conjunctivae normal.      Pupils: Pupils are equal, round, and reactive to light.   Neck:      Trachea: Trachea normal.   Cardiovascular:      Rate and Rhythm: Normal rate and regular rhythm.   Pulmonary:      Effort: Pulmonary effort is normal. No tachypnea, bradypnea, accessory muscle usage or respiratory distress.   Abdominal:      Palpations: Abdomen is soft.   Musculoskeletal:      Cervical back: Full passive range of motion without pain and neck supple.   Skin:     General: Skin is warm and dry.   Neurological:      Mental Status: He is alert and oriented to person, place, and time.      GCS: GCS eye subscore is 4. GCS verbal subscore is 5. GCS motor subscore is 6.      Gait: Gait is intact.      Deep Tendon Reflexes:      Reflex Scores:       Tricep reflexes are 1+ on the right side and 1+ on the left side.       Bicep reflexes are 1+ on the right side and 1+ on the left side.       Brachioradialis reflexes are 1+ on the right side and 1+ on the left side.       Patellar reflexes are 1+ on the right side and 1+ on the left side.       Achilles reflexes are 0 on the right side and 0 on the left side.  Psychiatric:         Speech: Speech normal.         Behavior: Behavior normal. Behavior is cooperative.     Neurologic Exam     Mental Status   Oriented to person, place, and time.   Attention: normal. Concentration: normal.   Speech: speech is normal   Level of consciousness: alert    Cranial Nerves     CN II   Visual fields full to confrontation.     CN III, IV, VI   Pupils are equal, round, and reactive to light.  Extraocular motions are normal.     CN V   Facial sensation intact.     CN VII   Facial expression full, symmetric.     CN VIII   CN VIII normal.     CN IX, X   CN IX normal.     CN XI   CN XI  normal.     Motor Exam   Right arm tone: normal  Left arm tone: normal  Right leg tone: normal  Left leg tone: normal    Strength   Right deltoid: 5/5  Left deltoid: 5/5  Right biceps: 5/5  Left biceps: 5/5  Right triceps: 5/5  Left triceps: 5/5  Right wrist extension: 5/5  Left wrist extension: 5/5  Right iliopsoas: 5/5  Left iliopsoas: 5/5  Right quadriceps: 5/5  Left quadriceps: 5/5  Right anterior tibial: 4/5  Left anterior tibial: 5/5  Right gastroc: 5/5  Left gastroc: 5/5  Right EHL 5/5  Left EHL 5/5       Sensory Exam   Right arm light touch: normal  Left arm light touch: normal  Right leg light touch: normal  Left leg light touch: normal    Gait, Coordination, and Reflexes     Gait  Gait: normal    Tremor   Resting tremor: absent  Intention tremor: absent  Action tremor: absent    Reflexes   Right brachioradialis: 1+  Left brachioradialis: 1+  Right biceps: 1+  Left biceps: 1+  Right triceps: 1+  Left triceps: 1+  Right patellar: 1+  Left patellar: 1+  Right achilles: 0  Left achilles: 0  Right plantar: normal  Left plantar: normal  Right Villalta: absent  Left Villalta: absent  Right ankle clonus: absent  Left ankle clonus: absent  Right pendular knee jerk: absent  Left pendular knee jerk: absent    Incision: WOUND IMAGE - Postop T12 kyphoplasty (08/31/2023)   (Consent to obtain photo of postoperative site for documentation purposes only obtained verbally by Mr. Collazo)    Results Review: no new imaging      Assessment/Plan:   T12 compression fracture status post kyphoplasty  Juan Luis Collazo is a 82 y.o. male who presents today for post operative wound check following a Thoracic 12, KYPHOPLASTY WITH BIOPSY on 8/18/2023.  Mr. Collazo has done well since we last saw him.   His symptoms are stable.  His post operative incision is clean, dry, intact, and well approximated without signs of soft tissue infection.  We discussed the signs and symptoms of a soft tissue infection and I recommended they call immediately  for any concerns. He may continue current pain medication regimen with tapering instructions as previously provided.  B/R/AE discussed. I advised the patient to keep scheduled appointment with Dr. Moffett for reassessment on 10/23/2023.  Juan Luis knows to call the neurosurgical clinic to return sooner for any new or additional concerns.      Overweight (BMI 25.0-29.9)  Body mass index is 27.29 kg/mý..  Information on the DASH diet provided in the AVS.  We will continue to provided diet and exercise information with the goal of weight loss at each scheduled appointment.     STEADI Fall Risk Assessment was completed, and patient is at LOW risk for falls.Assessment completed on:8/31/2023  Fall risk information provided in AVS    Diagnoses and all orders for this visit:    1. Compression fracture of T12 vertebra with delayed healing (Primary)    2. S/P kyphoplasty    3. Overweight (BMI 25.0-29.9)      Return for KEEP SCHEDULED APPT WITH DR. MOFFETT.    I discussed the patients findings and my recommendations with patient    DIEGO Dominguez

## 2023-08-31 NOTE — PATIENT INSTRUCTIONS
"DASH Eating Plan  DASH stands for Dietary Approaches to Stop Hypertension. The DASH eating plan is a healthy eating plan that has been shown to:  Reduce high blood pressure (hypertension).  Reduce your risk for type 2 diabetes, heart disease, and stroke.  Help with weight loss.  What are tips for following this plan?  Reading food labels  Check food labels for the amount of salt (sodium) per serving. Choose foods with less than 5 percent of the Daily Value of sodium. Generally, foods with less than 300 milligrams (mg) of sodium per serving fit into this eating plan.  To find whole grains, look for the word \"whole\" as the first word in the ingredient list.  Shopping  Buy products labeled as \"low-sodium\" or \"no salt added.\"  Buy fresh foods. Avoid canned foods and pre-made or frozen meals.  Cooking  Avoid adding salt when cooking. Use salt-free seasonings or herbs instead of table salt or sea salt. Check with your health care provider or pharmacist before using salt substitutes.  Do not garcia foods. Cook foods using healthy methods such as baking, boiling, grilling, roasting, and broiling instead.  Cook with heart-healthy oils, such as olive, canola, avocado, soybean, or sunflower oil.  Meal planning    Eat a balanced diet that includes:  4 or more servings of fruits and 4 or more servings of vegetables each day. Try to fill one-half of your plate with fruits and vegetables.  6-8 servings of whole grains each day.  Less than 6 oz (170 g) of lean meat, poultry, or fish each day. A 3-oz (85-g) serving of meat is about the same size as a deck of cards. One egg equals 1 oz (28 g).  2-3 servings of low-fat dairy each day. One serving is 1 cup (237 mL).  1 serving of nuts, seeds, or beans 5 times each week.  2-3 servings of heart-healthy fats. Healthy fats called omega-3 fatty acids are found in foods such as walnuts, flaxseeds, fortified milks, and eggs. These fats are also found in cold-water fish, such as sardines, salmon, " and mackerel.  Limit how much you eat of:  Canned or prepackaged foods.  Food that is high in trans fat, such as some fried foods.  Food that is high in saturated fat, such as fatty meat.  Desserts and other sweets, sugary drinks, and other foods with added sugar.  Full-fat dairy products.  Do not salt foods before eating.  Do not eat more than 4 egg yolks a week.  Try to eat at least 2 vegetarian meals a week.  Eat more home-cooked food and less restaurant, buffet, and fast food.  Lifestyle  When eating at a restaurant, ask that your food be prepared with less salt or no salt, if possible.  If you drink alcohol:  Limit how much you use to:  0-1 drink a day for women who are not pregnant.  0-2 drinks a day for men.  Be aware of how much alcohol is in your drink. In the [place] one drink equals one 12 oz bottle of beer (355 mL), one 5 oz glass of wine (148 mL), or one 1« oz glass of hard liquor (44 mL).  General information  Avoid eating more than 2,300 mg of salt a day. If you have hypertension, you may need to reduce your sodium intake to 1,500 mg a day.  Work with your health care provider to maintain a healthy body weight or to lose weight. Ask what an ideal weight is for you.  Get at least 30 minutes of exercise that causes your heart to beat faster (aerobic exercise) most days of the week. Activities may include walking, swimming, or biking.  Work with your health care provider or dietitian to adjust your eating plan to your individual calorie needs.  What foods should I eat?  Fruits  All fresh, dried, or frozen fruit. Canned fruit in natural juice (without added sugar).  Vegetables  Fresh or frozen vegetables (raw, steamed, roasted, or grilled). Low-sodium or reduced-sodium tomato and vegetable juice. Low-sodium or reduced-sodium tomato sauce and tomato paste. Low-sodium or reduced-sodium canned vegetables.  Grains  Whole-grain or whole-wheat bread. Whole-grain or whole-wheat pasta. Brown rice. Oatmeal. Quinoa.  Bulgur. Whole-grain and low-sodium cereals. Jerrica bread. Low-fat, low-sodium crackers. Whole-wheat flour tortillas.  Meats and other proteins  Skinless chicken or turkey. Ground chicken or turkey. Pork with fat trimmed off. Fish and seafood. Egg whites. Dried beans, peas, or lentils. Unsalted nuts, nut butters, and seeds. Unsalted canned beans. Lean cuts of beef with fat trimmed off. Low-sodium, lean precooked or cured meat, such as sausages or meat loaves.  Dairy  Low-fat (1%) or fat-free (skim) milk. Reduced-fat, low-fat, or fat-free cheeses. Nonfat, low-sodium ricotta or cottage cheese. Low-fat or nonfat yogurt. Low-fat, low-sodium cheese.  Fats and oils  Soft margarine without trans fats. Vegetable oil. Reduced-fat, low-fat, or light mayonnaise and salad dressings (reduced-sodium). Canola, safflower, olive, avocado, soybean, and sunflower oils. Avocado.  Seasonings and condiments  Herbs. Spices. Seasoning mixes without salt.  Other foods  Unsalted popcorn and pretzels. Fat-free sweets.  The items listed above may not be a complete list of foods and beverages you can eat. Contact a dietitian for more information.  What foods should I avoid?  Fruits  Canned fruit in a light or heavy syrup. Fried fruit. Fruit in cream or butter sauce.  Vegetables  Creamed or fried vegetables. Vegetables in a cheese sauce. Regular canned vegetables (not low-sodium or reduced-sodium). Regular canned tomato sauce and paste (not low-sodium or reduced-sodium). Regular tomato and vegetable juice (not low-sodium or reduced-sodium). Pickles. Olives.  Grains  Baked goods made with fat, such as croissants, muffins, or some breads. Dry pasta or rice meal packs.  Meats and other proteins  Fatty cuts of meat. Ribs. Fried meat. May. Bologna, salami, and other precooked or cured meats, such as sausages or meat loaves. Fat from the back of a pig (fatback). Bratwurst. Salted nuts and seeds. Canned beans with added salt. Canned or smoked fish.  Whole eggs or egg yolks. Chicken or turkey with skin.  Dairy  Whole or 2% milk, cream, and half-and-half. Whole or full-fat cream cheese. Whole-fat or sweetened yogurt. Full-fat cheese. Nondairy creamers. Whipped toppings. Processed cheese and cheese spreads.  Fats and oils  Butter. Stick margarine. Lard. Shortening. Ghee. May fat. Tropical oils, such as coconut, palm kernel, or palm oil.  Seasonings and condiments  Onion salt, garlic salt, seasoned salt, table salt, and sea salt. Worcestershire sauce. Tartar sauce. Barbecue sauce. Teriyaki sauce. Soy sauce, including reduced-sodium. Steak sauce. Canned and packaged gravies. Fish sauce. Oyster sauce. Cocktail sauce. Store-bought horseradish. Ketchup. Mustard. Meat flavorings and tenderizers. Bouillon cubes. Hot sauces. Pre-made or packaged marinades. Pre-made or packaged taco seasonings. Relishes. Regular salad dressings.  Other foods  Salted popcorn and pretzels.  The items listed above may not be a complete list of foods and beverages you should avoid. Contact a dietitian for more information.  Where to find more information  National Heart, Lung, and Blood Webb: www.nhlbi.nih.gov  American Heart Association: www.heart.org  Academy of Nutrition and Dietetics: www.eatright.org  National Kidney Foundation: www.kidney.org  Summary  The DASH eating plan is a healthy eating plan that has been shown to reduce high blood pressure (hypertension). It may also reduce your risk for type 2 diabetes, heart disease, and stroke.  When on the DASH eating plan, aim to eat more fresh fruits and vegetables, whole grains, lean proteins, low-fat dairy, and heart-healthy fats.  With the DASH eating plan, you should limit salt (sodium) intake to 2,300 mg a day. If you have hypertension, you may need to reduce your sodium intake to 1,500 mg a day.  Work with your health care provider or dietitian to adjust your eating plan to your individual calorie needs.  This information is not  intended to replace advice given to you by your health care provider. Make sure you discuss any questions you have with your health care provider.  Document Revised: 11/20/2020 Document Reviewed: 11/20/2020  [employer] Patient Education c 2023 xTV.    Advance Care Planning and Advance Directives     You make decisions on a daily basis - decisions about where you want to live, your career, your home, your life. Perhaps one of the most important decisions you face is your choice for future medical care. Take time to talk with your family and your healthcare team and start planning today.  Advance Care Planning is a process that can help you:  Understand possible future healthcare decisions in light of your own experiences  Reflect on those decision in light of your goals and values  Discuss your decisions with those closest to you and the healthcare professionals that care for you  Make a plan by creating a document that reflects your wishes    Surrogate Decision Maker  In the event of a medical emergency, which has left you unable to communicate or to make your own decisions, you would need someone to make decisions for you.  It is important to discuss your preferences for medical treatment with this person while you are in good health.     Qualities of a surrogate decision maker:  Willing to take on this role and responsibility  Knows what you want for future medical care  Willing to follow your wishes even if they don't agree with them  Able to make difficult medical decisions under stressful circumstances    Advance Directives  These are legal documents you can create that will guide your healthcare team and decision maker(s) when needed. These documents can be stored in the electronic medical record.    Living Will - a legal document to guide your care if you have a terminal condition or a serious illness and are unable to communicate. States vary by statute in document names/types, but most forms may  include one or more of the following:        -  Directions regarding life-prolonging treatments        -  Directions regarding artificially provided nutrition/hydration        -  Choosing a healthcare decision maker        -  Direction regarding organ/tissue donation    Durable Power of  for Healthcare - this document names an -in-fact to make medical decisions for you, but it may also allow this person to make personal and financial decisions for you. Please seek the advice of an  if you need this type of document.    **Advance Directives are not required and no one may discriminate against you if you do not sign one.    Medical Orders  Many states allow specific forms/orders signed by your physician to record your wishes for medical treatment in your current state of health. This form, signed in personal communication with your physician, addresses resuscitation and other medical interventions that you may or may not want.      For more information or to schedule a time with a [facility] Advance Care Planning Facilitator contact: [facility].[employer]/ACP or call [phone] and someone will contact you directly.

## 2023-10-09 DIAGNOSIS — M50.30 DEGENERATIVE DISC DISEASE, CERVICAL: ICD-10-CM

## 2023-10-09 DIAGNOSIS — S22.080G COMPRESSION FRACTURE OF T12 VERTEBRA WITH DELAYED HEALING: ICD-10-CM

## 2023-10-09 DIAGNOSIS — M54.9 BACK PAIN, UNSPECIFIED BACK LOCATION, UNSPECIFIED BACK PAIN LATERALITY, UNSPECIFIED CHRONICITY: ICD-10-CM

## 2023-10-09 NOTE — TELEPHONE ENCOUNTER
"     Caller: Juan Luis Collazo \"ADAN\"    Relationship: Self    Best call back number:  [phone]     Requested Prescriptions:   Requested Prescriptions     Pending Prescriptions Disp Refills    traMADol (ULTRAM) 50 MG tablet       Sig: Take 1 tablet by mouth Every 6 (Six) Hours As Needed for Moderate Pain. Patient is taking spouses prescription.    olmesartan (BENICAR) 40 MG tablet       Sig: Take 1 tablet by mouth Daily.        Pharmacy where request should be sent:  [facility]     Last office visit with prescribing clinician: 8/3/2023   Last telemedicine visit with prescribing clinician: Visit date not found   Next office visit with prescribing clinician: 11/3/2023     Additional details provided by patient: HE IS ASKING FOR A 90 DAY SUPPLY FOR BOTH    Does the patient have less than a 3 day supply:  [x] Yes  [] No    Would you like a call back once the refill request has been completed: [] Yes [x] No    If the office needs to give you a call back, can they leave a voicemail: [] Yes [x] No    Wander Blair Rep   10/09/23 15:27 CDT            "

## 2023-10-10 NOTE — TELEPHONE ENCOUNTER
Last OV 8/3 w/Agnieszka  Next OV 11/3 w/Fleming  UTD on UDS/CSA  Per pharmacy, was given #60 Ultracet on 7/11 (per Agnieszka), #60 Ultram on 8/3 (per Agnieszka), #12 Ultracet on 8/18 per Curtis Woodward, and #28 Ultram on 8/24 per Dr. Martínez.  I called to ask him how often he is taking the Tramadol (Ultram) on average, as far as a 90 day supply is concerned, and he says 1-3 per day, depending on his activity.  Not sure what quantity you wish to put on this.

## 2023-10-11 RX ORDER — TRAMADOL HYDROCHLORIDE 50 MG/1
50 TABLET ORAL EVERY 8 HOURS PRN
Qty: 180 TABLET | Refills: 1 | Status: SHIPPED | OUTPATIENT
Start: 2023-10-11

## 2023-10-11 RX ORDER — OLMESARTAN MEDOXOMIL 40 MG/1
40 TABLET ORAL DAILY
Qty: 90 TABLET | Refills: 3 | Status: SHIPPED | OUTPATIENT
Start: 2023-10-11

## 2023-10-16 ENCOUNTER — HOSPITAL ENCOUNTER (INPATIENT)
Facility: HOSPITAL | Age: 83
LOS: 2 days | Discharge: HOME | End: 2023-10-18
Attending: FAMILY MEDICINE | Admitting: STUDENT IN AN ORGANIZED HEALTH CARE EDUCATION/TRAINING PROGRAM
Payer: MEDICARE

## 2023-10-16 ENCOUNTER — APPOINTMENT (OUTPATIENT)
Dept: GENERAL RADIOLOGY | Facility: HOSPITAL | Age: 83
End: 2023-10-16
Payer: MEDICARE

## 2023-10-16 DIAGNOSIS — J18.9 PNEUMONIA OF RIGHT UPPER LOBE DUE TO INFECTIOUS ORGANISM: Primary | ICD-10-CM

## 2023-10-16 DIAGNOSIS — Z74.09 IMPAIRED MOBILITY: ICD-10-CM

## 2023-10-16 DIAGNOSIS — R26.81 GAIT INSTABILITY: ICD-10-CM

## 2023-10-16 DIAGNOSIS — R06.02 SHORTNESS OF BREATH: ICD-10-CM

## 2023-10-16 DIAGNOSIS — S22.080G COMPRESSION FRACTURE OF T12 VERTEBRA WITH DELAYED HEALING: ICD-10-CM

## 2023-10-16 DIAGNOSIS — M50.30 DEGENERATIVE DISC DISEASE, CERVICAL: ICD-10-CM

## 2023-10-16 DIAGNOSIS — R07.1 PAINFUL RESPIRATION: ICD-10-CM

## 2023-10-16 DIAGNOSIS — M54.9 BACK PAIN, UNSPECIFIED BACK LOCATION, UNSPECIFIED BACK PAIN LATERALITY, UNSPECIFIED CHRONICITY: ICD-10-CM

## 2023-10-16 DIAGNOSIS — R79.89 ELEVATED TROPONIN: ICD-10-CM

## 2023-10-16 DIAGNOSIS — M51.34 DEGENERATIVE DISC DISEASE, THORACIC: ICD-10-CM

## 2023-10-16 PROBLEM — R09.02 HYPOXIA: Status: ACTIVE | Noted: 2023-10-16

## 2023-10-16 PROBLEM — R52: Status: ACTIVE | Noted: 2023-10-16

## 2023-10-16 LAB
ALBUMIN SERPL-MCNC: 3.4 G/DL (ref 3.5–5.2)
ALBUMIN/GLOB SERPL: 0.6 G/DL
ALP SERPL-CCNC: 92 U/L (ref 39–117)
ALT SERPL W P-5'-P-CCNC: 13 U/L (ref 1–41)
ANION GAP SERPL CALCULATED.3IONS-SCNC: 11 MMOL/L (ref 5–15)
APTT PPP: 30.2 SECONDS (ref 24.5–36)
ARTERIAL PATENCY WRIST A: POSITIVE
AST SERPL-CCNC: 15 U/L (ref 1–40)
ATMOSPHERIC PRESS: 752 MMHG
B PARAPERT DNA SPEC QL NAA+PROBE: NOT DETECTED
B PERT DNA SPEC QL NAA+PROBE: NOT DETECTED
BASE EXCESS BLDA CALC-SCNC: 3.1 MMOL/L (ref 0–2)
BASOPHILS # BLD AUTO: 0.05 10*3/MM3 (ref 0–0.2)
BASOPHILS NFR BLD AUTO: 0.4 % (ref 0–1.5)
BDY SITE: ABNORMAL
BILIRUB SERPL-MCNC: 0.6 MG/DL (ref 0–1.2)
BODY TEMPERATURE: 37 C
BUN SERPL-MCNC: 28 MG/DL (ref 8–23)
BUN/CREAT SERPL: 27.5 (ref 7–25)
C PNEUM DNA NPH QL NAA+NON-PROBE: NOT DETECTED
CALCIUM SPEC-SCNC: 10.8 MG/DL (ref 8.6–10.5)
CHLORIDE SERPL-SCNC: 98 MMOL/L (ref 98–107)
CO2 SERPL-SCNC: 28 MMOL/L (ref 22–29)
CREAT SERPL-MCNC: 1.02 MG/DL (ref 0.76–1.27)
D-LACTATE SERPL-SCNC: 1.2 MMOL/L (ref 0.5–2)
DEPRECATED RDW RBC AUTO: 48.3 FL (ref 37–54)
EGFRCR SERPLBLD CKD-EPI 2021: 72.5 ML/MIN/1.73
EOSINOPHIL # BLD AUTO: 0.05 10*3/MM3 (ref 0–0.4)
EOSINOPHIL NFR BLD AUTO: 0.4 % (ref 0.3–6.2)
ERYTHROCYTE [DISTWIDTH] IN BLOOD BY AUTOMATED COUNT: 14.1 % (ref 12.3–15.4)
FERRITIN SERPL-MCNC: 373.2 NG/ML (ref 30–400)
FLUAV SUBTYP SPEC NAA+PROBE: NOT DETECTED
FLUBV RNA ISLT QL NAA+PROBE: NOT DETECTED
GEN 5 2HR TROPONIN T REFLEX: 22 NG/L
GLOBULIN UR ELPH-MCNC: 5.3 GM/DL
GLUCOSE SERPL-MCNC: 112 MG/DL (ref 65–99)
HADV DNA SPEC NAA+PROBE: NOT DETECTED
HCO3 BLDA-SCNC: 27.8 MMOL/L (ref 20–26)
HCOV 229E RNA SPEC QL NAA+PROBE: NOT DETECTED
HCOV HKU1 RNA SPEC QL NAA+PROBE: NOT DETECTED
HCOV NL63 RNA SPEC QL NAA+PROBE: NOT DETECTED
HCOV OC43 RNA SPEC QL NAA+PROBE: NOT DETECTED
HCT VFR BLD AUTO: 33.2 % (ref 37.5–51)
HGB BLD-MCNC: 10.6 G/DL (ref 13–17.7)
HMPV RNA NPH QL NAA+NON-PROBE: NOT DETECTED
HOLD SPECIMEN: NORMAL
HPIV1 RNA ISLT QL NAA+PROBE: NOT DETECTED
HPIV2 RNA SPEC QL NAA+PROBE: NOT DETECTED
HPIV3 RNA NPH QL NAA+PROBE: NOT DETECTED
HPIV4 P GENE NPH QL NAA+PROBE: NOT DETECTED
IMM GRANULOCYTES # BLD AUTO: 0.12 10*3/MM3 (ref 0–0.05)
IMM GRANULOCYTES NFR BLD AUTO: 0.9 % (ref 0–0.5)
INR PPP: 1.24 (ref 0.91–1.09)
IRON 24H UR-MRATE: 19 MCG/DL (ref 59–158)
IRON SATN MFR SERPL: 7 % (ref 20–50)
LYMPHOCYTES # BLD AUTO: 1.57 10*3/MM3 (ref 0.7–3.1)
LYMPHOCYTES NFR BLD AUTO: 12.1 % (ref 19.6–45.3)
Lab: ABNORMAL
M PNEUMO IGG SER IA-ACNC: NOT DETECTED
MCH RBC QN AUTO: 29.4 PG (ref 26.6–33)
MCHC RBC AUTO-ENTMCNC: 31.9 G/DL (ref 31.5–35.7)
MCV RBC AUTO: 92.2 FL (ref 79–97)
MODALITY: ABNORMAL
MONOCYTES # BLD AUTO: 1.39 10*3/MM3 (ref 0.1–0.9)
MONOCYTES NFR BLD AUTO: 10.7 % (ref 5–12)
NEUTROPHILS NFR BLD AUTO: 75.5 % (ref 42.7–76)
NEUTROPHILS NFR BLD AUTO: 9.79 10*3/MM3 (ref 1.7–7)
NRBC BLD AUTO-RTO: 0 /100 WBC (ref 0–0.2)
NT-PROBNP SERPL-MCNC: 284.9 PG/ML (ref 0–1800)
PCO2 BLDA: 42.3 MM HG (ref 35–45)
PCO2 TEMP ADJ BLD: 42.3 MM HG (ref 35–45)
PH BLDA: 7.43 PH UNITS (ref 7.35–7.45)
PH, TEMP CORRECTED: 7.43 PH UNITS (ref 7.35–7.45)
PLATELET # BLD AUTO: 351 10*3/MM3 (ref 140–450)
PMV BLD AUTO: 10.3 FL (ref 6–12)
PO2 BLDA: 58.2 MM HG (ref 83–108)
PO2 TEMP ADJ BLD: 58.2 MM HG (ref 83–108)
POTASSIUM SERPL-SCNC: 4.2 MMOL/L (ref 3.5–5.2)
PROCALCITONIN SERPL-MCNC: 0.1 NG/ML (ref 0–0.25)
PROT SERPL-MCNC: 8.7 G/DL (ref 6–8.5)
PROTHROMBIN TIME: 15.8 SECONDS (ref 11.8–14.8)
RBC # BLD AUTO: 3.6 10*6/MM3 (ref 4.14–5.8)
RETICS # AUTO: 0.06 10*6/MM3 (ref 0.02–0.13)
RETICS/RBC NFR AUTO: 1.75 % (ref 0.7–1.9)
RHINOVIRUS RNA SPEC NAA+PROBE: NOT DETECTED
RSV RNA NPH QL NAA+NON-PROBE: NOT DETECTED
SAO2 % BLDCOA: 92.2 % (ref 94–99)
SARS-COV-2 RNA NPH QL NAA+NON-PROBE: NOT DETECTED
SODIUM SERPL-SCNC: 137 MMOL/L (ref 136–145)
TIBC SERPL-MCNC: 277 MCG/DL (ref 298–536)
TRANSFERRIN SERPL-MCNC: 186 MG/DL (ref 200–360)
TROPONIN T DELTA: 5 NG/L
TROPONIN T SERPL HS-MCNC: 17 NG/L
VENTILATOR MODE: ABNORMAL
WBC NRBC COR # BLD: 12.97 10*3/MM3 (ref 3.4–10.8)
WHOLE BLOOD HOLD COAG: NORMAL
WHOLE BLOOD HOLD SPECIMEN: NORMAL

## 2023-10-16 PROCEDURE — 71045 X-RAY EXAM CHEST 1 VIEW: CPT

## 2023-10-16 PROCEDURE — 87040 BLOOD CULTURE FOR BACTERIA: CPT | Performed by: FAMILY MEDICINE

## 2023-10-16 PROCEDURE — 83540 ASSAY OF IRON: CPT | Performed by: NURSE PRACTITIONER

## 2023-10-16 PROCEDURE — 94799 UNLISTED PULMONARY SVC/PX: CPT

## 2023-10-16 PROCEDURE — 85025 COMPLETE CBC W/AUTO DIFF WBC: CPT | Performed by: FAMILY MEDICINE

## 2023-10-16 PROCEDURE — 99285 EMERGENCY DEPT VISIT HI MDM: CPT

## 2023-10-16 PROCEDURE — 83605 ASSAY OF LACTIC ACID: CPT | Performed by: FAMILY MEDICINE

## 2023-10-16 PROCEDURE — 85610 PROTHROMBIN TIME: CPT | Performed by: FAMILY MEDICINE

## 2023-10-16 PROCEDURE — 25010000002 PIPERACILLIN SOD-TAZOBACTAM PER 1 G: Performed by: FAMILY MEDICINE

## 2023-10-16 PROCEDURE — 84145 PROCALCITONIN (PCT): CPT | Performed by: NURSE PRACTITIONER

## 2023-10-16 PROCEDURE — 84484 ASSAY OF TROPONIN QUANT: CPT | Performed by: FAMILY MEDICINE

## 2023-10-16 PROCEDURE — 25010000002 MAGNESIUM SULFATE 2 GM/50ML SOLUTION: Performed by: FAMILY MEDICINE

## 2023-10-16 PROCEDURE — 85045 AUTOMATED RETICULOCYTE COUNT: CPT | Performed by: NURSE PRACTITIONER

## 2023-10-16 PROCEDURE — 94640 AIRWAY INHALATION TREATMENT: CPT

## 2023-10-16 PROCEDURE — 36600 WITHDRAWAL OF ARTERIAL BLOOD: CPT

## 2023-10-16 PROCEDURE — 80053 COMPREHEN METABOLIC PANEL: CPT | Performed by: FAMILY MEDICINE

## 2023-10-16 PROCEDURE — 83880 ASSAY OF NATRIURETIC PEPTIDE: CPT | Performed by: FAMILY MEDICINE

## 2023-10-16 PROCEDURE — 93010 ELECTROCARDIOGRAM REPORT: CPT | Performed by: INTERNAL MEDICINE

## 2023-10-16 PROCEDURE — 36415 COLL VENOUS BLD VENIPUNCTURE: CPT

## 2023-10-16 PROCEDURE — 85730 THROMBOPLASTIN TIME PARTIAL: CPT | Performed by: FAMILY MEDICINE

## 2023-10-16 PROCEDURE — 0202U NFCT DS 22 TRGT SARS-COV-2: CPT | Performed by: FAMILY MEDICINE

## 2023-10-16 PROCEDURE — 93005 ELECTROCARDIOGRAM TRACING: CPT | Performed by: FAMILY MEDICINE

## 2023-10-16 PROCEDURE — 82803 BLOOD GASES ANY COMBINATION: CPT

## 2023-10-16 PROCEDURE — 82728 ASSAY OF FERRITIN: CPT | Performed by: NURSE PRACTITIONER

## 2023-10-16 PROCEDURE — 84466 ASSAY OF TRANSFERRIN: CPT | Performed by: NURSE PRACTITIONER

## 2023-10-16 RX ORDER — SODIUM CHLORIDE 9 MG/ML
75 INJECTION, SOLUTION INTRAVENOUS CONTINUOUS
Status: DISCONTINUED | OUTPATIENT
Start: 2023-10-16 | End: 2023-10-18 | Stop reason: HOSPADM

## 2023-10-16 RX ORDER — ENOXAPARIN SODIUM 100 MG/ML
40 INJECTION SUBCUTANEOUS DAILY
Status: DISCONTINUED | OUTPATIENT
Start: 2023-10-16 | End: 2023-10-18 | Stop reason: HOSPADM

## 2023-10-16 RX ORDER — SODIUM CHLORIDE 0.9 % (FLUSH) 0.9 %
10 SYRINGE (ML) INJECTION AS NEEDED
Status: DISCONTINUED | OUTPATIENT
Start: 2023-10-16 | End: 2023-10-18 | Stop reason: HOSPADM

## 2023-10-16 RX ORDER — SODIUM CHLORIDE 0.9 % (FLUSH) 0.9 %
10 SYRINGE (ML) INJECTION EVERY 12 HOURS SCHEDULED
Status: DISCONTINUED | OUTPATIENT
Start: 2023-10-16 | End: 2023-10-18 | Stop reason: HOSPADM

## 2023-10-16 RX ORDER — ACETAMINOPHEN 650 MG/1
650 SUPPOSITORY RECTAL EVERY 4 HOURS PRN
Status: DISCONTINUED | OUTPATIENT
Start: 2023-10-16 | End: 2023-10-18 | Stop reason: HOSPADM

## 2023-10-16 RX ORDER — ACETAMINOPHEN 325 MG/1
650 TABLET ORAL EVERY 4 HOURS PRN
Status: DISCONTINUED | OUTPATIENT
Start: 2023-10-16 | End: 2023-10-18 | Stop reason: HOSPADM

## 2023-10-16 RX ORDER — SODIUM CHLORIDE 9 MG/ML
40 INJECTION, SOLUTION INTRAVENOUS AS NEEDED
Status: DISCONTINUED | OUTPATIENT
Start: 2023-10-16 | End: 2023-10-18 | Stop reason: HOSPADM

## 2023-10-16 RX ORDER — MAGNESIUM SULFATE HEPTAHYDRATE 40 MG/ML
2 INJECTION, SOLUTION INTRAVENOUS ONCE
Status: COMPLETED | OUTPATIENT
Start: 2023-10-16 | End: 2023-10-16

## 2023-10-16 RX ORDER — IPRATROPIUM BROMIDE AND ALBUTEROL SULFATE 2.5; .5 MG/3ML; MG/3ML
3 SOLUTION RESPIRATORY (INHALATION)
Status: DISCONTINUED | OUTPATIENT
Start: 2023-10-16 | End: 2023-10-18 | Stop reason: HOSPADM

## 2023-10-16 RX ORDER — ONDANSETRON 2 MG/ML
4 INJECTION INTRAMUSCULAR; INTRAVENOUS EVERY 6 HOURS PRN
Status: DISCONTINUED | OUTPATIENT
Start: 2023-10-16 | End: 2023-10-18 | Stop reason: HOSPADM

## 2023-10-16 RX ORDER — CODEINE PHOSPHATE AND GUAIFENESIN 10; 100 MG/5ML; MG/5ML
5 SOLUTION ORAL EVERY 6 HOURS PRN
Status: DISCONTINUED | OUTPATIENT
Start: 2023-10-16 | End: 2023-10-18 | Stop reason: HOSPADM

## 2023-10-16 RX ORDER — ONDANSETRON 4 MG/1
4 TABLET, FILM COATED ORAL EVERY 6 HOURS PRN
Status: DISCONTINUED | OUTPATIENT
Start: 2023-10-16 | End: 2023-10-18 | Stop reason: HOSPADM

## 2023-10-16 RX ORDER — ACETAMINOPHEN 160 MG/5ML
650 SOLUTION ORAL EVERY 4 HOURS PRN
Status: DISCONTINUED | OUTPATIENT
Start: 2023-10-16 | End: 2023-10-18 | Stop reason: HOSPADM

## 2023-10-16 RX ADMIN — PIPERACILLIN SODIUM AND TAZOBACTAM SODIUM 3.38 G: 3; .375 INJECTION, POWDER, LYOPHILIZED, FOR SOLUTION INTRAVENOUS at 22:11

## 2023-10-16 RX ADMIN — MAGNESIUM SULFATE HEPTAHYDRATE 2 G: 2 INJECTION, SOLUTION INTRAVENOUS at 19:21

## 2023-10-16 RX ADMIN — IPRATROPIUM BROMIDE AND ALBUTEROL SULFATE 3 ML: .5; 3 SOLUTION RESPIRATORY (INHALATION) at 23:57

## 2023-10-17 LAB
ALBUMIN SERPL-MCNC: 3.2 G/DL (ref 3.5–5.2)
ALBUMIN/GLOB SERPL: 0.7 G/DL
ALP SERPL-CCNC: 92 U/L (ref 39–117)
ALT SERPL W P-5'-P-CCNC: 15 U/L (ref 1–41)
ANION GAP SERPL CALCULATED.3IONS-SCNC: 9 MMOL/L (ref 5–15)
AST SERPL-CCNC: 19 U/L (ref 1–40)
BASOPHILS # BLD AUTO: 0.04 10*3/MM3 (ref 0–0.2)
BASOPHILS NFR BLD AUTO: 0.4 % (ref 0–1.5)
BILIRUB SERPL-MCNC: 0.4 MG/DL (ref 0–1.2)
BUN SERPL-MCNC: 28 MG/DL (ref 8–23)
BUN/CREAT SERPL: 29.5 (ref 7–25)
CALCIUM SPEC-SCNC: 10.6 MG/DL (ref 8.6–10.5)
CHLORIDE SERPL-SCNC: 101 MMOL/L (ref 98–107)
CHOLEST SERPL-MCNC: 121 MG/DL (ref 0–200)
CO2 SERPL-SCNC: 29 MMOL/L (ref 22–29)
CREAT SERPL-MCNC: 0.95 MG/DL (ref 0.76–1.27)
DEPRECATED RDW RBC AUTO: 48.8 FL (ref 37–54)
EGFRCR SERPLBLD CKD-EPI 2021: 78.9 ML/MIN/1.73
EOSINOPHIL # BLD AUTO: 0.09 10*3/MM3 (ref 0–0.4)
EOSINOPHIL NFR BLD AUTO: 0.9 % (ref 0.3–6.2)
ERYTHROCYTE [DISTWIDTH] IN BLOOD BY AUTOMATED COUNT: 14.1 % (ref 12.3–15.4)
GLOBULIN UR ELPH-MCNC: 4.9 GM/DL
GLUCOSE SERPL-MCNC: 114 MG/DL (ref 65–99)
HBA1C MFR BLD: 5.5 % (ref 4.8–5.6)
HCT VFR BLD AUTO: 33.1 % (ref 37.5–51)
HDLC SERPL-MCNC: 37 MG/DL (ref 40–60)
HGB BLD-MCNC: 10.3 G/DL (ref 13–17.7)
IMM GRANULOCYTES # BLD AUTO: 0.1 10*3/MM3 (ref 0–0.05)
IMM GRANULOCYTES NFR BLD AUTO: 1 % (ref 0–0.5)
L PNEUMO1 AG UR QL IA: NEGATIVE
LDLC SERPL CALC-MCNC: 68 MG/DL (ref 0–100)
LDLC/HDLC SERPL: 1.85 {RATIO}
LYMPHOCYTES # BLD AUTO: 1.47 10*3/MM3 (ref 0.7–3.1)
LYMPHOCYTES NFR BLD AUTO: 14.6 % (ref 19.6–45.3)
MCH RBC QN AUTO: 29.3 PG (ref 26.6–33)
MCHC RBC AUTO-ENTMCNC: 31.1 G/DL (ref 31.5–35.7)
MCV RBC AUTO: 94.3 FL (ref 79–97)
MONOCYTES # BLD AUTO: 1.11 10*3/MM3 (ref 0.1–0.9)
MONOCYTES NFR BLD AUTO: 11 % (ref 5–12)
MRSA DNA SPEC QL NAA+PROBE: NORMAL
NEUTROPHILS NFR BLD AUTO: 7.29 10*3/MM3 (ref 1.7–7)
NEUTROPHILS NFR BLD AUTO: 72.1 % (ref 42.7–76)
NRBC BLD AUTO-RTO: 0 /100 WBC (ref 0–0.2)
PLATELET # BLD AUTO: 331 10*3/MM3 (ref 140–450)
PMV BLD AUTO: 10.8 FL (ref 6–12)
POTASSIUM SERPL-SCNC: 4.5 MMOL/L (ref 3.5–5.2)
PROT SERPL-MCNC: 8.1 G/DL (ref 6–8.5)
QT INTERVAL: 324 MS
QTC INTERVAL: 400 MS
RBC # BLD AUTO: 3.51 10*6/MM3 (ref 4.14–5.8)
S PNEUM AG SPEC QL LA: NEGATIVE
SODIUM SERPL-SCNC: 139 MMOL/L (ref 136–145)
TRIGL SERPL-MCNC: 78 MG/DL (ref 0–150)
VLDLC SERPL-MCNC: 16 MG/DL (ref 5–40)
WBC NRBC COR # BLD: 10.1 10*3/MM3 (ref 3.4–10.8)

## 2023-10-17 PROCEDURE — 25010000002 AZITHROMYCIN PER 500 MG: Performed by: NURSE PRACTITIONER

## 2023-10-17 PROCEDURE — 85025 COMPLETE CBC W/AUTO DIFF WBC: CPT | Performed by: NURSE PRACTITIONER

## 2023-10-17 PROCEDURE — 94799 UNLISTED PULMONARY SVC/PX: CPT

## 2023-10-17 PROCEDURE — 87899 AGENT NOS ASSAY W/OPTIC: CPT | Performed by: NURSE PRACTITIONER

## 2023-10-17 PROCEDURE — 87641 MR-STAPH DNA AMP PROBE: CPT | Performed by: NURSE PRACTITIONER

## 2023-10-17 PROCEDURE — 80053 COMPREHEN METABOLIC PANEL: CPT | Performed by: NURSE PRACTITIONER

## 2023-10-17 PROCEDURE — 97165 OT EVAL LOW COMPLEX 30 MIN: CPT

## 2023-10-17 PROCEDURE — 83036 HEMOGLOBIN GLYCOSYLATED A1C: CPT | Performed by: NURSE PRACTITIONER

## 2023-10-17 PROCEDURE — 25010000002 ENOXAPARIN PER 10 MG: Performed by: NURSE PRACTITIONER

## 2023-10-17 PROCEDURE — 87070 CULTURE OTHR SPECIMN AEROBIC: CPT | Performed by: NURSE PRACTITIONER

## 2023-10-17 PROCEDURE — 25810000003 SODIUM CHLORIDE 0.9 % SOLUTION: Performed by: NURSE PRACTITIONER

## 2023-10-17 PROCEDURE — 97161 PT EVAL LOW COMPLEX 20 MIN: CPT | Performed by: PHYSICAL THERAPIST

## 2023-10-17 PROCEDURE — 25810000003 SODIUM CHLORIDE 0.9 % SOLUTION 250 ML FLEX CONT: Performed by: NURSE PRACTITIONER

## 2023-10-17 PROCEDURE — 25810000003 SODIUM CHLORIDE 0.9 % SOLUTION 250 ML FLEX CONT

## 2023-10-17 PROCEDURE — 80061 LIPID PANEL: CPT | Performed by: NURSE PRACTITIONER

## 2023-10-17 PROCEDURE — 94664 DEMO&/EVAL PT USE INHALER: CPT

## 2023-10-17 PROCEDURE — 25010000002 CEFTRIAXONE PER 250 MG: Performed by: FAMILY MEDICINE

## 2023-10-17 PROCEDURE — 87205 SMEAR GRAM STAIN: CPT | Performed by: NURSE PRACTITIONER

## 2023-10-17 PROCEDURE — 36415 COLL VENOUS BLD VENIPUNCTURE: CPT | Performed by: NURSE PRACTITIONER

## 2023-10-17 PROCEDURE — 87449 NOS EACH ORGANISM AG IA: CPT | Performed by: NURSE PRACTITIONER

## 2023-10-17 PROCEDURE — 25010000002 AZITHROMYCIN PER 500 MG

## 2023-10-17 RX ORDER — TRAMADOL HYDROCHLORIDE 50 MG/1
50 TABLET ORAL EVERY 8 HOURS PRN
Status: DISCONTINUED | OUTPATIENT
Start: 2023-10-17 | End: 2023-10-18 | Stop reason: HOSPADM

## 2023-10-17 RX ORDER — GUAIFENESIN 600 MG/1
600 TABLET, EXTENDED RELEASE ORAL EVERY 12 HOURS SCHEDULED
Status: DISCONTINUED | OUTPATIENT
Start: 2023-10-17 | End: 2023-10-18

## 2023-10-17 RX ORDER — CYCLOBENZAPRINE HCL 10 MG
5 TABLET ORAL 3 TIMES DAILY PRN
Status: DISCONTINUED | OUTPATIENT
Start: 2023-10-17 | End: 2023-10-18 | Stop reason: HOSPADM

## 2023-10-17 RX ORDER — TRAMADOL HYDROCHLORIDE 50 MG/1
50 TABLET ORAL EVERY 8 HOURS PRN
COMMUNITY

## 2023-10-17 RX ORDER — AMOXICILLIN 250 MG
2 CAPSULE ORAL DAILY PRN
COMMUNITY
End: 2023-10-24

## 2023-10-17 RX ORDER — AMOXICILLIN 250 MG
2 CAPSULE ORAL DAILY
Status: DISCONTINUED | OUTPATIENT
Start: 2023-10-17 | End: 2023-10-18 | Stop reason: HOSPADM

## 2023-10-17 RX ADMIN — CYCLOBENZAPRINE 5 MG: 10 TABLET, FILM COATED ORAL at 22:42

## 2023-10-17 RX ADMIN — GUAIFENESIN 600 MG: 600 TABLET, EXTENDED RELEASE ORAL at 09:23

## 2023-10-17 RX ADMIN — SODIUM CHLORIDE 75 ML/HR: 9 INJECTION, SOLUTION INTRAVENOUS at 00:32

## 2023-10-17 RX ADMIN — IPRATROPIUM BROMIDE AND ALBUTEROL SULFATE 3 ML: .5; 3 SOLUTION RESPIRATORY (INHALATION) at 06:55

## 2023-10-17 RX ADMIN — GUAIFENESIN AND CODEINE PHOSPHATE 5 ML: 100; 10 SOLUTION ORAL at 02:31

## 2023-10-17 RX ADMIN — GUAIFENESIN AND CODEINE PHOSPHATE 5 ML: 100; 10 SOLUTION ORAL at 20:14

## 2023-10-17 RX ADMIN — DOCUSATE SODIUM 50 MG AND SENNOSIDES 8.6 MG 2 TABLET: 8.6; 5 TABLET, FILM COATED ORAL at 09:23

## 2023-10-17 RX ADMIN — Medication 10 ML: at 00:30

## 2023-10-17 RX ADMIN — IPRATROPIUM BROMIDE AND ALBUTEROL SULFATE 3 ML: .5; 3 SOLUTION RESPIRATORY (INHALATION) at 11:06

## 2023-10-17 RX ADMIN — AZITHROMYCIN DIHYDRATE 500 MG: 500 INJECTION, POWDER, LYOPHILIZED, FOR SOLUTION INTRAVENOUS at 00:30

## 2023-10-17 RX ADMIN — SODIUM CHLORIDE 1 G: 900 INJECTION INTRAVENOUS at 02:31

## 2023-10-17 RX ADMIN — IPRATROPIUM BROMIDE AND ALBUTEROL SULFATE 3 ML: .5; 3 SOLUTION RESPIRATORY (INHALATION) at 20:22

## 2023-10-17 RX ADMIN — ENOXAPARIN SODIUM 40 MG: 100 INJECTION SUBCUTANEOUS at 00:29

## 2023-10-17 RX ADMIN — GUAIFENESIN 600 MG: 600 TABLET, EXTENDED RELEASE ORAL at 22:36

## 2023-10-17 RX ADMIN — IPRATROPIUM BROMIDE AND ALBUTEROL SULFATE 3 ML: .5; 3 SOLUTION RESPIRATORY (INHALATION) at 14:07

## 2023-10-17 RX ADMIN — AZITHROMYCIN 500 MG: 500 INJECTION, POWDER, LYOPHILIZED, FOR SOLUTION INTRAVENOUS at 22:36

## 2023-10-17 RX ADMIN — Medication 10 ML: at 20:14

## 2023-10-17 RX ADMIN — Medication 10 ML: at 09:23

## 2023-10-17 NOTE — PLAN OF CARE
Goal Outcome Evaluation:  Plan of Care Reviewed With: patient        Progress: no change  Outcome Evaluation: Pt is A&Ox4. No c/o pain. VSS. RA. . C/o cough, relieved by PRN medication. Normal sinus on tele. Voiding via toilet. Up x1 assist with cane. IV R upper arm, NS infusing at 75 ml/hr per order, IV ABX given per order. Tolerating cardiac diet. Call light in reach. Safety maintained.

## 2023-10-17 NOTE — PLAN OF CARE
Goal Outcome Evaluation: up in chair today for several hours. Up x 1 assist. Need ob stool with next BM. On tele NSR. Room air. Ivf as ordered. Safety maintained. Dc plan is home with HH at [place]

## 2023-10-17 NOTE — THERAPY EVALUATION
Patient Name: Juan Luis Collazo  : 1940    MRN: [id number]                              Today's Date: 10/17/2023       Admit Date: 10/16/2023    Visit Dx:     ICD-10-CM ICD-9-CM   1. Pneumonia of right upper lobe due to infectious organism  J18.9 486   2. Shortness of breath  R06.02 786.05   3. Painful respiration  R07.1 786.52   4. Elevated troponin  R79.89 790.6     Patient Active Problem List   Diagnosis    Closed compression fracture of first lumbar vertebra    Current non-smoker    BMI 28.0-28.9,adult    Lumbar compression fracture, closed, initial encounter    Lumbar compression fracture    S/P kyphoplasty    Numbness and tingling of right leg    Compression fracture of fourth lumbar vertebra with delayed healing    Benign hypertension    Flatback syndrome of lumbar region    Hammer toe of right foot    High cholesterol    Impaired fasting glucose    Malignant neoplasm of prostate    Osteopenia    Acute exacerbation of chronic obstructive airways disease    Back pain    Compression fracture of thoracic vertebra with routine healing    Normocytic anemia    Actinic keratosis    Hyperplastic colonic polyp    FH: colon cancer in first degree relative <60 years old    Weight loss    History of adenomatous polyp of colon    Decreased bone mass    Sebaceous cyst    Degenerative disc disease, cervical    Degenerative disc disease, lumbar    Degenerative disc disease, thoracic    Compression fracture of T12 vertebra with delayed healing    Age-related osteoporosis with current pathological fracture with delayed healing    Right upper lobe pneumonia    Gait instability    Pain initiated by coughing    Hypoxia     Past Medical History:   Diagnosis Date    Arthritis     Back pain     Cancer     CHEST, SKIN CANCER    GERD (gastroesophageal reflux disease)     History of colon polyps     History of prostate cancer     Hypertension     Low back pain     Macular degeneration     Osteopenia      Past Surgical History:    Procedure Laterality Date    APPENDECTOMY      CATARACT EXTRACTION, BILATERAL      COLONOSCOPY  09/17/2013    Dr. José-One 2-3mm polyp at 20cm; Otherwise normal; Repeat 3 years    COLONOSCOPY N/A 12/07/2022    Procedure: COLONOSCOPY WITH ANESTHESIA;  Surgeon: Bri Alexis MD;  Location: [facility] ENDOSCOPY;  Service: Gastroenterology;  Laterality: N/A;  pre: anemia. hx polyps.  post: polyps. diverticulosis.  no PCP    ENDOSCOPY N/A 12/07/2022    Procedure: ESOPHAGOGASTRODUODENOSCOPY WITH ANESTHESIA;  Surgeon: Bri Alexis MD;  Location: [facility] ENDOSCOPY;  Service: Gastroenterology;  Laterality: N/A;  pre: anemia  post: hiatal hernia. esophagitis.gastric erosions.  no PCP    FOOT SURGERY Right     KYPHOPLASTY Bilateral 07/17/2018    Procedure: L3 KYPHOPLASTY 1-2 LEVELS;  Surgeon: Vega Pandey MD;  Location:  [facility] OR;  Service: Neurosurgery    KYPHOPLASTY Bilateral 09/11/2018    Procedure: L4 KYPHOPLASTY WITH BIOPSY;  Surgeon: Vega Pandey MD;  Location:  [facility] OR;  Service: Neurosurgery    KYPHOPLASTY WITH BIOPSY N/A 01/25/2022    Procedure: KYPHOPLASTY WITH BIOPSY, THORACIC 6 and LUMBAR 5;  Surgeon: Nick Martínez MD;  Location:  [facility] OR;  Service: Neurosurgery;  Laterality: N/A;    KYPHOPLASTY WITH BIOPSY N/A 8/18/2023    Procedure: Thoracic 12, KYPHOPLASTY WITH BIOPSY;  Surgeon: Nick Martínez MD;  Location:  [facility] OR;  Service: Neurosurgery;  Laterality: N/A;    PROSTATECTOMY      TONSILLECTOMY      TOTAL SHOULDER REPLACEMENT Bilateral       General Information       Row Name 10/17/23 1114          Physical Therapy Time and Intention    Document Type evaluation  Admitted d/t pneumonia and hypoxia.  -SB (r) JS (t) SB (c)     Mode of Treatment physical therapy  -SB (r) JS (t) SB (c)       Row Name 10/17/23 1115          General Information    Patient Profile Reviewed yes  -SB (r) JS (t) SB (c)     Prior Level of Function independent:;all household mobility;community  mobility;gait;transfer;bed mobility;ADL's;grooming;dressing;bathing;driving;shopping  -SB (r) JS (t) SB (c)     Existing Precautions/Restrictions fall  -SB (r) JS (t) SB (c)     Barriers to Rehab previous functional deficit;visual deficit  -SB (r) JS (t) SB (c)       Row Name 10/17/23 1114          Living Environment    People in Home spouse  -SB (r) JS (t) SB (c)       Row Name 10/17/23 [facility]4          Home Main Entrance    Number of Stairs, Main Entrance three  -SB (r) JS (t) SB (c)     Stair Railings, Main Entrance none  -SB (r) JS (t) SB (c)       Row Name 10/17/23 1114          Stairs Within Home, Primary    Number of Stairs, Within Home, Primary other (see comments)  14. But does not have to use.  -SB (r) JS (t) SB (c)     Stair Railings, Within Home, Primary railing on left side (ascending)  -SB (r) JS (t) SB (c)       Row Name 10/17/23 1114          Cognition    Orientation Status (Cognition) oriented x 4  -SB (r) JS (t) SB (c)       Row Name 10/17/23 1114          Safety Issues, Functional Mobility    Impairments Affecting Function (Mobility) shortness of breath;sensation/sensory awareness;strength;balance;postural/trunk control  -SB (r) JS (t) SB (c)     Comment, Safety Issues/Impairments (Mobility) Patient has walkers and canes at home. Step in shower with bench seat. Shelf by the toilet he can push off with.  -SB (r) JS (t) SB (c)               User Key  (r) = Recorded By, (t) = Taken By, (c) = Cosigned By      Initials Name Provider Type    SB Racquel Choi, PT DPT Physical Therapist    Igor Bar, MARCI Student PT Student                   Mobility       Row Name 10/17/23 1114          Bed Mobility    Bed Mobility scooting/bridging;sit-supine;supine-sit  -SB (r) JS (t) SB (c)     Scooting/Bridging Kelly (Bed Mobility) standby assist  -SB (r) JS (t) SB (c)     Supine-Sit Kelly (Bed Mobility) standby assist  -SB (r) JS (t) SB (c)     Sit-Supine Kelly (Bed Mobility) standby assist   -SB (r) JS (t) SB (c)     Assistive Device (Bed Mobility) bed rails  -SB (r) JS (t) SB (c)       Row Name 10/17/23 1114          Sit-Stand Transfer    Sit-Stand Yoder (Transfers) contact guard  -SB (r) JS (t) SB (c)     Assistive Device (Sit-Stand Transfers) cane, straight  -SB (r) JS (t) SB (c)       Row Name 10/17/23 1114          Gait/Stairs (Locomotion)    Yoder Level (Gait) contact guard  -SB (r) JS (t) SB (c)     Assistive Device (Gait) cane, straight  -SB (r) JS (t) SB (c)     Distance in Feet (Gait) 275  -SB (r) JS (t) SB (c)     Bilateral Gait Deviations forward flexed posture  -SB (r) JS (t) SB (c)               User Key  (r) = Recorded By, (t) = Taken By, (c) = Cosigned By      Initials Name Provider Type    SB Racquel Choi, PT DPT Physical Therapist    Igor Bar, PT Student PT Student                   Obj/Interventions       Row Name 10/17/23 1114          Range of Motion Comprehensive    General Range of Motion bilateral lower extremity ROM WFL  -SB (r) JS (t) SB (c)       Row Name 10/17/23 1114          Strength Comprehensive (MMT)    Comment, General Manual Muscle Testing (MMT) Assessment Grossly 4/5 except R DF 3+/5  -SB (r) JS (t) SB (c)       Row Name 10/17/23 1114          Motor Skills    Motor Skills coordination  -SB (r) JS (t) SB (c)     Coordination heel to shin;WNL  -SB (r) JS (t) SB (c)       Row Name 10/17/23 1114          Balance    Balance Assessment sitting static balance;sitting dynamic balance;standing static balance;standing dynamic balance  -SB (r) JS (t) SB (c)     Static Sitting Balance standby assist  -SB (r) JS (t) SB (c)     Dynamic Sitting Balance standby assist  -SB (r) JS (t) SB (c)     Position, Sitting Balance unsupported;sitting edge of bed  -SB (r) JS (t) SB (c)     Static Standing Balance contact guard  -SB (r) JS (t) SB (c)     Dynamic Standing Balance contact guard  -SB (r) JS (t) SB (c)     Position/Device Used, Standing Balance  supported;cane, straight  -SB (r) JS (t) SB (c)     Balance Interventions sitting;manual resistance applied during activity  -SB (r) JS (t) SB (c)     Comment, Balance Patient demonstrates kyphotic posture and could accept perturbations in all directions in seated.  -SB (r) JS (t) SB (c)       Row Name 10/17/23 1114          Sensory Assessment (Somatosensory)    Sensory Assessment (Somatosensory) right LE  -SB (r) JS (t) SB (c)     Right LE Sensory Assessment general sensation;impaired;other (see comments)  R foot and toes. Patient had surgery to repair hammer toe deformity.  -SB (r) JS (t) SB (c)               User Key  (r) = Recorded By, (t) = Taken By, (c) = Cosigned By      Initials Name Provider Type    Racquel Jaime, PT DPT Physical Therapist    Igor Bar, PT Student PT Student                   Goals/Plan       Row Name 10/17/23 1114          Bed Mobility Goal 1 (PT)    Activity/Assistive Device (Bed Mobility Goal 1, PT) supine to sit;sit to supine  -SB (r) JS (t) SB (c)     [place] Level/Cues Needed (Bed Mobility Goal 1, PT) independent  -SB (r) JS (t) SB (c)     Time Frame (Bed Mobility Goal 1, PT) long term goal (LTG);by discharge  -SB (r) JS (t) SB (c)     Progress/Outcomes (Bed Mobility Goal 1, PT) new goal  -SB (r) JS (t) SB (c)       Row Name 10/17/23 1114          Transfer Goal 1 (PT)    Activity/Assistive Device (Transfer Goal 1, PT) sit-to-stand/stand-to-sit;bed-to-chair/chair-to-bed  -SB (r) JS (t) SB (c)     [place] Level/Cues Needed (Transfer Goal 1, PT) standby assist  -SB (r) JS (t) SB (c)     Time Frame (Transfer Goal 1, PT) long term goal (LTG);by discharge  -SB (r) JS (t) SB (c)     Progress/Outcome (Transfer Goal 1, PT) new goal  -SB (r) JS (t) SB (c)       Row Name 10/17/23 1114          Gait Training Goal 1 (PT)    Activity/Assistive Device (Gait Training Goal 1, PT) gait (walking locomotion);assistive device use;decrease fall risk;increase endurance/gait  distance;cane, straight  -SB (r) JS (t) SB (c)     [place] Level (Gait Training Goal 1, PT) modified independence  -SB (r) JS (t) SB (c)     Distance (Gait Training Goal 1, PT) 500  -SB (r) JS (t) SB (c)     Time Frame (Gait Training Goal 1, PT) long term goal (LTG);by discharge  -SB (r) JS (t) SB (c)     Progress/Outcome (Gait Training Goal 1, PT) new goal  -SB (r) JS (t) SB (c)       Row Name 10/17/23 1114          Stairs Goal 1 (PT)    Activity/Assistive Device (Stairs Goal 1, PT) ascending stairs;descending stairs  -SB (r) JS (t) SB (c)     [place] Level/Cues Needed (Stairs Goal 1, PT) standby assist  -SB (r) JS (t) SB (c)     Number of Stairs (Stairs Goal 1, PT) 3  -SB (r) JS (t) SB (c)     Time Frame (Stairs Goal 1, PT) long term goal (LTG);by discharge  -SB (r) JS (t) SB (c)     Progress/Outcome (Stairs Goal 1, PT) new goal  -SB (r) JS (t) SB (c)       Row Name 10/17/23 1114          Therapy Assessment/Plan (PT)    Planned Therapy Interventions (PT) balance training;bed mobility training;gait training;postural re-education;patient/family education;stair training;strengthening;transfer training  -SB (r) JS (t) SB (c)               User Key  (r) = Recorded By, (t) = Taken By, (c) = Cosigned By      Initials Name Provider Type    Racquel Jaime, PT DPT Physical Therapist    Igor Bar, PT Student PT Student                   Clinical Impression       Row Name 10/17/23 111          Pain    Pretreatment Pain Rating 0/10 - no pain  -SB (r) JS (t) SB (c)     Posttreatment Pain Rating 0/10 - no pain  -SB (r) JS (t) SB (c)     Pain Intervention(s) Ambulation/increased activity;Repositioned  -SB (r) JS (t) SB (c)       Row Name 10/17/23 1112          Plan of Care Review    Plan of Care Reviewed With patient  -SB (r) JS (t) SB (c)     Progress no change  -SB (r) JS (t) SB (c)     Outcome Evaluation PT eval complete. Pt is A&Ox4 with no complaints of pain this visit. He required SBA for bed mobility  and CGA for standing and ambulation. He transferred supine>sit using momentum, and appeared to have decreased balance briefly when standing. He ambulated 275 with a single-point cane with no significant deficits, but demonstrated kyphotic posture. He does have some weakness and numbness in his R foot but it does not effect his functional mobility. He reports no fatigue or shortness of breath with ambulation. He does state that he has felt weak since his recent kyphoplasty and wishes to get stronger. Pt educated on benefits of ambulation and weightbearing exercise for strength maintenance. PT services necessary to address generalized weakness and to maintain functional status while admitted. Anticipate discharge home with assist and OP services for strengthening.  -SB (r) JS (t) SB (c)       Row Name 10/17/23 1114          Therapy Assessment/Plan (PT)    Patient/Family Therapy Goals Statement (PT) Go home, get stronger  -SB (r) JS (t) SB (c)     Rehab Potential (PT) good, to achieve stated therapy goals  -SB (r) JS (t) SB (c)     Criteria for Skilled Interventions Met (PT) yes;meets criteria;skilled treatment is necessary  -SB (r) JS (t) SB (c)     Therapy Frequency (PT) 2 times/day  -SB (r) JS (t) SB (c)     Predicted Duration of Therapy Intervention (PT) Until discharge.  -SB (r) JS (t) SB (c)       Row Name 10/17/23 1114          Vital Signs    O2 Delivery Pre Treatment room air  -SB (r) JS (t) SB (c)     O2 Delivery Intra Treatment room air  -SB (r) JS (t) SB (c)     O2 Delivery Post Treatment room air  -SB (r) JS (t) SB (c)     Pre Patient Position Supine  -SB (r) JS (t) SB (c)     Intra Patient Position Standing  -SB (r) JS (t) SB (c)     Post Patient Position Supine  -SB (r) JS (t) SB (c)       Row Name 10/17/23 1114          Positioning and Restraints    Pre-Treatment Position in bed  -SB (r) JS (t) SB (c)     Post Treatment Position bed  -SB (r) JS (t) SB (c)     In Bed fowlers;call light within  reach;encouraged to call for assist;side rails up x2  -SB (r) JS (t) SB (c)               User Key  (r) = Recorded By, (t) = Taken By, (c) = Cosigned By      Initials Name Provider Type    Racquel Jaime, PT DPT Physical Therapist    Igor Bar, PT Student PT Student                   Outcome Measures       Row Name 10/17/23 1114          How much help from another person do you currently need...    Turning from your back to your side while in flat bed without using bedrails? 3  -SB (r) JS (t) SB (c)     Moving from lying on back to sitting on the side of a flat bed without bedrails? 3  -SB (r) JS (t) SB (c)     Moving to and from a bed to a chair (including a wheelchair)? 4  -SB (r) JS (t) SB (c)     Standing up from a chair using your arms (e.g., wheelchair, bedside chair)? 4  -SB (r) JS (t) SB (c)     Climbing 3-5 steps with a railing? 3  -SB (r) JS (t) SB (c)     To walk in hospital room? 4  -SB (r) JS (t) SB (c)     AM-PAC 6 Clicks Score (PT) 21  -SB (r) JS (t)     Highest level of mobility 6 --> Walked 10 steps or more  -SB (r) JS (t)       Row Name 10/17/23 1114 10/17/23 0732       Functional Assessment    Outcome Measure Options AM-PAC 6 Clicks Basic Mobility (PT)  -SB (r) JS (t) SB (c) AM-PAC 6 Clicks Daily Activity (OT)  -CS (r) MB (t) CS (c)              User Key  (r) = Recorded By, (t) = Taken By, (c) = Cosigned By      Initials Name Provider Type    Brenda Gunter S, OTR/L, CNT Occupational Therapist    Racquel Jaime, PT DPT Physical Therapist    Igor Bar, PT Student PT Student    Filomena Mccormack, OT Student OT Student                                 Physical Therapy Education       Title: PT OT SLP Therapies (Done)       Topic: Physical Therapy (Done)       Point: Mobility training (Done)       Learning Progress Summary             Patient Acceptance, E, VU,DU by TAMIE at 10/17/2023 1114    Comment: Educated on benefits of ambulation and weightbearing exercise for  strength/ aerobic capacity. Verbal cueing for postural awareness.                         Point: Precautions (Done)       Learning Progress Summary             Patient Acceptance, E, VU,DU by  at 10/17/2023 1114    Comment: Educated on benefits of ambulation and weightbearing exercise for strength/ aerobic capacity. Verbal cueing for postural awareness.                                         User Key       Initials Effective Dates Name Provider Type Discipline     07/13/23 -  Igor Groves, PT Student PT Student PT                  PT Recommendation and Plan  Planned Therapy Interventions (PT): balance training, bed mobility training, gait training, postural re-education, patient/family education, stair training, strengthening, transfer training  Plan of Care Reviewed With: patient  Progress: no change  Outcome Evaluation: PT eval complete. Pt is A&Ox4 with no complaints of pain this visit. He required SBA for bed mobility and CGA for standing and ambulation. He transferred supine>sit using momentum, and appeared to have decreased balance briefly when standing. He ambulated 275 with a single-point cane with no significant deficits, but demonstrated kyphotic posture. He does have some weakness and numbness in his R foot but it does not effect his functional mobility. He reports no fatigue or shortness of breath with ambulation. He does state that he has felt weak since his recent kyphoplasty and wishes to get stronger. Pt educated on benefits of ambulation and weightbearing exercise for strength maintenance. PT services necessary to address generalized weakness and to maintain functional status while admitted. Anticipate discharge home with assist and OP services for strengthening.     Time Calculation:         PT Charges       Row Name 10/17/23 1114             Time Calculation    Start Time 1114  12 mins spent in chart  -SB (r) JS (t) SB (c)      Stop Time 1145  -SB (r) JS (t) SB (c)      Time Calculation  (min) 31 min  -SB (r) JS (t)      PT Received On 10/17/23  -SB (r) JS (t) SB (c)      PT Goal Re-Cert Due Date 10/27/23  -SB (r) JS (t) SB (c)         Untimed Charges    PT Eval/Re-eval Minutes 43  -SB (r) JS (t) SB (c)         Total Minutes    Untimed Charges Total Minutes 43  -SB (r) JS (t)       Total Minutes 43  -SB (r) JS (t)                User Key  (r) = Recorded By, (t) = Taken By, (c) = Cosigned By      Initials Name Provider Type    Racquel Jaime, PT DPT Physical Therapist    Igor Bar, PT Student PT Student                      PT G-Codes  Outcome Measure Options: AM-PAC 6 Clicks Basic Mobility (PT)  AM-PAC 6 Clicks Score (PT): 21  AM-PAC 6 Clicks Score (OT): 24  PT Discharge Summary  Anticipated Discharge Disposition (PT): home with assist, home with outpatient therapy services    Igor Groves PT Student  10/17/2023

## 2023-10-17 NOTE — THERAPY DISCHARGE NOTE
Acute Care - Occupational Therapy Discharge  [facility]    Patient Name: Juan Luis Collazo  : 1940    MRN: [id number]                              Today's Date: 10/17/2023       Admit Date: 10/16/2023    Visit Dx:     ICD-10-CM ICD-9-CM   1. Pneumonia of right upper lobe due to infectious organism  J18.9 486   2. Shortness of breath  R06.02 786.05   3. Painful respiration  R07.1 786.52   4. Elevated troponin  R79.89 790.6     Patient Active Problem List   Diagnosis    Closed compression fracture of first lumbar vertebra    Current non-smoker    BMI 28.0-28.9,adult    Lumbar compression fracture, closed, initial encounter    Lumbar compression fracture    S/P kyphoplasty    Numbness and tingling of right leg    Compression fracture of fourth lumbar vertebra with delayed healing    Benign hypertension    Flatback syndrome of lumbar region    Hammer toe of right foot    High cholesterol    Impaired fasting glucose    Malignant neoplasm of prostate    Osteopenia    Acute exacerbation of chronic obstructive airways disease    Back pain    Compression fracture of thoracic vertebra with routine healing    Normocytic anemia    Actinic keratosis    Hyperplastic colonic polyp    FH: colon cancer in first degree relative <60 years old    Weight loss    History of adenomatous polyp of colon    Decreased bone mass    Sebaceous cyst    Degenerative disc disease, cervical    Degenerative disc disease, lumbar    Degenerative disc disease, thoracic    Compression fracture of T12 vertebra with delayed healing    Age-related osteoporosis with current pathological fracture with delayed healing    Right upper lobe pneumonia    Gait instability    Pain initiated by coughing    Hypoxia     Past Medical History:   Diagnosis Date    Arthritis     Back pain     Cancer     CHEST, SKIN CANCER    GERD (gastroesophageal reflux disease)     History of colon polyps     History of prostate cancer     Hypertension     Low back pain     Macular  degeneration     Osteopenia      Past Surgical History:   Procedure Laterality Date    APPENDECTOMY      CATARACT EXTRACTION, BILATERAL      COLONOSCOPY  09/17/2013    Dr. José-One 2-3mm polyp at 20cm; Otherwise normal; Repeat 3 years    COLONOSCOPY N/A 12/07/2022    Procedure: COLONOSCOPY WITH ANESTHESIA;  Surgeon: Bri Alexis MD;  Location: [facility] ENDOSCOPY;  Service: Gastroenterology;  Laterality: N/A;  pre: anemia. hx polyps.  post: polyps. diverticulosis.  no PCP    ENDOSCOPY N/A 12/07/2022    Procedure: ESOPHAGOGASTRODUODENOSCOPY WITH ANESTHESIA;  Surgeon: Bri Alexis MD;  Location: [facility] ENDOSCOPY;  Service: Gastroenterology;  Laterality: N/A;  pre: anemia  post: hiatal hernia. esophagitis.gastric erosions.  no PCP    FOOT SURGERY Right     KYPHOPLASTY Bilateral 07/17/2018    Procedure: L3 KYPHOPLASTY 1-2 LEVELS;  Surgeon: Vega Pandey MD;  Location:  [facility] OR;  Service: Neurosurgery    KYPHOPLASTY Bilateral 09/11/2018    Procedure: L4 KYPHOPLASTY WITH BIOPSY;  Surgeon: Vega Pandey MD;  Location:  [facility] OR;  Service: Neurosurgery    KYPHOPLASTY WITH BIOPSY N/A 01/25/2022    Procedure: KYPHOPLASTY WITH BIOPSY, THORACIC 6 and LUMBAR 5;  Surgeon: Nick Martínez MD;  Location:  [facility] OR;  Service: Neurosurgery;  Laterality: N/A;    KYPHOPLASTY WITH BIOPSY N/A 8/18/2023    Procedure: Thoracic 12, KYPHOPLASTY WITH BIOPSY;  Surgeon: Nick Martínez MD;  Location: [facility] OR;  Service: Neurosurgery;  Laterality: N/A;    PROSTATECTOMY      TONSILLECTOMY      TOTAL SHOULDER REPLACEMENT Bilateral       General Information       Row Name 10/17/23 0732          OT Time and Intention    Document Type evaluation  PMH: prostate cancer, macular degeneration, diffuse disc disease, C/o worsening SOB and chest pain when taking a deep breath, Dx: R upper lobe pneumonia,  -CS (r) MB (t) CS (c)     Mode of Treatment occupational therapy  -CS (r) MB (t) CS (c)       Row Name 10/17/23  [facility]          General Information    Patient Profile Reviewed yes  -CS (r) MB (t) CS (c)     Prior Level of Function independent:;ADL's;cooking;cleaning;driving;shopping  only drives during the day  -CS (r) MB (t) CS (c)     Existing Precautions/Restrictions fall  -CS (r) MB (t) CS (c)     Barriers to Rehab previous functional deficit;visual deficit;hearing deficit  -CS (r) MB (t) CS (c)       Row Name 10/17/23 [facility]          Occupational Profile    Environmental Supports and Barriers (Occupational Profile) cane, walk in, tub bench  -CS (r) MB (t) CS (c)       Row Name 10/17/23 [facility]          Living Environment    People in Home spouse  -CS (r) MB (t) CS (c)       Row Name 10/17/23 [facility]          Home Main Entrance    Number of Stairs, Main Entrance three  -CS (r) MB (t) CS (c)     Stair Railings, Main Entrance none  -CS (r) MB (t) CS (c)       Row Name 10/17/23 [facility]          Stairs Within Home, Primary    Number of Stairs, Within Home, Primary other (see comments)  14  -CS (r) MB (t) CS (c)     Stair Railings, Within Home, Primary railing on right side (ascending)  -CS (r) MB (t) CS (c)       Row Name 10/17/23 [facility]          Cognition    Orientation Status (Cognition) oriented x 4  -CS (r) MB (t) CS (c)       Row Name 10/17/23 [facility]          Safety Issues, Functional Mobility    Safety Issues Affecting Function (Mobility) insight into deficits/self-awareness;safety precaution awareness  -CS (r) MB (t) CS (c)     Impairments Affecting Function (Mobility) balance;range of motion (ROM);shortness of breath  -CS (r) MB (t) CS (c)               User Key  (r) = Recorded By, (t) = Taken By, (c) = Cosigned By      Initials Name Provider Type    CS Brenda Guevara S, OTR/L, CNT Occupational Therapist    Filomena Mccormack, OT Student OT Student                   Mobility/ADL's       Row Name 10/17/23 [facility]          Bed Mobility    Bed Mobility scooting/bridging;supine-sit  -CS (r) MB (t) CS (c)     Scooting/Bridging  [place] (Bed Mobility) standby assist  -CS (r) MB (t) CS (c)     Supine-Sit [place] (Bed Mobility) standby assist  -CS (r) MB (t) CS (c)     Assistive Device (Bed Mobility) bed rails  -CS (r) MB (t) CS (c)       Row Name 10/17/23 [facility]          Transfers    Transfers sit-stand transfer;stand-sit transfer  -CS (r) MB (t) CS (c)       Row Name 10/17/23 [facility]          Sit-Stand Transfer    Sit-Stand [place] (Transfers) contact guard  -CS (r) MB (t) CS (c)     Assistive Device (Sit-Stand Transfers) cane, straight  -CS (r) MB (t) CS (c)       Row Name 10/17/23 [facility]          Stand-Sit Transfer    Stand-Sit [place] (Transfers) contact guard  -CS (r) MB (t) CS (c)     Assistive Device (Stand-Sit Transfers) cane, straight  -CS (r) MB (t) CS (c)       Row Name 10/17/23 [facility]          Functional Mobility    Functional Mobility- Ind. Level contact guard assist  -CS (r) MB (t) CS (c)     Functional Mobility- Device cane, straight  -CS (r) MB (t) CS (c)       Row Name 10/17/23 [facility]          Activities of Daily Living    BADL Assessment/Intervention lower body dressing;grooming;toileting  -CS (r) MB (t) CS (c)       Row Name 10/17/23 [facility]          Lower Body Dressing Assessment/Training    [place] Level (Lower Body Dressing) don;doff;socks;standby assist  -CS (r) MB (t) CS (c)     Position (Lower Body Dressing) edge of bed sitting;unsupported sitting  -CS (r) MB (t) CS (c)       Row Name 10/17/23 [facility]          Grooming Assessment/Training    [place] Level (Grooming) wash face, hands;contact guard assist  -CS (r) MB (t) CS (c)     Position (Grooming) sink side  -CS (r) MB (t) CS (c)       Row Name 10/17/23 [facility]          Toileting Assessment/Training    [place] Level (Toileting) adjust/manage clothing;perform perineal hygiene;standby assist;contact guard assist  -CS (r) MB (t) CS (c)     Assistive Devices (Toileting) commode  -CS (r) MB (t) CS (c)     Position (Toileting) unsupported standing  -CS (r)  MB (t) CS (c)               User Key  (r) = Recorded By, (t) = Taken By, (c) = Cosigned By      Initials Name Provider Type    CS Brenda Guevara, OTR/L, COOPER Occupational Therapist    Filomena Mccormack, OT Student OT Student                   Obj/Interventions       Row Name 10/17/23 [facility]          Sensory Assessment (Somatosensory)    Sensory Assessment (Somatosensory) UE sensation intact  -CS (r) MB (t) CS (c)       Row Name 10/17/23 [facility]          Vision Assessment/Intervention    Visual Impairment/Limitations other (see comments)  macular degeneration  -CS (r) MB (t) CS (c)       Row Name 10/17/23 [facility]          Range of Motion Comprehensive    Comment, General Range of Motion BUE 50% impaired,  pt had BUE TSA  -CS (r) MB (t) CS (c)       Row Name 10/17/23 [facility]          Strength Comprehensive (MMT)    General Manual Muscle Testing (MMT) Assessment no strength deficits identified  -CS (r) MB (t) CS (c)     Comment, General Manual Muscle Testing (MMT) Assessment BUE 5/5  -CS (r) MB (t) CS (c)       Row Name 10/17/23 [facility]          Balance    Balance Assessment sitting static balance;sitting dynamic balance;standing static balance;standing dynamic balance  -CS (r) MB (t) CS (c)     Static Sitting Balance standby assist  -CS (r) MB (t) CS (c)     Dynamic Sitting Balance standby assist  -CS (r) MB (t) CS (c)     Position, Sitting Balance unsupported;sitting edge of bed  -CS (r) MB (t) CS (c)     Static Standing Balance contact guard  -CS (r) MB (t) CS (c)     Dynamic Standing Balance contact guard  -CS (r) MB (t) CS (c)     Position/Device Used, Standing Balance supported;cane, straight  -CS (r) MB (t) CS (c)               User Key  (r) = Recorded By, (t) = Taken By, (c) = Cosigned By      Initials Name Provider Type    NAVEEN Brenda Guevara, OTR/L, COOPER Occupational Therapist    Filomena Mccormack, OT Student OT Student                   Goals/Plan    No documentation.                  Clinical Impression        Row Name 10/17/23 [facility]          Pain Assessment    Pretreatment Pain Rating 0/10 - no pain  -CS (r) MB (t) CS (c)       Row Name 10/17/23 [facility]          Plan of Care Review    Plan of Care Reviewed With patient  -CS (r) MB (t) CS (c)     Progress no change  -CS (r) MB (t) CS (c)     Outcome Evaluation OT eval completed. Pt alert and oriented x4 with no c/o. Pt [place] w/o hearing aids in. He completed bed mobility with SBA and all t/f's with CGA utilizing straight cane to help maintain balance. Pt uses straight cane at baseline. Pt amb to BR to complete toileting task and grooming task requiring SBA/CGA. Educated pt on energy conservation techniques including minimal rest breaks with position changes and utilizing tub bench when bathing. At this time, OT isn't deemed necessary d/t pt being fuctionally at baseline with ADLs/IADLs. Anticipate further d/c home and home with assist.  -CS (r) MB (t) CS (c)       Row Name 10/17/23 [facility]          Therapy Assessment/Plan (OT)    Patient/Family Therapy Goal Statement (OT) to go home  -CS (r) MB (t) CS (c)     Criteria for Skilled Therapeutic Interventions Met (OT) no problems identified which require skilled intervention;does not meet criteria for skilled intervention  -CS (r) MB (t) CS (c)     Therapy Frequency (OT) evaluation only  -CS (r) MB (t) CS (c)       Row Name 10/17/23 [facility]          Therapy Plan Review/Discharge Plan (OT)    Anticipated Discharge Disposition (OT) home;home with assist  -CS (r) MB (t) CS (c)       Row Name 10/17/23 [facility]          Positioning and Restraints    Pre-Treatment Position in bed  -CS (r) MB (t) CS (c)     Post Treatment Position chair  -CS (r) MB (t) CS (c)     In Chair call light within reach;encouraged to call for assist;legs elevated  -CS (r) MB (t) CS (c)               User Key  (r) = Recorded By, (t) = Taken By, (c) = Cosigned By      Initials Name Provider Type    CS Brenda Guevara S, OTR/L, CNT Occupational Therapist    CIELO Torre,  Filomena, OT Student OT Student                   Outcome Measures       Row Name 10/17/23 0732          How much help from another is currently needed...    Putting on and taking off regular lower body clothing? 4  -CS (r) MB (t) CS (c)     Bathing (including washing, rinsing, and drying) 4  -CS (r) MB (t) CS (c)     Toileting (which includes using toilet bed pan or urinal) 4  -CS (r) MB (t) CS (c)     Putting on and taking off regular upper body clothing 4  -CS (r) MB (t) CS (c)     Taking care of personal grooming (such as brushing teeth) 4  -CS (r) MB (t) CS (c)     Eating meals 4  -CS (r) MB (t) CS (c)     AM-PAC 6 Clicks Score (OT) 24  -CS (r) MB (t)       Row Name 10/17/23 1114          How much help from another person do you currently need...    Turning from your back to your side while in flat bed without using bedrails? 3 (P)   -JS     Moving from lying on back to sitting on the side of a flat bed without bedrails? 3 (P)   -JS     Moving to and from a bed to a chair (including a wheelchair)? 4 (P)   -JS     Standing up from a chair using your arms (e.g., wheelchair, bedside chair)? 4 (P)   -JS     Climbing 3-5 steps with a railing? 3 (P)   -JS     To walk in hospital room? 4 (P)   -JS     AM-PAC 6 Clicks Score (PT) 21 (P)   -JS     Highest level of mobility 6 --> Walked 10 steps or more (P)   -JS       Row Name 10/17/23 1114 10/17/23 0732       Functional Assessment    Outcome Measure Options AM-PAC 6 Clicks Basic Mobility (PT) (P)   -JS AM-PAC 6 Clicks Daily Activity (OT)  -CS (r) MB (t) CS (c)              User Key  (r) = Recorded By, (t) = Taken By, (c) = Cosigned By      Initials Name Provider Type    CS Brenda Guevara, OTR/L, CNT Occupational Therapist    Igor Bar, PT Student PT Student    Filomena Mccormack, OT Student OT Student                  Occupational Therapy Education       Title: PT OT SLP Therapies (Done)       Topic: Occupational Therapy (Done)       Point: ADL training  (Done)       Description:   Instruct learner(s) on proper safety adaptation and remediation techniques during self care or transfers.   Instruct in proper use of assistive devices.                  Learning Progress Summary             Patient Acceptance, E, VU by MB at 10/17/2023 0840                         Point: Home exercise program (Done)       Description:   Instruct learner(s) on appropriate technique for monitoring, assisting and/or progressing therapeutic exercises/activities.                  Learning Progress Summary             Patient Acceptance, E, VU by MB at 10/17/2023 0840                         Point: Precautions (Done)       Description:   Instruct learner(s) on prescribed precautions during self-care and functional transfers.                  Learning Progress Summary             Patient Acceptance, E, VU by MB at 10/17/2023 0840                         Point: Body mechanics (Done)       Description:   Instruct learner(s) on proper positioning and spine alignment during self-care, functional mobility activities and/or exercises.                  Learning Progress Summary             Patient Acceptance, E, VU by MB at 10/17/2023 0840                                         User Key       Initials Effective Dates Name Provider Type Discipline    MB 08/04/23 -  Filomena Torre OT Student OT Student OT                  OT Recommendation and Plan  Therapy Frequency (OT): evaluation only  Plan of Care Review  Plan of Care Reviewed With: patient  Progress: no change  Outcome Evaluation: OT eval completed. Pt alert and oriented x4 with no c/o. Pt [place] w/o hearing aids in. He completed bed mobility with SBA and all t/f's with CGA utilizing straight cane to help maintain balance. Pt uses straight cane at baseline. Pt amb to BR to complete toileting task and grooming task requiring SBA/CGA. Educated pt on energy conservation techniques including minimal rest breaks with position changes and utilizing  tub bench when bathing. At this time, OT isn't deemed necessary d/t pt being fuctionally at baseline with ADLs/IADLs. Anticipate further d/c home and home with assist.  Plan of Care Reviewed With: patient  Outcome Evaluation: OT eval completed. Pt alert and oriented x4 with no c/o. Pt [place] w/o hearing aids in. He completed bed mobility with SBA and all t/f's with CGA utilizing straight cane to help maintain balance. Pt uses straight cane at baseline. Pt amb to BR to complete toileting task and grooming task requiring SBA/CGA. Educated pt on energy conservation techniques including minimal rest breaks with position changes and utilizing tub bench when bathing. At this time, OT isn't deemed necessary d/t pt being fuctionally at baseline with ADLs/IADLs. Anticipate further d/c home and home with assist.     Time Calculation:         Time Calculation- OT       Row Name 10/17/23 0732             Time Calculation- OT    OT Start Time 0730  +10 min chart review  -CS (r) MB (t) CS (c)      OT Stop Time 0813  -CS (r) MB (t) CS (c)      OT Time Calculation (min) 43 min  -CS (r) MB (t)      OT Received On 10/17/23  -CS (r) MB (t) CS (c)         Untimed Charges    OT Eval/Re-eval Minutes 53  -CS (r) MB (t) CS (c)         Total Minutes    Untimed Charges Total Minutes 53  -CS (r) MB (t)       Total Minutes 53  -CS (r) MB (t)                User Key  (r) = Recorded By, (t) = Taken By, (c) = Cosigned By      Initials Name Provider Type    CS Brenda Guevara S, OTR/L, CNT Occupational Therapist    Filomena Mccormack, OT Student OT Student                         OT Discharge Summary  Anticipated Discharge Disposition (OT): home, home with assist  Reason for Discharge: At baseline function  Outcomes Achieved: Other (one time eval only)  Discharge Destination: Home, Home with assist    HERMINIA Dobson  10/17/2023

## 2023-10-17 NOTE — CASE MANAGEMENT/SOCIAL WORK
Continued Stay Note  NICOLE Sampson     Patient Name: Juan Luis Collazo  MRN: [id number]  Today's Date: 10/17/2023    Admit Date: 10/16/2023        Discharge Plan       Row Name 10/17/23 0429       Plan    Plan Comments Pt lives at home with his spouse and plans to return home when ready. Pt is not interested in rehab placement at this time. Therapy is recommending HH vs outpt. Will follow.                   Discharge Codes    No documentation.                       MALKA Roman

## 2023-10-17 NOTE — H&P
[facility] Medicine Services  HISTORY AND PHYSICAL    Date of Admission: 10/16/2023  Primary Care Physician: Santiago Aaron MD    Subjective   Primary Historian: Patient and wife    Chief Complaint: 5-day history of painful cough    History of Present Illness  Juan Luis Collazo is an 82-year-old male with a past medical history of prostate cancer, GERD, hypertension, diffuse disc disease with chronic back pain, macular degeneration, osteopenia and chronic pain.  Patient presents to [facility] emergency department with complaints of a 5-day history of worsening cough that causes pain.  He states he coughs so hard that he ends up vomiting.  He states sputum is gray in color.  Upon arrival he was hypoxic with PO2 88.2 on room air however at rest he is 99% on room air.  He states when he arrived he had been coughing severely and could not catch his breath.  ER work-up reveals elevated troponin not felt to be associated with coronary artery disease, normocytic anemia, anemia studies reveal anemia of chronic disease.  Chest x-ray positive for right upper lobe consolidation.  Respiratory panel negative for COVID or influenza.  Patient states that he fell yesterday walking up the steps.  He does use a cane consistently.  He states he is extremely weak and has poor ability to control his lower extremities.  He is admitted for further evaluation treatment.    Review of Systems   Otherwise complete ROS reviewed and negative except as mentioned in the HPI.    Past Medical History:   Past Medical History:   Diagnosis Date    Arthritis     Back pain     Cancer     CHEST, SKIN CANCER    GERD (gastroesophageal reflux disease)     History of colon polyps     History of prostate cancer     Hypertension     Low back pain     Macular degeneration     Osteopenia      Past Surgical History:  Past Surgical History:   Procedure Laterality Date    APPENDECTOMY      CATARACT EXTRACTION,  BILATERAL      COLONOSCOPY  09/17/2013    Dr. José-One 2-3mm polyp at 20cm; Otherwise normal; Repeat 3 years    COLONOSCOPY N/A 12/07/2022    Procedure: COLONOSCOPY WITH ANESTHESIA;  Surgeon: Bri Alexis MD;  Location: [facility] ENDOSCOPY;  Service: Gastroenterology;  Laterality: N/A;  pre: anemia. hx polyps.  post: polyps. diverticulosis.  no PCP    ENDOSCOPY N/A 12/07/2022    Procedure: ESOPHAGOGASTRODUODENOSCOPY WITH ANESTHESIA;  Surgeon: Bri Alexis MD;  Location: [facility] ENDOSCOPY;  Service: Gastroenterology;  Laterality: N/A;  pre: anemia  post: hiatal hernia. esophagitis.gastric erosions.  no PCP    FOOT SURGERY Right     KYPHOPLASTY Bilateral 07/17/2018    Procedure: L3 KYPHOPLASTY 1-2 LEVELS;  Surgeon: Vega Pandey MD;  Location: [facility] OR;  Service: Neurosurgery    KYPHOPLASTY Bilateral 09/11/2018    Procedure: L4 KYPHOPLASTY WITH BIOPSY;  Surgeon: Vega Pandey MD;  Location:  [facility] OR;  Service: Neurosurgery    KYPHOPLASTY WITH BIOPSY N/A 01/25/2022    Procedure: KYPHOPLASTY WITH BIOPSY, THORACIC 6 and LUMBAR 5;  Surgeon: Nick Martínez MD;  Location:  [facility] OR;  Service: Neurosurgery;  Laterality: N/A;    KYPHOPLASTY WITH BIOPSY N/A 8/18/2023    Procedure: Thoracic 12, KYPHOPLASTY WITH BIOPSY;  Surgeon: Nick Martínez MD;  Location: [facility] OR;  Service: Neurosurgery;  Laterality: N/A;    PROSTATECTOMY      TONSILLECTOMY      TOTAL SHOULDER REPLACEMENT Bilateral      Social History:  reports that he quit smoking about 32 years ago. His smoking use included cigarettes. He has a 30.00 pack-year smoking history. He has never used smokeless tobacco. He reports current alcohol use. He reports that he does not use drugs.    Family History: family history includes No Known Problems in his mother; Prostate cancer in his father.       Allergies:  No Known Allergies    Medications:  Prior to Admission medications    Medication Sig Start Date End Date Taking? Authorizing  "Provider   cyclobenzaprine (FLEXERIL) 5 MG tablet Take 1 tablet by mouth 3 (Three) Times a Day As Needed for Muscle Spasms. 7/11/23   Santiago Aaron MD   denosumab (PROLIA) 60 MG/ML solution prefilled syringe syringe Inject 1 mL under the skin into the appropriate area as directed Every 6 (Six) Months. 5/3/23   Kourtney, [place]th[place] C, APRN   multivitamins-minerals (PRESERVISION AREDS 2) capsule capsule Take 1 capsule by mouth 2 (Two) Times a Day.    Provider, MD Rosa Elena   naloxone (NARCAN) 4 MG/0.1ML nasal spray Call 911. Don't prime. [place] in 1 nostril for overdose. Repeat in 2-3 minutes in other nostril if no or minimal breathing/responsiveness. 8/18/23   Curtis Woodward APRN   olmesartan (BENICAR) 40 MG tablet Take 1 tablet by mouth Daily. 10/11/23   Santiago Aaron MD   sennosides-docusate (senna-docusate sodium) 8.6-50 MG per tablet Take 2 tablets by mouth Daily. 8/18/23   Curtis Woodward APRN   traMADol (ULTRAM) 50 MG tablet Take 1 tablet by mouth Every 8 (Eight) Hours As Needed for Moderate Pain. Patient is taking spouses prescription. 10/11/23   Santiago Aaron MD     I have utilized all available immediate resources to obtain, update, or review the patient's current medications (including all prescriptions, over-the-counter products, herbals, cannabis/cannabidiol products, and vitamin/mineral/dietary (nutritional) supplements).    Objective     Vital Signs: /60   Pulse 83   Temp 98.1 °F (36.7 °C) (Oral)   Resp (!) 30   Ht 160 cm (62.99\")   Wt 70.3 kg (155 lb)   SpO2 98%   BMI 27.46 kg/m²   Physical Exam  Vitals reviewed.   Constitutional:       Appearance: He is ill-appearing.   HENT:      Head: Normocephalic and atraumatic.      Mouth/Throat:      Mouth: Mucous membranes are moist.      Pharynx: Oropharynx is clear.   Eyes:      Extraocular Movements: Extraocular movements intact.      Conjunctiva/sclera: Conjunctivae normal.   Cardiovascular:      Rate and Rhythm: Normal rate and " regular rhythm.   Pulmonary:      Effort: Pulmonary effort is normal.      Comments: Diminished throughout  Abdominal:      General: There is no distension.      Palpations: Abdomen is soft.   Musculoskeletal:      Cervical back: Normal range of motion and neck supple.      Right lower leg: No edema.      Left lower leg: No edema.      Comments: Generalized weakness and debility   Skin:     General: Skin is warm and dry.   Neurological:      General: No focal deficit present.      Mental Status: He is alert and oriented to person, place, and time.   Psychiatric:         Mood and Affect: Mood normal.         Behavior: Behavior normal.        Results Reviewed:  Lab Results (last 24 hours)       Procedure Component Value Units Date/Time    High Sensitivity Troponin T 2Hr [[id number]]  (Abnormal) Collected: 10/16/23 2038    Specimen: Blood Updated: 10/16/23 2103     HS Troponin T 22 ng/L      Troponin T Delta 5 ng/L     Respiratory Panel PCR w/COVID-19(SARS-CoV-2) [facility] In-House, NP Swab in UTM/VTM, 3-4 HR TAT - Swab, Nasopharynx [[id number]]  (Normal) Collected: 10/16/23 1922    Specimen: Swab from Nasopharynx Updated: 10/16/23 2035     ADENOVIRUS, PCR Not Detected     Coronavirus 229E Not Detected     Coronavirus HKU1 Not Detected     Coronavirus NL63 Not Detected     Coronavirus OC43 Not Detected     COVID19 Not Detected     Human Metapneumovirus Not Detected     Human Rhinovirus/Enterovirus Not Detected     Influenza A PCR Not Detected     Influenza B PCR Not Detected     Parainfluenza Virus 1 Not Detected     Parainfluenza Virus 2 Not Detected     Parainfluenza Virus 3 Not Detected     Parainfluenza Virus 4 Not Detected     RSV, PCR Not Detected     Bordetella pertussis pcr Not Detected     Bordetella parapertussis PCR Not Detected     Chlamydophila pneumoniae PCR Not Detected     Mycoplasma pneumo by PCR Not Detected    Comprehensive Metabolic Panel [[id number]]  (Abnormal) Collected: 10/16/23  1826    Specimen: Blood Updated: 10/16/23 1941     Glucose 112 mg/dL      BUN 28 mg/dL      Creatinine 1.02 mg/dL      Sodium 137 mmol/L      Potassium 4.2 mmol/L      Chloride 98 mmol/L      CO2 28.0 mmol/L      Calcium 10.8 mg/dL      Total Protein 8.7 g/dL      Albumin 3.4 g/dL      ALT (SGPT) 13 U/L      AST (SGOT) 15 U/L      Alkaline Phosphatase 92 U/L      Total Bilirubin 0.6 mg/dL      Globulin 5.3 gm/dL      A/G Ratio 0.6 g/dL      BUN/Creatinine Ratio 27.5     Anion Gap 11.0 mmol/L      eGFR 72.5 mL/min/1.73     Lactic Acid, Plasma [[id number]]  (Normal) Collected: 10/16/23 1826    Specimen: Blood Updated: 10/1938     Lactate 1.2 mmol/L     BNP [[id number]]  (Normal) Collected: 10/16/23 1826    Specimen: Blood Updated: 10/16/23 1937     proBNP 284.9 pg/mL     High Sensitivity Troponin T [[id number]]  (Abnormal) Collected: 10/16/23 1826    Specimen: Blood Updated: 10/16/23 1936     HS Troponin T 17 ng/L     Blood Gas, Arterial - [[id number]]  (Abnormal) Collected: 10/16/23 1937    Specimen: Arterial Blood Updated: 10/16/23 1936     Site Right Radial     Bj's Test Positive     pH, Arterial 7.427 pH units      pCO2, Arterial 42.3 mm Hg      pO2, Arterial 58.2 mm Hg      Comment: 84 Value below reference range        HCO3, Arterial 27.8 mmol/L      Comment: 83 Value above reference range        Base Excess, Arterial 3.1 mmol/L      Comment: 83 Value above reference range        O2 Saturation, Arterial 92.2 %      Comment: 84 Value below reference range        Temperature 37.0 C      Barometric Pressure for Blood Gas 752 mmHg      Modality Room Air     Ventilator Mode NA     Collected by [id number]     Comment: Meter: [id number]     :  [id number]        pCO2, Temperature Corrected 42.3 mm Hg      pH, Temp Corrected 7.427 pH Units      pO2, Temperature Corrected 58.2 mm Hg     Blood Culture - Blood, Arm, Right [[id number]] Collected: 10/16/23 1922    Specimen: Blood from Arm, Right Updated: 10/16/23  1931    Blood Culture - Blood, Arm, Left [[id number]] Collected: 10/16/23 1922    Specimen: Blood from Arm, Left Updated: 10/16/23 1930    aPTT [[id number]]  (Normal) Collected: 10/16/23 1826    Specimen: Blood Updated: 10/16/23 1929     PTT 30.2 seconds     Protime-INR [[id number]]  (Abnormal) Collected: 10/16/23 1826    Specimen: Blood Updated: 10/16/23 1929     Protime 15.8 Seconds      INR 1.24    CBC Auto Differential [[id number]]  (Abnormal) Collected: 10/16/23 1826    Specimen: Blood Updated: 10/16/23 1924     WBC 12.97 10*3/mm3      RBC 3.60 10*6/mm3      Hemoglobin 10.6 g/dL      Hematocrit 33.2 %      MCV 92.2 fL      MCH 29.4 pg      MCHC 31.9 g/dL      RDW 14.1 %      RDW-SD 48.3 fl      MPV 10.3 fL      Platelets 351 10*3/mm3      Neutrophil % 75.5 %      Lymphocyte % 12.1 %      Monocyte % 10.7 %      Eosinophil % 0.4 %      Basophil % 0.4 %      Immature Grans % 0.9 %      Neutrophils, Absolute 9.79 10*3/mm3      Lymphocytes, Absolute 1.57 10*3/mm3      Monocytes, Absolute 1.39 10*3/mm3      Eosinophils, Absolute 0.05 10*3/mm3      Basophils, Absolute 0.05 10*3/mm3      Immature Grans, Absolute 0.12 10*3/mm3      nRBC 0.0 /100 WBC           Imaging Results (Last 24 Hours)       Procedure Component Value Units Date/Time    XR Chest 1 View [[id number]] Collected: 10/16/23 2010     Updated: 10/16/23 2014    Narrative:      EXAM/TECHNIQUE: XR CHEST 1 VW-     INDICATION: Cough, shortness of breath     COMPARISON: 4/14/2023     FINDINGS:     Cardiac silhouette is within normal limits and stable. No pleural  effusion or visible pneumothorax. Right upper lobe consolidation is  present. Kyphoplasty changes in the thoracolumbar spine are noted.  Proximal imaged bilateral shoulder arthroplasty.       Impression:      Right upper lobe consolidation, highly suspicious for pneumonia.  Recommend follow-up to document resolution.     This report was signed and finalized on 10/16/2023 8:11 PM CDT by Dr. Audie Au,  MD.               Assessment / Plan   Assessment:   Active Hospital Problems    Diagnosis     **Right upper lobe pneumonia     Gait instability     Pain initiated by coughing     Hypoxia     Normocytic anemia        Treatment Plan  1.  The patient will be admitted to Dr. Hamlin's service here at [facility].   2.  Continue Rocephin and azithromycin as started in the emergency department  3.  Home medications reviewed and restarted as appropriate  4.  DVT prophylaxis with Lovenox  5.  Supplemental oxygen as needed, incentive spirometry, continuous pulse oximetry, DuoNebs, ABGs as needed number Romilar-AC for cough and pain  6.  Labs in a.m., urine studies for Legionella and strep pneumoniae, respiratory culture, MRSA screening, stool for occult blood  7.  Consult  as patient may need home health, consult PT and OT strengthening    Medical Decision Making  Number and Complexity of problems: 5  Differential Diagnosis: None    Conditions and Status        Condition is unchanged.     [facility] Data  External documents reviewed: No  Cardiac tracing (EKG, telemetry) interpretation: Reviewed  Radiology interpretation: Reviewed  Labs reviewed: Yes  Any tests that were considered but not ordered: No     Decision rules/scores evaluated (example [id number], Wells, etc): None     Discussed with: Patient, wife and Dr. Hamlin     Care Planning  Shared decision making: Patient, wife Jyoti and Pap Dr. Hamlin  Code status and discussions: DNR/DNI    Disposition  Social Determinants of Health that impact treatment or disposition: None  Estimated length of stay is 2+ days.     I confirmed that the patient's advanced care plan is present, code status is documented, and a surrogate decision maker is listed in the patient's medical record.     The patient's surrogate decision maker is wife Jyoti.     The patient was seen and examined by me on 10/16/2023 at 10:09 PM.    Electronically signed by Ada EVANGELISTA  DIEGO Brito, 10/16/23, 22:44 CDT.

## 2023-10-17 NOTE — PROGRESS NOTES
[facility] Medicine Services  INPATIENT PROGRESS NOTE    Patient Name: Juan Luis Collazo  Date of Admission: 10/16/2023  Today's Date: 10/17/23  Length of Stay: 1  Primary Care Physician: Santiago Aaron MD    Subjective   Chief Complaint: Cough, shortness of breath  HPI   Patient lying in bed on room air in no acute distress.  He denies chest pain, he does endorse shortness of breath.  He is not requiring supplemental oxygen at this time.  He also tells me he is unable to ambulate.  He describes weakness in bilateral lower extremities.  He denies changes of sensation in these extremities, he also denies incontinence of stool or urine.  He denies lower back pain.  Continue care for pneumonia including pulmonary toileting, Mucinex, IV antibiotics.  Emphasis on physical therapy and ambulation.  Discussed regaining functional status with patient to return home versus SNF.  He adamantly defers SNF as an option at this time.  I encouraged him to work hard with physical therapy.    Review of Systems   All pertinent negatives and positives are as above. All other systems have been reviewed and are negative unless otherwise stated.     Objective    Temp:  [98 °F (36.7 °C)-98.2 °F (36.8 °C)] 98.2 °F (36.8 °C)  Heart Rate:  [76-98] 98  Resp:  [16-30] 16  BP: (100-127)/(55-81) 101/55  Physical Exam  Vitals reviewed.   Constitutional:       Appearance: Lying in bed, appears ill in no acute distress, room air, no visitors at bedside  HENT:      Head: Normocephalic and atraumatic.      Mouth/Throat:      Mouth: Mucous membranes are moist.      Pharynx: Oropharynx is clear.   Eyes:      Extraocular Movements: Extraocular movements intact.      Conjunctiva/sclera: Conjunctivae normal.   Cardiovascular:      Rate and Rhythm: Normal rate and regular rhythm.   Pulmonary:      Effort: Pulmonary effort is normal.      Comments: Diminished with scattered rhonchi  Abdominal:      General: There is no  distension.      Palpations: Abdomen is soft.   Musculoskeletal:      Cervical back: Normal range of motion and neck supple.      Right lower leg: No edema.      Left lower leg: No edema.      Comments: Weak  Skin:     General: Skin is warm and dry.   Neurological:      General: No focal deficit present.      Mental Status: He is alert and oriented to person, place, and time.   Psychiatric:         Mood and Affect: Mood normal.         Behavior: Behavior normal.  Conversational    Results Review:  I have reviewed the labs, radiology results, and diagnostic studies.    Laboratory Data:   Results from last 7 days   Lab Units 10/17/23  0545 10/16/23  1826   WBC 10*3/mm3 10.10 12.97*   HEMOGLOBIN g/dL 10.3* 10.6*   HEMATOCRIT % 33.1* 33.2*   PLATELETS 10*3/mm3 331 351        Results from last 7 days   Lab Units 10/17/23  0545 10/16/23  1826   SODIUM mmol/L 139 137   POTASSIUM mmol/L 4.5 4.2   CHLORIDE mmol/L 101 98   CO2 mmol/L 29.0 28.0   BUN mg/dL 28* 28*   CREATININE mg/dL 0.95 1.02   CALCIUM mg/dL 10.6* 10.8*   BILIRUBIN mg/dL 0.4 0.6   ALK PHOS U/L 92 92   ALT (SGPT) U/L 15 13   AST (SGOT) U/L 19 15   GLUCOSE mg/dL 114* 112*     Radiology Data:   Imaging Results (Last 24 Hours)       Procedure Component Value Units Date/Time    XR Chest 1 View [[id number]] Collected: 10/16/23 2010     Updated: 10/16/23 2014    Narrative:      EXAM/TECHNIQUE: XR CHEST 1 VW-     INDICATION: Cough, shortness of breath     COMPARISON: 4/14/2023     FINDINGS:     Cardiac silhouette is within normal limits and stable. No pleural  effusion or visible pneumothorax. Right upper lobe consolidation is  present. Kyphoplasty changes in the thoracolumbar spine are noted.  Proximal imaged bilateral shoulder arthroplasty.       Impression:      Right upper lobe consolidation, highly suspicious for pneumonia.  Recommend follow-up to document resolution.     This report was signed and finalized on 10/16/2023 8:11 PM CDT by Dr. Audie Au MD.                I have reviewed the patient's current medications.     Assessment/Plan   Assessment  Active Hospital Problems    Diagnosis     **Right upper lobe pneumonia     Gait instability     Pain initiated by coughing     Hypoxia     Normocytic anemia        Treatment Plan  Right upper lobe pneumonia-  Originally with hypoxia, patient was 88% on room air.  Supplemental oxygen utilized and then weaned  MRSA, strep pneumo, Legionella antigens negative  Respiratory PCR negative  Blood culture x2 pending  Respiratory culture pending  Continue Mucinex, DuoNeb, incentive spirometry  Continue IV ceftriaxone, IV azithromycin    Disc disease, chronic back pain, gait instability-  Continue Flexeril, Ultram for pain control  PT/OT     Normocytic anemia of chronic disease-  Hemoglobin was 10.5 on 8/15 of this year.  Hemoglobin 10.3 this morning, continue to closely monitor  No signs or symptoms of active bleeding    Lovenox for DVT prophylaxis    Medical Decision Making  Number and Complexity of problems: 1 acute problem of high complexity in right upper lobe pneumonia.  1 acute problem of high complexity in hypoxia.  1 chronic problem of moderate complexity in disc disease.  1 chronic problem of moderate complexity and chronic back pain.  1 acute problem of moderate complexity in gait instability.  Differential Diagnosis: None    Conditions and Status        Status stable     MDM Data  External documents reviewed: None  Cardiac tracing (EKG, telemetry) interpretation: EKG reviewed  Radiology interpretation: Chest x-ray reviewed, right upper lobe consolidation  Labs reviewed: High-sensitivity troponin, proBNP, CMP, iron profile, lipid panel, PT, INR, PTT, CBC with differential  Any tests that were considered but not ordered: None     Decision rules/scores evaluated (example [id number], Wells, etc): None     Discussed with: Patient, Dr. Auguste     Care Planning  Shared decision making: Discussed with above, patient  agreeable to his plan of care  Code status and discussions: No CPR    Disposition  Social Determinants of Health that impact treatment or disposition: Inability to ambulate currently  I expect the patient to be discharged to home versus SNF when ambulatory or when arrangements made.    Electronically signed by DIEGO Oneil, 10/17/23, 09:20 CDT.

## 2023-10-17 NOTE — THERAPY EVALUATION
Patient Name: Juan Luis Collazo  : 1940    MRN: [id number]                              Today's Date: 10/17/2023       Admit Date: 10/16/2023    Visit Dx:     ICD-10-CM ICD-9-CM   1. Pneumonia of right upper lobe due to infectious organism  J18.9 486   2. Shortness of breath  R06.02 786.05   3. Painful respiration  R07.1 786.52   4. Elevated troponin  R79.89 790.6     Patient Active Problem List   Diagnosis    Closed compression fracture of first lumbar vertebra    Current non-smoker    BMI 28.0-28.9,adult    Lumbar compression fracture, closed, initial encounter    Lumbar compression fracture    S/P kyphoplasty    Numbness and tingling of right leg    Compression fracture of fourth lumbar vertebra with delayed healing    Benign hypertension    Flatback syndrome of lumbar region    Hammer toe of right foot    High cholesterol    Impaired fasting glucose    Malignant neoplasm of prostate    Osteopenia    Acute exacerbation of chronic obstructive airways disease    Back pain    Compression fracture of thoracic vertebra with routine healing    Normocytic anemia    Actinic keratosis    Hyperplastic colonic polyp    FH: colon cancer in first degree relative <60 years old    Weight loss    History of adenomatous polyp of colon    Decreased bone mass    Sebaceous cyst    Degenerative disc disease, cervical    Degenerative disc disease, lumbar    Degenerative disc disease, thoracic    Compression fracture of T12 vertebra with delayed healing    Age-related osteoporosis with current pathological fracture with delayed healing    Right upper lobe pneumonia    Gait instability    Pain initiated by coughing    Hypoxia     Past Medical History:   Diagnosis Date    Arthritis     Back pain     Cancer     CHEST, SKIN CANCER    GERD (gastroesophageal reflux disease)     History of colon polyps     History of prostate cancer     Hypertension     Low back pain     Macular degeneration     Osteopenia      Past Surgical History:    Procedure Laterality Date    APPENDECTOMY      CATARACT EXTRACTION, BILATERAL      COLONOSCOPY  09/17/2013    Dr. José-One 2-3mm polyp at 20cm; Otherwise normal; Repeat 3 years    COLONOSCOPY N/A 12/07/2022    Procedure: COLONOSCOPY WITH ANESTHESIA;  Surgeon: Bri Alexis MD;  Location: [facility] ENDOSCOPY;  Service: Gastroenterology;  Laterality: N/A;  pre: anemia. hx polyps.  post: polyps. diverticulosis.  no PCP    ENDOSCOPY N/A 12/07/2022    Procedure: ESOPHAGOGASTRODUODENOSCOPY WITH ANESTHESIA;  Surgeon: Bri Alexis MD;  Location: [facility] ENDOSCOPY;  Service: Gastroenterology;  Laterality: N/A;  pre: anemia  post: hiatal hernia. esophagitis.gastric erosions.  no PCP    FOOT SURGERY Right     KYPHOPLASTY Bilateral 07/17/2018    Procedure: L3 KYPHOPLASTY 1-2 LEVELS;  Surgeon: Vega Pandey MD;  Location:  [facility] OR;  Service: Neurosurgery    KYPHOPLASTY Bilateral 09/11/2018    Procedure: L4 KYPHOPLASTY WITH BIOPSY;  Surgeon: Vega Pandey MD;  Location:  [facility] OR;  Service: Neurosurgery    KYPHOPLASTY WITH BIOPSY N/A 01/25/2022    Procedure: KYPHOPLASTY WITH BIOPSY, THORACIC 6 and LUMBAR 5;  Surgeon: Nick Martínez MD;  Location:  [facility] OR;  Service: Neurosurgery;  Laterality: N/A;    KYPHOPLASTY WITH BIOPSY N/A 8/18/2023    Procedure: Thoracic 12, KYPHOPLASTY WITH BIOPSY;  Surgeon: Nick Martínez MD;  Location:  [facility] OR;  Service: Neurosurgery;  Laterality: N/A;    PROSTATECTOMY      TONSILLECTOMY      TOTAL SHOULDER REPLACEMENT Bilateral       General Information       Row Name 10/17/23 0732          OT Time and Intention    Document Type evaluation  PMH: prostate cancer, macular degeneration, diffuse disc disease, C/o worsening SOB and chest pain when taking a deep breath, Dx: R upper lobe pneumonia,  -CS (r) MB (t) CS (c)     Mode of Treatment occupational therapy  -CS (r) MB (t) CS (c)       Row Name 10/17/23 0732          General Information    Patient Profile  Reviewed yes  -CS (r) MB (t) CS (c)     Prior Level of Function independent:;ADL's;cooking;cleaning;driving;shopping  only drives during the day  -CS (r) MB (t) CS (c)     Existing Precautions/Restrictions fall  -CS (r) MB (t) CS (c)     Barriers to Rehab previous functional deficit;visual deficit;hearing deficit  -CS (r) MB (t) CS (c)       Row Name 10/17/23 [facility]          Occupational Profile    Environmental Supports and Barriers (Occupational Profile) cane, walk in, tub bench  -CS (r) MB (t) CS (c)       Row Name 10/17/23 [facility]          Living Environment    People in Home spouse  -CS (r) MB (t) CS (c)       Row Name 10/17/23 [facility]          Home Main Entrance    Number of Stairs, Main Entrance three  -CS (r) MB (t) CS (c)     Stair Railings, Main Entrance none  -CS (r) MB (t) CS (c)       Row Name 10/17/23 [facility]          Stairs Within Home, Primary    Number of Stairs, Within Home, Primary other (see comments)  14  -CS (r) MB (t) CS (c)     Stair Railings, Within Home, Primary railing on right side (ascending)  -CS (r) MB (t) CS (c)       Row Name 10/17/23 [facility]          Cognition    Orientation Status (Cognition) oriented x 4  -CS (r) MB (t) CS (c)       Row Name 10/17/23 [facility]          Safety Issues, Functional Mobility    Safety Issues Affecting Function (Mobility) insight into deficits/self-awareness;safety precaution awareness  -CS (r) MB (t) CS (c)     Impairments Affecting Function (Mobility) balance;range of motion (ROM);shortness of breath  -CS (r) MB (t) CS (c)               User Key  (r) = Recorded By, (t) = Taken By, (c) = Cosigned By      Initials Name Provider Type    CS Brenda Guevara, OTR/L, CNT Occupational Therapist    Filomena Mccormack, OT Student OT Student                     Mobility/ADL's       Row Name 10/17/23 [facility]          Bed Mobility    Bed Mobility scooting/bridging;supine-sit  -CS (r) MB (t) CS (c)     Scooting/Bridging [place] (Bed Mobility) standby assist  -CS (r) MB (t) CS  (c)     Supine-Sit [place] (Bed Mobility) standby assist  -CS (r) MB (t) CS (c)     Assistive Device (Bed Mobility) bed rails  -CS (r) MB (t) CS (c)       Row Name 10/17/23 [facility]          Transfers    Transfers sit-stand transfer;stand-sit transfer  -CS (r) MB (t) CS (c)       [facility] Name 10/17/23 [facility]          Sit-Stand Transfer    Sit-Stand [place] (Transfers) contact guard  -CS (r) MB (t) CS (c)     Assistive Device (Sit-Stand Transfers) cane, straight  -CS (r) MB (t) CS (c)       Row Name 10/17/23 [facility]          Stand-Sit Transfer    Stand-Sit [place] (Transfers) contact guard  -CS (r) MB (t) CS (c)     Assistive Device (Stand-Sit Transfers) cane, straight  -CS (r) MB (t) CS (c)       Row Name 10/17/23 [facility]          Functional Mobility    Functional Mobility- Ind. Level contact guard assist  -CS (r) MB (t) CS (c)     Functional Mobility- Device cane, straight  -CS (r) MB (t) CS (c)       Row Name 10/17/23 [facility]          Activities of Daily Living    BADL Assessment/Intervention lower body dressing;grooming;toileting  -CS (r) MB (t) CS (c)       [facility] Name 10/17/23 [facility]          Lower Body Dressing Assessment/Training    [place] Level (Lower Body Dressing) don;doff;socks;standby assist  -CS (r) MB (t) CS (c)     Position (Lower Body Dressing) edge of bed sitting;unsupported sitting  -CS (r) MB (t) CS (c)       Row Name 10/17/23 [facility]          Grooming Assessment/Training    [place] Level (Grooming) wash face, hands;contact guard assist  -CS (r) MB (t) CS (c)     Position (Grooming) sink side  -CS (r) MB (t) CS (c)       Row Name 10/17/23 [facility]          Toileting Assessment/Training    [place] Level (Toileting) adjust/manage clothing;perform perineal hygiene;standby assist;contact guard assist  -CS (r) MB (t) CS (c)     Assistive Devices (Toileting) commode  -CS (r) MB (t) CS (c)     Position (Toileting) unsupported standing  -CS (r) MB (t) CS (c)               User Key  (r) = Recorded By, (t)  = Taken By, (c) = Cosigned By      Initials Name Provider Type    NAVEEN Brenda Guevara, OTR/L, [place] Occupational Therapist    Filomena Mccormack, OT Student OT Student                   Obj/Interventions       Row Name 10/17/23 [facility]          Sensory Assessment (Somatosensory)    Sensory Assessment (Somatosensory) UE sensation intact  -CS (r) MB (t) CS (c)       Row Name 10/17/23 [facility]          Vision Assessment/Intervention    Visual Impairment/Limitations other (see comments)  macular degeneration  -CS (r) MB (t) CS (c)       Row Name 10/17/23 [facility]          Range of Motion Comprehensive    Comment, General Range of Motion BUE 50% impaired,  pt had BUE TSA  -CS (r) MB (t) CS (c)       Row Name 10/17/23 [facility]          Strength Comprehensive (MMT)    General Manual Muscle Testing (MMT) Assessment no strength deficits identified  -CS (r) MB (t) CS (c)     Comment, General Manual Muscle Testing (MMT) Assessment BUE 5/5  -CS (r) MB (t) CS (c)       Row Name 10/17/23 [facility]          Balance    Balance Assessment sitting static balance;sitting dynamic balance;standing static balance;standing dynamic balance  -CS (r) MB (t) CS (c)     Static Sitting Balance standby assist  -CS (r) MB (t) CS (c)     Dynamic Sitting Balance standby assist  -CS (r) MB (t) CS (c)     Position, Sitting Balance unsupported;sitting edge of bed  -CS (r) MB (t) CS (c)     Static Standing Balance contact guard  -CS (r) MB (t) CS (c)     Dynamic Standing Balance contact guard  -CS (r) MB (t) CS (c)     Position/Device Used, Standing Balance supported;cane, straight  -CS (r) MB (t) CS (c)               User Key  (r) = Recorded By, (t) = Taken By, (c) = Cosigned By      Initials Name Provider Type    NAVEEN Brenda Guevara, OTR/L, COOPER Occupational Therapist    Filomena Mccormack, OT Student OT Student                   Goals/Plan    No documentation.                  Clinical Impression       Row Name 10/17/23 [facility]          Pain Assessment     Pretreatment Pain Rating 0/10 - no pain  -CS (r) MB (t) CS (c)       Row Name 10/17/23 [facility]          Plan of Care Review    Plan of Care Reviewed With patient  -CS (r) MB (t) CS (c)     Progress no change  -CS (r) MB (t) CS (c)     Outcome Evaluation OT eval completed. Pt alert and oriented x4 with no c/o. Pt [place] w/o hearing aids in. He completed bed mobility with SBA and all t/f's with CGA utilizing straight cane to help maintain balance. Pt uses straight cane at baseline. Pt amb to BR to complete toileting task and grooming task requiring SBA/CGA. Educated pt on energy conservation techniques including minimal rest breaks with position changes and utilizing tub bench when bathing. At this time, OT isn't deemed necessary d/t pt being fuctionally at baseline with ADLs/IADLs. Anticipate further d/c home and home with assist.  -CS (r) MB (t) CS (c)       Row Name 10/17/23 [facility]          Therapy Assessment/Plan (OT)    Patient/Family Therapy Goal Statement (OT) to go home  -CS (r) MB (t) CS (c)     Criteria for Skilled Therapeutic Interventions Met (OT) no problems identified which require skilled intervention;does not meet criteria for skilled intervention  -CS (r) MB (t) CS (c)     Therapy Frequency (OT) evaluation only  -CS (r) MB (t) CS (c)       Row Name 10/17/23 [facility]          Therapy Plan Review/Discharge Plan (OT)    Anticipated Discharge Disposition (OT) home;home with assist  -CS (r) MB (t) CS (c)       Row Name 10/17/23 [facility]          Positioning and Restraints    Pre-Treatment Position in bed  -CS (r) MB (t) CS (c)     Post Treatment Position chair  -CS (r) MB (t) CS (c)     In Chair call light within reach;encouraged to call for assist;legs elevated  -CS (r) MB (t) CS (c)               User Key  (r) = Recorded By, (t) = Taken By, (c) = Cosigned By      Initials Name Provider Type    CS Brenda Guevara S, OTR/L, CNT Occupational Therapist    Filomena Mccormack, OT Student OT Student                    Outcome Measures       Row Name 10/17/23 0732          How much help from another is currently needed...    Putting on and taking off regular lower body clothing? 4  -CS (r) MB (t) CS (c)     Bathing (including washing, rinsing, and drying) 4  -CS (r) MB (t) CS (c)     Toileting (which includes using toilet bed pan or urinal) 4  -CS (r) MB (t) CS (c)     Putting on and taking off regular upper body clothing 4  -CS (r) MB (t) CS (c)     Taking care of personal grooming (such as brushing teeth) 4  -CS (r) MB (t) CS (c)     Eating meals 4  -CS (r) MB (t) CS (c)     AM-PAC 6 Clicks Score (OT) 24  -CS (r) MB (t)       Row Name 10/17/23 1114          How much help from another person do you currently need...    Turning from your back to your side while in flat bed without using bedrails? 3 (P)   -JS     Moving from lying on back to sitting on the side of a flat bed without bedrails? 3 (P)   -JS     Moving to and from a bed to a chair (including a wheelchair)? 4 (P)   -JS     Standing up from a chair using your arms (e.g., wheelchair, bedside chair)? 4 (P)   -JS     Climbing 3-5 steps with a railing? 3 (P)   -JS     To walk in hospital room? 4 (P)   -JS     AM-PAC 6 Clicks Score (PT) 21 (P)   -JS     Highest level of mobility 6 --> Walked 10 steps or more (P)   -JS       Row Name 10/17/23 1114 10/17/23 0732       Functional Assessment    Outcome Measure Options AM-PAC 6 Clicks Basic Mobility (PT) (P)   -JS AM-PAC 6 Clicks Daily Activity (OT)  -CS (r) MB (t) CS (c)              User Key  (r) = Recorded By, (t) = Taken By, (c) = Cosigned By      Initials Name Provider Type    CS Brenda Guevara, OTR/L, CNT Occupational Therapist    Igor Bar, PT Student PT Student    Filomena Mccormack, OT Student OT Student                    Occupational Therapy Education       Title: PT OT SLP Therapies (Done)       Topic: Occupational Therapy (Done)       Point: ADL training (Done)       Description:   Instruct learner(s) on  proper safety adaptation and remediation techniques during self care or transfers.   Instruct in proper use of assistive devices.                  Learning Progress Summary             Patient Acceptance, E, VU by MB at 10/17/2023 0840                         Point: Home exercise program (Done)       Description:   Instruct learner(s) on appropriate technique for monitoring, assisting and/or progressing therapeutic exercises/activities.                  Learning Progress Summary             Patient Acceptance, E, VU by MB at 10/17/2023 0840                         Point: Precautions (Done)       Description:   Instruct learner(s) on prescribed precautions during self-care and functional transfers.                  Learning Progress Summary             Patient Acceptance, E, VU by MB at 10/17/2023 0840                         Point: Body mechanics (Done)       Description:   Instruct learner(s) on proper positioning and spine alignment during self-care, functional mobility activities and/or exercises.                  Learning Progress Summary             Patient Acceptance, E, VU by MB at 10/17/2023 0840                                         User Key       Initials Effective Dates Name Provider Type Discipline    MB 08/04/23 -  Filomena Torre OT Student OT Student OT                  OT Recommendation and Plan  Therapy Frequency (OT): evaluation only  Plan of Care Review  Plan of Care Reviewed With: patient  Progress: no change  Outcome Evaluation: OT eval completed. Pt alert and oriented x4 with no c/o. Pt [place] w/o hearing aids in. He completed bed mobility with SBA and all t/f's with CGA utilizing straight cane to help maintain balance. Pt uses straight cane at baseline. Pt amb to BR to complete toileting task and grooming task requiring SBA/CGA. Educated pt on energy conservation techniques including minimal rest breaks with position changes and utilizing tub bench when bathing. At this time, OT isn't  deemed necessary d/t pt being fuctionally at baseline with ADLs/IADLs. Anticipate further d/c home and home with assist.     Time Calculation:         Time Calculation- OT       Row Name 10/17/23 0732             Time Calculation- OT    OT Start Time 0730  +10 min chart review  -CS (r) MB (t) CS (c)      OT Stop Time 0813  -CS (r) MB (t) CS (c)      OT Time Calculation (min) 43 min  -CS (r) MB (t)      OT Received On 10/17/23  -CS (r) MB (t) CS (c)         Untimed Charges    OT Eval/Re-eval Minutes 53  -CS (r) MB (t) CS (c)         Total Minutes    Untimed Charges Total Minutes 53  -CS (r) MB (t)       Total Minutes 53  -CS (r) MB (t)                User Key  (r) = Recorded By, (t) = Taken By, (c) = Cosigned By      Initials Name Provider Type    CS Brenda Guevara, OTR/L, CNT Occupational Therapist    Filomena Mccormack, OT Student OT Student                           Filomena Torre, OT Student  10/17/2023

## 2023-10-17 NOTE — PAYOR COMM NOTE
"Rowan Stratton \"ADAN\" (82 y.o. Male)   [id number]  admit 10/16   [facility] phone    fax          Date of Birth   1940    Social Security Number       Address   [address] [place] [address]    Home Phone   [phone]    MRN   [id number]       Rastafari   Muslim    Marital Status                               Admission Date   10/16/23    Admission Type   Emergency    Admitting Provider   Wayne Auguste MD    Attending Provider   Wayne Auguste MD    Department, Room/Bed   [facility] 3A, 347/1       Discharge Date       Discharge Disposition       Discharge Destination                                 Attending Provider: Wayne Auguste MD    Allergies: No Known Allergies    Isolation: None   Infection: None   Code Status: No CPR    Ht: 165.1 cm (65\")   Wt: 67.7 kg (149 lb 3.2 oz)    Admission Cmt: None   Principal Problem: Right upper lobe pneumonia [J18.9]                   Active Insurance as of 10/16/2023       Primary Coverage       Payor Plan Insurance Group Employer/Plan Group    AETNA MEDICARE REPLACEMENT AETNA MEDICARE REPLACEMENT [id number]       Payor Plan Address Payor Plan Phone Number Payor Plan Fax Number Effective Dates    [address] [phone]  1/1/2023 - None Entered    [facility] [id number]         Subscriber Name Subscriber Birth Date Member ID       ROWAN STRATTON 1940 [id number]                     Emergency Contacts        (Rel.) Home Phone Work Phone Mobile Phone    Jyoti Stratton (Spouse) [phone] -- [phone]                 History & Physical        Ada Brito, APRN at 10/16/23 8699       Attestation signed by Petr Hamlin MD at 10/17/23 5621    I have reviewed this documentation and agree.    This visit was performed by both a physician and an APC. I personally evaluated and examined the patient. I performed all aspects of the MDM as " documented.      Electronically signed by Petr Hamlin MD, 10/17/2023, 07:35 CDT.                        [facility] Medicine Services  HISTORY AND PHYSICAL    Date of Admission: 10/16/2023  Primary Care Physician: Santiago Aaron MD    Subjective   Primary Historian: Patient and wife    Chief Complaint: 5-day history of painful cough    History of Present Illness  Juan Luis Collazo is an 82-year-old male with a past medical history of prostate cancer, GERD, hypertension, diffuse disc disease with chronic back pain, macular degeneration, osteopenia and chronic pain.  Patient presents to [facility] emergency department with complaints of a 5-day history of worsening cough that causes pain.  He states he coughs so hard that he ends up vomiting.  He states sputum is gray in color.  Upon arrival he was hypoxic with PO2 88.2 on room air however at rest he is 99% on room air.  He states when he arrived he had been coughing severely and could not catch his breath.  ER work-up reveals elevated troponin not felt to be associated with coronary artery disease, normocytic anemia, anemia studies reveal anemia of chronic disease.  Chest x-ray positive for right upper lobe consolidation.  Respiratory panel negative for COVID or influenza.  Patient states that he fell yesterday walking up the steps.  He does use a cane consistently.  He states he is extremely weak and has poor ability to control his lower extremities.  He is admitted for further evaluation treatment.    Review of Systems   Otherwise complete ROS reviewed and negative except as mentioned in the HPI.    Past Medical History:   Past Medical History:   Diagnosis Date    Arthritis     Back pain     Cancer     CHEST, SKIN CANCER    GERD (gastroesophageal reflux disease)     History of colon polyps     History of prostate cancer     Hypertension     Low back pain     Macular degeneration     Osteopenia      Past  Surgical History:  Past Surgical History:   Procedure Laterality Date    APPENDECTOMY      CATARACT EXTRACTION, BILATERAL      COLONOSCOPY  09/17/2013    Dr. José-One 2-3mm polyp at 20cm; Otherwise normal; Repeat 3 years    COLONOSCOPY N/A 12/07/2022    Procedure: COLONOSCOPY WITH ANESTHESIA;  Surgeon: Bri Alexis MD;  Location: [facility] ENDOSCOPY;  Service: Gastroenterology;  Laterality: N/A;  pre: anemia. hx polyps.  post: polyps. diverticulosis.  no PCP    ENDOSCOPY N/A 12/07/2022    Procedure: ESOPHAGOGASTRODUODENOSCOPY WITH ANESTHESIA;  Surgeon: Bri Alexis MD;  Location: [facility] ENDOSCOPY;  Service: Gastroenterology;  Laterality: N/A;  pre: anemia  post: hiatal hernia. esophagitis.gastric erosions.  no PCP    FOOT SURGERY Right     KYPHOPLASTY Bilateral 07/17/2018    Procedure: L3 KYPHOPLASTY 1-2 LEVELS;  Surgeon: Vega Pandey MD;  Location: [facility] OR;  Service: Neurosurgery    KYPHOPLASTY Bilateral 09/11/2018    Procedure: L4 KYPHOPLASTY WITH BIOPSY;  Surgeon: Vega Pandey MD;  Location: [facility] OR;  Service: Neurosurgery    KYPHOPLASTY WITH BIOPSY N/A 01/25/2022    Procedure: KYPHOPLASTY WITH BIOPSY, THORACIC 6 and LUMBAR 5;  Surgeon: Nick Martínez MD;  Location: [facility] OR;  Service: Neurosurgery;  Laterality: N/A;    KYPHOPLASTY WITH BIOPSY N/A 8/18/2023    Procedure: Thoracic 12, KYPHOPLASTY WITH BIOPSY;  Surgeon: Nick Martínez MD;  Location: [facility] OR;  Service: Neurosurgery;  Laterality: N/A;    PROSTATECTOMY      TONSILLECTOMY      TOTAL SHOULDER REPLACEMENT Bilateral      Social History:  reports that he quit smoking about 32 years ago. His smoking use included cigarettes. He has a 30.00 pack-year smoking history. He has never used smokeless tobacco. He reports current alcohol use. He reports that he does not use drugs.    Family History: family history includes No Known Problems in his mother; Prostate cancer in his father.       Allergies:  No Known  "Allergies    Medications:  Prior to Admission medications    Medication Sig Start Date End Date Taking? Authorizing Provider   cyclobenzaprine (FLEXERIL) 5 MG tablet Take 1 tablet by mouth 3 (Three) Times a Day As Needed for Muscle Spasms. 7/11/23   Santiago Aaron MD   denosumab (PROLIA) 60 MG/ML solution prefilled syringe syringe Inject 1 mL under the skin into the appropriate area as directed Every 6 (Six) Months. 5/3/23   Kourtney, [place] C, APRDAVEY   multivitamins-minerals (PRESERVISION AREDS 2) capsule capsule Take 1 capsule by mouth 2 (Two) Times a Day.    Provider, MD Rosa Elena   naloxone (NARCAN) 4 MG/0.1ML nasal spray Call 911. Don't prime. [place] in 1 nostril for overdose. Repeat in 2-3 minutes in other nostril if no or minimal breathing/responsiveness. 8/18/23   Curtis Woodward APRN   olmesartan (BENICAR) 40 MG tablet Take 1 tablet by mouth Daily. 10/11/23   Santiago Aaron MD   sennosides-docusate (senna-docusate sodium) 8.6-50 MG per tablet Take 2 tablets by mouth Daily. 8/18/23   Curtis Woodward APRN   traMADol (ULTRAM) 50 MG tablet Take 1 tablet by mouth Every 8 (Eight) Hours As Needed for Moderate Pain. Patient is taking spouses prescription. 10/11/23   Santiago Aaron MD     I have utilized all available immediate resources to obtain, update, or review the patient's current medications (including all prescriptions, over-the-counter products, herbals, cannabis/cannabidiol products, and vitamin/mineral/dietary (nutritional) supplements).    Objective     Vital Signs: /60   Pulse 83   Temp 98.1 °F (36.7 °C) (Oral)   Resp (!) 30   Ht 160 cm (62.99\")   Wt 70.3 kg (155 lb)   SpO2 98%   BMI 27.46 kg/m²   Physical Exam  Vitals reviewed.   Constitutional:       Appearance: He is ill-appearing.   HENT:      Head: Normocephalic and atraumatic.      Mouth/Throat:      Mouth: Mucous membranes are moist.      Pharynx: Oropharynx is clear.   Eyes:      Extraocular Movements: Extraocular " movements intact.      Conjunctiva/sclera: Conjunctivae normal.   Cardiovascular:      Rate and Rhythm: Normal rate and regular rhythm.   Pulmonary:      Effort: Pulmonary effort is normal.      Comments: Diminished throughout  Abdominal:      General: There is no distension.      Palpations: Abdomen is soft.   Musculoskeletal:      Cervical back: Normal range of motion and neck supple.      Right lower leg: No edema.      Left lower leg: No edema.      Comments: Generalized weakness and debility   Skin:     General: Skin is warm and dry.   Neurological:      General: No focal deficit present.      Mental Status: He is alert and oriented to person, place, and time.   Psychiatric:         Mood and Affect: Mood normal.         Behavior: Behavior normal.        Results Reviewed:  Lab Results (last 24 hours)       Procedure Component Value Units Date/Time    High Sensitivity Troponin T 2Hr [[id number]]  (Abnormal) Collected: 10/16/23 2038    Specimen: Blood Updated: 10/16/23 2103     HS Troponin T 22 ng/L      Troponin T Delta 5 ng/L     Respiratory Panel PCR w/COVID-19(SARS-CoV-2) [facility] In-House, NP Swab in UTM/VTM, 3-4 HR TAT - Swab, Nasopharynx [[id number]]  (Normal) Collected: 10/16/23 1922    Specimen: Swab from Nasopharynx Updated: 10/16/23 2035     ADENOVIRUS, PCR Not Detected     Coronavirus 229E Not Detected     Coronavirus HKU1 Not Detected     Coronavirus NL63 Not Detected     Coronavirus OC43 Not Detected     COVID19 Not Detected     Human Metapneumovirus Not Detected     Human Rhinovirus/Enterovirus Not Detected     Influenza A PCR Not Detected     Influenza B PCR Not Detected     Parainfluenza Virus 1 Not Detected     Parainfluenza Virus 2 Not Detected     Parainfluenza Virus 3 Not Detected     Parainfluenza Virus 4 Not Detected     RSV, PCR Not Detected     Bordetella pertussis pcr Not Detected     Bordetella parapertussis PCR Not Detected     Chlamydophila pneumoniae PCR Not Detected      Mycoplasma pneumo by PCR Not Detected    Comprehensive Metabolic Panel [[id number]]  (Abnormal) Collected: 10/16/23 1826    Specimen: Blood Updated: 10/16/23 1941     Glucose 112 mg/dL      BUN 28 mg/dL      Creatinine 1.02 mg/dL      Sodium 137 mmol/L      Potassium 4.2 mmol/L      Chloride 98 mmol/L      CO2 28.0 mmol/L      Calcium 10.8 mg/dL      Total Protein 8.7 g/dL      Albumin 3.4 g/dL      ALT (SGPT) 13 U/L      AST (SGOT) 15 U/L      Alkaline Phosphatase 92 U/L      Total Bilirubin 0.6 mg/dL      Globulin 5.3 gm/dL      A/G Ratio 0.6 g/dL      BUN/Creatinine Ratio 27.5     Anion Gap 11.0 mmol/L      eGFR 72.5 mL/min/1.73     Lactic Acid, Plasma [[id number]]  (Normal) Collected: 10/16/23 1826    Specimen: Blood Updated: 10/1940     Lactate 1.2 mmol/L     BNP [[id number]]  (Normal) Collected: 10/16/23 1826    Specimen: Blood Updated: 10/16/23 1937     proBNP 284.9 pg/mL     High Sensitivity Troponin T [[id number]]  (Abnormal) Collected: 10/16/23 1826    Specimen: Blood Updated: 10/16/23 1936     HS Troponin T 17 ng/L     Blood Gas, Arterial - [[id number]]  (Abnormal) Collected: 10/16/23 1937    Specimen: Arterial Blood Updated: 10/16/23 1936     Site Right Radial     Bj's Test Positive     pH, Arterial 7.427 pH units      pCO2, Arterial 42.3 mm Hg      pO2, Arterial 58.2 mm Hg      Comment: 84 Value below reference range        HCO3, Arterial 27.8 mmol/L      Comment: 83 Value above reference range        Base Excess, Arterial 3.1 mmol/L      Comment: 83 Value above reference range        O2 Saturation, Arterial 92.2 %      Comment: 84 Value below reference range        Temperature 37.0 C      Barometric Pressure for Blood Gas 752 mmHg      Modality Room Air     Ventilator Mode NA     Collected by [id number]     Comment: Meter: [id number]     :  [id number]        pCO2, Temperature Corrected 42.3 mm Hg      pH, Temp Corrected 7.427 pH Units      pO2, Temperature Corrected 58.2 mm Hg     Blood  Culture - Blood, Arm, Right [[id number]] Collected: 10/16/23 1922    Specimen: Blood from Arm, Right Updated: 10/16/23 1931    Blood Culture - Blood, Arm, Left [[id number]] Collected: 10/16/23 1922    Specimen: Blood from Arm, Left Updated: 10/16/23 1930    aPTT [[id number]]  (Normal) Collected: 10/16/23 1826    Specimen: Blood Updated: 10/16/23 1929     PTT 30.2 seconds     Protime-INR [[id number]]  (Abnormal) Collected: 10/16/23 1826    Specimen: Blood Updated: 10/16/23 1929     Protime 15.8 Seconds      INR 1.24    CBC Auto Differential [[id number]]  (Abnormal) Collected: 10/16/23 1826    Specimen: Blood Updated: 10/16/23 1924     WBC 12.97 10*3/mm3      RBC 3.60 10*6/mm3      Hemoglobin 10.6 g/dL      Hematocrit 33.2 %      MCV 92.2 fL      MCH 29.4 pg      MCHC 31.9 g/dL      RDW 14.1 %      RDW-SD 48.3 fl      MPV 10.3 fL      Platelets 351 10*3/mm3      Neutrophil % 75.5 %      Lymphocyte % 12.1 %      Monocyte % 10.7 %      Eosinophil % 0.4 %      Basophil % 0.4 %      Immature Grans % 0.9 %      Neutrophils, Absolute 9.79 10*3/mm3      Lymphocytes, Absolute 1.57 10*3/mm3      Monocytes, Absolute 1.39 10*3/mm3      Eosinophils, Absolute 0.05 10*3/mm3      Basophils, Absolute 0.05 10*3/mm3      Immature Grans, Absolute 0.12 10*3/mm3      nRBC 0.0 /100 WBC           Imaging Results (Last 24 Hours)       Procedure Component Value Units Date/Time    XR Chest 1 View [[id number]] Collected: 10/16/23 2010     Updated: 10/16/23 2014    Narrative:      EXAM/TECHNIQUE: XR CHEST 1 VW-     INDICATION: Cough, shortness of breath     COMPARISON: 4/14/2023     FINDINGS:     Cardiac silhouette is within normal limits and stable. No pleural  effusion or visible pneumothorax. Right upper lobe consolidation is  present. Kyphoplasty changes in the thoracolumbar spine are noted.  Proximal imaged bilateral shoulder arthroplasty.       Impression:      Right upper lobe consolidation, highly suspicious for pneumonia.  Recommend follow-up  to document resolution.     This report was signed and finalized on 10/16/2023 8:11 PM CDT by Dr. Audie Au MD.               Assessment / Plan   Assessment:   Active Hospital Problems    Diagnosis     **Right upper lobe pneumonia     Gait instability     Pain initiated by coughing     Hypoxia     Normocytic anemia        Treatment Plan  1.  The patient will be admitted to Dr. Hamlin's service here at [facility].   2.  Continue Rocephin and azithromycin as started in the emergency department  3.  Home medications reviewed and restarted as appropriate  4.  DVT prophylaxis with Lovenox  5.  Supplemental oxygen as needed, incentive spirometry, continuous pulse oximetry, DuoNebs, ABGs as needed number Romilar-AC for cough and pain  6.  Labs in a.m., urine studies for Legionella and strep pneumoniae, respiratory culture, MRSA screening, stool for occult blood  7.  Consult  as patient may need home health, consult PT and OT strengthening    Medical Decision Making  Number and Complexity of problems: 5  Differential Diagnosis: None    Conditions and Status        Condition is unchanged.     [place] Data  External documents reviewed: No  Cardiac tracing (EKG, telemetry) interpretation: Reviewed  Radiology interpretation: Reviewed  Labs reviewed: Yes  Any tests that were considered but not ordered: No     Decision rules/scores evaluated (example [id number], [place], etc): None     Discussed with: Patient, wife and Dr. Hamlin     Care Planning  Shared decision making: Patient, wife Jyoti and Pap Dr. Hamlin  Code status and discussions: DNR/DNI    Disposition  Social Determinants of Health that impact treatment or disposition: None  Estimated length of stay is 2+ days.     I confirmed that the patient's advanced care plan is present, code status is documented, and a surrogate decision maker is listed in the patient's medical record.     The patient's surrogate decision maker is wife Jyoti.      The patient was seen and examined by me on 10/16/2023 at 10:09 PM.    Electronically signed by DIEGO Bowser, 10/16/23, 22:44 CDT.               Electronically signed by Petr Hamlin MD at 10/17/23 0735          Emergency Department Notes        Jhonny Florentino Jr., MD at 10/16/23 2130          HPI:    Patient is a 82-year-old white male who presents to the emergency room with a complaint of having a 5-day history of worsening shortness of breath.  Patient also states that he is having some chest pain when he takes a deep breath.  Patient's had a cough that has been productive primarily white but lightly yellow-tinged sputum.  Patient denies any injury.  Patient states that he is beginning to get weaker and weaker over the last few days.  Patient denies any known exposure to anyone with other illnesses.  Patient denies any nausea vomiting or diarrhea.    REVIEW OF SYSTEMS  CONSTITUTIONAL: Positive for generalized fatigue and chills   EYES:  No complaints of discharge   ENT: No complaints of sore throat or ear pain  CARDIOVASCULAR: Positive for chest wall pain  RESPIRATORY: Positive for painful respirations and cough productive sputum GI:  No complaints of abdominal pain, nausea, vomiting, or diarrhea  MUSCULOSKELETAL:  No complaints of back pain  SKIN:  No complaints of rash  NEUROLOGIC:  No complaints of headache, focal weakness, or sensory changes  ENDOCRINE:  No complaints of polyuria or polydipsia  LYMPHATIC:  No complaints of swollen glands  GENITOURINARY: No complaints of urinary frequency or hematuria        PAST MEDICAL HISTORY  Past Medical History:   Diagnosis Date    Arthritis     Back pain     Cancer     CHEST, SKIN CANCER    GERD (gastroesophageal reflux disease)     History of colon polyps     History of prostate cancer     Hypertension     Low back pain     Macular degeneration     Osteopenia        FAMILY HISTORY  Family History   Problem Relation Age of Onset    No  Known Problems Mother     Prostate cancer Father        SOCIAL HISTORY  Social History     Socioeconomic History    Marital status:    Tobacco Use    Smoking status: Former     Packs/day: 1.00     Years: 30.00     Additional pack years: 0.00     Total pack years: 30.00     Types: Cigarettes     Quit date: 9/10/1991     Years since quittin.1    Smokeless tobacco: Never    Tobacco comments:     1991   Vaping Use    Vaping Use: Never used   Substance and Sexual Activity    Alcohol use: Yes     Comment: occasional drink    Drug use: No    Sexual activity: Not Currently     Partners: Female     Birth control/protection: Post-menopausal       IMMUNIZATION HISTORY  Deferred to primary care physician.    SURGICAL HISTORY  Past Surgical History:   Procedure Laterality Date    APPENDECTOMY      CATARACT EXTRACTION, BILATERAL      COLONOSCOPY  2013    Dr. José-One 2-3mm polyp at 20cm; Otherwise normal; Repeat 3 years    COLONOSCOPY N/A 2022    Procedure: COLONOSCOPY WITH ANESTHESIA;  Surgeon: Bri Alexis MD;  Location: [facility] ENDOSCOPY;  Service: Gastroenterology;  Laterality: N/A;  pre: anemia. hx polyps.  post: polyps. diverticulosis.  no PCP    ENDOSCOPY N/A 2022    Procedure: ESOPHAGOGASTRODUODENOSCOPY WITH ANESTHESIA;  Surgeon: Bri Alexis MD;  Location: [facility] ENDOSCOPY;  Service: Gastroenterology;  Laterality: N/A;  pre: anemia  post: hiatal hernia. esophagitis.gastric erosions.  no PCP    FOOT SURGERY Right     KYPHOPLASTY Bilateral 2018    Procedure: L3 KYPHOPLASTY 1-2 LEVELS;  Surgeon: Vega Pandey MD;  Location: [facility] OR;  Service: Neurosurgery    KYPHOPLASTY Bilateral 2018    Procedure: L4 KYPHOPLASTY WITH BIOPSY;  Surgeon: Vega Pandey MD;  Location: [facility] OR;  Service: Neurosurgery    KYPHOPLASTY WITH BIOPSY N/A 2022    Procedure: KYPHOPLASTY WITH BIOPSY, THORACIC 6 and LUMBAR 5;  Surgeon: Nick Martínez MD;  Location:   [facility] OR;  Service: Neurosurgery;  Laterality: N/A;    KYPHOPLASTY WITH BIOPSY N/A 8/18/2023    Procedure: Thoracic 12, KYPHOPLASTY WITH BIOPSY;  Surgeon: Nick Martínez MD;  Location:  [facility] OR;  Service: Neurosurgery;  Laterality: N/A;    PROSTATECTOMY      TONSILLECTOMY      TOTAL SHOULDER REPLACEMENT Bilateral        CURRENT MEDICATIONS    Current Facility-Administered Medications:     piperacillin-tazobactam (ZOSYN) IVPB 3.375 g in 100 mL NS (CD), 3.375 g, Intravenous, Once, Jhonny Florentino Jr., MD    sodium chloride 0.9 % flush 10 mL, 10 mL, Intravenous, PRN, Jhonny Florentino Jr., MD    [COMPLETED] Insert Peripheral IV, , , Once **AND** sodium chloride 0.9 % flush 10 mL, 10 mL, Intravenous, PRN, Jhonny Florentino Jr., MD    Current Outpatient Medications:     cyclobenzaprine (FLEXERIL) 5 MG tablet, Take 1 tablet by mouth 3 (Three) Times a Day As Needed for Muscle Spasms., Disp: 30 tablet, Rfl: 0    denosumab (PROLIA) 60 MG/ML solution prefilled syringe syringe, Inject 1 mL under the skin into the appropriate area as directed Every 6 (Six) Months., Disp: 180 mL, Rfl: 1    multivitamins-minerals (PRESERVISION AREDS 2) capsule capsule, Take 1 capsule by mouth 2 (Two) Times a Day., Disp: , Rfl:     naloxone (NARCAN) 4 MG/0.1ML nasal spray, Call 911. Don't prime. [place] in 1 nostril for overdose. Repeat in 2-3 minutes in other nostril if no or minimal breathing/responsiveness., Disp: 2 each, Rfl: 0    olmesartan (BENICAR) 40 MG tablet, Take 1 tablet by mouth Daily., Disp: 90 tablet, Rfl: 3    sennosides-docusate (senna-docusate sodium) 8.6-50 MG per tablet, Take 2 tablets by mouth Daily., Disp: 30 tablet, Rfl: 0    traMADol (ULTRAM) 50 MG tablet, Take 1 tablet by mouth Every 8 (Eight) Hours As Needed for Moderate Pain. Patient is taking spouses prescription., Disp: 180 tablet, Rfl: 1    ALLERGIES  No Known Allergies      Respiratory Exam    VITAL SIGNS:   /60   Pulse 83   Temp 98.1 °F  "(36.7 °C) (Oral)   Resp (!) 30   Ht 160 cm (62.99\")   Wt 70.3 kg (155 lb)   SpO2 98%   BMI 27.46 kg/m²     Constitutional: Patient is alert and in no distress.  Patient with moderate right-sided chest discomfort worse with inspiration.    ENT: There is a normal pharynx with no acute erythema or exudate and oral mucosa is moist.  Nose is clear with no drainage.  Tympanic membranes intact and non-erythemic    Cardiovascular: 1 S2 with a diastolic murmur 1 out of 5.    Respiratory: Positive tender palpation over the right anterior chest wall.  There is coarse rhonchi noted in the right upper lobe.  And decreased breath sounds in bilateral lower lobes.    Abdomen: Soft nontender bowel sounds are normal in all 4 quadrants there is no rebound or guarding noted.  There is no abdominal distention or hepatosplenomegaly.    Genitourinary: Patient is voiding appropriately.    Integument: No acute lesions noted color appears to be normal.    Lexis Coma Scale: Total score 15    Neurological: Patient is alert and oriented x4 in no acute findings noted.  Speech is fluent and cognition is normal.  No evidence of acute CVA.  Cranial nerves II through XII intact.  Patient with normal motor function as well as reflexes and sensation    Psychiatric: Normal affect and mood      RADIOLOGY/PROCEDURES    XR Chest 1 View   Final Result   Right upper lobe consolidation, highly suspicious for pneumonia.   Recommend follow-up to document resolution.       This report was signed and finalized on 10/16/2023 8:11 PM CDT by Dr. Audie Au MD.                FUTURE APPOINTMENTS     Future Appointments   Date Time Provider Department Center   10/17/2023  2:30 PM Aleida Valle APRN [facility]   10/23/2023  4:30 PM Nick Mratínez MD [facility]   11/3/2023  1:45 PM Santiago Aaron MD [facility]   11/6/2023  1:30 PM Santiago Aaron MD [facility]            COURSE & MEDICAL DECISION MAKING   "   Patient's partial differential diagnosis can include: Pneumonia, pneumonitis, pneumothorax, bronchiolitis, COPD, bronchospasm, and other        Sat down and discussed the results of the patient's laboratory and radiological test with him and explained that due to the fact that he has a large right upper lobe pneumonia and the fact that he has elevated troponin would recommend admitting to the hospital for treatment.  The patient and the family member feels comfortable with this plan.    Discussed the case with the hospitalist Dr. Hamlin who will admit the patient to his medical service.      We will contact the hospitalist for admission.        Patient's level of risk: Moderate        CRITICAL CARE    CRITICAL CARE: No    CRITICAL CARE TIME: None      The patient's last clinical visit to PCP in the [employer] electronic old medical record was reviewed by me:     Also Old charts were reviewed per [employer] EMR.  Pertinent details are summarized above.  All laboratory, radiologic, and EKG studies that were performed in the Emergency Department were a necessary part of the evaluation needed to exclude unstable or  emergent medical conditions:     Patient was hemodynamically and neurologically stable in the ED.   Pertinent studies were reviewed as above.     Recent Results (from the past 24 hour(s))   ECG 12 Lead Dyspnea    Collection Time: 10/16/23  6:17 PM   Result Value Ref Range    QT Interval 324 ms    QTC Interval 400 ms   Green Top (Gel)    Collection Time: 10/16/23  6:26 PM   Result Value Ref Range    Extra Tube Hold for add-ons.    Lavender Top    Collection Time: 10/16/23  6:26 PM   Result Value Ref Range    Extra Tube hold for add-on    Red Top    Collection Time: 10/16/23  6:26 PM   Result Value Ref Range    Extra Tube Hold for add-ons.    Light Blue Top    Collection Time: 10/16/23  6:26 PM   Result Value Ref Range    Extra Tube Hold for add-ons.    Comprehensive Metabolic Panel    Collection Time: 10/16/23  6:26  PM    Specimen: Blood   Result Value Ref Range    Glucose 112 (H) 65 - 99 mg/dL    BUN 28 (H) 8 - 23 mg/dL    Creatinine 1.02 0.76 - 1.27 mg/dL    Sodium 137 136 - 145 mmol/L    Potassium 4.2 3.5 - 5.2 mmol/L    Chloride 98 98 - 107 mmol/L    CO2 28.0 22.0 - 29.0 mmol/L    Calcium 10.8 (H) 8.6 - 10.5 mg/dL    Total Protein 8.7 (H) 6.0 - 8.5 g/dL    Albumin 3.4 (L) 3.5 - 5.2 g/dL    ALT (SGPT) 13 1 - 41 U/L    AST (SGOT) 15 1 - 40 U/L    Alkaline Phosphatase 92 39 - 117 U/L    Total Bilirubin 0.6 0.0 - 1.2 mg/dL    Globulin 5.3 gm/dL    A/G Ratio 0.6 g/dL    BUN/Creatinine Ratio 27.5 (H) 7.0 - 25.0    Anion Gap 11.0 5.0 - 15.0 mmol/L    eGFR 72.5 >60.0 mL/min/1.73   aPTT    Collection Time: 10/16/23  6:26 PM    Specimen: Blood   Result Value Ref Range    PTT 30.2 24.5 - 36.0 seconds   Protime-INR    Collection Time: 10/16/23  6:26 PM    Specimen: Blood   Result Value Ref Range    Protime 15.8 (H) 11.8 - 14.8 Seconds    INR 1.24 (H) 0.91 - 1.09   BNP    Collection Time: 10/16/23  6:26 PM    Specimen: Blood   Result Value Ref Range    proBNP 284.9 0.0 - 1,800.0 pg/mL   High Sensitivity Troponin T    Collection Time: 10/16/23  6:26 PM    Specimen: Blood   Result Value Ref Range    HS Troponin T 17 (H) <15 ng/L   Lactic Acid, Plasma    Collection Time: 10/16/23  6:26 PM    Specimen: Blood   Result Value Ref Range    Lactate 1.2 0.5 - 2.0 mmol/L   CBC Auto Differential    Collection Time: 10/16/23  6:26 PM    Specimen: Blood   Result Value Ref Range    WBC 12.97 (H) 3.40 - 10.80 10*3/mm3    RBC 3.60 (L) 4.14 - 5.80 10*6/mm3    Hemoglobin 10.6 (L) 13.0 - 17.7 g/dL    Hematocrit 33.2 (L) 37.5 - 51.0 %    MCV 92.2 79.0 - 97.0 fL    MCH 29.4 26.6 - 33.0 pg    MCHC 31.9 31.5 - 35.7 g/dL    RDW 14.1 12.3 - 15.4 %    RDW-SD 48.3 37.0 - 54.0 fl    MPV 10.3 6.0 - 12.0 fL    Platelets 351 140 - 450 10*3/mm3    Neutrophil % 75.5 42.7 - 76.0 %    Lymphocyte % 12.1 (L) 19.6 - 45.3 %    Monocyte % 10.7 5.0 - 12.0 %    Eosinophil % 0.4  0.3 - 6.2 %    Basophil % 0.4 0.0 - 1.5 %    Immature Grans % 0.9 (H) 0.0 - 0.5 %    Neutrophils, Absolute 9.79 (H) 1.70 - 7.00 10*3/mm3    Lymphocytes, Absolute 1.57 0.70 - 3.10 10*3/mm3    Monocytes, Absolute 1.39 (H) 0.10 - 0.90 10*3/mm3    Eosinophils, Absolute 0.05 0.00 - 0.40 10*3/mm3    Basophils, Absolute 0.05 0.00 - 0.20 10*3/mm3    Immature Grans, Absolute 0.12 (H) 0.00 - 0.05 10*3/mm3    nRBC 0.0 0.0 - 0.2 /100 WBC   Respiratory Panel PCR w/COVID-19(SARS-CoV-2) CASANDRA/CANDY/GEORGIANA/PAD/COR/MAD/CHLOÉ In-House, NP Swab in [facility], 3-4 HR TAT - Swab, Nasopharynx    Collection Time: 10/16/23  7:22 PM    Specimen: Nasopharynx; Swab   Result Value Ref Range    ADENOVIRUS, PCR Not Detected Not Detected    Coronavirus 229E Not Detected Not Detected    Coronavirus HKU1 Not Detected Not Detected    Coronavirus NL63 Not Detected Not Detected    Coronavirus OC43 Not Detected Not Detected    COVID19 Not Detected Not Detected - Ref. Range    Human Metapneumovirus Not Detected Not Detected    Human Rhinovirus/Enterovirus Not Detected Not Detected    Influenza A PCR Not Detected Not Detected    Influenza B PCR Not Detected Not Detected    Parainfluenza Virus 1 Not Detected Not Detected    Parainfluenza Virus 2 Not Detected Not Detected    Parainfluenza Virus 3 Not Detected Not Detected    Parainfluenza Virus 4 Not Detected Not Detected    RSV, PCR Not Detected Not Detected    Bordetella pertussis pcr Not Detected Not Detected    Bordetella parapertussis PCR Not Detected Not Detected    Chlamydophila pneumoniae PCR Not Detected Not Detected    Mycoplasma pneumo by PCR Not Detected Not Detected   Blood Gas, Arterial -    Collection Time: 10/16/23  7:37 PM    Specimen: Arterial Blood   Result Value Ref Range    Site Right Radial     Bj's Test Positive     pH, Arterial 7.427 7.350 - 7.450 pH units    pCO2, Arterial 42.3 35.0 - 45.0 mm Hg    pO2, Arterial 58.2 (L) 83.0 - 108.0 mm Hg    HCO3, Arterial 27.8 (H) 20.0 - 26.0 mmol/L     Base Excess, Arterial 3.1 (H) 0.0 - 2.0 mmol/L    O2 Saturation, Arterial 92.2 (L) 94.0 - 99.0 %    Temperature 37.0 C    Barometric Pressure for Blood Gas 752 mmHg    Modality Room Air     Ventilator Mode NA     Collected by [id number]     pCO2, Temperature Corrected 42.3 35 - 45 mm Hg    pH, Temp Corrected 7.427 7.350 - 7.450 pH Units    pO2, Temperature Corrected 58.2 (L) 83 - 108 mm Hg   High Sensitivity Troponin T 2Hr    Collection Time: 10/16/23  8:38 PM    Specimen: Blood   Result Value Ref Range    HS Troponin T 22 (H) <15 ng/L    Troponin T Delta 5 (C) >=-4 - <+4 ng/L       The patient received:  Medications   sodium chloride 0.9 % flush 10 mL (has no administration in time range)   sodium chloride 0.9 % flush 10 mL (has no administration in time range)   piperacillin-tazobactam (ZOSYN) IVPB 3.375 g in 100 mL NS (CD) (has no administration in time range)   magnesium sulfate 2g/50 mL (PREMIX) infusion (0 g Intravenous Stopped 10/16/23 2141)            ED Disposition       ED Disposition   Intended Admit    Condition   --    Comment   --                   Dragon disclaimer:  Part of this note may be an electronic transcription/translation of spoken language to printed text using the Dragon Dictation System. Due to this some dictation errors could occur in the chart.      I have reviewed the patient’s prescription history via a prescription monitoring program.  This information is consistent with my knowledge of the patient’s controlled substance use history.    Patient evaluate during Coronavirus Pandemic. Isolation practices followed according to [facility] policy.    FINAL IMPRESSION   Diagnosis Plan   1. Pneumonia of right upper lobe due to infectious organism        2. Shortness of breath        3. Painful respiration        4. Elevated troponin              MD Chadd Wan Jr, Thomas Mark Jr., MD  10/16/23 0269      Electronically signed by Jhonny Florentino Jr., MD at 10/16/23  2203       Vital Signs (last day)       Date/Time Temp Temp src Pulse Resp BP Patient Position SpO2    10/17/23 1416 -- -- 89 16 -- -- 97    10/17/23 1407 -- -- 88 16 -- -- 98    10/17/23 1112 -- -- 89 16 -- -- 97    10/17/23 1106 -- -- 89 16 -- -- 92    10/17/23 0759 98.2 (36.8) Oral 98 16 101/55 Lying 96    10/17/23 0700 -- -- 80 -- -- -- --    10/17/23 0655 -- -- 76 18 -- -- 96    10/17/23 0340 -- -- 78 18 110/60 Lying 97    10/17/23 0003 -- -- 83 -- -- -- 98    10/16/23 2357 -- -- 89 16 -- -- 98    10/16/23 2324 98 (36.7) Oral 93 20 103/81 Lying 98    10/16/23 2146 -- -- 83 -- 112/60 -- 98    10/16/23 2131 -- -- 87 -- 109/62 -- 98    10/16/23 2116 -- -- 87 -- 105/61 -- 100    10/16/23 2101 -- -- 87 -- 116/67 -- 99    10/16/23 2046 -- -- 86 -- 106/62 -- 96    10/16/23 2031 -- -- 87 -- 110/59 -- 97    10/16/23 2016 -- -- 86 -- 108/62 -- 99    10/16/23 2001 -- -- 89 -- 106/67 -- 97    10/16/23 1946 -- -- 89 -- 120/64 -- 99    10/16/23 1931 -- -- 90 -- 127/64 -- 95    10/16/23 1916 -- -- 91 -- 100/65 -- 100    10/16/23 1901 -- -- 92 -- 122/62 -- 100    10/16/23 1846 -- -- 88 -- 105/65 -- 98    10/16/23 1831 -- -- 89 -- 109/67 -- 98    10/16/23 1816 -- -- 93 -- 107/64 -- 96    10/16/23 1813 98.1 (36.7) Oral -- -- -- -- --    10/16/23 1809 -- -- 96 30 111/68 Lying 99          Current Facility-Administered Medications   Medication Dose Route Frequency Provider Last Rate Last Admin    acetaminophen (TYLENOL) tablet 650 mg  650 mg Oral Q4H PRN Ada Brito APRN        Or    acetaminophen (TYLENOL) 160 MG/5ML oral solution 650 mg  650 mg Oral Q4H PRN Ada Brito APRN        Or    acetaminophen (TYLENOL) suppository 650 mg  650 mg Rectal Q4H PRN Ada Brito APRN        [START ON 10/18/2023] cefTRIAXone (ROCEPHIN) 1 g in sodium chloride 0.9 % 100 mL IVPB  1 g Intravenous Q24H Arnoldo Cadet APRN        And    azithromycin (ZITHROMAX) 500 mg in sodium chloride 0.9 % 250 mL IVPB-VTB  500 mg Intravenous  Q24H Arnoldo Cadet APRN        cyclobenzaprine (FLEXERIL) tablet 5 mg  5 mg Oral TID PRN Ada Brito APRN        Enoxaparin Sodium (LOVENOX) syringe 40 mg  40 mg Subcutaneous Daily Ada Brito APRN   40 mg at 10/17/23 0029    guaiFENesin (MUCINEX) 12 hr tablet 600 mg  600 mg Oral Q12H Arnoldo Cadet APRN   600 mg at 10/17/23 0923    guaiFENesin-codeine (ROMILAR-AC) syrup 5 mL  5 mL Oral Q6H PRN Ada Brito APRN   5 mL at 10/17/23 0231    ipratropium-albuterol (DUO-NEB) nebulizer solution 3 mL  3 mL Nebulization 4x Daily - RT Aad Brito APRN   3 mL at 10/17/23 1407    ondansetron (ZOFRAN) tablet 4 mg  4 mg Oral Q6H PRN Ada Brito APRN        Or    ondansetron (ZOFRAN) injection 4 mg  4 mg Intravenous Q6H PRN Ada Brito APRN        sennosides-docusate (PERICOLACE) 8.6-50 MG per tablet 2 tablet  2 tablet Oral Daily Ada Brito APRN   2 tablet at 10/17/23 0923    sodium chloride 0.9 % flush 10 mL  10 mL Intravenous PRN Jhonny Florentino Jr., MD        sodium chloride 0.9 % flush 10 mL  10 mL Intravenous PRN Jhonny Florentino Jr., MD        sodium chloride 0.9 % flush 10 mL  10 mL Intravenous Q12H Ada Brito APRN   10 mL at 10/17/23 0923    sodium chloride 0.9 % flush 10 mL  10 mL Intravenous PRN Ada Brito APRN        sodium chloride 0.9 % infusion 40 mL  40 mL Intravenous PRN Ada Brito APRN        sodium chloride 0.9 % infusion  75 mL/hr Intravenous Continuous Ada Brito APRN 75 mL/hr at 10/17/23 0032 75 mL/hr at 10/17/23 0032    traMADol (ULTRAM) tablet 50 mg  50 mg Oral Q8H PRN Ada Brito APRN            Physician Progress Notes (last 24 hours)        Arnoldo Cadet APRN at 10/17/23 0914       Attestation signed by Wayne Auguste MD at 10/17/23 8224    This visit was performed by both a physician and an APC. I performed all aspects of the MDM as documented.    Electronically signed by Wayne Auguste MD,  10/17/2023, 14:35 CDT.                       [facility]  INPATIENT PROGRESS NOTE    Patient Name: Juan Luis Collazo  Date of Admission: 10/16/2023  Today's Date: 10/17/23  Length of Stay: 1  Primary Care Physician: Santiago Aaron MD    Subjective   Chief Complaint: Cough, shortness of breath  HPI   Patient lying in bed on room air in no acute distress.  He denies chest pain, he does endorse shortness of breath.  He is not requiring supplemental oxygen at this time.  He also tells me he is unable to ambulate.  He describes weakness in bilateral lower extremities.  He denies changes of sensation in these extremities, he also denies incontinence of stool or urine.  He denies lower back pain.  Continue care for pneumonia including pulmonary toileting, Mucinex, IV antibiotics.  Emphasis on physical therapy and ambulation.  Discussed regaining functional status with patient to return home versus SNF.  He adamantly defers SNF as an option at this time.  I encouraged him to work hard with physical therapy.    Review of Systems   All pertinent negatives and positives are as above. All other systems have been reviewed and are negative unless otherwise stated.     Objective    Temp:  [98 °F (36.7 °C)-98.2 °F (36.8 °C)] 98.2 °F (36.8 °C)  Heart Rate:  [76-98] 98  Resp:  [16-30] 16  BP: (100-127)/(55-81) 101/55  Physical Exam  Vitals reviewed.   Constitutional:       Appearance: Lying in bed, appears ill in no acute distress, room air, no visitors at bedside  HENT:      Head: Normocephalic and atraumatic.      Mouth/Throat:      Mouth: Mucous membranes are moist.      Pharynx: Oropharynx is clear.   Eyes:      Extraocular Movements: Extraocular movements intact.      Conjunctiva/sclera: Conjunctivae normal.   Cardiovascular:      Rate and Rhythm: Normal rate and regular rhythm.   Pulmonary:      Effort: Pulmonary effort is normal.      Comments: Diminished with scattered  rhonchi  Abdominal:      General: There is no distension.      Palpations: Abdomen is soft.   Musculoskeletal:      Cervical back: Normal range of motion and neck supple.      Right lower leg: No edema.      Left lower leg: No edema.      Comments: Weak  Skin:     General: Skin is warm and dry.   Neurological:      General: No focal deficit present.      Mental Status: He is alert and oriented to person, place, and time.   Psychiatric:         Mood and Affect: Mood normal.         Behavior: Behavior normal.  Conversational    Results Review:  I have reviewed the labs, radiology results, and diagnostic studies.    Laboratory Data:   Results from last 7 days   Lab Units 10/17/23  0545 10/16/23  1826   WBC 10*3/mm3 10.10 12.97*   HEMOGLOBIN g/dL 10.3* 10.6*   HEMATOCRIT % 33.1* 33.2*   PLATELETS 10*3/mm3 331 351        Results from last 7 days   Lab Units 10/17/23  0545 10/16/23  1826   SODIUM mmol/L 139 137   POTASSIUM mmol/L 4.5 4.2   CHLORIDE mmol/L 101 98   CO2 mmol/L 29.0 28.0   BUN mg/dL 28* 28*   CREATININE mg/dL 0.95 1.02   CALCIUM mg/dL 10.6* 10.8*   BILIRUBIN mg/dL 0.4 0.6   ALK PHOS U/L 92 92   ALT (SGPT) U/L 15 13   AST (SGOT) U/L 19 15   GLUCOSE mg/dL 114* 112*     Radiology Data:   Imaging Results (Last 24 Hours)       Procedure Component Value Units Date/Time    XR Chest 1 View [[id number]] Collected: 10/16/23 2010     Updated: 10/16/23 2014    Narrative:      EXAM/TECHNIQUE: XR CHEST 1 VW-     INDICATION: Cough, shortness of breath     COMPARISON: 4/14/2023     FINDINGS:     Cardiac silhouette is within normal limits and stable. No pleural  effusion or visible pneumothorax. Right upper lobe consolidation is  present. Kyphoplasty changes in the thoracolumbar spine are noted.  Proximal imaged bilateral shoulder arthroplasty.       Impression:      Right upper lobe consolidation, highly suspicious for pneumonia.  Recommend follow-up to document resolution.     This report was signed and finalized on  10/16/2023 8:11 PM CDT by Dr. Audie Au MD.               I have reviewed the patient's current medications.     Assessment/Plan   Assessment  Active Hospital Problems    Diagnosis     **Right upper lobe pneumonia     Gait instability     Pain initiated by coughing     Hypoxia     Normocytic anemia        Treatment Plan  Right upper lobe pneumonia-  Originally with hypoxia, patient was 88% on room air.  Supplemental oxygen utilized and then weaned  MRSA, strep pneumo, Legionella antigens negative  Respiratory PCR negative  Blood culture x2 pending  Respiratory culture pending  Continue Mucinex, DuoNeb, incentive spirometry  Continue IV ceftriaxone, IV azithromycin    Disc disease, chronic back pain, gait instability-  Continue Flexeril, Ultram for pain control  PT/OT     Normocytic anemia of chronic disease-  Hemoglobin was 10.5 on 8/15 of this year.  Hemoglobin 10.3 this morning, continue to closely monitor  No signs or symptoms of active bleeding    Lovenox for DVT prophylaxis    Medical Decision Making  Number and Complexity of problems: 1 acute problem of high complexity in right upper lobe pneumonia.  1 acute problem of high complexity in hypoxia.  1 chronic problem of moderate complexity in disc disease.  1 chronic problem of moderate complexity and chronic back pain.  1 acute problem of moderate complexity in gait instability.  Differential Diagnosis: None    Conditions and Status        Status stable     MDM Data  External documents reviewed: None  Cardiac tracing (EKG, telemetry) interpretation: EKG reviewed  Radiology interpretation: Chest x-ray reviewed, right upper lobe consolidation  Labs reviewed: High-sensitivity troponin, proBNP, CMP, iron profile, lipid panel, PT, INR, PTT, CBC with differential  Any tests that were considered but not ordered: None     Decision rules/scores evaluated (example [id number], Wells, etc): None     Discussed with: Patient, Dr. Auguste     Care Planning  Shared  decision making: Discussed with above, patient agreeable to his plan of care  Code status and discussions: No CPR    Disposition  Social Determinants of Health that impact treatment or disposition: Inability to ambulate currently  I expect the patient to be discharged to home versus SNF when ambulatory or when arrangements made.    Electronically signed by DIEGO Oneil, 10/17/23, 09:20 CDT.      Electronically signed by Wayne Auguste MD at 10/17/23 8302

## 2023-10-17 NOTE — PLAN OF CARE
Goal Outcome Evaluation:  Plan of Care Reviewed With: patient        Progress: no change  Outcome Evaluation: PT eval complete. Pt is A&Ox4 with no complaints of pain this visit. He required SBA for bed mobility and CGA for standing and ambulation. He transferred supine>sit using momentum, and appeared to have decreased balance briefly when standing. He ambulated 275 with a single-point cane with no significant deficits, but demonstrated kyphotic posture. He does have some weakness and numbness in his R foot but it does not effect his functional mobility. He reports no fatigue or shortness of breath with ambulation. He does state that he has felt weak since his recent kyphoplasty and wishes to get stronger. Pt educated on benefits of ambulation and weightbearing exercise for strength maintenance. PT services necessary to address generalized weakness and to maintain functional status while admitted. Anticipate discharge home with assist and OP services for strengthening.      Anticipated Discharge Disposition (PT): home with assist, home with outpatient therapy services   severe

## 2023-10-17 NOTE — ED PROVIDER NOTES
HPI:    Patient is a 82-year-old white male who presents to the emergency room with a complaint of having a 5-day history of worsening shortness of breath.  Patient also states that he is having some chest pain when he takes a deep breath.  Patient's had a cough that has been productive primarily white but lightly yellow-tinged sputum.  Patient denies any injury.  Patient states that he is beginning to get weaker and weaker over the last few days.  Patient denies any known exposure to anyone with other illnesses.  Patient denies any nausea vomiting or diarrhea.    REVIEW OF SYSTEMS  CONSTITUTIONAL: Positive for generalized fatigue and chills   EYES:  No complaints of discharge   ENT: No complaints of sore throat or ear pain  CARDIOVASCULAR: Positive for chest wall pain  RESPIRATORY: Positive for painful respirations and cough productive sputum GI:  No complaints of abdominal pain, nausea, vomiting, or diarrhea  MUSCULOSKELETAL:  No complaints of back pain  SKIN:  No complaints of rash  NEUROLOGIC:  No complaints of headache, focal weakness, or sensory changes  ENDOCRINE:  No complaints of polyuria or polydipsia  LYMPHATIC:  No complaints of swollen glands  GENITOURINARY: No complaints of urinary frequency or hematuria        PAST MEDICAL HISTORY  Past Medical History:   Diagnosis Date    Arthritis     Back pain     Cancer     CHEST, SKIN CANCER    GERD (gastroesophageal reflux disease)     History of colon polyps     History of prostate cancer     Hypertension     Low back pain     Macular degeneration     Osteopenia        FAMILY HISTORY  Family History   Problem Relation Age of Onset    No Known Problems Mother     Prostate cancer Father        SOCIAL HISTORY  Social History     Socioeconomic History    Marital status:    Tobacco Use    Smoking status: Former     Packs/day: 1.00     Years: 30.00     Additional pack years: 0.00     Total pack years: 30.00     Types: Cigarettes     Quit date: 9/10/1991      Years since quittin.1    Smokeless tobacco: Never    Tobacco comments:     qu1991   Vaping Use    Vaping Use: Never used   Substance and Sexual Activity    Alcohol use: Yes     Comment: occasional drink    Drug use: No    Sexual activity: Not Currently     Partners: Female     Birth control/protection: Post-menopausal       IMMUNIZATION HISTORY  Deferred to primary care physician.    SURGICAL HISTORY  Past Surgical History:   Procedure Laterality Date    APPENDECTOMY      CATARACT EXTRACTION, BILATERAL      COLONOSCOPY  2013    Dr. José-One 2-3mm polyp at 20cm; Otherwise normal; Repeat 3 years    COLONOSCOPY N/A 2022    Procedure: COLONOSCOPY WITH ANESTHESIA;  Surgeon: Bri Alexis MD;  Location: [facility] ENDOSCOPY;  Service: Gastroenterology;  Laterality: N/A;  pre: anemia. hx polyps.  post: polyps. diverticulosis.  no PCP    ENDOSCOPY N/A 2022    Procedure: ESOPHAGOGASTRODUODENOSCOPY WITH ANESTHESIA;  Surgeon: Bri Alexis MD;  Location: [facility] ENDOSCOPY;  Service: Gastroenterology;  Laterality: N/A;  pre: anemia  post: hiatal hernia. esophagitis.gastric erosions.  no PCP    FOOT SURGERY Right     KYPHOPLASTY Bilateral 2018    Procedure: L3 KYPHOPLASTY 1-2 LEVELS;  Surgeon: Vega Pandey MD;  Location: [facility] OR;  Service: Neurosurgery    KYPHOPLASTY Bilateral 2018    Procedure: L4 KYPHOPLASTY WITH BIOPSY;  Surgeon: Vega Pandey MD;  Location: [facility] OR;  Service: Neurosurgery    KYPHOPLASTY WITH BIOPSY N/A 2022    Procedure: KYPHOPLASTY WITH BIOPSY, THORACIC 6 and LUMBAR 5;  Surgeon: Nick Martínez MD;  Location:  [facility] OR;  Service: Neurosurgery;  Laterality: N/A;    KYPHOPLASTY WITH BIOPSY N/A 2023    Procedure: Thoracic 12, KYPHOPLASTY WITH BIOPSY;  Surgeon: Nick Martínez MD;  Location:  [facility] OR;  Service: Neurosurgery;  Laterality: N/A;    PROSTATECTOMY      TONSILLECTOMY      TOTAL SHOULDER REPLACEMENT Bilateral   "      CURRENT MEDICATIONS    Current Facility-Administered Medications:     piperacillin-tazobactam (ZOSYN) IVPB 3.375 g in 100 mL NS (CD), 3.375 g, Intravenous, Once, Jhonny Florentino Jr., MD    sodium chloride 0.9 % flush 10 mL, 10 mL, Intravenous, PRN, Jhonny Florentino Jr., MD    [COMPLETED] Insert Peripheral IV, , , Once **AND** sodium chloride 0.9 % flush 10 mL, 10 mL, Intravenous, PRN, Jhonny Florentino Jr., MD    Current Outpatient Medications:     cyclobenzaprine (FLEXERIL) 5 MG tablet, Take 1 tablet by mouth 3 (Three) Times a Day As Needed for Muscle Spasms., Disp: 30 tablet, Rfl: 0    denosumab (PROLIA) 60 MG/ML solution prefilled syringe syringe, Inject 1 mL under the skin into the appropriate area as directed Every 6 (Six) Months., Disp: 180 mL, Rfl: 1    multivitamins-minerals (PRESERVISION AREDS 2) capsule capsule, Take 1 capsule by mouth 2 (Two) Times a Day., Disp: , Rfl:     naloxone (NARCAN) 4 MG/0.1ML nasal spray, Call 911. Don't prime. [place] in 1 nostril for overdose. Repeat in 2-3 minutes in other nostril if no or minimal breathing/responsiveness., Disp: 2 each, Rfl: 0    olmesartan (BENICAR) 40 MG tablet, Take 1 tablet by mouth Daily., Disp: 90 tablet, Rfl: 3    sennosides-docusate (senna-docusate sodium) 8.6-50 MG per tablet, Take 2 tablets by mouth Daily., Disp: 30 tablet, Rfl: 0    traMADol (ULTRAM) 50 MG tablet, Take 1 tablet by mouth Every 8 (Eight) Hours As Needed for Moderate Pain. Patient is taking spouses prescription., Disp: 180 tablet, Rfl: 1    ALLERGIES  No Known Allergies      Respiratory Exam    VITAL SIGNS:   /60   Pulse 83   Temp 98.1 °F (36.7 °C) (Oral)   Resp (!) 30   Ht 160 cm (62.99\")   Wt 70.3 kg (155 lb)   SpO2 98%   BMI 27.46 kg/m²     Constitutional: Patient is alert and in no distress.  Patient with moderate right-sided chest discomfort worse with inspiration.    ENT: There is a normal pharynx with no acute erythema or exudate and oral mucosa is " moist.  Nose is clear with no drainage.  Tympanic membranes intact and non-erythemic    Cardiovascular: 1 S2 with a diastolic murmur 1 out of 5.    Respiratory: Positive tender palpation over the right anterior chest wall.  There is coarse rhonchi noted in the right upper lobe.  And decreased breath sounds in bilateral lower lobes.    Abdomen: Soft nontender bowel sounds are normal in all 4 quadrants there is no rebound or guarding noted.  There is no abdominal distention or hepatosplenomegaly.    Genitourinary: Patient is voiding appropriately.    Integument: No acute lesions noted color appears to be normal.    Lexis Coma Scale: Total score 15    Neurological: Patient is alert and oriented x4 in no acute findings noted.  Speech is fluent and cognition is normal.  No evidence of acute CVA.  Cranial nerves II through XII intact.  Patient with normal motor function as well as reflexes and sensation    Psychiatric: Normal affect and mood      RADIOLOGY/PROCEDURES    XR Chest 1 View   Final Result   Right upper lobe consolidation, highly suspicious for pneumonia.   Recommend follow-up to document resolution.       This report was signed and finalized on 10/16/2023 8:11 PM CDT by Dr. Audie Au MD.                FUTURE APPOINTMENTS     Future Appointments   Date Time Provider Department Center   10/17/2023  2:30 PM Aleida Valle APRN [facility]   10/23/2023  4:30 PM Nick Martínez MD [facility]   11/3/2023  1:45 PM Santiago Aaron MD [facility]   11/6/2023  1:30 PM Santiago Aaron MD [facility]            COURSE & MEDICAL DECISION MAKING     Patient's partial differential diagnosis can include: Pneumonia, pneumonitis, pneumothorax, bronchiolitis, COPD, bronchospasm, and other        Sat down and discussed the results of the patient's laboratory and radiological test with him and explained that due to the fact that he has a large right upper lobe pneumonia and the fact  that he has elevated troponin would recommend admitting to the hospital for treatment.  The patient and the family member feels comfortable with this plan.    Discussed the case with the hospitalist Dr. Hamlin who will admit the patient to his medical service.      We will contact the hospitalist for admission.        Patient's level of risk: Moderate        CRITICAL CARE    CRITICAL CARE: No    CRITICAL CARE TIME: None      The patient's last clinical visit to PCP in the [facility] electronic old medical record was reviewed by me:     Also Old charts were reviewed per [facility] EMR.  Pertinent details are summarized above.  All laboratory, radiologic, and EKG studies that were performed in the Emergency Department were a necessary part of the evaluation needed to exclude unstable or  emergent medical conditions:     Patient was hemodynamically and neurologically stable in the ED.   Pertinent studies were reviewed as above.     Recent Results (from the past 24 hour(s))   ECG 12 Lead Dyspnea    Collection Time: 10/16/23  6:17 PM   Result Value Ref Range    QT Interval 324 ms    QTC Interval 400 ms   Green Top (Gel)    Collection Time: 10/16/23  6:26 PM   Result Value Ref Range    Extra Tube Hold for add-ons.    Lavender Top    Collection Time: 10/16/23  6:26 PM   Result Value Ref Range    Extra Tube hold for add-on    Red Top    Collection Time: 10/16/23  6:26 PM   Result Value Ref Range    Extra Tube Hold for add-ons.    Light Blue Top    Collection Time: 10/16/23  6:26 PM   Result Value Ref Range    Extra Tube Hold for add-ons.    Comprehensive Metabolic Panel    Collection Time: 10/16/23  6:26 PM    Specimen: Blood   Result Value Ref Range    Glucose 112 (H) 65 - 99 mg/dL    BUN 28 (H) 8 - 23 mg/dL    Creatinine 1.02 0.76 - 1.27 mg/dL    Sodium 137 136 - 145 mmol/L    Potassium 4.2 3.5 - 5.2 mmol/L    Chloride 98 98 - 107 mmol/L    CO2 28.0 22.0 - 29.0 mmol/L    Calcium 10.8 (H) 8.6 - 10.5 mg/dL    Total Protein 8.7 (H)  6.0 - 8.5 g/dL    Albumin 3.4 (L) 3.5 - 5.2 g/dL    ALT (SGPT) 13 1 - 41 U/L    AST (SGOT) 15 1 - 40 U/L    Alkaline Phosphatase 92 39 - 117 U/L    Total Bilirubin 0.6 0.0 - 1.2 mg/dL    Globulin 5.3 gm/dL    A/G Ratio 0.6 g/dL    BUN/Creatinine Ratio 27.5 (H) 7.0 - 25.0    Anion Gap 11.0 5.0 - 15.0 mmol/L    eGFR 72.5 >60.0 mL/min/1.73   aPTT    Collection Time: 10/16/23  6:26 PM    Specimen: Blood   Result Value Ref Range    PTT 30.2 24.5 - 36.0 seconds   Protime-INR    Collection Time: 10/16/23  6:26 PM    Specimen: Blood   Result Value Ref Range    Protime 15.8 (H) 11.8 - 14.8 Seconds    INR 1.24 (H) 0.91 - 1.09   BNP    Collection Time: 10/16/23  6:26 PM    Specimen: Blood   Result Value Ref Range    proBNP 284.9 0.0 - 1,800.0 pg/mL   High Sensitivity Troponin T    Collection Time: 10/16/23  6:26 PM    Specimen: Blood   Result Value Ref Range    HS Troponin T 17 (H) <15 ng/L   Lactic Acid, Plasma    Collection Time: 10/16/23  6:26 PM    Specimen: Blood   Result Value Ref Range    Lactate 1.2 0.5 - 2.0 mmol/L   CBC Auto Differential    Collection Time: 10/16/23  6:26 PM    Specimen: Blood   Result Value Ref Range    WBC 12.97 (H) 3.40 - 10.80 10*3/mm3    RBC 3.60 (L) 4.14 - 5.80 10*6/mm3    Hemoglobin 10.6 (L) 13.0 - 17.7 g/dL    Hematocrit 33.2 (L) 37.5 - 51.0 %    MCV 92.2 79.0 - 97.0 fL    MCH 29.4 26.6 - 33.0 pg    MCHC 31.9 31.5 - 35.7 g/dL    RDW 14.1 12.3 - 15.4 %    RDW-SD 48.3 37.0 - 54.0 fl    MPV 10.3 6.0 - 12.0 fL    Platelets 351 140 - 450 10*3/mm3    Neutrophil % 75.5 42.7 - 76.0 %    Lymphocyte % 12.1 (L) 19.6 - 45.3 %    Monocyte % 10.7 5.0 - 12.0 %    Eosinophil % 0.4 0.3 - 6.2 %    Basophil % 0.4 0.0 - 1.5 %    Immature Grans % 0.9 (H) 0.0 - 0.5 %    Neutrophils, Absolute 9.79 (H) 1.70 - 7.00 10*3/mm3    Lymphocytes, Absolute 1.57 0.70 - 3.10 10*3/mm3    Monocytes, Absolute 1.39 (H) 0.10 - 0.90 10*3/mm3    Eosinophils, Absolute 0.05 0.00 - 0.40 10*3/mm3    Basophils, Absolute 0.05 0.00 - 0.20  10*3/mm3    Immature Grans, Absolute 0.12 (H) 0.00 - 0.05 10*3/mm3    nRBC 0.0 0.0 - 0.2 /100 WBC   Respiratory Panel PCR w/COVID-19(SARS-CoV-2) [facility] In-House, NP Swab in UTM/VTM, 3-4 HR TAT - Swab, Nasopharynx    Collection Time: 10/16/23  7:22 PM    Specimen: Nasopharynx; Swab   Result Value Ref Range    ADENOVIRUS, PCR Not Detected Not Detected    Coronavirus 229E Not Detected Not Detected    Coronavirus HKU1 Not Detected Not Detected    Coronavirus NL63 Not Detected Not Detected    Coronavirus OC43 Not Detected Not Detected    COVID19 Not Detected Not Detected - Ref. Range    Human Metapneumovirus Not Detected Not Detected    Human Rhinovirus/Enterovirus Not Detected Not Detected    Influenza A PCR Not Detected Not Detected    Influenza B PCR Not Detected Not Detected    Parainfluenza Virus 1 Not Detected Not Detected    Parainfluenza Virus 2 Not Detected Not Detected    Parainfluenza Virus 3 Not Detected Not Detected    Parainfluenza Virus 4 Not Detected Not Detected    RSV, PCR Not Detected Not Detected    Bordetella pertussis pcr Not Detected Not Detected    Bordetella parapertussis PCR Not Detected Not Detected    Chlamydophila pneumoniae PCR Not Detected Not Detected    Mycoplasma pneumo by PCR Not Detected Not Detected   Blood Gas, Arterial -    Collection Time: 10/16/23  7:37 PM    Specimen: Arterial Blood   Result Value Ref Range    Site Right Radial     Bj's Test Positive     pH, Arterial 7.427 7.350 - 7.450 pH units    pCO2, Arterial 42.3 35.0 - 45.0 mm Hg    pO2, Arterial 58.2 (L) 83.0 - 108.0 mm Hg    HCO3, Arterial 27.8 (H) 20.0 - 26.0 mmol/L    Base Excess, Arterial 3.1 (H) 0.0 - 2.0 mmol/L    O2 Saturation, Arterial 92.2 (L) 94.0 - 99.0 %    Temperature 37.0 C    Barometric Pressure for Blood Gas 752 mmHg    Modality Room Air     Ventilator Mode NA     Collected by [id number]     pCO2, Temperature Corrected 42.3 35 - 45 mm Hg    pH, Temp Corrected 7.427 7.350 - 7.450 pH Units     pO2, Temperature Corrected 58.2 (L) 83 - 108 mm Hg   High Sensitivity Troponin T 2Hr    Collection Time: 10/16/23  8:38 PM    Specimen: Blood   Result Value Ref Range    HS Troponin T 22 (H) <15 ng/L    Troponin T Delta 5 (C) >=-4 - <+4 ng/L       The patient received:  Medications   sodium chloride 0.9 % flush 10 mL (has no administration in time range)   sodium chloride 0.9 % flush 10 mL (has no administration in time range)   piperacillin-tazobactam (ZOSYN) IVPB 3.375 g in 100 mL NS (CD) (has no administration in time range)   magnesium sulfate 2g/50 mL (PREMIX) infusion (0 g Intravenous Stopped 10/16/23 2291)            ED Disposition       ED Disposition   Intended Admit    Condition   --    Comment   --                   Dragon disclaimer:  Part of this note may be an electronic transcription/translation of spoken language to printed text using the Dragon Dictation System. Due to this some dictation errors could occur in the chart.      I have reviewed the patient’s prescription history via a prescription monitoring program.  This information is consistent with my knowledge of the patient’s controlled substance use history.    Patient evaluate during Coronavirus Pandemic. Isolation practices followed according to [facility] policy.    FINAL IMPRESSION   Diagnosis Plan   1. Pneumonia of right upper lobe due to infectious organism        2. Shortness of breath        3. Painful respiration        4. Elevated troponin              MD Chadd Wan Jr, Thomas Mark Jr., MD  10/16/23 6620

## 2023-10-17 NOTE — PLAN OF CARE
Goal Outcome Evaluation:  Plan of Care Reviewed With: patient        Progress: no change  Outcome Evaluation: OT eval completed. Pt alert and oriented x4 with no c/o. Pt Al[place]q w/o hearing aids in. He completed bed mobility with SBA and all t/f's with CGA utilizing straight cane to help maintain balance. Pt uses straight cane at baseline. Pt amb to BR to complete toileting task and grooming task requiring SBA/CGA. Educated pt on energy conservation techniques including minimal rest breaks with position changes and utilizing tub bench when bathing. At this time, OT isn't deemed necessary d/t pt being fuctionally at baseline with ADLs/IADLs. Anticipate further d/c home and home with assist.      Anticipated Discharge Disposition (OT): home, home with assist

## 2023-10-18 ENCOUNTER — READMISSION MANAGEMENT (OUTPATIENT)
Dept: CALL CENTER | Facility: HOSPITAL | Age: 83
End: 2023-10-18
Payer: MEDICARE

## 2023-10-18 VITALS
HEIGHT: 65 IN | HEART RATE: 88 BPM | RESPIRATION RATE: 16 BRPM | BODY MASS INDEX: 24.86 KG/M2 | SYSTOLIC BLOOD PRESSURE: 119 MMHG | DIASTOLIC BLOOD PRESSURE: 55 MMHG | OXYGEN SATURATION: 95 % | TEMPERATURE: 97.9 F | WEIGHT: 149.2 LBS

## 2023-10-18 LAB
ANION GAP SERPL CALCULATED.3IONS-SCNC: 7 MMOL/L (ref 5–15)
BUN SERPL-MCNC: 26 MG/DL (ref 8–23)
BUN/CREAT SERPL: 31.7 (ref 7–25)
CALCIUM SPEC-SCNC: 10.4 MG/DL (ref 8.6–10.5)
CHLORIDE SERPL-SCNC: 104 MMOL/L (ref 98–107)
CO2 SERPL-SCNC: 27 MMOL/L (ref 22–29)
CREAT SERPL-MCNC: 0.82 MG/DL (ref 0.76–1.27)
DEPRECATED RDW RBC AUTO: 48.6 FL (ref 37–54)
EGFRCR SERPLBLD CKD-EPI 2021: 86.6 ML/MIN/1.73
ERYTHROCYTE [DISTWIDTH] IN BLOOD BY AUTOMATED COUNT: 14.1 % (ref 12.3–15.4)
GLUCOSE SERPL-MCNC: 110 MG/DL (ref 65–99)
HCT VFR BLD AUTO: 29.4 % (ref 37.5–51)
HGB BLD-MCNC: 9.2 G/DL (ref 13–17.7)
MCH RBC QN AUTO: 29.1 PG (ref 26.6–33)
MCHC RBC AUTO-ENTMCNC: 31.3 G/DL (ref 31.5–35.7)
MCV RBC AUTO: 93 FL (ref 79–97)
PLATELET # BLD AUTO: 333 10*3/MM3 (ref 140–450)
PMV BLD AUTO: 10.5 FL (ref 6–12)
POTASSIUM SERPL-SCNC: 4.2 MMOL/L (ref 3.5–5.2)
RBC # BLD AUTO: 3.16 10*6/MM3 (ref 4.14–5.8)
SODIUM SERPL-SCNC: 138 MMOL/L (ref 136–145)
WBC NRBC COR # BLD: 9.3 10*3/MM3 (ref 3.4–10.8)

## 2023-10-18 PROCEDURE — 94799 UNLISTED PULMONARY SVC/PX: CPT

## 2023-10-18 PROCEDURE — 97530 THERAPEUTIC ACTIVITIES: CPT

## 2023-10-18 PROCEDURE — 85027 COMPLETE CBC AUTOMATED: CPT

## 2023-10-18 PROCEDURE — 80048 BASIC METABOLIC PNL TOTAL CA: CPT

## 2023-10-18 PROCEDURE — 25010000002 ENOXAPARIN PER 10 MG: Performed by: NURSE PRACTITIONER

## 2023-10-18 PROCEDURE — 25010000002 CEFTRIAXONE PER 250 MG

## 2023-10-18 PROCEDURE — 94664 DEMO&/EVAL PT USE INHALER: CPT

## 2023-10-18 PROCEDURE — 97116 GAIT TRAINING THERAPY: CPT

## 2023-10-18 RX ORDER — CEFDINIR 300 MG/1
300 CAPSULE ORAL 2 TIMES DAILY
Qty: 10 CAPSULE | Refills: 0 | Status: SHIPPED | OUTPATIENT
Start: 2023-10-19 | End: 2023-10-24

## 2023-10-18 RX ORDER — DEXTROMETHORPHAN POLISTIREX 30 MG/5ML
60 SUSPENSION ORAL EVERY 12 HOURS PRN
Qty: 280 ML | Refills: 0 | Status: SHIPPED | OUTPATIENT
Start: 2023-10-18

## 2023-10-18 RX ORDER — AZITHROMYCIN 500 MG/1
500 TABLET, FILM COATED ORAL DAILY
Qty: 3 TABLET | Refills: 0 | Status: SHIPPED | OUTPATIENT
Start: 2023-10-19 | End: 2023-10-22

## 2023-10-18 RX ADMIN — IPRATROPIUM BROMIDE AND ALBUTEROL SULFATE 3 ML: .5; 3 SOLUTION RESPIRATORY (INHALATION) at 05:43

## 2023-10-18 RX ADMIN — ENOXAPARIN SODIUM 40 MG: 100 INJECTION SUBCUTANEOUS at 09:56

## 2023-10-18 RX ADMIN — IPRATROPIUM BROMIDE AND ALBUTEROL SULFATE 3 ML: .5; 3 SOLUTION RESPIRATORY (INHALATION) at 11:04

## 2023-10-18 RX ADMIN — Medication 10 ML: at 09:56

## 2023-10-18 RX ADMIN — DOCUSATE SODIUM 50 MG AND SENNOSIDES 8.6 MG 2 TABLET: 8.6; 5 TABLET, FILM COATED ORAL at 09:56

## 2023-10-18 RX ADMIN — SODIUM CHLORIDE 1 G: 900 INJECTION INTRAVENOUS at 03:20

## 2023-10-18 RX ADMIN — GUAIFENESIN AND CODEINE PHOSPHATE 5 ML: 100; 10 SOLUTION ORAL at 03:20

## 2023-10-18 NOTE — DISCHARGE SUMMARY
[facility] Medicine Services  DISCHARGE SUMMARY       Date of Admission: 10/16/2023  Date of Discharge:  10/18/2023  Primary Care Physician: Santiago Aaron MD    Presenting Problem/History of Present Illness:  Right upper lobe pneumonia    Final Discharge Diagnoses:  Active Hospital Problems    Diagnosis     **Right upper lobe pneumonia     Gait instability     Pain initiated by coughing     Hypoxia     Normocytic anemia        Consults: None    Procedures Performed: None    Pertinent Test Results:       Imaging Results (All)       Procedure Component Value Units Date/Time    XR Chest 1 View [[id number]] Collected: 10/16/23 2010     Updated: 10/16/23 2014    Narrative:      EXAM/TECHNIQUE: XR CHEST 1 VW-     INDICATION: Cough, shortness of breath     COMPARISON: 4/14/2023     FINDINGS:     Cardiac silhouette is within normal limits and stable. No pleural  effusion or visible pneumothorax. Right upper lobe consolidation is  present. Kyphoplasty changes in the thoracolumbar spine are noted.  Proximal imaged bilateral shoulder arthroplasty.       Impression:      Right upper lobe consolidation, highly suspicious for pneumonia.  Recommend follow-up to document resolution.     This report was signed and finalized on 10/16/2023 8:11 PM CDT by Dr. Audie Au MD.             LAB RESULTS:      Lab 10/18/23  0528 10/17/23  0545 10/16/23  1826   WBC 9.30 10.10 12.97*   HEMOGLOBIN 9.2* 10.3* 10.6*   HEMATOCRIT 29.4* 33.1* 33.2*   PLATELETS 333 331 351   NEUTROS ABS  --  7.29* 9.79*   IMMATURE GRANS (ABS)  --  0.10* 0.12*   LYMPHS ABS  --  1.47 1.57   MONOS ABS  --  1.11* 1.39*   EOS ABS  --  0.09 0.05   MCV 93.0 94.3 92.2   PROCALCITONIN  --   --  0.10   LACTATE  --   --  1.2   PROTIME  --   --  15.8*   APTT  --   --  30.2         Lab 10/18/23  0527 10/17/23  0545 10/16/23  1826   SODIUM 138 139 137   POTASSIUM 4.2 4.5 4.2   CHLORIDE 104 101 98   CO2 27.0 29.0 28.0   ANION GAP  7.0 9.0 11.0   BUN 26* 28* 28*   CREATININE 0.82 0.95 1.02   EGFR 86.6 78.9 72.5   GLUCOSE 110* 114* 112*   CALCIUM 10.4 10.6* 10.8*   HEMOGLOBIN A1C  --  5.50  --          Lab 10/17/23  0545 10/16/23  1826   TOTAL PROTEIN 8.1 8.7*   ALBUMIN 3.2* 3.4*   GLOBULIN 4.9 5.3   ALT (SGPT) 15 13   AST (SGOT) 19 15   BILIRUBIN 0.4 0.6   ALK PHOS 92 92         Lab 10/16/23  2038 10/16/23  1826   PROBNP  --  284.9   HSTROP T 22* 17*   PROTIME  --  15.8*   INR  --  1.24*         Lab 10/17/23  0545   CHOLESTEROL 121   LDL CHOL 68   HDL CHOL 37*   TRIGLYCERIDES 78         Lab 10/16/23  1826   IRON 19*   IRON SATURATION (TSAT) 7*   TIBC 277*   TRANSFERRIN 186*   FERRITIN 373.20         Lab 10/16/23  1937   PH, ARTERIAL 7.427   PCO2, ARTERIAL 42.3   PO2 ART 58.2*   O2 SATURATION ART 92.2*   HCO3 ART 27.8*   BASE EXCESS ART 3.1*     Brief Urine Lab Results  (Last result in the past 365 days)        Color   Clarity   Blood   Leuk Est   Nitrite   Protein   CREAT   Urine HCG        12/14/22 1407 Yellow   Clear   Negative   Trace   Negative   Negative                 Microbiology Results (last 10 days)       Procedure Component Value - Date/Time    MRSA Screen, PCR (Inpatient) - Swab, Nares [[id number]]  (Normal) Collected: 10/17/23 0112    Lab Status: Final result Specimen: Swab from Nares Updated: 10/17/23 0233     MRSA PCR No MRSA Detected    Narrative:      The negative predictive value of this diagnostic test is high and should only be used to consider de-escalating anti-MRSA therapy. A positive result may indicate colonization with MRSA and must be correlated clinically.    S. Pneumo Ag Urine or CSF - Urine, Urine, Clean Catch [[id number]]  (Normal) Collected: 10/17/23 0027    Lab Status: Final result Specimen: Urine, Clean Catch Updated: 10/17/23 0113     Strep Pneumo Ag Negative    Legionella Antigen, Urine - Urine, Urine, Clean Catch [[id number]]  (Normal) Collected: 10/17/23 0027    Lab Status: Final result Specimen: Urine,  Clean Catch Updated: 10/17/23 0113     LEGIONELLA ANTIGEN, URINE Negative    Respiratory Culture - Sputum, Cough [[id number]] Collected: 10/17/23 0022    Lab Status: Preliminary result Specimen: Sputum from Cough Updated: 10/17/23 0803     Gram Stain Moderate (3+) WBCs per low power field      Few (2+) Epithelial cells per low power field      Moderate (3+) Mixed gram positive elizabeth      Few (2+) Gram negative bacilli    Blood Culture - Blood, Arm, Right [[id number]]  (Normal) Collected: 10/16/23 1922    Lab Status: Preliminary result Specimen: Blood from Arm, Right Updated: 10/17/23 1945     Blood Culture No growth at 24 hours    Blood Culture - Blood, Arm, Left [[id number]]  (Normal) Collected: 10/16/23 1922    Lab Status: Preliminary result Specimen: Blood from Arm, Left Updated: 10/17/23 1930     Blood Culture No growth at 24 hours    Respiratory Panel PCR w/COVID-19(SARS-CoV-2) CASANDRA/CANDY/GEORGIANA/PAD/COR/MAD/CHLOÉ In-House, NP Swab in UTM/VTM, 3-4 HR TAT - Swab, Nasopharynx [[id number]]  (Normal) Collected: 10/16/23 1922    Lab Status: Final result Specimen: Swab from Nasopharynx Updated: 10/16/23 2035     ADENOVIRUS, PCR Not Detected     Coronavirus 229E Not Detected     Coronavirus HKU1 Not Detected     Coronavirus NL63 Not Detected     Coronavirus OC43 Not Detected     COVID19 Not Detected     Human Metapneumovirus Not Detected     Human Rhinovirus/Enterovirus Not Detected     Influenza A PCR Not Detected     Influenza B PCR Not Detected     Parainfluenza Virus 1 Not Detected     Parainfluenza Virus 2 Not Detected     Parainfluenza Virus 3 Not Detected     Parainfluenza Virus 4 Not Detected     RSV, PCR Not Detected     Bordetella pertussis pcr Not Detected     Bordetella parapertussis PCR Not Detected     Chlamydophila pneumoniae PCR Not Detected     Mycoplasma pneumo by PCR Not Detected    Narrative:      In the setting of a positive respiratory panel with a viral infection PLUS a negative procalcitonin without other  underlying concern for bacterial infection, consider observing off antibiotics or discontinuation of antibiotics and continue supportive care. If the respiratory panel is positive for atypical bacterial infection (Bordetella pertussis, Chlamydophila pneumoniae, or Mycoplasma pneumoniae), consider antibiotic de-escalation to target atypical bacterial infection.            Hospital Course:  On arrival:  Patient presented to [facility] emergency department on 10/16/2023 with a complaint of persistent painful cough.  He reported this to 5 days prior to arrival.  His oxygen saturation was 88% on room air originally.  He intermittently required supplemental oxygen and was quickly weaned back to room air.  Chest x-ray suggest right upper lobe consolidation.  Respiratory panel negative.  Rocephin and azithromycin initiated in the emergency department.  Antitussive initiated for cough.  Patient also had complaints of gait instability and difficulty ambulating.  He was admitted for further monitoring and management.    Thereafter:  Right upper lobe pneumonia-  Originally with hypoxia, patient was 88% on room air.  Supplemental oxygen utilized and then weaned  MRSA, strep pneumo, Legionella antigens negative  Respiratory PCR negative  Blood culture x2 no growth to date  Patient participated in pulmonary toileting.  Sputum production appears benign.  Patient was treated with IV ceftriaxone, IV azithromycin, these will be transition to Omnicef and azithromycin at discharge.  Patient will be discharged with antitussive.     Disc disease, chronic back pain, gait instability-  Continue Flexeril, Ultram for pain control as previous  PT/OT evaluated patient.  Patient ambulated 275 feet day prior to discharge.  Discussed with patient options of further inpatient care versus outpatient rehab.  Patient elects to continue to participate in physical therapy outpatient after discharge.  Order will be placed at discharge for ambulatory  "referral to PT.     Normocytic anemia of chronic disease-  Hemoglobin was 10.5 on 8/15 of this year.  Hemoglobin 10.3, no signs or symptoms of active bleeding     Day of discharge, patient is on room air in no acute distress.  He is ambulated with physical therapy.  He elects to discharge home at this time.  Discharge plan will consist of oral antibiotics and antitussives as well as an outpatient referral to physical therapy.  Patient's disc disease is chronic and patient has proven to ambulate.  He defers any further inpatient treatment in terms of further rehab or ambulatory assistance.    Continue Omnicef, azithromycin until completion  Recommend follow-up with PCP in 1 week    Physical Exam on Discharge:  /55 (BP Location: Right arm, Patient Position: Sitting)   Pulse 84   Temp 97.9 °F (36.6 °C) (Oral)   Resp 16   Ht 165.1 cm (65\")   Wt 67.7 kg (149 lb 3.2 oz)   SpO2 95%   BMI 24.83 kg/m²   Physical Exam  Constitutional:       Appearance: Up in recliner, room air, no acute distress, no visitors at bedside  HENT:      Head: Normocephalic and atraumatic.      Mouth/Throat:      Mouth: Mucous membranes are moist.      Pharynx: Oropharynx is clear.   Eyes:      Extraocular Movements: Extraocular movements intact.      Conjunctiva/sclera: Conjunctivae normal.   Cardiovascular:      Rate and Rhythm: Normal rate and regular rhythm.   Pulmonary:      Effort: Pulmonary effort is normal.      Comments: Somewhat diminished in lower lobes, otherwise clear abdominal:      General: There is no distension.      Palpations: Abdomen is soft.   Musculoskeletal:      Cervical back: Normal range of motion and neck supple.      Right lower leg: No edema.      Left lower leg: No edema.      Comments: Neurolysed weakness  Skin:     General: Skin is warm and dry.   Neurological:      General: No focal deficit present.      Mental Status: He is alert and oriented to person, place, and time.   Psychiatric:         Mood and " Affect: Mood normal.         Behavior: Behavior normal.  Conversational    Condition on Discharge: Stable    Discharge Disposition:  Home or Self Care    Discharge Medications:     Discharge Medications        New Medications        Instructions Start Date   azithromycin 500 MG tablet  Commonly known as: Zithromax   500 mg, Oral, Daily   Start Date: October 19, 2023     cefdinir 300 MG capsule  Commonly known as: OMNICEF   300 mg, Oral, 2 Times Daily   Start Date: October 19, 2023     dextromethorphan polistirex ER 30 MG/5ML Suspension Extended Release oral suspension  Commonly known as: Delsym   60 mg, Oral, Every 12 Hours PRN             Continue These Medications        Instructions Start Date   denosumab 60 MG/ML solution prefilled syringe syringe  Commonly known as: PROLIA   60 mg, Subcutaneous, Every 6 Months      multivitamins-minerals capsule capsule   1 capsule, Oral, 2 Times Daily      naloxone 4 MG/0.1ML nasal spray  Commonly known as: NARCAN   Call 911. Don't prime. Iselin in 1 nostril for overdose. Repeat in 2-3 minutes in other nostril if no or minimal breathing/responsiveness.      olmesartan 40 MG tablet  Commonly known as: BENICAR   40 mg, Oral, Daily      sennosides-docusate 8.6-50 MG per tablet  Commonly known as: PERICOLACE   2 tablets, Oral, Daily PRN      traMADol 50 MG tablet  Commonly known as: ULTRAM   50 mg, Oral, Every 8 Hours PRN             Discharge Diet:   Diet Instructions       Diet: Cardiac Diets; Healthy Heart (2-3 Na+); Thin (IDDSI 0)      Discharge Diet: Cardiac Diets    Cardiac Diet: Healthy Heart (2-3 Na+)    Fluid Consistency: Thin (IDDSI 0)            Activity at Discharge:   Activity Instructions       Up WIth Assist              Follow-up Appointments:   Future Appointments   Date Time Provider Department Center   10/23/2023  4:30 PM Nick Martínez MD [facility]   11/3/2023  1:45 PM Santiago Aaron MD [facility]   11/6/2023  1:30 PM Santiago Aaron,  [facility]       Test Results Pending at Discharge: Blood culture x2 no growth at 24 hours.  Sputum culture pending, follow to completion    Electronically signed by DIEGO Oneil, 10/18/23, 08:49 CDT.    Time: 35 minutes.

## 2023-10-18 NOTE — PLAN OF CARE
Goal Outcome Evaluation:           Progress: improving  Outcome Evaluation: Pt is AOx4 and complaining of dry cough keeping him awake throughout the night. Patient educated on PRN cough syrup. See EMAR. Rest has been promoted for patient and patient instructed to use call light for needs when we are not currently in the room. Patient also educated on importance of calling for assistance to ambulate in room.

## 2023-10-18 NOTE — PLAN OF CARE
Problem: Adult Inpatient Plan of Care  Goal: Plan of Care Review  Outcome: Ongoing, Progressing  Goal: Patient-Specific Goal (Individualized)  Outcome: Ongoing, Progressing  Goal: Absence of Hospital-Acquired Illness or Injury  Outcome: Ongoing, Progressing  Intervention: Identify and Manage Fall Risk  Recent Flowsheet Documentation  Taken 10/18/2023 0200 by Amira Francois RN  Safety Promotion/Fall Prevention: safety round/check completed  Taken 10/18/2023 0000 by Amira Francois RN  Safety Promotion/Fall Prevention: safety round/check completed  Taken 10/17/2023 2200 by Amira Francois RN  Safety Promotion/Fall Prevention: safety round/check completed  Taken 10/17/2023 2014 by Amira Francois RN  Safety Promotion/Fall Prevention: safety round/check completed  Intervention: Prevent Skin Injury  Recent Flowsheet Documentation  Taken 10/18/2023 0200 by Amira Francois RN  Body Position: supine  Taken 10/18/2023 0000 by Amira Francois RN  Body Position:   left   side-lying  Taken 10/17/2023 2200 by Amira Francois RN  Body Position:   left   side-lying  Taken 10/17/2023 2014 by Amira Francois RN  Body Position: (up in chair)   weight shifting   other (see comments)  Intervention: Prevent and Manage VTE (Venous Thromboembolism) Risk  Recent Flowsheet Documentation  Taken 10/17/2023 2014 by Amira Francois RN  Activity Management: activity encouraged  VTE Prevention/Management: (see MAR) other (see comments)  Range of Motion: active ROM (range of motion) encouraged  Intervention: Prevent Infection  Recent Flowsheet Documentation  Taken 10/17/2023 2014 by Amira Francois RN  Infection Prevention:   single patient room provided   rest/sleep promoted  Goal: Optimal Comfort and Wellbeing  Outcome: Ongoing, Progressing  Intervention: Provide Person-Centered Care  Recent Flowsheet Documentation  Taken 10/17/2023 2014 by Amira Francois RN  Trust Relationship/Rapport:   care explained   choices provided   emotional support  provided   questions answered   questions encouraged   thoughts/feelings acknowledged  Goal: Readiness for Transition of Care  Outcome: Ongoing, Progressing     Problem: Fluid Imbalance (Pneumonia)  Goal: Fluid Balance  Outcome: Ongoing, Progressing     Problem: Infection (Pneumonia)  Goal: Resolution of Infection Signs and Symptoms  Outcome: Ongoing, Progressing     Problem: Respiratory Compromise (Pneumonia)  Goal: Effective Oxygenation and Ventilation  Outcome: Ongoing, Progressing  Intervention: Promote Airway Secretion Clearance  Recent Flowsheet Documentation  Taken 10/17/2023 2014 by Amira Francois RN  Cough And Deep Breathing: done independently per patient  Intervention: Optimize Oxygenation and Ventilation  Recent Flowsheet Documentation  Taken 10/17/2023 2014 by Amira Francois RN  Head of Bed (HOB) Positioning: (up in chair) other (see comments)     Problem: Pain Acute  Goal: Acceptable Pain Control and Functional Ability  Outcome: Ongoing, Progressing  Intervention: Optimize Psychosocial Wellbeing  Recent Flowsheet Documentation  Taken 10/17/2023 2014 by Amira Francois RN  Diversional Activities: television     Problem: Fall Injury Risk  Goal: Absence of Fall and Fall-Related Injury  Outcome: Ongoing, Progressing  Intervention: Promote Injury-Free Environment  Recent Flowsheet Documentation  Taken 10/18/2023 0200 by Amira Francois RN  Safety Promotion/Fall Prevention: safety round/check completed  Taken 10/18/2023 0000 by Amira Francois RN  Safety Promotion/Fall Prevention: safety round/check completed  Taken 10/17/2023 2200 by Amira Francois RN  Safety Promotion/Fall Prevention: safety round/check completed  Taken 10/17/2023 2014 by Amira Francois RN  Safety Promotion/Fall Prevention: safety round/check completed   Goal Outcome Evaluation:

## 2023-10-18 NOTE — THERAPY TREATMENT NOTE
Acute Care - Physical Therapy Treatment Note  [facility]     Patient Name: Juan Luis Collazo  : 1940  MRN: [id number]  Today's Date: 10/18/2023      Visit Dx:     ICD-10-CM ICD-9-CM   1. Pneumonia of right upper lobe due to infectious organism  J18.9 486   2. Shortness of breath  R06.02 786.05   3. Painful respiration  R07.1 786.52   4. Elevated troponin  R79.89 790.6   5. Impaired mobility [Z74.09]  Z74.09 799.89   6. Compression fracture of T12 vertebra with delayed healing  S22.080G V54.27   7. Degenerative disc disease, cervical  M50.30 722.4   8. Back pain, unspecified back location, unspecified back pain laterality, unspecified chronicity  M54.9 724.5   9. Gait instability  R26.81 781.2   10. Degenerative disc disease, thoracic  M51.34 722.51     Patient Active Problem List   Diagnosis    Closed compression fracture of first lumbar vertebra    Current non-smoker    BMI 28.0-28.9,adult    Lumbar compression fracture, closed, initial encounter    Lumbar compression fracture    S/P kyphoplasty    Numbness and tingling of right leg    Compression fracture of fourth lumbar vertebra with delayed healing    Benign hypertension    Flatback syndrome of lumbar region    Hammer toe of right foot    High cholesterol    Impaired fasting glucose    Malignant neoplasm of prostate    Osteopenia    Acute exacerbation of chronic obstructive airways disease    Back pain    Compression fracture of thoracic vertebra with routine healing    Normocytic anemia    Actinic keratosis    Hyperplastic colonic polyp    FH: colon cancer in first degree relative <60 years old    Weight loss    History of adenomatous polyp of colon    Decreased bone mass    Sebaceous cyst    Degenerative disc disease, cervical    Degenerative disc disease, lumbar    Degenerative disc disease, thoracic    Compression fracture of T12 vertebra with delayed healing    Age-related osteoporosis with current pathological fracture with delayed healing    Right  upper lobe pneumonia    Gait instability    Pain initiated by coughing    Hypoxia     Past Medical History:   Diagnosis Date    Arthritis     Back pain     Cancer     CHEST, SKIN CANCER    GERD (gastroesophageal reflux disease)     History of colon polyps     History of prostate cancer     Hypertension     Low back pain     Macular degeneration     Osteopenia      Past Surgical History:   Procedure Laterality Date    APPENDECTOMY      CATARACT EXTRACTION, BILATERAL      COLONOSCOPY  09/17/2013    Dr. Bodnarchuk-One 2-3mm polyp at 20cm; Otherwise normal; Repeat 3 years    COLONOSCOPY N/A 12/07/2022    Procedure: COLONOSCOPY WITH ANESTHESIA;  Surgeon: Bri Alexis MD;  Location: [facility] ENDOSCOPY;  Service: Gastroenterology;  Laterality: N/A;  pre: anemia. hx polyps.  post: polyps. diverticulosis.  no PCP    ENDOSCOPY N/A 12/07/2022    Procedure: ESOPHAGOGASTRODUODENOSCOPY WITH ANESTHESIA;  Surgeon: Bri Alexis MD;  Location: [facility] ENDOSCOPY;  Service: Gastroenterology;  Laterality: N/A;  pre: anemia  post: hiatal hernia. esophagitis.gastric erosions.  no PCP    FOOT SURGERY Right     KYPHOPLASTY Bilateral 07/17/2018    Procedure: L3 KYPHOPLASTY 1-2 LEVELS;  Surgeon: Vega Pandey MD;  Location: [facility] OR;  Service: Neurosurgery    KYPHOPLASTY Bilateral 09/11/2018    Procedure: L4 KYPHOPLASTY WITH BIOPSY;  Surgeon: Vega Pandey MD;  Location: [facility] OR;  Service: Neurosurgery    KYPHOPLASTY WITH BIOPSY N/A 01/25/2022    Procedure: KYPHOPLASTY WITH BIOPSY, THORACIC 6 and LUMBAR 5;  Surgeon: Nick Martínez MD;  Location: [facility] OR;  Service: Neurosurgery;  Laterality: N/A;    KYPHOPLASTY WITH BIOPSY N/A 8/18/2023    Procedure: Thoracic 12, KYPHOPLASTY WITH BIOPSY;  Surgeon: Nick Martínez MD;  Location: [facility] OR;  Service: Neurosurgery;  Laterality: N/A;    PROSTATECTOMY      TONSILLECTOMY      TOTAL SHOULDER REPLACEMENT Bilateral      PT Assessment (last 12 hours)       PT  Evaluation and Treatment       Row Name 10/18/23 1018          Physical Therapy Time and Intention    Subjective Information no complaints  -TB     Document Type therapy note (daily note)  -TB     Mode of Treatment physical therapy  -TB       Row Name 10/18/23 1018          General Information    Existing Precautions/Restrictions fall  -TB       Row Name 10/18/23 1018          Pain    Pretreatment Pain Rating 0/10 - no pain  -TB     Posttreatment Pain Rating 0/10 - no pain  -TB       Row Name 10/18/23 1018          Bed Mobility    Comment, (Bed Mobility) Chair  -TB       Row Name 10/18/23 1018          Transfers    Transfers sit-stand transfer;stand-sit transfer  -TB       Row Name 10/18/23 1018          Sit-Stand Transfer    Sit-Stand [place] (Transfers) standby assist;verbal cues  -TB       Row Name 10/18/23 1018          Stand-Sit Transfer    Stand-Sit [place] (Transfers) standby assist;verbal cues  -TB       Row Name 10/18/23 1018          Gait/Stairs (Locomotion)    [place] Level (Gait) contact guard;verbal cues  -TB     Assistive Device (Gait) cane, straight  -TB     Distance in Feet (Gait) 50'  -TB     Bilateral Gait Deviations forward flexed posture  -TB       Row Name 10/18/23 1018          Balance    Balance Assessment sitting static balance;sitting dynamic balance  -TB     Static Sitting Balance independent  -TB     Dynamic Sitting Balance independent  -TB     Position, Sitting Balance sitting edge of bed  -TB       Row Name 10/18/23 1018          Motor Skills    Therapeutic Exercise --  AROM BLE x15  -TB       Row Name 10/18/23 1018          Positioning and Restraints    Pre-Treatment Position sitting in chair/recliner  -TB     Post Treatment Position chair  -TB     In Chair sitting;call light within reach;encouraged to call for assist  -TB               User Key  (r) = Recorded By, (t) = Taken By, (c) = Cosigned By      Initials Name Provider Type    TB Vika Rock, PTA Physical  Therapist Assistant                    Physical Therapy Education       Title: PT OT SLP Therapies (Resolved)       Topic: Physical Therapy (Resolved)       Point: Mobility training (Resolved)       Learning Progress Summary             Patient Acceptance, E, VU,DU by  at 10/17/2023 1114    Comment: Educated on benefits of ambulation and weightbearing exercise for strength/ aerobic capacity. Verbal cueing for postural awareness.                         Point: Precautions (Resolved)       Learning Progress Summary             Patient Acceptance, E, VU,DU by  at 10/17/2023 1114    Comment: Educated on benefits of ambulation and weightbearing exercise for strength/ aerobic capacity. Verbal cueing for postural awareness.                                         User Key       Initials Effective Dates Name Provider Type Discipline     07/13/23 -  Igor Groves, PT Student PT Student PT                  PT Recommendation and Plan             Time Calculation:    PT Charges       Row Name 10/18/23 1414             Time Calculation    Start Time 1018  -TB      Stop Time 1041  -TB      Time Calculation (min) 23 min  -TB      PT Received On 10/18/23  -TB         Time Calculation- PT    Total Timed Code Minutes- PT 23 minute(s)  -TB                User Key  (r) = Recorded By, (t) = Taken By, (c) = Cosigned By      Initials Name Provider Type    TB Vika Rock PTA Physical Therapist Assistant                  Therapy Charges for Today       Code Description Service Date Service Provider Modifiers Qty    75130426705 HC GAIT TRAINING EA 15 MIN 10/18/2023 Vika Rock PTA GP 1    80461675753 HC PT THERAPEUTIC ACT EA 15 MIN 10/18/2023 Vika Rock PTA GP 1            PT G-Codes  Outcome Measure Options: AM-PAC 6 Clicks Basic Mobility (PT)  AM-PAC 6 Clicks Score (PT): 21  AM-PAC 6 Clicks Score (OT): 24    Vika Rock PTA  10/18/2023

## 2023-10-18 NOTE — PAYOR COMM NOTE
"REF:   [id number]     [facility]  FAX  [phone]     Rowan Stratton \"ADAN\" (82 y.o. Male)       Date of Birth   1940    Social Security Number       Address   [address] [place] [address]    Home Phone   [phone]    MRN   [id number]       Evangelical   Sikhism    Marital Status                               Admission Date   10/16/23    Admission Type   Emergency    Admitting Provider   Wayne Auguste MD    Attending Provider       Department, Room/Bed   [facility] 3A, 347/1       Discharge Date   10/18/2023    Discharge Disposition   Home or Self Care    Discharge Destination                                 Attending Provider: (none)   Allergies: No Known Allergies    Isolation: None   Infection: None   Code Status: Prior    Ht: 165.1 cm (65\")   Wt: 67.7 kg (149 lb 3.2 oz)    Admission Cmt: None   Principal Problem: Right upper lobe pneumonia [J18.9]                   Active Insurance as of 10/16/2023       Primary Coverage       Payor Plan Insurance Group Employer/Plan Group    AETNA MEDICARE REPLACEMENT AETNA MEDICARE REPLACEMENT [id number]       Payor Plan Address Payor Plan Phone Number Payor Plan Fax Number Effective Dates    [address] [phone]  1/1/2023 - None Entered    [facility] [id number]         Subscriber Name Subscriber Birth Date Member ID       ROWAN STRATTON 1940 [id number]                     Emergency Contacts        (Rel.) Home Phone Work Phone Mobile Phone    Jyoti Stratton (Spouse) [phone] -- [phone]                 Discharge Summary        Arnoldo Cadet APRN at 10/18/23 0835                [facility]  DISCHARGE SUMMARY       Date of Admission: 10/16/2023  Date of Discharge:  10/18/2023  Primary Care Physician: Santiago Aaron MD    Presenting Problem/History of Present Illness:  Right upper lobe pneumonia    Final Discharge Diagnoses:  Active " Hospital Problems    Diagnosis     **Right upper lobe pneumonia     Gait instability     Pain initiated by coughing     Hypoxia     Normocytic anemia        Consults: None    Procedures Performed: None    Pertinent Test Results:       Imaging Results (All)       Procedure Component Value Units Date/Time    XR Chest 1 View [[id number]] Collected: 10/16/23 2010     Updated: 10/16/23 2014    Narrative:      EXAM/TECHNIQUE: XR CHEST 1 VW-     INDICATION: Cough, shortness of breath     COMPARISON: 4/14/2023     FINDINGS:     Cardiac silhouette is within normal limits and stable. No pleural  effusion or visible pneumothorax. Right upper lobe consolidation is  present. Kyphoplasty changes in the thoracolumbar spine are noted.  Proximal imaged bilateral shoulder arthroplasty.       Impression:      Right upper lobe consolidation, highly suspicious for pneumonia.  Recommend follow-up to document resolution.     This report was signed and finalized on 10/16/2023 8:11 PM CDT by Dr. Audie Au MD.             LAB RESULTS:      Lab 10/18/23  0528 10/17/23  0545 10/16/23  1826   WBC 9.30 10.10 12.97*   HEMOGLOBIN 9.2* 10.3* 10.6*   HEMATOCRIT 29.4* 33.1* 33.2*   PLATELETS 333 331 351   NEUTROS ABS  --  7.29* 9.79*   IMMATURE GRANS (ABS)  --  0.10* 0.12*   LYMPHS ABS  --  1.47 1.57   MONOS ABS  --  1.11* 1.39*   EOS ABS  --  0.09 0.05   MCV 93.0 94.3 92.2   PROCALCITONIN  --   --  0.10   LACTATE  --   --  1.2   PROTIME  --   --  15.8*   APTT  --   --  30.2         Lab 10/18/23  0527 10/17/23  0545 10/16/23  1826   SODIUM 138 139 137   POTASSIUM 4.2 4.5 4.2   CHLORIDE 104 101 98   CO2 27.0 29.0 28.0   ANION GAP 7.0 9.0 11.0   BUN 26* 28* 28*   CREATININE 0.82 0.95 1.02   EGFR 86.6 78.9 72.5   GLUCOSE 110* 114* 112*   CALCIUM 10.4 10.6* 10.8*   HEMOGLOBIN A1C  --  5.50  --          Lab 10/17/23  0545 10/16/23  1826   TOTAL PROTEIN 8.1 8.7*   ALBUMIN 3.2* 3.4*   GLOBULIN 4.9 5.3   ALT (SGPT) 15 13   AST (SGOT) 19 15   BILIRUBIN  0.4 0.6   ALK PHOS 92 92         Lab 10/16/23  2038 10/16/23  1826   PROBNP  --  284.9   HSTROP T 22* 17*   PROTIME  --  15.8*   INR  --  1.24*         Lab 10/17/23  0545   CHOLESTEROL 121   LDL CHOL 68   HDL CHOL 37*   TRIGLYCERIDES 78         Lab 10/16/23  1826   IRON 19*   IRON SATURATION (TSAT) 7*   TIBC 277*   TRANSFERRIN 186*   FERRITIN 373.20         Lab 10/16/23  1937   PH, ARTERIAL 7.427   PCO2, ARTERIAL 42.3   PO2 ART 58.2*   O2 SATURATION ART 92.2*   HCO3 ART 27.8*   BASE EXCESS ART 3.1*     Brief Urine Lab Results  (Last result in the past 365 days)        Color   Clarity   Blood   Leuk Est   Nitrite   Protein   CREAT   Urine HCG        12/14/22 1407 Yellow   Clear   Negative   Trace   Negative   Negative                 Microbiology Results (last 10 days)       Procedure Component Value - Date/Time    MRSA Screen, PCR (Inpatient) - Swab, Nares [[id number]]  (Normal) Collected: 10/17/23 0112    Lab Status: Final result Specimen: Swab from Nares Updated: 10/17/23 0233     MRSA PCR No MRSA Detected    Narrative:      The negative predictive value of this diagnostic test is high and should only be used to consider de-escalating anti-MRSA therapy. A positive result may indicate colonization with MRSA and must be correlated clinically.    S. Pneumo Ag Urine or CSF - Urine, Urine, Clean Catch [[id number]]  (Normal) Collected: 10/17/23 0027    Lab Status: Final result Specimen: Urine, Clean Catch Updated: 10/17/23 0113     Strep Pneumo Ag Negative    Legionella Antigen, Urine - Urine, Urine, Clean Catch [[id number]]  (Normal) Collected: 10/17/23 0027    Lab Status: Final result Specimen: Urine, Clean Catch Updated: 10/17/23 0113     LEGIONELLA ANTIGEN, URINE Negative    Respiratory Culture - Sputum, Cough [[id number]] Collected: 10/17/23 0022    Lab Status: Preliminary result Specimen: Sputum from Cough Updated: 10/17/23 0803     Gram Stain Moderate (3+) WBCs per low power field      Few (2+) Epithelial cells per  low power field      Moderate (3+) Mixed gram positive elizabeth      Few (2+) Gram negative bacilli    Blood Culture - Blood, Arm, Right [[id number]]  (Normal) Collected: 10/16/23 1922    Lab Status: Preliminary result Specimen: Blood from Arm, Right Updated: 10/17/23 1945     Blood Culture No growth at 24 hours    Blood Culture - Blood, Arm, Left [[id number]]  (Normal) Collected: 10/16/23 1922    Lab Status: Preliminary result Specimen: Blood from Arm, Left Updated: 10/17/23 1930     Blood Culture No growth at 24 hours    Respiratory Panel PCR w/COVID-19(SARS-CoV-2) CASANDRA/CANDY/GEORGIANA/PAD/COR/MAD/CHLOÉ In-House, NP Swab in UTM/VTM, 3-4 HR TAT - Swab, Nasopharynx [[id number]]  (Normal) Collected: 10/16/23 1922    Lab Status: Final result Specimen: Swab from Nasopharynx Updated: 10/16/23 2035     ADENOVIRUS, PCR Not Detected     Coronavirus 229E Not Detected     Coronavirus HKU1 Not Detected     Coronavirus NL63 Not Detected     Coronavirus OC43 Not Detected     COVID19 Not Detected     Human Metapneumovirus Not Detected     Human Rhinovirus/Enterovirus Not Detected     Influenza A PCR Not Detected     Influenza B PCR Not Detected     Parainfluenza Virus 1 Not Detected     Parainfluenza Virus 2 Not Detected     Parainfluenza Virus 3 Not Detected     Parainfluenza Virus 4 Not Detected     RSV, PCR Not Detected     Bordetella pertussis pcr Not Detected     Bordetella parapertussis PCR Not Detected     Chlamydophila pneumoniae PCR Not Detected     Mycoplasma pneumo by PCR Not Detected    Narrative:      In the setting of a positive respiratory panel with a viral infection PLUS a negative procalcitonin without other underlying concern for bacterial infection, consider observing off antibiotics or discontinuation of antibiotics and continue supportive care. If the respiratory panel is positive for atypical bacterial infection (Bordetella pertussis, Chlamydophila pneumoniae, or Mycoplasma pneumoniae), consider antibiotic de-escalation to  target atypical bacterial infection.            Hospital Course:  On arrival:  Patient presented to [facility] emergency department on 10/16/2023 with a complaint of persistent painful cough.  He reported this to 5 days prior to arrival.  His oxygen saturation was 88% on room air originally.  He intermittently required supplemental oxygen and was quickly weaned back to room air.  Chest x-ray suggest right upper lobe consolidation.  Respiratory panel negative.  Rocephin and azithromycin initiated in the emergency department.  Antitussive initiated for cough.  Patient also had complaints of gait instability and difficulty ambulating.  He was admitted for further monitoring and management.    Thereafter:  Right upper lobe pneumonia-  Originally with hypoxia, patient was 88% on room air.  Supplemental oxygen utilized and then weaned  MRSA, strep pneumo, Legionella antigens negative  Respiratory PCR negative  Blood culture x2 no growth to date  Patient participated in pulmonary toileting.  Sputum production appears benign.  Patient was treated with IV ceftriaxone, IV azithromycin, these will be transition to Omnicef and azithromycin at discharge.  Patient will be discharged with antitussive.     Disc disease, chronic back pain, gait instability-  Continue Flexeril, Ultram for pain control as previous  PT/OT evaluated patient.  Patient ambulated 275 feet day prior to discharge.  Discussed with patient options of further inpatient care versus outpatient rehab.  Patient elects to continue to participate in physical therapy outpatient after discharge.  Order will be placed at discharge for ambulatory referral to PT.     Normocytic anemia of chronic disease-  Hemoglobin was 10.5 on 8/15 of this year.  Hemoglobin 10.3, no signs or symptoms of active bleeding     Day of discharge, patient is on room air in no acute distress.  He is ambulated with physical therapy.  He elects to discharge home at this time.  Discharge plan will  "consist of oral antibiotics and antitussives as well as an outpatient referral to physical therapy.  Patient's disc disease is chronic and patient has proven to ambulate.  He defers any further inpatient treatment in terms of further rehab or ambulatory assistance.    Continue Omnicef, azithromycin until completion  Recommend follow-up with PCP in 1 week    Physical Exam on Discharge:  /55 (BP Location: Right arm, Patient Position: Sitting)   Pulse 84   Temp 97.9 °F (36.6 °C) (Oral)   Resp 16   Ht 165.1 cm (65\")   Wt 67.7 kg (149 lb 3.2 oz)   SpO2 95%   BMI 24.83 kg/m²   Physical Exam  Constitutional:       Appearance: Up in recliner, room air, no acute distress, no visitors at bedside  HENT:      Head: Normocephalic and atraumatic.      Mouth/Throat:      Mouth: Mucous membranes are moist.      Pharynx: Oropharynx is clear.   Eyes:      Extraocular Movements: Extraocular movements intact.      Conjunctiva/sclera: Conjunctivae normal.   Cardiovascular:      Rate and Rhythm: Normal rate and regular rhythm.   Pulmonary:      Effort: Pulmonary effort is normal.      Comments: Somewhat diminished in lower lobes, otherwise clear abdominal:      General: There is no distension.      Palpations: Abdomen is soft.   Musculoskeletal:      Cervical back: Normal range of motion and neck supple.      Right lower leg: No edema.      Left lower leg: No edema.      Comments: Neurolysed weakness  Skin:     General: Skin is warm and dry.   Neurological:      General: No focal deficit present.      Mental Status: He is alert and oriented to person, place, and time.   Psychiatric:         Mood and Affect: Mood normal.         Behavior: Behavior normal.  Conversational    Condition on Discharge: Stable    Discharge Disposition:  Home or Self Care    Discharge Medications:     Discharge Medications        New Medications        Instructions Start Date   azithromycin 500 MG tablet  Commonly known as: Zithromax   500 mg, Oral, " Daily   Start Date: October 19, 2023     cefdinir 300 MG capsule  Commonly known as: OMNICEF   300 mg, Oral, 2 Times Daily   Start Date: October 19, 2023     dextromethorphan polistirex ER 30 MG/5ML Suspension Extended Release oral suspension  Commonly known as: Delsym   60 mg, Oral, Every 12 Hours PRN             Continue These Medications        Instructions Start Date   denosumab 60 MG/ML solution prefilled syringe syringe  Commonly known as: PROLIA   60 mg, Subcutaneous, Every 6 Months      multivitamins-minerals capsule capsule   1 capsule, Oral, 2 Times Daily      naloxone 4 MG/0.1ML nasal spray  Commonly known as: NARCAN   Call 911. Don't prime. [place] in 1 nostril for overdose. Repeat in 2-3 minutes in other nostril if no or minimal breathing/responsiveness.      olmesartan 40 MG tablet  Commonly known as: BENICAR   40 mg, Oral, Daily      sennosides-docusate 8.6-50 MG per tablet  Commonly known as: PERICOLACE   2 tablets, Oral, Daily PRN      traMADol 50 MG tablet  Commonly known as: ULTRAM   50 mg, Oral, Every 8 Hours PRN             Discharge Diet:   Diet Instructions       Diet: Cardiac Diets; Healthy Heart (2-3 Na+); Thin (IDDSI 0)      Discharge Diet: Cardiac Diets    Cardiac Diet: Healthy Heart (2-3 Na+)    Fluid Consistency: Thin (IDDSI 0)            Activity at Discharge:   Activity Instructions       Up WIth Assist              Follow-up Appointments:   Future Appointments   Date Time Provider Department Center   10/23/2023  4:30 PM Nick Martínez MD [facility] PAD PAD   11/3/2023  1:45 PM Santiago Aaron MD [facility] VSQ PAD   11/6/2023  1:30 PM Santiago Aaron MD [facility] PAD       Test Results Pending at Discharge: Blood culture x2 no growth at 24 hours.  Sputum culture pending, follow to completion    Electronically signed by DIEGO Oneil, 10/18/23, 08:49 CDT.    Time: 35 minutes.          Electronically signed by Arnoldo Cadet APRN at 10/18/23 0849       Discharge Order  (From admission, onward)       Start     Ordered    10/18/23 0844  Discharge patient  Once        Expected Discharge Date: 10/18/23   Discharge Disposition: Home or Self Care   Physician of Record for Attribution - Please select from Treatment Team: GABRIEL SKINNER [[id number]]   Review needed by CMO to determine Physician of Record: No      Question Answer Comment   Physician of Record for Attribution - Please select from Treatment Team GABRIEL SKINNER    Review needed by CMO to determine Physician of Record No        10/18/23 0849

## 2023-10-18 NOTE — THERAPY DISCHARGE NOTE
Acute Care - Physical Therapy Discharge Summary  [facility]       Patient Name: Juan Luis Collazo  : 1940  MRN: [id number]    Today's Date: 10/18/2023                 Admit Date: 10/16/2023      PT Recommendation and Plan    Visit Dx:    ICD-10-CM ICD-9-CM   1. Pneumonia of right upper lobe due to infectious organism  J18.9 486   2. Shortness of breath  R06.02 786.05   3. Painful respiration  R07.1 786.52   4. Elevated troponin  R79.89 790.6   5. Impaired mobility [Z74.09]  Z74.09 799.89   6. Compression fracture of T12 vertebra with delayed healing  S22.080G V54.27   7. Degenerative disc disease, cervical  M50.30 722.4   8. Back pain, unspecified back location, unspecified back pain laterality, unspecified chronicity  M54.9 724.5   9. Gait instability  R26.81 781.2   10. Degenerative disc disease, thoracic  M51.34 722.51                PT Rehab Goals       Row Name 10/18/23 1300             Bed Mobility Goal 1 (PT)    Activity/Assistive Device (Bed Mobility Goal 1, PT) supine to sit;sit to supine  -AB      [place] Level/Cues Needed (Bed Mobility Goal 1, PT) independent  -AB      Time Frame (Bed Mobility Goal 1, PT) long term goal (LTG);by discharge  -AB      Progress/Outcomes (Bed Mobility Goal 1, PT) goal not met  -AB         Transfer Goal 1 (PT)    Activity/Assistive Device (Transfer Goal 1, PT) sit-to-stand/stand-to-sit;bed-to-chair/chair-to-bed  -AB      [place] Level/Cues Needed (Transfer Goal 1, PT) standby assist  -AB      Time Frame (Transfer Goal 1, PT) long term goal (LTG);by discharge  -AB      Progress/Outcome (Transfer Goal 1, PT) goal not met  -AB         Gait Training Goal 1 (PT)    Activity/Assistive Device (Gait Training Goal 1, PT) gait (walking locomotion);assistive device use;decrease fall risk;increase endurance/gait distance;cane, straight  -AB      [place] Level (Gait Training Goal 1, PT) modified independence  -AB      Distance (Gait Training Goal 1, PT) 500  -AB       Time Frame (Gait Training Goal 1, PT) long term goal (LTG);by discharge  -AB      Progress/Outcome (Gait Training Goal 1, PT) goal not met  -AB         Stairs Goal 1 (PT)    Activity/Assistive Device (Stairs Goal 1, PT) ascending stairs;descending stairs  -AB      [place] Level/Cues Needed (Stairs Goal 1, PT) standby assist  -AB      Number of Stairs (Stairs Goal 1, PT) 3  -AB      Time Frame (Stairs Goal 1, PT) long term goal (LTG);by discharge  -AB      Progress/Outcome (Stairs Goal 1, PT) goal not met  -AB                User Key  (r) = Recorded By, (t) = Taken By, (c) = Cosigned By      Initials Name Provider Type Discipline    Viviana Durant PTA Physical Therapist Assistant PT                        PT Discharge Summary  Anticipated Discharge Disposition (PT): home with assist, home with outpatient therapy services  Reason for Discharge: Discharge from facility  Outcomes Achieved: Refer to plan of care for updates on goals achieved  Discharge Destination: Home with assist      Viviana Farnsworth PTA   10/18/2023

## 2023-10-19 ENCOUNTER — TRANSITIONAL CARE MANAGEMENT TELEPHONE ENCOUNTER (OUTPATIENT)
Dept: CALL CENTER | Facility: HOSPITAL | Age: 83
End: 2023-10-19
Payer: MEDICARE

## 2023-10-19 LAB
BACTERIA SPEC RESP CULT: NORMAL
GRAM STN SPEC: NORMAL

## 2023-10-19 NOTE — OUTREACH NOTE
Call Center TCM Note      Flowsheet Row Responses   [facility] patient discharged from? [place]   Does the patient have one of the following disease processes/diagnoses(primary or secondary)? Pneumonia   TCM attempt successful? Yes   Call start time 1429   Call end time 1432   Discharge diagnosis Right upper lobe PNA   Person spoke with today (if not patient) and relationship pt   Meds reviewed with patient/caregiver? Yes   Is the patient having any side effects they believe may be caused by any medication additions or changes? No   Does the patient have all medications ordered at discharge? Yes   Is the patient taking all medications as directed (includes completed medication regime)? Yes   Does the patient have an appointment with their PCP within 7-14 days of discharge? No   Nursing Interventions Routed TCM call to PCP office   Pulse Ox monitoring Intermittent   Pulse Ox device source Patient   O2 Sat comments Will check today   Psychosocial issues? No   Did the patient receive a copy of their discharge instructions? Yes   Nursing interventions Reviewed instructions with patient   What is the patient's perception of their health status since discharge? Improving   Nursing Interventions Nurse provided patient education   Are the patient's immunizations up to date?  No   If the patient is a current smoker, are they able to teach back resources for cessation? Not a smoker   Is the patient/caregiver able to teach back the hierarchy of who to call/visit for symptoms/problems? PCP, Specialist, Home health nurse, Urgent Care, ED, 911 Yes   Is the patient/caregiver able to teach back signs and symptoms of worsening condition: Fever/chills, Shortness of breath, Chest pain   Is the patient/caregiver able to teach back importance of completing antibiotic course of treatment? Yes   TCM call completed? Yes   Wrap up additional comments Spouse states is doing better, and denies increased SOB, worsening cough, chest pain.  Reviewed AVS/meds with spouse. Spouse verified PCP appt.   Call end time 1432   Would this patient benefit from a Referral to [facility] Social Work? No   Is the patient interested in additional calls from an ambulatory ? No            Destiny Vides RN    10/19/2023, 14:34 CDT

## 2023-10-19 NOTE — OUTREACH NOTE
Prep Survey      Flowsheet Row Responses   [facility] facility patient discharged from? [facility]   Is LACE score < 7 ? No   Eligibility [facility]   Date of Admission 10/16/23   Date of Discharge 10/18/23   Discharge Disposition Home or Self Care   Discharge diagnosis Right upper lobe PNA   Does the patient have one of the following disease processes/diagnoses(primary or secondary)? Pneumonia   Does the patient have Home health ordered? No   Is there a DME ordered? No   Prep survey completed? Yes            MONI A - Registered Nurse

## 2023-10-21 LAB
BACTERIA SPEC AEROBE CULT: NORMAL
BACTERIA SPEC AEROBE CULT: NORMAL

## 2023-10-23 ENCOUNTER — OFFICE VISIT (OUTPATIENT)
Dept: NEUROSURGERY | Facility: CLINIC | Age: 83
End: 2023-10-23
Payer: MEDICARE

## 2023-10-23 VITALS — BODY MASS INDEX: 24.83 KG/M2 | HEIGHT: 65 IN | WEIGHT: 149 LBS

## 2023-10-23 DIAGNOSIS — S22.080G COMPRESSION FRACTURE OF T12 VERTEBRA WITH DELAYED HEALING: Primary | ICD-10-CM

## 2023-10-23 DIAGNOSIS — Z98.890 S/P KYPHOPLASTY: ICD-10-CM

## 2023-10-23 DIAGNOSIS — Z78.9 NONSMOKER: ICD-10-CM

## 2023-10-23 PROCEDURE — 99024 POSTOP FOLLOW-UP VISIT: CPT | Performed by: NEUROLOGICAL SURGERY

## 2023-10-23 NOTE — LETTER
October 23, 2023     Santiago Aaron MD  [address] [place] [address]    Patient: Juan Luis Collazo   YOB: 1940   Date of Visit: 10/23/2023     Dear Santiago Aaron MD:       Thank you for referring Juan Luis Collazo to me for evaluation. Below are the relevant portions of my assessment and plan of care.    If you have questions, please do not hesitate to call me. I look forward to following Juan Luis along with you.         Sincerely,        Nick Martínez MD        CC: No Recipients    Nick Martínez MD  10/23/23 0257  Incomplete  Chief complaint:   Chief Complaint   Patient presents with   • Back Pain     Patient here for 2mo post op visit. He had a kyphoplasty on 8/18/23.      Subjective    HPI:   Interval History: Juan Luis Collazo is a 82 y.o.  male who presents today for post operative follow-up from a Thoracic 12, KYPHOPLASTY WITH BIOPSY on 8/18/2023.  Mr. Collazo has done well since we last saw him.  His overall back pain is improved by 90% since undergoing T12 kyphoplasty.  He denies fevers, chills, or concern for postoperative incision infection.  He currently rates the severity of his symptoms 3/10.  No additional concerns at this time.    No data recorded  No data recorded  No data recorded    PFSH:  Past Medical History:   Diagnosis Date   • Arthritis    • Back pain    • Cancer     CHEST, SKIN CANCER   • GERD (gastroesophageal reflux disease)    • History of colon polyps    • History of prostate cancer    • Hypertension    • Low back pain    • Macular degeneration    • Osteopenia      Past Surgical History:   Procedure Laterality Date   • APPENDECTOMY     • CATARACT EXTRACTION, BILATERAL     • COLONOSCOPY  09/17/2013    Dr. José-One 2-3mm polyp at 20cm; Otherwise normal; Repeat 3 years   • COLONOSCOPY N/A 12/07/2022    Procedure: COLONOSCOPY WITH ANESTHESIA;  Surgeon: Bri Alexis MD;  Location: [facility] ENDOSCOPY;  Service: Gastroenterology;   Laterality: N/A;  pre: anemia. hx polyps.  post: polyps. diverticulosis.  no PCP   • ENDOSCOPY N/A 12/07/2022    Procedure: ESOPHAGOGASTRODUODENOSCOPY WITH ANESTHESIA;  Surgeon: Bri Alexis MD;  Location: [facility] ENDOSCOPY;  Service: Gastroenterology;  Laterality: N/A;  pre: anemia  post: hiatal hernia. esophagitis.gastric erosions.  no PCP   • FOOT SURGERY Right    • KYPHOPLASTY Bilateral 07/17/2018    Procedure: L3 KYPHOPLASTY 1-2 LEVELS;  Surgeon: Vega Pandey MD;  Location:  [facility] OR;  Service: Neurosurgery   • KYPHOPLASTY Bilateral 09/11/2018    Procedure: L4 KYPHOPLASTY WITH BIOPSY;  Surgeon: Vega Pandey MD;  Location:  [facility] OR;  Service: Neurosurgery   • KYPHOPLASTY WITH BIOPSY N/A 01/25/2022    Procedure: KYPHOPLASTY WITH BIOPSY, THORACIC 6 and LUMBAR 5;  Surgeon: Nick Martínez MD;  Location:  [facility] OR;  Service: Neurosurgery;  Laterality: N/A;   • KYPHOPLASTY WITH BIOPSY N/A 8/18/2023    Procedure: Thoracic 12, KYPHOPLASTY WITH BIOPSY;  Surgeon: Nick Martínez MD;  Location:  [facility] OR;  Service: Neurosurgery;  Laterality: N/A;   • PROSTATECTOMY     • TONSILLECTOMY     • TOTAL SHOULDER REPLACEMENT Bilateral      Objective     Current Outpatient Medications   Medication Sig Dispense Refill   • cefdinir (OMNICEF) 300 MG capsule Take 1 capsule by mouth 2 (Two) Times a Day for 5 days. 10 capsule 0   • denosumab (PROLIA) 60 MG/ML solution prefilled syringe syringe Inject 1 mL under the skin into the appropriate area as directed Every 6 (Six) Months. 180 mL 1   • dextromethorphan polistirex ER (Delsym) 30 MG/5ML Suspension Extended Release oral suspension Take 10 mL by mouth Every 12 (Twelve) Hours As Needed (Cough). 280 mL 0   • multivitamins-minerals (PRESERVISION AREDS 2) capsule capsule Take 1 capsule by mouth 2 (Two) Times a Day.     • naloxone (NARCAN) 4 MG/0.1ML nasal spray Call 911. Don't prime. [place] in 1 nostril for overdose. Repeat in 2-3 minutes in other nostril  "if no or minimal breathing/responsiveness. 2 each 0   • olmesartan (BENICAR) 40 MG tablet Take 1 tablet by mouth Daily. 90 tablet 3   • sennosides-docusate (senna-docusate sodium) 8.6-50 MG per tablet Take 2 tablets by mouth Daily As Needed for Constipation.     • traMADol (ULTRAM) 50 MG tablet Take 1 tablet by mouth Every 8 (Eight) Hours As Needed for Moderate Pain.       No current facility-administered medications for this visit.     Vital Signs  Ht 165.1 cm (65\")   Wt 67.6 kg (149 lb)   BMI 24.79 kg/m²   Physical Exam  Vitals and nursing note reviewed.   Constitutional:       General: He is not in acute distress.     Appearance: Normal appearance. He is well-developed, well-groomed and overweight. He is not ill-appearing, toxic-appearing or diaphoretic.          Comments: BMI 27.29   HENT:      Head: Normocephalic and atraumatic.      Right Ear: Hearing normal.      Left Ear: Hearing normal.   Eyes:      Extraocular Movements: EOM normal.      Conjunctiva/sclera: Conjunctivae normal.      Pupils: Pupils are equal, round, and reactive to light.   Neck:      Trachea: Trachea normal.   Cardiovascular:      Rate and Rhythm: Normal rate and regular rhythm.   Pulmonary:      Effort: Pulmonary effort is normal. No tachypnea, bradypnea, accessory muscle usage or respiratory distress.   Abdominal:      Palpations: Abdomen is soft.   Musculoskeletal:      Cervical back: Full passive range of motion without pain and neck supple.   Skin:     General: Skin is warm and dry.   Neurological:      Mental Status: He is alert and oriented to person, place, and time.      GCS: GCS eye subscore is 4. GCS verbal subscore is 5. GCS motor subscore is 6.      Gait: Gait is intact.      Deep Tendon Reflexes:      Reflex Scores:       Tricep reflexes are 1+ on the right side and 1+ on the left side.       Bicep reflexes are 1+ on the right side and 1+ on the left side.       Brachioradialis reflexes are 1+ on the right side and 1+ on " the left side.       Patellar reflexes are 1+ on the right side and 1+ on the left side.       Achilles reflexes are 0 on the right side and 0 on the left side.  Psychiatric:         Speech: Speech normal.         Behavior: Behavior normal. Behavior is cooperative.       Neurologic Exam     Mental Status   Oriented to person, place, and time.   Attention: normal. Concentration: normal.   Speech: speech is normal   Level of consciousness: alert    Cranial Nerves     CN II   Visual fields full to confrontation.     CN III, IV, VI   Pupils are equal, round, and reactive to light.  Extraocular motions are normal.     CN V   Facial sensation intact.     CN VII   Facial expression full, symmetric.     CN VIII   CN VIII normal.     CN IX, X   CN IX normal.     CN XI   CN XI normal.     Motor Exam   Right arm tone: normal  Left arm tone: normal  Right leg tone: normal  Left leg tone: normal    Strength   Right deltoid: 5/5  Left deltoid: 5/5  Right biceps: 5/5  Left biceps: 5/5  Right triceps: 5/5  Left triceps: 5/5  Right wrist extension: 5/5  Left wrist extension: 5/5  Right iliopsoas: 5/5  Left iliopsoas: 5/5  Right quadriceps: 5/5  Left quadriceps: 5/5  Right anterior tibial: 4/5  Left anterior tibial: 5/5  Right gastroc: 5/5  Left gastroc: 5/5  Right EHL 5/5  Left EHL 5/5       Sensory Exam   Right arm light touch: normal  Left arm light touch: normal  Right leg light touch: normal  Left leg light touch: normal    Gait, Coordination, and Reflexes     Gait  Gait: normal    Tremor   Resting tremor: absent  Intention tremor: absent  Action tremor: absent    Reflexes   Right brachioradialis: 1+  Left brachioradialis: 1+  Right biceps: 1+  Left biceps: 1+  Right triceps: 1+  Left triceps: 1+  Right patellar: 1+  Left patellar: 1+  Right achilles: 0  Left achilles: 0  Right plantar: normal  Left plantar: normal  Right Villalta: absent  Left Villalta: absent  Right ankle clonus: absent  Left ankle clonus: absent  Right pendular  knee jerk: absent  Left pendular knee jerk: absent      Incision: WOUND IMAGE - Postop T12 kyphoplasty (08/31/2023)   (Consent to obtain photo of postoperative site for documentation purposes only obtained verbally by Mr. Collazo)    Results Review:       Lab Results   Component Value Date    FINALDX  08/18/2023     Bone, T12, biopsies:  Hemorrhagic bone with mild osteoporotic changes.  Normocellular marrow with maturing trilineage hematopoiesis.             Assessment/Plan:   T12 compression fracture status post kyphoplasty  Juan Luis Collazo is a 82 y.o. male who presents today for post operative wound check following a Thoracic 12, KYPHOPLASTY WITH BIOPSY on 8/18/2023.  Mr. Collazo has done well since we last saw him.   His symptoms are stable.  His post operative incision is clean, dry, intact, and well approximated without signs of soft tissue infection.  We discussed the signs and symptoms of a soft tissue infection and I recommended they call immediately for any concerns. He is off of all pain medications.  He would like referral to ambulatory physical therapy.  We discussed safe bending lifting and twisting practices.  Given his severe osteoporosis would recommend long-term avoidance of bending lifting or twisting.  Will defer osteoporosis treatment to his PCP.    Overweight (BMI 25.0-29.9)  Body mass index is 24.79 kg/m²..  Information on the DASH diet provided in the AVS.  We will continue to provided diet and exercise information with the goal of weight loss at each scheduled appointment.     STERAJESH Fall Risk Assessment was completed, and patient is at LOW risk for falls.Assessment completed on:8/31/2023  Fall risk information provided in AVS    Diagnoses and all orders for this visit:    1. Compression fracture of T12 vertebra with delayed healing (Primary)  -     Ambulatory Referral to Physical Therapy Evaluate and treat    2. S/P kyphoplasty  -     Ambulatory Referral to Physical Therapy Evaluate and  treat    3. Nonsmoker        Return if symptoms worsen or fail to improve.    I discussed the patients findings and my recommendations with patient    MD Tanya Blanchard, Nick Martinez MD  10/23/23 1609  Sign when Signing Visit  Chief complaint:   Chief Complaint   Patient presents with   • Back Pain     Patient here for 2mo post op visit. He had a kyphoplasty on 8/18/23.      Subjective    HPI:   Interval History: Juan Luis Collazo is a 82 y.o.  male who presents today for post operative follow-up from a Thoracic 12, KYPHOPLASTY WITH BIOPSY on 8/18/2023.  Mr. Collazo has done well since we last saw him.  His overall back pain is improved by 90% since undergoing T12 kyphoplasty.  He denies fevers, chills, or concern for postoperative incision infection.  He currently rates the severity of his symptoms 3/10.  No additional concerns at this time.    No data recorded  No data recorded  No data recorded    PFSH:  Past Medical History:   Diagnosis Date   • Arthritis    • Back pain    • Cancer     CHEST, SKIN CANCER   • GERD (gastroesophageal reflux disease)    • History of colon polyps    • History of prostate cancer    • Hypertension    • Low back pain    • Macular degeneration    • Osteopenia      Past Surgical History:   Procedure Laterality Date   • APPENDECTOMY     • CATARACT EXTRACTION, BILATERAL     • COLONOSCOPY  09/17/2013    Dr. José-One 2-3mm polyp at 20cm; Otherwise normal; Repeat 3 years   • COLONOSCOPY N/A 12/07/2022    Procedure: COLONOSCOPY WITH ANESTHESIA;  Surgeon: Bri Alexis MD;  Location: [facility] ENDOSCOPY;  Service: Gastroenterology;  Laterality: N/A;  pre: anemia. hx polyps.  post: polyps. diverticulosis.  no PCP   • ENDOSCOPY N/A 12/07/2022    Procedure: ESOPHAGOGASTRODUODENOSCOPY WITH ANESTHESIA;  Surgeon: Bri Alexis MD;  Location: [facility] ENDOSCOPY;  Service: Gastroenterology;  Laterality: N/A;  pre: anemia  post: hiatal hernia. esophagitis.gastric  erosions.  no PCP   • FOOT SURGERY Right    • KYPHOPLASTY Bilateral 07/17/2018    Procedure: L3 KYPHOPLASTY 1-2 LEVELS;  Surgeon: Vega Pandey MD;  Location:  [facility] OR;  Service: Neurosurgery   • KYPHOPLASTY Bilateral 09/11/2018    Procedure: L4 KYPHOPLASTY WITH BIOPSY;  Surgeon: Vega Pandey MD;  Location:  [facility] OR;  Service: Neurosurgery   • KYPHOPLASTY WITH BIOPSY N/A 01/25/2022    Procedure: KYPHOPLASTY WITH BIOPSY, THORACIC 6 and LUMBAR 5;  Surgeon: Nick Martínez MD;  Location:  [facility] OR;  Service: Neurosurgery;  Laterality: N/A;   • KYPHOPLASTY WITH BIOPSY N/A 8/18/2023    Procedure: Thoracic 12, KYPHOPLASTY WITH BIOPSY;  Surgeon: Nick Martínez MD;  Location:  [facility] OR;  Service: Neurosurgery;  Laterality: N/A;   • PROSTATECTOMY     • TONSILLECTOMY     • TOTAL SHOULDER REPLACEMENT Bilateral      Objective     Current Outpatient Medications   Medication Sig Dispense Refill   • cefdinir (OMNICEF) 300 MG capsule Take 1 capsule by mouth 2 (Two) Times a Day for 5 days. 10 capsule 0   • denosumab (PROLIA) 60 MG/ML solution prefilled syringe syringe Inject 1 mL under the skin into the appropriate area as directed Every 6 (Six) Months. 180 mL 1   • dextromethorphan polistirex ER (Delsym) 30 MG/5ML Suspension Extended Release oral suspension Take 10 mL by mouth Every 12 (Twelve) Hours As Needed (Cough). 280 mL 0   • multivitamins-minerals (PRESERVISION AREDS 2) capsule capsule Take 1 capsule by mouth 2 (Two) Times a Day.     • naloxone (NARCAN) 4 MG/0.1ML nasal spray Call 911. Don't prime. [place] in 1 nostril for overdose. Repeat in 2-3 minutes in other nostril if no or minimal breathing/responsiveness. 2 each 0   • olmesartan (BENICAR) 40 MG tablet Take 1 tablet by mouth Daily. 90 tablet 3   • sennosides-docusate (senna-docusate sodium) 8.6-50 MG per tablet Take 2 tablets by mouth Daily As Needed for Constipation.     • traMADol (ULTRAM) 50 MG tablet Take 1 tablet by mouth Every 8  "(Eight) Hours As Needed for Moderate Pain.       No current facility-administered medications for this visit.     Vital Signs  Ht 165.1 cm (65\")   Wt 67.6 kg (149 lb)   BMI 24.79 kg/m²   Physical Exam  Vitals and nursing note reviewed.   Constitutional:       General: He is not in acute distress.     Appearance: Normal appearance. He is well-developed, well-groomed and overweight. He is not ill-appearing, toxic-appearing or diaphoretic.          Comments: BMI 27.29   HENT:      Head: Normocephalic and atraumatic.      Right Ear: Hearing normal.      Left Ear: Hearing normal.   Eyes:      Extraocular Movements: EOM normal.      Conjunctiva/sclera: Conjunctivae normal.      Pupils: Pupils are equal, round, and reactive to light.   Neck:      Trachea: Trachea normal.   Cardiovascular:      Rate and Rhythm: Normal rate and regular rhythm.   Pulmonary:      Effort: Pulmonary effort is normal. No tachypnea, bradypnea, accessory muscle usage or respiratory distress.   Abdominal:      Palpations: Abdomen is soft.   Musculoskeletal:      Cervical back: Full passive range of motion without pain and neck supple.   Skin:     General: Skin is warm and dry.   Neurological:      Mental Status: He is alert and oriented to person, place, and time.      GCS: GCS eye subscore is 4. GCS verbal subscore is 5. GCS motor subscore is 6.      Gait: Gait is intact.      Deep Tendon Reflexes:      Reflex Scores:       Tricep reflexes are 1+ on the right side and 1+ on the left side.       Bicep reflexes are 1+ on the right side and 1+ on the left side.       Brachioradialis reflexes are 1+ on the right side and 1+ on the left side.       Patellar reflexes are 1+ on the right side and 1+ on the left side.       Achilles reflexes are 0 on the right side and 0 on the left side.  Psychiatric:         Speech: Speech normal.         Behavior: Behavior normal. Behavior is cooperative.       Neurologic Exam     Mental Status   Oriented to person, " place, and time.   Attention: normal. Concentration: normal.   Speech: speech is normal   Level of consciousness: alert    Cranial Nerves     CN II   Visual fields full to confrontation.     CN III, IV, VI   Pupils are equal, round, and reactive to light.  Extraocular motions are normal.     CN V   Facial sensation intact.     CN VII   Facial expression full, symmetric.     CN VIII   CN VIII normal.     CN IX, X   CN IX normal.     CN XI   CN XI normal.     Motor Exam   Right arm tone: normal  Left arm tone: normal  Right leg tone: normal  Left leg tone: normal    Strength   Right deltoid: 5/5  Left deltoid: 5/5  Right biceps: 5/5  Left biceps: 5/5  Right triceps: 5/5  Left triceps: 5/5  Right wrist extension: 5/5  Left wrist extension: 5/5  Right iliopsoas: 5/5  Left iliopsoas: 5/5  Right quadriceps: 5/5  Left quadriceps: 5/5  Right anterior tibial: 4/5  Left anterior tibial: 5/5  Right gastroc: 5/5  Left gastroc: 5/5  Right EHL 5/5  Left EHL 5/5       Sensory Exam   Right arm light touch: normal  Left arm light touch: normal  Right leg light touch: normal  Left leg light touch: normal    Gait, Coordination, and Reflexes     Gait  Gait: normal    Tremor   Resting tremor: absent  Intention tremor: absent  Action tremor: absent    Reflexes   Right brachioradialis: 1+  Left brachioradialis: 1+  Right biceps: 1+  Left biceps: 1+  Right triceps: 1+  Left triceps: 1+  Right patellar: 1+  Left patellar: 1+  Right achilles: 0  Left achilles: 0  Right plantar: normal  Left plantar: normal  Right Villalta: absent  Left Villalta: absent  Right ankle clonus: absent  Left ankle clonus: absent  Right pendular knee jerk: absent  Left pendular knee jerk: absent      Incision: WOUND IMAGE - Postop T12 kyphoplasty (08/31/2023)   (Consent to obtain photo of postoperative site for documentation purposes only obtained verbally by Mr. Collazo)    Results Review: no new imaging    Lab Results   Component Value Date    FINALDX  08/18/2023      Bone, T12, biopsies:  Hemorrhagic bone with mild osteoporotic changes.  Normocellular marrow with maturing trilineage hematopoiesis.             Assessment/Plan:   T12 compression fracture status post kyphoplasty  Juan Luis Collazo is a 82 y.o. male who presents today for post operative wound check following a Thoracic 12, KYPHOPLASTY WITH BIOPSY on 8/18/2023.  Mr. Collazo has done well since we last saw him.   His symptoms are stable.  His post operative incision is clean, dry, intact, and well approximated without signs of soft tissue infection.  We discussed the signs and symptoms of a soft tissue infection and I recommended they call immediately for any concerns. He may continue current pain medication regimen with tapering instructions as previously provided.  B/R/AE discussed. I advised the patient to keep scheduled appointment with Dr. Martínez for reassessment on 10/23/2023.  Juan Luis knows to call the neurosurgical clinic to return sooner for any new or additional concerns.      Overweight (BMI 25.0-29.9)  Body mass index is 24.79 kg/m²..  Information on the DASH diet provided in the AVS.  We will continue to provided diet and exercise information with the goal of weight loss at each scheduled appointment.     STEADI Fall Risk Assessment was completed, and patient is at LOW risk for falls.Assessment completed on:8/31/2023  Fall risk information provided in AVS    There are no diagnoses linked to this encounter.    No follow-ups on file.    I discussed the patients findings and my recommendations with patient    Nick Martínez MD

## 2023-10-23 NOTE — PROGRESS NOTES
Chief complaint:   Chief Complaint   Patient presents with    Back Pain     Patient here for 2mo post op visit. He had a kyphoplasty on 8/18/23.      Subjective     HPI:   Interval History: Juan Luis Collazo is a 82 y.o.  male who presents today for post operative follow-up from a Thoracic 12, KYPHOPLASTY WITH BIOPSY on 8/18/2023.  Mr. Collazo has done well since we last saw him.  His overall back pain is improved by 90% since undergoing T12 kyphoplasty.  He denies fevers, chills, or concern for postoperative incision infection.  He currently rates the severity of his symptoms 3/10.  No additional concerns at this time.    No data recorded  No data recorded  No data recorded    PFSH:  Past Medical History:   Diagnosis Date    Arthritis     Back pain     Cancer     CHEST, SKIN CANCER    GERD (gastroesophageal reflux disease)     History of colon polyps     History of prostate cancer     Hypertension     Low back pain     Macular degeneration     Osteopenia      Past Surgical History:   Procedure Laterality Date    APPENDECTOMY      CATARACT EXTRACTION, BILATERAL      COLONOSCOPY  09/17/2013    Dr. José-One 2-3mm polyp at 20cm; Otherwise normal; Repeat 3 years    COLONOSCOPY N/A 12/07/2022    Procedure: COLONOSCOPY WITH ANESTHESIA;  Surgeon: Bri Alexis MD;  Location: [facility] ENDOSCOPY;  Service: Gastroenterology;  Laterality: N/A;  pre: anemia. hx polyps.  post: polyps. diverticulosis.  no PCP    ENDOSCOPY N/A 12/07/2022    Procedure: ESOPHAGOGASTRODUODENOSCOPY WITH ANESTHESIA;  Surgeon: Bri Alexis MD;  Location: [facility] ENDOSCOPY;  Service: Gastroenterology;  Laterality: N/A;  pre: anemia  post: hiatal hernia. esophagitis.gastric erosions.  no PCP    FOOT SURGERY Right     KYPHOPLASTY Bilateral 07/17/2018    Procedure: L3 KYPHOPLASTY 1-2 LEVELS;  Surgeon: Vega Pandey MD;  Location: [facility] OR;  Service: Neurosurgery    KYPHOPLASTY Bilateral 09/11/2018    Procedure: L4 KYPHOPLASTY WITH  "BIOPSY;  Surgeon: Vega Pandey MD;  Location:  [facility] OR;  Service: Neurosurgery    KYPHOPLASTY WITH BIOPSY N/A 01/25/2022    Procedure: KYPHOPLASTY WITH BIOPSY, THORACIC 6 and LUMBAR 5;  Surgeon: Nick Martínez MD;  Location:  [facility] OR;  Service: Neurosurgery;  Laterality: N/A;    KYPHOPLASTY WITH BIOPSY N/A 8/18/2023    Procedure: Thoracic 12, KYPHOPLASTY WITH BIOPSY;  Surgeon: Nick Martínez MD;  Location:  [facility] OR;  Service: Neurosurgery;  Laterality: N/A;    PROSTATECTOMY      TONSILLECTOMY      TOTAL SHOULDER REPLACEMENT Bilateral      Objective      Current Outpatient Medications   Medication Sig Dispense Refill    cefdinir (OMNICEF) 300 MG capsule Take 1 capsule by mouth 2 (Two) Times a Day for 5 days. 10 capsule 0    denosumab (PROLIA) 60 MG/ML solution prefilled syringe syringe Inject 1 mL under the skin into the appropriate area as directed Every 6 (Six) Months. 180 mL 1    dextromethorphan polistirex ER (Delsym) 30 MG/5ML Suspension Extended Release oral suspension Take 10 mL by mouth Every 12 (Twelve) Hours As Needed (Cough). 280 mL 0    multivitamins-minerals (PRESERVISION AREDS 2) capsule capsule Take 1 capsule by mouth 2 (Two) Times a Day.      naloxone (NARCAN) 4 MG/0.1ML nasal spray Call 911. Don't prime. [place] in 1 nostril for overdose. Repeat in 2-3 minutes in other nostril if no or minimal breathing/responsiveness. 2 each 0    olmesartan (BENICAR) 40 MG tablet Take 1 tablet by mouth Daily. 90 tablet 3    sennosides-docusate (senna-docusate sodium) 8.6-50 MG per tablet Take 2 tablets by mouth Daily As Needed for Constipation.      traMADol (ULTRAM) 50 MG tablet Take 1 tablet by mouth Every 8 (Eight) Hours As Needed for Moderate Pain.       No current facility-administered medications for this visit.     Vital Signs  Ht 165.1 cm (65\")   Wt 67.6 kg (149 lb)   BMI 24.79 kg/m²   Physical Exam  Vitals and nursing note reviewed.   Constitutional:       General: He is not in " acute distress.     Appearance: Normal appearance. He is well-developed, well-groomed and overweight. He is not ill-appearing, toxic-appearing or diaphoretic.          Comments: BMI 27.29   HENT:      Head: Normocephalic and atraumatic.      Right Ear: Hearing normal.      Left Ear: Hearing normal.   Eyes:      Extraocular Movements: EOM normal.      Conjunctiva/sclera: Conjunctivae normal.      Pupils: Pupils are equal, round, and reactive to light.   Neck:      Trachea: Trachea normal.   Cardiovascular:      Rate and Rhythm: Normal rate and regular rhythm.   Pulmonary:      Effort: Pulmonary effort is normal. No tachypnea, bradypnea, accessory muscle usage or respiratory distress.   Abdominal:      Palpations: Abdomen is soft.   Musculoskeletal:      Cervical back: Full passive range of motion without pain and neck supple.   Skin:     General: Skin is warm and dry.   Neurological:      Mental Status: He is alert and oriented to person, place, and time.      GCS: GCS eye subscore is 4. GCS verbal subscore is 5. GCS motor subscore is 6.      Gait: Gait is intact.      Deep Tendon Reflexes:      Reflex Scores:       Tricep reflexes are 1+ on the right side and 1+ on the left side.       Bicep reflexes are 1+ on the right side and 1+ on the left side.       Brachioradialis reflexes are 1+ on the right side and 1+ on the left side.       Patellar reflexes are 1+ on the right side and 1+ on the left side.       Achilles reflexes are 0 on the right side and 0 on the left side.  Psychiatric:         Speech: Speech normal.         Behavior: Behavior normal. Behavior is cooperative.       Neurologic Exam     Mental Status   Oriented to person, place, and time.   Attention: normal. Concentration: normal.   Speech: speech is normal   Level of consciousness: alert    Cranial Nerves     CN II   Visual fields full to confrontation.     CN III, IV, VI   Pupils are equal, round, and reactive to light.  Extraocular motions are  normal.     CN V   Facial sensation intact.     CN VII   Facial expression full, symmetric.     CN VIII   CN VIII normal.     CN IX, X   CN IX normal.     CN XI   CN XI normal.     Motor Exam   Right arm tone: normal  Left arm tone: normal  Right leg tone: normal  Left leg tone: normal    Strength   Right deltoid: 5/5  Left deltoid: 5/5  Right biceps: 5/5  Left biceps: 5/5  Right triceps: 5/5  Left triceps: 5/5  Right wrist extension: 5/5  Left wrist extension: 5/5  Right iliopsoas: 5/5  Left iliopsoas: 5/5  Right quadriceps: 5/5  Left quadriceps: 5/5  Right anterior tibial: 4/5  Left anterior tibial: 5/5  Right gastroc: 5/5  Left gastroc: 5/5  Right EHL 5/5  Left EHL 5/5       Sensory Exam   Right arm light touch: normal  Left arm light touch: normal  Right leg light touch: normal  Left leg light touch: normal    Gait, Coordination, and Reflexes     Gait  Gait: normal    Tremor   Resting tremor: absent  Intention tremor: absent  Action tremor: absent    Reflexes   Right brachioradialis: 1+  Left brachioradialis: 1+  Right biceps: 1+  Left biceps: 1+  Right triceps: 1+  Left triceps: 1+  Right patellar: 1+  Left patellar: 1+  Right achilles: 0  Left achilles: 0  Right plantar: normal  Left plantar: normal  Right [place]: absent  Left [place]: absent  Right ankle clonus: absent  Left ankle clonus: absent  Right pendular knee jerk: absent  Left pendular knee jerk: absent      Incision: WOUND IMAGE - Postop T12 kyphoplasty (08/31/2023)   (Consent to obtain photo of postoperative site for documentation purposes only obtained verbally by Mr. Collazo)    Results Review:       Lab Results   Component Value Date    FINALDX  08/18/2023     Bone, T12, biopsies:  Hemorrhagic bone with mild osteoporotic changes.  Normocellular marrow with maturing trilineage hematopoiesis.             Assessment/Plan:   T12 compression fracture status post kyphoplasty  Juan Luis Collazo is a 82 y.o. male who presents today for post operative wound  check following a Thoracic 12, KYPHOPLASTY WITH BIOPSY on 8/18/2023.  Mr. Collazo has done well since we last saw him.   His symptoms are stable.  His post operative incision is clean, dry, intact, and well approximated without signs of soft tissue infection.  We discussed the signs and symptoms of a soft tissue infection and I recommended they call immediately for any concerns. He is off of all pain medications.  He would like referral to ambulatory physical therapy.  We discussed safe bending lifting and twisting practices.  Given his severe osteoporosis would recommend long-term avoidance of bending lifting or twisting.  Will defer osteoporosis treatment to his PCP.    Overweight (BMI 25.0-29.9)  Body mass index is 24.79 kg/m²..  Information on the DASH diet provided in the AVS.  We will continue to provided diet and exercise information with the goal of weight loss at each scheduled appointment.     STEADI Fall Risk Assessment was completed, and patient is at LOW risk for falls.Assessment completed on:8/31/2023  Fall risk information provided in AVS    Diagnoses and all orders for this visit:    1. Compression fracture of T12 vertebra with delayed healing (Primary)  -     Ambulatory Referral to Physical Therapy Evaluate and treat    2. S/P kyphoplasty  -     Ambulatory Referral to Physical Therapy Evaluate and treat    3. Nonsmoker        Return if symptoms worsen or fail to improve.    I discussed the patients findings and my recommendations with patient    Nick Martínez MD

## 2023-10-24 ENCOUNTER — HOSPITAL ENCOUNTER (OUTPATIENT)
Dept: GENERAL RADIOLOGY | Facility: HOSPITAL | Age: 83
Discharge: HOME | End: 2023-10-24
Admitting: INTERNAL MEDICINE
Payer: MEDICARE

## 2023-10-24 ENCOUNTER — OFFICE VISIT (OUTPATIENT)
Dept: INTERNAL MEDICINE | Facility: CLINIC | Age: 83
End: 2023-10-24
Payer: MEDICARE

## 2023-10-24 VITALS
HEART RATE: 58 BPM | SYSTOLIC BLOOD PRESSURE: 116 MMHG | HEIGHT: 65 IN | DIASTOLIC BLOOD PRESSURE: 60 MMHG | OXYGEN SATURATION: 98 % | BODY MASS INDEX: 26.16 KG/M2 | TEMPERATURE: 98.6 F | WEIGHT: 157 LBS

## 2023-10-24 DIAGNOSIS — M51.34 DEGENERATIVE DISC DISEASE, THORACIC: ICD-10-CM

## 2023-10-24 DIAGNOSIS — Z09 HOSPITAL DISCHARGE FOLLOW-UP: Primary | ICD-10-CM

## 2023-10-24 DIAGNOSIS — G47.09 OTHER INSOMNIA: ICD-10-CM

## 2023-10-24 DIAGNOSIS — M50.30 DEGENERATIVE DISC DISEASE, CERVICAL: ICD-10-CM

## 2023-10-24 DIAGNOSIS — R06.00 DYSPNEA, UNSPECIFIED TYPE: ICD-10-CM

## 2023-10-24 DIAGNOSIS — M51.36 DEGENERATIVE DISC DISEASE, LUMBAR: ICD-10-CM

## 2023-10-24 DIAGNOSIS — J18.9 PNEUMONIA OF RIGHT UPPER LOBE DUE TO INFECTIOUS ORGANISM: ICD-10-CM

## 2023-10-24 DIAGNOSIS — R26.81 GAIT INSTABILITY: ICD-10-CM

## 2023-10-24 PROCEDURE — 71046 X-RAY EXAM CHEST 2 VIEWS: CPT

## 2023-10-27 ENCOUNTER — READMISSION MANAGEMENT (OUTPATIENT)
Dept: CALL CENTER | Facility: HOSPITAL | Age: 83
End: 2023-10-27
Payer: MEDICARE

## 2023-10-27 NOTE — OUTREACH NOTE
COPD/PN Week 2 Survey      Flowsheet Row Responses   [facility] patient discharged from? [facility]   Does the patient have one of the following disease processes/diagnoses(primary or secondary)? Pneumonia   Week 2 attempt successful? Yes   Call start time 1336   Call end time 1337   Discharge diagnosis Right upper lobe PNA   Is patient permission given to speak with other caregiver? Yes   List who call center can speak with spouse- Jyoti   Person spoke with today (if not patient) and relationship spouse   Is the patient taking all medications as directed (includes completed medication regime)? Yes   Does the patient have a primary care provider?  Yes   Comments regarding PCP was seen at PCP office on 10/24   Has the patient kept scheduled appointments due by today? Yes   Has home health visited the patient within 72 hours of discharge? N/A   Psychosocial issues? No   What is the patient's perception of their health status since discharge? Improving   Nursing Interventions Nurse provided patient education   Is the patient/caregiver able to teach back the hierarchy of who to call/visit for symptoms/problems? PCP, Specialist, Home health nurse, Urgent Care, ED, 911 Yes   Is the patient/caregiver able to teach back signs and symptoms of worsening condition: Chest pain, Fever/chills, Shortness of breath   Week 2 call completed? Yes   Graduated Yes   Is the patient interested in additional calls from an ambulatory ? No   Would this patient benefit from a Referral to Amb Social Work? No   Wrap up additional comments Per spouse, patient is doing well, no further calls needed.   Call end time 1337            Ely SWANSON - Registered Nurse

## 2023-11-01 ENCOUNTER — TELEPHONE (OUTPATIENT)
Dept: INTERNAL MEDICINE | Facility: CLINIC | Age: 83
End: 2023-11-01

## 2023-11-01 NOTE — TELEPHONE ENCOUNTER
"  Caller: Juan Luis Collazo \"ADAN\"    Relationship: Self    Best call back number: [phone]     What is the best time to reach you: ANY    Who are you requesting to speak with (clinical staff, provider,  specific staff member): CLINICAL    What was the call regarding:     PATIENT STATES HE HAD LAB WORK PERFORMED AT THE HOSPITAL LAST WEEK. PATIENT STATES HE WAS ADMITTED FOR PNEUMONIA. PATIENT IS WONDERING IF HE STILL NEEDS TO HAVE LAB WORK PERFORMED BEFORE HIS NEXT APPOINTMENT?     Is it okay if the provider responds through MyChart: NO    " Albendazole Counseling:  I discussed with the patient the risks of albendazole including but not limited to cytopenia, kidney damage, nausea/vomiting and severe allergy.  The patient understands that this medication is being used in an off-label manner.

## 2023-11-06 ENCOUNTER — OFFICE VISIT (OUTPATIENT)
Dept: INTERNAL MEDICINE | Facility: CLINIC | Age: 83
End: 2023-11-06
Payer: MEDICARE

## 2023-11-06 VITALS
DIASTOLIC BLOOD PRESSURE: 62 MMHG | SYSTOLIC BLOOD PRESSURE: 118 MMHG | BODY MASS INDEX: 26.33 KG/M2 | TEMPERATURE: 97.1 F | HEART RATE: 69 BPM | HEIGHT: 65 IN | OXYGEN SATURATION: 99 % | WEIGHT: 158 LBS

## 2023-11-06 DIAGNOSIS — S32.050S COMPRESSION FRACTURE OF L5 VERTEBRA, SEQUELA: ICD-10-CM

## 2023-11-06 DIAGNOSIS — E78.00 HIGH CHOLESTEROL: ICD-10-CM

## 2023-11-06 DIAGNOSIS — Z98.890 S/P KYPHOPLASTY: ICD-10-CM

## 2023-11-06 DIAGNOSIS — R73.01 IMPAIRED FASTING GLUCOSE: ICD-10-CM

## 2023-11-06 DIAGNOSIS — M80.00XG AGE-RELATED OSTEOPOROSIS WITH CURRENT PATHOLOGICAL FRACTURE WITH DELAYED HEALING: ICD-10-CM

## 2023-11-06 DIAGNOSIS — R26.81 GAIT INSTABILITY: ICD-10-CM

## 2023-11-06 DIAGNOSIS — S32.000A LUMBAR COMPRESSION FRACTURE, CLOSED, INITIAL ENCOUNTER: ICD-10-CM

## 2023-11-06 DIAGNOSIS — I10 BENIGN HYPERTENSION: Primary | ICD-10-CM

## 2023-11-06 PROBLEM — R63.4 WEIGHT LOSS: Status: RESOLVED | Noted: 2022-12-05 | Resolved: 2023-11-06

## 2023-11-06 NOTE — PROGRESS NOTES
The ABCs of the Annual Wellness Visit  Subsequent Medicare Wellness Visit    Chief Complaint   Patient presents with    Medicare Wellness-subsequent     Flu vac done      Subjective    History of Present Illness:  Juan Luis Collazo is a 83 y.o. male who presents for a Subsequent Medicare Wellness Visit.    The following portions of the patient's history were reviewed and   updated as appropriate: allergies, current medications, past family history, past medical history, past social history, past surgical history and problem list.    Compared to one year ago, the patient feels his physical   health is better.    Compared to one year ago, the patient feels his mental   health is the same.    Recent Hospitalizations:  This patient has had a [facility] admission record on file within the last 365 days.    Current Medical Providers:  Patient Care Team:  Santiago Aaron MD as PCP - General (Internal Medicine)  Bri Alexis MD as Consulting Physician (Gastroenterology)  Curtis Woodward APRN as Nurse Practitioner (Nurse Practitioner)  Nick Martínez MD as Surgeon (Neurosurgery)    Outpatient Medications Prior to Visit   Medication Sig Dispense Refill    denosumab (PROLIA) 60 MG/ML solution prefilled syringe syringe Inject 1 mL under the skin into the appropriate area as directed Every 6 (Six) Months. 180 mL 1    multivitamins-minerals (PRESERVISION AREDS 2) capsule capsule Take 1 capsule by mouth 2 (Two) Times a Day.      naloxone (NARCAN) 4 MG/0.1ML nasal spray Call 911. Don't prime. [place] in 1 nostril for overdose. Repeat in 2-3 minutes in other nostril if no or minimal breathing/responsiveness. 2 each 0    olmesartan (BENICAR) 40 MG tablet Take 1 tablet by mouth Daily. 90 tablet 3    traMADol (ULTRAM) 50 MG tablet Take 1 tablet by mouth Every 8 (Eight) Hours As Needed for Moderate Pain.      dextromethorphan polistirex ER (Delsym) 30 MG/5ML Suspension Extended Release oral suspension Take 10 mL  by mouth Every 12 (Twelve) Hours As Needed (Cough). (Patient not taking: Reported on 11/6/2023) 280 mL 0     No facility-administered medications prior to visit.       Opioid medication/s are on active medication list.  and I have evaluated his active treatment plan and pain score trends (see table).  Vitals:    11/06/23 1344   PainSc: 0-No pain     I have reviewed the chart for potential of high risk medication and harmful drug interactions in the elderly.          Aspirin is not on active medication list.  Aspirin use is not indicated based on review of current medical condition/s. Risk of harm outweighs potential benefits.  .    Patient Active Problem List   Diagnosis    Closed compression fracture of first lumbar vertebra    Current non-smoker    Lumbar compression fracture, closed, initial encounter    Lumbar compression fracture    S/P kyphoplasty    Numbness and tingling of right leg    Compression fracture of fourth lumbar vertebra with delayed healing    Benign hypertension    Flatback syndrome of lumbar region    Hammer toe of right foot    High cholesterol    Impaired fasting glucose    Malignant neoplasm of prostate    Osteopenia    Acute exacerbation of chronic obstructive airways disease    Back pain    Compression fracture of thoracic vertebra with routine healing    Normocytic anemia    Actinic keratosis    Hyperplastic colonic polyp    FH: colon cancer in first degree relative <60 years old    History of adenomatous polyp of colon    Decreased bone mass    Sebaceous cyst    Degenerative disc disease, cervical    Degenerative disc disease, lumbar    Degenerative disc disease, thoracic    Compression fracture of T12 vertebra with delayed healing    Age-related osteoporosis with current pathological fracture with delayed healing    Gait instability    Pain initiated by coughing    Hypoxia     Advance Care Planning  Advance Directive is not on file.  ACP discussion was held with the patient during this  "visit. Patient does not have an advance directive, information provided.       Objective    Vitals:    23 1344   BP: 118/62   BP Location: Left arm   Patient Position: Sitting   Cuff Size: Adult   Pulse: 69   Temp: 97.1 °F (36.2 °C)   TempSrc: Temporal   SpO2: 99%   Weight: 71.7 kg (158 lb)   Height: 165.1 cm (65\")   PainSc: 0-No pain     Estimated body mass index is 26.29 kg/m² as calculated from the following:    Height as of this encounter: 165.1 cm (65\").    Weight as of this encounter: 71.7 kg (158 lb).           Does the patient have evidence of cognitive impairment? No      Lab Results   Component Value Date    TRIG 78 10/17/2023    HDL 37 (L) 10/17/2023    LDL 68 10/17/2023    VLDL 16 10/17/2023    HGBA1C 5.50 10/17/2023            HEALTH RISK ASSESSMENT    Smoking Status:  Social History     Tobacco Use   Smoking Status Former    Packs/day: 1.00    Years: 30.00    Additional pack years: 0.00    Total pack years: 30.00    Types: Cigarettes    Quit date: 9/10/1991    Years since quittin.1   Smokeless Tobacco Never   Tobacco Comments    1991     Alcohol Consumption:  Social History     Substance and Sexual Activity   Alcohol Use Not Currently     Fall Risk Screen:    LITZY Fall Risk Assessment was completed, and patient is at MODERATE risk for falls. Assessment completed on:2023    Depression Screenin/6/2023     1:41 PM   PHQ-2/PHQ-9 Depression Screening   Little Interest or Pleasure in Doing Things 0-->not at all   Feeling Down, Depressed or Hopeless 1-->several days   PHQ-9: Brief Depression Severity Measure Score 1       Health Habits and Functional and Cognitive Screenin/6/2023     1:42 PM   Functional & Cognitive Status   Do you have difficulty preparing food and eating? No   Do you have difficulty bathing yourself, getting dressed or grooming yourself? No   Do you have difficulty using the toilet? No   Do you have difficulty moving around from place to place? No "   Do you have trouble with steps or getting out of a bed or a chair? No   Current Diet Well Balanced Diet   Dental Exam Up to date   Eye Exam Up to date   Exercise (times per week) 3 times per week   Current Exercises Include Walking   Do you need help using the phone?  No   Are you deaf or do you have serious difficulty hearing?  Yes   Do you need help to go to places out of walking distance? No   Do you need help shopping? No   Do you need help preparing meals?  No   Do you need help with housework?  No   Do you need help with laundry? No   Do you need help taking your medications? No   Do you need help managing money? No   Do you ever drive or ride in a car without wearing a seat belt? No   Have you felt unusual stress, anger or loneliness in the last month? No   Who do you live with? Spouse   If you need help, do you have trouble finding someone available to you? No   Have you been bothered in the last four weeks by sexual problems? No   Do you have difficulty concentrating, remembering or making decisions? No       Age-appropriate Screening Schedule:  Refer to the list below for future screening recommendations based on patient's age, sex and/or medical conditions. Orders for these recommended tests are listed in the plan section. The patient has been provided with a written plan.    Health Maintenance   Topic Date Due    TDAP/TD VACCINES (1 - Tdap) Never done    ZOSTER VACCINE (2 of 2) 01/13/2021    COVID-19 Vaccine (3 - 2023-24 season) 09/01/2023    DXA SCAN  12/09/2023    LIPID PANEL  10/17/2024    BMI FOLLOWUP  10/23/2024    ANNUAL WELLNESS VISIT  11/06/2024    INFLUENZA VACCINE  Completed    Pneumococcal Vaccine 65+  Completed              Assessment & Plan   CMS Preventative Services Quick Reference  Risk Factors Identified During Encounter  Immunizations Discussed/Encouraged: Influenza, COVID19, and RSV (Respiratory Syncytial Virus)  Dental Screening Recommended  Vision Screening Recommended  The above  risks/problems have been discussed with the patient.  Follow up actions/plans if indicated are seen below in the Assessment/Plan Section.  Pertinent information has been shared with the patient in the After Visit Summary.    Diagnoses and all orders for this visit:    1. Benign hypertension (Primary)    2. Gait instability    3. Lumbar compression fracture, closed, initial encounter    4. Compression fracture of L5 vertebra, sequela    5. Age-related osteoporosis with current pathological fracture with delayed healing    6. High cholesterol    7. Impaired fasting glucose    8. S/P kyphoplasty      Patient improving from recent back surgery and pneumonia.  Reports that his back pain is essentially resolved.  He continues to have right foot pain as well as right arm pain that is chronic.  The patient is working with therapy on his gait.    Patient has osteoporosis and is on prolia.  Patient has had multiple fractures.    BP at goal     Hemoglobin has dropped some during his illness and after surgery, will monitor for now.  Recheck at next visit.      Result Review :  The following data was reviewed by: Santiago Aaron MD on 11/06/2023:  CMP          10/16/2023    18:26 10/17/2023    05:45 10/18/2023    05:27   CMP   Glucose 112  114  110    BUN 28  28  26    Creatinine 1.02  0.95  0.82    EGFR 72.5  78.9  86.6    Sodium 137  139  138    Potassium 4.2  4.5  4.2    Chloride 98  101  104    Calcium 10.8  10.6  10.4    Total Protein 8.7  8.1     Albumin 3.4  3.2     Globulin 5.3  4.9     Total Bilirubin 0.6  0.4     Alkaline Phosphatase 92  92     AST (SGOT) 15  19     ALT (SGPT) 13  15     Albumin/Globulin Ratio 0.6  0.7     BUN/Creatinine Ratio 27.5  29.5  31.7    Anion Gap 11.0  9.0  7.0      CBC w/diff          10/16/2023    18:26 10/17/2023    05:45 10/18/2023    05:28   CBC w/Diff   WBC 12.97  10.10  9.30    RBC 3.60  3.51  3.16    Hemoglobin 10.6  10.3  9.2    Hematocrit 33.2  33.1  29.4    MCV 92.2  94.3  93.0     MCH 29.4  29.3  29.1    MCHC 31.9  31.1  31.3    RDW 14.1  14.1  14.1    Platelets 351  331  333    Neutrophil Rel % 75.5  72.1     Immature Granulocyte Rel % 0.9  1.0     Lymphocyte Rel % 12.1  14.6     Monocyte Rel % 10.7  11.0     Eosinophil Rel % 0.4  0.9     Basophil Rel % 0.4  0.4       Lipid Panel          10/17/2023    05:45   Lipid Panel   Total Cholesterol 121    Triglycerides 78    HDL Cholesterol 37    VLDL Cholesterol 16    LDL Cholesterol  68    LDL/HDL Ratio 1.85        A1C Last 3 Results          4/28/2023    13:34 10/17/2023    05:45   HGBA1C Last 3 Results   Hemoglobin A1C 5.5  5.50        UA          12/14/2022    14:07   Urinalysis   Squamous Epithelial Cells, UA None Seen    Specific Gravity, UA 1.012    Ketones, UA Negative    Blood, UA Negative    Leukocytes, UA Trace    Nitrite, UA Negative    RBC, UA None Seen    WBC, UA None Seen    Bacteria, UA None Seen             Follow Up:   Return in about 6 months (around 5/6/2024) for Recheck.     An After Visit Summary and PPPS were made available to the patient.                         ANTHAN Aaron MD, FACP, FH      Electronically signed by Santiago Aaron MD, 11/06/23, 2:01 PM CST.

## 2023-11-16 ENCOUNTER — TRANSCRIBE ORDERS (OUTPATIENT)
Dept: ADMINISTRATIVE | Facility: HOSPITAL | Age: 83
End: 2023-11-16
Payer: MEDICARE

## 2023-11-16 DIAGNOSIS — M65.9 SAPHO SYNDROME: Primary | ICD-10-CM

## 2023-11-16 DIAGNOSIS — M85.80 SAPHO SYNDROME: Primary | ICD-10-CM

## 2023-11-16 DIAGNOSIS — Z87.81 HEALED FRACTURES: ICD-10-CM

## 2023-11-16 DIAGNOSIS — L70.9 SAPHO SYNDROME: Primary | ICD-10-CM

## 2023-11-16 DIAGNOSIS — M86.9 SAPHO SYNDROME: Primary | ICD-10-CM

## 2023-11-16 DIAGNOSIS — L40.3 SAPHO SYNDROME: Primary | ICD-10-CM

## 2024-01-30 ENCOUNTER — TELEPHONE (OUTPATIENT)
Dept: NEUROSURGERY | Facility: CLINIC | Age: 84
End: 2024-01-30
Payer: MEDICARE

## 2024-01-30 DIAGNOSIS — S22.080G COMPRESSION FRACTURE OF T12 VERTEBRA WITH DELAYED HEALING: Primary | ICD-10-CM

## 2024-01-30 DIAGNOSIS — Z98.890 S/P KYPHOPLASTY: ICD-10-CM

## 2024-01-30 NOTE — TELEPHONE ENCOUNTER
Patient calling in, states he needs new PT order sent to [facility] in [place]. Called patient back and advised we will get one sent over.

## 2024-03-06 ENCOUNTER — OFFICE VISIT (OUTPATIENT)
Dept: INTERNAL MEDICINE | Facility: CLINIC | Age: 84
End: 2024-03-06
Payer: MEDICARE

## 2024-03-06 VITALS
OXYGEN SATURATION: 97 % | TEMPERATURE: 97.8 F | SYSTOLIC BLOOD PRESSURE: 130 MMHG | HEART RATE: 68 BPM | DIASTOLIC BLOOD PRESSURE: 70 MMHG | BODY MASS INDEX: 27.32 KG/M2 | HEIGHT: 65 IN | WEIGHT: 164 LBS

## 2024-03-06 DIAGNOSIS — M51.36 DEGENERATIVE DISC DISEASE, LUMBAR: ICD-10-CM

## 2024-03-06 DIAGNOSIS — M80.00XG AGE-RELATED OSTEOPOROSIS WITH CURRENT PATHOLOGICAL FRACTURE WITH DELAYED HEALING: ICD-10-CM

## 2024-03-06 DIAGNOSIS — Z98.890 S/P KYPHOPLASTY: ICD-10-CM

## 2024-03-06 DIAGNOSIS — D64.9 ANEMIA, UNSPECIFIED TYPE: Primary | ICD-10-CM

## 2024-03-06 DIAGNOSIS — M19.90 ARTHRITIS: ICD-10-CM

## 2024-03-06 DIAGNOSIS — I10 BENIGN HYPERTENSION: ICD-10-CM

## 2024-03-06 DIAGNOSIS — M54.9 BACK PAIN, UNSPECIFIED BACK LOCATION, UNSPECIFIED BACK PAIN LATERALITY, UNSPECIFIED CHRONICITY: ICD-10-CM

## 2024-03-06 RX ORDER — TRAMADOL HYDROCHLORIDE 50 MG/1
50 TABLET ORAL EVERY 8 HOURS PRN
Qty: 180 TABLET | Refills: 1 | Status: SHIPPED | OUTPATIENT
Start: 2024-03-06

## 2024-03-07 LAB
ALBUMIN SERPL-MCNC: 4.1 G/DL (ref 3.5–5.2)
ALBUMIN/GLOB SERPL: 1.1 G/DL
ALP SERPL-CCNC: 85 U/L (ref 39–117)
ALT SERPL-CCNC: 12 U/L (ref 1–41)
AST SERPL-CCNC: 15 U/L (ref 1–40)
BASOPHILS # BLD AUTO: 0.02 10*3/MM3 (ref 0–0.2)
BASOPHILS NFR BLD AUTO: 0.4 % (ref 0–1.5)
BILIRUB SERPL-MCNC: 0.6 MG/DL (ref 0–1.2)
BUN SERPL-MCNC: 16 MG/DL (ref 8–23)
BUN/CREAT SERPL: 16.3 (ref 7–25)
CALCIUM SERPL-MCNC: 9 MG/DL (ref 8.6–10.5)
CHLORIDE SERPL-SCNC: 102 MMOL/L (ref 98–107)
CO2 SERPL-SCNC: 26.8 MMOL/L (ref 22–29)
CREAT SERPL-MCNC: 0.98 MG/DL (ref 0.76–1.27)
CRP SERPL-MCNC: <0.3 MG/DL (ref 0–0.5)
EGFRCR SERPLBLD CKD-EPI 2021: 75.6 ML/MIN/1.73
EOSINOPHIL # BLD AUTO: 0.16 10*3/MM3 (ref 0–0.4)
EOSINOPHIL NFR BLD AUTO: 2.9 % (ref 0.3–6.2)
ERYTHROCYTE [DISTWIDTH] IN BLOOD BY AUTOMATED COUNT: 13.5 % (ref 12.3–15.4)
ERYTHROCYTE [SEDIMENTATION RATE] IN BLOOD BY WESTERGREN METHOD: 12 MM/HR (ref 0–20)
FERRITIN SERPL-MCNC: 77.5 NG/ML (ref 30–400)
GLOBULIN SER CALC-MCNC: 3.8 GM/DL
GLUCOSE SERPL-MCNC: 95 MG/DL (ref 65–99)
HCT VFR BLD AUTO: 37.2 % (ref 37.5–51)
HGB BLD-MCNC: 12.2 G/DL (ref 13–17.7)
IMM GRANULOCYTES # BLD AUTO: 0.02 10*3/MM3 (ref 0–0.05)
IMM GRANULOCYTES NFR BLD AUTO: 0.4 % (ref 0–0.5)
IRON SATN MFR SERPL: 22 % (ref 20–50)
IRON SERPL-MCNC: 82 MCG/DL (ref 59–158)
LYMPHOCYTES # BLD AUTO: 1.08 10*3/MM3 (ref 0.7–3.1)
LYMPHOCYTES NFR BLD AUTO: 19.4 % (ref 19.6–45.3)
MCH RBC QN AUTO: 28.8 PG (ref 26.6–33)
MCHC RBC AUTO-ENTMCNC: 32.8 G/DL (ref 31.5–35.7)
MCV RBC AUTO: 87.7 FL (ref 79–97)
MONOCYTES # BLD AUTO: 0.68 10*3/MM3 (ref 0.1–0.9)
MONOCYTES NFR BLD AUTO: 12.2 % (ref 5–12)
NEUTROPHILS # BLD AUTO: 3.62 10*3/MM3 (ref 1.7–7)
NEUTROPHILS NFR BLD AUTO: 64.7 % (ref 42.7–76)
NRBC BLD AUTO-RTO: 0 /100 WBC (ref 0–0.2)
PLATELET # BLD AUTO: 250 10*3/MM3 (ref 140–450)
POTASSIUM SERPL-SCNC: 4.9 MMOL/L (ref 3.5–5.2)
PROT SERPL-MCNC: 7.9 G/DL (ref 6–8.5)
RBC # BLD AUTO: 4.24 10*6/MM3 (ref 4.14–5.8)
SODIUM SERPL-SCNC: 139 MMOL/L (ref 136–145)
TIBC SERPL-MCNC: 380 MCG/DL
UIBC SERPL-MCNC: 298 MCG/DL (ref 112–346)
WBC # BLD AUTO: 5.58 10*3/MM3 (ref 3.4–10.8)

## 2024-03-07 NOTE — PROGRESS NOTES
"      Chief Complaint  Arthritis (Wants to be checked for psoriatic arthritis( grand son was just diagnosed) as wife states he has had the symptoms off and on /There are days he is ok but also days he is in awful pain and can't even get out of bed - tramadol does not seem to help with this particular issue /Adhesions on back that will flair up )    Subjective        Juan Luis Collazo presents to [facility]    HPI    Patient here for the above problems.  See Assessment and Plan for further HPI components.      Review of Systems    Objective   Vital Signs:  /70 (BP Location: Left arm, Patient Position: Sitting, Cuff Size: Adult)   Pulse 68   Temp 97.8 °F (36.6 °C) (Temporal)   Ht 165.1 cm (65\")   Wt 74.4 kg (164 lb)   SpO2 97%   BMI 27.29 kg/m²   Estimated body mass index is 27.29 kg/m² as calculated from the following:    Height as of this encounter: 165.1 cm (65\").    Weight as of this encounter: 74.4 kg (164 lb).      Physical Exam  Vitals and nursing note reviewed.   Constitutional:       Appearance: He is not ill-appearing.   Eyes:      General: No scleral icterus.     Conjunctiva/sclera: Conjunctivae normal.   Cardiovascular:      Rate and Rhythm: Normal rate and regular rhythm.   Pulmonary:      Effort: Pulmonary effort is normal. No respiratory distress.   Musculoskeletal:      Thoracic back: Scoliosis present.      Lumbar back: Scoliosis present.      Comments: Kyphosis, scoliosis  Loss of lordosis   Neurological:      General: No focal deficit present.      Mental Status: He is alert and oriented to person, place, and time.   Psychiatric:         Mood and Affect: Mood normal.         Behavior: Behavior normal.                       Assessment and Plan   Diagnoses and all orders for this visit:    1. Anemia, unspecified type (Primary)  -     CBC w AUTO Differential  -     Sedimentation Rate  -     Ferritin  -     Iron Profile    2. Arthritis  -     traMADol (ULTRAM) 50 " MG tablet; Take 1 tablet by mouth Every 8 (Eight) Hours As Needed for Moderate Pain.  Dispense: 180 tablet; Refill: 1  -     C-reactive Protein  -     Comprehensive metabolic panel    3. Back pain, unspecified back location, unspecified back pain laterality, unspecified chronicity    4. Age-related osteoporosis with current pathological fracture with delayed healing    5. S/P kyphoplasty    6. Degenerative disc disease, lumbar    7. Benign hypertension      Discussed with them that I do not think this is psoriatic arthritis.  Would be very unlikely to get inflammatory arthritis at his age.  Even with the family history.  Typically this would have occurred much earlier in his life.  What is likely the cause is osteoarthritis related to his multiple compression fractures and chronic degenerative changes in his back.  The patient has osteoporosis and has had multi-level compression fractures s/p multiple kyphoplasties.  This in addition to the patients 83 years of being a hard worker and hard on his body has left him with osteoarthritis.  I will check some inflammatory markers today, but again do not think psoriatic arthritis, suspect more degenerative and post-traumatic changes.      Patient also reports that sometimes when he works hard he will be real tired the next day and may want to lay around because he simply doesn't feel good.  I noted on last visit anemia and wanted to recheck iron profile and a CBC.  I will check inflammatory markers as mentioned above as well.      I tried to gently set expectations for the patient and family.  Patient is 83 years old and still does quite a bit of work around his property and outside.  I suspect that he is overdoing it and having to take time to recover.  I would like to check some metabolic issues to be sure nothing easy to fixu such as the anemia or other issues.  I personally think the patient is in pretty good shape and does very well for his age.  I would like him to  continue to work hard and enjoy life.      Patient has hypertension.  BP is at goal.  The patient's BP goal is < 130/80.  Recommend ambulatory BP monitoring after patient has taken medication.  Recommend varying the times of the blood pressure readings.  Patient is currently on olmesartan.  Recommend we continue current regimen.    Continue to monitor.          Result Review :                   BMI is >= 25 and <30. (Overweight) The following options were offered after discussion;: exercise counseling/recommendations and nutrition counseling/recommendations            Follow Up   Return if symptoms worsen or fail to improve, for Next scheduled follow up.  Patient was given instructions and counseling regarding his condition or for health maintenance advice. Please see specific information pulled into the AVS if appropriate.       NATHAN Aaron MD, FACP, FHM      Electronically signed by Santiago Aaron MD, 03/06/24, 6:33 PM CST.

## 2024-05-15 ENCOUNTER — OFFICE VISIT (OUTPATIENT)
Dept: INTERNAL MEDICINE | Facility: CLINIC | Age: 84
End: 2024-05-15
Payer: MEDICARE

## 2024-05-15 VITALS
OXYGEN SATURATION: 96 % | SYSTOLIC BLOOD PRESSURE: 128 MMHG | BODY MASS INDEX: 27.49 KG/M2 | WEIGHT: 165 LBS | HEIGHT: 65 IN | TEMPERATURE: 97.6 F | DIASTOLIC BLOOD PRESSURE: 68 MMHG | HEART RATE: 75 BPM

## 2024-05-15 DIAGNOSIS — I10 BENIGN HYPERTENSION: ICD-10-CM

## 2024-05-15 DIAGNOSIS — J18.9 PNEUMONIA OF RIGHT UPPER LOBE DUE TO INFECTIOUS ORGANISM: ICD-10-CM

## 2024-05-15 DIAGNOSIS — R73.01 IMPAIRED FASTING GLUCOSE: Primary | ICD-10-CM

## 2024-05-15 DIAGNOSIS — M19.90 ARTHRITIS: ICD-10-CM

## 2024-05-15 LAB
EXPIRATION DATE: NORMAL
HBA1C MFR BLD: 5.4 % (ref 4.5–5.7)
Lab: NORMAL

## 2024-05-15 PROCEDURE — 99214 OFFICE O/P EST MOD 30 MIN: CPT | Performed by: NURSE PRACTITIONER

## 2024-05-15 PROCEDURE — 83036 HEMOGLOBIN GLYCOSYLATED A1C: CPT | Performed by: NURSE PRACTITIONER

## 2024-05-15 PROCEDURE — 3078F DIAST BP <80 MM HG: CPT | Performed by: NURSE PRACTITIONER

## 2024-05-15 PROCEDURE — 3044F HG A1C LEVEL LT 7.0%: CPT | Performed by: NURSE PRACTITIONER

## 2024-05-15 PROCEDURE — 3074F SYST BP LT 130 MM HG: CPT | Performed by: NURSE PRACTITIONER

## 2024-05-15 PROCEDURE — 1160F RVW MEDS BY RX/DR IN RCRD: CPT | Performed by: NURSE PRACTITIONER

## 2024-05-15 PROCEDURE — 1126F AMNT PAIN NOTED NONE PRSNT: CPT | Performed by: NURSE PRACTITIONER

## 2024-05-15 PROCEDURE — 1159F MED LIST DOCD IN RCRD: CPT | Performed by: NURSE PRACTITIONER

## 2024-05-15 RX ORDER — ALBUTEROL SULFATE 90 UG/1
2 AEROSOL, METERED RESPIRATORY (INHALATION) EVERY 6 HOURS PRN
COMMUNITY
End: 2024-05-15 | Stop reason: SDUPTHER

## 2024-05-15 RX ORDER — ALBUTEROL SULFATE 90 UG/1
2 AEROSOL, METERED RESPIRATORY (INHALATION) EVERY 6 HOURS PRN
Qty: 8 G | Refills: 2 | Status: SHIPPED | OUTPATIENT
Start: 2024-05-15

## 2024-05-15 RX ORDER — TRAMADOL HYDROCHLORIDE 50 MG/1
50 TABLET ORAL EVERY 8 HOURS PRN
Qty: 180 TABLET | Refills: 1 | Status: SHIPPED | OUTPATIENT
Start: 2024-05-15

## 2024-05-15 RX ORDER — ALBUTEROL SULFATE 90 UG/1
2 AEROSOL, METERED RESPIRATORY (INHALATION) EVERY 6 HOURS PRN
Qty: 8 G | Refills: 2 | Status: SHIPPED | OUTPATIENT
Start: 2024-05-15 | End: 2024-05-15 | Stop reason: SDUPTHER

## 2024-05-16 NOTE — PROGRESS NOTES
Subjective     Chief Complaint:  Shortness of breath. Occasional cough.    HPI:  Patient presents to the office today for a 6 month follow-up. He states he feels as though he has an occasional cough and shortness of breath since having pneumonia in October.  He is a former smoker, has never had pulmonary function testing or seen a pulmonologist.  Please see assessment and plan below.    Patient's PMR from outside medical facility reviewed and noted.    Past Medical History:   Past Medical History:   Diagnosis Date    Arthritis     Back pain     Cancer     CHEST, SKIN CANCER    GERD (gastroesophageal reflux disease)     History of colon polyps     History of prostate cancer     Hypertension     Low back pain     Macular degeneration     Osteopenia      Past Surgical History:  Past Surgical History:   Procedure Laterality Date    APPENDECTOMY      CATARACT EXTRACTION, BILATERAL      COLONOSCOPY  09/17/2013    Dr. José-One 2-3mm polyp at 20cm; Otherwise normal; Repeat 3 years    COLONOSCOPY N/A 12/07/2022    Procedure: COLONOSCOPY WITH ANESTHESIA;  Surgeon: Bri Alexis MD;  Location: [facility] ENDOSCOPY;  Service: Gastroenterology;  Laterality: N/A;  pre: anemia. hx polyps.  post: polyps. diverticulosis.  no PCP    ENDOSCOPY N/A 12/07/2022    Procedure: ESOPHAGOGASTRODUODENOSCOPY WITH ANESTHESIA;  Surgeon: Bri Alexis MD;  Location: [facility] ENDOSCOPY;  Service: Gastroenterology;  Laterality: N/A;  pre: anemia  post: hiatal hernia. esophagitis.gastric erosions.  no PCP    FOOT SURGERY Right     KYPHOPLASTY Bilateral 07/17/2018    Procedure: L3 KYPHOPLASTY 1-2 LEVELS;  Surgeon: Vega Pandey MD;  Location: [facility] OR;  Service: Neurosurgery    KYPHOPLASTY Bilateral 09/11/2018    Procedure: L4 KYPHOPLASTY WITH BIOPSY;  Surgeon: Vega Pandey MD;  Location: [facility] OR;  Service: Neurosurgery    KYPHOPLASTY WITH BIOPSY N/A 01/25/2022    Procedure: KYPHOPLASTY WITH BIOPSY, THORACIC 6 and LUMBAR  5;  Surgeon: Nick Martínez MD;  Location:  [facility] OR;  Service: Neurosurgery;  Laterality: N/A;    KYPHOPLASTY WITH BIOPSY N/A 8/18/2023    Procedure: Thoracic 12, KYPHOPLASTY WITH BIOPSY;  Surgeon: Nick Martínez MD;  Location:  [facility] OR;  Service: Neurosurgery;  Laterality: N/A;    PROSTATECTOMY      TONSILLECTOMY      TOTAL SHOULDER REPLACEMENT Bilateral      Social History:  reports that he quit smoking about 32 years ago. His smoking use included cigarettes. He started smoking about 62 years ago. He has a 30 pack-year smoking history. He has never used smokeless tobacco. He reports that he does not currently use alcohol. He reports that he does not use drugs.    Family History: family history includes No Known Problems in his mother; Prostate cancer in his father.      Allergies:  No Known Allergies  Medications:  Prior to Admission medications    Medication Sig Start Date End Date Taking? Authorizing Provider   albuterol sulfate  (90 Base) MCG/ACT inhaler Inhale 2 puffs Every 6 (Six) Hours As Needed for Wheezing or Shortness of Air. 5/15/24  Yes Aleida Valle APRN   denosumab (PROLIA) 60 MG/ML solution prefilled syringe syringe Inject 1 mL under the skin into the appropriate area as directed Every 6 (Six) Months. 5/3/23  Yes Christie Oconnell APRN   multivitamins-minerals (PRESERVISION AREDS 2) capsule capsule Take 1 capsule by mouth 2 (Two) Times a Day.   Yes Rosa Elena Slade MD   olmesartan (BENICAR) 40 MG tablet Take 1 tablet by mouth Daily. 10/11/23  Yes Santiago Aaron MD   traMADol (ULTRAM) 50 MG tablet Take 1 tablet by mouth Every 8 (Eight) Hours As Needed for Moderate Pain. 5/15/24  Yes Aleida Valle APRN   albuterol sulfate  (90 Base) MCG/ACT inhaler Inhale 2 puffs Every 6 (Six) Hours As Needed.  5/15/24 Yes Rosa Elena Slade MD   albuterol sulfate  (90 Base) MCG/ACT inhaler Inhale 2 puffs Every 6 (Six) Hours As Needed for Wheezing or  "Shortness of Air. 5/15/24 5/15/24 Yes Aleida Valle APRN   traMADol (ULTRAM) 50 MG tablet Take 1 tablet by mouth Every 8 (Eight) Hours As Needed for Moderate Pain. 3/6/24 5/15/24 Yes Santiago Aaron MD   naloxone (NARCAN) 4 MG/0.1ML nasal spray Call 911. Don't prime. [place] in 1 nostril for overdose. Repeat in 2-3 minutes in other nostril if no or minimal breathing/responsiveness. 8/18/23 5/15/24  Curtis Woodward APRN       Objective     Vital Signs: /68 (BP Location: Left arm, Patient Position: Sitting, Cuff Size: Adult)   Pulse 75   Temp 97.6 °F (36.4 °C) (Temporal)   Ht 165.1 cm (65\")   Wt 74.8 kg (165 lb)   SpO2 96%   BMI 27.46 kg/m²   Physical Exam  Vitals and nursing note reviewed.   Constitutional:       General: He is not in acute distress.     Appearance: He is not ill-appearing or toxic-appearing.   HENT:      Head: Normocephalic and atraumatic.      Mouth/Throat:      Mouth: Mucous membranes are moist.      Pharynx: Oropharynx is clear.   Cardiovascular:      Rate and Rhythm: Normal rate and regular rhythm.      Pulses: Normal pulses.      Heart sounds: Normal heart sounds.   Pulmonary:      Effort: Pulmonary effort is normal.      Breath sounds: No wheezing, rhonchi or rales.      Comments: Diminished breath sounds  Abdominal:      General: Bowel sounds are normal. There is no distension.      Palpations: Abdomen is soft.      Tenderness: There is no abdominal tenderness.   Musculoskeletal:         General: No swelling or tenderness. Normal range of motion.      Cervical back: Normal range of motion and neck supple. No tenderness.   Skin:     General: Skin is warm and dry.      Findings: No erythema or rash.   Neurological:      General: No focal deficit present.      Mental Status: He is alert and oriented to person, place, and time.   Psychiatric:         Mood and Affect: Mood normal.         Behavior: Behavior normal.         Thought Content: Thought content normal.         " Judgment: Judgment normal.       Results Reviewed:  POC Glycated Hemoglobin, Total (05/15/2024 14:45)   XR Chest PA & Lateral (10/24/2023 15:28)  XR Chest 1 View (10/16/2023 20:01)  Iron Profile (03/06/2024 09:57)  Ferritin (03/06/2024 09:57)  Sedimentation Rate (03/06/2024 09:57)  Comprehensive metabolic panel (03/06/2024 09:57)  CBC w AUTO Differential (03/06/2024 09:57)  C-reactive Protein (03/06/2024 09:57)  Office Visit with Santiago Aaron MD (03/06/2024)   Assessment / Plan     Assessment/Plan:  Diagnoses and all orders for this visit:    1. Impaired fasting glucose (Primary)  -     POC Glycated Hemoglobin, Total    2. Arthritis  -     traMADol (ULTRAM) 50 MG tablet; Take 1 tablet by mouth Every 8 (Eight) Hours As Needed for Moderate Pain.  Dispense: 180 tablet; Refill: 1    3. Pneumonia of right upper lobe due to infectious organism  -     XR Chest PA & Lateral; Future  -     albuterol sulfate  (90 Base) MCG/ACT inhaler; Inhale 2 puffs Every 6 (Six) Hours As Needed for Wheezing or Shortness of Air.  Dispense: 8 g; Refill: 2    4. Benign hypertension    Other orders  -     Discontinue: albuterol sulfate  (90 Base) MCG/ACT inhaler; Inhale 2 puffs Every 6 (Six) Hours As Needed for Wheezing or Shortness of Air.  Dispense: 8 g; Refill: 2       Patient presents to the office today for a 6 month follow-up. He states he feels as though he has an occasional cough and shortness of breath since having pneumonia in October.  Shortness of breath is typically more with exertion.  Cough is only productive regularly.  He has had no fever or chills.  He denies any orthopnea or paroxysmal nocturnal dyspnea.  He was admitted to [facility] for this, treated with Rocephin and azithromycin.  He is a former smoker and did have evidence of emphysema on chest x-ray, has never had pulmonary function testing or seen a pulmonologist.  We did discuss his symptoms today and discussed pursuing pulmonary  function testing, which he is not interested in at this time.  He tells me he would likely not take a daily maintenance inhaler unless absolutely necessary.  We will refill his albuterol rescue inhaler.  I would like to repeat his chest x-ray today with ongoing symptoms.  His last chest x-ray performed on 10/24 showed improving right upper lobe infiltrate.    Blood pressure is well-controlled in the office today at 128/68.  He denies any chest pain, dizziness/lightheadedness or headaches.  Last labs assessed in March indicated normal renal function and electrolytes.  Continue current dosage of olmesartan.  Patient remains very physically active.    History of impaired fasting glucose.  Hemoglobin A1c is stable today at 5.4%.  Discussed with patient that we could likely only check this once yearly given stability.     Return in about 6 months (around 11/15/2024) for Medicare Wellness. unless patient needs to be seen sooner or acute issues arise.    I have discussed the patient results/orders and and plan/recommendation with them at today's visit.      Aleida Valle, DIEGO   05/15/2024

## 2024-06-06 ENCOUNTER — TRANSCRIBE ORDERS (OUTPATIENT)
Dept: LAB | Facility: HOSPITAL | Age: 84
End: 2024-06-06
Payer: MEDICARE

## 2024-06-06 DIAGNOSIS — Z87.81 HEALED FRACTURES: ICD-10-CM

## 2024-06-06 DIAGNOSIS — M85.80 DECREASED BONE MASS: Primary | ICD-10-CM

## 2024-06-06 DIAGNOSIS — M85.80 DECREASED BONE DENSITY: Primary | ICD-10-CM

## 2024-08-21 ENCOUNTER — OFFICE VISIT (OUTPATIENT)
Dept: INTERNAL MEDICINE | Facility: CLINIC | Age: 84
End: 2024-08-21
Payer: MEDICARE

## 2024-08-21 VITALS
DIASTOLIC BLOOD PRESSURE: 58 MMHG | BODY MASS INDEX: 26.82 KG/M2 | TEMPERATURE: 98.2 F | HEIGHT: 65 IN | WEIGHT: 161 LBS | SYSTOLIC BLOOD PRESSURE: 110 MMHG | HEART RATE: 64 BPM | OXYGEN SATURATION: 97 %

## 2024-08-21 DIAGNOSIS — I95.1 ORTHOSTASIS: Primary | ICD-10-CM

## 2024-08-21 DIAGNOSIS — D64.9 NORMOCYTIC ANEMIA: ICD-10-CM

## 2024-08-21 DIAGNOSIS — H61.21 RIGHT EAR IMPACTED CERUMEN: ICD-10-CM

## 2024-08-21 DIAGNOSIS — I10 BENIGN HYPERTENSION: ICD-10-CM

## 2024-08-21 PROCEDURE — 3078F DIAST BP <80 MM HG: CPT | Performed by: INTERNAL MEDICINE

## 2024-08-21 PROCEDURE — 3074F SYST BP LT 130 MM HG: CPT | Performed by: INTERNAL MEDICINE

## 2024-08-21 PROCEDURE — 69210 REMOVE IMPACTED EAR WAX UNI: CPT | Performed by: INTERNAL MEDICINE

## 2024-08-21 PROCEDURE — 1126F AMNT PAIN NOTED NONE PRSNT: CPT | Performed by: INTERNAL MEDICINE

## 2024-08-21 PROCEDURE — 99214 OFFICE O/P EST MOD 30 MIN: CPT | Performed by: INTERNAL MEDICINE

## 2024-08-21 RX ORDER — OLMESARTAN MEDOXOMIL 40 MG/1
20 TABLET ORAL DAILY
Start: 2024-08-21

## 2024-08-21 NOTE — PROGRESS NOTES
"      Chief Complaint  Dizziness (Change in position/Takes about 30-45 seconds for the dizzy feeling to go away/Happened today when he laid down to take BP and again sitting up. )    Subjective        Juan Luis Collazo presents to [facility]    HPI    Patient here for the above problems.  See Assessment and Plan for further HPI components.      Review of Systems    Objective   Vital Signs:  /58 (BP Location: Left arm, Patient Position: Sitting, Cuff Size: Adult)   Pulse 64   Temp 98.2 °F (36.8 °C) (Temporal)   Ht 165.1 cm (65\")   Wt 73 kg (161 lb)   SpO2 97%   BMI 26.79 kg/m²   Estimated body mass index is 26.79 kg/m² as calculated from the following:    Height as of this encounter: 165.1 cm (65\").    Weight as of this encounter: 73 kg (161 lb).      Physical Exam  Vitals and nursing note reviewed.   Constitutional:       Appearance: He is not ill-appearing.   HENT:      Right Ear: There is impacted cerumen.      Left Ear: Tympanic membrane normal.   Eyes:      General: No scleral icterus.     Conjunctiva/sclera: Conjunctivae normal.   Pulmonary:      Effort: Pulmonary effort is normal. No respiratory distress.   Neurological:      General: No focal deficit present.      Mental Status: He is alert and oriented to person, place, and time.   Psychiatric:         Mood and Affect: Mood normal.         Behavior: Behavior normal.                     Assessment and Plan   Diagnoses and all orders for this visit:    1. Orthostasis (Primary)  -     CBC w AUTO Differential  -     Comprehensive metabolic panel  -     Magnesium    2. Benign hypertension  -     TSH Rfx On Abnormal To Free T4  -     olmesartan (BENICAR) 40 MG tablet; Take 0.5 tablets by mouth Daily.    3. Normocytic anemia  -     CBC w AUTO Differential      Patient has a history of hypertension.  The patient is noted to be on olmesartan 40 mg.  The patient reports that he has been having dizziness with change in position. "  Orthostatic vital signs did not show any significant drop in his blood pressure, although did he did report symptoms.  Patient reports however that when he rolls over in bed when he is already laying horizontal, he does not have any significant dizziness.  Patient does not have any fluid in his middle ear.  Low suspicion for a central or peripheral vertigo as he is able to roll over without any symptoms, however when this patient changes in the vertical position he is noted to briefly have lightheadedness and dizziness.  Patient's blood pressure is little bit on the softer side.  I would like to go ahead and cut his olmesartan from 40 mg to 20 mg.  If the pill is too small to cut, he can hold the medication for a while and see what his blood pressure does and see if the symptoms are better.  Also emphasized focusing on hydration.    The patient also did have anemia on his most recent labs, had significant anemia previously.  I want to rule this out as a cause for his lightheadedness with positional change.  I have had some patients that have had similar symptoms in the past and were noted to be anemic.  We will check CBC.  Also would like to check thyroid as it has been sometime since this has been checked.  We will also check electrolytes and kidney function.    Result Review :                   BMI is >= 25 and <30. (Overweight) The following options were offered after discussion;: none (medical contraindication)            Follow Up   Return in about 3 months (around 11/21/2024), or if symptoms worsen or fail to improve, for Medicare Wellness - Labs prior to visit, Med Check, follow up for above problems. Longitudinal care..  Patient was given instructions and counseling regarding his condition or for health maintenance advice. Please see specific information pulled into the AVS if appropriate.       NATHAN Aaron MD, FACP, [facility]      Electronically signed by Santiago Aaron MD, 08/21/24, 3:12 PM  CDT.            Ear Cerumen Removal    Date/Time: 8/21/2024 5:30 PM    Performed by: Santiago Aaron MD  Authorized by: Santiago Aaron MD  Location details: right ear  Patient tolerance: patient tolerated the procedure well with no immediate complications  Comments: Patient improved after procedure.   Procedure type: instrumentation, irrigation, curette

## 2024-08-22 LAB
ALBUMIN SERPL-MCNC: 3.7 G/DL (ref 3.5–5.2)
ALBUMIN/GLOB SERPL: 0.9 G/DL
ALP SERPL-CCNC: 71 U/L (ref 39–117)
ALT SERPL-CCNC: 10 U/L (ref 1–41)
AST SERPL-CCNC: 15 U/L (ref 1–40)
BASOPHILS # BLD AUTO: 0.02 10*3/MM3 (ref 0–0.2)
BASOPHILS NFR BLD AUTO: 0.4 % (ref 0–1.5)
BILIRUB SERPL-MCNC: 0.5 MG/DL (ref 0–1.2)
BUN SERPL-MCNC: 34 MG/DL (ref 8–23)
BUN/CREAT SERPL: 30.1 (ref 7–25)
CALCIUM SERPL-MCNC: 9 MG/DL (ref 8.6–10.5)
CHLORIDE SERPL-SCNC: 105 MMOL/L (ref 98–107)
CO2 SERPL-SCNC: 25.2 MMOL/L (ref 22–29)
CREAT SERPL-MCNC: 1.13 MG/DL (ref 0.76–1.27)
EGFRCR SERPLBLD CKD-EPI 2021: 63.7 ML/MIN/1.73
EOSINOPHIL # BLD AUTO: 0.38 10*3/MM3 (ref 0–0.4)
EOSINOPHIL NFR BLD AUTO: 6.9 % (ref 0.3–6.2)
ERYTHROCYTE [DISTWIDTH] IN BLOOD BY AUTOMATED COUNT: 13.3 % (ref 12.3–15.4)
GLOBULIN SER CALC-MCNC: 4 GM/DL
GLUCOSE SERPL-MCNC: 84 MG/DL (ref 65–99)
HCT VFR BLD AUTO: 32.9 % (ref 37.5–51)
HGB BLD-MCNC: 11 G/DL (ref 13–17.7)
IMM GRANULOCYTES # BLD AUTO: 0.02 10*3/MM3 (ref 0–0.05)
IMM GRANULOCYTES NFR BLD AUTO: 0.4 % (ref 0–0.5)
LYMPHOCYTES # BLD AUTO: 1.42 10*3/MM3 (ref 0.7–3.1)
LYMPHOCYTES NFR BLD AUTO: 25.9 % (ref 19.6–45.3)
MAGNESIUM SERPL-MCNC: 2 MG/DL (ref 1.6–2.4)
MCH RBC QN AUTO: 30.6 PG (ref 26.6–33)
MCHC RBC AUTO-ENTMCNC: 33.4 G/DL (ref 31.5–35.7)
MCV RBC AUTO: 91.4 FL (ref 79–97)
MONOCYTES # BLD AUTO: 0.59 10*3/MM3 (ref 0.1–0.9)
MONOCYTES NFR BLD AUTO: 10.7 % (ref 5–12)
NEUTROPHILS # BLD AUTO: 3.06 10*3/MM3 (ref 1.7–7)
NEUTROPHILS NFR BLD AUTO: 55.7 % (ref 42.7–76)
NRBC BLD AUTO-RTO: 0 /100 WBC (ref 0–0.2)
PLATELET # BLD AUTO: 233 10*3/MM3 (ref 140–450)
POTASSIUM SERPL-SCNC: 5.8 MMOL/L (ref 3.5–5.2)
PROT SERPL-MCNC: 7.7 G/DL (ref 6–8.5)
RBC # BLD AUTO: 3.6 10*6/MM3 (ref 4.14–5.8)
SODIUM SERPL-SCNC: 137 MMOL/L (ref 136–145)
TSH SERPL DL<=0.005 MIU/L-ACNC: 2.68 UIU/ML (ref 0.27–4.2)
WBC # BLD AUTO: 5.49 10*3/MM3 (ref 3.4–10.8)

## 2024-08-23 DIAGNOSIS — E87.5 SERUM POTASSIUM ELEVATED: Primary | ICD-10-CM

## 2024-08-26 ENCOUNTER — LAB (OUTPATIENT)
Dept: LAB | Facility: HOSPITAL | Age: 84
End: 2024-08-26
Payer: MEDICARE

## 2024-08-26 DIAGNOSIS — E87.5 SERUM POTASSIUM ELEVATED: Primary | ICD-10-CM

## 2024-08-26 DIAGNOSIS — E87.5 SERUM POTASSIUM ELEVATED: ICD-10-CM

## 2024-08-26 LAB — POTASSIUM SERPL-SCNC: 5.9 MMOL/L (ref 3.5–5.3)

## 2024-08-26 PROCEDURE — 36415 COLL VENOUS BLD VENIPUNCTURE: CPT

## 2024-08-26 PROCEDURE — 84132 ASSAY OF SERUM POTASSIUM: CPT

## 2024-08-26 RX ORDER — SODIUM ZIRCONIUM CYCLOSILICATE 10 G/10G
10 POWDER, FOR SUSPENSION ORAL DAILY
Qty: 3 PACKET | Refills: 0 | Status: SHIPPED | OUTPATIENT
Start: 2024-08-26 | End: 2024-08-29

## 2024-08-27 ENCOUNTER — TELEPHONE (OUTPATIENT)
Dept: INTERNAL MEDICINE | Facility: CLINIC | Age: 84
End: 2024-08-27
Payer: MEDICARE

## 2024-08-27 NOTE — TELEPHONE ENCOUNTER
----- Message from Santiago Aaron sent at 8/21/2024  5:18 PM CDT -----  Blood pressure?  Symptoms?  Did patient cut BP medication in half or did he hold it?

## 2024-08-27 NOTE — TELEPHONE ENCOUNTER
Called and spoke with wife  Pt has held BP medication altogether    BP range over the last week has been 104//76 pulse range of 69-86

## 2024-08-30 ENCOUNTER — LAB (OUTPATIENT)
Dept: LAB | Facility: HOSPITAL | Age: 84
End: 2024-08-30
Payer: MEDICARE

## 2024-08-30 DIAGNOSIS — E87.5 SERUM POTASSIUM ELEVATED: ICD-10-CM

## 2024-08-30 LAB — POTASSIUM SERPL-SCNC: 4.1 MMOL/L (ref 3.5–5.3)

## 2024-08-30 PROCEDURE — 36415 COLL VENOUS BLD VENIPUNCTURE: CPT

## 2024-08-30 PROCEDURE — 84132 ASSAY OF SERUM POTASSIUM: CPT

## 2024-09-12 ENCOUNTER — OFFICE VISIT (OUTPATIENT)
Dept: INTERNAL MEDICINE | Facility: CLINIC | Age: 84
End: 2024-09-12
Payer: MEDICARE

## 2024-09-12 VITALS
TEMPERATURE: 98.6 F | HEIGHT: 65 IN | SYSTOLIC BLOOD PRESSURE: 142 MMHG | WEIGHT: 163 LBS | HEART RATE: 72 BPM | BODY MASS INDEX: 27.16 KG/M2 | DIASTOLIC BLOOD PRESSURE: 58 MMHG | OXYGEN SATURATION: 98 %

## 2024-09-12 DIAGNOSIS — E87.5 SERUM POTASSIUM ELEVATED: ICD-10-CM

## 2024-09-12 DIAGNOSIS — I10 BENIGN HYPERTENSION: Primary | ICD-10-CM

## 2024-09-12 DIAGNOSIS — I95.1 ORTHOSTASIS: ICD-10-CM

## 2024-09-12 PROCEDURE — 3077F SYST BP >= 140 MM HG: CPT | Performed by: INTERNAL MEDICINE

## 2024-09-12 PROCEDURE — 3078F DIAST BP <80 MM HG: CPT | Performed by: INTERNAL MEDICINE

## 2024-09-12 PROCEDURE — 99214 OFFICE O/P EST MOD 30 MIN: CPT | Performed by: INTERNAL MEDICINE

## 2024-09-12 PROCEDURE — G2211 COMPLEX E/M VISIT ADD ON: HCPCS | Performed by: INTERNAL MEDICINE

## 2024-09-12 PROCEDURE — 1126F AMNT PAIN NOTED NONE PRSNT: CPT | Performed by: INTERNAL MEDICINE

## 2024-09-13 NOTE — PROGRESS NOTES
"      Chief Complaint  discuss BP and potassium  and Dizziness (With change in position )    Subjective        Juan Luis Collazo presents to [facility]    HPI    Patient here for the above problems.  See Assessment and Plan for further HPI components.      Review of Systems    Objective   Vital Signs:  /58 (BP Location: Left arm, Patient Position: Sitting, Cuff Size: Adult)   Pulse 72   Temp 98.6 °F (37 °C) (Temporal)   Ht 165.1 cm (65\")   Wt 73.9 kg (163 lb)   SpO2 98%   BMI 27.12 kg/m²   Estimated body mass index is 27.12 kg/m² as calculated from the following:    Height as of this encounter: 165.1 cm (65\").    Weight as of this encounter: 73.9 kg (163 lb).      Physical Exam  Vitals and nursing note reviewed.   Constitutional:       Appearance: He is not ill-appearing.   Eyes:      General: No scleral icterus.     Conjunctiva/sclera: Conjunctivae normal.   Cardiovascular:      Rate and Rhythm: Normal rate and regular rhythm.      Heart sounds: Murmur heard.   Pulmonary:      Effort: Pulmonary effort is normal. No respiratory distress.   Neurological:      General: No focal deficit present.      Mental Status: He is alert and oriented to person, place, and time.   Psychiatric:         Mood and Affect: Mood normal.         Behavior: Behavior normal.                   Assessment and Plan   Diagnoses and all orders for this visit:    1. Benign hypertension (Primary)    2. Orthostasis    3. Serum potassium elevated        Patient has a history of essential hypertension.  The patient is been on olmesartan.  At the patient's last visit he was having some signs and symptoms of orthostasis, we cut back his Olmesartan to half a tablet.  Unfortunately the symptoms have not improved at all.  Patient was noted today that going from lying to sitting and dropped from 1 48-1 20 and then from sitting to standing dropped again all the way down to 110.  The above vital signs that should go " lying sitting standing instead of lying standing standing.  The patient was symptomatic with this.  Were going to hold his olmesartan even though his blood pressure is a little bit high at baseline.  The risk of him falling is higher than the risk of his blood pressure being a little bit elevated.  We can also consider trying a different medication.  Also recommended the patient focus on hydration.  Whenever he stands up to pause before he takes off walking.  Patient typically utilizes a cane.  Recommend he continue to do so.  Also strongly suggested trying to use compression socks.    Patient also recently had some issues with hyperkalemia, holding olmesartan will help with this.  We treated with a few rounds of Lokelma.    Result Review :  The following data was reviewed by: Santiago Aaron MD on 09/12/2024:  CMP          8/21/2024    14:39 8/26/2024    10:39 8/30/2024    13:21   CMP   Glucose 84      BUN 34      Creatinine 1.13      Sodium 137      Potassium 5.8  5.9  4.1    Chloride 105      Calcium 9.0      Total Protein 7.7      Albumin 3.7      Globulin 4.0      Total Bilirubin 0.5      Alkaline Phosphatase 71      AST (SGOT) 15      ALT (SGPT) 10      BUN/Creatinine Ratio 30.1        BMP          8/21/2024    14:39 8/26/2024    10:39 8/30/2024    13:21   BMP   BUN 34      Creatinine 1.13      Sodium 137      Potassium 5.8  5.9  4.1    Chloride 105      CO2 25.2      Calcium 9.0        We treated with a few rounds of            BMI is >= 25 and <30. (Overweight) The following options were offered after discussion;: none (medical contraindication)            Follow Up   Return in about 2 weeks (around 9/26/2024), or if symptoms worsen or fail to improve.  Patient was given instructions and counseling regarding his condition or for health maintenance advice. Please see specific information pulled into the AVS if appropriate.       NATHAN Aaron MD, FACP, [facility]      Electronically signed by Santiago Guzman  MD Agnieszka, 09/13/24, 10:13 AM CDT.

## 2024-09-26 ENCOUNTER — OFFICE VISIT (OUTPATIENT)
Dept: INTERNAL MEDICINE | Facility: CLINIC | Age: 84
End: 2024-09-26
Payer: MEDICARE

## 2024-09-26 VITALS
WEIGHT: 159 LBS | SYSTOLIC BLOOD PRESSURE: 130 MMHG | BODY MASS INDEX: 26.49 KG/M2 | HEART RATE: 72 BPM | DIASTOLIC BLOOD PRESSURE: 78 MMHG | HEIGHT: 65 IN | TEMPERATURE: 96.7 F | OXYGEN SATURATION: 96 %

## 2024-09-26 DIAGNOSIS — R26.81 GAIT INSTABILITY: ICD-10-CM

## 2024-09-26 DIAGNOSIS — E87.5 SERUM POTASSIUM ELEVATED: ICD-10-CM

## 2024-09-26 DIAGNOSIS — I95.1 ORTHOSTASIS: ICD-10-CM

## 2024-09-26 DIAGNOSIS — Z23 FLU VACCINE NEED: Primary | ICD-10-CM

## 2024-09-26 DIAGNOSIS — M51.369 DEGENERATIVE DISC DISEASE, LUMBAR: ICD-10-CM

## 2024-09-26 DIAGNOSIS — M50.30 DEGENERATIVE DISC DISEASE, CERVICAL: ICD-10-CM

## 2024-09-26 DIAGNOSIS — I10 BENIGN HYPERTENSION: ICD-10-CM

## 2024-09-26 PROCEDURE — 3078F DIAST BP <80 MM HG: CPT | Performed by: INTERNAL MEDICINE

## 2024-09-26 PROCEDURE — 1126F AMNT PAIN NOTED NONE PRSNT: CPT | Performed by: INTERNAL MEDICINE

## 2024-09-26 PROCEDURE — G0008 ADMIN INFLUENZA VIRUS VAC: HCPCS | Performed by: INTERNAL MEDICINE

## 2024-09-26 PROCEDURE — 99213 OFFICE O/P EST LOW 20 MIN: CPT | Performed by: INTERNAL MEDICINE

## 2024-09-26 PROCEDURE — 90662 IIV NO PRSV INCREASED AG IM: CPT | Performed by: INTERNAL MEDICINE

## 2024-09-26 PROCEDURE — 3075F SYST BP GE 130 - 139MM HG: CPT | Performed by: INTERNAL MEDICINE

## 2024-11-12 ENCOUNTER — OFFICE VISIT (OUTPATIENT)
Dept: INTERNAL MEDICINE | Facility: CLINIC | Age: 84
End: 2024-11-12
Payer: MEDICARE

## 2024-11-12 VITALS
SYSTOLIC BLOOD PRESSURE: 160 MMHG | HEART RATE: 74 BPM | BODY MASS INDEX: 26.33 KG/M2 | OXYGEN SATURATION: 96 % | HEIGHT: 65 IN | WEIGHT: 158 LBS | DIASTOLIC BLOOD PRESSURE: 84 MMHG | TEMPERATURE: 97.8 F

## 2024-11-12 DIAGNOSIS — I10 BENIGN HYPERTENSION: Primary | ICD-10-CM

## 2024-11-12 DIAGNOSIS — M51.369 DEGENERATION OF INTERVERTEBRAL DISC OF LUMBAR REGION, UNSPECIFIED WHETHER PAIN PRESENT: ICD-10-CM

## 2024-11-12 DIAGNOSIS — M50.30 DEGENERATIVE DISC DISEASE, CERVICAL: ICD-10-CM

## 2024-11-12 DIAGNOSIS — R26.81 GAIT INSTABILITY: ICD-10-CM

## 2024-11-12 DIAGNOSIS — F32.A DEPRESSION, UNSPECIFIED DEPRESSION TYPE: ICD-10-CM

## 2024-11-12 PROCEDURE — 1126F AMNT PAIN NOTED NONE PRSNT: CPT | Performed by: INTERNAL MEDICINE

## 2024-11-12 PROCEDURE — 1170F FXNL STATUS ASSESSED: CPT | Performed by: INTERNAL MEDICINE

## 2024-11-12 PROCEDURE — 99214 OFFICE O/P EST MOD 30 MIN: CPT | Performed by: INTERNAL MEDICINE

## 2024-11-12 PROCEDURE — 1160F RVW MEDS BY RX/DR IN RCRD: CPT | Performed by: INTERNAL MEDICINE

## 2024-11-12 PROCEDURE — G0439 PPPS, SUBSEQ VISIT: HCPCS | Performed by: INTERNAL MEDICINE

## 2024-11-12 PROCEDURE — 1159F MED LIST DOCD IN RCRD: CPT | Performed by: INTERNAL MEDICINE

## 2024-11-12 RX ORDER — DULOXETIN HYDROCHLORIDE 20 MG/1
20 CAPSULE, DELAYED RELEASE ORAL DAILY
Qty: 30 CAPSULE | Refills: 2 | Status: SHIPPED | OUTPATIENT
Start: 2024-11-12

## 2024-11-12 NOTE — PROGRESS NOTES
Subjective   The ABCs of the Annual Wellness Visit  Medicare Wellness Visit      Juan Luis Collazo is a 84 y.o. patient who presents for a Medicare Wellness Visit.    The following portions of the patient's history were reviewed and   updated as appropriate: allergies, current medications, past family history, past medical history, past social history, past surgical history, and problem list.    Compared to one year ago, the patient's physical   health is the same.  Compared to one year ago, the patient's mental   health is the same.    Recent Hospitalizations:  He was not admitted to the hospital during the last year.     Current Medical Providers:  Patient Care Team:  Santiago Aaron MD as PCP - General (Internal Medicine)  Bri Alexis MD as Consulting Physician (Gastroenterology)  Curtis Woodward APRN as Nurse Practitioner (Nurse Practitioner)  Nick Martínez MD as Surgeon (Neurosurgery)    Outpatient Medications Prior to Visit   Medication Sig Dispense Refill    albuterol sulfate  (90 Base) MCG/ACT inhaler Inhale 2 puffs Every 6 (Six) Hours As Needed for Wheezing or Shortness of Air. 8 g 2    denosumab (PROLIA) 60 MG/ML solution prefilled syringe syringe Inject 1 mL under the skin into the appropriate area as directed Every 6 (Six) Months. 180 mL 1    multivitamins-minerals (PRESERVISION AREDS 2) capsule capsule Take 1 capsule by mouth 2 (Two) Times a Day.      traMADol (ULTRAM) 50 MG tablet Take 1 tablet by mouth Every 8 (Eight) Hours As Needed for Moderate Pain. 180 tablet 1     No facility-administered medications prior to visit.     Opioid medication/s are on active medication list.  and I have evaluated his active treatment plan and pain score trends (see table).  Vitals:    11/12/24 1437   PainSc: 0-No pain     I have reviewed the chart for potential of high risk medication and harmful drug interactions in the elderly.        Aspirin is not on active medication list.  Aspirin  "use is not indicated based on review of current medical condition/s. Risk of harm outweighs potential benefits.  .    Patient Active Problem List   Diagnosis    Closed compression fracture of first lumbar vertebra    Current non-smoker    Lumbar compression fracture, closed, initial encounter    Lumbar compression fracture    S/P kyphoplasty    Numbness and tingling of right leg    Compression fracture of fourth lumbar vertebra with delayed healing    Benign hypertension    Flatback syndrome of lumbar region    Hammer toe of right foot    High cholesterol    Impaired fasting glucose    Malignant neoplasm of prostate    Osteopenia    Acute exacerbation of chronic obstructive airways disease    Back pain    Compression fracture of thoracic vertebra with routine healing    Normocytic anemia    Actinic keratosis    Hyperplastic colonic polyp    FH: colon cancer in first degree relative <60 years old    History of adenomatous polyp of colon    Decreased bone mass    Sebaceous cyst    Degenerative disc disease, cervical    Degenerative disc disease, lumbar    Degenerative disc disease, thoracic    Compression fracture of T12 vertebra with delayed healing    Age-related osteoporosis with current pathological fracture with delayed healing    Gait instability    Pain initiated by coughing    Hypoxia     Advance Care Planning Advance Directive is not on file.  ACP discussion was held with the patient during this visit. Patient does not have an advance directive, information provided.            Objective   Vitals:    11/12/24 1437   BP: 160/84   BP Location: Left arm   Patient Position: Sitting   Cuff Size: Adult   Pulse: 74   Temp: 97.8 °F (36.6 °C)   TempSrc: Temporal   SpO2: 96%   Weight: 71.7 kg (158 lb)   Height: 165.1 cm (65\")   PainSc: 0-No pain       Estimated body mass index is 26.29 kg/m² as calculated from the following:    Height as of this encounter: 165.1 cm (65\").    Weight as of this encounter: 71.7 kg (158 " lb).            Does the patient have evidence of cognitive impairment? No  Lab Results   Component Value Date    CHLPL 151 2024    TRIG 112 2024    HDL 39 (L) 2024    LDL 92 2024    VLDL 20 2024    HGBA1C 5.7 (H) 2024                                                                                               Health  Risk Assessment    Smoking Status:  Social History     Tobacco Use   Smoking Status Former    Current packs/day: 0.00    Average packs/day: 1 pack/day for 30.0 years (30.0 ttl pk-yrs)    Types: Cigarettes    Start date: 9/10/1961    Quit date: 9/10/1991    Years since quittin.2   Smokeless Tobacco Never   Tobacco Comments    quit      Alcohol Consumption:  Social History     Substance and Sexual Activity   Alcohol Use Not Currently       Fall Risk Screen  STEADI Fall Risk Assessment was completed, and patient is at HIGH risk for falls. Assessment completed on:2024    Depression Screening   Little interest or pleasure in doing things? Not at all   Feeling down, depressed, or hopeless? Not at all   PHQ-2 Total Score 0      Health Habits and Functional and Cognitive Screenin/12/2024     2:36 PM   Functional & Cognitive Status   Do you have difficulty preparing food and eating? No   Do you have difficulty bathing yourself, getting dressed or grooming yourself? No   Do you have difficulty using the toilet? No   Do you have difficulty moving around from place to place? Yes   Do you have trouble with steps or getting out of a bed or a chair? Yes   Current Diet Limited Junk Food   Dental Exam Up to date   Eye Exam Up to date   Exercise (times per week) 0 times per week   Current Exercises Include No Regular Exercise   Do you need help using the phone?  No   Are you deaf or do you have serious difficulty hearing?  Yes   Do you need help to go to places out of walking distance? Yes   Do you need help shopping? Yes   Do you need help preparing meals?   No   Do you need help with housework?  Yes   Do you need help with laundry? No   Do you need help taking your medications? No   Do you need help managing money? No   Do you ever drive or ride in a car without wearing a seat belt? No   Have you felt unusual stress, anger or loneliness in the last month? No   Who do you live with? Spouse   If you need help, do you have trouble finding someone available to you? No   Have you been bothered in the last four weeks by sexual problems? No   Do you have difficulty concentrating, remembering or making decisions? No           Age-appropriate Screening Schedule:  Refer to the list below for future screening recommendations based on patient's age, sex and/or medical conditions. Orders for these recommended tests are listed in the plan section. The patient has been provided with a written plan.    Health Maintenance List  Health Maintenance   Topic Date Due    TDAP/TD VACCINES (1 - Tdap) Never done    RSV Vaccine - Adults (1 - 1-dose 75+ series) Never done    ZOSTER VACCINE (2 of 2) 01/13/2021    COVID-19 Vaccine (4 - 2024-25 season) 09/01/2024    BMI FOLLOWUP  03/06/2025    LIPID PANEL  11/08/2025    ANNUAL WELLNESS VISIT  11/12/2025    INFLUENZA VACCINE  Completed    Pneumococcal Vaccine 65+  Completed                                                                                                                                                 CMS Preventative Services Quick Reference  Risk Factors Identified During Encounter  Immunizations Discussed/Encouraged: Tdap, Shingrix, and RSV (Respiratory Syncytial Virus)  Dental Screening Recommended  Vision Screening Recommended    The above risks/problems have been discussed with the patient.  Pertinent information has been shared with the patient in the After Visit Summary.  An After Visit Summary and PPPS were made available to the patient.    Follow Up:   Next Medicare Wellness visit to be scheduled in 1 year.        Diagnoses  and all orders for this visit:    1. Benign hypertension (Primary)    2. Gait instability    3. Degeneration of intervertebral disc of lumbar region, unspecified whether pain present    4. Degenerative disc disease, cervical    5. Depression, unspecified depression type    Other orders  -     traMADol-acetaminophen (ULTRACET) 37.5-325 MG per tablet; Take 1 tablet by mouth Every 8 (Eight) Hours As Needed for Moderate Pain.  Dispense: 180 tablet; Refill: 2  -     DULoxetine (Cymbalta) 20 MG capsule; Take 1 capsule by mouth Daily.  Dispense: 30 capsule; Refill: 2        Recommend at least annual dental and vision screening.  Recommend annual influenza vaccination  Recommend a varied diet and appropriate portion sizes.   CDC recommendations for physical activity:  At least 150 minutes a week (for example, 30 minutes a day, 5 days a week) of moderate-intensity activity such as brisk walking. Or can consider 75 minutes a week of vigorous-intensity activity such as hiking, jogging, or running.  At least 2 days a week of activities that strengthen muscles.  Plus activities to improve balance.    Health Maintenance Due   Topic Date Due    TDAP/TD VACCINES (1 - Tdap) Never done    RSV Vaccine - Adults (1 - 1-dose 75+ series) Never done    ZOSTER VACCINE (2 of 2) 01/13/2021    COVID-19 Vaccine (4 - 2024-25 season) 09/01/2024           Result Review :  The following data was reviewed by: Santiago Aaron MD on 11/12/2024:  CMP          8/26/2024    10:39 8/30/2024    13:21 11/8/2024    14:05   CMP   Glucose   84    BUN   12    Creatinine   0.91    Sodium   137    Potassium 5.9  4.1  4.3    Chloride   97    Calcium   9.2    Total Protein   7.6    Albumin   3.7    Globulin   3.9    Total Bilirubin   0.5    Alkaline Phosphatase   72    AST (SGOT)   20    ALT (SGPT)   8    BUN/Creatinine Ratio   13      CBC w/diff          3/6/2024    09:57 8/21/2024    14:39 11/8/2024    14:05   CBC w/Diff   WBC 5.58  5.49  4.8    RBC 4.24  3.60   3.92    Hemoglobin 12.2  11.0  12.1    Hematocrit 37.2  32.9  36.5    MCV 87.7  91.4  93    MCH 28.8  30.6  30.9    MCHC 32.8  33.4  33.2    RDW 13.5  13.3  13.1    Platelets 250  233  282    Neutrophil Rel % 64.7  55.7  50    Lymphocyte Rel % 19.4  25.9  31    Monocyte Rel % 12.2  10.7  15    Eosinophil Rel % 2.9  6.9  2    Basophil Rel % 0.4  0.4  1      Lipid Panel          11/8/2024    14:05   Lipid Panel   Total Cholesterol 151    Triglycerides 112    HDL Cholesterol 39    VLDL Cholesterol 20    LDL Cholesterol  92      TSH          8/21/2024    14:39 11/8/2024    14:05   TSH   TSH 2.680  0.925      A1C Last 3 Results          5/15/2024    14:45 11/8/2024    14:05   HGBA1C Last 3 Results   Hemoglobin A1C 5.4  5.7        UA          11/12/2024    11:00   Urinalysis   Blood, UA Negative    Leukocytes, UA Trace    Nitrite, UA Negative    RBC, UA 0-2    Bacteria, UA Comment             Follow Up:   Return in about 6 months (around 5/12/2025), or if symptoms worsen or fail to improve, for follow up for above problems. Longitudinal care..     An After Visit Summary and PPPS were made available to the patient.                   NTAHAN Aaron MD, [facility], [facility]      Electronically signed by Santiago Aaron MD, 11/12/24, 3:04 PM CST.    Additional E&M Note during same encounter follows:  Patient has additional, significant, and separately identifiable condition(s)/problem(s) that require work above and beyond the Medicare Wellness Visit       Chief Complaint  Medicare Wellness-subsequent, Dizziness (Still swimmy somtimes), discuss medications (Tramadol-wants to discuss going down on the dose ), and Hypertension (Our reading: 160/84 pulse 74/Home wrist cuff reading :184/100 pulse 74)    Subjective        HPI  Juan Luis KALEN Collazo is also being seen today for depresision, dizziness, hypertension  Patient here for the above problems.  See Assessment and Plan for further HPI components.        Objective   Vitals:    11/12/24  "1437   BP: 160/84   BP Location: Left arm   Patient Position: Sitting   Cuff Size: Adult   Pulse: 74   Temp: 97.8 °F (36.6 °C)   TempSrc: Temporal   SpO2: 96%   Weight: 71.7 kg (158 lb)   Height: 165.1 cm (65\")   PainSc: 0-No pain     Physical Exam  Vitals and nursing note reviewed.   Constitutional:       Appearance: He is not ill-appearing.   Eyes:      General: No scleral icterus.     Conjunctiva/sclera: Conjunctivae normal.   Cardiovascular:      Rate and Rhythm: Normal rate and regular rhythm. Rhythm irregular.      Heart sounds: Murmur heard.   Pulmonary:      Effort: Pulmonary effort is normal. No respiratory distress.   Neurological:      General: No focal deficit present.      Mental Status: He is alert and oriented to person, place, and time.   Psychiatric:         Mood and Affect: Mood normal.         Behavior: Behavior normal.              The following data was reviewed by: Santiago Aaron MD on 11/12/2024:  CMP          8/26/2024    10:39 8/30/2024    13:21 11/8/2024    14:05   CMP   Glucose   84    BUN   12    Creatinine   0.91    Sodium   137    Potassium 5.9  4.1  4.3    Chloride   97    Calcium   9.2    Total Protein   7.6    Albumin   3.7    Globulin   3.9    Total Bilirubin   0.5    Alkaline Phosphatase   72    AST (SGOT)   20    ALT (SGPT)   8    BUN/Creatinine Ratio   13      CBC w/diff          3/6/2024    09:57 8/21/2024    14:39 11/8/2024    14:05   CBC w/Diff   WBC 5.58  5.49  4.8    RBC 4.24  3.60  3.92    Hemoglobin 12.2  11.0  12.1    Hematocrit 37.2  32.9  36.5    MCV 87.7  91.4  93    MCH 28.8  30.6  30.9    MCHC 32.8  33.4  33.2    RDW 13.5  13.3  13.1    Platelets 250  233  282    Neutrophil Rel % 64.7  55.7  50    Lymphocyte Rel % 19.4  25.9  31    Monocyte Rel % 12.2  10.7  15    Eosinophil Rel % 2.9  6.9  2    Basophil Rel % 0.4  0.4  1      Lipid Panel          11/8/2024    14:05   Lipid Panel   Total Cholesterol 151    Triglycerides 112    HDL Cholesterol 39    VLDL " Cholesterol 20    LDL Cholesterol  92      TSH          8/21/2024    14:39 11/8/2024    14:05   TSH   TSH 2.680  0.925      A1C Last 3 Results          5/15/2024    14:45 11/8/2024    14:05   HGBA1C Last 3 Results   Hemoglobin A1C 5.4  5.7        UA          11/12/2024    11:00   Urinalysis   Blood, UA Negative    Leukocytes, UA Trace    Nitrite, UA Negative    RBC, UA 0-2    Bacteria, UA Comment                 Assessment and Plan   Diagnoses and all orders for this visit:    1. Benign hypertension (Primary)    2. Gait instability    3. Degeneration of intervertebral disc of lumbar region, unspecified whether pain present    4. Degenerative disc disease, cervical    5. Depression, unspecified depression type    Other orders  -     traMADol-acetaminophen (ULTRACET) 37.5-325 MG per tablet; Take 1 tablet by mouth Every 8 (Eight) Hours As Needed for Moderate Pain.  Dispense: 180 tablet; Refill: 2  -     DULoxetine (Cymbalta) 20 MG capsule; Take 1 capsule by mouth Daily.  Dispense: 30 capsule; Refill: 2      History of Present Illness  The patient presents for evaluation of multiple medical concerns.    He reports that his overall health and mental well-being remain consistent with the previous year. He has not required any hospitalizations this year.    He experiences occasional dizziness, particularly upon standing, which has led to a recent fall. He uses a cane for mobility. His hydration is adequate, and he is not currently on any blood pressure medication.    He has requested a reduction in his tramadol dosage from 50 mg to 37.5 mg, as he finds the former too potent. His activity level is satisfactory, although he avoids heavy lifting.    He experiences occasional bouts of depression due to his physical limitations. He is open to trying antidepressant medication to manage his dizziness and depression.    His appetite is low, and he often forgets to eat unless reminded by his wife. He consumes a lot of crackers and  drinks water and Gatorade.    He struggles with sleep, often waking up early and unable to nap during the day. He occasionally takes a sleeping pill provided by his wife, which he finds effective.    SOCIAL HISTORY  He used to drink alcohol.    Assessment & Plan  1. Elevated blood pressure.  His blood pressure is slightly elevated, but it is preferable to maintain it at a slightly higher level than to let it drop too low. He should continue monitoring his blood pressure at home, preferably with an arm cuff for more accurate readings.    2. Weight loss.  His weight has decreased slightly. He is advised to reduce his intake of crackers and increase his consumption of home-cooked meals to help stabilize his weight.    3. Prediabetes.  His A1c has increased marginally, shifting his blood sugar levels from normal to the prediabetic range. He was advised to reduce his intake of crackers and increase his consumption of home-cooked meals.    4. Dizziness.  He continues to experience dizziness, which sometimes leads to falls. An ultrasound of the heart is scheduled for the end of November to help determine if there are any underlying causes. He is advised to use his cane to prevent falls.    5. Depression.  He reports intermittent depression. Cymbalta was prescribed to be taken at night before bedtime. He was informed that it may take up to 6 weeks to notice significant improvement, but any adverse effects should become apparent within a week. If he tolerates the medication well, he should continue it for 6 weeks to observe any improvements.    6. Medication management.  He requested to change his Tramadol dosage from 50 mg to 37.5 mg, as he finds the 50 mg too potent. The prescription will be adjusted accordingly and sent to [facility].    Follow-up  Return in 6 months for follow up.  Will contact in 6 weeks.           Follow Up   Return in about 6 months (around 5/12/2025), or if symptoms worsen or fail to improve,  for follow up for above problems. Longitudinal care..  Patient was given instructions and counseling regarding his condition or for health maintenance advice. Please see specific information pulled into the AVS if appropriate.   Patient or patient representative verbalized consent for the use of Ambient Listening during the visit with  Santiago Aaron MD for chart documentation. 11/13/2024  17:07 CST

## 2024-11-19 ENCOUNTER — TELEPHONE (OUTPATIENT)
Dept: INTERNAL MEDICINE | Facility: CLINIC | Age: 84
End: 2024-11-19

## 2024-11-19 ENCOUNTER — HOSPITAL ENCOUNTER (OUTPATIENT)
Dept: GENERAL RADIOLOGY | Facility: HOSPITAL | Age: 84
Discharge: HOME | End: 2024-11-19
Admitting: INTERNAL MEDICINE
Payer: MEDICARE
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
## 2024-11-19 DIAGNOSIS — R07.81 RIB PAIN ON RIGHT SIDE: ICD-10-CM

## 2024-11-19 DIAGNOSIS — R07.81 RIB PAIN ON RIGHT SIDE: Primary | ICD-10-CM

## 2024-11-19 PROCEDURE — 71100 X-RAY EXAM RIBS UNI 2 VIEWS: CPT

## 2024-11-19 NOTE — TELEPHONE ENCOUNTER
Patient's wife came in and said patient fell to the ground 11/19/2024. He is complaining of right side rib pain. She wants to see if Dr. Aaron will order an xray can be ordered. Please call patient's wife at [phone] if no answer, call [phone]

## 2024-11-22 ENCOUNTER — HOSPITAL ENCOUNTER (OUTPATIENT)
Dept: CARDIOLOGY | Facility: HOSPITAL | Age: 84
Discharge: HOME | End: 2024-11-22
Payer: MEDICARE

## 2024-11-22 VITALS
WEIGHT: 158 LBS | HEIGHT: 65 IN | SYSTOLIC BLOOD PRESSURE: 130 MMHG | DIASTOLIC BLOOD PRESSURE: 78 MMHG | BODY MASS INDEX: 26.33 KG/M2

## 2024-11-22 DIAGNOSIS — I10 BENIGN HYPERTENSION: ICD-10-CM

## 2024-11-22 DIAGNOSIS — I95.1 ORTHOSTASIS: ICD-10-CM

## 2024-11-22 PROCEDURE — 93306 TTE W/DOPPLER COMPLETE: CPT

## 2024-11-25 LAB
BH CV ECHO MEAS - AO MAX PG: 17.6 MMHG
BH CV ECHO MEAS - AO MEAN PG: 10 MMHG
BH CV ECHO MEAS - AO ROOT DIAM: 3.5 CM
BH CV ECHO MEAS - AO V2 MAX: 210 CM/SEC
BH CV ECHO MEAS - AO V2 VTI: 47.4 CM
BH CV ECHO MEAS - AVA(I,D): 1.83 CM2
BH CV ECHO MEAS - EDV(CUBED): 97.3 ML
BH CV ECHO MEAS - EDV(MOD-SP2): 69.9 ML
BH CV ECHO MEAS - EDV(MOD-SP4): 68.2 ML
BH CV ECHO MEAS - EF(MOD-BP): 53 %
BH CV ECHO MEAS - EF(MOD-SP2): 43.9 %
BH CV ECHO MEAS - EF(MOD-SP4): 66.6 %
BH CV ECHO MEAS - ESV(CUBED): 19.7 ML
BH CV ECHO MEAS - ESV(MOD-SP2): 39.2 ML
BH CV ECHO MEAS - ESV(MOD-SP4): 22.8 ML
BH CV ECHO MEAS - FS: 41.3 %
BH CV ECHO MEAS - IVS/LVPW: 0.92 CM
BH CV ECHO MEAS - IVSD: 1.2 CM
BH CV ECHO MEAS - LA DIMENSION: 3.7 CM
BH CV ECHO MEAS - LAT PEAK E' VEL: 5.1 CM/SEC
BH CV ECHO MEAS - LV DIASTOLIC VOL/BSA (35-75): 38.1 CM2
BH CV ECHO MEAS - LV MASS(C)D: 217.4 GRAMS
BH CV ECHO MEAS - LV MAX PG: 3.3 MMHG
BH CV ECHO MEAS - LV MEAN PG: 2 MMHG
BH CV ECHO MEAS - LV SYSTOLIC VOL/BSA (12-30): 12.7 CM2
BH CV ECHO MEAS - LV V1 MAX: 91.1 CM/SEC
BH CV ECHO MEAS - LV V1 VTI: 19.2 CM
BH CV ECHO MEAS - LVIDD: 4.6 CM
BH CV ECHO MEAS - LVIDS: 2.7 CM
BH CV ECHO MEAS - LVOT AREA: 4.5 CM2
BH CV ECHO MEAS - LVOT DIAM: 2.4 CM
BH CV ECHO MEAS - LVPWD: 1.3 CM
BH CV ECHO MEAS - MED PEAK E' VEL: 4.1 CM/SEC
BH CV ECHO MEAS - MV A MAX VEL: 84 CM/SEC
BH CV ECHO MEAS - MV DEC TIME: 0.21 SEC
BH CV ECHO MEAS - MV E MAX VEL: 69.4 CM/SEC
BH CV ECHO MEAS - MV E/A: 0.83
BH CV ECHO MEAS - PI END-D VEL: 147 CM/SEC
BH CV ECHO MEAS - RAP SYSTOLE: 3 MMHG
BH CV ECHO MEAS - RVSP: 36 MMHG
BH CV ECHO MEAS - SV(LVOT): 86.9 ML
BH CV ECHO MEAS - SV(MOD-SP2): 30.7 ML
BH CV ECHO MEAS - SV(MOD-SP4): 45.4 ML
BH CV ECHO MEAS - SVI(LVOT): 48.5 ML/M2
BH CV ECHO MEAS - SVI(MOD-SP2): 17.2 ML/M2
BH CV ECHO MEAS - SVI(MOD-SP4): 25.4 ML/M2
BH CV ECHO MEAS - TR MAX PG: 33 MMHG
BH CV ECHO MEAS - TR MAX VEL: 289 CM/SEC
BH CV ECHO MEASUREMENTS AVERAGE E/E' RATIO: 15.09
BH CV XLRA - RV BASE: 4.5 CM
BH CV XLRA - RV LENGTH: 5.4 CM
BH CV XLRA - RV MID: 3.4 CM

## 2024-12-03 DIAGNOSIS — M85.80 DECREASED BONE MASS: Primary | ICD-10-CM

## 2024-12-04 ENCOUNTER — APPOINTMENT (OUTPATIENT)
Dept: CT IMAGING | Facility: HOSPITAL | Age: 84
End: 2024-12-04
Payer: MEDICARE

## 2024-12-04 ENCOUNTER — HOSPITAL ENCOUNTER (INPATIENT)
Facility: HOSPITAL | Age: 84
LOS: 7 days | Discharge: HOME HEALTH | End: 2024-12-11
Attending: FAMILY MEDICINE | Admitting: FAMILY MEDICINE
Payer: MEDICARE

## 2024-12-04 ENCOUNTER — APPOINTMENT (OUTPATIENT)
Dept: GENERAL RADIOLOGY | Facility: HOSPITAL | Age: 84
End: 2024-12-04
Payer: MEDICARE

## 2024-12-04 DIAGNOSIS — R07.9 CHEST PAIN, UNSPECIFIED TYPE: ICD-10-CM

## 2024-12-04 DIAGNOSIS — S22.41XA CLOSED FRACTURE OF MULTIPLE RIBS OF RIGHT SIDE, INITIAL ENCOUNTER: ICD-10-CM

## 2024-12-04 DIAGNOSIS — R26.81 GAIT INSTABILITY: ICD-10-CM

## 2024-12-04 DIAGNOSIS — S22.49XA CLOSED FRACTURE OF MULTIPLE RIBS, UNSPECIFIED LATERALITY, INITIAL ENCOUNTER: ICD-10-CM

## 2024-12-04 DIAGNOSIS — J18.9 PNEUMONIA OF LEFT LOWER LOBE DUE TO INFECTIOUS ORGANISM: Primary | ICD-10-CM

## 2024-12-04 LAB
ALBUMIN SERPL-MCNC: 3.5 G/DL (ref 3.5–5.2)
ALBUMIN/GLOB SERPL: 0.8 G/DL
ALP SERPL-CCNC: 124 U/L (ref 39–117)
ALT SERPL W P-5'-P-CCNC: 12 U/L (ref 1–41)
ANION GAP SERPL CALCULATED.3IONS-SCNC: 8 MMOL/L (ref 5–15)
AST SERPL-CCNC: 15 U/L (ref 1–40)
BASOPHILS # BLD AUTO: 0.02 10*3/MM3 (ref 0–0.2)
BASOPHILS NFR BLD AUTO: 0.4 % (ref 0–1.5)
BILIRUB SERPL-MCNC: 0.3 MG/DL (ref 0–1.2)
BUN SERPL-MCNC: 15 MG/DL (ref 8–23)
BUN/CREAT SERPL: 25 (ref 7–25)
CALCIUM SPEC-SCNC: 8.9 MG/DL (ref 8.6–10.5)
CHLORIDE SERPL-SCNC: 97 MMOL/L (ref 98–107)
CO2 SERPL-SCNC: 32 MMOL/L (ref 22–29)
CREAT SERPL-MCNC: 0.6 MG/DL (ref 0.76–1.27)
D-LACTATE SERPL-SCNC: 1 MMOL/L (ref 0.5–2)
DEPRECATED RDW RBC AUTO: 44 FL (ref 37–54)
EGFRCR SERPLBLD CKD-EPI 2021: 94 ML/MIN/1.73
EOSINOPHIL # BLD AUTO: 0.06 10*3/MM3 (ref 0–0.4)
EOSINOPHIL NFR BLD AUTO: 1.2 % (ref 0.3–6.2)
ERYTHROCYTE [DISTWIDTH] IN BLOOD BY AUTOMATED COUNT: 13.2 % (ref 12.3–15.4)
GEN 5 1HR TROPONIN T REFLEX: 13 NG/L
GLOBULIN UR ELPH-MCNC: 4.4 GM/DL
GLUCOSE SERPL-MCNC: 96 MG/DL (ref 65–99)
HCT VFR BLD AUTO: 38.1 % (ref 37.5–51)
HGB BLD-MCNC: 12.3 G/DL (ref 13–17.7)
IMM GRANULOCYTES # BLD AUTO: 0.06 10*3/MM3 (ref 0–0.05)
IMM GRANULOCYTES NFR BLD AUTO: 1.2 % (ref 0–0.5)
INR PPP: 1.01 (ref 0.91–1.09)
LYMPHOCYTES # BLD AUTO: 0.63 10*3/MM3 (ref 0.7–3.1)
LYMPHOCYTES NFR BLD AUTO: 12.5 % (ref 19.6–45.3)
MCH RBC QN AUTO: 29.6 PG (ref 26.6–33)
MCHC RBC AUTO-ENTMCNC: 32.3 G/DL (ref 31.5–35.7)
MCV RBC AUTO: 91.6 FL (ref 79–97)
MONOCYTES # BLD AUTO: 0.58 10*3/MM3 (ref 0.1–0.9)
MONOCYTES NFR BLD AUTO: 11.5 % (ref 5–12)
NEUTROPHILS NFR BLD AUTO: 3.71 10*3/MM3 (ref 1.7–7)
NEUTROPHILS NFR BLD AUTO: 73.2 % (ref 42.7–76)
NRBC BLD AUTO-RTO: 0 /100 WBC (ref 0–0.2)
PLATELET # BLD AUTO: 270 10*3/MM3 (ref 140–450)
PMV BLD AUTO: 9.7 FL (ref 6–12)
POTASSIUM SERPL-SCNC: 4.3 MMOL/L (ref 3.5–5.2)
PROT SERPL-MCNC: 7.9 G/DL (ref 6–8.5)
PROTHROMBIN TIME: 13.7 SECONDS (ref 11.8–14.8)
RBC # BLD AUTO: 4.16 10*6/MM3 (ref 4.14–5.8)
SODIUM SERPL-SCNC: 137 MMOL/L (ref 136–145)
TROPONIN T NUMERIC DELTA: 0 NG/L
TROPONIN T SERPL HS-MCNC: 13 NG/L
WBC NRBC COR # BLD AUTO: 5.06 10*3/MM3 (ref 3.4–10.8)

## 2024-12-04 PROCEDURE — 85610 PROTHROMBIN TIME: CPT | Performed by: NURSE PRACTITIONER

## 2024-12-04 PROCEDURE — 99285 EMERGENCY DEPT VISIT HI MDM: CPT

## 2024-12-04 PROCEDURE — 93010 ELECTROCARDIOGRAM REPORT: CPT | Performed by: INTERNAL MEDICINE

## 2024-12-04 PROCEDURE — 25010000002 CEFTRIAXONE PER 250 MG: Performed by: NURSE PRACTITIONER

## 2024-12-04 PROCEDURE — 72128 CT CHEST SPINE W/O DYE: CPT

## 2024-12-04 PROCEDURE — 93005 ELECTROCARDIOGRAM TRACING: CPT | Performed by: FAMILY MEDICINE

## 2024-12-04 PROCEDURE — 36415 COLL VENOUS BLD VENIPUNCTURE: CPT

## 2024-12-04 PROCEDURE — 80053 COMPREHEN METABOLIC PANEL: CPT | Performed by: NURSE PRACTITIONER

## 2024-12-04 PROCEDURE — 25010000002 ONDANSETRON PER 1 MG: Performed by: NURSE PRACTITIONER

## 2024-12-04 PROCEDURE — 72131 CT LUMBAR SPINE W/O DYE: CPT

## 2024-12-04 PROCEDURE — 25010000002 MORPHINE PER 10 MG: Performed by: NURSE PRACTITIONER

## 2024-12-04 PROCEDURE — 71045 X-RAY EXAM CHEST 1 VIEW: CPT

## 2024-12-04 PROCEDURE — 93005 ELECTROCARDIOGRAM TRACING: CPT

## 2024-12-04 PROCEDURE — 84484 ASSAY OF TROPONIN QUANT: CPT | Performed by: NURSE PRACTITIONER

## 2024-12-04 PROCEDURE — 25010000002 ENOXAPARIN PER 10 MG: Performed by: FAMILY MEDICINE

## 2024-12-04 PROCEDURE — 70450 CT HEAD/BRAIN W/O DYE: CPT

## 2024-12-04 PROCEDURE — 25010000002 METHYLPREDNISOLONE PER 125 MG: Performed by: NURSE PRACTITIONER

## 2024-12-04 PROCEDURE — 85025 COMPLETE CBC W/AUTO DIFF WBC: CPT | Performed by: NURSE PRACTITIONER

## 2024-12-04 PROCEDURE — 83605 ASSAY OF LACTIC ACID: CPT | Performed by: NURSE PRACTITIONER

## 2024-12-04 PROCEDURE — 71250 CT THORAX DX C-: CPT

## 2024-12-04 PROCEDURE — 87040 BLOOD CULTURE FOR BACTERIA: CPT | Performed by: NURSE PRACTITIONER

## 2024-12-04 RX ORDER — HYDRALAZINE HYDROCHLORIDE 20 MG/ML
5 INJECTION INTRAMUSCULAR; INTRAVENOUS EVERY 4 HOURS PRN
Status: DISCONTINUED | OUTPATIENT
Start: 2024-12-04 | End: 2024-12-11 | Stop reason: HOSPADM

## 2024-12-04 RX ORDER — METHYLPREDNISOLONE SODIUM SUCCINATE 125 MG/2ML
125 INJECTION, POWDER, LYOPHILIZED, FOR SOLUTION INTRAMUSCULAR; INTRAVENOUS ONCE
Status: COMPLETED | OUTPATIENT
Start: 2024-12-04 | End: 2024-12-04

## 2024-12-04 RX ORDER — MORPHINE SULFATE 2 MG/ML
2 INJECTION, SOLUTION INTRAMUSCULAR; INTRAVENOUS ONCE
Status: COMPLETED | OUTPATIENT
Start: 2024-12-04 | End: 2024-12-04

## 2024-12-04 RX ORDER — BISACODYL 10 MG
10 SUPPOSITORY, RECTAL RECTAL DAILY PRN
Status: DISCONTINUED | OUTPATIENT
Start: 2024-12-04 | End: 2024-12-11 | Stop reason: HOSPADM

## 2024-12-04 RX ORDER — SODIUM CHLORIDE 0.9 % (FLUSH) 0.9 %
10 SYRINGE (ML) INJECTION AS NEEDED
Status: DISCONTINUED | OUTPATIENT
Start: 2024-12-04 | End: 2024-12-05 | Stop reason: SDUPTHER

## 2024-12-04 RX ORDER — ONDANSETRON 2 MG/ML
4 INJECTION INTRAMUSCULAR; INTRAVENOUS EVERY 6 HOURS PRN
Status: DISCONTINUED | OUTPATIENT
Start: 2024-12-04 | End: 2024-12-11 | Stop reason: HOSPADM

## 2024-12-04 RX ORDER — ONDANSETRON 2 MG/ML
4 INJECTION INTRAMUSCULAR; INTRAVENOUS ONCE
Status: COMPLETED | OUTPATIENT
Start: 2024-12-04 | End: 2024-12-04

## 2024-12-04 RX ORDER — ACETAMINOPHEN 160 MG/5ML
650 SOLUTION ORAL EVERY 4 HOURS PRN
Status: DISCONTINUED | OUTPATIENT
Start: 2024-12-04 | End: 2024-12-11 | Stop reason: HOSPADM

## 2024-12-04 RX ORDER — AMOXICILLIN 250 MG
2 CAPSULE ORAL 2 TIMES DAILY PRN
Status: DISCONTINUED | OUTPATIENT
Start: 2024-12-04 | End: 2024-12-11 | Stop reason: HOSPADM

## 2024-12-04 RX ORDER — ACETAMINOPHEN 325 MG/1
650 TABLET ORAL EVERY 4 HOURS PRN
Status: DISCONTINUED | OUTPATIENT
Start: 2024-12-04 | End: 2024-12-11 | Stop reason: HOSPADM

## 2024-12-04 RX ORDER — SODIUM CHLORIDE 9 MG/ML
40 INJECTION, SOLUTION INTRAVENOUS AS NEEDED
Status: DISCONTINUED | OUTPATIENT
Start: 2024-12-04 | End: 2024-12-11 | Stop reason: HOSPADM

## 2024-12-04 RX ORDER — LOSARTAN POTASSIUM 25 MG/1
25 TABLET ORAL
Status: DISCONTINUED | OUTPATIENT
Start: 2024-12-04 | End: 2024-12-07

## 2024-12-04 RX ORDER — POLYETHYLENE GLYCOL 3350 17 G/17G
17 POWDER, FOR SOLUTION ORAL DAILY PRN
Status: DISCONTINUED | OUTPATIENT
Start: 2024-12-04 | End: 2024-12-11 | Stop reason: HOSPADM

## 2024-12-04 RX ORDER — SODIUM CHLORIDE 0.9 % (FLUSH) 0.9 %
10 SYRINGE (ML) INJECTION EVERY 12 HOURS SCHEDULED
Status: DISCONTINUED | OUTPATIENT
Start: 2024-12-04 | End: 2024-12-11 | Stop reason: HOSPADM

## 2024-12-04 RX ORDER — SODIUM CHLORIDE 0.9 % (FLUSH) 0.9 %
10 SYRINGE (ML) INJECTION AS NEEDED
Status: DISCONTINUED | OUTPATIENT
Start: 2024-12-04 | End: 2024-12-11 | Stop reason: HOSPADM

## 2024-12-04 RX ORDER — BISACODYL 5 MG/1
5 TABLET, DELAYED RELEASE ORAL DAILY PRN
Status: DISCONTINUED | OUTPATIENT
Start: 2024-12-04 | End: 2024-12-11 | Stop reason: HOSPADM

## 2024-12-04 RX ORDER — ONDANSETRON 4 MG/1
4 TABLET, ORALLY DISINTEGRATING ORAL EVERY 6 HOURS PRN
Status: DISCONTINUED | OUTPATIENT
Start: 2024-12-04 | End: 2024-12-11 | Stop reason: HOSPADM

## 2024-12-04 RX ORDER — AZITHROMYCIN 250 MG/1
500 TABLET, FILM COATED ORAL ONCE
Status: COMPLETED | OUTPATIENT
Start: 2024-12-04 | End: 2024-12-04

## 2024-12-04 RX ORDER — ENOXAPARIN SODIUM 100 MG/ML
30 INJECTION SUBCUTANEOUS DAILY
Status: DISCONTINUED | OUTPATIENT
Start: 2024-12-04 | End: 2024-12-11 | Stop reason: HOSPADM

## 2024-12-04 RX ORDER — OXYCODONE AND ACETAMINOPHEN 5; 325 MG/1; MG/1
1 TABLET ORAL EVERY 8 HOURS PRN
Status: DISCONTINUED | OUTPATIENT
Start: 2024-12-04 | End: 2024-12-05

## 2024-12-04 RX ORDER — ACETAMINOPHEN 650 MG/1
650 SUPPOSITORY RECTAL EVERY 4 HOURS PRN
Status: DISCONTINUED | OUTPATIENT
Start: 2024-12-04 | End: 2024-12-11 | Stop reason: HOSPADM

## 2024-12-04 RX ADMIN — ENOXAPARIN SODIUM 30 MG: 100 INJECTION SUBCUTANEOUS at 21:03

## 2024-12-04 RX ADMIN — MORPHINE SULFATE 2 MG: 2 INJECTION, SOLUTION INTRAMUSCULAR; INTRAVENOUS at 17:25

## 2024-12-04 RX ADMIN — LOSARTAN POTASSIUM 25 MG: 25 TABLET, FILM COATED ORAL at 21:03

## 2024-12-04 RX ADMIN — Medication 10 ML: at 21:03

## 2024-12-04 RX ADMIN — AZITHROMYCIN 500 MG: 250 TABLET, FILM COATED ORAL at 19:54

## 2024-12-04 RX ADMIN — SODIUM CHLORIDE 1000 MG: 900 INJECTION INTRAVENOUS at 18:49

## 2024-12-04 RX ADMIN — METHYLPREDNISOLONE SODIUM SUCCINATE 125 MG: 125 INJECTION, POWDER, FOR SOLUTION INTRAMUSCULAR; INTRAVENOUS at 18:47

## 2024-12-04 RX ADMIN — ONDANSETRON 4 MG: 2 INJECTION INTRAMUSCULAR; INTRAVENOUS at 15:11

## 2024-12-04 RX ADMIN — MORPHINE SULFATE 2 MG: 2 INJECTION, SOLUTION INTRAMUSCULAR; INTRAVENOUS at 15:11

## 2024-12-04 RX ADMIN — OXYCODONE HYDROCHLORIDE AND ACETAMINOPHEN 1 TABLET: 5; 325 TABLET ORAL at 21:02

## 2024-12-05 ENCOUNTER — TRANSCRIBE ORDERS (OUTPATIENT)
Dept: ADMINISTRATIVE | Facility: HOSPITAL | Age: 84
End: 2024-12-05
Payer: MEDICARE

## 2024-12-05 LAB
ALBUMIN SERPL-MCNC: 3.2 G/DL (ref 3.5–5.2)
ALBUMIN/GLOB SERPL: 0.8 G/DL
ALP SERPL-CCNC: 118 U/L (ref 39–117)
ALT SERPL W P-5'-P-CCNC: 13 U/L (ref 1–41)
ANION GAP SERPL CALCULATED.3IONS-SCNC: 9 MMOL/L (ref 5–15)
AST SERPL-CCNC: 20 U/L (ref 1–40)
BACTERIA UR QL AUTO: NORMAL /HPF
BASOPHILS # BLD AUTO: 0.01 10*3/MM3 (ref 0–0.2)
BASOPHILS NFR BLD AUTO: 0.2 % (ref 0–1.5)
BILIRUB SERPL-MCNC: 0.2 MG/DL (ref 0–1.2)
BILIRUB UR QL STRIP: NEGATIVE
BUN SERPL-MCNC: 20 MG/DL (ref 8–23)
BUN/CREAT SERPL: 25.6 (ref 7–25)
CALCIUM SPEC-SCNC: 8.6 MG/DL (ref 8.6–10.5)
CHLORIDE SERPL-SCNC: 94 MMOL/L (ref 98–107)
CLARITY UR: CLEAR
CO2 SERPL-SCNC: 30 MMOL/L (ref 22–29)
COLOR UR: ABNORMAL
CREAT SERPL-MCNC: 0.78 MG/DL (ref 0.76–1.27)
DEPRECATED RDW RBC AUTO: 44.8 FL (ref 37–54)
EGFRCR SERPLBLD CKD-EPI 2021: 86.9 ML/MIN/1.73
EOSINOPHIL # BLD AUTO: 0 10*3/MM3 (ref 0–0.4)
EOSINOPHIL NFR BLD AUTO: 0 % (ref 0.3–6.2)
ERYTHROCYTE [DISTWIDTH] IN BLOOD BY AUTOMATED COUNT: 13.1 % (ref 12.3–15.4)
GLOBULIN UR ELPH-MCNC: 4.2 GM/DL
GLUCOSE SERPL-MCNC: 217 MG/DL (ref 65–99)
GLUCOSE UR STRIP-MCNC: NEGATIVE MG/DL
HCT VFR BLD AUTO: 36.6 % (ref 37.5–51)
HGB BLD-MCNC: 11.6 G/DL (ref 13–17.7)
HGB UR QL STRIP.AUTO: NEGATIVE
HYALINE CASTS UR QL AUTO: NORMAL /LPF
IMM GRANULOCYTES # BLD AUTO: 0.02 10*3/MM3 (ref 0–0.05)
IMM GRANULOCYTES NFR BLD AUTO: 0.3 % (ref 0–0.5)
KETONES UR QL STRIP: ABNORMAL
LEUKOCYTE ESTERASE UR QL STRIP.AUTO: ABNORMAL
LYMPHOCYTES # BLD AUTO: 0.78 10*3/MM3 (ref 0.7–3.1)
LYMPHOCYTES NFR BLD AUTO: 13.4 % (ref 19.6–45.3)
MCH RBC QN AUTO: 29.5 PG (ref 26.6–33)
MCHC RBC AUTO-ENTMCNC: 31.7 G/DL (ref 31.5–35.7)
MCV RBC AUTO: 93.1 FL (ref 79–97)
MONOCYTES # BLD AUTO: 0.04 10*3/MM3 (ref 0.1–0.9)
MONOCYTES NFR BLD AUTO: 0.7 % (ref 5–12)
NEUTROPHILS NFR BLD AUTO: 4.98 10*3/MM3 (ref 1.7–7)
NEUTROPHILS NFR BLD AUTO: 85.4 % (ref 42.7–76)
NITRITE UR QL STRIP: NEGATIVE
NRBC BLD AUTO-RTO: 0 /100 WBC (ref 0–0.2)
PH UR STRIP.AUTO: 5.5 [PH] (ref 5–8)
PLATELET # BLD AUTO: 290 10*3/MM3 (ref 140–450)
PMV BLD AUTO: 10.2 FL (ref 6–12)
POTASSIUM SERPL-SCNC: 4.8 MMOL/L (ref 3.5–5.2)
PROT SERPL-MCNC: 7.4 G/DL (ref 6–8.5)
PROT UR QL STRIP: ABNORMAL
QT INTERVAL: 400 MS
QT INTERVAL: 402 MS
QTC INTERVAL: 425 MS
QTC INTERVAL: 440 MS
RBC # BLD AUTO: 3.93 10*6/MM3 (ref 4.14–5.8)
RBC # UR STRIP: NORMAL /HPF
REF LAB TEST METHOD: NORMAL
SODIUM SERPL-SCNC: 133 MMOL/L (ref 136–145)
SP GR UR STRIP: 1.03 (ref 1–1.03)
SQUAMOUS #/AREA URNS HPF: NORMAL /HPF
UROBILINOGEN UR QL STRIP: ABNORMAL
WBC # UR STRIP: NORMAL /HPF
WBC NRBC COR # BLD AUTO: 5.83 10*3/MM3 (ref 3.4–10.8)

## 2024-12-05 PROCEDURE — 97530 THERAPEUTIC ACTIVITIES: CPT | Performed by: PHYSICAL THERAPIST

## 2024-12-05 PROCEDURE — 97166 OT EVAL MOD COMPLEX 45 MIN: CPT

## 2024-12-05 PROCEDURE — 85025 COMPLETE CBC W/AUTO DIFF WBC: CPT | Performed by: FAMILY MEDICINE

## 2024-12-05 PROCEDURE — 80053 COMPREHEN METABOLIC PANEL: CPT | Performed by: FAMILY MEDICINE

## 2024-12-05 PROCEDURE — 25010000002 ENOXAPARIN PER 10 MG: Performed by: FAMILY MEDICINE

## 2024-12-05 PROCEDURE — 36415 COLL VENOUS BLD VENIPUNCTURE: CPT | Performed by: FAMILY MEDICINE

## 2024-12-05 PROCEDURE — 81001 URINALYSIS AUTO W/SCOPE: CPT | Performed by: FAMILY MEDICINE

## 2024-12-05 PROCEDURE — 97161 PT EVAL LOW COMPLEX 20 MIN: CPT | Performed by: PHYSICAL THERAPIST

## 2024-12-05 RX ORDER — LIDOCAINE 4 G/G
1 PATCH TOPICAL
Status: DISCONTINUED | OUTPATIENT
Start: 2024-12-05 | End: 2024-12-11 | Stop reason: HOSPADM

## 2024-12-05 RX ORDER — OXYCODONE AND ACETAMINOPHEN 5; 325 MG/1; MG/1
1 TABLET ORAL EVERY 6 HOURS PRN
Status: DISCONTINUED | OUTPATIENT
Start: 2024-12-05 | End: 2024-12-06

## 2024-12-05 RX ADMIN — AMOXICILLIN AND CLAVULANATE POTASSIUM 1 TABLET: 875; 125 TABLET, FILM COATED ORAL at 20:43

## 2024-12-05 RX ADMIN — OXYCODONE HYDROCHLORIDE AND ACETAMINOPHEN 1 TABLET: 5; 325 TABLET ORAL at 12:55

## 2024-12-05 RX ADMIN — OXYCODONE HYDROCHLORIDE AND ACETAMINOPHEN 1 TABLET: 5; 325 TABLET ORAL at 05:28

## 2024-12-05 RX ADMIN — LOSARTAN POTASSIUM 25 MG: 25 TABLET, FILM COATED ORAL at 08:39

## 2024-12-05 RX ADMIN — LIDOCAINE 1 PATCH: 0.04 PATCH TOPICAL at 17:18

## 2024-12-05 RX ADMIN — OXYCODONE HYDROCHLORIDE AND ACETAMINOPHEN 1 TABLET: 5; 325 TABLET ORAL at 17:19

## 2024-12-05 RX ADMIN — Medication 10 ML: at 08:39

## 2024-12-05 RX ADMIN — ENOXAPARIN SODIUM 30 MG: 100 INJECTION SUBCUTANEOUS at 20:43

## 2024-12-05 RX ADMIN — OXYCODONE HYDROCHLORIDE AND ACETAMINOPHEN 1 TABLET: 5; 325 TABLET ORAL at 23:33

## 2024-12-05 RX ADMIN — AMOXICILLIN AND CLAVULANATE POTASSIUM 1 TABLET: 875; 125 TABLET, FILM COATED ORAL at 08:39

## 2024-12-05 RX ADMIN — Medication 10 ML: at 20:43

## 2024-12-05 RX ADMIN — ACETAMINOPHEN 650 MG: 325 TABLET, FILM COATED ORAL at 08:41

## 2024-12-05 NOTE — PLAN OF CARE
Goal Outcome Evaluation:  Plan of Care Reviewed With: patient        Progress: no change  Outcome Evaluation: Received pt from Ed to room 328 for pneumonia. A&Ox4. Pt has hisotry of frequent falls with broken ribs on the rt. 2 L NC  Awaiting UA sample. Appears to be resting between care. Tele placed. Orthos daily.

## 2024-12-05 NOTE — PLAN OF CARE
Goal Outcome Evaluation:  Plan of Care Reviewed With: patient        Progress: no change  Outcome Evaluation: (P) OT completed eval. Pt alert and oriented x4 sitting up in bed eating breakfast independently. Pt has low vision and hearing and wears glasses and hearing aids. He completed supine<>sit with Velma using HOB elevated and bed rails. He don/doffed socks with SBA. OT removed 2L NC as pt does not usually wear O2 and nurse said it was okay. He stood with CGA and ambulated to BR with Cl. Pt has difficulty picking his feet up and could not correct with vc. Pt consistently uses objects and surfaces in room for UE support during ambulation. He has a forward flexed posture while seated and standing that he was able to correct with vc. He completed oral hygiene requiring Cl to open toothpaste and mouthwash. To maintain standing balance, he leaned against the sink counter. Upon returning to bed, pts O2 measured 86%. OT put 2L NC back on pt and he recovered to 91% in 3 minutes with vc for PLB. Pts safety and independence in ADLs is most limited by his pain, balance, and endurance. Skilled OT services will address functional mobility and ADL safety. Recommend home with 24/7 supervision, home with HH.    Anticipated Discharge Disposition (OT): home with home health, home with 24/7 care

## 2024-12-05 NOTE — ED PROVIDER NOTES
How Severe Are Your Bumps?: moderate Subjective   History of Present Illness  Patient is an 84-year-old male who presents to the ER with complaints of chest pain.  Patient states he fell approximately 2 and half weeks ago.  He states he sustained 5 rib fractures in the fall.  He states on Thanksgiving he fell again.  He had stood up and was getting ready to go into his house when he began getting weak.  His wife was nearby and they report he fell a second time with the wife bracing his fall.  He essentially fell on top of his wife.  He reports hitting his head without loss of consciousness.  He has chronic back pain with history of previous spinal surgery.  He denies any loss of bowel or bladder control or saddle anesthesia.  Since this fall he has had worsening chest pain in particular with taking a deep breath.  He has had no known fevers.  Due to symptoms described he came to the ER for evaluation and treatment.  Past medical history significant for arthritis, back pain, skin cancer, GERD, colon polyps, prostate cancer, hypertension, macular degeneration, osteopenia        Review of Systems   Constitutional:  Negative for fever.   HENT: Negative.  Negative for congestion.    Respiratory:  Positive for shortness of breath. Negative for cough.    Cardiovascular:  Positive for chest pain.   Gastrointestinal: Negative.  Negative for abdominal pain, diarrhea, nausea and vomiting.   Genitourinary: Negative.  Negative for dysuria.   Musculoskeletal:  Positive for back pain. Negative for neck pain.   Skin: Negative.    All other systems reviewed and are negative.      Past Medical History:   Diagnosis Date    Arthritis     Back pain     Cancer     CHEST, SKIN CANCER    GERD (gastroesophageal reflux disease)     History of colon polyps     History of prostate cancer     Hypertension     Low back pain     Macular degeneration     Osteopenia        No Known Allergies    Past Surgical History:   Procedure Laterality Date    APPENDECTOMY      CATARACT  EXTRACTION, BILATERAL      COLONOSCOPY  2013    Dr. José-One 2-3mm polyp at 20cm; Otherwise normal; Repeat 3 years    COLONOSCOPY N/A 2022    Procedure: COLONOSCOPY WITH ANESTHESIA;  Surgeon: Bri Alexis MD;  Location: [facility] ENDOSCOPY;  Service: Gastroenterology;  Laterality: N/A;  pre: anemia. hx polyps.  post: polyps. diverticulosis.  no PCP    ENDOSCOPY N/A 2022    Procedure: ESOPHAGOGASTRODUODENOSCOPY WITH ANESTHESIA;  Surgeon: Bri Alexis MD;  Location: [facility] ENDOSCOPY;  Service: Gastroenterology;  Laterality: N/A;  pre: anemia  post: hiatal hernia. esophagitis.gastric erosions.  no PCP    FOOT SURGERY Right     KYPHOPLASTY Bilateral 2018    Procedure: L3 KYPHOPLASTY 1-2 LEVELS;  Surgeon: Vega Pandey MD;  Location: [facility] OR;  Service: Neurosurgery    KYPHOPLASTY Bilateral 2018    Procedure: L4 KYPHOPLASTY WITH BIOPSY;  Surgeon: Vega Pandey MD;  Location:  [facility] OR;  Service: Neurosurgery    KYPHOPLASTY WITH BIOPSY N/A 2022    Procedure: KYPHOPLASTY WITH BIOPSY, THORACIC 6 and LUMBAR 5;  Surgeon: Nick Martínez MD;  Location:  [facility] OR;  Service: Neurosurgery;  Laterality: N/A;    KYPHOPLASTY WITH BIOPSY N/A 2023    Procedure: Thoracic 12, KYPHOPLASTY WITH BIOPSY;  Surgeon: Nick Martínez MD;  Location: [facility] OR;  Service: Neurosurgery;  Laterality: N/A;    PROSTATECTOMY      TONSILLECTOMY      TOTAL SHOULDER REPLACEMENT Bilateral        Family History   Problem Relation Age of Onset    No Known Problems Mother     Prostate cancer Father        Social History     Socioeconomic History    Marital status:    Tobacco Use    Smoking status: Former     Current packs/day: 0.00     Average packs/day: 1 pack/day for 30.0 years (30.0 ttl pk-yrs)     Types: Cigarettes     Start date: 9/10/1961     Quit date: 9/10/1991     Years since quittin.2    Smokeless tobacco: Never    Tobacco comments:     2quit    Vaping  Have Your Bumps Been Treated?: been treated Is This A New Presentation, Or A Follow-Up?: Bump Use    Vaping status: Never Used   Substance and Sexual Activity    Alcohol use: Not Currently    Drug use: No    Sexual activity: Not Currently     Partners: Female     Birth control/protection: Post-menopausal           Objective   Physical Exam  Vitals and nursing note reviewed.   Constitutional:       General: He is not in acute distress.     Appearance: He is well-developed. He is not diaphoretic.   HENT:      Head: Atraumatic.      Right Ear: External ear normal.      Left Ear: External ear normal.      Nose: Nose normal.      Mouth/Throat:      Pharynx: Oropharynx is clear.   Eyes:      General: No scleral icterus.     Extraocular Movements: Extraocular movements intact.      Conjunctiva/sclera: Conjunctivae normal.   Neck:      Thyroid: No thyromegaly.      Vascular: No JVD.   Cardiovascular:      Rate and Rhythm: Normal rate and regular rhythm.      Heart sounds: Normal heart sounds. No murmur heard.  Pulmonary:      Effort: Pulmonary effort is normal. No respiratory distress.      Breath sounds: Normal breath sounds. No wheezing or rales.   Chest:      Chest wall: Tenderness present.   Abdominal:      General: Bowel sounds are normal. There is no distension.      Palpations: Abdomen is soft. There is no mass.      Tenderness: There is no abdominal tenderness. There is no guarding or rebound.   Musculoskeletal:         General: Tenderness and signs of injury present.      Cervical back: Normal range of motion and neck supple.   Lymphadenopathy:      Cervical: No cervical adenopathy.   Skin:     General: Skin is warm and dry.      Coloration: Skin is not pale.      Findings: No erythema or rash.   Neurological:      Mental Status: He is alert and oriented to person, place, and time.      Cranial Nerves: No cranial nerve deficit.      Coordination: Coordination normal.      Deep Tendon Reflexes: Reflexes are normal and symmetric.   Psychiatric:         Mood and Affect: Mood normal.         Behavior: Behavior  Additional History: Patient states the bump has been growing in size since May of 2017, in February of 2018 was told it was a clogged pore. She saw an  they drained it the summer of 2019, within a month the bump was back to its original size. normal.         Thought Content: Thought content normal.         Judgment: Judgment normal.         Procedures           ED Course                                                       Medical Decision Making  Patient is an 84-year-old male who presents to the ER with complaints of chest pain.  Patient states he fell approximately 2 and half weeks ago.  He states he sustained 5 rib fractures in the fall.  He states on Thanksgiving he fell again.  He had stood up and was getting ready to go into his house when he began getting weak.  His wife was nearby and they report he fell a second time with the wife bracing his fall.  He essentially fell on top of his wife.  He reports hitting his head without loss of consciousness.  He has chronic back pain with history of previous spinal surgery.  He denies any loss of bowel or bladder control or saddle anesthesia.  Since this fall he has had worsening chest pain in particular with taking a deep breath.  He has had no known fevers.  Due to symptoms described he came to the ER for evaluation and treatment.  Past medical history significant for arthritis, back pain, skin cancer, GERD, colon polyps, prostate cancer, hypertension, macular degeneration, osteopenia    Differential diagnosis includes but not limited to rib fracture, pneumothorax, pneumonia, ACS, spinous fracture, and other etiologies    Labs Reviewed  COMPREHENSIVE METABOLIC PANEL - Abnormal; Notable for the following components:     Creatinine                    0.60 (*)               Chloride                      97 (*)                 CO2                           32.0 (*)               Alkaline Phosphatase          124 (*)             All other components within normal limits         Narrative: GFR Normal >60                  Chronic Kidney Disease <60                  Kidney Failure <15                                    The GFR formula is only valid for adults with stable renal function between ages 18 and  70.  CBC WITH AUTO DIFFERENTIAL - Abnormal; Notable for the following components:     Hemoglobin                    12.3 (*)               Lymphocyte %                  12.5 (*)               Immature Grans %              1.2 (*)                Lymphocytes, Absolute         0.63 (*)               Immature Grans, Absolute      0.06 (*)            All other components within normal limits  PROTIME-INR - Normal  TROPONIN - Normal         Narrative: High Sensitive Troponin T Reference Range:                  <14.0 ng/L- Negative Female for AMI                  <22.0 ng/L- Negative Male for AMI                  >=14 - Abnormal Female indicating possible myocardial injury.                  >=22 - Abnormal Male indicating possible myocardial injury.                   Clinicians would have to utilize clinical acumen, EKG, Troponin, and serial changes to determine if it is an Acute Myocardial Infarction or myocardial injury due to an underlying chronic condition.                                       HIGH SENSITIVITIY TROPONIN T 2HR - Normal         Narrative: High Sensitive Troponin T Reference Range:                  <14.0 ng/L- Negative Female for AMI                  <22.0 ng/L- Negative Male for AMI                  >=14 - Abnormal Female indicating possible myocardial injury.                  >=22 - Abnormal Male indicating possible myocardial injury.                   Clinicians would have to utilize clinical acumen, EKG, Troponin, and serial changes to determine if it is an Acute Myocardial Infarction or myocardial injury due to an underlying chronic condition.                                       LACTIC ACID, PLASMA - Normal  BLOOD CULTURE  BLOOD CULTURE  URINALYSIS W/ CULTURE IF INDICATED  CBC AND DIFFERENTIAL     CT Head Without Contrast   Final Result    1. Chronic changes and no acute intracranial findings.     2. Small right superior frontal scalp hematoma.         This report was signed and finalized on  12/4/2024 4:57 PM by Crow Alvarez.          CT Thoracic Spine Without Contrast   Final Result         Osteopenia and levoscoliosis and chronic compression fractures as above.    No acute fracture or spondylolisthesis identified.         This report was signed and finalized on 12/4/2024 5:17 PM by Crow Alvarez.          CT Lumbar Spine Without Contrast   Final Result    1. Advanced osteopenia. Old compression deformities involving T12 L2,    L3, L4, and L5 with kyphoplasty changes at each level except for L2. No    convincing acute lumbar vertebral fracture or traumatic malalignment    2. Degenerative changes resulting in severe right L5-S1 foraminal    narrowing. Moderate right L4/5 and left L5-S1 foraminal stenosis also    present..         This report was signed and finalized on 12/4/2024 4:36 PM by Dr. Pascale Cade MD.          CT Chest Without Contrast Diagnostic   Final Result    1. Opacity and volume loss at the left lung base medially may be    atelectasis or pneumonia.    2. Multi right rib fractures without significant displacement. The right    anterior sixth rib fracture may be acute.         This report was signed and finalized on 12/4/2024 5:09 PM by Crow Alvarez.          XR Chest 1 View   Final Result    1. New opacities in the left lung base likely due to pneumonia or    atelectasis. Follow-up recommended to document resolution.    2. Stable bronchial wall thickening.    3. Mild cardiomegaly.                   This report was signed and finalized on 12/4/2024 3:32 PM by Dr. Randy Chatterjee MD.       Patient has 5 rib fractures that were noted 2 and half weeks ago.  He has what appears to be a new acute rib fracture making it a total of 6 rib fractures.  He also has findings concerning for atelectasis versus pneumonia.  We gave patient pain medicine in the emergency department, steroids, and Rocephin as well as Zithromax.  Discussed with hospitalist who is agreeable for  admission.  Please see their note for details.    Problems Addressed:  Chest pain, unspecified type: acute illness or injury  Closed fracture of multiple ribs, unspecified laterality, initial encounter: acute illness or injury  Pneumonia of left lower lobe due to infectious organism: acute illness or injury    Amount and/or Complexity of Data Reviewed  Labs: ordered. Decision-making details documented in ED Course.  Radiology: ordered. Decision-making details documented in ED Course.  ECG/medicine tests: ordered. Decision-making details documented in ED Course.  Discussion of management or test interpretation with external provider(s): Discussed with hospitalist    Risk  Prescription drug management.  Decision regarding hospitalization.        Final diagnoses:   Pneumonia of left lower lobe due to infectious organism   Closed fracture of multiple ribs, unspecified laterality, initial encounter   Chest pain, unspecified type       ED Disposition  ED Disposition       ED Disposition   Decision to Admit    Condition   --    Comment   Level of Care: Telemetry [5]   Diagnosis: Pneumonia [131992]   Admitting Physician: SABINO NICKERSON [[id number]]   Attending Physician: SABINO NICKERSON [[id number]]   Certification: I Certify That Inpatient Hospital Services Are Medically Necessary For Greater Than 2 Midnights                 No follow-up provider specified.       Medication List      No changes were made to your prescriptions during this visit.            Brenda Valle, APRN  12/04/24 1955

## 2024-12-05 NOTE — PAYOR COMM NOTE
"REF:    [id number]    [facility]  JESSE,   [phone]   OR   FAX    [phone]    Rowan Stratton \"ADAN\" (84 y.o. Male)       Date of Birth   1940    Social Security Number       Address   [address] [place] [address]    Home Phone   [phone]    MRN   [id number]       Tenriism   Restorationism    Marital Status                               Admission Date   12/4/24    Admission Type   Emergency    Admitting Provider   Asaf Washburn MD    Attending Provider   Asaf Washburn MD    Department, Room/Bed   [facility] 3A, 328/1       Discharge Date       Discharge Disposition       Discharge Destination                                 Attending Provider: Asaf Washburn MD    Allergies: No Known Allergies    Isolation: None   Infection: None   Code Status: CPR    Ht: 170.2 cm (67\")   Wt: 72.7 kg (160 lb 4.4 oz)    Admission Cmt: None   Principal Problem: Pneumonia [J18.9]                   Active Insurance as of 12/4/2024       Primary Coverage       Payor Plan Insurance Group Employer/Plan Group    AETNA MEDICARE REPLACEMENT AETNA MEDICARE REPLACEMENT [id number]       Payor Plan Address Payor Plan Phone Number Payor Plan Fax Number Effective Dates    [address] [phone]  1/1/2023 - None Entered    [facility] [id number]         Subscriber Name Subscriber Birth Date Member ID       ROWAN STRATTON 1940 [id number]                     Emergency Contacts        (Rel.) Home Phone Work Phone Mobile Phone    Jyoti Stratton (Spouse) [phone] -- [phone]        Patient Care Timeline (12/4/2024 14:30 to 12/4/2024 20:57)    12/4/2024 12/4/2024 Event Details User   14:30 Patient arrival  Carmen Schmitt RN   14:30 Patient roomed in ED To room 02 Carmen Schmitt RN   14:30 HPI HPI  Stated Reason for Visit: pt co muskuloskeletal pain in central chest. pt pain comes and goes when he moves aorund. heat relieves the pain. pt " "fell on  night.  History Obtained From: patient; EMS Carmen Schmitt RN   14:31 Sepsis Predictive Model Early Detection of Sepsis PA score  Early Detection of Sepsis PA score: 1.29 Inpatient, Batch Job   14:32 Pain Pain  (0-10) Pain Rating: Rest: 10  Pain Location: chest Carmen Schmitt RN   14:32 Acuity/Destination Acuity/Destination  Patient Acuity: 3  ED Destination: ED Beds Carmen Schmitt RN   14:32:01 Allergies Reviewed  Carmen Schmitt RN   14:32:26 Trigger for Triage Start  Carmen Schmitt RN   14:32:26 Triage Started  Carmen Schmitt RN   14:32:26 Chief Complaints Updated Fall    Muscle Pain Carmen Schmitt RN   14:32:32 Acuity 3 Selected  Carmen Schmitt RN   14:32:32 Triage Completed AUTOMATIC Carmen Schmitt RN     14:37 Vital Signs  Vital Signs  Temp: 97.6 °F (36.4 °C)  Temp src: Oral  BP: 185/79 Abnormal   BP Location: Right arm  BP Method: Automatic  Patient Position: Lying Carmen Schmitt RN     14:43 Vital Signs Vital Signs  Heart Rate: 73  Heart Rate Source: Monitor  Resp: 16  BMI (Calculated): 29.1  Oxygen Therapy  SpO2: 100 %  Device (Oxygen Therapy): room air  Height and Weight  Height: 165.1 cm (65\")  Weight: 79.4 kg (175 lb)  Other flowsheet entries  Ideal Body Weight k.5 Carmen Schmitt RN     15:11 Pain Medication Administered - Adult Given - Morphine sulfate (PF) injection 2 mg Carmen Schmitt RN   15:11 Medication Given Morphine sulfate (PF) injection 2 mg - Dose: 2 mg ; Route: Intravenous ; Line: Peripheral IV 24 1508 Anterior;Left Forearm ; Scheduled Time:  Carmen Schmitt RN   15:11 Medication Given ondansetron (ZOFRAN) injection 4 mg - Dose: 4 mg ; Route: Intravenous ; Line: Peripheral IV 24 1508 Anterior;Left Forearm ; Scheduled Time:  Carmen Schmitt RN   15:11 Data Pain Assessment  (0-10) Pain Rating: Rest: 10 Carmen Schmitt RN     15:16 Vital Signs  Vital Signs  Heart Rate: 75 (Device Time: 15:16:00)  BP: 161/81 (Device Time: " 15:16:00)  Noninvasive MAP (mmHg): 103 (Device Time: 15:16:00)  Oxygen Therapy  SpO2: 87 % Abnormal  (Device Time: 15:16:00) Carmen Schmitt RN     15:31 Vital Signs  Vital Signs  Heart Rate: 73 (Device Time: 15:31:00)  BP: 145/80 (Device Time: 15:31:00)  Noninvasive MAP (mmHg): 100 (Device Time: 15:31:00)  Oxygen Therapy  SpO2: 87 % Abnormal  (Device Time: 15:31:00) Carmen Schmitt R     15:38 Vital Signs Vital Signs  Heart Rate: 72 (Device Time: 15:38:00)  Oxygen Therapy  SpO2: 86 % Abnormal  (Device Time: 15:38:00) Carmen Schmitt RN     15:42:30 Respiratory Respiratory  Airway WDL: WDL  Respiratory WDL  Respiratory WDL: WDL; rhythm/pattern  Rhythm/Pattern, Respiratory: unlabored; pattern regular; no shortness of breath reported; shallow Carmen Schmitt RN     17:25 Medication Given Morphine sulfate (PF) injection 2 mg - Dose: 2 mg ; Route: Intravenous ; Line: Peripheral IV 12/04/24 1508 Anterior;Left Forearm ; Scheduled Time: 1725 Carmen Schmitt RN   17:25 Data Pain Assessment  (0-10) Pain Rating: Rest: 5 Carmen Schmitt RN     8:47 Medication Given methylPREDNISolone sodium succinate (SOLU-Medrol) injection 125 mg - Dose: 125 mg ; Route: Intravenous ; Line: Peripheral IV 12/04/24 1508 Anterior;Left Forearm ; Scheduled Time: 1842 Carmen Schmitt RN   18:49 Medication New Bag cefTRIAXone (ROCEPHIN) 1,000 mg in sodium chloride 0.9 % 100 mL MBP - Dose: 1,000 mg ; Rate: 200 mL/hr ; Route: Intravenous ; Line: Peripheral IV 12/04/24 1508 Anterior;Left Forearm ; Scheduled Time: 1842 Carmen Schmitt RN     19:54 Medication Given azithromycin (ZITHROMAX) tablet 500 mg - Dose: 500 mg ; Route: Oral ; Scheduled Time: 1959 Ryanne Puckett RN Fleming, Lori, RN   Registered Nurse  Nursing     Plan of Care     Signed     Date of Service: 12/05/24 0323  Creation Time: 12/05/24 0323     Signed         Goal Outcome Evaluation:  Plan of Care Reviewed With: patient  Progress: no change  Outcome Evaluation: Received pt from Ed  to room 328 for pneumonia. A&Ox4. Pt has hisotry of frequent falls with broken ribs on the rt. 2 L NC  Awaiting UA sample. Appears to be resting between care. Tele placed. Orthos daily.                       History & Physical        Art Lizarraga MD at 12/04/24 2008              [facility] Medicine Services  HISTORY AND PHYSICAL    Date of Admission: 12/4/2024  Primary Care Physician: Santiago Aaron MD    Subjective   Primary Historian: Self    Chief Complaint: Chest pain    History of Present Illness  Patient is an 84-year-old male that presents to the emergency room with chest pain following a fall that occurred this past Thursday.  Patient has a history significant for broken ribs that occurred 3 weeks ago.  Patient states that his wife was turning on the lights and he just fell backwards.  He denies hitting his hips, possible head trauma.  Past medical history is significant for arthritis, back pain, skin cancer, GERD, hypertension, and osteopenia.  Patient denies fever or nasal congestion.  Patient states that his primary care provider discontinued his antihypertensive medications weeks ago.  Unclear reason.        Review of Systems   Constitutional:  Negative for activity change, chills and fever.   HENT:  Negative for congestion.    Respiratory:  Positive for shortness of breath.    Cardiovascular:  Positive for chest pain.   Gastrointestinal:  Negative for nausea and vomiting.      Otherwise complete ROS reviewed and negative except as mentioned in the HPI.    Past Medical History:   Past Medical History:   Diagnosis Date    Arthritis     Back pain     Cancer     CHEST, SKIN CANCER    GERD (gastroesophageal reflux disease)     History of colon polyps     History of prostate cancer     Hypertension     Low back pain     Macular degeneration     Osteopenia      Past Surgical History:  Past Surgical History:   Procedure Laterality Date    APPENDECTOMY      CATARACT  EXTRACTION, BILATERAL      COLONOSCOPY  09/17/2013    Dr. José-One 2-3mm polyp at 20cm; Otherwise normal; Repeat 3 years    COLONOSCOPY N/A 12/07/2022    Procedure: COLONOSCOPY WITH ANESTHESIA;  Surgeon: Bri Alexis MD;  Location: [facility] ENDOSCOPY;  Service: Gastroenterology;  Laterality: N/A;  pre: anemia. hx polyps.  post: polyps. diverticulosis.  no PCP    ENDOSCOPY N/A 12/07/2022    Procedure: ESOPHAGOGASTRODUODENOSCOPY WITH ANESTHESIA;  Surgeon: Bri Alexis MD;  Location: [facility] ENDOSCOPY;  Service: Gastroenterology;  Laterality: N/A;  pre: anemia  post: hiatal hernia. esophagitis.gastric erosions.  no PCP    FOOT SURGERY Right     KYPHOPLASTY Bilateral 07/17/2018    Procedure: L3 KYPHOPLASTY 1-2 LEVELS;  Surgeon: Vega Pandey MD;  Location: [facility] OR;  Service: Neurosurgery    KYPHOPLASTY Bilateral 09/11/2018    Procedure: L4 KYPHOPLASTY WITH BIOPSY;  Surgeon: Vega Pandey MD;  Location: [facility] OR;  Service: Neurosurgery    KYPHOPLASTY WITH BIOPSY N/A 01/25/2022    Procedure: KYPHOPLASTY WITH BIOPSY, THORACIC 6 and LUMBAR 5;  Surgeon: Nick Martínez MD;  Location:  [facility] OR;  Service: Neurosurgery;  Laterality: N/A;    KYPHOPLASTY WITH BIOPSY N/A 8/18/2023    Procedure: Thoracic 12, KYPHOPLASTY WITH BIOPSY;  Surgeon: Nick Martínez MD;  Location: [facility] OR;  Service: Neurosurgery;  Laterality: N/A;    PROSTATECTOMY      TONSILLECTOMY      TOTAL SHOULDER REPLACEMENT Bilateral      Social History:  reports that he quit smoking about 33 years ago. His smoking use included cigarettes. He started smoking about 63 years ago. He has a 30 pack-year smoking history. He has never used smokeless tobacco. He reports that he does not currently use alcohol. He reports that he does not use drugs.    Family History: family history includes No Known Problems in his mother; Prostate cancer in his father.   Reviewed    Allergies:  No Known Allergies    Medications:  Prior to  "Admission medications    Medication Sig Start Date End Date Taking? Authorizing Provider   albuterol sulfate  (90 Base) MCG/ACT inhaler Inhale 2 puffs Every 6 (Six) Hours As Needed for Wheezing or Shortness of Air. 5/15/24   Aleida Valle APRN   denosumab (PROLIA) 60 MG/ML solution prefilled syringe syringe Inject 1 mL under the skin into the appropriate area as directed Every 6 (Six) Months. 5/3/23   Christie Oconnell APRN   DULoxetine (Cymbalta) 20 MG capsule Take 1 capsule by mouth Daily. 11/12/24   Santiago Aaron MD   multivitamins-minerals (PRESERVISION AREDS 2) capsule capsule Take 1 capsule by mouth 2 (Two) Times a Day.    Provider, MD Rosa Elena   traMADol-acetaminophen (ULTRACET) 37.5-325 MG per tablet Take 1 tablet by mouth Every 8 (Eight) Hours As Needed for Moderate Pain. 11/12/24   Santiago Aaron MD     I have utilized all available immediate resources to obtain, update, or review the patient's current medications (including all prescriptions, over-the-counter products, herbals, cannabis/cannabidiol products, and vitamin/mineral/dietary (nutritional) supplements).    Objective     Vital Signs: /89   Pulse 67   Temp 97.6 °F (36.4 °C) (Oral)   Resp 16   Ht 165.1 cm (65\")   Wt 79.4 kg (175 lb)   SpO2 96%   BMI 29.12 kg/m²   Physical Exam  Cardiovascular:      Rate and Rhythm: Normal rate and regular rhythm.      Comments: Midsternal chest pain on palpation  Pulmonary:      Effort: No respiratory distress.   Abdominal:      General: There is no distension.      Tenderness: There is no abdominal tenderness.   Musculoskeletal:         General: Tenderness present.   Skin:     General: Skin is cool and dry.        Constitutional:       Appearance: Normal appearance.  Oriented x 3  HENT:      Head: Normocephalic and atraumatic.      Nose: Nose normal.      Mouth/Throat:      Mouth: Mucous membranes are moist.   Eyes:      Extraocular Movements: Extraocular movements " intact.      Conjunctiva/sclera: Conjunctivae normal.   Cardiovascular:      Rate and Rhythm: Normal rate and regular rhythm.      Pulses: Normal pulses.   Pulmonary:      Effort: No respiratory distress.      Breath sounds: Normal breath sounds. No wheezing, rhonchi or rales.   Abdominal:      General: Abdomen is flat. Bowel sounds are normal.      Palpations: Abdomen is soft.      Tenderness: There is no guarding or rebound.   Musculoskeletal:         General: Normal range of motion.      Cervical back: Normal range of motion and neck supple.   Extremities:  No lower extremity edema.  Skin:     Capillary Refill: Capillary refill takes less than 2 seconds.      Coloration: Skin is not jaundiced.      Findings: No rash.   Neurological:      General: No focal deficit present.      Mental Status: Patient is alert, oriented to place time and person..         Results Reviewed:  Lab Results (last 24 hours)       Procedure Component Value Units Date/Time    Lactic Acid, Plasma [[id number]]  (Normal) Collected: 12/04/24 1846    Specimen: Blood Updated: 12/04/24 1917     Lactate 1.0 mmol/L     Blood Culture - Blood, Arm, Left [[id number]] Collected: 12/04/24 1846    Specimen: Blood from Arm, Left Updated: 12/04/24 1905    Blood Culture - Blood, Arm, Right [[id number]] Collected: 12/04/24 1846    Specimen: Blood from Arm, Right Updated: 12/04/24 1904    High Sensitivity Troponin T 2Hr [[id number]]  (Normal) Collected: 12/04/24 1714    Specimen: Blood Updated: 12/04/24 1737     HS Troponin T 13 ng/L      Troponin T Delta 0 ng/L     Narrative:      High Sensitive Troponin T Reference Range:  <14.0 ng/L- Negative Female for AMI  <22.0 ng/L- Negative Male for AMI  >=14 - Abnormal Female indicating possible myocardial injury.  >=22 - Abnormal Male indicating possible myocardial injury.   Clinicians would have to utilize clinical acumen, EKG, Troponin, and serial changes to determine if it is an Acute Myocardial Infarction or  myocardial injury due to an underlying chronic condition.         Comprehensive Metabolic Panel [[id number]]  (Abnormal) Collected: 12/04/24 1509    Specimen: Blood Updated: 12/04/24 1538     Glucose 96 mg/dL      BUN 15 mg/dL      Creatinine 0.60 mg/dL      Sodium 137 mmol/L      Potassium 4.3 mmol/L      Chloride 97 mmol/L      CO2 32.0 mmol/L      Calcium 8.9 mg/dL      Total Protein 7.9 g/dL      Albumin 3.5 g/dL      ALT (SGPT) 12 U/L      AST (SGOT) 15 U/L      Alkaline Phosphatase 124 U/L      Total Bilirubin 0.3 mg/dL      Globulin 4.4 gm/dL      A/G Ratio 0.8 g/dL      BUN/Creatinine Ratio 25.0     Anion Gap 8.0 mmol/L      eGFR 94.0 mL/min/1.73     Narrative:      GFR Normal >60  Chronic Kidney Disease <60  Kidney Failure <15    The GFR formula is only valid for adults with stable renal function between ages 18 and 70.    High Sensitivity Troponin T [[id number]]  (Normal) Collected: 12/04/24 1509    Specimen: Blood Updated: 12/04/24 1535     HS Troponin T 13 ng/L     Narrative:      High Sensitive Troponin T Reference Range:  <14.0 ng/L- Negative Female for AMI  <22.0 ng/L- Negative Male for AMI  >=14 - Abnormal Female indicating possible myocardial injury.  >=22 - Abnormal Male indicating possible myocardial injury.   Clinicians would have to utilize clinical acumen, EKG, Troponin, and serial changes to determine if it is an Acute Myocardial Infarction or myocardial injury due to an underlying chronic condition.         Protime-INR [[id number]]  (Normal) Collected: 12/04/24 1509    Specimen: Blood Updated: 12/04/24 1527     Protime 13.7 Seconds      INR 1.01    CBC & Differential [[id number]]  (Abnormal) Collected: 12/04/24 1509    Specimen: Blood Updated: 12/04/24 1521    Narrative:      The following orders were created for panel order CBC & Differential.  Procedure                               Abnormality         Status                     ---------                               -----------         ------                      CBC Auto Differential[[id number]]        Abnormal            Final result                 Please view results for these tests on the individual orders.    CBC Auto Differential [[id number]]  (Abnormal) Collected: 12/04/24 1509    Specimen: Blood Updated: 12/04/24 1521     WBC 5.06 10*3/mm3      RBC 4.16 10*6/mm3      Hemoglobin 12.3 g/dL      Hematocrit 38.1 %      MCV 91.6 fL      MCH 29.6 pg      MCHC 32.3 g/dL      RDW 13.2 %      RDW-SD 44.0 fl      MPV 9.7 fL      Platelets 270 10*3/mm3      Neutrophil % 73.2 %      Lymphocyte % 12.5 %      Monocyte % 11.5 %      Eosinophil % 1.2 %      Basophil % 0.4 %      Immature Grans % 1.2 %      Neutrophils, Absolute 3.71 10*3/mm3      Lymphocytes, Absolute 0.63 10*3/mm3      Monocytes, Absolute 0.58 10*3/mm3      Eosinophils, Absolute 0.06 10*3/mm3      Basophils, Absolute 0.02 10*3/mm3      Immature Grans, Absolute 0.06 10*3/mm3      nRBC 0.0 /100 WBC           Imaging Results (Last 24 Hours)       Procedure Component Value Units Date/Time    CT Thoracic Spine Without Contrast [[id number]] Collected: 12/04/24 1709     Updated: 12/04/24 1720    Narrative:      EXAM: CT THORACIC SPINE WO CONTRAST-      DATE: 12/4/2024 3:03 PM     HISTORY: fall; trauma       COMPARISON: None available.     DOSE LENGTH PRODUCT: 2028.77 mGy.cm  Automated exposure control was also  utilized to decrease patient radiation dose.     TECHNIQUE: Unenhanced CT images of the thoracic spine were obtained with  multiplanar reformats.     FINDINGS:     There is osteopenia. Patient is status post vertebroplasty at T12 and  T7. There is loss of height at the T1 and T3 vertebral bodies and  represent chronic compression fractures. These are chronic fractures and  unchanged from a prior CT of the chest from 1/28/2022. Facet joints are  aligned bilaterally. There is multilevel facet arthropathy. There is  chronic loss of height anteriorly at T9 and T8. No acute fracture is  identified.  There is levoscoliosis.     The visualized paravertebral soft tissues are unremarkable.     Visualized lungs are clear.       Impression:         Osteopenia and levoscoliosis and chronic compression fractures as above.  No acute fracture or spondylolisthesis identified.     This report was signed and finalized on 12/4/2024 5:17 PM by Crow Alvarez.       CT Chest Without Contrast Diagnostic [[id number]] Collected: 12/04/24 1704     Updated: 12/04/24 1712    Narrative:      EXAM: CT CHEST WO CONTRAST DIAGNOSTIC-      DATE: 12/4/2024 3:03 PM     HISTORY: fall; trauma       COMPARISON: 1/28/2022.     DOSE LENGTH PRODUCT: 2028.77 mGy.cm mGy cm. Automatic exposure control  was utilized to make radiation dose as low as reasonably achievable.     TECHNIQUE: Unenhanced CT images of the chest obtained with multiplanar  reformats.     FINDINGS:     MEDIASTINUM/EXTRATHORACIC: There is calcification of the thoracic aorta  which is ectatic and torturous and coronary artery calcification. There  is calcification at the aortic valve. There is cardiomegaly without  pericardial effusion. No significant pleural effusion is identified. No  thoracic lymphadenopathy is seen.     LUNGS/AIRWAYS: There is scarring at the lung apices. An area of opacity  and volume loss in the left lower lobe may be due to atelectasis or  pneumonia. There is mild atelectasis at the right lung base. There is no  pneumothorax.     INCLUDED UPPER ABDOMEN: The visualized portions of the upper abdomen are  within normal limits.     SOFT TISSUES: There is bilateral gynecomastia left greater than right  which is unchanged.     BONES: No suspicious osseous lesion identified.The there are fractures  of the right anterior 4, 5, 6, 7, and 8 ribs. Most of these fractures  appear subacute. The right sixth rib fracture may be acute.       Impression:      1. Opacity and volume loss at the left lung base medially may be  atelectasis or pneumonia.  2. Multi right rib  fractures without significant displacement. The right  anterior sixth rib fracture may be acute.     This report was signed and finalized on 12/4/2024 5:09 PM by Crow Alvarez.       CT Head Without Contrast [[id number]] Collected: 12/04/24 1652     Updated: 12/04/24 1700    Narrative:      EXAM: CT HEAD WO CONTRAST-      DATE: 12/4/2024 3:03 PM     HISTORY: fall; trauma       COMPARISON: 12/14/2022.     DOSE LENGTH PRODUCT: 2029 mGy centimeters automated exposure control was  also utilized to decrease patient radiation dose.     TECHNIQUE: Unenhanced CT images obtained from vertex to skull base with  multiplanar reformats.     FINDINGS:  There is no acute intracranial hemorrhage, midline shift, mass effect,  or hydrocephalus. There is no CT evidence for acute infarct.     There are chronic changes with volume loss and chronic small vessel  ischemic change of the periventricular white matter.      SOFT TISSUES: There is a small right superior frontal scalp hematoma.        SINUS:The visualized paranasal sinuses and mastoid air cells are clear.      ORBITS: The visualized orbits and globes are unremarkable. There is  bilateral lens extraction.          Impression:      1. Chronic changes and no acute intracranial findings.   2. Small right superior frontal scalp hematoma.     This report was signed and finalized on 12/4/2024 4:57 PM by Crow Alvarez.       CT Lumbar Spine Without Contrast [[id number]] Collected: 12/04/24 1628     Updated: 12/04/24 1639    Narrative:      CT LUMBAR SPINE WO CONTRAST- 12/4/2024 3:03 PM     HISTORY: fall; trauma      COMPARISON: 6/14/2018     TOTAL DOSE LENGTH PRODUCT: 2028.77 mGy.cm. Automated exposure control  was also utilized to decrease patient radiation dose.     TECHNIQUE: Axial images of the lumbar spine are obtained with sagittal  and coronal reconstructions     FINDINGS: A rudimentary disc at the S1/2 level is assumed when counting  vertebral bodies. There are  kyphoplasty changes of the T12, L3, L4, and  L5 vertebra. Loss of height is greatest at L3 and L5 measuring  approximately 35%. Mild chronic compression of the superior plate of L2.  No L1 compression deformity. Old healed right sacral ala fracture. No  acute lumbar vertebral fracture identified. Moderate to advanced lower  lumbar facet osteoarthropathy.     Mild to moderate central lumbar canal stenosis with severe right and  moderate L5-S1 foraminal stenosis. Moderate right L4/5 foraminal  narrowing. Advanced degenerative disc changes at L5-S1.     Diffuse vascular calcification.          Impression:      1. Advanced osteopenia. Old compression deformities involving T12 L2,  L3, L4, and L5 with kyphoplasty changes at each level except for L2. No  convincing acute lumbar vertebral fracture or traumatic malalignment  2. Degenerative changes resulting in severe right L5-S1 foraminal  narrowing. Moderate right L4/5 and left L5-S1 foraminal stenosis also  present..     This report was signed and finalized on 12/4/2024 4:36 PM by Dr. Pasclae Cade MD.       XR Chest 1 View [[id number]] Collected: 12/04/24 1530     Updated: 12/04/24 1535    Narrative:      EXAMINATION:  XR CHEST 1 VW-  12/4/2024 2:05 PM     HISTORY: Chest pain.     COMPARISON: 10/24/2023.     TECHNIQUE: Single view AP image.     FINDINGS: There is patchy opacity within the left lung base. No other  focal infiltrate is seen. There is stable bronchial wall thickening.  There is mild cardiomegaly. There is a probable coronary artery stent.  There is atheromatous disease of the thoracic aorta. There are  degenerative changes of the spine with scoliosis. There are compression  deformities with prior kyphoplasty. Prior bilateral shoulder  arthroplasty.          Impression:      1. New opacities in the left lung base likely due to pneumonia or  atelectasis. Follow-up recommended to document resolution.  2. Stable bronchial wall thickening.  3. Mild  cardiomegaly.           This report was signed and finalized on 12/4/2024 3:32 PM by Dr. Randy Chatterjee MD.             I have personally reviewed and interpreted the radiology studies and ECG obtained at time of admission.     Assessment / Plan   Assessment:   Active Hospital Problems    Diagnosis     **Pneumonia        Recurrent falls  Subacute right anterior fourth fifth sixth seventh and eighth rib fractures  Small right frontal scalp hematoma  Questionable atelectasis versus left lower lung pneumonia  Hypertension  Chronic thoracic and lumbar compression fractures  History of kyphoplasty          CT chest  The there are fractures  of the right anterior 4, 5, 6, 7, and 8 ribs. Most of these fractures  appear subacute. The right sixth rib fracture may be acute.    1. Opacity and volume loss at the left lung base medially may be  atelectasis or pneumonia.  2. Multi right rib fractures without significant displacement. The right  anterior sixth rib fracture may be acute.    Troponins negative.  Sodium 137.  Potassium 4.3 creatinine 0.6  Hemoglobin 12.3      Treatment Plan  The patient will be admitted to my service here at [facility].      Pain control as needed  Incentive spirometer  Patient received ceftriaxone and azithromycin in the emergency department, as well as a dose of Solu-Medrol 125 mg IV  And questionable pneumonia on CT scan, and reports of cough, I will treat with Augmentin for 5 days  Nurse to perform orthostatic vital signs  Given elevated blood pressure, states that he he had his antihypertensives discontinued some weeks ago, was taking olmesartan 40 p.o. daily.  Will restart at a lower dose losartan 25 p.o. daily and monitor blood pressure  PT OT evaluation    Lovenox DVT prophylaxis        Medical Decision Making  Number and Complexity of problems: 5, moderate complexity  Differential Diagnosis: See above    Conditions and Status        Condition is unchanged.     [facility] Data  External  documents reviewed: None  Cardiac tracing (EKG, telemetry) interpretation: Sinus rhythm  Radiology interpretation: Radiology reports reviewed  Labs reviewed: Yes  Any tests that were considered but not ordered: No     Decision rules/scores evaluated (example [id number], Wells, etc): See chart     Discussed with: Patient     Care Planning  Shared decision making: With patient  Code status and discussions: Full code    Disposition  Social Determinants of Health that impact treatment or disposition: None  Estimated length of stay is 1 to 2 days.     I confirmed that the patient's advanced care plan is present, code status is documented, and a surrogate decision maker is listed in the patient's medical record.     The patient's surrogate decision maker is family member.     Electronically signed by Art Lizarraga MD, 12/04/24, 20:09 CST.               Electronically signed by Art Lizarraga MD at 12/04/24 2042          Emergency Department Notes        Brenda Valle APRN at 12/04/24 1810          Subjective   History of Present Illness  Patient is an 84-year-old male who presents to the ER with complaints of chest pain.  Patient states he fell approximately 2 and half weeks ago.  He states he sustained 5 rib fractures in the fall.  He states on Thanksgiving he fell again.  He had stood up and was getting ready to go into his house when he began getting weak.  His wife was nearby and they report he fell a second time with the wife bracing his fall.  He essentially fell on top of his wife.  He reports hitting his head without loss of consciousness.  He has chronic back pain with history of previous spinal surgery.  He denies any loss of bowel or bladder control or saddle anesthesia.  Since this fall he has had worsening chest pain in particular with taking a deep breath.  He has had no known fevers.  Due to symptoms described he came to the ER for evaluation and treatment.  Past medical history significant  for arthritis, back pain, skin cancer, GERD, colon polyps, prostate cancer, hypertension, macular degeneration, osteopenia        Review of Systems   Constitutional:  Negative for fever.   HENT: Negative.  Negative for congestion.    Respiratory:  Positive for shortness of breath. Negative for cough.    Cardiovascular:  Positive for chest pain.   Gastrointestinal: Negative.  Negative for abdominal pain, diarrhea, nausea and vomiting.   Genitourinary: Negative.  Negative for dysuria.   Musculoskeletal:  Positive for back pain. Negative for neck pain.   Skin: Negative.    All other systems reviewed and are negative.      Past Medical History:   Diagnosis Date    Arthritis     Back pain     Cancer     CHEST, SKIN CANCER    GERD (gastroesophageal reflux disease)     History of colon polyps     History of prostate cancer     Hypertension     Low back pain     Macular degeneration     Osteopenia        No Known Allergies    Past Surgical History:   Procedure Laterality Date    APPENDECTOMY      CATARACT EXTRACTION, BILATERAL      COLONOSCOPY  09/17/2013    Dr. José-One 2-3mm polyp at 20cm; Otherwise normal; Repeat 3 years    COLONOSCOPY N/A 12/07/2022    Procedure: COLONOSCOPY WITH ANESTHESIA;  Surgeon: Bri Alexis MD;  Location: [facility] ENDOSCOPY;  Service: Gastroenterology;  Laterality: N/A;  pre: anemia. hx polyps.  post: polyps. diverticulosis.  no PCP    ENDOSCOPY N/A 12/07/2022    Procedure: ESOPHAGOGASTRODUODENOSCOPY WITH ANESTHESIA;  Surgeon: Bri Alexis MD;  Location: [facility] ENDOSCOPY;  Service: Gastroenterology;  Laterality: N/A;  pre: anemia  post: hiatal hernia. esophagitis.gastric erosions.  no PCP    FOOT SURGERY Right     KYPHOPLASTY Bilateral 07/17/2018    Procedure: L3 KYPHOPLASTY 1-2 LEVELS;  Surgeon: Vega Pandey MD;  Location: [facility] OR;  Service: Neurosurgery    KYPHOPLASTY Bilateral 09/11/2018    Procedure: L4 KYPHOPLASTY WITH BIOPSY;  Surgeon: Vega Pandey MD;   Location:  [facility] OR;  Service: Neurosurgery    KYPHOPLASTY WITH BIOPSY N/A 2022    Procedure: KYPHOPLASTY WITH BIOPSY, THORACIC 6 and LUMBAR 5;  Surgeon: Nick Martínez MD;  Location:  [facility] OR;  Service: Neurosurgery;  Laterality: N/A;    KYPHOPLASTY WITH BIOPSY N/A 2023    Procedure: Thoracic 12, KYPHOPLASTY WITH BIOPSY;  Surgeon: Nick Martínez MD;  Location:  [facility] OR;  Service: Neurosurgery;  Laterality: N/A;    PROSTATECTOMY      TONSILLECTOMY      TOTAL SHOULDER REPLACEMENT Bilateral        Family History   Problem Relation Age of Onset    No Known Problems Mother     Prostate cancer Father        Social History     Socioeconomic History    Marital status:    Tobacco Use    Smoking status: Former     Current packs/day: 0.00     Average packs/day: 1 pack/day for 30.0 years (30.0 ttl pk-yrs)     Types: Cigarettes     Start date: 9/10/1961     Quit date: 9/10/1991     Years since quittin.2    Smokeless tobacco: Never    Tobacco comments:     2qu1991   Vaping Use    Vaping status: Never Used   Substance and Sexual Activity    Alcohol use: Not Currently    Drug use: No    Sexual activity: Not Currently     Partners: Female     Birth control/protection: Post-menopausal           Objective   Physical Exam  Vitals and nursing note reviewed.   Constitutional:       General: He is not in acute distress.     Appearance: He is well-developed. He is not diaphoretic.   HENT:      Head: Atraumatic.      Right Ear: External ear normal.      Left Ear: External ear normal.      Nose: Nose normal.      Mouth/Throat:      Pharynx: Oropharynx is clear.   Eyes:      General: No scleral icterus.     Extraocular Movements: Extraocular movements intact.      Conjunctiva/sclera: Conjunctivae normal.   Neck:      Thyroid: No thyromegaly.      Vascular: No JVD.   Cardiovascular:      Rate and Rhythm: Normal rate and regular rhythm.      Heart sounds: Normal heart sounds. No murmur  heard.  Pulmonary:      Effort: Pulmonary effort is normal. No respiratory distress.      Breath sounds: Normal breath sounds. No wheezing or rales.   Chest:      Chest wall: Tenderness present.   Abdominal:      General: Bowel sounds are normal. There is no distension.      Palpations: Abdomen is soft. There is no mass.      Tenderness: There is no abdominal tenderness. There is no guarding or rebound.   Musculoskeletal:         General: Tenderness and signs of injury present.      Cervical back: Normal range of motion and neck supple.   Lymphadenopathy:      Cervical: No cervical adenopathy.   Skin:     General: Skin is warm and dry.      Coloration: Skin is not pale.      Findings: No erythema or rash.   Neurological:      Mental Status: He is alert and oriented to person, place, and time.      Cranial Nerves: No cranial nerve deficit.      Coordination: Coordination normal.      Deep Tendon Reflexes: Reflexes are normal and symmetric.   Psychiatric:         Mood and Affect: Mood normal.         Behavior: Behavior normal.         Thought Content: Thought content normal.         Judgment: Judgment normal.         Procedures          ED Course                                                       Medical Decision Making  Patient is an 84-year-old male who presents to the ER with complaints of chest pain.  Patient states he fell approximately 2 and half weeks ago.  He states he sustained 5 rib fractures in the fall.  He states on Thanksgiving he fell again.  He had stood up and was getting ready to go into his house when he began getting weak.  His wife was nearby and they report he fell a second time with the wife bracing his fall.  He essentially fell on top of his wife.  He reports hitting his head without loss of consciousness.  He has chronic back pain with history of previous spinal surgery.  He denies any loss of bowel or bladder control or saddle anesthesia.  Since this fall he has had worsening chest pain  in particular with taking a deep breath.  He has had no known fevers.  Due to symptoms described he came to the ER for evaluation and treatment.  Past medical history significant for arthritis, back pain, skin cancer, GERD, colon polyps, prostate cancer, hypertension, macular degeneration, osteopenia    Differential diagnosis includes but not limited to rib fracture, pneumothorax, pneumonia, ACS, spinous fracture, and other etiologies    Labs Reviewed  COMPREHENSIVE METABOLIC PANEL - Abnormal; Notable for the following components:     Creatinine                    0.60 (*)               Chloride                      97 (*)                 CO2                           32.0 (*)               Alkaline Phosphatase          124 (*)             All other components within normal limits         Narrative: GFR Normal >60                  Chronic Kidney Disease <60                  Kidney Failure <15                                    The GFR formula is only valid for adults with stable renal function between ages 18 and 70.  CBC WITH AUTO DIFFERENTIAL - Abnormal; Notable for the following components:     Hemoglobin                    12.3 (*)               Lymphocyte %                  12.5 (*)               Immature Grans %              1.2 (*)                Lymphocytes, Absolute         0.63 (*)               Immature Grans, Absolute      0.06 (*)            All other components within normal limits  PROTIME-INR - Normal  TROPONIN - Normal         Narrative: High Sensitive Troponin T Reference Range:                  <14.0 ng/L- Negative Female for AMI                  <22.0 ng/L- Negative Male for AMI                  >=14 - Abnormal Female indicating possible myocardial injury.                  >=22 - Abnormal Male indicating possible myocardial injury.                   Clinicians would have to utilize clinical acumen, EKG, Troponin, and serial changes to determine if it is an Acute Myocardial Infarction or  myocardial injury due to an underlying chronic condition.                                       HIGH SENSITIVITIY TROPONIN T 2HR - Normal         Narrative: High Sensitive Troponin T Reference Range:                  <14.0 ng/L- Negative Female for AMI                  <22.0 ng/L- Negative Male for AMI                  >=14 - Abnormal Female indicating possible myocardial injury.                  >=22 - Abnormal Male indicating possible myocardial injury.                   Clinicians would have to utilize clinical acumen, EKG, Troponin, and serial changes to determine if it is an Acute Myocardial Infarction or myocardial injury due to an underlying chronic condition.                                       LACTIC ACID, PLASMA - Normal  BLOOD CULTURE  BLOOD CULTURE  URINALYSIS W/ CULTURE IF INDICATED  CBC AND DIFFERENTIAL     CT Head Without Contrast   Final Result    1. Chronic changes and no acute intracranial findings.     2. Small right superior frontal scalp hematoma.         This report was signed and finalized on 12/4/2024 4:57 PM by Crow Alvarez.          CT Thoracic Spine Without Contrast   Final Result         Osteopenia and levoscoliosis and chronic compression fractures as above.    No acute fracture or spondylolisthesis identified.         This report was signed and finalized on 12/4/2024 5:17 PM by Crow Alvarez.          CT Lumbar Spine Without Contrast   Final Result    1. Advanced osteopenia. Old compression deformities involving T12 L2,    L3, L4, and L5 with kyphoplasty changes at each level except for L2. No    convincing acute lumbar vertebral fracture or traumatic malalignment    2. Degenerative changes resulting in severe right L5-S1 foraminal    narrowing. Moderate right L4/5 and left L5-S1 foraminal stenosis also    present..         This report was signed and finalized on 12/4/2024 4:36 PM by Dr. Pasclae Cade MD.          CT Chest Without Contrast Diagnostic   Final Result     1. Opacity and volume loss at the left lung base medially may be    atelectasis or pneumonia.    2. Multi right rib fractures without significant displacement. The right    anterior sixth rib fracture may be acute.         This report was signed and finalized on 12/4/2024 5:09 PM by Crow Alvarez.          XR Chest 1 View   Final Result    1. New opacities in the left lung base likely due to pneumonia or    atelectasis. Follow-up recommended to document resolution.    2. Stable bronchial wall thickening.    3. Mild cardiomegaly.                   This report was signed and finalized on 12/4/2024 3:32 PM by Dr. Randy Chatterjee MD.       Patient has 5 rib fractures that were noted 2 and half weeks ago.  He has what appears to be a new acute rib fracture making it a total of 6 rib fractures.  He also has findings concerning for atelectasis versus pneumonia.  We gave patient pain medicine in the emergency department, steroids, and Rocephin as well as Zithromax.  Discussed with hospitalist who is agreeable for admission.  Please see their note for details.    Problems Addressed:  Chest pain, unspecified type: acute illness or injury  Closed fracture of multiple ribs, unspecified laterality, initial encounter: acute illness or injury  Pneumonia of left lower lobe due to infectious organism: acute illness or injury    Amount and/or Complexity of Data Reviewed  Labs: ordered. Decision-making details documented in ED Course.  Radiology: ordered. Decision-making details documented in ED Course.  ECG/medicine tests: ordered. Decision-making details documented in ED Course.  Discussion of management or test interpretation with external provider(s): Discussed with hospitalist    Risk  Prescription drug management.  Decision regarding hospitalization.        Final diagnoses:   Pneumonia of left lower lobe due to infectious organism   Closed fracture of multiple ribs, unspecified laterality, initial encounter   Chest  pain, unspecified type       ED Disposition  ED Disposition       ED Disposition   Decision to Admit    Condition   --    Comment   Level of Care: Telemetry [5]   Diagnosis: Pneumonia [648724]   Admitting Physician: SABINO NICKERSON [[id number]]   Attending Physician: SABINO NICKERSON [[id number]]   Certification: I Certify That Inpatient Hospital Services Are Medically Necessary For Greater Than 2 Midnights                 No follow-up provider specified.       Medication List      No changes were made to your prescriptions during this visit.            Brenda Valle APRN  12/04/24 1955      Electronically signed by Brenda Valle APRN at 12/04/24 1955       Vital Signs (last 2 days)       Date/Time Temp Temp src Pulse Resp BP Patient Position SpO2    12/05/24 0737 -- -- -- -- 151/82 Standing --    12/05/24 0735 -- -- -- -- 156/84 Sitting --    12/05/24 0732 97.6 (36.4) Oral 77 16 139/67 Lying 97    12/05/24 0352 98.2 (36.8) Oral 86 18 142/86 Lying 95    12/04/24 2334 -- -- -- -- 141/67 Standing --    12/04/24 2333 -- -- -- -- 143/76 Sitting --    12/04/24 2331 98.9 (37.2) Oral 90 16 137/68 Lying 93    12/04/24 2029 98 (36.7) Oral 81 16 178/73 Lying 92    12/04/24 2002 -- -- -- -- -- -- 100    12/04/24 2001 -- -- -- -- 162/75 -- --    12/04/24 1846 -- -- 67 -- 180/89 -- 96    12/04/24 1816 -- -- 69 -- 174/77 -- 97    12/04/24 1801 -- -- 73 -- 170/81 -- 95    12/04/24 1746 -- -- 70 -- 157/80 -- 98    12/04/24 1731 -- -- 68 -- 156/79 -- 95    12/04/24 1716 -- -- -- -- 164/76 -- --    12/04/24 1709 -- -- -- -- 157/78 -- --    12/04/24 1546 -- -- 68 -- 140/77 -- 98    12/04/24 1538 -- -- 72 -- -- -- 86    12/04/24 1531 -- -- 73 -- 145/80 -- 87    12/04/24 1516 -- -- 75 -- 161/81 -- 87    12/04/24 1501 -- -- 71 -- 169/83 -- 93    12/04/24 1446 -- -- 73 -- 167/86 -- 93    12/04/24 1443 -- -- 73 16 -- -- 100    12/04/24 1437 97.6 (36.4) Oral -- -- 185/79 Lying --          Oxygen Therapy (last 2 days)        Date/Time SpO2 Device (Oxygen Therapy) Flow (L/min) (Oxygen Therapy) Oxygen Concentration (%) ETCO2 (mmHg)    12/05/24 0732 97 nasal cannula 2 -- --    12/05/24 0352 95 nasal cannula 2 -- --    12/04/24 2331 93 nasal cannula 2 -- --    12/04/24 2039 -- nasal cannula 1 -- --    12/04/24 2029 92 nasal cannula 2 -- --    12/04/24 2002 100 -- -- -- --    12/04/24 1846 96 -- -- -- --    12/04/24 1816 97 -- -- -- --    12/04/24 1801 95 -- -- -- --    12/04/24 1746 98 -- -- -- --    12/04/24 1731 95 -- -- -- --    12/04/24 1546 98 -- -- -- --    12/04/24 1538 86 -- -- -- --    12/04/24 1531 87 -- -- -- --    12/04/24 1516 87 -- -- -- --    12/04/24 1501 93 -- -- -- --    12/04/24 1446 93 -- -- -- --    12/04/24 1443 100 room air -- -- --          Intake & Output (last 2 days)         12/03 0701 12/04 0700 12/04 0701  12/05 0700 12/05 0701  12/06 0700    IV Piggyback  100     Total Intake(mL/kg)  100 (1.4)     Urine (mL/kg/hr)  200     Total Output  200     Net  -100                  Facility-Administered Medications as of 12/5/2024   Medication Dose Route Frequency Provider Last Rate Last Admin    acetaminophen (TYLENOL) tablet 650 mg  650 mg Oral Q4H PRN Art Lizarraga MD   650 mg at 12/05/24 0841    Or    acetaminophen (TYLENOL) 160 MG/5ML oral solution 650 mg  650 mg Oral Q4H PRN Art Lizarraga MD        Or    acetaminophen (TYLENOL) suppository 650 mg  650 mg Rectal Q4H PRN Art Lizarraga MD        amoxicillin-clavulanate (AUGMENTIN) 875-125 MG per tablet 1 tablet  1 tablet Oral Q12H Art Lizarraga MD   1 tablet at 12/05/24 0839    [COMPLETED] azithromycin (ZITHROMAX) tablet 500 mg  500 mg Oral Once Brenda Valle APRN   500 mg at 12/04/24 1954    sennosides-docusate (PERICOLACE) 8.6-50 MG per tablet 2 tablet  2 tablet Oral BID PRN Art Lizarraga MD        And    polyethylene glycol (MIRALAX) packet 17 g  17 g Oral Daily PRN Art Lizarraga MD        And    bisacodyl (DULCOLAX) EC tablet 5  mg  5 mg Oral Daily PRN Art Lizarraga MD        And    bisacodyl (DULCOLAX) suppository 10 mg  10 mg Rectal Daily PRN Art Lizarraga MD        [COMPLETED] cefTRIAXone (ROCEPHIN) 1,000 mg in sodium chloride 0.9 % 100 mL MBP  1,000 mg Intravenous Once Brenda Valle APRN   Stopped at 12/04/24 1919    Enoxaparin Sodium (LOVENOX) syringe 30 mg  30 mg Subcutaneous Daily Art Lizarraga MD   30 mg at 12/04/24 2103    hydrALAZINE (APRESOLINE) injection 5 mg  5 mg Intravenous Q4H PRN Art Lizarraga MD        losartan (COZAAR) tablet 25 mg  25 mg Oral Q24H Art Lizarraga MD   25 mg at 12/05/24 0839    [COMPLETED] methylPREDNISolone sodium succinate (SOLU-Medrol) injection 125 mg  125 mg Intravenous Once Brenda Valle APRN   125 mg at 12/04/24 1847    [COMPLETED] Morphine sulfate (PF) injection 2 mg  2 mg Intravenous Once Brenda Valle APRN   2 mg at 12/04/24 1511    [COMPLETED] Morphine sulfate (PF) injection 2 mg  2 mg Intravenous Once Brenda Valle APRN   2 mg at 12/04/24 1725    [COMPLETED] ondansetron (ZOFRAN) injection 4 mg  4 mg Intravenous Once Brenda Valle APRN   4 mg at 12/04/24 1511    ondansetron ODT (ZOFRAN-ODT) disintegrating tablet 4 mg  4 mg Oral Q6H PRN Art Lizarraga MD        Or    ondansetron (ZOFRAN) injection 4 mg  4 mg Intravenous Q6H PRN Art Lizarraga MD        oxyCODONE-acetaminophen (PERCOCET) 5-325 MG per tablet 1 tablet  1 tablet Oral Q8H PRN Art Lizarraga MD   1 tablet at 12/05/24 0528    sodium chloride 0.9 % flush 10 mL  10 mL Intravenous Q12H Art Lizarraga MD   10 mL at 12/05/24 0839    sodium chloride 0.9 % flush 10 mL  10 mL Intravenous PRN Art Lizarraga MD        sodium chloride 0.9 % infusion 40 mL  40 mL Intravenous PRN Art Lizarraga MD         Orders (last 48 hrs)        Start     Ordered    12/05/24 0900  amoxicillin-clavulanate (AUGMENTIN) 875-125 MG per tablet 1 tablet  Every 12 Hours Scheduled          "12/04/24 2046    12/05/24 0800  Oral Care  2 Times Daily       12/04/24 2011 12/05/24 0600  CBC Auto Differential  Morning Draw         12/04/24 2011 12/05/24 0600  Comprehensive Metabolic Panel  Morning Draw         12/04/24 2011 12/05/24 0539  Urinalysis, Microscopic Only - Urine, Clean Catch  Once         12/05/24 0538    12/05/24 0000  Vital Signs  Every 4 Hours       12/04/24 2011 12/04/24 2200  Incentive Spirometry  Every 4 Hours While Awake       12/04/24 1940 12/04/24 2100  sodium chloride 0.9 % flush 10 mL  Every 12 Hours Scheduled         12/04/24 2011 12/04/24 2100  Enoxaparin Sodium (LOVENOX) syringe 30 mg  Daily         12/04/24 2011 12/04/24 2036  Orthostatic Blood Pressure  Daily       12/04/24 2035 12/04/24 2029  losartan (COZAAR) tablet 25 mg  Every 24 Hours Scheduled         12/04/24 2013 12/04/24 2011  ondansetron ODT (ZOFRAN-ODT) disintegrating tablet 4 mg  Every 6 Hours PRN        Placed in \"Or\" Linked Group    12/04/24 2011 12/04/24 2011  ondansetron (ZOFRAN) injection 4 mg  Every 6 Hours PRN        Placed in \"Or\" Linked Group    12/04/24 2011 12/04/24 2011  sennosides-docusate (PERICOLACE) 8.6-50 MG per tablet 2 tablet  2 Times Daily PRN        Placed in \"And\" Linked Group    12/04/24 2011 12/04/24 2011  polyethylene glycol (MIRALAX) packet 17 g  Daily PRN        Placed in \"And\" Linked Group    12/04/24 2011 12/04/24 2011  bisacodyl (DULCOLAX) EC tablet 5 mg  Daily PRN        Placed in \"And\" Linked Group    12/04/24 2011 12/04/24 2011  bisacodyl (DULCOLAX) suppository 10 mg  Daily PRN        Placed in \"And\" Linked Group    12/04/24 2011 12/04/24 2011  oxyCODONE-acetaminophen (PERCOCET) 5-325 MG per tablet 1 tablet  Every 8 Hours PRN         12/04/24 2011 12/04/24 2011  acetaminophen (TYLENOL) tablet 650 mg  Every 4 Hours PRN        Placed in \"Or\" Linked Group    12/04/24 2011 12/04/24 2011  acetaminophen (TYLENOL) 160 MG/5ML oral solution 650 " "mg  Every 4 Hours PRN        Placed in \"Or\" Linked Group    12/04/24 2011 12/04/24 2011  acetaminophen (TYLENOL) suppository 650 mg  Every 4 Hours PRN        Placed in \"Or\" Linked Group    12/04/24 2011 12/04/24 2011  Intake & Output  Every Shift       12/04/24 2011 12/04/24 2011  Weigh Patient  Once         12/04/24 2011 12/04/24 2011  Insert Peripheral IV  Once         12/04/24 2011 12/04/24 2011  Saline Lock & Maintain IV Access  Continuous         12/04/24 2011 12/04/24 2011  Code Status and Medical Interventions: CPR (Attempt to Resuscitate); Full Support  Continuous         12/04/24 2011 12/04/24 2011  Place Sequential Compression Device  Once         12/04/24 2011 12/04/24 2011  Maintain Sequential Compression Device  Continuous         12/04/24 2011 12/04/24 2011  Diet: Cardiac; Healthy Heart (2-3 Na+); Fluid Consistency: Thin (IDDSI 0)  Diet Effective Now         12/04/24 2011 12/04/24 2011  OT Consult: Eval & Treat ADL Performance Below Baseline  Once         12/04/24 2011 12/04/24 2011  PT Consult: Eval & Treat Discharge Placement Assessment  Once         12/04/24 2011 12/04/24 2010  sodium chloride 0.9 % flush 10 mL  As Needed         12/04/24 2011 12/04/24 2010  sodium chloride 0.9 % infusion 40 mL  As Needed         12/04/24 2011 12/04/24 1959  azithromycin (ZITHROMAX) tablet 500 mg  Once         12/04/24 1943    12/04/24 1947  Inpatient Admission  Once         12/04/24 1947    12/04/24 1944  Urinalysis With Culture If Indicated - Urine, Clean Catch  STAT         12/04/24 1943    12/04/24 1939  hydrALAZINE (APRESOLINE) injection 5 mg  Every 4 Hours PRN         12/04/24 1940    12/04/24 1842  methylPREDNISolone sodium succinate (SOLU-Medrol) injection 125 mg  Once         12/04/24 1826    12/04/24 1842  cefTRIAXone (ROCEPHIN) 1,000 mg in sodium chloride 0.9 % 100 mL MBP  Once         12/04/24 1826    12/04/24 1827  Blood Culture - Blood, Arm, Left  Once         " "12/04/24 1826    12/04/24 1827  Blood Culture - Blood, Arm, Right  Once        Comments: 30 minutes after first collection, or from a different site      12/04/24 1826    12/04/24 1827  Lactic Acid, Plasma  Once         12/04/24 1826    12/04/24 1725  Morphine sulfate (PF) injection 2 mg  Once         12/04/24 1709    12/04/24 1709  High Sensitivity Troponin T 2Hr  PROCEDURE ONCE         12/04/24 1535    12/04/24 1659  ECG 12 Lead Chest Pain  Once         12/04/24 1501    12/04/24 1516  Morphine sulfate (PF) injection 2 mg  Once         12/04/24 1500    12/04/24 1516  ondansetron (ZOFRAN) injection 4 mg  Once         12/04/24 1500    12/04/24 1500  CT Head Without Contrast  1 Time Imaging         12/04/24 1500    12/04/24 1500  CT Thoracic Spine Without Contrast  1 Time Imaging         12/04/24 1500    12/04/24 1500  CT Lumbar Spine Without Contrast  1 Time Imaging         12/04/24 1500    12/04/24 1500  CT Chest Without Contrast Diagnostic  1 Time Imaging         12/04/24 1500    12/04/24 1459  Comprehensive Metabolic Panel  Once         12/04/24 1500    12/04/24 1459  CBC & Differential  Once         12/04/24 1500    12/04/24 1459  Protime-INR  Once         12/04/24 1500    12/04/24 1459  High Sensitivity Troponin T  Once         12/04/24 1500    12/04/24 1459  ECG 12 Lead Chest Pain  Now & in 2 Hours,   Status:  Canceled       12/04/24 1500    12/04/24 1459  XR Chest 1 View  1 Time Imaging         12/04/24 1500    12/04/24 1459  Insert Peripheral IV  Once        Placed in \"And\" Linked Group    12/04/24 1500    12/04/24 1459  Cardiac Monitoring  Continuous        Comments: Follow Standing Orders As Outlined in Process Instructions (Open Order Report to View Full Instructions)    12/04/24 1500    12/04/24 1459  CBC Auto Differential  PROCEDURE ONCE         12/04/24 1500    12/04/24 1458  sodium chloride 0.9 % flush 10 mL  As Needed,   Status:  Discontinued        Placed in \"And\" Linked Group    12/04/24 1500    " 12/04/24 1439  ECG 12 Lead Chest Pain  Once         12/04/24 1438    12/04/24 0000  Telemetry Scan  Once         12/04/24 0000    Unscheduled  Up With Assistance  As Needed       12/04/24 2011    Signed and Held  DULoxetine (CYMBALTA) DR capsule 20 mg  Daily         Signed and Held    Signed and Held  multivitamin with minerals 1 tablet  Daily         Signed and Held

## 2024-12-05 NOTE — PLAN OF CARE
Goal Outcome Evaluation:  Plan of Care Reviewed With: patient        Progress: improving  Outcome Evaluation: PT eval complete. Pt presented A&Ox4 in fowlers on 2L of O2. Pt states prior to admission he was mod independent in all home and community ADLs with single point cane. Pt has a history of numberous falls and is [place]. Pt able to sit EOB with SBA and use of BR with HOB elevated. Pt demonstrated BLE AROM WFL and BLE gross strength 4/5 with exception of EHL R at 3-/5. Pt reports prior surgery on R great toe to address his hammer toe. Pt reports intermittent numbness in RLE that travels down his leg and in his feet that began post great toe surgery. Pt states it happens a few times a week and does not last more than a few hours. Pt able to stand with CGA and FWW. Pt able to ambulate in hallway with CGA and FWW on 2L of O2. Pt did not present with any signs of shortness of breath but did state increased pain in chest. Pt educated on importance of movement. During room ambulation, pt RLE was not clearing the group. Pt able to march in place and SLS on BLE. Pt corrected gait with verbal cueing and cleared RLE in hallway. PT recommends discharge home with assist and outpatient physical therapy for AD management, increase strength, increase balance, and return to previous independence while reducing the risk of future falls.    Anticipated Discharge Disposition (PT): home with assist, home with outpatient therapy services

## 2024-12-05 NOTE — THERAPY EVALUATION
Acute Care - Occupational Therapy Initial Evaluation  [facility]     Patient Name: Juan Luis Collazo  : 1940  MRN: [id number]  Today's Date: 2024  Onset of Illness/Injury or Date of Surgery: 24  Date of Referral to OT: 24  Referring Physician: Dr. Lizarraga    Admit Date: 2024       ICD-10-CM ICD-9-CM   1. Pneumonia of left lower lobe due to infectious organism  J18.9 486   2. Closed fracture of multiple ribs, unspecified laterality, initial encounter  S22.49XA 807.09   3. Chest pain, unspecified type  R07.9 786.50   4. Gait instability [R26.81]  R26.81 781.2     Patient Active Problem List   Diagnosis    Closed compression fracture of first lumbar vertebra    Current non-smoker    Lumbar compression fracture, closed, initial encounter    Lumbar compression fracture    S/P kyphoplasty    Numbness and tingling of right leg    Compression fracture of fourth lumbar vertebra with delayed healing    Benign hypertension    Flatback syndrome of lumbar region    Hammer toe of right foot    High cholesterol    Impaired fasting glucose    Malignant neoplasm of prostate    Osteopenia    Acute exacerbation of chronic obstructive airways disease    Back pain    Compression fracture of thoracic vertebra with routine healing    Normocytic anemia    Actinic keratosis    Hyperplastic colonic polyp    FH: colon cancer in first degree relative <60 years old    History of adenomatous polyp of colon    Decreased bone mass    Sebaceous cyst    Degenerative disc disease, cervical    Degenerative disc disease, lumbar    Degenerative disc disease, thoracic    Compression fracture of T12 vertebra with delayed healing    Age-related osteoporosis with current pathological fracture with delayed healing    Gait instability    Pain initiated by coughing    Hypoxia    Pneumonia     Past Medical History:   Diagnosis Date    Arthritis     Back pain     Cancer     CHEST, SKIN CANCER    GERD (gastroesophageal reflux disease)      History of colon polyps     History of prostate cancer     Hypertension     Low back pain     Macular degeneration     Osteopenia      Past Surgical History:   Procedure Laterality Date    APPENDECTOMY      CATARACT EXTRACTION, BILATERAL      COLONOSCOPY  09/17/2013    Dr. José-One 2-3mm polyp at 20cm; Otherwise normal; Repeat 3 years    COLONOSCOPY N/A 12/07/2022    Procedure: COLONOSCOPY WITH ANESTHESIA;  Surgeon: Bri Alexis MD;  Location: [facility] ENDOSCOPY;  Service: Gastroenterology;  Laterality: N/A;  pre: anemia. hx polyps.  post: polyps. diverticulosis.  no PCP    ENDOSCOPY N/A 12/07/2022    Procedure: ESOPHAGOGASTRODUODENOSCOPY WITH ANESTHESIA;  Surgeon: Bri Alexis MD;  Location: [facility] ENDOSCOPY;  Service: Gastroenterology;  Laterality: N/A;  pre: anemia  post: hiatal hernia. esophagitis.gastric erosions.  no PCP    FOOT SURGERY Right     KYPHOPLASTY Bilateral 07/17/2018    Procedure: L3 KYPHOPLASTY 1-2 LEVELS;  Surgeon: Vega Pandey MD;  Location:  [facility] OR;  Service: Neurosurgery    KYPHOPLASTY Bilateral 09/11/2018    Procedure: L4 KYPHOPLASTY WITH BIOPSY;  Surgeon: Vega Pandey MD;  Location: [facility] OR;  Service: Neurosurgery    KYPHOPLASTY WITH BIOPSY N/A 01/25/2022    Procedure: KYPHOPLASTY WITH BIOPSY, THORACIC 6 and LUMBAR 5;  Surgeon: Nick Martínez MD;  Location:  [facility] OR;  Service: Neurosurgery;  Laterality: N/A;    KYPHOPLASTY WITH BIOPSY N/A 8/18/2023    Procedure: Thoracic 12, KYPHOPLASTY WITH BIOPSY;  Surgeon: Nick Martínez MD;  Location:  [facility] OR;  Service: Neurosurgery;  Laterality: N/A;    PROSTATECTOMY      TONSILLECTOMY      TOTAL SHOULDER REPLACEMENT Bilateral          OT ASSESSMENT FLOWSHEET (Last 12 Hours)       OT Evaluation and Treatment       Row Name 12/05/24 0812                   OT Time and Intention    Subjective Information complains of;pain;dizziness  reports dizziness when standing up  -AC (r) JR (t) AC (c)         Document Type evaluation  - (r)  (t) AC (c)        Mode of Treatment occupational therapy  -AC (r)  (t) AC (c)           General Information    Patient Profile Reviewed yes  -AC (r)  (t) AC (c)        Onset of Illness/Injury or Date of Surgery 12/04/24  -AC (r)  (t) AC (c)        Referring Physician Dr. Lizarraga  -AC (r)  (t) AC (c)        Prior Level of Function independent:;all household mobility;community mobility;ADL's;home management;driving;shopping  only drives during day short distances; holds wifes hand when going up the step into the house  -AC (r)  (t) AC (c)        Pertinent History of Current Functional Problem admit: chest pain, fall Dx: pneumonia, rib fx subacute PMH: recurrent falls, hx prostate cancer  -AC (r)  (t) AC (c)        Existing Precautions/Restrictions fall;oxygen therapy device and L/min  2L NC  -AC (r)  (t) AC (c)        Risks Reviewed patient:;LOB;nausea/vomiting;dizziness;increased discomfort  -AC (r)  (t) AC (c)        Benefits Reviewed patient:;improve function;increase independence;increase strength;increase balance  -AC (r)  (t) AC (c)        Barriers to Rehab hearing deficit;visual deficit  macular degeneration  - (r)  (t) AC (c)           Living Environment    Current Living Arrangements home  walk in shower with shower chair  -AC (r)  (t) AC (c)        Home Accessibility stairs to enter home;stairs within home  - (r)  (t) AC (c)        People in Home spouse  - (r)  (t) AC (c)           Home Main Entrance    Number of Stairs, Main Entrance one  -AC (r)  (t) AC (c)        Stair Railings, Main Entrance railing on left side (ascending)  -AC (r)  (t) AC (c)        Landing, Stairs, Main Entrance no railings  - (r)  (t) AC (c)           Stairs Within Home, Primary    Number of Stairs, Within Home, Primary other (see comments)  13 or 14  -AC (r)  (t) AC (c)        Stair Railings, Within Home, Primary railing on left side (ascending)  - (r)   (t) AC (c)        Stairs Comment, Within Home, Primary pt reports he goes into the basement everyday  -AC (r) JR (t) AC (c)           Home Use of Assistive/Adaptive Equipment    Equipment Currently Used at Home cane, straight;shower chair  -AC (r)  (t) AC (c)           Pain Assessment    Pretreatment Pain Rating 8/10  -AC (r) JR (t) AC (c)        Posttreatment Pain Rating 10/10  -AC (r) JR (t) AC (c)        Pain Location flank;back;chest  -AC (r) JR (t) AC (c)        Pain Management Interventions activity modification encouraged;breathing exercises utilized;nursing notified;positioning techniques utilized;exercise or physical activity utilized  -AC (r) JR (t) AC (c)        Response to Pain Interventions activity participation with increased pain  -AC (r) JR (t) AC (c)           Cognition    Orientation Status (Cognition) oriented x 4  -AC (r) JR (t) AC (c)        Personal Safety Interventions elopement precautions initiated;fall prevention program maintained;gait belt;muscle strengthening facilitated;nonskid shoes/slippers when out of bed;supervised activity  - (r) JR (t) AC (c)           Range of Motion Comprehensive    Comment, General Range of Motion B shoulder AROM 25% limited (chronic deficit x15 years); B elbows, wrists, digits AROM WFL (P)   -           Strength Comprehensive (MMT)    Comment, General Manual Muscle Testing (MMT) Assessment B shoulder strength 4-/5 within available range; B elbows, digits strength 4/5  - (r) JR (t) AC (c)           Activities of Daily Living    BADL Assessment/Intervention lower body dressing;grooming;feeding  -AC (r) JR (t) AC (c)           Lower Body Dressing Assessment/Training    [place] Level (Lower Body Dressing) don;doff;shoes/slippers;standby assist  - (r) JR (t) AC (c)        Position (Lower Body Dressing) edge of bed sitting;unsupported sitting  -AC (r) JR (t) AC (c)           Grooming Assessment/Training    [place] Level (Grooming) oral care  regimen;minimum assist (75% patient effort)  to open toothpaste and mouthwash  -AC (r) JR (t) AC (c)        Position (Grooming) supported standing;sink side  -AC (r) JR (t) AC (c)        Comment, (Grooming) pt supported himself by leaning onto the sink counter  -AC (r) JR (t) AC (c)           Self-Feeding Assessment/Training    [place] Level (Feeding) feeding skills;independent  -AC (r) JR (t) AC (c)        Position (Feeding) sitting up in bed;supported sitting  -AC (r) JR (t) AC (c)           BADL Safety/Performance    Impairments, BADL Safety/Performance balance;endurance/activity tolerance;pain;range of motion;strength;trunk/postural control  -AC (r) JR (t) AC (c)           Bed Mobility    Bed Mobility supine-sit;sit-supine  -AC (r) JR (t) AC (c)        Supine-Sit [place] (Bed Mobility) modified independence  -AC (r) JR (t) AC (c)        Sit-Supine [place] (Bed Mobility) modified independence  -AC (r) JR (t) AC (c)        Assistive Device (Bed Mobility) bed rails;head of bed elevated  -AC (r) JR (t) AC (c)           Functional Mobility    Functional Mobility- Ind. Level minimum assist (75% patient effort);verbal cues required  -AC (r) JR (t) AC (c)        Functional Mobility- Comment HHA, bed<>BR, Pt has difficulty picking feet up and could not correct with vc. Pt consistently uses objects and surfaces in room for UE support during ambulation. (P)   -JR           Transfer Assessment/Treatment    Transfers sit-stand transfer;stand-sit transfer  -AC (r) JR (t) AC (c)           Sit-Stand Transfer    Sit-Stand [place] (Transfers) contact guard;verbal cues  -AC (r) JR (t) AC (c)           Stand-Sit Transfer    Stand-Sit [place] (Transfers) contact guard;verbal cues  -AC (r) JR (t) AC (c)           Safety Issues/Impairments Affecting Functional Mobility    Safety Issues Affecting Function (Mobility) awareness of need for assistance;insight into deficits/self-awareness;safety precaution  awareness;safety precautions follow-through/compliance  -AC (r) JR (t) AC (c)        Impairments Affecting Function (Mobility) balance;endurance/activity tolerance;pain;range of motion (ROM);shortness of breath;visual/perceptual  -AC (r) JR (t) AC (c)           Motor Skills    Motor Skills coordination  -AC (r) JR (t) AC (c)        Coordination bilateral;upper extremity;WFL;finger to nose  finger to thumb opposition  -AC (r) JR (t) AC (c)           Balance    Balance Assessment sitting static balance;sitting dynamic balance;standing static balance;standing dynamic balance  -AC (r) JR (t) AC (c)        Static Sitting Balance supervision  -AC (r) JR (t) AC (c)        Dynamic Sitting Balance supervision  -AC (r) JR (t) AC (c)        Position, Sitting Balance unsupported;sitting edge of bed  -AC (r) JR (t) AC (c)        Static Standing Balance contact guard;verbal cues  -AC (r) JR (t) AC (c)        Dynamic Standing Balance contact guard;verbal cues  -AC (r) JR (t) AC (c)        Position/Device Used, Standing Balance --  HHA  -AC (r) JR (t) AC (c)        Comment, Balance pt has forward flexed posture. he is able to correct with vc.  -AC (r) JR (t) AC (c)           Plan of Care Review    Plan of Care Reviewed With patient  -AC (r) JR (t) AC (c)        Progress no change  -AC (r) JR (t) AC (c)        Outcome Evaluation OT completed eval. Pt alert and oriented x4 sitting up in bed eating breakfast independently. Pt has low vision and hearing and wears glasses and hearing aids. He completed supine<>sit with Velma using HOB elevated and bed rails. He don/doffed socks with SBA. OT removed 2L NC as pt does not usually wear O2 and nurse said it was okay. He stood with CGA and ambulated to BR with Cl. Pt has difficulty picking his feet up and could not correct with vc. Pt consistently uses objects and surfaces in room for UE support during ambulation. He has a forward flexed posture while seated and standing that he was able to  correct with vc. He completed oral hygiene requiring Cl to open toothpaste and mouthwash. To maintain standing balance, he leaned against the sink counter. Upon returning to bed, pts O2 measured 86%. OT put 2L NC back on pt and he recovered to 91% in 3 minutes with vc for PLB. Pts safety and independence in ADLs is most limited by his pain, balance, and endurance. Skilled OT services will address functional mobility and ADL safety. Recommend home with 24/7 supervision, home with . (P)   -JR           Vital Signs    Pre SpO2 (%) 93  -AC (r) JR (t) AC (c)        O2 Delivery Pre Treatment nasal cannula  2L NC  -AC (r) JR (t) AC (c)        Intra SpO2 (%) 86  -AC (r) JR (t) AC (c)        O2 Delivery Intra Treatment room air  -AC (r) JR (t) AC (c)        Post SpO2 (%) 91  -AC (r) JR (t) AC (c)        O2 Delivery Post Treatment nasal cannula  2L NC  -AC (r) JR (t) AC (c)        Pre Patient Position Sitting  EOB  -AC (r) JR (t) AC (c)        Intra Patient Position Sitting  EOB after returning from BR  -AC (r) JR (t) AC (c)        Post Patient Position Supine  -AC (r) JR (t) AC (c)           Positioning and Restraints    Pre-Treatment Position in bed  -AC (r) JR (t) AC (c)        Post Treatment Position bed  -AC (r) JR (t) AC (c)        In Bed fowlers;call light within reach;encouraged to call for assist;exit alarm on;side rails up x2  -AC (r) JR (t) AC (c)           Therapy Assessment/Plan (OT)    Date of Referral to OT 12/04/24  -AC (r) JR (t) AC (c)        OT Diagnosis decreased ADLs  -AC (r) JR (t) AC (c)        Rehab Potential (OT) good  -AC (r) JR (t) AC (c)        Criteria for Skilled Therapeutic Interventions Met (OT) yes;skilled treatment is necessary;meets criteria  -AC (r) JR (t) AC (c)        Therapy Frequency (OT) 5 times/wk  -AC (r) JR (t) AC (c)        Predicted Duration of Therapy Intervention (OT) 10 days  -AC (r) JR (t) EMMANUEL (c)        Planned Therapy Interventions (OT) activity tolerance training;BADL  retraining;functional balance retraining;occupation/activity based interventions;patient/caregiver education/training;strengthening exercise;transfer/mobility retraining  -AC (r) JR (t) AC (c)           Therapy Plan Review/Discharge Plan (OT)    Anticipated Discharge Disposition (OT) home with home health;home with /7 care  -AC (r) JR (t) AC (c)           OT Goals    Bathing Goal Selection (OT) bathing, OT goal 1  -AC (r) JR (t) AC (c)        Toileting Goal Selection (OT) toileting, OT goal 1  -AC (r) JR (t) AC (c)        Balance Goal Selection (OT) balance, OT goal 1  -AC (r) JR (t) AC (c)        Problem Specific Goal Selection (OT) problem specific goal 1, OT  -AC (r) JR (t) AC (c)           Bathing Goal 1 (OT)    Activity/Device (Bathing Goal 1, OT) bathing skills, all  -AC (r) JR (t) AC (c)        [place] Level/Cues Needed (Bathing Goal 1, OT) supervision required  -AC (r) JR (t) AC (c)        Time Frame (Bathing Goal 1, OT) 10 days;long term goal (LTG)  -AC (r) JR (t) AC (c)        Progress/Outcomes (Bathing Goal 1, OT) new goal  -AC (r) JR (t) AC (c)           Toileting Goal 1 (OT)    Activity/Device (Toileting Goal 1, OT) toileting skills, all;commode  -AC (r) JR (t) AC (c)        [place] Level/Cues Needed (Toileting Goal 1, OT) supervision required  -AC (r) JR (t) AC (c)        Time Frame (Toileting Goal 1, OT) 10 days;long term goal (LTG)  -AC (r) JR (t) AC (c)        Progress/Outcome (Toileting Goal 1, OT) new goal  -AC (r) JR (t) AC (c)           Balance Goal 1 (OT)    Activity/Assistive Device (Balance Goal 1, OT) standing dynamic balance;supported;with ADLs;with functional activities/occupations;with functional mobility activities;with functional reaching activities  -AC (r) JR (t) AC (c)        [place] Level/Cues Needed (Balance Goal 1, OT) contact guard required  -EMMANUEL (chaz)  (t) EMMANUEL (c)        Time Frame (Balance Goal 1, OT) long-term goal (LTG);other (see comments)  10 days  -EMMANUEL RUIZ (r)  (t) AC (c)        Progress/Outcomes (Balance Goal 1, OT) goal ongoing  -AC (r) JR (t) AC (c)           Problem Specific Goal 1 (OT)    Problem Specific Goal 1 (OT) Pt will independently implement one pain mgmt strategy during functional activity.  -AC (r) JR (t) AC (c)        Time Frame (Problem Specific Goal 1, OT) 10 days;long term goal (LTG)  -AC (r) JR (t) AC (c)        Progress/Outcome (Problem Specific Goal 1, OT) new goal  -AC (r) JR (t) AC (c)                  User Key  (r) = Recorded By, (t) = Taken By, (c) = Cosigned By      Initials Name Effective Dates    AC Alex Gonzalez, OTR/L, CNT 02/03/23 -     Maria Esther Burnette, OT Student 09/11/24 -                      Occupational Therapy Education       Title: PT OT SLP Therapies (In Progress)       Topic: Occupational Therapy (In Progress)       Point: ADL training (Done)       Description:   Instruct learner(s) on proper safety adaptation and remediation techniques during self care or transfers.   Instruct in proper use of assistive devices.                  Learning Progress Summary            Patient Acceptance, E, VU,DU,NR by  at 12/5/2024 1016    Comment: OT role, safe transfer techniques, PLB, OT POC                      Point: Home exercise program (Not Started)       Description:   Instruct learner(s) on appropriate technique for monitoring, assisting and/or progressing therapeutic exercises/activities.                  Learner Progress:  Not documented in this visit.              Point: Precautions (Done)       Description:   Instruct learner(s) on prescribed precautions during self-care and functional transfers.                  Learning Progress Summary            Patient Acceptance, E, VU,DU,NR by  at 12/5/2024 1016    Comment: OT role, safe transfer techniques, PLB, OT POC                      Point: Body mechanics (Done)       Description:   Instruct learner(s) on proper positioning and spine alignment during self-care, functional mobility  activities and/or exercises.                  Learning Progress Summary            Patient Acceptance, E, VU,DU,NR by  at 12/5/2024 1016    Comment: OT role, safe transfer techniques, PLB, OT POC                                      User Key       Initials Effective Dates Name Provider Type Discipline     09/11/24 -  Maria Esther Hogue, OT Student OT Student OT                      OT Recommendation and Plan  Planned Therapy Interventions (OT): activity tolerance training, BADL retraining, functional balance retraining, occupation/activity based interventions, patient/caregiver education/training, strengthening exercise, transfer/mobility retraining  Therapy Frequency (OT): 5 times/wk  Plan of Care Review  Plan of Care Reviewed With: patient  Progress: no change  Outcome Evaluation: (P) OT completed eval. Pt alert and oriented x4 sitting up in bed eating breakfast independently. Pt has low vision and hearing and wears glasses and hearing aids. He completed supine<>sit with Velma using HOB elevated and bed rails. He don/doffed socks with SBA. OT removed 2L NC as pt does not usually wear O2 and nurse said it was okay. He stood with CGA and ambulated to BR with Cl. Pt has difficulty picking his feet up and could not correct with vc. Pt consistently uses objects and surfaces in room for UE support during ambulation. He has a forward flexed posture while seated and standing that he was able to correct with vc. He completed oral hygiene requiring Cl to open toothpaste and mouthwash. To maintain standing balance, he leaned against the sink counter. Upon returning to bed, pts O2 measured 86%. OT put 2L NC back on pt and he recovered to 91% in 3 minutes with vc for PLB. Pts safety and independence in ADLs is most limited by his pain, balance, and endurance. Skilled OT services will address functional mobility and ADL safety. Recommend home with 24/7 supervision, home with HH.  Plan of Care Reviewed With: patient  Outcome  Evaluation: (P) OT completed eval. Pt alert and oriented x4 sitting up in bed eating breakfast independently. Pt has low vision and hearing and wears glasses and hearing aids. He completed supine<>sit with Velma using HOB elevated and bed rails. He don/doffed socks with SBA. OT removed 2L NC as pt does not usually wear O2 and nurse said it was okay. He stood with CGA and ambulated to BR with Cl. Pt has difficulty picking his feet up and could not correct with vc. Pt consistently uses objects and surfaces in room for UE support during ambulation. He has a forward flexed posture while seated and standing that he was able to correct with vc. He completed oral hygiene requiring Cl to open toothpaste and mouthwash. To maintain standing balance, he leaned against the sink counter. Upon returning to bed, pts O2 measured 86%. OT put 2L NC back on pt and he recovered to 91% in 3 minutes with vc for PLB. Pts safety and independence in ADLs is most limited by his pain, balance, and endurance. Skilled OT services will address functional mobility and ADL safety. Recommend home with 24/7 supervision, home with HH.     Outcome Measures       Row Name 12/05/24 7528             How much help from another is currently needed...    Putting on and taking off regular lower body clothing? 3  -AC (r) JR (t) AC (c)      Bathing (including washing, rinsing, and drying) 3  -AC (r) JR (t) AC (c)      Toileting (which includes using toilet bed pan or urinal) 3  -AC (r) JR (t) AC (c)      Putting on and taking off regular upper body clothing 4  -AC (r) JR (t) AC (c)      Taking care of personal grooming (such as brushing teeth) 4  -AC (r) JR (t) AC (c)      Eating meals 4  -AC (r) JR (t) AC (c)      AM-PAC 6 Clicks Score (OT) 21  -AC (r) JR (t)         Functional Assessment    Outcome Measure Options AM-PAC 6 Clicks Daily Activity (OT)  -AC (r) JR (t) AC (c)                User Key  (r) = Recorded By, (t) = Taken By, (c) = Cosigned By       Initials Name Provider Type    EMMANUEL Alex Gonzalez, OTR/L, [place] Occupational Therapist    Maria Esther Burnette, OT Student OT Student                    Time Calculation:    Time Calculation- OT       Row Name 12/05/24 1017             Time Calculation- OT    OT Start Time 0812  +5 min  -AC (r) JR (t) AC (c)      OT Stop Time 0903  -AC (r) JR (t) AC (c)      OT Time Calculation (min) 51 min  -AC (r) JR (t)      OT Received On 12/05/24  -AC (r) JR (t) AC (c)      OT Goal Re-Cert Due Date 12/15/24  -AC (r) JR (t) AC (c)         Untimed Charges    OT Eval/Re-eval Minutes 56  -AC (r) JR (t) AC (c)         Total Minutes    Untimed Charges Total Minutes 56  -AC (r) JR (t)       Total Minutes 56  -AC (r) JR (t)                User Key  (r) = Recorded By, (t) = Taken By, (c) = Cosigned By      Initials Name Provider Type    Alex Barrios, OTR/L, [place] Occupational Therapist    Maria Esther Burnette, OT Student OT Student                           Maria Esther Hogue OT Student  12/5/2024

## 2024-12-05 NOTE — THERAPY EVALUATION
Patient Name: Juan Luis Collazo  : 1940    MRN: [id number]                              Today's Date: 2024       Admit Date: 2024    Visit Dx:     ICD-10-CM ICD-9-CM   1. Pneumonia of left lower lobe due to infectious organism  J18.9 486   2. Closed fracture of multiple ribs, unspecified laterality, initial encounter  S22.49XA 807.09   3. Chest pain, unspecified type  R07.9 786.50   4. Gait instability [R26.81]  R26.81 781.2     Patient Active Problem List   Diagnosis    Closed compression fracture of first lumbar vertebra    Current non-smoker    Lumbar compression fracture, closed, initial encounter    Lumbar compression fracture    S/P kyphoplasty    Numbness and tingling of right leg    Compression fracture of fourth lumbar vertebra with delayed healing    Benign hypertension    Flatback syndrome of lumbar region    Hammer toe of right foot    High cholesterol    Impaired fasting glucose    Malignant neoplasm of prostate    Osteopenia    Acute exacerbation of chronic obstructive airways disease    Back pain    Compression fracture of thoracic vertebra with routine healing    Normocytic anemia    Actinic keratosis    Hyperplastic colonic polyp    FH: colon cancer in first degree relative <60 years old    History of adenomatous polyp of colon    Decreased bone mass    Sebaceous cyst    Degenerative disc disease, cervical    Degenerative disc disease, lumbar    Degenerative disc disease, thoracic    Compression fracture of T12 vertebra with delayed healing    Age-related osteoporosis with current pathological fracture with delayed healing    Gait instability    Pain initiated by coughing    Hypoxia    Pneumonia     Past Medical History:   Diagnosis Date    Arthritis     Back pain     Cancer     CHEST, SKIN CANCER    GERD (gastroesophageal reflux disease)     History of colon polyps     History of prostate cancer     Hypertension     Low back pain     Macular degeneration     Osteopenia      Past  Surgical History:   Procedure Laterality Date    APPENDECTOMY      CATARACT EXTRACTION, BILATERAL      COLONOSCOPY  09/17/2013    Dr. José-One 2-3mm polyp at 20cm; Otherwise normal; Repeat 3 years    COLONOSCOPY N/A 12/07/2022    Procedure: COLONOSCOPY WITH ANESTHESIA;  Surgeon: Bri Alexis MD;  Location: [facility] ENDOSCOPY;  Service: Gastroenterology;  Laterality: N/A;  pre: anemia. hx polyps.  post: polyps. diverticulosis.  no PCP    ENDOSCOPY N/A 12/07/2022    Procedure: ESOPHAGOGASTRODUODENOSCOPY WITH ANESTHESIA;  Surgeon: Bri Alexis MD;  Location: [facility] ENDOSCOPY;  Service: Gastroenterology;  Laterality: N/A;  pre: anemia  post: hiatal hernia. esophagitis.gastric erosions.  no PCP    FOOT SURGERY Right     KYPHOPLASTY Bilateral 07/17/2018    Procedure: L3 KYPHOPLASTY 1-2 LEVELS;  Surgeon: Vega Pandey MD;  Location: [facility] OR;  Service: Neurosurgery    KYPHOPLASTY Bilateral 09/11/2018    Procedure: L4 KYPHOPLASTY WITH BIOPSY;  Surgeon: Vega Pandey MD;  Location:  [facility] OR;  Service: Neurosurgery    KYPHOPLASTY WITH BIOPSY N/A 01/25/2022    Procedure: KYPHOPLASTY WITH BIOPSY, THORACIC 6 and LUMBAR 5;  Surgeon: Nick Martínez MD;  Location:  [facility] OR;  Service: Neurosurgery;  Laterality: N/A;    KYPHOPLASTY WITH BIOPSY N/A 8/18/2023    Procedure: Thoracic 12, KYPHOPLASTY WITH BIOPSY;  Surgeon: Nick Martínez MD;  Location: [facility] OR;  Service: Neurosurgery;  Laterality: N/A;    PROSTATECTOMY      TONSILLECTOMY      TOTAL SHOULDER REPLACEMENT Bilateral       General Information       Row Name 12/05/24 1034          Physical Therapy Time and Intention    Document Type evaluation  Dx: Pneumonia Hx: Recurrent falls, Subacute right anterior fourth fifth sixth seventh and eighth rib fractures; Able to ambulate with FWW in hallway, needs additional training for AD prior to returning home  -MS (r) HF (t) MS (c)     Mode of Treatment physical therapy  -MS (r) HF (t) MS (c)        Row Name 12/05/24 1034          General Information    Patient Profile Reviewed yes  -MS (r) HF (t) MS (c)     Prior Level of Function independent:;all household mobility;feeding;grooming;dressing;bathing;ADL's;cooking;cleaning;driving;shopping  ambulates with single point cane  -MS (r) HF (t) MS (c)     Existing Precautions/Restrictions fall  -MS (r) HF (t) MS (c)     Barriers to Rehab medically complex;previous functional deficit  -MS (r) HF (t) MS (c)       Row Name 12/05/24 1034          Living Environment    People in Home significant other  -MS (r) HF (t) MS (c)       Row Name 12/05/24 1034          Home Main Entrance    Number of Stairs, Main Entrance one  -MS (r) HF (t) MS (c)     Stair Railings, Main Entrance none  -MS (r) HF (t) MS (c)       Row Name 12/05/24 1034          Stairs Within Home, Primary    Number of Stairs, Within Home, Primary other (see comments)  14  -MS (r) HF (t) MS (c)     Stair Railings, Within Home, Primary railing on left side (ascending)  -MS (r) HF (t) MS (c)       Row Name 12/05/24 1034          Cognition    Orientation Status (Cognition) oriented x 4  -MS (r) HF (t) MS (c)       Row Name 12/05/24 1034          Safety Issues/Impairments Affecting Functional Mobility    Safety Issues Affecting Function (Mobility) awareness of need for assistance;insight into deficits/self-awareness  -MS (r) HF (t) MS (c)     Impairments Affecting Function (Mobility) balance;endurance/activity tolerance;shortness of breath;strength  -MS (r) HF (t) MS (c)               User Key  (r) = Recorded By, (t) = Taken By, (c) = Cosigned By      Initials Name Provider Type    Marjorie Lucero, PT, DPT, NCS Physical Therapist    HF Chayo Tsang, PT Student PT Student                   Mobility       Row Name 12/05/24 1142          Bed Mobility    Bed Mobility supine-sit  -MS (r) HF (t) MS (c)     Supine-Sit [place] (Bed Mobility) standby assist  -MS (r) HF (t) MS (c)     Assistive Device (Bed  Mobility) bed rails;head of bed elevated  -MS (r) HF (t) MS (c)       Row Name 12/05/24 1142          Sit-Stand Transfer    Sit-Stand [place] (Transfers) 1 person assist;standby assist  -MS (r) HF (t) MS (c)     Assistive Device (Sit-Stand Transfers) walker, front-wheeled  -MS (r) HF (t) MS (c)       Row Name 12/05/24 1142          Gait/Stairs (Locomotion)    [place] Level (Gait) contact guard  -MS (r) HF (t) MS (c)     Assistive Device (Gait) walker, front-wheeled  -MS (r) HF (t) MS (c)     Patient was able to Ambulate yes  -MS (r) HF (t) MS (c)     Distance in Feet (Gait) 80  -MS (r) HF (t) MS (c)     Comment, (Gait/Stairs) decreased step length and narrow DEANNA; at beginning patient was not clearing RLE but able to correct with verbal cueing.  -MS (r) HF (t) MS (c)               User Key  (r) = Recorded By, (t) = Taken By, (c) = Cosigned By      Initials Name Provider Type    Marjorie Lucero R, PT, DPT, NCS Physical Therapist    HF Chayo Tsang, PT Student PT Student                   Obj/Interventions       Row Name 12/05/24 1034          Range of Motion Comprehensive    Comment, General Range of Motion AROM BLE WFL, with exception of great toe extension R limited 30%  -MS (r) HF (t) MS (c)       Row Name 12/05/24 1034          Strength Comprehensive (MMT)    Comment, General Manual Muscle Testing (MMT) Assessment BLE gross strength 4/5 with exception of EHL R at 3-/5  -MS (r) HF (t) MS (c)       Row Name 12/05/24 1034          Motor Skills    Motor Skills coordination  -MS (r) HF (t) MS (c)     Coordination bilateral;upper extremity;minimal impairment  finger opposition  -MS (r) HF (t) MS (c)       Row Name 12/05/24 1034          Balance    Balance Assessment sitting static balance;sitting dynamic balance;standing static balance;standing dynamic balance  -MS (r) HF (t) MS (c)     Static Sitting Balance independent  -MS (r) HF (t) MS (c)     Dynamic Sitting Balance standby assist  -MS (r) HF (t) MS  (c)     Position, Sitting Balance unsupported;sitting edge of bed  -MS (r) HF (t) MS (c)     Static Standing Balance standby assist  -MS (r) HF (t) MS (c)     Dynamic Standing Balance contact guard  -MS (r) HF (t) MS (c)     Position/Device Used, Standing Balance walker, 4-wheeled  -MS (r) HF (t) MS (c)       Row Name 12/05/24 1034          Sensory Assessment (Somatosensory)    Sensory Assessment (Somatosensory) LE sensation intact  BLE light touch sensation L3-5 intact; Pt states he experiences intermittent numbness down RLE. States it resolves within an hour. Pt not reporting numbness at this time  -MS (r) HF (t) MS (c)               User Key  (r) = Recorded By, (t) = Taken By, (c) = Cosigned By      Initials Name Provider Type    MS [place] Marjorie R, PT, DPT, NCS Physical Therapist    HF Chayo Tsang, PT Student PT Student                   Goals/Plan       Row Name 12/05/24 1034          Bed Mobility Goal 1 (PT)    Activity/Assistive Device (Bed Mobility Goal 1, PT) bed mobility activities, all  -MS (r) HF (t) MS (c)     [place] Level/Cues Needed (Bed Mobility Goal 1, PT) independent  -MS (r) HF (t) MS (c)     Time Frame (Bed Mobility Goal 1, PT) long term goal (LTG);by discharge  -MS (r) HF (t) MS (c)     Progress/Outcomes (Bed Mobility Goal 1, PT) new goal  -MS (r) HF (t) MS (c)       Row Name 12/05/24 1034          Transfer Goal 1 (PT)    Activity/Assistive Device (Transfer Goal 1, PT) sit-to-stand/stand-to-sit;bed-to-chair/chair-to-bed;walker, rolling  -MS (r) HF (t) MS (c)     [place] Level/Cues Needed (Transfer Goal 1, PT) modified independence  -MS (r) HF (t) MS (c)     Time Frame (Transfer Goal 1, PT) long term goal (LTG);by discharge  -MS (r) HF (t) MS (c)     Progress/Outcome (Transfer Goal 1, PT) new goal  -MS (r) HF (t) MS (c)       Row Name 12/05/24 1034          Gait Training Goal 1 (PT)    Activity/Assistive Device (Gait Training Goal 1, PT) gait (walking locomotion);assistive device  use;decrease fall risk;diminish gait deviation;improve balance and speed;increase endurance/gait distance;increase energy conservation;walker, rolling  -MS (r) HF (t) MS (c)     [place] Level (Gait Training Goal 1, PT) modified independence  -MS (r) HF (t) MS (c)     Distance (Gait Training Goal 1, PT) 150  -MS (r) HF (t) MS (c)     Time Frame (Gait Training Goal 1, PT) long term goal (LTG);by discharge  -MS (r) HF (t) MS (c)     Progress/Outcome (Gait Training Goal 1, PT) new goal  -MS (r) HF (t) MS (c)       Row Name 12/05/24 1034          Balance Goal 1 (PT)    Activity/Assistive Device (Balance Goal) sitting dynamic balance;standing static balance;standing dynamic balance;walker, rolling  -MS (r) HF (t) MS (c)     [place] Level/Cues Needed (Balance Goal 1, PT) modified independence  -MS (r) HF (t) MS (c)     Time Frame (Balance Goal 1, PT) long-term goal (LTG);by discharge  -MS (r) HF (t) MS (c)     Progress/Outcomes (Balance Goal 1, PT) --  new goal  -MS (r) HF (t) MS (c)       Row Name 12/05/24 1039          Stairs Goal 1 (PT)    Activity/Assistive Device (Stairs Goal 1, PT) ascending stairs;descending stairs;step-to-step  -MS (r) HF (t) MS (c)     [place] Level/Cues Needed (Stairs Goal 1, PT) modified independence  -MS (r) HF (t) MS (c)     Number of Stairs (Stairs Goal 1, PT) 14  -MS (r) HF (t) MS (c)     Time Frame (Stairs Goal 1, PT) long term goal (LTG);by discharge  -MS (r) HF (t) MS (c)     Progress/Outcome (Stairs Goal 1, PT) new goal  -MS (r) HF (t) MS (c)       Row Name 12/05/24 1033          Problem Specific Goal 1 (PT)    Problem Specific Goal 1 (PT) Pt will report a pain of less than or equal to 3-5/10 with ambulation or other mobility tasks  -MS (r) HF (t) MS (c)     Time Frame (Problem Specific Goal 1, PT) long-term goal (LTG);by discharge  -MS (r) HF (t) MS (c)     Progress/Outcome (Problem Specific Goal 1, PT) new goal  -MS (r) HF (t) MS (c)       Row Name 12/05/24 1035           Therapy Assessment/Plan (PT)    Planned Therapy Interventions (PT) balance training;bed mobility training;gait training;patient/family education;postural re-education;strengthening;transfer training  -MS (r) HF (t) MS (c)               User Key  (r) = Recorded By, (t) = Taken By, (c) = Cosigned By      Initials Name Provider Type    Marjorie Lucero R, PT, DPT, NCS Physical Therapist    HF Chayo Tsang, PT Student PT Student                   Clinical Impression       Row Name 12/05/24 1034          Pain    Pretreatment Pain Rating 6/10  -MS (r) HF (t) MS (c)     Posttreatment Pain Rating 8/10  -MS (r) HF (t) MS (c)     Pain Location chest  -MS (r) HF (t) MS (c)     Pain Side/Orientation anterior  -MS (r) HF (t) MS (c)     Pain Management Interventions exercise or physical activity utilized;activity modification encouraged  -MS (r) HF (t) MS (c)     Response to Pain Interventions activity participation with tolerable pain  -MS (r) HF (t) MS (c)       Row Name 12/05/24 1034          Plan of Care Review    Plan of Care Reviewed With patient  -MS (r) HF (t) MS (c)     Progress improving  -MS (r) HF (t) MS (c)     Outcome Evaluation PT eval complete. Pt presented A&Ox4 in fowlers on 2L of O2. Pt states prior to admission he was mod independent in all home and community ADLs with single point cane. Pt has a history of numberous falls and is [place]. Pt able to sit EOB with SBA and use of BR with HOB elevated. Pt demonstrated BLE AROM WFL and BLE gross strength 4/5 with exception of EHL R at 3-/5. Pt reports prior surgery on R great toe to address his hammer toe. Pt reports intermittent numbness in RLE that travels down his leg and in his feet that began post great toe surgery. Pt states it happens a few times a week and does not last more than a few hours. Pt able to stand with CGA and FWW. Pt able to ambulate in hallway with CGA and FWW on 2L of O2. Pt did not present with any signs of shortness of breath but did state  increased pain in chest. Pt educated on importance of movement. During room ambulation, pt RLE was not clearing the group. Pt able to march in place and SLS on BLE. Pt corrected gait with verbal cueing and cleared RLE in hallway. PT recommends discharge home with assist and outpatient physical therapy for AD management, increase strength, increase balance, and return to previous independence while reducing the risk of future falls.  -MS (r) HF (t) MS (c)       Row Name 12/05/24 1034          Therapy Assessment/Plan (PT)    Rehab Potential (PT) good  -MS (r) HF (t) MS (c)     Criteria for Skilled Interventions Met (PT) yes;meets criteria;skilled treatment is necessary  -MS (r) HF (t) MS (c)     Therapy Frequency (PT) 2 times/day  -MS (r) HF (t) MS (c)       Row Name 12/05/24 1034          Vital Signs    Pre SpO2 (%) 97  -MS (r) HF (t) MS (c)     O2 Delivery Pre Treatment nasal cannula  2L  -MS (r) HF (t) MS (c)     Post SpO2 (%) 93  -MS (r) HF (t) MS (c)     O2 Delivery Post Treatment nasal cannula  2L  -MS (r) HF (t) MS (c)       Row Name 12/05/24 1034          Positioning and Restraints    Pre-Treatment Position in bed  -MS (r) HF (t) MS (c)     Post Treatment Position chair  -MS (r) HF (t) MS (c)     In Chair reclined;call light within reach;encouraged to call for assist  -MS (r) HF (t) MS (c)               User Key  (r) = Recorded By, (t) = Taken By, (c) = Cosigned By      Initials Name Provider Type    Marjorie Lucero, PT, DPT, NCS Physical Therapist    HF Chayo Tsang, PT Student PT Student                   Outcome Measures       Row Name 12/05/24 1034 12/05/24 0841       How much help from another person do you currently need...    Turning from your back to your side while in flat bed without using bedrails? 3  -MS (r) HF (t) MS (c) 3  -SS    Moving from lying on back to sitting on the side of a flat bed without bedrails? 3  -MS (r) HF (t) MS (c) 3  -SS    Moving to and from a bed to a chair (including  a wheelchair)? 3  -MS (r) HF (t) MS (c) 3  -SS    Standing up from a chair using your arms (e.g., wheelchair, bedside chair)? 4  -MS (r) HF (t) MS (c) 3  -SS    Climbing 3-5 steps with a railing? 2  -MS (r) HF (t) MS (c) 3  -SS    To walk in hospital room? 3  -MS (r) HF (t) MS (c) 3  -SS    AM-PAC 6 Clicks Score (PT) 18  -MS (r) HF (t) 18  -SS    Highest Level of Mobility Goal 6 --> Walk 10 steps or more  -MS (r) HF (t) 6 --> Walk 10 steps or more  -SS      Row Name 12/05/24 1034 12/05/24 0812       Functional Assessment    Outcome Measure Options AM-PAC 6 Clicks Basic Mobility (PT)  -MS (r) HF (t) MS (c) AM-PAC 6 Clicks Daily Activity (OT)  -AC (r) JR (t) AC (c)              User Key  (r) = Recorded By, (t) = Taken By, (c) = Cosigned By      Initials Name Provider Type    AC Alex Gonzalez N, OTR/L, CNT Occupational Therapist    Marjorie Lucero, PT, DPT, NCS Physical Therapist    SS Rose Case, RN Registered Nurse    Maria Esther Burnette, OT Student OT Student     Chayo Tsang, PT Student PT Student                                 Physical Therapy Education       Title: PT OT SLP Therapies (In Progress)       Topic: Physical Therapy (In Progress)       Point: Mobility training (Done)       Learning Progress Summary            Patient Acceptance, E, DU by HF at 12/5/2024 1034    Comment: Pt educated on role of PT in care plan                      Point: Home exercise program (Not Started)       Learner Progress:  Not documented in this visit.              Point: Body mechanics (Done)       Learning Progress Summary            Patient Acceptance, E, DU by HF at 12/5/2024 1034    Comment: Pt educated on role of PT in care plan                      Point: Precautions (Not Started)       Learner Progress:  Not documented in this visit.                              User Key       Initials Effective Dates Name Provider Type [facility] 10/04/24 -  Chayo Tsang, PT Student PT Student PT                  PT  Recommendation and Plan  Planned Therapy Interventions (PT): balance training, bed mobility training, gait training, patient/family education, postural re-education, strengthening, transfer training  Progress: improving  Outcome Evaluation: PT eval complete. Pt presented A&Ox4 in fowlers on 2L of O2. Pt states prior to admission he was mod independent in all home and community ADLs with single point cane. Pt has a history of numberous falls and is [place]. Pt able to sit EOB with SBA and use of BR with HOB elevated. Pt demonstrated BLE AROM WFL and BLE gross strength 4/5 with exception of EHL R at 3-/5. Pt reports prior surgery on R great toe to address his hammer toe. Pt reports intermittent numbness in RLE that travels down his leg and in his feet that began post great toe surgery. Pt states it happens a few times a week and does not last more than a few hours. Pt able to stand with CGA and FWW. Pt able to ambulate in hallway with CGA and FWW on 2L of O2. Pt did not present with any signs of shortness of breath but did state increased pain in chest. Pt educated on importance of movement. During room ambulation, pt RLE was not clearing the group. Pt able to march in place and SLS on BLE. Pt corrected gait with verbal cueing and cleared RLE in hallway. PT recommends discharge home with assist and outpatient physical therapy for AD management, increase strength, increase balance, and return to previous independence while reducing the risk of future falls.     Time Calculation:         PT Charges       Row Name 12/05/24 1034             Time Calculation    Start Time 1034  additional 10 minutes chart review  -MS (r) HF (t) MS (c)      Stop Time 1132  -MS (r) HF (t) MS (c)      Time Calculation (min) 58 min  -MS (r) HF (t)      PT Received On 12/05/24  -MS (r) HF (t) MS (c)      PT Goal Re-Cert Due Date 12/15/24  -MS (r) HF (t) MS (c)         Timed Charges    06640 - PT Therapeutic Activity Minutes 10  -MS (r) HF (t) MS  (c)         Untimed Charges    PT Eval/Re-eval Minutes 58  -MS (r) HF (t) MS (c)         Total Minutes    Timed Charges Total Minutes 10  -MS (r) HF (t)      Untimed Charges Total Minutes 58  -MS (r) HF (t)       Total Minutes 68  -MS (r) HF (t)                User Key  (r) = Recorded By, (t) = Taken By, (c) = Cosigned By      Initials Name Provider Type    Marjorie Lucero R, PT, DPT, NCS Physical Therapist    Chayo Fraire, PT Student PT Student                      PT G-Codes  Outcome Measure Options: AM-PAC 6 Clicks Basic Mobility (PT)  AM-PAC 6 Clicks Score (PT): 18  AM-PAC 6 Clicks Score (OT): 21  PT Discharge Summary  Anticipated Discharge Disposition (PT): home with assist, home with outpatient therapy services    Chayo Tsang, PT Student  12/5/2024

## 2024-12-05 NOTE — PLAN OF CARE
Goal Outcome Evaluation:  Plan of Care Reviewed With: caregiver        Progress: improving  Outcome Evaluation: Nutrition assessment completd. Pt with reduced appetite and weight loss of 2-13 lbs per nursing nutrition screen. Per weight report pt has lost 7 lbs (4%) over approximately six months; insignficiant amount at this time. Nursing reports pt consumed 75% of one meal today. Pt admitted with pneumonia this admission. On admission pt reports he had fallen three weeks prior with fractures ribs and fell again on Thanksgiving pt with generalized chest pain on arrival. Pt with advanced age; generalized weakness. Encourage po with meals and fluid intake. Boost Orginal BID. Elevated glucose, may benefit from sliding scale insulin and accu checks. Cont to follow for plan of care.

## 2024-12-05 NOTE — H&P
[facility] Medicine Services  HISTORY AND PHYSICAL    Date of Admission: 12/4/2024  Primary Care Physician: Santiago Aaron MD    Subjective   Primary Historian: Self    Chief Complaint: Chest pain    History of Present Illness  Patient is an 84-year-old male that presents to the emergency room with chest pain following a fall that occurred this past Thursday.  Patient has a history significant for broken ribs that occurred 3 weeks ago.  Patient states that his wife was turning on the lights and he just fell backwards.  He denies hitting his hips, possible head trauma.  Past medical history is significant for arthritis, back pain, skin cancer, GERD, hypertension, and osteopenia.  Patient denies fever or nasal congestion.  Patient states that his primary care provider discontinued his antihypertensive medications weeks ago.  Unclear reason.        Review of Systems   Constitutional:  Negative for activity change, chills and fever.   HENT:  Negative for congestion.    Respiratory:  Positive for shortness of breath.    Cardiovascular:  Positive for chest pain.   Gastrointestinal:  Negative for nausea and vomiting.      Otherwise complete ROS reviewed and negative except as mentioned in the HPI.    Past Medical History:   Past Medical History:   Diagnosis Date    Arthritis     Back pain     Cancer     CHEST, SKIN CANCER    GERD (gastroesophageal reflux disease)     History of colon polyps     History of prostate cancer     Hypertension     Low back pain     Macular degeneration     Osteopenia      Past Surgical History:  Past Surgical History:   Procedure Laterality Date    APPENDECTOMY      CATARACT EXTRACTION, BILATERAL      COLONOSCOPY  09/17/2013    Dr. José-One 2-3mm polyp at 20cm; Otherwise normal; Repeat 3 years    COLONOSCOPY N/A 12/07/2022    Procedure: COLONOSCOPY WITH ANESTHESIA;  Surgeon: Bri Alexis MD;  Location: [facility] ENDOSCOPY;  Service:  Gastroenterology;  Laterality: N/A;  pre: anemia. hx polyps.  post: polyps. diverticulosis.  no PCP    ENDOSCOPY N/A 12/07/2022    Procedure: ESOPHAGOGASTRODUODENOSCOPY WITH ANESTHESIA;  Surgeon: Bri Alexis MD;  Location: [facility] ENDOSCOPY;  Service: Gastroenterology;  Laterality: N/A;  pre: anemia  post: hiatal hernia. esophagitis.gastric erosions.  no PCP    FOOT SURGERY Right     KYPHOPLASTY Bilateral 07/17/2018    Procedure: L3 KYPHOPLASTY 1-2 LEVELS;  Surgeon: Vega Pandey MD;  Location: [facility] OR;  Service: Neurosurgery    KYPHOPLASTY Bilateral 09/11/2018    Procedure: L4 KYPHOPLASTY WITH BIOPSY;  Surgeon: Vega Pandey MD;  Location:  [facility] OR;  Service: Neurosurgery    KYPHOPLASTY WITH BIOPSY N/A 01/25/2022    Procedure: KYPHOPLASTY WITH BIOPSY, THORACIC 6 and LUMBAR 5;  Surgeon: Nick Martínez MD;  Location:  [facility] OR;  Service: Neurosurgery;  Laterality: N/A;    KYPHOPLASTY WITH BIOPSY N/A 8/18/2023    Procedure: Thoracic 12, KYPHOPLASTY WITH BIOPSY;  Surgeon: Nick Martínez MD;  Location:  [facility] OR;  Service: Neurosurgery;  Laterality: N/A;    PROSTATECTOMY      TONSILLECTOMY      TOTAL SHOULDER REPLACEMENT Bilateral      Social History:  reports that he quit smoking about 33 years ago. His smoking use included cigarettes. He started smoking about 63 years ago. He has a 30 pack-year smoking history. He has never used smokeless tobacco. He reports that he does not currently use alcohol. He reports that he does not use drugs.    Family History: family history includes No Known Problems in his mother; Prostate cancer in his father.   Reviewed    Allergies:  No Known Allergies    Medications:  Prior to Admission medications    Medication Sig Start Date End Date Taking? Authorizing Provider   albuterol sulfate  (90 Base) MCG/ACT inhaler Inhale 2 puffs Every 6 (Six) Hours As Needed for Wheezing or Shortness of Air. 5/15/24   Aleida Valle APRN   denosumab  "(PROLIA) 60 MG/ML solution prefilled syringe syringe Inject 1 mL under the skin into the appropriate area as directed Every 6 (Six) Months. 5/3/23   Christie Oconnell APRN   DULoxetine (Cymbalta) 20 MG capsule Take 1 capsule by mouth Daily. 11/12/24   Santiago Aaron MD   multivitamins-minerals (PRESERVISION AREDS 2) capsule capsule Take 1 capsule by mouth 2 (Two) Times a Day.    Provider, MD Rosa Elena   traMADol-acetaminophen (ULTRACET) 37.5-325 MG per tablet Take 1 tablet by mouth Every 8 (Eight) Hours As Needed for Moderate Pain. 11/12/24   Santiago Aaron MD     I have utilized all available immediate resources to obtain, update, or review the patient's current medications (including all prescriptions, over-the-counter products, herbals, cannabis/cannabidiol products, and vitamin/mineral/dietary (nutritional) supplements).    Objective     Vital Signs: /89   Pulse 67   Temp 97.6 °F (36.4 °C) (Oral)   Resp 16   Ht 165.1 cm (65\")   Wt 79.4 kg (175 lb)   SpO2 96%   BMI 29.12 kg/m²   Physical Exam  Cardiovascular:      Rate and Rhythm: Normal rate and regular rhythm.      Comments: Midsternal chest pain on palpation  Pulmonary:      Effort: No respiratory distress.   Abdominal:      General: There is no distension.      Tenderness: There is no abdominal tenderness.   Musculoskeletal:         General: Tenderness present.   Skin:     General: Skin is cool and dry.        Constitutional:       Appearance: Normal appearance.  Oriented x 3  HENT:      Head: Normocephalic and atraumatic.      Nose: Nose normal.      Mouth/Throat:      Mouth: Mucous membranes are moist.   Eyes:      Extraocular Movements: Extraocular movements intact.      Conjunctiva/sclera: Conjunctivae normal.   Cardiovascular:      Rate and Rhythm: Normal rate and regular rhythm.      Pulses: Normal pulses.   Pulmonary:      Effort: No respiratory distress.      Breath sounds: Normal breath sounds. No wheezing, rhonchi or " rales.   Abdominal:      General: Abdomen is flat. Bowel sounds are normal.      Palpations: Abdomen is soft.      Tenderness: There is no guarding or rebound.   Musculoskeletal:         General: Normal range of motion.      Cervical back: Normal range of motion and neck supple.   Extremities:  No lower extremity edema.  Skin:     Capillary Refill: Capillary refill takes less than 2 seconds.      Coloration: Skin is not jaundiced.      Findings: No rash.   Neurological:      General: No focal deficit present.      Mental Status: Patient is alert, oriented to place time and person..         Results Reviewed:  Lab Results (last 24 hours)       Procedure Component Value Units Date/Time    Lactic Acid, Plasma [[id number]]  (Normal) Collected: 12/04/24 1846    Specimen: Blood Updated: 12/04/24 1917     Lactate 1.0 mmol/L     Blood Culture - Blood, Arm, Left [[id number]] Collected: 12/04/24 1846    Specimen: Blood from Arm, Left Updated: 12/04/24 1905    Blood Culture - Blood, Arm, Right [[id number]] Collected: 12/04/24 1846    Specimen: Blood from Arm, Right Updated: 12/04/24 1904    High Sensitivity Troponin T 2Hr [[id number]]  (Normal) Collected: 12/04/24 1714    Specimen: Blood Updated: 12/04/24 1737     HS Troponin T 13 ng/L      Troponin T Delta 0 ng/L     Narrative:      High Sensitive Troponin T Reference Range:  <14.0 ng/L- Negative Female for AMI  <22.0 ng/L- Negative Male for AMI  >=14 - Abnormal Female indicating possible myocardial injury.  >=22 - Abnormal Male indicating possible myocardial injury.   Clinicians would have to utilize clinical acumen, EKG, Troponin, and serial changes to determine if it is an Acute Myocardial Infarction or myocardial injury due to an underlying chronic condition.         Comprehensive Metabolic Panel [[id number]]  (Abnormal) Collected: 12/04/24 1509    Specimen: Blood Updated: 12/04/24 1538     Glucose 96 mg/dL      BUN 15 mg/dL      Creatinine 0.60 mg/dL      Sodium 137 mmol/L       Potassium 4.3 mmol/L      Chloride 97 mmol/L      CO2 32.0 mmol/L      Calcium 8.9 mg/dL      Total Protein 7.9 g/dL      Albumin 3.5 g/dL      ALT (SGPT) 12 U/L      AST (SGOT) 15 U/L      Alkaline Phosphatase 124 U/L      Total Bilirubin 0.3 mg/dL      Globulin 4.4 gm/dL      A/G Ratio 0.8 g/dL      BUN/Creatinine Ratio 25.0     Anion Gap 8.0 mmol/L      eGFR 94.0 mL/min/1.73     Narrative:      GFR Normal >60  Chronic Kidney Disease <60  Kidney Failure <15    The GFR formula is only valid for adults with stable renal function between ages 18 and 70.    High Sensitivity Troponin T [[id number]]  (Normal) Collected: 12/04/24 1509    Specimen: Blood Updated: 12/04/24 1535     HS Troponin T 13 ng/L     Narrative:      High Sensitive Troponin T Reference Range:  <14.0 ng/L- Negative Female for AMI  <22.0 ng/L- Negative Male for AMI  >=14 - Abnormal Female indicating possible myocardial injury.  >=22 - Abnormal Male indicating possible myocardial injury.   Clinicians would have to utilize clinical acumen, EKG, Troponin, and serial changes to determine if it is an Acute Myocardial Infarction or myocardial injury due to an underlying chronic condition.         Protime-INR [[id number]]  (Normal) Collected: 12/04/24 1509    Specimen: Blood Updated: 12/04/24 1527     Protime 13.7 Seconds      INR 1.01    CBC & Differential [[id number]]  (Abnormal) Collected: 12/04/24 1509    Specimen: Blood Updated: 12/04/24 1521    Narrative:      The following orders were created for panel order CBC & Differential.  Procedure                               Abnormality         Status                     ---------                               -----------         ------                     CBC Auto Differential[[id number]]        Abnormal            Final result                 Please view results for these tests on the individual orders.    CBC Auto Differential [[id number]]  (Abnormal) Collected: 12/04/24 1509    Specimen: Blood Updated:  12/04/24 1521     WBC 5.06 10*3/mm3      RBC 4.16 10*6/mm3      Hemoglobin 12.3 g/dL      Hematocrit 38.1 %      MCV 91.6 fL      MCH 29.6 pg      MCHC 32.3 g/dL      RDW 13.2 %      RDW-SD 44.0 fl      MPV 9.7 fL      Platelets 270 10*3/mm3      Neutrophil % 73.2 %      Lymphocyte % 12.5 %      Monocyte % 11.5 %      Eosinophil % 1.2 %      Basophil % 0.4 %      Immature Grans % 1.2 %      Neutrophils, Absolute 3.71 10*3/mm3      Lymphocytes, Absolute 0.63 10*3/mm3      Monocytes, Absolute 0.58 10*3/mm3      Eosinophils, Absolute 0.06 10*3/mm3      Basophils, Absolute 0.02 10*3/mm3      Immature Grans, Absolute 0.06 10*3/mm3      nRBC 0.0 /100 WBC           Imaging Results (Last 24 Hours)       Procedure Component Value Units Date/Time    CT Thoracic Spine Without Contrast [[id number]] Collected: 12/04/24 1709     Updated: 12/04/24 1720    Narrative:      EXAM: CT THORACIC SPINE WO CONTRAST-      DATE: 12/4/2024 3:03 PM     HISTORY: fall; trauma       COMPARISON: None available.     DOSE LENGTH PRODUCT: 2028.77 mGy.cm  Automated exposure control was also  utilized to decrease patient radiation dose.     TECHNIQUE: Unenhanced CT images of the thoracic spine were obtained with  multiplanar reformats.     FINDINGS:     There is osteopenia. Patient is status post vertebroplasty at T12 and  T7. There is loss of height at the T1 and T3 vertebral bodies and  represent chronic compression fractures. These are chronic fractures and  unchanged from a prior CT of the chest from 1/28/2022. Facet joints are  aligned bilaterally. There is multilevel facet arthropathy. There is  chronic loss of height anteriorly at T9 and T8. No acute fracture is  identified. There is levoscoliosis.     The visualized paravertebral soft tissues are unremarkable.     Visualized lungs are clear.       Impression:         Osteopenia and levoscoliosis and chronic compression fractures as above.  No acute fracture or spondylolisthesis identified.      This report was signed and finalized on 12/4/2024 5:17 PM by Crow Alvarez.       CT Chest Without Contrast Diagnostic [[id number]] Collected: 12/04/24 1704     Updated: 12/04/24 1712    Narrative:      EXAM: CT CHEST WO CONTRAST DIAGNOSTIC-      DATE: 12/4/2024 3:03 PM     HISTORY: fall; trauma       COMPARISON: 1/28/2022.     DOSE LENGTH PRODUCT: 2028.77 mGy.cm mGy cm. Automatic exposure control  was utilized to make radiation dose as low as reasonably achievable.     TECHNIQUE: Unenhanced CT images of the chest obtained with multiplanar  reformats.     FINDINGS:     MEDIASTINUM/EXTRATHORACIC: There is calcification of the thoracic aorta  which is ectatic and torturous and coronary artery calcification. There  is calcification at the aortic valve. There is cardiomegaly without  pericardial effusion. No significant pleural effusion is identified. No  thoracic lymphadenopathy is seen.     LUNGS/AIRWAYS: There is scarring at the lung apices. An area of opacity  and volume loss in the left lower lobe may be due to atelectasis or  pneumonia. There is mild atelectasis at the right lung base. There is no  pneumothorax.     INCLUDED UPPER ABDOMEN: The visualized portions of the upper abdomen are  within normal limits.     SOFT TISSUES: There is bilateral gynecomastia left greater than right  which is unchanged.     BONES: No suspicious osseous lesion identified.The there are fractures  of the right anterior 4, 5, 6, 7, and 8 ribs. Most of these fractures  appear subacute. The right sixth rib fracture may be acute.       Impression:      1. Opacity and volume loss at the left lung base medially may be  atelectasis or pneumonia.  2. Multi right rib fractures without significant displacement. The right  anterior sixth rib fracture may be acute.     This report was signed and finalized on 12/4/2024 5:09 PM by Crow Alvarez.       CT Head Without Contrast [[id number]] Collected: 12/04/24 1652     Updated: 12/04/24 1700     Narrative:      EXAM: CT HEAD WO CONTRAST-      DATE: 12/4/2024 3:03 PM     HISTORY: fall; trauma       COMPARISON: 12/14/2022.     DOSE LENGTH PRODUCT: 2029 mGy centimeters automated exposure control was  also utilized to decrease patient radiation dose.     TECHNIQUE: Unenhanced CT images obtained from vertex to skull base with  multiplanar reformats.     FINDINGS:  There is no acute intracranial hemorrhage, midline shift, mass effect,  or hydrocephalus. There is no CT evidence for acute infarct.     There are chronic changes with volume loss and chronic small vessel  ischemic change of the periventricular white matter.      SOFT TISSUES: There is a small right superior frontal scalp hematoma.        SINUS:The visualized paranasal sinuses and mastoid air cells are clear.      ORBITS: The visualized orbits and globes are unremarkable. There is  bilateral lens extraction.          Impression:      1. Chronic changes and no acute intracranial findings.   2. Small right superior frontal scalp hematoma.     This report was signed and finalized on 12/4/2024 4:57 PM by Crow Alvarez.       CT Lumbar Spine Without Contrast [[id number]] Collected: 12/04/24 1628     Updated: 12/04/24 1639    Narrative:      CT LUMBAR SPINE WO CONTRAST- 12/4/2024 3:03 PM     HISTORY: fall; trauma      COMPARISON: 6/14/2018     TOTAL DOSE LENGTH PRODUCT: 2028.77 mGy.cm. Automated exposure control  was also utilized to decrease patient radiation dose.     TECHNIQUE: Axial images of the lumbar spine are obtained with sagittal  and coronal reconstructions     FINDINGS: A rudimentary disc at the S1/2 level is assumed when counting  vertebral bodies. There are kyphoplasty changes of the T12, L3, L4, and  L5 vertebra. Loss of height is greatest at L3 and L5 measuring  approximately 35%. Mild chronic compression of the superior plate of L2.  No L1 compression deformity. Old healed right sacral ala fracture. No  acute lumbar vertebral fracture  identified. Moderate to advanced lower  lumbar facet osteoarthropathy.     Mild to moderate central lumbar canal stenosis with severe right and  moderate L5-S1 foraminal stenosis. Moderate right L4/5 foraminal  narrowing. Advanced degenerative disc changes at L5-S1.     Diffuse vascular calcification.          Impression:      1. Advanced osteopenia. Old compression deformities involving T12 L2,  L3, L4, and L5 with kyphoplasty changes at each level except for L2. No  convincing acute lumbar vertebral fracture or traumatic malalignment  2. Degenerative changes resulting in severe right L5-S1 foraminal  narrowing. Moderate right L4/5 and left L5-S1 foraminal stenosis also  present..     This report was signed and finalized on 12/4/2024 4:36 PM by Dr. Pascale Cade MD.       XR Chest 1 View [[id number]] Collected: 12/04/24 1530     Updated: 12/04/24 1535    Narrative:      EXAMINATION:  XR CHEST 1 VW-  12/4/2024 2:05 PM     HISTORY: Chest pain.     COMPARISON: 10/24/2023.     TECHNIQUE: Single view AP image.     FINDINGS: There is patchy opacity within the left lung base. No other  focal infiltrate is seen. There is stable bronchial wall thickening.  There is mild cardiomegaly. There is a probable coronary artery stent.  There is atheromatous disease of the thoracic aorta. There are  degenerative changes of the spine with scoliosis. There are compression  deformities with prior kyphoplasty. Prior bilateral shoulder  arthroplasty.          Impression:      1. New opacities in the left lung base likely due to pneumonia or  atelectasis. Follow-up recommended to document resolution.  2. Stable bronchial wall thickening.  3. Mild cardiomegaly.           This report was signed and finalized on 12/4/2024 3:32 PM by Dr. Randy Chatterjee MD.             I have personally reviewed and interpreted the radiology studies and ECG obtained at time of admission.     Assessment / Plan   Assessment:   Active Hospital Problems     Diagnosis     **Pneumonia        Recurrent falls  Subacute right anterior fourth fifth sixth seventh and eighth rib fractures  Small right frontal scalp hematoma  Questionable atelectasis versus left lower lung pneumonia  Hypertension  Chronic thoracic and lumbar compression fractures  History of kyphoplasty          CT chest  The there are fractures  of the right anterior 4, 5, 6, 7, and 8 ribs. Most of these fractures  appear subacute. The right sixth rib fracture may be acute.    1. Opacity and volume loss at the left lung base medially may be  atelectasis or pneumonia.  2. Multi right rib fractures without significant displacement. The right  anterior sixth rib fracture may be acute.    Troponins negative.  Sodium 137.  Potassium 4.3 creatinine 0.6  Hemoglobin 12.3      Treatment Plan  The patient will be admitted to my service here at [facility].      Pain control as needed  Incentive spirometer  Patient received ceftriaxone and azithromycin in the emergency department, as well as a dose of Solu-Medrol 125 mg IV  And questionable pneumonia on CT scan, and reports of cough, I will treat with Augmentin for 5 days  Nurse to perform orthostatic vital signs  Given elevated blood pressure, states that he he had his antihypertensives discontinued some weeks ago, was taking olmesartan 40 p.o. daily.  Will restart at a lower dose losartan 25 p.o. daily and monitor blood pressure  PT OT evaluation    Lovenox DVT prophylaxis        Medical Decision Making  Number and Complexity of problems: 5, moderate complexity  Differential Diagnosis: See above    Conditions and Status        Condition is unchanged.     [facility] Data  External documents reviewed: None  Cardiac tracing (EKG, telemetry) interpretation: Sinus rhythm  Radiology interpretation: Radiology reports reviewed  Labs reviewed: Yes  Any tests that were considered but not ordered: No     Decision rules/scores evaluated (example [id number], Wells, etc): See  chart     Discussed with: Patient     Care Planning  Shared decision making: With patient  Code status and discussions: Full code    Disposition  Social Determinants of Health that impact treatment or disposition: None  Estimated length of stay is 1 to 2 days.     I confirmed that the patient's advanced care plan is present, code status is documented, and a surrogate decision maker is listed in the patient's medical record.     The patient's surrogate decision maker is family member.     Electronically signed by Art Lizarraga MD, 12/04/24, 20:09 CST.

## 2024-12-05 NOTE — PROGRESS NOTES
[facility] Medicine Services  INPATIENT PROGRESS NOTE    Patient Name: Juan Luis Collazo  Date of Admission: 12/4/2024  Today's Date: 12/05/24  Length of Stay: 1  Primary Care Physician: Santiago Aaron MD    Subjective   Chief Complaint: Follow-up  HPI   Patient was admitted overnight and presented with chest pain following a fall that occurred past Thursday.  He has known broken ribs that occurred 3 weeks ago which on imaging study during this hospitalization confirmed multi right rib fractures without significant displacement.  The right anterior sixth rib fracture may be acute.  In addition to this he was described to have opacity and volume loss of the left lung base which may be atelectasis or pneumonia    His CT of the lumbar spine described advanced osteopenia and old compression deformities involving T12, L2, L3, L4 and L5 with kyphoplasty changes at each level except for L2.  No convincing acute lumbar fracture or traumatic malalignment.  He also has moderate right L4-L5 and L5-S1 foraminal stenosis.  CT of thoracic spine described chronic compression fractures  Head CT scan showed small right superior frontal scalp hematoma.    He is hemodynamically stable.  He is hypertensive.  He was started back on ARB during this hospitalization    He had some hypoxic events at around 1516 hrs. on December 4.  He has been on supplemental oxygen anywhere from 1 to 2 L    He is afebrile with Tmax of 98.9  Normal white count  Mildly hyponatremic of unclear significance  He has no evidence of urinary tract infection.  Troponin T is negative    Probably related to concern of opacity in the CT of the chest, patient was started on Augmentin.  Patient received Zithromax and ceftriaxone in the emergency room  CODE STATUS is full code  History of fall, I think it is reasonable to get PT OT involvement get functional capacity assessment and check other safety precautionary measures we could  "recommend.  Significant other at bedside  Review of Systems   All pertinent negatives and positives are as above. All other systems have been reviewed and are negative unless otherwise stated.     Objective    Temp:  [97.6 °F (36.4 °C)-98.9 °F (37.2 °C)] 97.6 °F (36.4 °C)  Heart Rate:  [67-90] 77  Resp:  [16-18] 16  BP: (137-185)/(67-89) 151/82  Physical Exam  Seated comfortably on recliner; appears chronically ill  No distress  On supplemental oxygen  Diminished breath sounds, diminished inspiratory effort.  Adequate air exchange.  Encouraged him to take some deep breathing.  He has incentive spirometry in front of him  S1-S2 regular rate and rhythm, no gross murmur  Soft abdomen, nontender, no organomegaly  No gross cyanosis or edema  Appropriate affect        Results Review:  I have reviewed the labs, radiology results, and diagnostic studies.    Laboratory Data:   Results from last 7 days   Lab Units 12/05/24  0930 12/04/24  1509   WBC 10*3/mm3 5.83 5.06   HEMOGLOBIN g/dL 11.6* 12.3*   HEMATOCRIT % 36.6* 38.1   PLATELETS 10*3/mm3 290 270        Results from last 7 days   Lab Units 12/05/24  0930 12/04/24  1509   SODIUM mmol/L 133* 137   POTASSIUM mmol/L 4.8 4.3   CHLORIDE mmol/L 94* 97*   CO2 mmol/L 30.0* 32.0*   BUN mg/dL 20 15   CREATININE mg/dL 0.78 0.60*   CALCIUM mg/dL 8.6 8.9   BILIRUBIN mg/dL 0.2 0.3   ALK PHOS U/L 118* 124*   ALT (SGPT) U/L 13 12   AST (SGOT) U/L 20 15   GLUCOSE mg/dL 217* 96       Culture Data:   No results found for: \"BLOODCX\", \"URINECX\", \"WOUNDCX\", \"MRSACX\", \"RESPCX\", \"STOOLCX\"    Radiology Data:   Imaging Results (Last 24 Hours)       Procedure Component Value Units Date/Time    CT Thoracic Spine Without Contrast [[id number]] Collected: 12/04/24 1709     Updated: 12/04/24 1720    Narrative:      EXAM: CT THORACIC SPINE WO CONTRAST-      DATE: 12/4/2024 3:03 PM     HISTORY: fall; trauma       COMPARISON: None available.     DOSE LENGTH PRODUCT: 2028.77 mGy.cm  Automated exposure " control was also  utilized to decrease patient radiation dose.     TECHNIQUE: Unenhanced CT images of the thoracic spine were obtained with  multiplanar reformats.     FINDINGS:     There is osteopenia. Patient is status post vertebroplasty at T12 and  T7. There is loss of height at the T1 and T3 vertebral bodies and  represent chronic compression fractures. These are chronic fractures and  unchanged from a prior CT of the chest from 1/28/2022. Facet joints are  aligned bilaterally. There is multilevel facet arthropathy. There is  chronic loss of height anteriorly at T9 and T8. No acute fracture is  identified. There is levoscoliosis.     The visualized paravertebral soft tissues are unremarkable.     Visualized lungs are clear.       Impression:         Osteopenia and levoscoliosis and chronic compression fractures as above.  No acute fracture or spondylolisthesis identified.     This report was signed and finalized on 12/4/2024 5:17 PM by Crow Alvarez.       CT Chest Without Contrast Diagnostic [[id number]] Collected: 12/04/24 1704     Updated: 12/04/24 1712    Narrative:      EXAM: CT CHEST WO CONTRAST DIAGNOSTIC-      DATE: 12/4/2024 3:03 PM     HISTORY: fall; trauma       COMPARISON: 1/28/2022.     DOSE LENGTH PRODUCT: 2028.77 mGy.cm mGy cm. Automatic exposure control  was utilized to make radiation dose as low as reasonably achievable.     TECHNIQUE: Unenhanced CT images of the chest obtained with multiplanar  reformats.     FINDINGS:     MEDIASTINUM/EXTRATHORACIC: There is calcification of the thoracic aorta  which is ectatic and torturous and coronary artery calcification. There  is calcification at the aortic valve. There is cardiomegaly without  pericardial effusion. No significant pleural effusion is identified. No  thoracic lymphadenopathy is seen.     LUNGS/AIRWAYS: There is scarring at the lung apices. An area of opacity  and volume loss in the left lower lobe may be due to atelectasis  or  pneumonia. There is mild atelectasis at the right lung base. There is no  pneumothorax.     INCLUDED UPPER ABDOMEN: The visualized portions of the upper abdomen are  within normal limits.     SOFT TISSUES: There is bilateral gynecomastia left greater than right  which is unchanged.     BONES: No suspicious osseous lesion identified.The there are fractures  of the right anterior 4, 5, 6, 7, and 8 ribs. Most of these fractures  appear subacute. The right sixth rib fracture may be acute.       Impression:      1. Opacity and volume loss at the left lung base medially may be  atelectasis or pneumonia.  2. Multi right rib fractures without significant displacement. The right  anterior sixth rib fracture may be acute.     This report was signed and finalized on 12/4/2024 5:09 PM by Crow Alvarez.       CT Head Without Contrast [[id number]] Collected: 12/04/24 1652     Updated: 12/04/24 1700    Narrative:      EXAM: CT HEAD WO CONTRAST-      DATE: 12/4/2024 3:03 PM     HISTORY: fall; trauma       COMPARISON: 12/14/2022.     DOSE LENGTH PRODUCT: 2029 mGy centimeters automated exposure control was  also utilized to decrease patient radiation dose.     TECHNIQUE: Unenhanced CT images obtained from vertex to skull base with  multiplanar reformats.     FINDINGS:  There is no acute intracranial hemorrhage, midline shift, mass effect,  or hydrocephalus. There is no CT evidence for acute infarct.     There are chronic changes with volume loss and chronic small vessel  ischemic change of the periventricular white matter.      SOFT TISSUES: There is a small right superior frontal scalp hematoma.        SINUS:The visualized paranasal sinuses and mastoid air cells are clear.      ORBITS: The visualized orbits and globes are unremarkable. There is  bilateral lens extraction.          Impression:      1. Chronic changes and no acute intracranial findings.   2. Small right superior frontal scalp hematoma.     This report was  signed and finalized on 12/4/2024 4:57 PM by Crow Alvarez.       CT Lumbar Spine Without Contrast [[id number]] Collected: 12/04/24 1628     Updated: 12/04/24 1639    Narrative:      CT LUMBAR SPINE WO CONTRAST- 12/4/2024 3:03 PM     HISTORY: fall; trauma      COMPARISON: 6/14/2018     TOTAL DOSE LENGTH PRODUCT: 2028.77 mGy.cm. Automated exposure control  was also utilized to decrease patient radiation dose.     TECHNIQUE: Axial images of the lumbar spine are obtained with sagittal  and coronal reconstructions     FINDINGS: A rudimentary disc at the S1/2 level is assumed when counting  vertebral bodies. There are kyphoplasty changes of the T12, L3, L4, and  L5 vertebra. Loss of height is greatest at L3 and L5 measuring  approximately 35%. Mild chronic compression of the superior plate of L2.  No L1 compression deformity. Old healed right sacral ala fracture. No  acute lumbar vertebral fracture identified. Moderate to advanced lower  lumbar facet osteoarthropathy.     Mild to moderate central lumbar canal stenosis with severe right and  moderate L5-S1 foraminal stenosis. Moderate right L4/5 foraminal  narrowing. Advanced degenerative disc changes at L5-S1.     Diffuse vascular calcification.          Impression:      1. Advanced osteopenia. Old compression deformities involving T12 L2,  L3, L4, and L5 with kyphoplasty changes at each level except for L2. No  convincing acute lumbar vertebral fracture or traumatic malalignment  2. Degenerative changes resulting in severe right L5-S1 foraminal  narrowing. Moderate right L4/5 and left L5-S1 foraminal stenosis also  present..     This report was signed and finalized on 12/4/2024 4:36 PM by Dr. Pascale Cade MD.       XR Chest 1 View [[id number]] Collected: 12/04/24 1530     Updated: 12/04/24 1535    Narrative:      EXAMINATION:  XR CHEST 1 VW-  12/4/2024 2:05 PM     HISTORY: Chest pain.     COMPARISON: 10/24/2023.     TECHNIQUE: Single view AP image.     FINDINGS:  There is patchy opacity within the left lung base. No other  focal infiltrate is seen. There is stable bronchial wall thickening.  There is mild cardiomegaly. There is a probable coronary artery stent.  There is atheromatous disease of the thoracic aorta. There are  degenerative changes of the spine with scoliosis. There are compression  deformities with prior kyphoplasty. Prior bilateral shoulder  arthroplasty.          Impression:      1. New opacities in the left lung base likely due to pneumonia or  atelectasis. Follow-up recommended to document resolution.  2. Stable bronchial wall thickening.  3. Mild cardiomegaly.           This report was signed and finalized on 12/4/2024 3:32 PM by Dr. Randy Chatterjee MD.               I have reviewed the patient's current medications.     Assessment/Plan   Assessment  Active Hospital Problems    Diagnosis     **Pneumonia              Medical Decision Making  Number and Complexity of problems:   Abnormal imaging of chest concern for pneumonia versus atelectasis in the setting of subacute right anterior fourth fifth sixth seventh and eighth rib fractures   Small right frontal scalp hematoma  Hypertension  Chronic thoracic and lumbar compression fractures  History of kyphoplasty      Treatment Plan  He and significant other wished to be referred to rehab facility  I requested social service for consultation  Continue on PT OT  Continue empiric antibiotic  Incentive spirometry  Orthostatic blood pressure reading  --did not show any evidence of it.  In fact blood pressure went up.  Heart rate was not recorded.        Conditions and Status    fair  MDM Data  External documents reviewed: reviewed epic   Cardiac tracing (EKG, telemetry) interpretation: -  Radiology interpretation: reviewed  Labs reviewed: y  Any tests that were considered but not ordered: none     Decision rules/scores evaluated (example [id number], Wells, etc): none     Discussed with: Patient and significant  other, nursing staff     Care Planning  Shared decision making: Patient and significant other  Code status and discussions: Full code  I confirmed that the patient's advanced care plan is present, code status is documented, and a surrogate decision maker is listed in the patient's medical record.    Disposition  Social Determinants of Health that impact treatment or disposition: None identified at this time.  Family and patient requesting placement.  I expect the patient to be discharged to (TBD).         Electronically signed by Asaf Washburn MD, 12/05/24, 12:38 CST.

## 2024-12-06 PROCEDURE — 97110 THERAPEUTIC EXERCISES: CPT

## 2024-12-06 PROCEDURE — 97535 SELF CARE MNGMENT TRAINING: CPT

## 2024-12-06 PROCEDURE — 97116 GAIT TRAINING THERAPY: CPT

## 2024-12-06 PROCEDURE — 97530 THERAPEUTIC ACTIVITIES: CPT

## 2024-12-06 PROCEDURE — 25010000002 ENOXAPARIN PER 10 MG: Performed by: FAMILY MEDICINE

## 2024-12-06 RX ORDER — OXYCODONE AND ACETAMINOPHEN 5; 325 MG/1; MG/1
1 TABLET ORAL EVERY 4 HOURS PRN
Status: DISCONTINUED | OUTPATIENT
Start: 2024-12-06 | End: 2024-12-11 | Stop reason: HOSPADM

## 2024-12-06 RX ORDER — OLMESARTAN MEDOXOMIL 20 MG/1
30 TABLET ORAL DAILY
COMMUNITY
End: 2024-12-18 | Stop reason: SDUPTHER

## 2024-12-06 RX ADMIN — Medication 10 ML: at 08:04

## 2024-12-06 RX ADMIN — ACETAMINOPHEN 650 MG: 325 TABLET, FILM COATED ORAL at 10:38

## 2024-12-06 RX ADMIN — ACETAMINOPHEN 650 MG: 325 TABLET, FILM COATED ORAL at 17:11

## 2024-12-06 RX ADMIN — LIDOCAINE 1 PATCH: 0.04 PATCH TOPICAL at 08:03

## 2024-12-06 RX ADMIN — OXYCODONE HYDROCHLORIDE AND ACETAMINOPHEN 1 TABLET: 5; 325 TABLET ORAL at 20:25

## 2024-12-06 RX ADMIN — ENOXAPARIN SODIUM 30 MG: 100 INJECTION SUBCUTANEOUS at 20:25

## 2024-12-06 RX ADMIN — Medication 10 ML: at 20:25

## 2024-12-06 RX ADMIN — OXYCODONE HYDROCHLORIDE AND ACETAMINOPHEN 1 TABLET: 5; 325 TABLET ORAL at 07:56

## 2024-12-06 RX ADMIN — OXYCODONE HYDROCHLORIDE AND ACETAMINOPHEN 1 TABLET: 5; 325 TABLET ORAL at 14:39

## 2024-12-06 RX ADMIN — Medication 5 MG: at 20:25

## 2024-12-06 RX ADMIN — AMOXICILLIN AND CLAVULANATE POTASSIUM 1 TABLET: 875; 125 TABLET, FILM COATED ORAL at 08:04

## 2024-12-06 RX ADMIN — AMOXICILLIN AND CLAVULANATE POTASSIUM 1 TABLET: 875; 125 TABLET, FILM COATED ORAL at 20:25

## 2024-12-06 RX ADMIN — LOSARTAN POTASSIUM 25 MG: 25 TABLET, FILM COATED ORAL at 08:04

## 2024-12-06 NOTE — PLAN OF CARE
Goal Outcome Evaluation:  Plan of Care Reviewed With: patient, family        Progress: improving  Outcome Evaluation: OT completed treatment. Pt alert and oriented x4 with nsging reporting his BP has returned to normal and he is okay to work with. Pt stood with SBA and vc and ambulated to BR with CGA to BR. He completed oral hygiene with SBA standing sinkside. He completed toileting with supervision. Pt participated in balance activity with CGA and fww of retrieving gloves from around the room at different heights and surfaces. OT recommended use of fww at home, and pt and family agreed stating they have ordered one for him to use. Continue OT POC. Recommend home with assist, home with outpatient therapy services at discharge.    Anticipated Discharge Disposition (OT): home with outpatient therapy services, home with assist

## 2024-12-06 NOTE — PLAN OF CARE
Goal Outcome Evaluation:  Plan of Care Reviewed With: patient        Progress: improving  Outcome Evaluation: Pt c/o pain pain throughout shift. Medicated with PO pain meds per pt request. Safety maintained. A/o x4. IV IID. Up with asst x1. Voiding per BRP. Lidodaine patch in place to left side of chest. Cont to monitor.

## 2024-12-06 NOTE — THERAPY TREATMENT NOTE
Acute Care - Physical Therapy Treatment Note  [facility]     Patient Name: Juan Luis Collazo  : 1940  MRN: [id number]  Today's Date: 2024   Onset of Illness/Injury or Date of Surgery: 24  Visit Dx:     ICD-10-CM ICD-9-CM   1. Pneumonia of left lower lobe due to infectious organism  J18.9 486   2. Closed fracture of multiple ribs, unspecified laterality, initial encounter  S22.49XA 807.09   3. Chest pain, unspecified type  R07.9 786.50   4. Gait instability [R26.81]  R26.81 781.2     Patient Active Problem List   Diagnosis    Closed compression fracture of first lumbar vertebra    Current non-smoker    Lumbar compression fracture, closed, initial encounter    Lumbar compression fracture    S/P kyphoplasty    Numbness and tingling of right leg    Compression fracture of fourth lumbar vertebra with delayed healing    Benign hypertension    Flatback syndrome of lumbar region    Hammer toe of right foot    High cholesterol    Impaired fasting glucose    Malignant neoplasm of prostate    Osteopenia    Acute exacerbation of chronic obstructive airways disease    Back pain    Compression fracture of thoracic vertebra with routine healing    Normocytic anemia    Actinic keratosis    Hyperplastic colonic polyp    FH: colon cancer in first degree relative <60 years old    History of adenomatous polyp of colon    Decreased bone mass    Sebaceous cyst    Degenerative disc disease, cervical    Degenerative disc disease, lumbar    Degenerative disc disease, thoracic    Compression fracture of T12 vertebra with delayed healing    Age-related osteoporosis with current pathological fracture with delayed healing    Gait instability    Pain initiated by coughing    Hypoxia    Pneumonia     Past Medical History:   Diagnosis Date    Arthritis     Back pain     Cancer     CHEST, SKIN CANCER    GERD (gastroesophageal reflux disease)     History of colon polyps     History of prostate cancer     Hypertension     Low back  pain     Macular degeneration     Osteopenia      Past Surgical History:   Procedure Laterality Date    APPENDECTOMY      CATARACT EXTRACTION, BILATERAL      COLONOSCOPY  09/17/2013    Dr. José-One 2-3mm polyp at 20cm; Otherwise normal; Repeat 3 years    COLONOSCOPY N/A 12/07/2022    Procedure: COLONOSCOPY WITH ANESTHESIA;  Surgeon: Bri Alexis MD;  Location: [facility] ENDOSCOPY;  Service: Gastroenterology;  Laterality: N/A;  pre: anemia. hx polyps.  post: polyps. diverticulosis.  no PCP    ENDOSCOPY N/A 12/07/2022    Procedure: ESOPHAGOGASTRODUODENOSCOPY WITH ANESTHESIA;  Surgeon: Bri Alexis MD;  Location: [facility] ENDOSCOPY;  Service: Gastroenterology;  Laterality: N/A;  pre: anemia  post: hiatal hernia. esophagitis.gastric erosions.  no PCP    FOOT SURGERY Right     KYPHOPLASTY Bilateral 07/17/2018    Procedure: L3 KYPHOPLASTY 1-2 LEVELS;  Surgeon: Vega Pandey MD;  Location: [facility] OR;  Service: Neurosurgery    KYPHOPLASTY Bilateral 09/11/2018    Procedure: L4 KYPHOPLASTY WITH BIOPSY;  Surgeon: Vega Pandey MD;  Location: [facility] OR;  Service: Neurosurgery    KYPHOPLASTY WITH BIOPSY N/A 01/25/2022    Procedure: KYPHOPLASTY WITH BIOPSY, THORACIC 6 and LUMBAR 5;  Surgeon: Nick Martínez MD;  Location: [facility] OR;  Service: Neurosurgery;  Laterality: N/A;    KYPHOPLASTY WITH BIOPSY N/A 8/18/2023    Procedure: Thoracic 12, KYPHOPLASTY WITH BIOPSY;  Surgeon: Nick Martínez MD;  Location: [facility] OR;  Service: Neurosurgery;  Laterality: N/A;    PROSTATECTOMY      TONSILLECTOMY      TOTAL SHOULDER REPLACEMENT Bilateral      PT Assessment (Last 12 Hours)       PT Evaluation and Treatment       Row Name 12/06/24 1402          Physical Therapy Time and Intention    Subjective Information complains of;pain  -LY     Document Type therapy note (daily note)  -LY     Mode of Treatment physical therapy  -LY       Row Name 12/06/24 9380          General Information    Existing  Precautions/Restrictions fall;oxygen therapy device and L/min  2L NC  -LY       Row Name 12/06/24 1405          Pain    Pretreatment Pain Rating 7/10  -LY     Posttreatment Pain Rating 7/10  -LY     Pain Location flank;chest  -LY     Pain Side/Orientation left  -LY     Pain Management Interventions exercise or physical activity utilized  -LY     Response to Pain Interventions activity participation with tolerable pain  -LY       Row Name 12/06/24 1405          Bed Mobility    Sit-Supine [place] (Bed Mobility) verbal cues;standby assist  -LY     Comment, (Bed Mobility) chair  -LY       Row Name 12/06/24 1405          Sit-Stand Transfer    Sit-Stand [place] (Transfers) standby assist;verbal cues  -LY     Assistive Device (Sit-Stand Transfers) walker, front-wheeled  -LY       Row Name 12/06/24 1405          Stand-Sit Transfer    Stand-Sit [place] (Transfers) standby assist;verbal cues  -LY       Row Name 12/06/24 1405          Gait/Stairs (Locomotion)    [place] Level (Gait) verbal cues;contact guard  -LY     Assistive Device (Gait) walker, front-wheeled  -LY     Distance in Feet (Gait) 120  -LY     Pattern (Gait) step-through  -LY     Deviations/Abnormal Patterns (Gait) gait speed decreased;stride length decreased  -LY     Bilateral Gait Deviations forward flexed posture  -LY       Row Name 12/06/24 1405          Motor Skills    Comments, Therapeutic Exercise BLE AROM x15 reps seated  -LY     Additional Documentation Comments, Therapeutic Exercise (Row)  -LY       Row Name 12/06/24 1405          Plan of Care Review    Plan of Care Reviewed With patient;spouse  -LY     Progress improving  -LY       Row Name 12/06/24 1405          Positioning and Restraints    Pre-Treatment Position sitting in chair/recliner  -LY     Post Treatment Position bed  -LY     In Bed fowlers;call light within reach;encouraged to call for assist;with family/caregiver  -LY               User Key  (r) = Recorded By, (t) =  Taken By, (c) = Cosigned By      Initials Name Provider Type    Peggy Mendez, PTA Physical Therapist Assistant                    Physical Therapy Education       Title: PT OT SLP Therapies (In Progress)       Topic: Physical Therapy (In Progress)       Point: Mobility training (Done)       Learning Progress Summary            Patient Acceptance, E, DU by  at 12/5/2024 1034    Comment: Pt educated on role of PT in care plan                      Point: Home exercise program (Not Started)       Learner Progress:  Not documented in this visit.              Point: Body mechanics (Done)       Learning Progress Summary            Patient Acceptance, E, DU by  at 12/5/2024 1034    Comment: Pt educated on role of PT in care plan                      Point: Precautions (Not Started)       Learner Progress:  Not documented in this visit.                              User Key       Initials Effective Dates Name Provider Type Discipline     10/04/24 -  Chayo Tsang, PT Student PT Student PT                  PT Recommendation and Plan     Plan of Care Reviewed With: patient, spouse  Progress: improving   Outcome Measures       Row Name 12/06/24 1105 12/05/24 0812          How much help from another is currently needed...    Putting on and taking off regular lower body clothing? 3  -AC (r) JR (t) AC (c) 3  -AC (r) JR (t) AC (c)     Bathing (including washing, rinsing, and drying) 4  -AC (r) JR (t) AC (c) 3  -AC (r) JR (t) AC (c)     Toileting (which includes using toilet bed pan or urinal) 4  -AC (r) JR (t) AC (c) 3  -AC (r) JR (t) AC (c)     Putting on and taking off regular upper body clothing 4  -AC (r) JR (t) AC (c) 4  -AC (r) JR (t) AC (c)     Taking care of personal grooming (such as brushing teeth) 4  -AC (r) JR (t) AC (c) 4  -AC (r) JR (t) AC (c)     Eating meals 4  -AC (r) JR (t) AC (c) 4  -AC (r) JR (t) AC (c)     AM-PAC 6 Clicks Score (OT) 23  -AC (r) JR (t) 21  -AC (r) JR (t)        Functional Assessment     Outcome Measure Options AM-PAC 6 Clicks Daily Activity (OT)  -AC (r) JR (t) AC (c) AM-PAC 6 Clicks Daily Activity (OT)  -AC (r) JR (t) AC (c)               User Key  (r) = Recorded By, (t) = Taken By, (c) = Cosigned By      Initials Name Provider Type    AC Alex Gonzalez, OTR/L, CNT Occupational Therapist    JR Maria Esther Hogue, OT Student OT Student                     Time Calculation:    PT Charges       Row Name 12/06/24 1530             Time Calculation    Start Time 1405  -LY      Stop Time 1429  -LY      Time Calculation (min) 24 min  -LY      PT Received On 12/06/24  -LY         Time Calculation- PT    Total Timed Code Minutes- PT 24 minute(s)  -LY         Timed Charges    75937 - PT Therapeutic Exercise Minutes 12  -LY      42229 - Gait Training Minutes  12  -LY         Total Minutes    Timed Charges Total Minutes 24  -LY       Total Minutes 24  -LY                User Key  (r) = Recorded By, (t) = Taken By, (c) = Cosigned By      Initials Name Provider Type    LY Peggy Melchor PTA Physical Therapist Assistant                  Therapy Charges for Today       Code Description Service Date Service Provider Modifiers Qty    78322686885 HC PT THER PROC EA 15 MIN 12/6/2024 Peggy Melchor PTA GP 1    15770588517 HC GAIT TRAINING EA 15 MIN 12/6/2024 Peggy Melchor PTA GP 1            PT G-Codes  Outcome Measure Options: AM-PAC 6 Clicks Daily Activity (OT)  AM-PAC 6 Clicks Score (PT): 20  AM-PAC 6 Clicks Score (OT): 23    Peggy Melchor PTA  12/6/2024

## 2024-12-06 NOTE — CASE MANAGEMENT/SOCIAL WORK
Discharge Planning Assessment   [place]     Patient Name: Juan Luis Collazo  MRN: [id number]  Today's Date: 12/6/2024    Admit Date: 12/4/2024        Discharge Needs Assessment       Row Name 12/06/24 1700       Living Environment    People in Home spouse    Current Living Arrangements home    Potentially Unsafe Housing Conditions none    In the past 12 months has the electric, gas, oil, or water company threatened to shut off services in your home? No    Primary Care Provided by self    Provides Primary Care For no one    Family Caregiver if Needed spouse    Quality of Family Relationships helpful;involved    Able to Return to Prior Arrangements other (see comments)       Resource/Environmental Concerns    Resource/Environmental Concerns none    Transportation Concerns none       Transportation Needs    In the past 12 months, has lack of transportation kept you from medical appointments or from getting medications? no    In the past 12 months, has lack of transportation kept you from meetings, work, or from getting things needed for daily living? No       Food Insecurity    Within the past 12 months, you worried that your food would run out before you got the money to buy more. Never true    Within the past 12 months, the food you bought just didn't last and you didn't have money to get more. Never true       Transition Planning    Patient/Family Anticipates Transition to inpatient rehabilitation facility    Patient/Family Anticipated Services at Transition skilled nursing;rehabilitation services    Transportation Anticipated family or friend will provide;health plan transportation       Discharge Needs Assessment    Readmission Within the Last 30 Days no previous admission in last 30 days    Equipment Currently Used at Home cane, straight;shower chair    Concerns to be Addressed adjustment to diagnosis/illness    Do you want help finding or keeping work or a job? I do not need or want help    Do you want help with  school or training? For example, starting or completing job training or getting a high school diploma, GED or equivalent No    Anticipated Changes Related to Illness none    Equipment Needed After Discharge other (see comments)    Outpatient/Agency/Support Group Needs skilled nursing facility    Discharge Facility/Level of Care Needs nursing facility, skilled    Provided Post Acute Provider List? Yes    Delivered To Support Person    Method of Delivery Telephone                   Discharge Plan       Row Name 12/06/24 3531       Plan    Plan Comments SW spoke to pt wife about dc planning. Jyoti states she prefers [facility] swing bed for rehab. Referral is being sent to [facility] re[facility]. SW will follow up with admissions on Monday. If bed offered, precert will be started.                  Continued Care and Services - Admitted Since 12/4/2024       Destination       Service Provider Request Status Services Address Phone Fax Patient Preferred    [facility] SWING [facility]ED Pending - Request Sent -- [address] [place] [id number] [phone] --                     Demographic Summary    No documentation.                  Functional Status    No documentation.                  Psychosocial    No documentation.                  Abuse/Neglect    No documentation.                  Legal    No documentation.                  Substance Abuse    No documentation.                  Patient Forms    No documentation.                     MALKA Roman

## 2024-12-06 NOTE — PLAN OF CARE
Goal Outcome Evaluation:  Plan of Care Reviewed With: patient        Progress: no change  A&Ox4.[place] Pt had near fall in RR from standing position. Pt lost balance and began to lean backward.. RN had to brace pt and push them upright to prevent falling backward. Prn pain med. Pt asking for pain med early. Pt asking for something to help him sleep.  Pt appears to be sleeping well. Will pass on to day shift. Up x 1 with walker. Pt has trouble keeping balance in standing position. Tele, NSR.

## 2024-12-06 NOTE — PLAN OF CARE
Goal Outcome Evaluation:  Plan of Care Reviewed With: patient, spouse        Progress: no change   Pt A/Ox4. VSS on 2L NC. IV to LFA, IID. RA at baseline. [place], L hearing aid. Up x1 with walker. Daily orthos. Pain meds changed to Q6 but monitor RR due to rib fx & pneumonia. Safety maintained. Call light in reach.

## 2024-12-06 NOTE — PROGRESS NOTES
1         [facility] Medicine Services  INPATIENT PROGRESS NOTE    Patient Name: Juan Luis Collazo  Date of Admission: 12/4/2024  Today's Date: 12/06/24  Length of Stay: 2  Primary Care Physician: Santiago Aaron MD    Subjective   Chief Complaint: Follow-up  HPI   Patient states that he has discomfort despite using Percocet every 6 hourly as well as adding Lidoderm patch.  He said that the pain is worse when taking deep breath.  He said that the pain medicine helps but it does not last long enough until the next dose of Percocet.    He has multiple rib fractures from a fall  His wife also sustained a right leg hematoma when he had fallen onto her as she tried to catch him.        Review of Systems   All pertinent negatives and positives are as above. All other systems have been reviewed and are negative unless otherwise stated.     Objective    Temp:  [97.4 °F (36.3 °C)-98.5 °F (36.9 °C)] 98.5 °F (36.9 °C)  Heart Rate:  [67-77] 67  Resp:  [16-18] 18  BP: (142-187)/(59-88) 144/72  Physical Exam  No distress  Laying comfortably in bed  On supplemental oxygen when at home he does not normally uses oxygen  He appeared barrel chested to me.    Diminished breath sounds, diminished inspiratory effort.  Adequate air exchange.    S1-S2 regular rate and rhythm, no gross murmur  Soft abdomen, nontender, no organomegaly  No gross cyanosis or edema  Appropriate affect  Results Review:  I have reviewed the labs, radiology results, and diagnostic studies.    Laboratory Data:   Results from last 7 days   Lab Units 12/05/24  0930 12/04/24  1509   WBC 10*3/mm3 5.83 5.06   HEMOGLOBIN g/dL 11.6* 12.3*   HEMATOCRIT % 36.6* 38.1   PLATELETS 10*3/mm3 290 270        Results from last 7 days   Lab Units 12/05/24  0930 12/04/24  1509   SODIUM mmol/L 133* 137   POTASSIUM mmol/L 4.8 4.3   CHLORIDE mmol/L 94* 97*   CO2 mmol/L 30.0* 32.0*   BUN mg/dL 20 15   CREATININE mg/dL 0.78 0.60*   CALCIUM mg/dL 8.6 8.9    BILIRUBIN mg/dL 0.2 0.3   ALK PHOS U/L 118* 124*   ALT (SGPT) U/L 13 12   AST (SGOT) U/L 20 15   GLUCOSE mg/dL 217* 96       Culture Data:   Blood Culture   Date Value Ref Range Status   12/04/2024 No growth at 24 hours  Preliminary   12/04/2024 No growth at 24 hours  Preliminary       Radiology Data:   Imaging Results (Last 24 Hours)       ** No results found for the last 24 hours. **            I have reviewed the patient's current medications.     Assessment/Plan   Assessment  Active Hospital Problems    Diagnosis     **Pneumonia        Medical Decision Making  Number and Complexity of problems:   Abnormal imaging of chest concern for pneumonia versus atelectasis in the setting of subacute right anterior fourth fifth sixth seventh and eighth rib fractures   Small right frontal scalp hematoma  Hypertension  Chronic thoracic and lumbar compression fractures  History of kyphoplasty        Treatment Plan  He and significant other wished to be referred to rehab facility  I requested social service for consultation  Continue on PT OT  Continue empiric antibiotic  Incentive spirometry  DC oxygen if room air SpO2 greater than 92%  Added melatonin per patient's request  Family and patient wish to go to a rehab.  Will check with social service for options.  Therapist indicates home with outpatient services, home with assist.  They were apprised of the recommendation by therapist.  amoxicillin-clavulanate, 1 tablet, Oral, Q12H  enoxaparin, 30 mg, Subcutaneous, Daily  Lidocaine, 1 patch, Transdermal, Q24H  losartan, 25 mg, Oral, Q24H  melatonin, 5 mg, Oral, Nightly  sodium chloride, 10 mL, Intravenous, Q12H              Conditions and Status    fair  MDM Data  External documents reviewed: reviewed epic   Cardiac tracing (EKG, telemetry) interpretation: -  Radiology interpretation: reviewed  Labs reviewed: y  Any tests that were considered but not ordered: none     Decision rules/scores evaluated (example [id number],  Abram, etc): none     Discussed with: Patient and significant other, nursing staff, social service     Care Planning  Shared decision making: Patient and significant other  Code status and discussions: Full code  I confirmed that the patient's advanced care plan is present, code status is documented, and a surrogate decision maker is listed in the patient's medical record.     Disposition  Social Determinants of Health that impact treatment or disposition: None identified at this time.  Family and patient requesting placement.  I expect the patient to be discharged to (TBD).       Electronically signed by Asaf Washburn MD, 12/06/24, 16:33 CST.

## 2024-12-06 NOTE — THERAPY TREATMENT NOTE
Acute Care - Occupational Therapy Treatment Note  [facility]     Patient Name: Juan Luis Collazo  : 1940  MRN: [id number]  Today's Date: 2024  Onset of Illness/Injury or Date of Surgery: 24  Date of Referral to OT: 24  Referring Physician: Dr. Lizarraga    Admit Date: 2024       ICD-10-CM ICD-9-CM   1. Pneumonia of left lower lobe due to infectious organism  J18.9 486   2. Closed fracture of multiple ribs, unspecified laterality, initial encounter  S22.49XA 807.09   3. Chest pain, unspecified type  R07.9 786.50   4. Gait instability [R26.81]  R26.81 781.2     Patient Active Problem List   Diagnosis    Closed compression fracture of first lumbar vertebra    Current non-smoker    Lumbar compression fracture, closed, initial encounter    Lumbar compression fracture    S/P kyphoplasty    Numbness and tingling of right leg    Compression fracture of fourth lumbar vertebra with delayed healing    Benign hypertension    Flatback syndrome of lumbar region    Hammer toe of right foot    High cholesterol    Impaired fasting glucose    Malignant neoplasm of prostate    Osteopenia    Acute exacerbation of chronic obstructive airways disease    Back pain    Compression fracture of thoracic vertebra with routine healing    Normocytic anemia    Actinic keratosis    Hyperplastic colonic polyp    FH: colon cancer in first degree relative <60 years old    History of adenomatous polyp of colon    Decreased bone mass    Sebaceous cyst    Degenerative disc disease, cervical    Degenerative disc disease, lumbar    Degenerative disc disease, thoracic    Compression fracture of T12 vertebra with delayed healing    Age-related osteoporosis with current pathological fracture with delayed healing    Gait instability    Pain initiated by coughing    Hypoxia    Pneumonia     Past Medical History:   Diagnosis Date    Arthritis     Back pain     Cancer     CHEST, SKIN CANCER    GERD (gastroesophageal reflux disease)      History of colon polyps     History of prostate cancer     Hypertension     Low back pain     Macular degeneration     Osteopenia      Past Surgical History:   Procedure Laterality Date    APPENDECTOMY      CATARACT EXTRACTION, BILATERAL      COLONOSCOPY  09/17/2013    Dr. José-One 2-3mm polyp at 20cm; Otherwise normal; Repeat 3 years    COLONOSCOPY N/A 12/07/2022    Procedure: COLONOSCOPY WITH ANESTHESIA;  Surgeon: Bri Alexis MD;  Location: [facility] ENDOSCOPY;  Service: Gastroenterology;  Laterality: N/A;  pre: anemia. hx polyps.  post: polyps. diverticulosis.  no PCP    ENDOSCOPY N/A 12/07/2022    Procedure: ESOPHAGOGASTRODUODENOSCOPY WITH ANESTHESIA;  Surgeon: Bri Alexis MD;  Location: [facility] ENDOSCOPY;  Service: Gastroenterology;  Laterality: N/A;  pre: anemia  post: hiatal hernia. esophagitis.gastric erosions.  no PCP    FOOT SURGERY Right     KYPHOPLASTY Bilateral 07/17/2018    Procedure: L3 KYPHOPLASTY 1-2 LEVELS;  Surgeon: Vega Pandey MD;  Location:  [facility] OR;  Service: Neurosurgery    KYPHOPLASTY Bilateral 09/11/2018    Procedure: L4 KYPHOPLASTY WITH BIOPSY;  Surgeon: Vega Pandey MD;  Location:  [facility] OR;  Service: Neurosurgery    KYPHOPLASTY WITH BIOPSY N/A 01/25/2022    Procedure: KYPHOPLASTY WITH BIOPSY, THORACIC 6 and LUMBAR 5;  Surgeon: Nick Martínez MD;  Location:  [facility] OR;  Service: Neurosurgery;  Laterality: N/A;    KYPHOPLASTY WITH BIOPSY N/A 8/18/2023    Procedure: Thoracic 12, KYPHOPLASTY WITH BIOPSY;  Surgeon: Nick Martínez MD;  Location:  [facility] OR;  Service: Neurosurgery;  Laterality: N/A;    PROSTATECTOMY      TONSILLECTOMY      TOTAL SHOULDER REPLACEMENT Bilateral          OT ASSESSMENT FLOWSHEET (Last 12 Hours)       OT Evaluation and Treatment       Row Name 12/06/24 1105 12/06/24 0943                OT Time and Intention    Subjective Information complains of;pain  -AC (r) JR (t) AC (c) --  -AC (r) JR (t) AC (c)       Document Type  therapy note (daily note)  -AC (r) JR (t) AC (c) therapy note (daily note)  -AC (r) JR (t) AC (c)       Mode of Treatment occupational therapy  -AC (r) JR (t) AC (c) occupational therapy  -AC (r) JR (t) AC (c)       Session Not Performed -- unable to treat, medical status change  -AC (r) JR (t) AC (c)       Comment, Session Not Performed -- BP seated measured 188/86. nsging notified and asked to hold until she speaks with drGavin  -AC (r) JR (t) AC (c)          General Information    Existing Precautions/Restrictions fall;oxygen therapy device and L/min  2L NC  -AC (r) JR (t) AC (c) --       Risks Reviewed patient and family:;LOB;dizziness;nausea/vomiting;increased discomfort  -AC (r) JR (t) AC (c) --       Benefits Reviewed patient and family:;improve function;increase independence;increase strength;increase balance;decrease pain  -AC (r) JR (t) AC (c) --       Barriers to Rehab visual deficit;hearing deficit  -AC (r) JR (t) AC (c) --          Pain Assessment    Pretreatment Pain Rating 5/10  -AC (r) JR (t) AC (c) 10/10  -AC (r) JR (t) AC (c)       Posttreatment Pain Rating 4/10  -AC (r) JR (t) AC (c) --       Pain Location chest;flank  ribs  -AC (r) JR (t) AC (c) chest;flank  ribs  -AC (r) JR (t) AC (c)       Pain Side/Orientation right  -AC (r) JR (t) AC (c) generalized  -AC (r) JR (t) AC (c)       Pain Management Interventions activity modification encouraged;exercise or physical activity utilized;positioning techniques utilized  -AC (r) JR (t) AC (c) --       Response to Pain Interventions activity participation with decreased pain  -AC (r) JR (t) AC (c) --          Cognition    Orientation Status (Cognition) oriented x 4  -AC (r) JR (t) AC (c) oriented x 4  -AC (r) JR (t) AC (c)       Personal Safety Interventions fall prevention program maintained;gait belt;muscle strengthening facilitated;nonskid shoes/slippers when out of bed;supervised activity  -AC (r) JR (t) AC (c) --          Activities of Daily Living     BADL Assessment/Intervention toileting;grooming  -AC (r) JR (t) AC (c) --          Grooming Assessment/Training    Crawford Level (Grooming) oral care regimen;standby assist  -AC (r) JR (t) AC (c) --       Position (Grooming) sink side;unsupported standing  -AC (r) JR (t) AC (c) --          Toileting Assessment/Training    Crawford Level (Toileting) toileting skills;adjust/manage clothing;perform perineal hygiene;supervision  -AC (r) JR (t) AC (c) --       Assistive Devices (Toileting) commode  -AC (r) JR (t) AC (c) --       Position (Toileting) supported sitting;supported standing  -AC (r) JR (t) AC (c) --          BADL Safety/Performance    Impairments, BADL Safety/Performance balance;pain  -AC (r) JR (t) AC (c) --          Bed Mobility    Comment, (Bed Mobility) up in chair upon arrival  -AC (r) JR (t) AC (c) --          Functional Mobility    Functional Mobility- Ind. Level contact guard assist;verbal cues required  -AC (r) JR (t) AC (c) --       Functional Mobility- Device walker, front-wheeled  -AC (r) JR (t) AC (c) --       Functional Mobility- Comment chair>BR>hallway>chair  -AC (r) JR (t) AC (c) --          Transfer Assessment/Treatment    Transfers sit-stand transfer;stand-sit transfer;toilet transfer  -AC (r) JR (t) AC (c) --          Sit-Stand Transfer    Sit-Stand Crawford (Transfers) standby assist;verbal cues  -AC (r) JR (t) AC (c) --       Assistive Device (Sit-Stand Transfers) walker, front-wheeled  -AC (r) JR (t) AC (c) --          Stand-Sit Transfer    Stand-Sit Crawford (Transfers) standby assist;verbal cues  -AC (r) JR (t) AC (c) --          Toilet Transfer    Type (Toilet Transfer) sit-stand;stand-sit  -AC (r) JR (t) AC (c) --       Crawford Level (Toilet Transfer) standby assist  -AC (r) JR (t) AC (c) --       Assistive Device (Toilet Transfer) commode;grab bars/safety frame  -AC (r) JR (t) AC (c) --          Safety Issues/Impairments Affecting Functional Mobility    Safety  Issues Affecting Function (Mobility) positioning of assistive device;insight into deficits/self-awareness;awareness of need for assistance;safety precaution awareness;safety precautions follow-through/compliance  -AC (r) JR (t) AC (c) --       Impairments Affecting Function (Mobility) balance;pain  -AC (r) JR (t) AC (c) --          Balance    Balance Interventions standing;supported;dynamic;dynamic reaching  -AC (r) JR (t) AC (c) --       Comment, Balance Pt participated in balance activity with CGA and fww of retrieving gloves from around the room at different heights and surfaces.  -AC (r)  (t) AC (c) --          Plan of Care Review    Plan of Care Reviewed With patient;family  -EMMANUEL (r)  (t) AC (c) --       Progress improving  -AC (r)  (t) AC (c) --       Outcome Evaluation OT completed treatment. Pt alert and oriented x4 with nsging reporting his BP has returned to normal and he is okay to work with. Pt stood with SBA and vc and ambulated to BR with CGA to BR. He completed oral hygiene with SBA standing sinkside. He completed toileting with supervision. Pt participated in balance activity with CGA and fww of retrieving gloves from around the room at different heights and surfaces. OT recommended use of fww at home, and pt and family agreed stating they have ordered one for him to use. Continue OT POC. Recommend home with assist, home with outpatient therapy services at discharge.  -AC (r)  (t) AC (c) --          Positioning and Restraints    Pre-Treatment Position sitting in chair/recliner  -AC (r) JR (t) AC (c) --       Post Treatment Position chair  -AC (r) JR (t) AC (c) --       In Chair reclined;call light within reach;encouraged to call for assist;with family/caregiver  -EMMANUEL (r)  (t) AC (c) --          Therapy Plan Review/Discharge Plan (OT)    Anticipated Discharge Disposition (OT) home with outpatient therapy services;home with assist  -AC (r) JR (t) AC (c) --                 User Key  (r) =  Recorded By, (t) = Taken By, (c) = Cosigned By      Initials Name Effective Dates    AC Alex Gonzalez DAVEY, OTR/L, CNT 02/03/23 -     Maria Esther Burnette, OT Student 09/11/24 -                      Occupational Therapy Education       Title: PT OT SLP Therapies (In Progress)       Topic: Occupational Therapy (In Progress)       Point: ADL training (Done)       Description:   Instruct learner(s) on proper safety adaptation and remediation techniques during self care or transfers.   Instruct in proper use of assistive devices.                  Learning Progress Summary            Patient Acceptance, E,D,TB, VU,DU,NR by  at 12/6/2024 1139    Comment: OT role, safe transfer techniques, use of AD, home safety, OT POC    Acceptance, E, VU,DU,NR by  at 12/5/2024 1016    Comment: OT role, safe transfer techniques, PLB, OT POC   Family Acceptance, E,D,TB, VU,DU,NR by  at 12/6/2024 1139    Comment: OT role, safe transfer techniques, use of AD, home safety, OT POC                      Point: Home exercise program (Not Started)       Description:   Instruct learner(s) on appropriate technique for monitoring, assisting and/or progressing therapeutic exercises/activities.                  Learner Progress:  Not documented in this visit.              Point: Precautions (Done)       Description:   Instruct learner(s) on prescribed precautions during self-care and functional transfers.                  Learning Progress Summary            Patient Acceptance, E,D,TB, VU,DU,NR by  at 12/6/2024 1139    Comment: OT role, safe transfer techniques, use of AD, home safety, OT POC    Acceptance, E, VU,DU,NR by  at 12/5/2024 1016    Comment: OT role, safe transfer techniques, PLB, OT POC   Family Acceptance, E,D,TB, VU,DU,NR by  at 12/6/2024 1139    Comment: OT role, safe transfer techniques, use of AD, home safety, OT POC                      Point: Body mechanics (Done)       Description:   Instruct learner(s) on proper positioning  and spine alignment during self-care, functional mobility activities and/or exercises.                  Learning Progress Summary            Patient Acceptance, E,D,TB, VU,DU,NR by  at 12/6/2024 1139    Comment: OT role, safe transfer techniques, use of AD, home safety, OT POC    Acceptance, E, VU,DU,NR by  at 12/5/2024 1016    Comment: OT role, safe transfer techniques, PLB, OT POC   Family Acceptance, E,D,TB, VU,DU,NR by  at 12/6/2024 1139    Comment: OT role, safe transfer techniques, use of AD, home safety, OT POC                                      User Key       Initials Effective Dates Name Provider Type Discipline     09/11/24 -  Maria Esther Hogue, OT Student OT Student OT                      OT Recommendation and Plan  Planned Therapy Interventions (OT): activity tolerance training, BADL retraining, functional balance retraining, occupation/activity based interventions, patient/caregiver education/training, strengthening exercise, transfer/mobility retraining  Therapy Frequency (OT): 5 times/wk  Plan of Care Review  Plan of Care Reviewed With: patient, family  Progress: improving  Outcome Evaluation: OT completed treatment. Pt alert and oriented x4 with nsging reporting his BP has returned to normal and he is okay to work with. Pt stood with SBA and vc and ambulated to BR with CGA to BR. He completed oral hygiene with SBA standing sinkside. He completed toileting with supervision. Pt participated in balance activity with CGA and fww of retrieving gloves from around the room at different heights and surfaces. OT recommended use of fww at home, and pt and family agreed stating they have ordered one for him to use. Continue OT POC. Recommend home with assist, home with outpatient therapy services at discharge.  Plan of Care Reviewed With: patient, family  Outcome Evaluation: OT completed treatment. Pt alert and oriented x4 with nsging reporting his BP has returned to normal and he is okay to work with. Pt  stood with SBA and vc and ambulated to BR with CGA to BR. He completed oral hygiene with SBA standing sinkside. He completed toileting with supervision. Pt participated in balance activity with CGA and fww of retrieving gloves from around the room at different heights and surfaces. OT recommended use of fww at home, and pt and family agreed stating they have ordered one for him to use. Continue OT POC. Recommend home with assist, home with outpatient therapy services at discharge.     Outcome Measures       Row Name 12/06/24 1105 12/05/24 0812          How much help from another is currently needed...    Putting on and taking off regular lower body clothing? 3  -AC (r) JR (t) AC (c) 3  -AC (r) JR (t) AC (c)     Bathing (including washing, rinsing, and drying) 4  -AC (r) JR (t) AC (c) 3  -AC (r) JR (t) AC (c)     Toileting (which includes using toilet bed pan or urinal) 4  -AC (r) JR (t) AC (c) 3  -AC (r) JR (t) AC (c)     Putting on and taking off regular upper body clothing 4  -AC (r) JR (t) AC (c) 4  -AC (r) JR (t) AC (c)     Taking care of personal grooming (such as brushing teeth) 4  -AC (r) JR (t) AC (c) 4  -AC (r) JR (t) AC (c)     Eating meals 4  -AC (r) JR (t) AC (c) 4  -AC (r) JR (t) AC (c)     AM-PAC 6 Clicks Score (OT) 23  -AC (r) JR (t) 21  -AC (r) JR (t)        Functional Assessment    Outcome Measure Options AM-PAC 6 Clicks Daily Activity (OT)  -AC (r) JR (t) AC (c) AM-PAC 6 Clicks Daily Activity (OT)  -AC (r) JR (t) AC (c)               User Key  (r) = Recorded By, (t) = Taken By, (c) = Cosigned By      Initials Name Provider Type    AC Alex Gonzalez, OTR/L, CNT Occupational Therapist    Maria Esther Burnette, OT Student OT Student                    Time Calculation:    Time Calculation- OT       Row Name 12/06/24 1141             Time Calculation- OT    OT Start Time 1105  -AC (r) JR (t) AC (c)      OT Stop Time 1130  -AC (r) JR (t) AC (c)      OT Time Calculation (min) 25 min  -AC (r) JR (t)      Total  Timed Code Minutes- OT 25 minute(s)  -AC (r) JR (t) AC (c)      OT Received On 12/06/24  -AC (r) JR (t) AC (c)         Timed Charges    08563 - OT Therapeutic Activity Minutes 10  -AC (r) JR (t) AC (c)      90014 - OT Self Care/Mgmt Minutes 15  -AC (r) JR (t) AC (c)         Total Minutes    Timed Charges Total Minutes 25  -AC (r) JR (t)       Total Minutes 25  -AC (r) JR (t)                User Key  (r) = Recorded By, (t) = Taken By, (c) = Cosigned By      Initials Name Provider Type    Alex Barrios, OTR/L, CNT Occupational Therapist    Maria Esther Burnette, OT Student OT Student                           Maria Esther Hogue, OT Student  12/6/2024

## 2024-12-06 NOTE — DISCHARGE PLACEMENT REQUEST
"Rowan Stratton \"ADAN\" (84 y.o. Male)       Date of Birth   1940    Social Security Number       Address   [address] [place] [address]    Home Phone   [phone]    MRN   [id number]       Jehovah's witness   Zoroastrianism    Marital Status                               Admission Date   12/4/24    Admission Type   Emergency    Admitting Provider   Asaf Washburn MD    Attending Provider   Asaf Washburn MD    Department, Room/Bed   Psychiatric 3A, 328/1       Discharge Date       Discharge Disposition       Discharge Destination                                 Attending Provider: Asaf Washburn MD    Allergies: No Known Allergies    Isolation: None   Infection: None   Code Status: CPR    Ht: 170.2 cm (67\")   Wt: 72 kg (158 lb 12.8 oz)    Admission Cmt: None   Principal Problem: Pneumonia [J18.9]                   Active Insurance as of 12/4/2024       Primary Coverage       Payor Plan Insurance Group Employer/Plan Group    AETNA MEDICARE REPLACEMENT AETNA MEDICARE REPLACEMENT [id number]       Payor Plan Address Payor Plan Phone Number Payor Plan Fax Number Effective Dates    [address] [phone]  1/1/2023 - None Entered    [facility] [id number]         Subscriber Name Subscriber Birth Date Member ID       ROWAN STRATTON 1940 [id number]                     Emergency Contacts        (Rel.) Home Phone Work Phone Mobile Phone    Jyoti Stratton (Spouse) [phone] -- [phone]              Insurance Information                  AETNA MEDICARE REPLACEMENT/AETNA MEDICARE REPLACEMENT Phone: [phone]    Subscriber: Rowan Stratton Subscriber#: [id number]    Group#: [id number] Precert#: --    Authorization#: [id number] Effective Date: --             History & Physical        Art Lizarraga MD at 12/04/24 2008              [facility] Medicine Services  HISTORY AND PHYSICAL    Date of Admission: " 12/4/2024  Primary Care Physician: Santiago Aaron MD    Subjective   Primary Historian: Self    Chief Complaint: Chest pain    History of Present Illness  Patient is an 84-year-old male that presents to the emergency room with chest pain following a fall that occurred this past Thursday.  Patient has a history significant for broken ribs that occurred 3 weeks ago.  Patient states that his wife was turning on the lights and he just fell backwards.  He denies hitting his hips, possible head trauma.  Past medical history is significant for arthritis, back pain, skin cancer, GERD, hypertension, and osteopenia.  Patient denies fever or nasal congestion.  Patient states that his primary care provider discontinued his antihypertensive medications weeks ago.  Unclear reason.        Review of Systems   Constitutional:  Negative for activity change, chills and fever.   HENT:  Negative for congestion.    Respiratory:  Positive for shortness of breath.    Cardiovascular:  Positive for chest pain.   Gastrointestinal:  Negative for nausea and vomiting.      Otherwise complete ROS reviewed and negative except as mentioned in the HPI.    Past Medical History:   Past Medical History:   Diagnosis Date    Arthritis     Back pain     Cancer     CHEST, SKIN CANCER    GERD (gastroesophageal reflux disease)     History of colon polyps     History of prostate cancer     Hypertension     Low back pain     Macular degeneration     Osteopenia      Past Surgical History:  Past Surgical History:   Procedure Laterality Date    APPENDECTOMY      CATARACT EXTRACTION, BILATERAL      COLONOSCOPY  09/17/2013    Dr. José-One 2-3mm polyp at 20cm; Otherwise normal; Repeat 3 years    COLONOSCOPY N/A 12/07/2022    Procedure: COLONOSCOPY WITH ANESTHESIA;  Surgeon: Bri Alexis MD;  Location: [facility] ENDOSCOPY;  Service: Gastroenterology;  Laterality: N/A;  pre: anemia. hx polyps.  post: polyps. diverticulosis.  no PCP    ENDOSCOPY N/A  12/07/2022    Procedure: ESOPHAGOGASTRODUODENOSCOPY WITH ANESTHESIA;  Surgeon: Bri Alexis MD;  Location: [facility] ENDOSCOPY;  Service: Gastroenterology;  Laterality: N/A;  pre: anemia  post: hiatal hernia. esophagitis.gastric erosions.  no PCP    FOOT SURGERY Right     KYPHOPLASTY Bilateral 07/17/2018    Procedure: L3 KYPHOPLASTY 1-2 LEVELS;  Surgeon: Vega Pandey MD;  Location:  [facility] OR;  Service: Neurosurgery    KYPHOPLASTY Bilateral 09/11/2018    Procedure: L4 KYPHOPLASTY WITH BIOPSY;  Surgeon: Vega Pandey MD;  Location:  [facility] OR;  Service: Neurosurgery    KYPHOPLASTY WITH BIOPSY N/A 01/25/2022    Procedure: KYPHOPLASTY WITH BIOPSY, THORACIC 6 and LUMBAR 5;  Surgeon: Nick Martínez MD;  Location:  [facility] OR;  Service: Neurosurgery;  Laterality: N/A;    KYPHOPLASTY WITH BIOPSY N/A 8/18/2023    Procedure: Thoracic 12, KYPHOPLASTY WITH BIOPSY;  Surgeon: Nick Martínez MD;  Location:  [facility] OR;  Service: Neurosurgery;  Laterality: N/A;    PROSTATECTOMY      TONSILLECTOMY      TOTAL SHOULDER REPLACEMENT Bilateral      Social History:  reports that he quit smoking about 33 years ago. His smoking use included cigarettes. He started smoking about 63 years ago. He has a 30 pack-year smoking history. He has never used smokeless tobacco. He reports that he does not currently use alcohol. He reports that he does not use drugs.    Family History: family history includes No Known Problems in his mother; Prostate cancer in his father.   Reviewed    Allergies:  No Known Allergies    Medications:  Prior to Admission medications    Medication Sig Start Date End Date Taking? Authorizing Provider   albuterol sulfate  (90 Base) MCG/ACT inhaler Inhale 2 puffs Every 6 (Six) Hours As Needed for Wheezing or Shortness of Air. 5/15/24   Aleida Valle APRN   denosumab (PROLIA) 60 MG/ML solution prefilled syringe syringe Inject 1 mL under the skin into the appropriate area as directed  "Every 6 (Six) Months. 5/3/23   Christie Oconnell APRN   DULoxetine (Cymbalta) 20 MG capsule Take 1 capsule by mouth Daily. 11/12/24   Santiago Aaron MD   multivitamins-minerals (PRESERVISION AREDS 2) capsule capsule Take 1 capsule by mouth 2 (Two) Times a Day.    Provider, MD Rosa Elena   traMADol-acetaminophen (ULTRACET) 37.5-325 MG per tablet Take 1 tablet by mouth Every 8 (Eight) Hours As Needed for Moderate Pain. 11/12/24   Santiago Aaron MD     I have utilized all available immediate resources to obtain, update, or review the patient's current medications (including all prescriptions, over-the-counter products, herbals, cannabis/cannabidiol products, and vitamin/mineral/dietary (nutritional) supplements).    Objective     Vital Signs: /89   Pulse 67   Temp 97.6 °F (36.4 °C) (Oral)   Resp 16   Ht 165.1 cm (65\")   Wt 79.4 kg (175 lb)   SpO2 96%   BMI 29.12 kg/m²   Physical Exam  Cardiovascular:      Rate and Rhythm: Normal rate and regular rhythm.      Comments: Midsternal chest pain on palpation  Pulmonary:      Effort: No respiratory distress.   Abdominal:      General: There is no distension.      Tenderness: There is no abdominal tenderness.   Musculoskeletal:         General: Tenderness present.   Skin:     General: Skin is cool and dry.        Constitutional:       Appearance: Normal appearance.  Oriented x 3  HENT:      Head: Normocephalic and atraumatic.      Nose: Nose normal.      Mouth/Throat:      Mouth: Mucous membranes are moist.   Eyes:      Extraocular Movements: Extraocular movements intact.      Conjunctiva/sclera: Conjunctivae normal.   Cardiovascular:      Rate and Rhythm: Normal rate and regular rhythm.      Pulses: Normal pulses.   Pulmonary:      Effort: No respiratory distress.      Breath sounds: Normal breath sounds. No wheezing, rhonchi or rales.   Abdominal:      General: Abdomen is flat. Bowel sounds are normal.      Palpations: Abdomen is soft.      " Tenderness: There is no guarding or rebound.   Musculoskeletal:         General: Normal range of motion.      Cervical back: Normal range of motion and neck supple.   Extremities:  No lower extremity edema.  Skin:     Capillary Refill: Capillary refill takes less than 2 seconds.      Coloration: Skin is not jaundiced.      Findings: No rash.   Neurological:      General: No focal deficit present.      Mental Status: Patient is alert, oriented to place time and person..         Results Reviewed:  Lab Results (last 24 hours)       Procedure Component Value Units Date/Time    Lactic Acid, Plasma [[id number]]  (Normal) Collected: 12/04/24 1846    Specimen: Blood Updated: 12/04/24 1917     Lactate 1.0 mmol/L     Blood Culture - Blood, Arm, Left [[id number]] Collected: 12/04/24 1846    Specimen: Blood from Arm, Left Updated: 12/04/24 1905    Blood Culture - Blood, Arm, Right [[id number]] Collected: 12/04/24 1846    Specimen: Blood from Arm, Right Updated: 12/04/24 1904    High Sensitivity Troponin T 2Hr [[id number]]  (Normal) Collected: 12/04/24 1714    Specimen: Blood Updated: 12/04/24 1737     HS Troponin T 13 ng/L      Troponin T Delta 0 ng/L     Narrative:      High Sensitive Troponin T Reference Range:  <14.0 ng/L- Negative Female for AMI  <22.0 ng/L- Negative Male for AMI  >=14 - Abnormal Female indicating possible myocardial injury.  >=22 - Abnormal Male indicating possible myocardial injury.   Clinicians would have to utilize clinical acumen, EKG, Troponin, and serial changes to determine if it is an Acute Myocardial Infarction or myocardial injury due to an underlying chronic condition.         Comprehensive Metabolic Panel [[id number]]  (Abnormal) Collected: 12/04/24 1509    Specimen: Blood Updated: 12/04/24 1538     Glucose 96 mg/dL      BUN 15 mg/dL      Creatinine 0.60 mg/dL      Sodium 137 mmol/L      Potassium 4.3 mmol/L      Chloride 97 mmol/L      CO2 32.0 mmol/L      Calcium 8.9 mg/dL      Total Protein 7.9  g/dL      Albumin 3.5 g/dL      ALT (SGPT) 12 U/L      AST (SGOT) 15 U/L      Alkaline Phosphatase 124 U/L      Total Bilirubin 0.3 mg/dL      Globulin 4.4 gm/dL      A/G Ratio 0.8 g/dL      BUN/Creatinine Ratio 25.0     Anion Gap 8.0 mmol/L      eGFR 94.0 mL/min/1.73     Narrative:      GFR Normal >60  Chronic Kidney Disease <60  Kidney Failure <15    The GFR formula is only valid for adults with stable renal function between ages 18 and 70.    High Sensitivity Troponin T [[id number]]  (Normal) Collected: 12/04/24 1509    Specimen: Blood Updated: 12/04/24 1535     HS Troponin T 13 ng/L     Narrative:      High Sensitive Troponin T Reference Range:  <14.0 ng/L- Negative Female for AMI  <22.0 ng/L- Negative Male for AMI  >=14 - Abnormal Female indicating possible myocardial injury.  >=22 - Abnormal Male indicating possible myocardial injury.   Clinicians would have to utilize clinical acumen, EKG, Troponin, and serial changes to determine if it is an Acute Myocardial Infarction or myocardial injury due to an underlying chronic condition.         Protime-INR [[id number]]  (Normal) Collected: 12/04/24 1509    Specimen: Blood Updated: 12/04/24 1527     Protime 13.7 Seconds      INR 1.01    CBC & Differential [[id number]]  (Abnormal) Collected: 12/04/24 1509    Specimen: Blood Updated: 12/04/24 1521    Narrative:      The following orders were created for panel order CBC & Differential.  Procedure                               Abnormality         Status                     ---------                               -----------         ------                     CBC Auto Differential[[id number]]        Abnormal            Final result                 Please view results for these tests on the individual orders.    CBC Auto Differential [[id number]]  (Abnormal) Collected: 12/04/24 1509    Specimen: Blood Updated: 12/04/24 1521     WBC 5.06 10*3/mm3      RBC 4.16 10*6/mm3      Hemoglobin 12.3 g/dL      Hematocrit 38.1 %      MCV  91.6 fL      MCH 29.6 pg      MCHC 32.3 g/dL      RDW 13.2 %      RDW-SD 44.0 fl      MPV 9.7 fL      Platelets 270 10*3/mm3      Neutrophil % 73.2 %      Lymphocyte % 12.5 %      Monocyte % 11.5 %      Eosinophil % 1.2 %      Basophil % 0.4 %      Immature Grans % 1.2 %      Neutrophils, Absolute 3.71 10*3/mm3      Lymphocytes, Absolute 0.63 10*3/mm3      Monocytes, Absolute 0.58 10*3/mm3      Eosinophils, Absolute 0.06 10*3/mm3      Basophils, Absolute 0.02 10*3/mm3      Immature Grans, Absolute 0.06 10*3/mm3      nRBC 0.0 /100 WBC           Imaging Results (Last 24 Hours)       Procedure Component Value Units Date/Time    CT Thoracic Spine Without Contrast [[id number]] Collected: 12/04/24 1709     Updated: 12/04/24 1720    Narrative:      EXAM: CT THORACIC SPINE WO CONTRAST-      DATE: 12/4/2024 3:03 PM     HISTORY: fall; trauma       COMPARISON: None available.     DOSE LENGTH PRODUCT: 2028.77 mGy.cm  Automated exposure control was also  utilized to decrease patient radiation dose.     TECHNIQUE: Unenhanced CT images of the thoracic spine were obtained with  multiplanar reformats.     FINDINGS:     There is osteopenia. Patient is status post vertebroplasty at T12 and  T7. There is loss of height at the T1 and T3 vertebral bodies and  represent chronic compression fractures. These are chronic fractures and  unchanged from a prior CT of the chest from 1/28/2022. Facet joints are  aligned bilaterally. There is multilevel facet arthropathy. There is  chronic loss of height anteriorly at T9 and T8. No acute fracture is  identified. There is levoscoliosis.     The visualized paravertebral soft tissues are unremarkable.     Visualized lungs are clear.       Impression:         Osteopenia and levoscoliosis and chronic compression fractures as above.  No acute fracture or spondylolisthesis identified.     This report was signed and finalized on 12/4/2024 5:17 PM by Crow Alvarez.       CT Chest Without Contrast  Diagnostic [[id number]] Collected: 12/04/24 1704     Updated: 12/04/24 1712    Narrative:      EXAM: CT CHEST WO CONTRAST DIAGNOSTIC-      DATE: 12/4/2024 3:03 PM     HISTORY: fall; trauma       COMPARISON: 1/28/2022.     DOSE LENGTH PRODUCT: 2028.77 mGy.cm mGy cm. Automatic exposure control  was utilized to make radiation dose as low as reasonably achievable.     TECHNIQUE: Unenhanced CT images of the chest obtained with multiplanar  reformats.     FINDINGS:     MEDIASTINUM/EXTRATHORACIC: There is calcification of the thoracic aorta  which is ectatic and torturous and coronary artery calcification. There  is calcification at the aortic valve. There is cardiomegaly without  pericardial effusion. No significant pleural effusion is identified. No  thoracic lymphadenopathy is seen.     LUNGS/AIRWAYS: There is scarring at the lung apices. An area of opacity  and volume loss in the left lower lobe may be due to atelectasis or  pneumonia. There is mild atelectasis at the right lung base. There is no  pneumothorax.     INCLUDED UPPER ABDOMEN: The visualized portions of the upper abdomen are  within normal limits.     SOFT TISSUES: There is bilateral gynecomastia left greater than right  which is unchanged.     BONES: No suspicious osseous lesion identified.The there are fractures  of the right anterior 4, 5, 6, 7, and 8 ribs. Most of these fractures  appear subacute. The right sixth rib fracture may be acute.       Impression:      1. Opacity and volume loss at the left lung base medially may be  atelectasis or pneumonia.  2. Multi right rib fractures without significant displacement. The right  anterior sixth rib fracture may be acute.     This report was signed and finalized on 12/4/2024 5:09 PM by Crow Alvarez.       CT Head Without Contrast [[id number]] Collected: 12/04/24 1652     Updated: 12/04/24 1700    Narrative:      EXAM: CT HEAD WO CONTRAST-      DATE: 12/4/2024 3:03 PM     HISTORY: fall; trauma        COMPARISON: 12/14/2022.     DOSE LENGTH PRODUCT: 2029 mGy centimeters automated exposure control was  also utilized to decrease patient radiation dose.     TECHNIQUE: Unenhanced CT images obtained from vertex to skull base with  multiplanar reformats.     FINDINGS:  There is no acute intracranial hemorrhage, midline shift, mass effect,  or hydrocephalus. There is no CT evidence for acute infarct.     There are chronic changes with volume loss and chronic small vessel  ischemic change of the periventricular white matter.      SOFT TISSUES: There is a small right superior frontal scalp hematoma.        SINUS:The visualized paranasal sinuses and mastoid air cells are clear.      ORBITS: The visualized orbits and globes are unremarkable. There is  bilateral lens extraction.          Impression:      1. Chronic changes and no acute intracranial findings.   2. Small right superior frontal scalp hematoma.     This report was signed and finalized on 12/4/2024 4:57 PM by Crow Alvarez.       CT Lumbar Spine Without Contrast [[id number]] Collected: 12/04/24 1628     Updated: 12/04/24 1639    Narrative:      CT LUMBAR SPINE WO CONTRAST- 12/4/2024 3:03 PM     HISTORY: fall; trauma      COMPARISON: 6/14/2018     TOTAL DOSE LENGTH PRODUCT: 2028.77 mGy.cm. Automated exposure control  was also utilized to decrease patient radiation dose.     TECHNIQUE: Axial images of the lumbar spine are obtained with sagittal  and coronal reconstructions     FINDINGS: A rudimentary disc at the S1/2 level is assumed when counting  vertebral bodies. There are kyphoplasty changes of the T12, L3, L4, and  L5 vertebra. Loss of height is greatest at L3 and L5 measuring  approximately 35%. Mild chronic compression of the superior plate of L2.  No L1 compression deformity. Old healed right sacral ala fracture. No  acute lumbar vertebral fracture identified. Moderate to advanced lower  lumbar facet osteoarthropathy.     Mild to moderate central  lumbar canal stenosis with severe right and  moderate L5-S1 foraminal stenosis. Moderate right L4/5 foraminal  narrowing. Advanced degenerative disc changes at L5-S1.     Diffuse vascular calcification.          Impression:      1. Advanced osteopenia. Old compression deformities involving T12 L2,  L3, L4, and L5 with kyphoplasty changes at each level except for L2. No  convincing acute lumbar vertebral fracture or traumatic malalignment  2. Degenerative changes resulting in severe right L5-S1 foraminal  narrowing. Moderate right L4/5 and left L5-S1 foraminal stenosis also  present..     This report was signed and finalized on 12/4/2024 4:36 PM by Dr. Pascale Cade MD.       XR Chest 1 View [[id number]] Collected: 12/04/24 1530     Updated: 12/04/24 1535    Narrative:      EXAMINATION:  XR CHEST 1 VW-  12/4/2024 2:05 PM     HISTORY: Chest pain.     COMPARISON: 10/24/2023.     TECHNIQUE: Single view AP image.     FINDINGS: There is patchy opacity within the left lung base. No other  focal infiltrate is seen. There is stable bronchial wall thickening.  There is mild cardiomegaly. There is a probable coronary artery stent.  There is atheromatous disease of the thoracic aorta. There are  degenerative changes of the spine with scoliosis. There are compression  deformities with prior kyphoplasty. Prior bilateral shoulder  arthroplasty.          Impression:      1. New opacities in the left lung base likely due to pneumonia or  atelectasis. Follow-up recommended to document resolution.  2. Stable bronchial wall thickening.  3. Mild cardiomegaly.           This report was signed and finalized on 12/4/2024 3:32 PM by Dr. Randy Chatterjee MD.             I have personally reviewed and interpreted the radiology studies and ECG obtained at time of admission.     Assessment / Plan   Assessment:   Active Hospital Problems    Diagnosis     **Pneumonia        Recurrent falls  Subacute right anterior fourth fifth sixth seventh and  eighth rib fractures  Small right frontal scalp hematoma  Questionable atelectasis versus left lower lung pneumonia  Hypertension  Chronic thoracic and lumbar compression fractures  History of kyphoplasty          CT chest  The there are fractures  of the right anterior 4, 5, 6, 7, and 8 ribs. Most of these fractures  appear subacute. The right sixth rib fracture may be acute.    1. Opacity and volume loss at the left lung base medially may be  atelectasis or pneumonia.  2. Multi right rib fractures without significant displacement. The right  anterior sixth rib fracture may be acute.    Troponins negative.  Sodium 137.  Potassium 4.3 creatinine 0.6  Hemoglobin 12.3      Treatment Plan  The patient will be admitted to my service here at [facility].      Pain control as needed  Incentive spirometer  Patient received ceftriaxone and azithromycin in the emergency department, as well as a dose of Solu-Medrol 125 mg IV  And questionable pneumonia on CT scan, and reports of cough, I will treat with Augmentin for 5 days  Nurse to perform orthostatic vital signs  Given elevated blood pressure, states that he he had his antihypertensives discontinued some weeks ago, was taking olmesartan 40 p.o. daily.  Will restart at a lower dose losartan 25 p.o. daily and monitor blood pressure  PT OT evaluation    Lovenox DVT prophylaxis        Medical Decision Making  Number and Complexity of problems: 5, moderate complexity  Differential Diagnosis: See above    Conditions and Status        Condition is unchanged.     [facility] Data  External documents reviewed: None  Cardiac tracing (EKG, telemetry) interpretation: Sinus rhythm  Radiology interpretation: Radiology reports reviewed  Labs reviewed: Yes  Any tests that were considered but not ordered: No     Decision rules/scores evaluated (example [id number], Wells, etc): See chart     Discussed with: Patient     Care Planning  Shared decision making: With patient  Code status  and discussions: Full code    Disposition  Social Determinants of Health that impact treatment or disposition: None  Estimated length of stay is 1 to 2 days.     I confirmed that the patient's advanced care plan is present, code status is documented, and a surrogate decision maker is listed in the patient's medical record.     The patient's surrogate decision maker is family member.     Electronically signed by Art Lizarraga MD, 12/04/24, 20:09 CST.               Electronically signed by Art Lizarraga MD at 12/04/24 2042       Vital Signs (last day)       Date/Time Temp Temp src Pulse Resp BP Patient Position SpO2    12/06/24 1518 98.5 (36.9) Oral 67 18 144/72 Lying 96    12/06/24 1038 98 (36.7) Oral 77 16 150/74 Sitting 96    12/06/24 0739 -- -- 72 -- 164/82 Standing --    12/06/24 0734 -- -- 69 -- 187/84 Sitting --    12/06/24 0730 97.4 (36.3) Oral 68 18 157/88 Lying 97    12/06/24 0300 97.7 (36.5) Oral 67 18 153/59 Lying 97    12/05/24 2001 -- -- -- -- 161/68 Standing --    12/05/24 2000 98.5 (36.9) Oral 70 16 142/61 Lying 98    12/05/24 1548 98 (36.7) Oral 69 16 136/68 Lying 97    12/05/24 0737 -- -- -- -- 151/82 Standing --    12/05/24 0735 -- -- -- -- 156/84 Sitting --    12/05/24 0732 97.6 (36.4) Oral 77 16 139/67 Lying 97    12/05/24 0352 98.2 (36.8) Oral 86 18 142/86 Lying 95          Current Facility-Administered Medications   Medication Dose Route Frequency Provider Last Rate Last Admin    acetaminophen (TYLENOL) tablet 650 mg  650 mg Oral Q4H PRN Art Lizarraga MD   650 mg at 12/06/24 1038    Or    acetaminophen (TYLENOL) 160 MG/5ML oral solution 650 mg  650 mg Oral Q4H PRN Art Lizarraga MD        Or    acetaminophen (TYLENOL) suppository 650 mg  650 mg Rectal Q4H PRN Art Lizarraga MD        amoxicillin-clavulanate (AUGMENTIN) 875-125 MG per tablet 1 tablet  1 tablet Oral Q12H Art Lizarraga MD   1 tablet at 12/06/24 0804    sennosides-docusate (PERICOLACE) 8.6-50 MG per tablet  2 tablet  2 tablet Oral BID PRN Art Lizarraga MD        And    polyethylene glycol (MIRALAX) packet 17 g  17 g Oral Daily PRN Art Lizarraga MD        And    bisacodyl (DULCOLAX) EC tablet 5 mg  5 mg Oral Daily PRN Art Lizarraga MD        And    bisacodyl (DULCOLAX) suppository 10 mg  10 mg Rectal Daily PRN Art Lizarraga MD        Enoxaparin Sodium (LOVENOX) syringe 30 mg  30 mg Subcutaneous Daily Art Lizarraga MD   30 mg at 12/05/24 2043    hydrALAZINE (APRESOLINE) injection 5 mg  5 mg Intravenous Q4H PRN Art Lizarraga MD        Lidocaine 4 % 1 patch  1 patch Transdermal Q24H Asaf Washburn MD   1 patch at 12/06/24 0803    losartan (COZAAR) tablet 25 mg  25 mg Oral Q24H Art Lizarraga MD   25 mg at 12/06/24 0804    melatonin tablet 5 mg  5 mg Oral Nightly Asaf Washburn MD        ondansetron ODT (ZOFRAN-ODT) disintegrating tablet 4 mg  4 mg Oral Q6H PRN Art Lizarraga MD        Or    ondansetron (ZOFRAN) injection 4 mg  4 mg Intravenous Q6H PRN Art Lizarraga MD        oxyCODONE-acetaminophen (PERCOCET) 5-325 MG per tablet 1 tablet  1 tablet Oral Q4H PRN Asaf Washburn MD        sodium chloride 0.9 % flush 10 mL  10 mL Intravenous Q12H Art Lizarraga MD   10 mL at 12/06/24 0804    sodium chloride 0.9 % flush 10 mL  10 mL Intravenous PRN Art Lizarraga MD        sodium chloride 0.9 % infusion 40 mL  40 mL Intravenous Art Chavez MD         Lab Results (last 24 hours)       Procedure Component Value Units Date/Time    Blood Culture - Blood, Arm, Left [[id number]]  (Normal) Collected: 12/04/24 1846    Specimen: Blood from Arm, Left Updated: 12/05/24 1915     Blood Culture No growth at 24 hours    Blood Culture - Blood, Arm, Right [[id number]]  (Normal) Collected: 12/04/24 1846    Specimen: Blood from Arm, Right Updated: 12/05/24 1915     Blood Culture No growth at 24 hours          Operative/Procedure Notes (last 5 days)  Notes  from 12/01/24 1658 through 12/06/24 1658   No notes of this type exist for this encounter.          Physician Progress Notes (last 24 hours)        Asaf Washburn MD at 12/06/24 1633          1         [facility] Medicine Services  INPATIENT PROGRESS NOTE    Patient Name: Juan Luis Collazo  Date of Admission: 12/4/2024  Today's Date: 12/06/24  Length of Stay: 2  Primary Care Physician: Santiago Aaron MD    Subjective   Chief Complaint: Follow-up  HPI   Patient states that he has discomfort despite using Percocet every 6 hourly as well as adding Lidoderm patch.  He said that the pain is worse when taking deep breath.  He said that the pain medicine helps but it does not last long enough until the next dose of Percocet.    He has multiple rib fractures from a fall  His wife also sustained a right leg hematoma when he had fallen onto her as she tried to catch him.        Review of Systems   All pertinent negatives and positives are as above. All other systems have been reviewed and are negative unless otherwise stated.     Objective    Temp:  [97.4 °F (36.3 °C)-98.5 °F (36.9 °C)] 98.5 °F (36.9 °C)  Heart Rate:  [67-77] 67  Resp:  [16-18] 18  BP: (142-187)/(59-88) 144/72  Physical Exam  No distress  Laying comfortably in bed  On supplemental oxygen when at home he does not normally uses oxygen  He appeared barrel chested to me.    Diminished breath sounds, diminished inspiratory effort.  Adequate air exchange.    S1-S2 regular rate and rhythm, no gross murmur  Soft abdomen, nontender, no organomegaly  No gross cyanosis or edema  Appropriate affect  Results Review:  I have reviewed the labs, radiology results, and diagnostic studies.    Laboratory Data:   Results from last 7 days   Lab Units 12/05/24  0930 12/04/24  1509   WBC 10*3/mm3 5.83 5.06   HEMOGLOBIN g/dL 11.6* 12.3*   HEMATOCRIT % 36.6* 38.1   PLATELETS 10*3/mm3 290 270        Results from last 7 days   Lab Units  12/05/24  0930 12/04/24  1509   SODIUM mmol/L 133* 137   POTASSIUM mmol/L 4.8 4.3   CHLORIDE mmol/L 94* 97*   CO2 mmol/L 30.0* 32.0*   BUN mg/dL 20 15   CREATININE mg/dL 0.78 0.60*   CALCIUM mg/dL 8.6 8.9   BILIRUBIN mg/dL 0.2 0.3   ALK PHOS U/L 118* 124*   ALT (SGPT) U/L 13 12   AST (SGOT) U/L 20 15   GLUCOSE mg/dL 217* 96       Culture Data:   Blood Culture   Date Value Ref Range Status   12/04/2024 No growth at 24 hours  Preliminary   12/04/2024 No growth at 24 hours  Preliminary       Radiology Data:   Imaging Results (Last 24 Hours)       ** No results found for the last 24 hours. **            I have reviewed the patient's current medications.     Assessment/Plan   Assessment  Active Hospital Problems    Diagnosis     **Pneumonia        Medical Decision Making  Number and Complexity of problems:   Abnormal imaging of chest concern for pneumonia versus atelectasis in the setting of subacute right anterior fourth fifth sixth seventh and eighth rib fractures   Small right frontal scalp hematoma  Hypertension  Chronic thoracic and lumbar compression fractures  History of kyphoplasty        Treatment Plan  He and significant other wished to be referred to rehab facility  I requested social service for consultation  Continue on PT OT  Continue empiric antibiotic  Incentive spirometry  DC oxygen if room air SpO2 greater than 92%  Added melatonin per patient's request  Family and patient wish to go to a rehab.  Will check with social service for options.  Therapist indicates home with outpatient services, home with assist.  They were apprised of the recommendation by therapist.  amoxicillin-clavulanate, 1 tablet, Oral, Q12H  enoxaparin, 30 mg, Subcutaneous, Daily  Lidocaine, 1 patch, Transdermal, Q24H  losartan, 25 mg, Oral, Q24H  melatonin, 5 mg, Oral, Nightly  sodium chloride, 10 mL, Intravenous, Q12H              Conditions and Status    fair  MDM Data  External documents reviewed: reviewed epic   Cardiac tracing  (EKG, telemetry) interpretation: -  Radiology interpretation: reviewed  Labs reviewed: y  Any tests that were considered but not ordered: none     Decision rules/scores evaluated (example [id number], [place], etc): none     Discussed with: Patient and significant other, nursing staff, social service     Care Planning  Shared decision making: Patient and significant other  Code status and discussions: Full code  I confirmed that the patient's advanced care plan is present, code status is documented, and a surrogate decision maker is listed in the patient's medical record.     Disposition  Social Determinants of Health that impact treatment or disposition: None identified at this time.  Family and patient requesting placement.  I expect the patient to be discharged to (TBD).       Electronically signed by Asaf Washburn MD, 24, 16:33 CST.      Electronically signed by Asaf Washburn MD at 24 1643       Consult Notes (last 24 hours)  Notes from 24 1658 through 24 1658   No notes of this type exist for this encounter.          Physical Therapy Notes (last 24 hours)        Peggy Melchor, LENCHO at 24 1531  Version 1 of 1         Acute Care - Physical Therapy Treatment Note  [facility]     Patient Name: Juan Luis Collazo  : 1940  MRN: [id number]  Today's Date: 2024   Onset of Illness/Injury or Date of Surgery: 24  Visit Dx:     ICD-10-CM ICD-9-CM   1. Pneumonia of left lower lobe due to infectious organism  J18.9 486   2. Closed fracture of multiple ribs, unspecified laterality, initial encounter  S22.49XA 807.09   3. Chest pain, unspecified type  R07.9 786.50   4. Gait instability [R26.81]  R26.81 781.2     Patient Active Problem List   Diagnosis    Closed compression fracture of first lumbar vertebra    Current non-smoker    Lumbar compression fracture, closed, initial encounter    Lumbar compression fracture    S/P kyphoplasty    Numbness and tingling of  right leg    Compression fracture of fourth lumbar vertebra with delayed healing    Benign hypertension    Flatback syndrome of lumbar region    Hammer toe of right foot    High cholesterol    Impaired fasting glucose    Malignant neoplasm of prostate    Osteopenia    Acute exacerbation of chronic obstructive airways disease    Back pain    Compression fracture of thoracic vertebra with routine healing    Normocytic anemia    Actinic keratosis    Hyperplastic colonic polyp    FH: colon cancer in first degree relative <60 years old    History of adenomatous polyp of colon    Decreased bone mass    Sebaceous cyst    Degenerative disc disease, cervical    Degenerative disc disease, lumbar    Degenerative disc disease, thoracic    Compression fracture of T12 vertebra with delayed healing    Age-related osteoporosis with current pathological fracture with delayed healing    Gait instability    Pain initiated by coughing    Hypoxia    Pneumonia     Past Medical History:   Diagnosis Date    Arthritis     Back pain     Cancer     CHEST, SKIN CANCER    GERD (gastroesophageal reflux disease)     History of colon polyps     History of prostate cancer     Hypertension     Low back pain     Macular degeneration     Osteopenia      Past Surgical History:   Procedure Laterality Date    APPENDECTOMY      CATARACT EXTRACTION, BILATERAL      COLONOSCOPY  09/17/2013    Dr. José-One 2-3mm polyp at 20cm; Otherwise normal; Repeat 3 years    COLONOSCOPY N/A 12/07/2022    Procedure: COLONOSCOPY WITH ANESTHESIA;  Surgeon: Bri Alexis MD;  Location: [facility] ENDOSCOPY;  Service: Gastroenterology;  Laterality: N/A;  pre: anemia. hx polyps.  post: polyps. diverticulosis.  no PCP    ENDOSCOPY N/A 12/07/2022    Procedure: ESOPHAGOGASTRODUODENOSCOPY WITH ANESTHESIA;  Surgeon: Bri Alexis MD;  Location: [facility] ENDOSCOPY;  Service: Gastroenterology;  Laterality: N/A;  pre: anemia  post: hiatal hernia. esophagitis.gastric erosions.  no  PCP    FOOT SURGERY Right     KYPHOPLASTY Bilateral 07/17/2018    Procedure: L3 KYPHOPLASTY 1-2 LEVELS;  Surgeon: Vega Pandey MD;  Location:  [facility] OR;  Service: Neurosurgery    KYPHOPLASTY Bilateral 09/11/2018    Procedure: L4 KYPHOPLASTY WITH BIOPSY;  Surgeon: Vega Pandey MD;  Location:  [facility] OR;  Service: Neurosurgery    KYPHOPLASTY WITH BIOPSY N/A 01/25/2022    Procedure: KYPHOPLASTY WITH BIOPSY, THORACIC 6 and LUMBAR 5;  Surgeon: Nick Martínez MD;  Location:  [facility] OR;  Service: Neurosurgery;  Laterality: N/A;    KYPHOPLASTY WITH BIOPSY N/A 8/18/2023    Procedure: Thoracic 12, KYPHOPLASTY WITH BIOPSY;  Surgeon: Nick Martínez MD;  Location:  [facility] OR;  Service: Neurosurgery;  Laterality: N/A;    PROSTATECTOMY      TONSILLECTOMY      TOTAL SHOULDER REPLACEMENT Bilateral      PT Assessment (Last 12 Hours)       PT Evaluation and Treatment       Row Name 12/06/24 1405          Physical Therapy Time and Intention    Subjective Information complains of;pain  -LY     Document Type therapy note (daily note)  -LY     Mode of Treatment physical therapy  -LY       Row Name 12/06/24 1405          General Information    Existing Precautions/Restrictions fall;oxygen therapy device and L/min  2L NC  -LY       Row Name 12/06/24 1405          Pain    Pretreatment Pain Rating 7/10  -LY     Posttreatment Pain Rating 7/10  -LY     Pain Location flank;chest  -LY     Pain Side/Orientation left  -LY     Pain Management Interventions exercise or physical activity utilized  -LY     Response to Pain Interventions activity participation with tolerable pain  -LY       Row Name 12/06/24 1405          Bed Mobility    Sit-Supine [place] (Bed Mobility) verbal cues;standby assist  -LY     Comment, (Bed Mobility) chair  -LY       Row Name 12/06/24 1405          Sit-Stand Transfer    Sit-Stand [place] (Transfers) standby assist;verbal cues  -LY     Assistive Device (Sit-Stand Transfers) walker,  front-wheeled  -LY       Row Name 12/06/24 1405          Stand-Sit Transfer    Stand-Sit [place] (Transfers) standby assist;verbal cues  -LY       Row Name 12/06/24 1405          Gait/Stairs (Locomotion)    [place] Level (Gait) verbal cues;contact guard  -LY     Assistive Device (Gait) walker, front-wheeled  -LY     Distance in Feet (Gait) 120  -LY     Pattern (Gait) step-through  -LY     Deviations/Abnormal Patterns (Gait) gait speed decreased;stride length decreased  -LY     Bilateral Gait Deviations forward flexed posture  -LY       Row Name 12/06/24 1405          Motor Skills    Comments, Therapeutic Exercise BLE AROM x15 reps seated  -LY     Additional Documentation Comments, Therapeutic Exercise (Row)  -LY       Row Name 12/06/24 1405          Plan of Care Review    Plan of Care Reviewed With patient;spouse  -LY     Progress improving  -LY       Row Name 12/06/24 1405          Positioning and Restraints    Pre-Treatment Position sitting in chair/recliner  -LY     Post Treatment Position bed  -LY     In Bed fowlers;call light within reach;encouraged to call for assist;with family/caregiver  -LY               User Key  (r) = Recorded By, (t) = Taken By, (c) = Cosigned By      Initials Name Provider Type    Peggy Mendez, PTA Physical Therapist Assistant                    Physical Therapy Education       Title: PT OT SLP Therapies (In Progress)       Topic: Physical Therapy (In Progress)       Point: Mobility training (Done)       Learning Progress Summary            Patient Acceptance, E, DU by HF at 12/5/2024 1034    Comment: Pt educated on role of PT in care plan                      Point: Home exercise program (Not Started)       Learner Progress:  Not documented in this visit.              Point: Body mechanics (Done)       Learning Progress Summary            Patient Acceptance, E, DU by HF at 12/5/2024 1034    Comment: Pt educated on role of PT in care plan                      Point:  Precautions (Not Started)       Learner Progress:  Not documented in this visit.                              User Key       Initials Effective Dates Name Provider Type Discipline    HF 10/04/24 -  Chayo Tsang, PT Student PT Student PT                  PT Recommendation and Plan     Plan of Care Reviewed With: patient, spouse  Progress: improving   Outcome Measures       Row Name 12/06/24 1105 12/05/24 0812          How much help from another is currently needed...    Putting on and taking off regular lower body clothing? 3  -AC (r) JR (t) AC (c) 3  -AC (r) JR (t) AC (c)     Bathing (including washing, rinsing, and drying) 4  -AC (r) JR (t) AC (c) 3  -AC (r) JR (t) AC (c)     Toileting (which includes using toilet bed pan or urinal) 4  -AC (r) JR (t) AC (c) 3  -AC (r) JR (t) AC (c)     Putting on and taking off regular upper body clothing 4  -AC (r) JR (t) AC (c) 4  -AC (r) JR (t) AC (c)     Taking care of personal grooming (such as brushing teeth) 4  -AC (r) JR (t) AC (c) 4  -AC (r) JR (t) AC (c)     Eating meals 4  -AC (r) JR (t) AC (c) 4  -AC (r) JR (t) AC (c)     AM-PAC 6 Clicks Score (OT) 23  -AC (r) JR (t) 21  -AC (r) JR (t)        Functional Assessment    Outcome Measure Options AM-PAC 6 Clicks Daily Activity (OT)  -AC (r) JR (t) AC (c) AM-PAC 6 Clicks Daily Activity (OT)  -AC (r) JR (t) AC (c)               User Key  (r) = Recorded By, (t) = Taken By, (c) = Cosigned By      Initials Name Provider Type    AC Alex Gonzalez, OTR/L, CNT Occupational Therapist    JR Maria Esther Hogue, OT Student OT Student                     Time Calculation:    PT Charges       Row Name 12/06/24 1530             Time Calculation    Start Time 1405  -LY      Stop Time 1429  -LY      Time Calculation (min) 24 min  -LY      PT Received On 12/06/24  -LY         Time Calculation- PT    Total Timed Code Minutes- PT 24 minute(s)  -LY         Timed Charges    79134 - PT Therapeutic Exercise Minutes 12  -LY      48906 - Gait Training  Minutes  12  -LY         Total Minutes    Timed Charges Total Minutes 24  -LY       Total Minutes 24  -LY                User Key  (r) = Recorded By, (t) = Taken By, (c) = Cosigned By      Initials Name Provider Type    Peggy Mendez PTA Physical Therapist Assistant                  Therapy Charges for Today       Code Description Service Date Service Provider Modifiers Qty    17251014903 HC PT THER PROC EA 15 MIN 12/6/2024 Peggy Melchor PTA GP 1    87675468672 HC GAIT TRAINING EA 15 MIN 12/6/2024 Peggy Melchor PTA GP 1            PT G-Codes  Outcome Measure Options: AM-PAC 6 Clicks Daily Activity (OT)  AM-PAC 6 Clicks Score (PT): 20  AM-PAC 6 Clicks Score (OT): 23    Peggy Melchor PTA  12/6/2024      Electronically signed by Peggy Melchor PTA at 12/06/24 1531       Occupational Therapy Notes (last 24 hours)  Notes from 12/05/24 1659 through 12/06/24 1659   No notes exist for this encounter.

## 2024-12-07 PROCEDURE — 97116 GAIT TRAINING THERAPY: CPT

## 2024-12-07 PROCEDURE — 25010000002 ENOXAPARIN PER 10 MG: Performed by: FAMILY MEDICINE

## 2024-12-07 RX ORDER — CETIRIZINE HYDROCHLORIDE 10 MG/1
10 TABLET ORAL DAILY
COMMUNITY

## 2024-12-07 RX ORDER — LOSARTAN POTASSIUM 50 MG/1
50 TABLET ORAL
Status: DISCONTINUED | OUTPATIENT
Start: 2024-12-08 | End: 2024-12-11 | Stop reason: HOSPADM

## 2024-12-07 RX ORDER — MULTIPLE VITAMINS W/ MINERALS TAB 9MG-400MCG
1 TAB ORAL DAILY
Status: DISCONTINUED | OUTPATIENT
Start: 2024-12-07 | End: 2024-12-11 | Stop reason: HOSPADM

## 2024-12-07 RX ORDER — DULOXETIN HYDROCHLORIDE 20 MG/1
20 CAPSULE, DELAYED RELEASE ORAL DAILY
Status: DISCONTINUED | OUTPATIENT
Start: 2024-12-07 | End: 2024-12-11 | Stop reason: HOSPADM

## 2024-12-07 RX ADMIN — OXYCODONE HYDROCHLORIDE AND ACETAMINOPHEN 1 TABLET: 5; 325 TABLET ORAL at 12:04

## 2024-12-07 RX ADMIN — DULOXETINE HYDROCHLORIDE 20 MG: 20 CAPSULE, DELAYED RELEASE ORAL at 17:14

## 2024-12-07 RX ADMIN — OXYCODONE HYDROCHLORIDE AND ACETAMINOPHEN 1 TABLET: 5; 325 TABLET ORAL at 20:52

## 2024-12-07 RX ADMIN — AMOXICILLIN AND CLAVULANATE POTASSIUM 1 TABLET: 875; 125 TABLET, FILM COATED ORAL at 08:05

## 2024-12-07 RX ADMIN — ENOXAPARIN SODIUM 30 MG: 100 INJECTION SUBCUTANEOUS at 20:53

## 2024-12-07 RX ADMIN — OXYCODONE HYDROCHLORIDE AND ACETAMINOPHEN 1 TABLET: 5; 325 TABLET ORAL at 05:32

## 2024-12-07 RX ADMIN — Medication 1 TABLET: at 17:14

## 2024-12-07 RX ADMIN — LOSARTAN POTASSIUM 25 MG: 25 TABLET, FILM COATED ORAL at 08:05

## 2024-12-07 RX ADMIN — OXYCODONE HYDROCHLORIDE AND ACETAMINOPHEN 1 TABLET: 5; 325 TABLET ORAL at 00:18

## 2024-12-07 RX ADMIN — Medication 10 ML: at 08:06

## 2024-12-07 RX ADMIN — Medication 5 MG: at 20:52

## 2024-12-07 RX ADMIN — AMOXICILLIN AND CLAVULANATE POTASSIUM 1 TABLET: 875; 125 TABLET, FILM COATED ORAL at 20:52

## 2024-12-07 RX ADMIN — LIDOCAINE 1 PATCH: 0.04 PATCH TOPICAL at 08:05

## 2024-12-07 RX ADMIN — Medication 10 ML: at 20:53

## 2024-12-07 NOTE — PLAN OF CARE
Goal Outcome Evaluation:  Plan of Care Reviewed With: patient        Progress: improving  Outcome Evaluation: A&Ox4. [place]. Up x 1 with walker to RR. Up in chair a few hours tonight. Tele, NSR. Pt requesting something stronger for sleep. Prn pain med.

## 2024-12-07 NOTE — PLAN OF CARE
Goal Outcome Evaluation:              Outcome Evaluation: Pt AxOx4, Prn pain med for rib/chest pain w/ good results.  RA w/ O2 sats above 90%.  Up x 1 w/ walker to BTR.  Call light within reach.    Small BM this shift.

## 2024-12-07 NOTE — THERAPY TREATMENT NOTE
Acute Care - Physical Therapy Treatment Note  [facility]     Patient Name: Juan Luis Collazo  : 1940  MRN: [id number]  Today's Date: 2024   Onset of Illness/Injury or Date of Surgery: 24  Visit Dx:     ICD-10-CM ICD-9-CM   1. Pneumonia of left lower lobe due to infectious organism  J18.9 486   2. Closed fracture of multiple ribs, unspecified laterality, initial encounter  S22.49XA 807.09   3. Chest pain, unspecified type  R07.9 786.50   4. Gait instability [R26.81]  R26.81 781.2     Patient Active Problem List   Diagnosis    Closed compression fracture of first lumbar vertebra    Current non-smoker    Lumbar compression fracture, closed, initial encounter    Lumbar compression fracture    S/P kyphoplasty    Numbness and tingling of right leg    Compression fracture of fourth lumbar vertebra with delayed healing    Benign hypertension    Flatback syndrome of lumbar region    Hammer toe of right foot    High cholesterol    Impaired fasting glucose    Malignant neoplasm of prostate    Osteopenia    Acute exacerbation of chronic obstructive airways disease    Back pain    Compression fracture of thoracic vertebra with routine healing    Normocytic anemia    Actinic keratosis    Hyperplastic colonic polyp    FH: colon cancer in first degree relative <60 years old    History of adenomatous polyp of colon    Decreased bone mass    Sebaceous cyst    Degenerative disc disease, cervical    Degenerative disc disease, lumbar    Degenerative disc disease, thoracic    Compression fracture of T12 vertebra with delayed healing    Age-related osteoporosis with current pathological fracture with delayed healing    Gait instability    Pain initiated by coughing    Hypoxia    Pneumonia     Past Medical History:   Diagnosis Date    Arthritis     Back pain     Cancer     CHEST, SKIN CANCER    GERD (gastroesophageal reflux disease)     History of colon polyps     History of prostate cancer     Hypertension     Low back  pain     Macular degeneration     Osteopenia      Past Surgical History:   Procedure Laterality Date    APPENDECTOMY      CATARACT EXTRACTION, BILATERAL      COLONOSCOPY  09/17/2013    Dr. José-One 2-3mm polyp at 20cm; Otherwise normal; Repeat 3 years    COLONOSCOPY N/A 12/07/2022    Procedure: COLONOSCOPY WITH ANESTHESIA;  Surgeon: Bri Alexis MD;  Location: [facility] ENDOSCOPY;  Service: Gastroenterology;  Laterality: N/A;  pre: anemia. hx polyps.  post: polyps. diverticulosis.  no PCP    ENDOSCOPY N/A 12/07/2022    Procedure: ESOPHAGOGASTRODUODENOSCOPY WITH ANESTHESIA;  Surgeon: Bri Alexis MD;  Location: [facility] ENDOSCOPY;  Service: Gastroenterology;  Laterality: N/A;  pre: anemia  post: hiatal hernia. esophagitis.gastric erosions.  no PCP    FOOT SURGERY Right     KYPHOPLASTY Bilateral 07/17/2018    Procedure: L3 KYPHOPLASTY 1-2 LEVELS;  Surgeon: Vega Pandey MD;  Location: [facility] OR;  Service: Neurosurgery    KYPHOPLASTY Bilateral 09/11/2018    Procedure: L4 KYPHOPLASTY WITH BIOPSY;  Surgeon: Vega Pandey MD;  Location: [facility] OR;  Service: Neurosurgery    KYPHOPLASTY WITH BIOPSY N/A 01/25/2022    Procedure: KYPHOPLASTY WITH BIOPSY, THORACIC 6 and LUMBAR 5;  Surgeon: Nick Martínez MD;  Location: [facility] OR;  Service: Neurosurgery;  Laterality: N/A;    KYPHOPLASTY WITH BIOPSY N/A 8/18/2023    Procedure: Thoracic 12, KYPHOPLASTY WITH BIOPSY;  Surgeon: Nick Martínez MD;  Location: [facility] OR;  Service: Neurosurgery;  Laterality: N/A;    PROSTATECTOMY      TONSILLECTOMY      TOTAL SHOULDER REPLACEMENT Bilateral      PT Assessment (Last 12 Hours)       PT Evaluation and Treatment       Row Name 12/07/24 8606          Physical Therapy Time and Intention    Subjective Information complains of;fatigue;pain  -LY     Document Type therapy note (daily note)  -LY     Mode of Treatment physical therapy  -LY       Row Name 12/07/24 5590          General Information    Existing  Precautions/Restrictions fall;oxygen therapy device and L/min  2L NC  -LY       Row Name 12/07/24 0850          Pain    Pretreatment Pain Rating 6/10  -LY     Posttreatment Pain Rating 6/10  -LY     Pain Location flank;chest  -LY     Pain Side/Orientation left  -LY     Pain Management Interventions exercise or physical activity utilized  -LY     Response to Pain Interventions activity participation with tolerable pain  -LY       Row Name 12/07/24 0850          Bed Mobility    Comment, (Bed Mobility) chair  -LY       Row Name 12/07/24 0850          Sit-Stand Transfer    Sit-Stand [place] (Transfers) standby assist;verbal cues  -LY     Assistive Device (Sit-Stand Transfers) walker, front-wheeled  -LY       Row Name 12/07/24 0850          Stand-Sit Transfer    Stand-Sit [place] (Transfers) standby assist;verbal cues  -LY       Row Name 12/07/24 0850          Gait/Stairs (Locomotion)    [place] Level (Gait) verbal cues;contact guard  -LY     Assistive Device (Gait) walker, front-wheeled  -LY     Distance in Feet (Gait) 80  x2  -LY     Pattern (Gait) step-through  -LY     Deviations/Abnormal Patterns (Gait) gait speed decreased;stride length decreased  -LY     Bilateral Gait Deviations forward flexed posture  -LY       Row Name 12/07/24 0850          Plan of Care Review    Plan of Care Reviewed With patient  -LY     Progress improving  -LY     Outcome Evaluation PT tx completed. Pt up in chair on arrival. Reports pain in ribs and chest on L side. SBA for sit tostands. Pt amb with RWX and CGA. Cues provided for increased R step height to improve foot clearance and pt able to correct. Cont with slow and guarded gait. Will cont to follow.  -LY       Row Name 12/07/24 0850          Positioning and Restraints    Pre-Treatment Position sitting in chair/recliner  -LY     Post Treatment Position chair  -LY     In Chair reclined;call light within reach;encouraged to call for assist  -LY               User Key  (r)  = Recorded By, (t) = Taken By, (c) = Cosigned By      Initials Name Provider Type    Peggy Mendez, PTA Physical Therapist Assistant                    Physical Therapy Education       Title: PT OT SLP Therapies (In Progress)       Topic: Physical Therapy (In Progress)       Point: Mobility training (Done)       Learning Progress Summary            Patient Acceptance, E, DU by  at 12/5/2024 1034    Comment: Pt educated on role of PT in care plan                      Point: Home exercise program (Not Started)       Learner Progress:  Not documented in this visit.              Point: Body mechanics (Done)       Learning Progress Summary            Patient Acceptance, E, DU by  at 12/5/2024 1034    Comment: Pt educated on role of PT in care plan                      Point: Precautions (Not Started)       Learner Progress:  Not documented in this visit.                              User Key       Initials Effective Dates Name Provider Type Discipline     10/04/24 -  Chayo Tsang, PT Student PT Student PT                  PT Recommendation and Plan     Plan of Care Reviewed With: patient  Progress: improving  Outcome Evaluation: PT tx completed. Pt up in chair on arrival. Reports pain in ribs and chest on L side. SBA for sit tostands. Pt amb with RWX and CGA. Cues provided for increased R step height to improve foot clearance and pt able to correct. Cont with slow and guarded gait. Will cont to follow.   Outcome Measures       Row Name 12/06/24 1105 12/05/24 0812          How much help from another is currently needed...    Putting on and taking off regular lower body clothing? 3  -AC (r) JR (t) AC (c) 3  -AC (r) JR (t) AC (c)     Bathing (including washing, rinsing, and drying) 4  -AC (r) JR (t) AC (c) 3  -AC (r) JR (t) AC (c)     Toileting (which includes using toilet bed pan or urinal) 4  -AC (r) JR (t) AC (c) 3  -AC (r) JR (t) AC (c)     Putting on and taking off regular upper body clothing 4  -AC (r) JR  (t) AC (c) 4  -AC (r) JR (t) AC (c)     Taking care of personal grooming (such as brushing teeth) 4  -AC (r) JR (t) AC (c) 4  -AC (r) JR (t) AC (c)     Eating meals 4  -AC (r) JR (t) AC (c) 4  -AC (r) JR (t) AC (c)     AM-PAC 6 Clicks Score (OT) 23  -AC (r) JR (t) 21  -AC (r) JR (t)        Functional Assessment    Outcome Measure Options AM-PAC 6 Clicks Daily Activity (OT)  -AC (r) JR (t) AC (c) AM-PAC 6 Clicks Daily Activity (OT)  -AC (r) JR (t) AC (c)               User Key  (r) = Recorded By, (t) = Taken By, (c) = Cosigned By      Initials Name Provider Type    AC Alex Gonzalez, OTR/L, CNT Occupational Therapist    JR Maria Esther Hogue, OT Student OT Student                     Time Calculation:    PT Charges       Row Name 12/07/24 0920             Time Calculation    Start Time 0850  -LY      Stop Time 0915  -LY      Time Calculation (min) 25 min  -LY      PT Received On 12/07/24  -LY         Time Calculation- PT    Total Timed Code Minutes- PT 25 minute(s)  -LY         Timed Charges    34328 - Gait Training Minutes  25  -LY         Total Minutes    Timed Charges Total Minutes 25  -LY       Total Minutes 25  -LY                User Key  (r) = Recorded By, (t) = Taken By, (c) = Cosigned By      Initials Name Provider Type    LY Peggy Melchor PTA Physical Therapist Assistant                  Therapy Charges for Today       Code Description Service Date Service Provider Modifiers Qty    03679657837 HC PT THER PROC EA 15 MIN 12/6/2024 Peggy Melchor, PTA GP 1    92083234401 HC GAIT TRAINING EA 15 MIN 12/6/2024 Peggy Melchor, LENCHO GP 1    35154331519 HC GAIT TRAINING EA 15 MIN 12/7/2024 ePggy Melchor PTA GP 2            PT G-Codes  Outcome Measure Options: AM-PAC 6 Clicks Daily Activity (OT)  AM-PAC 6 Clicks Score (PT): 20  AM-PAC 6 Clicks Score (OT): 23    Peggy Melchor PTA  12/7/2024

## 2024-12-07 NOTE — PLAN OF CARE
Goal Outcome Evaluation:  Plan of Care Reviewed With: patient        Progress: improving  Outcome Evaluation: PT tx completed. Pt up in chair on arrival. Reports pain in ribs and chest on L side. SBA for sit tostands. Pt amb with RWX and CGA. Cues provided for increased R step height to improve foot clearance and pt able to correct. Cont with slow and guarded gait. Will cont to follow.

## 2024-12-07 NOTE — PROGRESS NOTES
1         [facility] Medicine Services  INPATIENT PROGRESS NOTE    Patient Name: Juan Luis Collazo  Date of Admission: 12/4/2024  Today's Date: 12/07/24  Length of Stay: 3  Primary Care Physician: Santiago Aaron MD    Subjective   Chief Complaint: Follow-up  HPI     Pleasant couple.  No distress  No new event  Pain from rib fractures improved with change in frequency of medication along with lidocaine patch  Still not able to sleep.  He said that he has lots of things in his mind.  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  Review of Systems   All pertinent negatives and positives are as above. All other systems have been reviewed and are negative unless otherwise stated.     Objective    Temp:  [97.8 °F (36.6 °C)-98.5 °F (36.9 °C)] 98 °F (36.7 °C)  Heart Rate:  [67-78] 75  Resp:  [18] 18  BP: (144-179)/(72-88) 163/75  Physical Exam  Seated comfortably    No distress    Not requiring any oxygen  He appeared barrel chested to me.    Diminished breath sounds, diminished inspiratory effort.  Adequate air exchange.    S1-S2 regular rate and rhythm, no gross murmur  Soft abdomen, nontender, no organomegaly  No gross cyanosis or edema  Appropriate affect  Results Review:  I have reviewed the labs, radiology results, and diagnostic studies.    Laboratory Data:   Results from last 7 days   Lab Units 12/05/24  0930 12/04/24  1509   WBC 10*3/mm3 5.83 5.06   HEMOGLOBIN g/dL 11.6* 12.3*   HEMATOCRIT % 36.6* 38.1   PLATELETS 10*3/mm3 290 270        Results from last 7 days   Lab Units 12/05/24  0930 12/04/24  1509   SODIUM mmol/L 133* 137   POTASSIUM mmol/L 4.8 4.3   CHLORIDE mmol/L 94* 97*   CO2 mmol/L 30.0* 32.0*   BUN mg/dL 20 15   CREATININE mg/dL 0.78 0.60*   CALCIUM mg/dL 8.6 8.9   BILIRUBIN mg/dL 0.2 0.3   ALK PHOS U/L 118* 124*   ALT (SGPT) U/L 13 12   AST (SGOT) U/L 20 15   GLUCOSE mg/dL 217* 96       Culture Data:   Blood Culture    Date Value Ref Range Status   12/04/2024 No growth at 24 hours  Preliminary   12/04/2024 No growth at 24 hours  Preliminary       Radiology Data:   Imaging Results (Last 24 Hours)       ** No results found for the last 24 hours. **            I have reviewed the patient's current medications.     Assessment/Plan   Assessment  Active Hospital Problems    Diagnosis     **Pneumonia        Medical Decision Making  Number and Complexity of problems:   Abnormal imaging of chest concern for pneumonia versus atelectasis in the setting of subacute right anterior fourth fifth sixth seventh and eighth rib fractures   Small right frontal scalp hematoma  Hypertension  Chronic thoracic and lumbar compression fractures  History of kyphoplasty       Treatment Plan  He and significant other wished to be referred to rehab facility  I requested social service for consultation  Continue on PT OT: Standby assist for sit to stand.  Ambulated with rolling walker.    Continue empiric antibiotic  Incentive spirometry  DC oxygen if room air SpO2 greater than 92%  Added melatonin per patient's request  Family and patient wish to go to a rehab.  Will check with social service for options.  Therapist indicates home with outpatient services, home with assist.  They were apprised of the recommendation by therapist.  I reached out yesterday to  Mary.  Referral been made to rehab.  Noted elevated blood pressure.  Patient on losartan.  Patient been on pain medication which I increase its frequency yesterday.  Consider increasing losartan    Medications reviewed  amoxicillin-clavulanate, 1 tablet, Oral, Q12H  enoxaparin, 30 mg, Subcutaneous, Daily  Lidocaine, 1 patch, Transdermal, Q24H  losartan, 25 mg, Oral, Q24H  melatonin, 5 mg, Oral, Nightly  sodium chloride, 10 mL, Intravenous, Q12H                  Conditions and Status    fair  MDM Data  External documents reviewed: reviewed epic   Cardiac tracing (EKG, telemetry)  interpretation: -  Radiology interpretation: reviewed  Labs reviewed: y  Any tests that were considered but not ordered: none     Decision rules/scores evaluated (example [id number], [place], etc): none     Discussed with: Patient and significant other, nursing staff, social service     Care Planning  Shared decision making: Patient and significant other  Code status and discussions: Full code  I confirmed that the patient's advanced care plan is present, code status is documented, and a surrogate decision maker is listed in the patient's medical record.     Disposition  Social Determinants of Health that impact treatment or disposition: None identified at this time.  Family and patient requesting placement.  I expect the patient to be discharged to (TBD).       Electronically signed by Asaf Washburn MD, 12/07/24, 13:40 CST.

## 2024-12-08 PROCEDURE — 97530 THERAPEUTIC ACTIVITIES: CPT

## 2024-12-08 PROCEDURE — 97116 GAIT TRAINING THERAPY: CPT

## 2024-12-08 PROCEDURE — 25010000002 ENOXAPARIN PER 10 MG: Performed by: FAMILY MEDICINE

## 2024-12-08 RX ADMIN — OXYCODONE HYDROCHLORIDE AND ACETAMINOPHEN 1 TABLET: 5; 325 TABLET ORAL at 04:29

## 2024-12-08 RX ADMIN — LIDOCAINE 1 PATCH: 0.04 PATCH TOPICAL at 08:14

## 2024-12-08 RX ADMIN — Medication 10 ML: at 08:19

## 2024-12-08 RX ADMIN — ENOXAPARIN SODIUM 30 MG: 100 INJECTION SUBCUTANEOUS at 20:25

## 2024-12-08 RX ADMIN — Medication 10 ML: at 20:26

## 2024-12-08 RX ADMIN — AMOXICILLIN AND CLAVULANATE POTASSIUM 1 TABLET: 875; 125 TABLET, FILM COATED ORAL at 08:14

## 2024-12-08 RX ADMIN — OXYCODONE HYDROCHLORIDE AND ACETAMINOPHEN 1 TABLET: 5; 325 TABLET ORAL at 12:27

## 2024-12-08 RX ADMIN — AMOXICILLIN AND CLAVULANATE POTASSIUM 1 TABLET: 875; 125 TABLET, FILM COATED ORAL at 20:25

## 2024-12-08 RX ADMIN — DULOXETINE HYDROCHLORIDE 20 MG: 20 CAPSULE, DELAYED RELEASE ORAL at 08:14

## 2024-12-08 RX ADMIN — Medication 5 MG: at 20:25

## 2024-12-08 RX ADMIN — Medication 1 TABLET: at 08:14

## 2024-12-08 RX ADMIN — LOSARTAN POTASSIUM 50 MG: 50 TABLET, FILM COATED ORAL at 08:14

## 2024-12-08 RX ADMIN — OXYCODONE HYDROCHLORIDE AND ACETAMINOPHEN 1 TABLET: 5; 325 TABLET ORAL at 16:11

## 2024-12-08 RX ADMIN — OXYCODONE HYDROCHLORIDE AND ACETAMINOPHEN 1 TABLET: 5; 325 TABLET ORAL at 20:25

## 2024-12-08 RX ADMIN — OXYCODONE HYDROCHLORIDE AND ACETAMINOPHEN 1 TABLET: 5; 325 TABLET ORAL at 08:14

## 2024-12-08 NOTE — PAYOR COMM NOTE
"12/7 CLINICAL   [phone]    Stratton Rowan D \"ADAN\" (84 y.o. Male)       Date of Birth   1940    Social Security Number       Address   [address] [place] [address]    Home Phone   [phone]    MRN   [id number]       Congregation   [employer]    Marital Status                               Admission Date   12/4/24    Admission Type   Emergency    Admitting Provider   Asaf Washburn MD    Attending Provider   Asaf Washburn MD    Department, Room/Bed   [facility] 3A, 328/1       Discharge Date       Discharge Disposition       Discharge Destination                                 Attending Provider: Asaf Washburn MD    Allergies: No Known Allergies    Isolation: None   Infection: None   Code Status: CPR    Ht: 170.2 cm (67\")   Wt: 72 kg (158 lb 12.8 oz)    Admission Cmt: None   Principal Problem: Pneumonia [J18.9]                   Active Insurance as of 12/4/2024       Primary Coverage       Payor Plan Insurance Group Employer/Plan Group    AETNA MEDICARE REPLACEMENT AETNA MEDICARE REPLACEMENT [id number]       Payor Plan Address Payor Plan Phone Number Payor Plan Fax Number Effective Dates    [address] [phone]  1/1/2023 - None Entered    [place] [address]         Subscriber Name Subscriber Birth Date Member ID       ROWAN STRATTON KALEN 1940 [id number]                     Emergency Contacts        (Rel.) Home Phone Work Phone Mobile Phone    Jyoti Stratton (Spouse) [phone] -- [phone]              Current Facility-Administered Medications   Medication Dose Route Frequency Provider Last Rate Last Admin    acetaminophen (TYLENOL) tablet 650 mg  650 mg Oral Q4H PRN Art Lizarraga MD   650 mg at 12/06/24 1711    Or    acetaminophen (TYLENOL) 160 MG/5ML oral solution 650 mg  650 mg Oral Q4H PRN Art Lizarraga MD        Or    acetaminophen (TYLENOL) suppository 650 mg  650 mg Rectal Q4H PRN Art Lizarraga MD  "       amoxicillin-clavulanate (AUGMENTIN) 875-125 MG per tablet 1 tablet  1 tablet Oral Q12H Art Lizarraga MD   1 tablet at 12/08/24 0814    sennosides-docusate (PERICOLACE) 8.6-50 MG per tablet 2 tablet  2 tablet Oral BID PRN Art Lizarraga MD        And    polyethylene glycol (MIRALAX) packet 17 g  17 g Oral Daily PRN Art Lizarraga MD        And    bisacodyl (DULCOLAX) EC tablet 5 mg  5 mg Oral Daily PRN Art Lizarraga MD        And    bisacodyl (DULCOLAX) suppository 10 mg  10 mg Rectal Daily PRN Art Lizarraga MD        DULoxetine (CYMBALTA) DR capsule 20 mg  20 mg Oral Daily Art Lizarraga MD   20 mg at 12/08/24 0814    Enoxaparin Sodium (LOVENOX) syringe 30 mg  30 mg Subcutaneous Daily Art Lizarraga MD   30 mg at 12/07/24 2053    hydrALAZINE (APRESOLINE) injection 5 mg  5 mg Intravenous Q4H PRN Art Lizarraga MD        Lidocaine 4 % 1 patch  1 patch Transdermal Q24H Asaf Washburn MD   1 patch at 12/08/24 0814    losartan (COZAAR) tablet 50 mg  50 mg Oral Q24H sAaf Washburn MD   50 mg at 12/08/24 0814    melatonin tablet 5 mg  5 mg Oral Nightly Asaf Washburn MD   5 mg at 12/07/24 2052    multivitamin with minerals 1 tablet  1 tablet Oral Daily Art Lizarraga MD   1 tablet at 12/08/24 0814    ondansetron ODT (ZOFRAN-ODT) disintegrating tablet 4 mg  4 mg Oral Q6H PRN Art Lizarraga MD        Or    ondansetron (ZOFRAN) injection 4 mg  4 mg Intravenous Q6H PRN Art Lizarraga MD        oxyCODONE-acetaminophen (PERCOCET) 5-325 MG per tablet 1 tablet  1 tablet Oral Q4H PRN Asaf Washburn MD   1 tablet at 12/08/24 0814    sodium chloride 0.9 % flush 10 mL  10 mL Intravenous Q12H Art Lizarraga MD   10 mL at 12/08/24 0819    sodium chloride 0.9 % flush 10 mL  10 mL Intravenous PRN Art Lizarraga MD        sodium chloride 0.9 % infusion 40 mL  40 mL Intravenous PRN Art Lizarraga MD         Orders (last 24 hrs)        " Start     Ordered    12/08/24 0900  losartan (COZAAR) tablet 50 mg  Every 24 Hours Scheduled         12/07/24 1521    12/07/24 1715  DULoxetine (CYMBALTA) DR capsule 20 mg  Daily         12/07/24 1617    12/07/24 1715  multivitamin with minerals 1 tablet  Daily         12/07/24 1617    12/06/24 2100  melatonin tablet 5 mg  Nightly         12/06/24 1547    12/06/24 1546  oxyCODONE-acetaminophen (PERCOCET) 5-325 MG per tablet 1 tablet  Every 4 Hours PRN         12/06/24 1547    12/05/24 1800  Dietary Nutrition Supplements Other; Boost Original  Daily With Breakfast & Dinner      Comments: Alternate flavors    12/05/24 1325    12/05/24 1745  Lidocaine 4 % 1 patch  Every 24 Hours Scheduled         12/05/24 1658    12/05/24 0900  amoxicillin-clavulanate (AUGMENTIN) 875-125 MG per tablet 1 tablet  Every 12 Hours Scheduled         12/04/24 2046 12/05/24 0800  Oral Care  2 Times Daily       12/04/24 2011 12/05/24 0000  Vital Signs  Every 4 Hours       12/04/24 2011 12/04/24 2200  Incentive Spirometry  Every 4 Hours While Awake       12/04/24 1940    12/04/24 2100  sodium chloride 0.9 % flush 10 mL  Every 12 Hours Scheduled         12/04/24 2011 12/04/24 2100  Enoxaparin Sodium (LOVENOX) syringe 30 mg  Daily         12/04/24 2011 12/04/24 2036  Orthostatic Blood Pressure  Daily       12/04/24 2035 12/04/24 2029  losartan (COZAAR) tablet 25 mg  Every 24 Hours Scheduled,   Status:  Discontinued         12/04/24 2013 12/04/24 2011  ondansetron ODT (ZOFRAN-ODT) disintegrating tablet 4 mg  Every 6 Hours PRN        Placed in \"Or\" Linked Group    12/04/24 2011 12/04/24 2011  ondansetron (ZOFRAN) injection 4 mg  Every 6 Hours PRN        Placed in \"Or\" Linked Group    12/04/24 2011 12/04/24 2011  sennosides-docusate (PERICOLACE) 8.6-50 MG per tablet 2 tablet  2 Times Daily PRN        Placed in \"And\" Linked Group    12/04/24 2011 12/04/24 2011  polyethylene glycol (MIRALAX) packet 17 g  Daily PRN      " "  Placed in \"And\" Linked Group    12/04/24 2011 12/04/24 2011  bisacodyl (DULCOLAX) EC tablet 5 mg  Daily PRN        Placed in \"And\" Linked Group    12/04/24 2011 12/04/24 2011  bisacodyl (DULCOLAX) suppository 10 mg  Daily PRN        Placed in \"And\" Linked Group    12/04/24 2011 12/04/24 2011  acetaminophen (TYLENOL) tablet 650 mg  Every 4 Hours PRN        Placed in \"Or\" Linked Group    12/04/24 2011 12/04/24 2011  acetaminophen (TYLENOL) 160 MG/5ML oral solution 650 mg  Every 4 Hours PRN        Placed in \"Or\" Linked Group    12/04/24 2011 12/04/24 2011  acetaminophen (TYLENOL) suppository 650 mg  Every 4 Hours PRN        Placed in \"Or\" Linked Group    12/04/24 2011 12/04/24 2011  Intake & Output  Every Shift       12/04/24 2011 12/04/24 2010  sodium chloride 0.9 % flush 10 mL  As Needed         12/04/24 2011 12/04/24 2010  sodium chloride 0.9 % infusion 40 mL  As Needed         12/04/24 2011 12/04/24 1939  hydrALAZINE (APRESOLINE) injection 5 mg  Every 4 Hours PRN         12/04/24 1940    Unscheduled  Up With Assistance  As Needed       12/04/24 2011    --  olmesartan (BENICAR) 20 MG tablet  Daily         12/06/24 1042    --  cetirizine (zyrTEC) 10 MG tablet  Daily         12/07/24 1211                     Physician Progress Notes (last 48 hours)        Asaf Washburn MD at 12/07/24 1339          1         [facility] Medicine Services  INPATIENT PROGRESS NOTE    Patient Name: Juan Luis Collazo  Date of Admission: 12/4/2024  Today's Date: 12/07/24  Length of Stay: 3  Primary Care Physician: Santiago Aaron MD    Subjective   Chief Complaint: Follow-up  HPI     Pleasant couple.  No distress  No new event  Pain from rib fractures improved with change in frequency of medication along with lidocaine patch  Still not able to sleep.  He said that he has lots of things in his mind.  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  Review of Systems   All pertinent negatives and positives are as above. All other systems have been reviewed and are negative unless otherwise stated.     Objective    Temp:  [97.8 °F (36.6 °C)-98.5 °F (36.9 °C)] 98 °F (36.7 °C)  Heart Rate:  [67-78] 75  Resp:  [18] 18  BP: (144-179)/(72-88) 163/75  Physical Exam  Seated comfortably    No distress    Not requiring any oxygen  He appeared barrel chested to me.    Diminished breath sounds, diminished inspiratory effort.  Adequate air exchange.    S1-S2 regular rate and rhythm, no gross murmur  Soft abdomen, nontender, no organomegaly  No gross cyanosis or edema  Appropriate affect  Results Review:  I have reviewed the labs, radiology results, and diagnostic studies.    Laboratory Data:   Results from last 7 days   Lab Units 12/05/24  0930 12/04/24  1509   WBC 10*3/mm3 5.83 5.06   HEMOGLOBIN g/dL 11.6* 12.3*   HEMATOCRIT % 36.6* 38.1   PLATELETS 10*3/mm3 290 270        Results from last 7 days   Lab Units 12/05/24  0930 12/04/24  1509   SODIUM mmol/L 133* 137   POTASSIUM mmol/L 4.8 4.3   CHLORIDE mmol/L 94* 97*   CO2 mmol/L 30.0* 32.0*   BUN mg/dL 20 15   CREATININE mg/dL 0.78 0.60*   CALCIUM mg/dL 8.6 8.9   BILIRUBIN mg/dL 0.2 0.3   ALK PHOS U/L 118* 124*   ALT (SGPT) U/L 13 12   AST (SGOT) U/L 20 15   GLUCOSE mg/dL 217* 96       Culture Data:   Blood Culture   Date Value Ref Range Status   12/04/2024 No growth at 24 hours  Preliminary   12/04/2024 No growth at 24 hours  Preliminary       Radiology Data:   Imaging Results (Last 24 Hours)       ** No results found for the last 24 hours. **            I have reviewed the patient's current medications.     Assessment/Plan   Assessment  Active Hospital Problems    Diagnosis     **Pneumonia        Medical Decision Making  Number and Complexity of problems:   Abnormal imaging of chest concern for pneumonia versus atelectasis in the setting of subacute right anterior fourth fifth sixth  seventh and eighth rib fractures   Small right frontal scalp hematoma  Hypertension  Chronic thoracic and lumbar compression fractures  History of kyphoplasty       Treatment Plan  He and significant other wished to be referred to rehab facility  I requested social service for consultation  Continue on PT OT: Standby assist for sit to stand.  Ambulated with rolling walker.    Continue empiric antibiotic  Incentive spirometry  DC oxygen if room air SpO2 greater than 92%  Added melatonin per patient's request  Family and patient wish to go to a rehab.  Will check with social service for options.  Therapist indicates home with outpatient services, home with assist.  They were apprised of the recommendation by therapist.  I reached out yesterday to  Mary.  Referral been made to rehab.  Noted elevated blood pressure.  Patient on losartan.  Patient been on pain medication which I increase its frequency yesterday.  Consider increasing losartan    Medications reviewed  amoxicillin-clavulanate, 1 tablet, Oral, Q12H  enoxaparin, 30 mg, Subcutaneous, Daily  Lidocaine, 1 patch, Transdermal, Q24H  losartan, 25 mg, Oral, Q24H  melatonin, 5 mg, Oral, Nightly  sodium chloride, 10 mL, Intravenous, Q12H                  Conditions and Status    fair  [facility] Data  External documents reviewed: reviewed epic   Cardiac tracing (EKG, telemetry) interpretation: -  Radiology interpretation: reviewed  Labs reviewed: y  Any tests that were considered but not ordered: none     Decision rules/scores evaluated (example [id number], Wells, etc): none     Discussed with: Patient and significant other, nursing staff, social service     Care Planning  Shared decision making: Patient and significant other  Code status and discussions: Full code  I confirmed that the patient's advanced care plan is present, code status is documented, and a surrogate decision maker is listed in the patient's medical record.     Disposition  Social  Determinants of Health that impact treatment or disposition: None identified at this time.  Family and patient requesting placement.  I expect the patient to be discharged to (TBD).       Electronically signed by Asaf Washburn MD, 12/07/24, 13:40 CST.      Electronically signed by Asaf Washburn MD at 12/07/24 1644       Asaf Washburn MD at 12/06/24 1633          1         [facility] Medicine Services  INPATIENT PROGRESS NOTE    Patient Name: Juan Luis Collazo  Date of Admission: 12/4/2024  Today's Date: 12/06/24  Length of Stay: 2  Primary Care Physician: Santiago Aaron MD    Subjective   Chief Complaint: Follow-up  HPI   Patient states that he has discomfort despite using Percocet every 6 hourly as well as adding Lidoderm patch.  He said that the pain is worse when taking deep breath.  He said that the pain medicine helps but it does not last long enough until the next dose of Percocet.    He has multiple rib fractures from a fall  His wife also sustained a right leg hematoma when he had fallen onto her as she tried to catch him.        Review of Systems   All pertinent negatives and positives are as above. All other systems have been reviewed and are negative unless otherwise stated.     Objective    Temp:  [97.4 °F (36.3 °C)-98.5 °F (36.9 °C)] 98.5 °F (36.9 °C)  Heart Rate:  [67-77] 67  Resp:  [16-18] 18  BP: (142-187)/(59-88) 144/72  Physical Exam  No distress  Laying comfortably in bed  On supplemental oxygen when at home he does not normally uses oxygen  He appeared barrel chested to me.    Diminished breath sounds, diminished inspiratory effort.  Adequate air exchange.    S1-S2 regular rate and rhythm, no gross murmur  Soft abdomen, nontender, no organomegaly  No gross cyanosis or edema  Appropriate affect  Results Review:  I have reviewed the labs, radiology results, and diagnostic studies.    Laboratory Data:   Results from last 7 days    Lab Units 12/05/24  0930 12/04/24  1509   WBC 10*3/mm3 5.83 5.06   HEMOGLOBIN g/dL 11.6* 12.3*   HEMATOCRIT % 36.6* 38.1   PLATELETS 10*3/mm3 290 270        Results from last 7 days   Lab Units 12/05/24  0930 12/04/24  1509   SODIUM mmol/L 133* 137   POTASSIUM mmol/L 4.8 4.3   CHLORIDE mmol/L 94* 97*   CO2 mmol/L 30.0* 32.0*   BUN mg/dL 20 15   CREATININE mg/dL 0.78 0.60*   CALCIUM mg/dL 8.6 8.9   BILIRUBIN mg/dL 0.2 0.3   ALK PHOS U/L 118* 124*   ALT (SGPT) U/L 13 12   AST (SGOT) U/L 20 15   GLUCOSE mg/dL 217* 96       Culture Data:   Blood Culture   Date Value Ref Range Status   12/04/2024 No growth at 24 hours  Preliminary   12/04/2024 No growth at 24 hours  Preliminary       Radiology Data:   Imaging Results (Last 24 Hours)       ** No results found for the last 24 hours. **            I have reviewed the patient's current medications.     Assessment/Plan   Assessment  Active Hospital Problems    Diagnosis     **Pneumonia        Medical Decision Making  Number and Complexity of problems:   Abnormal imaging of chest concern for pneumonia versus atelectasis in the setting of subacute right anterior fourth fifth sixth seventh and eighth rib fractures   Small right frontal scalp hematoma  Hypertension  Chronic thoracic and lumbar compression fractures  History of kyphoplasty        Treatment Plan  He and significant other wished to be referred to rehab facility  I requested social service for consultation  Continue on PT OT  Continue empiric antibiotic  Incentive spirometry  DC oxygen if room air SpO2 greater than 92%  Added melatonin per patient's request  Family and patient wish to go to a rehab.  Will check with social service for options.  Therapist indicates home with outpatient services, home with assist.  They were apprised of the recommendation by therapist.  amoxicillin-clavulanate, 1 tablet, Oral, Q12H  enoxaparin, 30 mg, Subcutaneous, Daily  Lidocaine, 1 patch, Transdermal, Q24H  losartan, 25 mg, Oral,  Q24H  melatonin, 5 mg, Oral, Nightly  sodium chloride, 10 mL, Intravenous, Q12H              Conditions and Status    fair  MDM Data  External documents reviewed: reviewed epic   Cardiac tracing (EKG, telemetry) interpretation: -  Radiology interpretation: reviewed  Labs reviewed: y  Any tests that were considered but not ordered: none     Decision rules/scores evaluated (example [id number], Wells, etc): none     Discussed with: Patient and significant other, nursing staff, social service     Care Planning  Shared decision making: Patient and significant other  Code status and discussions: Full code  I confirmed that the patient's advanced care plan is present, code status is documented, and a surrogate decision maker is listed in the patient's medical record.     Disposition  Social Determinants of Health that impact treatment or disposition: None identified at this time.  Family and patient requesting placement.  I expect the patient to be discharged to (TBD).       Electronically signed by Asaf Washburn MD, 12/06/24, 16:33 CST.      Electronically signed by Asaf Washburn MD at 12/06/24 1643       Consult Notes (last 48 hours)  Notes from 12/06/24 1101 through 12/08/24 1101   No notes of this type exist for this encounter.

## 2024-12-08 NOTE — PLAN OF CARE
Goal Outcome Evaluation:  Plan of Care Reviewed With: patient        Progress: improving  Outcome Evaluation: Pt A/Ox4. VSS. RA. Pain medication given as needed per pt request. Pt slept most of the night. Currently up in chair. Safety maintained. Call light in reach.

## 2024-12-08 NOTE — PLAN OF CARE
Goal Outcome Evaluation:  Plan of Care Reviewed With: patient           Outcome Evaluation: Pt remains AOx4, has remianed up in chair this shift, up with standby assist/walker to bathroom and to ambulate in hallway with staff. safety maintained. Complaints of pain improved with PRN pain medications.

## 2024-12-08 NOTE — PROGRESS NOTES
1         [facility] Medicine Services  INPATIENT PROGRESS NOTE    Patient Name: Juan Luis Collazo  Date of Admission: 12/4/2024  Today's Date: 12/08/24  Length of Stay: 4  Primary Care Physician: Santiago Aaron MD    Subjective   Chief Complaint: Follow-up  HPI     Patient told me as soon as I entered the room that he slept well last night  His pain is also adequately controlled  Therapist note reviewed.  Able to stand with SBA. Amb with RWX and CGA, cues provided for increased B step height and posture. Worked through dynamic standing balance activites with SBA and at times requires single UE support to maintain balance   He is awaiting response for referral made for rehabitation.      Review of Systems   All pertinent negatives and positives are as above. All other systems have been reviewed and are negative unless otherwise stated.     Objective    Temp:  [97.7 °F (36.5 °C)-98.3 °F (36.8 °C)] 97.7 °F (36.5 °C)  Heart Rate:  [67-85] 74  Resp:  [18] 18  BP: (144-187)/(74-89) 147/74  Physical Exam  Seated comfortably  In good spirit  No distress  Not requiring any oxygen  He appeared barrel chested to me.    Diminished breath sounds, diminished inspiratory effort.  Adequate air exchange.    S1-S2 regular rate and rhythm, no gross murmur  Soft abdomen, nontender, no organomegaly  No gross cyanosis or edema  Appropriate affect  Results Review:  I have reviewed the labs, radiology results, and diagnostic studies.    Laboratory Data:   Results from last 7 days   Lab Units 12/05/24  0930 12/04/24  1509   WBC 10*3/mm3 5.83 5.06   HEMOGLOBIN g/dL 11.6* 12.3*   HEMATOCRIT % 36.6* 38.1   PLATELETS 10*3/mm3 290 270        Results from last 7 days   Lab Units 12/05/24  0930 12/04/24  1509   SODIUM mmol/L 133* 137   POTASSIUM mmol/L 4.8 4.3   CHLORIDE mmol/L 94* 97*   CO2 mmol/L 30.0* 32.0*   BUN mg/dL 20 15   CREATININE mg/dL 0.78 0.60*   CALCIUM mg/dL 8.6 8.9   BILIRUBIN mg/dL 0.2 0.3    ALK PHOS U/L 118* 124*   ALT (SGPT) U/L 13 12   AST (SGOT) U/L 20 15   GLUCOSE mg/dL 217* 96       Culture Data:   Blood Culture   Date Value Ref Range Status   12/04/2024 No growth at 24 hours  Preliminary   12/04/2024 No growth at 24 hours  Preliminary       Radiology Data:   Imaging Results (Last 24 Hours)       ** No results found for the last 24 hours. **            I have reviewed the patient's current medications.     Assessment/Plan   Assessment  Active Hospital Problems    Diagnosis     **Pneumonia        Medical Decision Making  Number and Complexity of problems:   Abnormal imaging of chest concern for pneumonia versus atelectasis in the setting of subacute right anterior fourth fifth sixth seventh and eighth rib fractures   Small right frontal scalp hematoma  Hypertension  Chronic thoracic and lumbar compression fractures  History of kyphoplasty       Treatment Plan  He and significant other wished to be referred to rehab facility  I requested social service for consultation  Continue on PT OT: Standby assist for sit to stand.  Ambulated with rolling walker.    Continue empiric antibiotic-complete course of antibiotic  Incentive spirometry  DC oxygen if room air SpO2 greater than 92%-maintaining O2 saturation in the low 90s in room air for the past couple of days  Added melatonin per patient's request  Family and patient wish to go to a rehab.  Will check with social service for options.  Therapist indicates home with outpatient services, home with assist.  They were apprised of the recommendation by therapist.  I reached out  to  Mary.  Referral been made to rehab.  Noted elevated blood pressure.  I increased losartan.  Continue to monitor blood pressure.  Adjust medications as needed    Medications reviewed  amoxicillin-clavulanate, 1 tablet, Oral, Q12H  DULoxetine, 20 mg, Oral, Daily  enoxaparin, 30 mg, Subcutaneous, Daily  Lidocaine, 1 patch, Transdermal, Q24H  losartan, 50 mg, Oral,  Q24H  melatonin, 5 mg, Oral, Nightly  multivitamin with minerals, 1 tablet, Oral, Daily  sodium chloride, 10 mL, Intravenous, Q12H                    Conditions and Status    fair  MDM Data  External documents reviewed: reviewed epic   Cardiac tracing (EKG, telemetry) interpretation: -  Radiology interpretation: reviewed  Labs reviewed: y  Any tests that were considered but not ordered: none     Decision rules/scores evaluated (example [id number], Wells, etc): none     Discussed with: Patient and significant other, nursing staff, social service     Care Planning  Shared decision making: Patient and significant other  Code status and discussions: Full code  I confirmed that the patient's advanced care plan is present, code status is documented, and a surrogate decision maker is listed in the patient's medical record.     Disposition  Social Determinants of Health that impact treatment or disposition: None identified at this time.  Family and patient requesting placement.  I expect the patient to be discharged to (TBD).       Electronically signed by Asaf Washburn MD, 12/08/24, 13:58 CST.

## 2024-12-08 NOTE — THERAPY TREATMENT NOTE
Acute Care - Physical Therapy Treatment Note  [facility]     Patient Name: Juan Luis Collazo  : 1940  MRN: [id number]  Today's Date: 2024   Onset of Illness/Injury or Date of Surgery: 24  Visit Dx:     ICD-10-CM ICD-9-CM   1. Pneumonia of left lower lobe due to infectious organism  J18.9 486   2. Closed fracture of multiple ribs, unspecified laterality, initial encounter  S22.49XA 807.09   3. Chest pain, unspecified type  R07.9 786.50   4. Gait instability [R26.81]  R26.81 781.2     Patient Active Problem List   Diagnosis    Closed compression fracture of first lumbar vertebra    Current non-smoker    Lumbar compression fracture, closed, initial encounter    Lumbar compression fracture    S/P kyphoplasty    Numbness and tingling of right leg    Compression fracture of fourth lumbar vertebra with delayed healing    Benign hypertension    Flatback syndrome of lumbar region    Hammer toe of right foot    High cholesterol    Impaired fasting glucose    Malignant neoplasm of prostate    Osteopenia    Acute exacerbation of chronic obstructive airways disease    Back pain    Compression fracture of thoracic vertebra with routine healing    Normocytic anemia    Actinic keratosis    Hyperplastic colonic polyp    FH: colon cancer in first degree relative <60 years old    History of adenomatous polyp of colon    Decreased bone mass    Sebaceous cyst    Degenerative disc disease, cervical    Degenerative disc disease, lumbar    Degenerative disc disease, thoracic    Compression fracture of T12 vertebra with delayed healing    Age-related osteoporosis with current pathological fracture with delayed healing    Gait instability    Pain initiated by coughing    Hypoxia    Pneumonia     Past Medical History:   Diagnosis Date    Arthritis     Back pain     Cancer     CHEST, SKIN CANCER    GERD (gastroesophageal reflux disease)     History of colon polyps     History of prostate cancer     Hypertension     Low back  pain     Macular degeneration     Osteopenia      Past Surgical History:   Procedure Laterality Date    APPENDECTOMY      CATARACT EXTRACTION, BILATERAL      COLONOSCOPY  09/17/2013    Dr. José-One 2-3mm polyp at 20cm; Otherwise normal; Repeat 3 years    COLONOSCOPY N/A 12/07/2022    Procedure: COLONOSCOPY WITH ANESTHESIA;  Surgeon: Bri Alexis MD;  Location: [facility] ENDOSCOPY;  Service: Gastroenterology;  Laterality: N/A;  pre: anemia. hx polyps.  post: polyps. diverticulosis.  no PCP    ENDOSCOPY N/A 12/07/2022    Procedure: ESOPHAGOGASTRODUODENOSCOPY WITH ANESTHESIA;  Surgeon: Bri Alexis MD;  Location: [facility] ENDOSCOPY;  Service: Gastroenterology;  Laterality: N/A;  pre: anemia  post: hiatal hernia. esophagitis.gastric erosions.  no PCP    FOOT SURGERY Right     KYPHOPLASTY Bilateral 07/17/2018    Procedure: L3 KYPHOPLASTY 1-2 LEVELS;  Surgeon: Vega Pandey MD;  Location: [facility] OR;  Service: Neurosurgery    KYPHOPLASTY Bilateral 09/11/2018    Procedure: L4 KYPHOPLASTY WITH BIOPSY;  Surgeon: Vega Pandey MD;  Location: [facility] OR;  Service: Neurosurgery    KYPHOPLASTY WITH BIOPSY N/A 01/25/2022    Procedure: KYPHOPLASTY WITH BIOPSY, THORACIC 6 and LUMBAR 5;  Surgeon: Nick Martínez MD;  Location: [facility] OR;  Service: Neurosurgery;  Laterality: N/A;    KYPHOPLASTY WITH BIOPSY N/A 8/18/2023    Procedure: Thoracic 12, KYPHOPLASTY WITH BIOPSY;  Surgeon: Nick Martínez MD;  Location: [facility] OR;  Service: Neurosurgery;  Laterality: N/A;    PROSTATECTOMY      TONSILLECTOMY      TOTAL SHOULDER REPLACEMENT Bilateral      PT Assessment (Last 12 Hours)       PT Evaluation and Treatment       Row Name 12/08/24 0891          Physical Therapy Time and Intention    Subjective Information complains of;pain  -LY     Document Type therapy note (daily note)  -LY     Mode of Treatment physical therapy  -LY       Row Name 12/08/24 0801          General Information    Existing  Precautions/Restrictions fall  -LY       Row Name 12/08/24 0825          Pain    Pretreatment Pain Rating 6/10  -LY     Posttreatment Pain Rating 6/10  -LY     Pain Location flank;chest  -LY     Pain Side/Orientation left  -LY     Pain Management Interventions exercise or physical activity utilized  -LY     Response to Pain Interventions activity participation with tolerable pain  -LY       Row Name 12/08/24 0825          Bed Mobility    Comment, (Bed Mobility) chair  -LY       Row Name 12/08/24 0825          Sit-Stand Transfer    Sit-Stand [place] (Transfers) standby assist;verbal cues  -LY     Assistive Device (Sit-Stand Transfers) walker, front-wheeled  -LY       Row Name 12/08/24 0825          Stand-Sit Transfer    Stand-Sit [place] (Transfers) standby assist;verbal cues  -LY       Row Name 12/08/24 0825          Gait/Stairs (Locomotion)    [place] Level (Gait) verbal cues;contact guard  -LY     Assistive Device (Gait) walker, front-wheeled  -LY     Distance in Feet (Gait) 80  x2  -LY     Pattern (Gait) step-through  -LY     Deviations/Abnormal Patterns (Gait) gait speed decreased;stride length decreased  -LY     Bilateral Gait Deviations forward flexed posture  -LY       Row Name 12/08/24 0825          Balance    Comment, Balance dynamic standing balance activities with SBA, requires single UE support at times to maintain balance  -LY       Row Name 12/08/24 0825          Plan of Care Review    Plan of Care Reviewed With patient  -LY     Progress improving  -LY     Outcome Evaluation PT tx completed. Pt up in chair on arrival. Cont to c/o L flank and chest pain secondary to rib fxs. Able to stand with SBA. Amb with RWX and CGA, cues provided for increased B step height and posture. Worked through dynamic standing balance activites with SBA and at times requires single UE support to maintain balance. Will cont to follow.  -LY       Row Name 12/08/24 0825          Positioning and Restraints     Pre-Treatment Position sitting in chair/recliner  -LY     Post Treatment Position chair  -LY     In Chair sitting;call light within reach;encouraged to call for assist  -LY               User Key  (r) = Recorded By, (t) = Taken By, (c) = Cosigned By      Initials Name Provider Type    LY Peggy Melchor, PTA Physical Therapist Assistant                    Physical Therapy Education       Title: PT OT SLP Therapies (In Progress)       Topic: Physical Therapy (In Progress)       Point: Mobility training (Done)       Learning Progress Summary            Patient Acceptance, E, DU by  at 12/5/2024 1034    Comment: Pt educated on role of PT in care plan                      Point: Home exercise program (Not Started)       Learner Progress:  Not documented in this visit.              Point: Body mechanics (Done)       Learning Progress Summary            Patient Acceptance, E, DU by  at 12/5/2024 1034    Comment: Pt educated on role of PT in care plan                      Point: Precautions (Not Started)       Learner Progress:  Not documented in this visit.                              User Key       Initials Effective Dates Name Provider Type Discipline     10/04/24 -  Chayo Tsang, PT Student PT Student PT                  PT Recommendation and Plan     Plan of Care Reviewed With: patient  Progress: improving  Outcome Evaluation: PT tx completed. Pt up in chair on arrival. Cont to c/o L flank and chest pain secondary to rib fxs. Able to stand with SBA. Amb with RWX and CGA, cues provided for increased B step height and posture. Worked through dynamic standing balance activites with SBA and at times requires single UE support to maintain balance. Will cont to follow.   Outcome Measures       Row Name 12/06/24 1100             How much help from another is currently needed...    Putting on and taking off regular lower body clothing? 3  -AC (r) JR (t) AC (c)      Bathing (including washing, rinsing, and drying) 4   -AC (r) JR (t) AC (c)      Toileting (which includes using toilet bed pan or urinal) 4  -AC (r) JR (t) AC (c)      Putting on and taking off regular upper body clothing 4  -AC (r) JR (t) AC (c)      Taking care of personal grooming (such as brushing teeth) 4  -AC (r) JR (t) AC (c)      Eating meals 4  -AC (r) JR (t) AC (c)      AM-PAC 6 Clicks Score (OT) 23  -AC (r) JR (t)         Functional Assessment    Outcome Measure Options AM-PAC 6 Clicks Daily Activity (OT)  -AC (r) JR (t) AC (c)                User Key  (r) = Recorded By, (t) = Taken By, (c) = Cosigned By      Initials Name Provider Type    Alex Barrios, OTR/L, CNT Occupational Therapist    JR Maria Esther Hogue, OT Student OT Student                     Time Calculation:    PT Charges       Row Name 12/08/24 0857             Time Calculation    Start Time 0825  -LY      Stop Time 0848  -LY      Time Calculation (min) 23 min  -LY      PT Received On 12/08/24  -LY         Time Calculation- PT    Total Timed Code Minutes- PT 23 minute(s)  -LY         Timed Charges    00871 - Gait Training Minutes  15  -LY      85871 - PT Therapeutic Activity Minutes 8  -LY         Total Minutes    Timed Charges Total Minutes 23  -LY       Total Minutes 23  -LY                User Key  (r) = Recorded By, (t) = Taken By, (c) = Cosigned By      Initials Name Provider Type    Peggy Mendez PTA Physical Therapist Assistant                  Therapy Charges for Today       Code Description Service Date Service Provider Modifiers Qty    52472095523 HC GAIT TRAINING EA 15 MIN 12/7/2024 Peggy Melchor PTA GP 2    49124417030 HC GAIT TRAINING EA 15 MIN 12/8/2024 Peggy Melchor, LENCHO GP 1    67825263999 HC PT THERAPEUTIC ACT EA 15 MIN 12/8/2024 Peggy Melchor, LENCHO GP 1            PT G-Codes  Outcome Measure Options: AM-PAC 6 Clicks Daily Activity (OT)  AM-PAC 6 Clicks Score (PT): 20  AM-PAC 6 Clicks Score (OT): 23    Peggy Melchor PTA  12/8/2024

## 2024-12-08 NOTE — PLAN OF CARE
Goal Outcome Evaluation:  Plan of Care Reviewed With: patient        Progress: improving  Outcome Evaluation: PT tx completed. Pt up in chair on arrival. Cont to c/o L flank and chest pain secondary to rib fxs. Able to stand with SBA. Amb with RWX and CGA, cues provided for increased B step height and posture. Worked through dynamic standing balance activites with SBA and at times requires single UE support to maintain balance. Will cont to follow.

## 2024-12-09 LAB
BACTERIA SPEC AEROBE CULT: NORMAL
BACTERIA SPEC AEROBE CULT: NORMAL

## 2024-12-09 PROCEDURE — 25010000002 ENOXAPARIN PER 10 MG: Performed by: FAMILY MEDICINE

## 2024-12-09 PROCEDURE — 97116 GAIT TRAINING THERAPY: CPT

## 2024-12-09 PROCEDURE — 97110 THERAPEUTIC EXERCISES: CPT

## 2024-12-09 PROCEDURE — 25010000002 HYDRALAZINE PER 20 MG: Performed by: FAMILY MEDICINE

## 2024-12-09 RX ADMIN — AMOXICILLIN AND CLAVULANATE POTASSIUM 1 TABLET: 875; 125 TABLET, FILM COATED ORAL at 08:05

## 2024-12-09 RX ADMIN — DULOXETINE HYDROCHLORIDE 20 MG: 20 CAPSULE, DELAYED RELEASE ORAL at 08:05

## 2024-12-09 RX ADMIN — ACETAMINOPHEN 650 MG: 325 TABLET, FILM COATED ORAL at 23:33

## 2024-12-09 RX ADMIN — OXYCODONE HYDROCHLORIDE AND ACETAMINOPHEN 1 TABLET: 5; 325 TABLET ORAL at 10:11

## 2024-12-09 RX ADMIN — AMOXICILLIN AND CLAVULANATE POTASSIUM 1 TABLET: 875; 125 TABLET, FILM COATED ORAL at 20:08

## 2024-12-09 RX ADMIN — OXYCODONE HYDROCHLORIDE AND ACETAMINOPHEN 1 TABLET: 5; 325 TABLET ORAL at 05:54

## 2024-12-09 RX ADMIN — OXYCODONE HYDROCHLORIDE AND ACETAMINOPHEN 1 TABLET: 5; 325 TABLET ORAL at 20:08

## 2024-12-09 RX ADMIN — Medication 10 ML: at 20:09

## 2024-12-09 RX ADMIN — HYDRALAZINE HYDROCHLORIDE 5 MG: 20 INJECTION, SOLUTION INTRAMUSCULAR; INTRAVENOUS at 23:33

## 2024-12-09 RX ADMIN — Medication 5 MG: at 20:08

## 2024-12-09 RX ADMIN — Medication 1 TABLET: at 08:05

## 2024-12-09 RX ADMIN — LOSARTAN POTASSIUM 50 MG: 50 TABLET, FILM COATED ORAL at 08:05

## 2024-12-09 RX ADMIN — ENOXAPARIN SODIUM 30 MG: 100 INJECTION SUBCUTANEOUS at 20:08

## 2024-12-09 RX ADMIN — Medication 10 ML: at 08:06

## 2024-12-09 RX ADMIN — LIDOCAINE 1 PATCH: 0.04 PATCH TOPICAL at 08:05

## 2024-12-09 NOTE — PLAN OF CARE
Goal Outcome Evaluation:  Plan of Care Reviewed With: patient        Progress: improving  Outcome Evaluation: Pt A/Ox4. Pt slept throughout the night. Safety maintained. Call light in reach.

## 2024-12-09 NOTE — CASE MANAGEMENT/SOCIAL WORK
Continued Stay Note  [facility]     Patient Name: Juan Luis Collazo  MRN: [id number]  Today's Date: 12/9/2024    Admit Date: 12/4/2024    Plan: [facility] - pending insurance approval   Discharge Plan       Row Name 12/09/24 1221       Plan    Plan [facility] - pending insurance approval    Plan Comments [facility] has accepted patient pending insurance approval.  [facility] will be starting insurance authorization today.  Will advise of insurance's decision when received.      Row Name 12/09/24 1107       Plan    Plan Referral pending at [facility]    Plan Comments MARIAELENA spoke with Rose at [facility] this am and she advised they had not received referral.  Rose stated she has EPIC access and can now pull referral for review, awaiting response.                   Discharge Codes    No documentation.                 Expected Discharge Date and Time       Expected Discharge Date Expected Discharge Time    Dec 11, 2024               YOSELIN Simons

## 2024-12-09 NOTE — PLAN OF CARE
Goal Outcome Evaluation:  Plan of Care Reviewed With: patient, spouse        Progress: improving  Outcome Evaluation: pt very motivated to work with therapy, sitting chair, trans sit-stand cga, pt amb short distances in hallway, worked on balance activity, side stepping, walking backward min assist, BLE AROM standing at rail, pt would benefit from rehab for balance, endurance, and strengthening

## 2024-12-09 NOTE — PLAN OF CARE
Goal Outcome Evaluation:  Plan of Care Reviewed With: patient, spouse        Progress: improving   Pt AOx4, VSS on RA. IV to LFA, IID.  Up in chair most of shift, up standby assist/walker to bathroom and to ambulate in hallway with staff. C/o pain, PRN meds given. Daily orthos. Plan to go to [facility], pending approval. Safety maintained. Call light in reach.

## 2024-12-09 NOTE — THERAPY TREATMENT NOTE
Acute Care - Physical Therapy Treatment Note  [facility]     Patient Name: Juan Luis Collazo  : 1940  MRN: [id number]  Today's Date: 2024   Onset of Illness/Injury or Date of Surgery: 24  Visit Dx:     ICD-10-CM ICD-9-CM   1. Pneumonia of left lower lobe due to infectious organism  J18.9 486   2. Closed fracture of multiple ribs, unspecified laterality, initial encounter  S22.49XA 807.09   3. Chest pain, unspecified type  R07.9 786.50   4. Gait instability [R26.81]  R26.81 781.2     Patient Active Problem List   Diagnosis    Closed compression fracture of first lumbar vertebra    Current non-smoker    Lumbar compression fracture, closed, initial encounter    Lumbar compression fracture    S/P kyphoplasty    Numbness and tingling of right leg    Compression fracture of fourth lumbar vertebra with delayed healing    Benign hypertension    Flatback syndrome of lumbar region    Hammer toe of right foot    High cholesterol    Impaired fasting glucose    Malignant neoplasm of prostate    Osteopenia    Acute exacerbation of chronic obstructive airways disease    Back pain    Compression fracture of thoracic vertebra with routine healing    Normocytic anemia    Actinic keratosis    Hyperplastic colonic polyp    FH: colon cancer in first degree relative <60 years old    History of adenomatous polyp of colon    Decreased bone mass    Sebaceous cyst    Degenerative disc disease, cervical    Degenerative disc disease, lumbar    Degenerative disc disease, thoracic    Compression fracture of T12 vertebra with delayed healing    Age-related osteoporosis with current pathological fracture with delayed healing    Gait instability    Pain initiated by coughing    Hypoxia    Pneumonia     Past Medical History:   Diagnosis Date    Arthritis     Back pain     Cancer     CHEST, SKIN CANCER    GERD (gastroesophageal reflux disease)     History of colon polyps     History of prostate cancer     Hypertension     Low back  pain     Macular degeneration     Osteopenia      Past Surgical History:   Procedure Laterality Date    APPENDECTOMY      CATARACT EXTRACTION, BILATERAL      COLONOSCOPY  09/17/2013    Dr. José-One 2-3mm polyp at 20cm; Otherwise normal; Repeat 3 years    COLONOSCOPY N/A 12/07/2022    Procedure: COLONOSCOPY WITH ANESTHESIA;  Surgeon: Bri Alexis MD;  Location: [facility] ENDOSCOPY;  Service: Gastroenterology;  Laterality: N/A;  pre: anemia. hx polyps.  post: polyps. diverticulosis.  no PCP    ENDOSCOPY N/A 12/07/2022    Procedure: ESOPHAGOGASTRODUODENOSCOPY WITH ANESTHESIA;  Surgeon: Bri Alexis MD;  Location: [facility] ENDOSCOPY;  Service: Gastroenterology;  Laterality: N/A;  pre: anemia  post: hiatal hernia. esophagitis.gastric erosions.  no PCP    FOOT SURGERY Right     KYPHOPLASTY Bilateral 07/17/2018    Procedure: L3 KYPHOPLASTY 1-2 LEVELS;  Surgeon: Vega Pandey MD;  Location: [facility] OR;  Service: Neurosurgery    KYPHOPLASTY Bilateral 09/11/2018    Procedure: L4 KYPHOPLASTY WITH BIOPSY;  Surgeon: Vega Pandey MD;  Location: [facility] OR;  Service: Neurosurgery    KYPHOPLASTY WITH BIOPSY N/A 01/25/2022    Procedure: KYPHOPLASTY WITH BIOPSY, THORACIC 6 and LUMBAR 5;  Surgeon: Nick Martínez MD;  Location: [facility] OR;  Service: Neurosurgery;  Laterality: N/A;    KYPHOPLASTY WITH BIOPSY N/A 8/18/2023    Procedure: Thoracic 12, KYPHOPLASTY WITH BIOPSY;  Surgeon: Nick Martínez MD;  Location: [facility] OR;  Service: Neurosurgery;  Laterality: N/A;    PROSTATECTOMY      TONSILLECTOMY      TOTAL SHOULDER REPLACEMENT Bilateral      PT Assessment (Last 12 Hours)       PT Evaluation and Treatment       Row Name 12/09/24 0750          Physical Therapy Time and Intention    Subjective Information complains of;pain  -AH     Document Type therapy note (daily note)  -AH     Mode of Treatment physical therapy  -       Row Name 12/09/24 075          General Information    Existing  Precautions/Restrictions fall  -[facility] Name 12/09/24 0750          Pain    Pretreatment Pain Rating 8/10  -     Posttreatment Pain Rating 10/10  -     Pain Location flank;chest  -     Pain Side/Orientation left  -     Pain Management Interventions nursing notified  -     Response to Pain Interventions activity participation with tolerable pain  -[facility] Name 12/09/24 0750          Bed Mobility    Comment, (Bed Mobility) chair  -       Row Name 12/09/24 0750          Sit-Stand Transfer    Sit-Stand [place] (Transfers) standby assist;verbal cues  -     Assistive Device (Sit-Stand Transfers) walker, front-wheeled  -       Row Name 12/09/24 0750          Stand-Sit Transfer    Stand-Sit [place] (Transfers) standby assist;verbal cues  -[facility] Name 12/09/24 0750          Gait/Stairs (Locomotion)    [place] Level (Gait) verbal cues;contact guard  -     Assistive Device (Gait) walker, front-wheeled  -     Distance in Feet (Gait) 50  50 x 4  -     Pattern (Gait) step-through  -     Deviations/Abnormal Patterns (Gait) gait speed decreased;stride length decreased  -     Bilateral Gait Deviations forward flexed posture  -[facility] Name 12/09/24 0750          Positioning and Restraints    Pre-Treatment Position sitting in chair/recliner  -     Post Treatment Position chair  -     In Chair sitting;call light within reach;encouraged to call for assist  -               User Key  (r) = Recorded By, (t) = Taken By, (c) = Cosigned By      Initials Name Provider Type     Aurora Yoder, PTA Physical Therapist Assistant                    Physical Therapy Education       Title: PT OT SLP Therapies (In Progress)       Topic: Physical Therapy (In Progress)       Point: Mobility training (Done)       Learning Progress Summary            Patient Acceptance, E, DU by  at 12/5/2024 1034    Comment: Pt educated on role of PT in care plan                      Point: Home  exercise program (Not Started)       Learner Progress:  Not documented in this visit.              Point: Body mechanics (Done)       Learning Progress Summary            Patient Acceptance, E, DU by  at 12/5/2024 1034    Comment: Pt educated on role of PT in care plan                      Point: Precautions (Not Started)       Learner Progress:  Not documented in this visit.                              User Key       Initials Effective Dates Name Provider Type Discipline     10/04/24 -  Chayo Tsang, PT Student PT Student PT                  PT Recommendation and Plan         Outcome Measures       Row Name 12/09/24 0800 12/06/24 1105          How much help from another person do you currently need...    Turning from your back to your side while in flat bed without using bedrails? 4  -AH --     Moving from lying on back to sitting on the side of a flat bed without bedrails? 4  -AH --     Moving to and from a bed to a chair (including a wheelchair)? 3  -AH --     Standing up from a chair using your arms (e.g., wheelchair, bedside chair)? 3  -AH --     Climbing 3-5 steps with a railing? 3  -AH --     To walk in hospital room? 3  -AH --     AM-PAC 6 Clicks Score (PT) 20  -AH --        How much help from another is currently needed...    Putting on and taking off regular lower body clothing? -- 3  -AC (r) JR (t) AC (c)     Bathing (including washing, rinsing, and drying) -- 4  -AC (r) JR (t) AC (c)     Toileting (which includes using toilet bed pan or urinal) -- 4  -AC (r) JR (t) AC (c)     Putting on and taking off regular upper body clothing -- 4  -AC (r) JR (t) AC (c)     Taking care of personal grooming (such as brushing teeth) -- 4  -AC (r) JR (t) AC (c)     Eating meals -- 4  -AC (r) JR (t) AC (c)     AM-PAC 6 Clicks Score (OT) -- 23  -AC (r) JR (t)        Functional Assessment    Outcome Measure Options AM-PAC 6 Clicks Basic Mobility (PT)  -AH AM-PAC 6 Clicks Daily Activity (OT)  -AC (r) JR (t) AC (c)                User Key  (r) = Recorded By, (t) = Taken By, (c) = Cosigned By      Initials Name Provider Type    AC Alex Gonzalez, OTR/L, CNT Occupational Therapist    Aurora Winchester PTA Physical Therapist Assistant    Maria Esther Burnette, OT Student OT Student                     Time Calculation:    PT Charges       Row Name 12/09/24 0808             Time Calculation    Start Time 0750  -      Stop Time 0805  -      Time Calculation (min) 15 min  -      PT Received On 12/09/24  -         Time Calculation- PT    Total Timed Code Minutes- PT 15 minute(s)  -         Timed Charges    32241 - Gait Training Minutes  15  -AH         Total Minutes    Timed Charges Total Minutes 15  -AH       Total Minutes 15  -AH                User Key  (r) = Recorded By, (t) = Taken By, (c) = Cosigned By      Initials Name Provider Type    Aurora Winchester PTA Physical Therapist Assistant                  Therapy Charges for Today       Code Description Service Date Service Provider Modifiers Qty    45777833654 HC GAIT TRAINING EA 15 MIN 12/9/2024 Aurora Yoder PTA GP 1            PT G-Codes  Outcome Measure Options: AM-PAC 6 Clicks Basic Mobility (PT)  AM-PAC 6 Clicks Score (PT): 20  AM-PAC 6 Clicks Score (OT): 23    Aurora Yoder PTA  12/9/2024

## 2024-12-09 NOTE — PROGRESS NOTES
[facility]  INPATIENT PROGRESS NOTE    Patient Name: Juan Luis Collazo  Date of Admission: 12/4/2024  Today's Date: 12/09/24  Length of Stay: 5  Primary Care Physician: Santiago Aaron MD    Subjective   Chief Complaint: Shortness of breath  HPI   Seen at bedside ate half of his lunch.  States that his usual oral intake.  No complaints.  Talkative and appropriate.  Blood pressure was elevated this morning and is improved after receiving Cozaar.    Review of Systems   All pertinent negatives and positives are as above. All other systems have been reviewed and are negative unless otherwise stated.     Objective    Temp:  [97.5 °F (36.4 °C)-98.2 °F (36.8 °C)] 97.5 °F (36.4 °C)  Heart Rate:  [68-80] 80  Resp:  [16-17] 16  BP: (121-189)/(65-89) 121/74  Physical Exam  Seated comfortably  In good spirit  No distress  Not requiring any oxygen  He appeared barrel chested to me.    Diminished breath sounds, diminished inspiratory effort.  Adequate air exchange.    S1-S2 regular rate and rhythm, no gross murmur  Soft abdomen, nontender, no organomegaly  No gross cyanosis or edema  Appropriate affect      Results Review:  I have reviewed the labs, radiology results, and diagnostic studies.    Laboratory Data:   Results from last 7 days   Lab Units 12/05/24  0930 12/04/24  1509   WBC 10*3/mm3 5.83 5.06   HEMOGLOBIN g/dL 11.6* 12.3*   HEMATOCRIT % 36.6* 38.1   PLATELETS 10*3/mm3 290 270        Results from last 7 days   Lab Units 12/05/24  0930 12/04/24  1509   SODIUM mmol/L 133* 137   POTASSIUM mmol/L 4.8 4.3   CHLORIDE mmol/L 94* 97*   CO2 mmol/L 30.0* 32.0*   BUN mg/dL 20 15   CREATININE mg/dL 0.78 0.60*   CALCIUM mg/dL 8.6 8.9   BILIRUBIN mg/dL 0.2 0.3   ALK PHOS U/L 118* 124*   ALT (SGPT) U/L 13 12   AST (SGOT) U/L 20 15   GLUCOSE mg/dL 217* 96       Culture Data:   Blood Culture   Date Value Ref Range Status   12/04/2024 No growth at 4 days  Preliminary   12/04/2024  No growth at 4 days  Preliminary       Radiology Data:   Imaging Results (Last 24 Hours)       ** No results found for the last 24 hours. **            I have reviewed the patient's current medications.     Assessment/Plan   Assessment  Active Hospital Problems    Diagnosis     **Pneumonia     Closed fracture of multiple ribs of right side     Gait instability     Benign hypertension        Treatment Plan  Vitals every 4 hours  Discontinue telemetry  Cardiac diet  IV fluids saline lock  Activity up with assistance and fall precautions  Continue PT OT    Pneumonia  Continue empiric antibiotic Augmentin every 12 hours  Cultures noted  Incentive spirometry  Oxygen as needed for oxygen saturation  88%    Hypertension  Continue home medications losartan    Falls, gait instability and deconditioning  Waiting for rehab referral to be approved.    DVT prophylaxis > Lovenox 30 mg subcutaneously daily    Medical Decision Making  Number and Complexity of problems: 4 complex medical problems  Differential Diagnosis: see above    Conditions and Status        Condition is improving.     [facility] Data  External documents reviewed: [facility] EHR  Cardiac tracing (EKG, telemetry) interpretation: NSR  Radiology interpretation: see above  Labs reviewed: see above  Any tests that were considered but not ordered: none     Decision rules/scores evaluated (example [id number], [place], etc): N/A     Discussed with: Patient     Care Planning  Shared decision making: Patient  Code status and discussions: Full code    Disposition  Social Determinants of Health that impact treatment or disposition: none  I expect the patient to be discharged to SNF when approved subacute rehabilitation placement.         Electronically signed by Petr Hamlin MD, 12/09/24, 12:26 CST.

## 2024-12-09 NOTE — CASE MANAGEMENT/SOCIAL WORK
Continued Stay Note  [facility]     Patient Name: Juan Luis Collazo  MRN: [id number]  Today's Date: 12/9/2024    Admit Date: 12/4/2024    Plan: Referral pending at [facility]   Discharge Plan       Row Name 12/09/24 1107       Plan    Plan Referral pending at [facility]    Plan Comments MARIAELENA spoke with Rose at [facility] this am and she advised they had not received referral.  Rose stated she has EPIC access and can now pull referral for review, awaiting response.                   Discharge Codes    No documentation.                 Expected Discharge Date and Time       Expected Discharge Date Expected Discharge Time    Dec 11, 2024               YOSELIN Simons

## 2024-12-09 NOTE — THERAPY TREATMENT NOTE
Acute Care - Physical Therapy Treatment Note  [facility]     Patient Name: Juan Luis Collazo  : 1940  MRN: [id number]  Today's Date: 2024   Onset of Illness/Injury or Date of Surgery: 24  Visit Dx:     ICD-10-CM ICD-9-CM   1. Pneumonia of left lower lobe due to infectious organism  J18.9 486   2. Closed fracture of multiple ribs, unspecified laterality, initial encounter  S22.49XA 807.09   3. Chest pain, unspecified type  R07.9 786.50   4. Gait instability [R26.81]  R26.81 781.2     Patient Active Problem List   Diagnosis    Closed compression fracture of first lumbar vertebra    Current non-smoker    Lumbar compression fracture, closed, initial encounter    Lumbar compression fracture    S/P kyphoplasty    Numbness and tingling of right leg    Compression fracture of fourth lumbar vertebra with delayed healing    Benign hypertension    Flatback syndrome of lumbar region    Hammer toe of right foot    High cholesterol    Impaired fasting glucose    Malignant neoplasm of prostate    Osteopenia    Acute exacerbation of chronic obstructive airways disease    Back pain    Compression fracture of thoracic vertebra with routine healing    Normocytic anemia    Actinic keratosis    Hyperplastic colonic polyp    FH: colon cancer in first degree relative <60 years old    History of adenomatous polyp of colon    Decreased bone mass    Sebaceous cyst    Degenerative disc disease, cervical    Degenerative disc disease, lumbar    Degenerative disc disease, thoracic    Compression fracture of T12 vertebra with delayed healing    Age-related osteoporosis with current pathological fracture with delayed healing    Gait instability    Pain initiated by coughing    Hypoxia    Pneumonia     Past Medical History:   Diagnosis Date    Arthritis     Back pain     Cancer     CHEST, SKIN CANCER    GERD (gastroesophageal reflux disease)     History of colon polyps     History of prostate cancer     Hypertension     Low back  pain     Macular degeneration     Osteopenia      Past Surgical History:   Procedure Laterality Date    APPENDECTOMY      CATARACT EXTRACTION, BILATERAL      COLONOSCOPY  09/17/2013    Dr. José-One 2-3mm polyp at 20cm; Otherwise normal; Repeat 3 years    COLONOSCOPY N/A 12/07/2022    Procedure: COLONOSCOPY WITH ANESTHESIA;  Surgeon: Bri Alexis MD;  Location: [facility] END[facility];  Service: Gastroenterology;  Laterality: N/A;  pre: anemia. hx polyps.  post: polyps. diverticulosis.  no PCP    ENDOSCOPY N/A 12/07/2022    Procedure: ESOPHAGOGASTRODUODENOSCOPY WITH ANESTHESIA;  Surgeon: Bri Alexis MD;  Location: [facility] ENDOSCOPY;  Service: Gastroenterology;  Laterality: N/A;  pre: anemia  post: hiatal hernia. esophagitis.gastric erosions.  no PCP    FOOT SURGERY Right     KYPHOPLASTY Bilateral 07/17/2018    Procedure: L3 KYPHOPLASTY 1-2 LEVELS;  Surgeon: Vega Pandey MD;  Location: [facility] OR;  Service: Neurosurgery    KYPHOPLASTY Bilateral 09/11/2018    Procedure: L4 KYPHOPLASTY WITH BIOPSY;  Surgeon: Vega Pandey MD;  Location: [facility] OR;  Service: Neurosurgery    KYPHOPLASTY WITH BIOPSY N/A 01/25/2022    Procedure: KYPHOPLASTY WITH BIOPSY, THORACIC 6 and LUMBAR 5;  Surgeon: Nick Martínez MD;  Location: [facility] OR;  Service: Neurosurgery;  Laterality: N/A;    KYPHOPLASTY WITH BIOPSY N/A 8/18/2023    Procedure: Thoracic 12, KYPHOPLASTY WITH BIOPSY;  Surgeon: Nick Martínez MD;  Location: [facility] OR;  Service: Neurosurgery;  Laterality: N/A;    PROSTATECTOMY      TONSILLECTOMY      TOTAL SHOULDER REPLACEMENT Bilateral      PT Assessment (Last 12 Hours)       PT Evaluation and Treatment       Row Name 12/09/24 1408 12/09/24 0750       Physical Therapy Time and Intention    Subjective Information complains of;weakness;fatigue;pain  -AH complains of;pain  -AH    Document Type therapy note (daily note)  -AH therapy note (daily note)  -    Mode of Treatment physical therapy  -  physical therapy  -[facility] Name 12/09/24 1408 12/09/24 0750       General Information    Patient/Family/Caregiver Comments/Observations wife  - --    Existing Precautions/Restrictions fall  - fall  -      Row Name 12/09/24 1408 12/09/24 0750       Pain    Pretreatment Pain Rating 5/10  - 8/10  -    Posttreatment Pain Rating 5/10  - 10/10  -    Pain Location flank;chest  - flank;chest  -    Pain Side/Orientation left  - left  -    Pain Management Interventions exercise or physical activity utilized  - nursing notified  -    Response to Pain Interventions activity participation with tolerable pain  - activity participation with tolerable pain  -[facility] Name 12/09/24 1408 12/09/24 0750       Bed Mobility    Comment, (Bed Mobility) chair  - chair  -[facility] Name 12/09/24 1408 12/09/24 0750       Sit-Stand Transfer    Sit-Stand [place] (Transfers) standby assist;verbal cues  - standby assist;verbal cues  -    Assistive Device (Sit-Stand Transfers) walker, front-wheeled  - walker, front-wheeled  -[facility] Name 12/09/24 1408 12/09/24 0750       Stand-Sit Transfer    Stand-Sit [place] (Transfers) standby assist;verbal cues  - standby assist;verbal cues  -[facility] Name 12/09/24 1408 12/09/24 0750       Gait/Stairs (Locomotion)    [place] Level (Gait) verbal cues;contact guard  - verbal cues;contact guard  -    Assistive Device (Gait) walker, front-wheeled  - walker, front-wheeled  -    Distance in Feet (Gait) 50  50 x 4  - 50  50 x 4  -    Pattern (Gait) step-through  - step-through  -    Deviations/Abnormal Patterns (Gait) gait speed decreased;stride length decreased  - gait speed decreased;stride length decreased  -    Bilateral Gait Deviations forward flexed posture  - forward flexed posture  -[facility] Name 12/09/24 1408          Balance    Comment, Balance side stepping, walking backwards min assist  -     Additional  Documentation Balance Assessment/Interventions ([facility])  -       Row Name 12/09/24 1408          Motor Skills    Comments, Therapeutic Exercise BLE AROM x 10 reps standing at rail  -     Additional Documentation Comments, Motor Control/Coordination ([facility]);Comments, Coordination Assessment/Intervention ([facility])  -       Row Name 12/09/24 1408          Plan of Care Review    Plan of Care Reviewed With patient;spouse  -     Progress improving  -     Outcome Evaluation pt very motivated to work with therapy, sitting chair, trans sit-stand cga, pt amb short distances in hallway, worked on balance activity, side stepping, walking backward min assist, BLE AROM standing at rail, pt would benefit from rehab for balance, endurance, and strengthening  -       Row Name 12/09/24 1408 12/09/24 0750       Positioning and Restraints    Pre-Treatment Position sitting in chair/recliner  - sitting in chair/recliner  -    Post Treatment Position chair  - chair  -    In Chair notified nsg;reclined;call light within reach;encouraged to call for assist;with family/caregiver  - sitting;call light within reach;encouraged to call for assist  [facility]              User Key  (r) = Recorded By, (t) = Taken By, (c) = Cosigned By      Initials Name Provider Type     Aurora Yoder, PTA Physical Therapist Assistant                    Physical Therapy Education       Title: PT OT SLP Therapies (In Progress)       Topic: Physical Therapy (In Progress)       Point: Mobility training (Done)       Learning Progress Summary            Patient Acceptance, E, DU by  at 12/5/2024 1034    Comment: Pt educated on role of PT in care plan                      Point: Home exercise program (Not Started)       Learner Progress:  Not documented in this visit.              Point: Body mechanics (Done)       Learning Progress Summary            Patient Acceptance, E, DU by HF at 12/5/2024 1034    Comment: Pt educated on role of PT in care plan                       Point: Precautions (Not Started)       Learner Progress:  Not documented in this visit.                              User Key       Initials Effective Dates Name Provider Type Discipline    HF 10/04/24 -  Chayo Tsang, PT Student PT Student PT                  PT Recommendation and Plan     Plan of Care Reviewed With: patient, spouse  Progress: improving  Outcome Evaluation: pt very motivated to work with therapy, sitting chair, trans sit-stand cga, pt amb short distances in hallway, worked on balance activity, side stepping, walking backward min assist, BLE AROM standing at rail, pt would benefit from rehab for balance, endurance, and strengthening   Outcome Measures       Row Name 12/09/24 0800             How much help from another person do you currently need...    Turning from your back to your side while in flat bed without using bedrails? 4  -AH      Moving from lying on back to sitting on the side of a flat bed without bedrails? 4  -AH      Moving to and from a bed to a chair (including a wheelchair)? 3  -AH      Standing up from a chair using your arms (e.g., wheelchair, bedside chair)? 3  -AH      Climbing 3-5 steps with a railing? 3  -AH      To walk in hospital room? 3  -AH      AM-PAC 6 Clicks Score (PT) 20  -         Functional Assessment    Outcome Measure Options AM-PAC 6 Clicks Basic Mobility (PT)  -                User Key  (r) = Recorded By, (t) = Taken By, (c) = Cosigned By      Initials Name Provider Type     Aurora Yoder, PTA Physical Therapist Assistant                     Time Calculation:    PT Charges       Row Name 12/09/24 1433 12/09/24 0808          Time Calculation    Start Time 1408  - 0750  -     Stop Time 1431  - 0805  -     Time Calculation (min) 23 min  - 15 min  -     PT Received On 12/09/24  - 12/09/24  -        Time Calculation- PT    Total Timed Code Minutes- PT 23 minute(s)  - 15 minute(s)  -        Timed Charges    17944 - PT  Therapeutic Exercise Minutes 10  -AH --     65144 - Gait Training Minutes  13  -AH 15  -AH        Total Minutes    Timed Charges Total Minutes 23  -AH 15  -AH      Total Minutes 23  -AH 15  -AH               User Key  (r) = Recorded By, (t) = Taken By, (c) = Cosigned By      Initials Name Provider Type    Aurora Winchester PTA Physical Therapist Assistant                  Therapy Charges for Today       Code Description Service Date Service Provider Modifiers Qty    10620274643 HC GAIT TRAINING EA 15 MIN 12/9/2024 Aurora Yoder, LENCHO GP 1    97862902575 HC PT THER PROC EA 15 MIN 12/9/2024 Aurora Yoder, PTA GP 1    77523255901 HC GAIT TRAINING EA 15 MIN 12/9/2024 Aurora Yoder, LENCHO GP 1            PT G-Codes  Outcome Measure Options: AM-PAC 6 Clicks Basic Mobility (PT)  AM-PAC 6 Clicks Score (PT): 20  AM-PAC 6 Clicks Score (OT): 23    Aurora Yoder PTA  12/9/2024

## 2024-12-10 ENCOUNTER — TRANSCRIBE ORDERS (OUTPATIENT)
Dept: ADMINISTRATIVE | Facility: HOSPITAL | Age: 84
End: 2024-12-10
Payer: MEDICARE

## 2024-12-10 ENCOUNTER — TELEPHONE (OUTPATIENT)
Dept: INTERNAL MEDICINE | Facility: CLINIC | Age: 84
End: 2024-12-10
Payer: MEDICARE

## 2024-12-10 DIAGNOSIS — M85.80 HYPEROSTOSIS: Primary | ICD-10-CM

## 2024-12-10 PROBLEM — S22.41XA CLOSED FRACTURE OF MULTIPLE RIBS OF RIGHT SIDE: Status: ACTIVE | Noted: 2024-12-09

## 2024-12-10 PROCEDURE — 25010000002 ENOXAPARIN PER 10 MG: Performed by: FAMILY MEDICINE

## 2024-12-10 PROCEDURE — 97535 SELF CARE MNGMENT TRAINING: CPT | Performed by: OCCUPATIONAL THERAPIST

## 2024-12-10 PROCEDURE — 97116 GAIT TRAINING THERAPY: CPT

## 2024-12-10 RX ADMIN — OXYCODONE HYDROCHLORIDE AND ACETAMINOPHEN 1 TABLET: 5; 325 TABLET ORAL at 16:20

## 2024-12-10 RX ADMIN — OXYCODONE HYDROCHLORIDE AND ACETAMINOPHEN 1 TABLET: 5; 325 TABLET ORAL at 11:44

## 2024-12-10 RX ADMIN — DULOXETINE HYDROCHLORIDE 20 MG: 20 CAPSULE, DELAYED RELEASE ORAL at 08:00

## 2024-12-10 RX ADMIN — OXYCODONE HYDROCHLORIDE AND ACETAMINOPHEN 1 TABLET: 5; 325 TABLET ORAL at 07:48

## 2024-12-10 RX ADMIN — Medication 10 ML: at 20:26

## 2024-12-10 RX ADMIN — Medication 10 ML: at 11:44

## 2024-12-10 RX ADMIN — LIDOCAINE 1 PATCH: 0.04 PATCH TOPICAL at 08:00

## 2024-12-10 RX ADMIN — LOSARTAN POTASSIUM 50 MG: 50 TABLET, FILM COATED ORAL at 08:00

## 2024-12-10 RX ADMIN — Medication 1 TABLET: at 08:00

## 2024-12-10 RX ADMIN — Medication 10 MG: at 21:46

## 2024-12-10 RX ADMIN — OXYCODONE HYDROCHLORIDE AND ACETAMINOPHEN 1 TABLET: 5; 325 TABLET ORAL at 02:41

## 2024-12-10 RX ADMIN — ENOXAPARIN SODIUM 30 MG: 100 INJECTION SUBCUTANEOUS at 20:26

## 2024-12-10 NOTE — PAYOR COMM NOTE
"12/9 CLINICAL    Rowan Stratton \"ADAN\" (84 y.o. Male)       Date of Birth   1940    Social Security Number       Address   [address] [place] [address]    Home Phone   [phone]    MRN   [id number]       Orthodoxy   Yazidi    Marital Status                               Admission Date   12/4/24    Admission Type   Emergency    Admitting Provider   Petr Hamlin MD    Attending Provider   Petr Hamlin MD    Department, Room/Bed   [facility] 3A, 328/1       Discharge Date       Discharge Disposition       Discharge Destination                                 Attending Provider: Petr Hamlin MD    Allergies: No Known Allergies    Isolation: None   Infection: None   Code Status: CPR    Ht: 170.2 cm (67\")   Wt: 72 kg (158 lb 12.8 oz)    Admission Cmt: None   Principal Problem: Pneumonia [J18.9]                   Active Insurance as of 12/4/2024       Primary Coverage       Payor Plan Insurance Group Employer/Plan Group    AETNA MEDICARE REPLACEMENT AETNA MEDICARE REPLACEMENT [id number]       Payor Plan Address Payor Plan Phone Number Payor Plan Fax Number Effective Dates    [address] [phone]  1/1/2023 - None Entered    [facility] [id number]         Subscriber Name Subscriber Birth Date Member ID       ROWAN STRATTON 1940 [id number]                     Emergency Contacts        (Rel.) Home Phone Work Phone Mobile Phone    Jyoti Stratton (Spouse) [phone] -- [phone]              Current Facility-Administered Medications   Medication Dose Route Frequency Provider Last Rate Last Admin    acetaminophen (TYLENOL) tablet 650 mg  650 mg Oral Q4H PRN Art Lizarraga MD   650 mg at 12/06/24 1711    Or    acetaminophen (TYLENOL) 160 MG/5ML oral solution 650 mg  650 mg Oral Q4H PRN Art Lizarraga MD        Or    acetaminophen (TYLENOL) suppository 650 mg  650 mg Rectal Q4H PRN Art Lizarraga MD        " amoxicillin-clavulanate (AUGMENTIN) 875-125 MG per tablet 1 tablet  1 tablet Oral Q12H Art Lizarraga MD   1 tablet at 12/09/24 0805    sennosides-docusate (PERICOLACE) 8.6-50 MG per tablet 2 tablet  2 tablet Oral BID PRN Art Lizarraga MD        And    polyethylene glycol (MIRALAX) packet 17 g  17 g Oral Daily PRN Art Lizarraga MD        And    bisacodyl (DULCOLAX) EC tablet 5 mg  5 mg Oral Daily PRN Art Lizarraga MD        And    bisacodyl (DULCOLAX) suppository 10 mg  10 mg Rectal Daily PRN Art Lizarraga MD        DULoxetine (CYMBALTA) DR capsule 20 mg  20 mg Oral Daily Art Lizarraga MD   20 mg at 12/09/24 0805    Enoxaparin Sodium (LOVENOX) syringe 30 mg  30 mg Subcutaneous Daily Art Lizarraga MD   30 mg at 12/08/24 2025    hydrALAZINE (APRESOLINE) injection 5 mg  5 mg Intravenous Q4H PRN Art Lizarraga MD        Lidocaine 4 % 1 patch  1 patch Transdermal Q24H Asaf Washburn MD   1 patch at 12/09/24 0805    losartan (COZAAR) tablet 50 mg  50 mg Oral Q24H Asaf Washburn MD   50 mg at 12/09/24 0805    melatonin tablet 5 mg  5 mg Oral Nightly Asaf Washburn MD   5 mg at 12/08/24 2025    multivitamin with minerals 1 tablet  1 tablet Oral Daily Art Lizarraga MD   1 tablet at 12/09/24 0805    ondansetron ODT (ZOFRAN-ODT) disintegrating tablet 4 mg  4 mg Oral Q6H PRN Art Lizarraga MD        Or    ondansetron (ZOFRAN) injection 4 mg  4 mg Intravenous Q6H PRN Art Lizarraga MD        oxyCODONE-acetaminophen (PERCOCET) 5-325 MG per tablet 1 tablet  1 tablet Oral Q4H PRN Asaf Washburn MD   1 tablet at 12/09/24 1011    sodium chloride 0.9 % flush 10 mL  10 mL Intravenous Q12H Art Lizarraga MD   10 mL at 12/09/24 0806    sodium chloride 0.9 % flush 10 mL  10 mL Intravenous PRN Art Lizarraga MD        sodium chloride 0.9 % infusion 40 mL  40 mL Intravenous PRN Art Lizarraga MD         Orders (last 24 hrs)         "Start     Ordered    12/08/24 0900  losartan (COZAAR) tablet 50 mg  Every 24 Hours Scheduled         12/07/24 1521    12/07/24 1715  DULoxetine (CYMBALTA) DR capsule 20 mg  Daily         12/07/24 1617    12/07/24 1715  multivitamin with minerals 1 tablet  Daily         12/07/24 1617    12/06/24 2100  melatonin tablet 5 mg  Nightly         12/06/24 1547    12/06/24 1546  oxyCODONE-acetaminophen (PERCOCET) 5-325 MG per tablet 1 tablet  Every 4 Hours PRN         12/06/24 1547    12/05/24 1800  Dietary Nutrition Supplements Other; Boost Original  Daily With Breakfast & Dinner      Comments: Alternate flavors    12/05/24 1325    12/05/24 1745  Lidocaine 4 % 1 patch  Every 24 Hours Scheduled         12/05/24 1658    12/05/24 0900  amoxicillin-clavulanate (AUGMENTIN) 875-125 MG per tablet 1 tablet  Every 12 Hours Scheduled         12/04/24 2046 12/05/24 0800  Oral Care  2 Times Daily       12/04/24 2011 12/05/24 0000  Vital Signs  Every 4 Hours       12/04/24 2011 12/04/24 2200  Incentive Spirometry  Every 4 Hours While Awake       12/04/24 1940    12/04/24 2100  sodium chloride 0.9 % flush 10 mL  Every 12 Hours Scheduled         12/04/24 2011 12/04/24 2100  Enoxaparin Sodium (LOVENOX) syringe 30 mg  Daily         12/04/24 2011 12/04/24 2036  Orthostatic Blood Pressure  Daily       12/04/24 2035 12/04/24 2011  ondansetron ODT (ZOFRAN-ODT) disintegrating tablet 4 mg  Every 6 Hours PRN        Placed in \"Or\" Linked Group    12/04/24 2011 12/04/24 2011  ondansetron (ZOFRAN) injection 4 mg  Every 6 Hours PRN        Placed in \"Or\" Linked Group    12/04/24 2011 12/04/24 2011  sennosides-docusate (PERICOLACE) 8.6-50 MG per tablet 2 tablet  2 Times Daily PRN        Placed in \"And\" Linked Group    12/04/24 2011 12/04/24 2011  polyethylene glycol (MIRALAX) packet 17 g  Daily PRN        Placed in \"And\" Linked Group    12/04/24 2011 12/04/24 2011  bisacodyl (DULCOLAX) EC tablet 5 mg  Daily PRN      " "  Placed in \"And\" Linked Group    12/04/24 2011 12/04/24 2011  bisacodyl (DULCOLAX) suppository 10 mg  Daily PRN        Placed in \"And\" Linked Group    12/04/24 2011 12/04/24 2011  acetaminophen (TYLENOL) tablet 650 mg  Every 4 Hours PRN        Placed in \"Or\" Linked Group    12/04/24 2011 12/04/24 2011  acetaminophen (TYLENOL) 160 MG/5ML oral solution 650 mg  Every 4 Hours PRN        Placed in \"Or\" Linked Group    12/04/24 2011 12/04/24 2011  acetaminophen (TYLENOL) suppository 650 mg  Every 4 Hours PRN        Placed in \"Or\" Linked Group    12/04/24 2011 12/04/24 2011  Intake & Output  Every Shift       12/04/24 2011 12/04/24 2010  sodium chloride 0.9 % flush 10 mL  As Needed         12/04/24 2011 12/04/24 2010  sodium chloride 0.9 % infusion 40 mL  As Needed         12/04/24 2011 12/04/24 1939  hydrALAZINE (APRESOLINE) injection 5 mg  Every 4 Hours PRN         12/04/24 1940    Unscheduled  Up With Assistance  As Needed       12/04/24 2011    --  olmesartan (BENICAR) 20 MG tablet  Daily         12/06/24 1042    --  cetirizine (zyrTEC) 10 MG tablet  Daily         12/07/24 1211                     Physician Progress Notes (last 48 hours)        Asaf Washburn MD at 12/08/24 1358          1         [facility] Medicine Services  INPATIENT PROGRESS NOTE    Patient Name: Juan Luis Collazo  Date of Admission: 12/4/2024  Today's Date: 12/08/24  Length of Stay: 4  Primary Care Physician: Santiago Aaron MD    Subjective   Chief Complaint: Follow-up  HPI     Patient told me as soon as I entered the room that he slept well last night  His pain is also adequately controlled  Therapist note reviewed.  Able to stand with SBA. Amb with RWX and CGA, cues provided for increased B step height and posture. Worked through dynamic standing balance activites with SBA and at times requires single UE support to maintain balance   He is awaiting response for referral made " for rehabitation.      Review of Systems   All pertinent negatives and positives are as above. All other systems have been reviewed and are negative unless otherwise stated.     Objective    Temp:  [97.7 °F (36.5 °C)-98.3 °F (36.8 °C)] 97.7 °F (36.5 °C)  Heart Rate:  [67-85] 74  Resp:  [18] 18  BP: (144-187)/(74-89) 147/74  Physical Exam  Seated comfortably  In good spirit  No distress  Not requiring any oxygen  He appeared barrel chested to me.    Diminished breath sounds, diminished inspiratory effort.  Adequate air exchange.    S1-S2 regular rate and rhythm, no gross murmur  Soft abdomen, nontender, no organomegaly  No gross cyanosis or edema  Appropriate affect  Results Review:  I have reviewed the labs, radiology results, and diagnostic studies.    Laboratory Data:   Results from last 7 days   Lab Units 12/05/24  0930 12/04/24  1509   WBC 10*3/mm3 5.83 5.06   HEMOGLOBIN g/dL 11.6* 12.3*   HEMATOCRIT % 36.6* 38.1   PLATELETS 10*3/mm3 290 270        Results from last 7 days   Lab Units 12/05/24  0930 12/04/24  1509   SODIUM mmol/L 133* 137   POTASSIUM mmol/L 4.8 4.3   CHLORIDE mmol/L 94* 97*   CO2 mmol/L 30.0* 32.0*   BUN mg/dL 20 15   CREATININE mg/dL 0.78 0.60*   CALCIUM mg/dL 8.6 8.9   BILIRUBIN mg/dL 0.2 0.3   ALK PHOS U/L 118* 124*   ALT (SGPT) U/L 13 12   AST (SGOT) U/L 20 15   GLUCOSE mg/dL 217* 96       Culture Data:   Blood Culture   Date Value Ref Range Status   12/04/2024 No growth at 24 hours  Preliminary   12/04/2024 No growth at 24 hours  Preliminary       Radiology Data:   Imaging Results (Last 24 Hours)       ** No results found for the last 24 hours. **            I have reviewed the patient's current medications.     Assessment/Plan   Assessment  Active Hospital Problems    Diagnosis     **Pneumonia        Medical Decision Making  Number and Complexity of problems:   Abnormal imaging of chest concern for pneumonia versus atelectasis in the setting of subacute right anterior fourth fifth sixth  seventh and eighth rib fractures   Small right frontal scalp hematoma  Hypertension  Chronic thoracic and lumbar compression fractures  History of kyphoplasty       Treatment Plan  He and significant other wished to be referred to rehab facility  I requested social service for consultation  Continue on PT OT: Standby assist for sit to stand.  Ambulated with rolling walker.    Continue empiric antibiotic-complete course of antibiotic  Incentive spirometry  DC oxygen if room air SpO2 greater than 92%-maintaining O2 saturation in the low 90s in room air for the past couple of days  Added melatonin per patient's request  Family and patient wish to go to a rehab.  Will check with social service for options.  Therapist indicates home with outpatient services, home with assist.  They were apprised of the recommendation by therapist.  I reached out  to  Mary.  Referral been made to rehab.  Noted elevated blood pressure.  I increased losartan.  Continue to monitor blood pressure.  Adjust medications as needed    Medications reviewed  amoxicillin-clavulanate, 1 tablet, Oral, Q12H  DULoxetine, 20 mg, Oral, Daily  enoxaparin, 30 mg, Subcutaneous, Daily  Lidocaine, 1 patch, Transdermal, Q24H  losartan, 50 mg, Oral, Q24H  melatonin, 5 mg, Oral, Nightly  multivitamin with minerals, 1 tablet, Oral, Daily  sodium chloride, 10 mL, Intravenous, Q12H                    Conditions and Status    fair  MDM Data  External documents reviewed: reviewed epic   Cardiac tracing (EKG, telemetry) interpretation: -  Radiology interpretation: reviewed  Labs reviewed: y  Any tests that were considered but not ordered: none     Decision rules/scores evaluated (example [id number], [place], etc): none     Discussed with: Patient and significant other, nursing staff, social service     Care Planning  Shared decision making: Patient and significant other  Code status and discussions: Full code  I confirmed that the patient's advanced care  plan is present, code status is documented, and a surrogate decision maker is listed in the patient's medical record.     Disposition  Social Determinants of Health that impact treatment or disposition: None identified at this time.  Family and patient requesting placement.  I expect the patient to be discharged to (TBD).       Electronically signed by Asaf Washburn MD, 12/08/24, 13:58 CST.      Electronically signed by Asaf Washburn MD at 12/08/24 1400       Consult Notes (last 48 hours)  Notes from 12/07/24 1838 through 12/09/24 1838   No notes of this type exist for this encounter.

## 2024-12-10 NOTE — THERAPY TREATMENT NOTE
Acute Care - Physical Therapy Treatment Note  [facility]     Patient Name: Juan Luis Collazo  : 1940  MRN: [id number]  Today's Date: 12/10/2024   Onset of Illness/Injury or Date of Surgery: 24  Visit Dx:     ICD-10-CM ICD-9-CM   1. Pneumonia of left lower lobe due to infectious organism  J18.9 486   2. Closed fracture of multiple ribs, unspecified laterality, initial encounter  S22.49XA 807.09   3. Chest pain, unspecified type  R07.9 786.50   4. Gait instability [R26.81]  R26.81 781.2     Patient Active Problem List   Diagnosis    Closed compression fracture of first lumbar vertebra    Current non-smoker    Lumbar compression fracture, closed, initial encounter    Lumbar compression fracture    S/P kyphoplasty    Numbness and tingling of right leg    Compression fracture of fourth lumbar vertebra with delayed healing    Benign hypertension    Flatback syndrome of lumbar region    Hammer toe of right foot    High cholesterol    Impaired fasting glucose    Malignant neoplasm of prostate    Osteopenia    Acute exacerbation of chronic obstructive airways disease    Back pain    Compression fracture of thoracic vertebra with routine healing    Normocytic anemia    Actinic keratosis    Hyperplastic colonic polyp    FH: colon cancer in first degree relative <60 years old    History of adenomatous polyp of colon    Decreased bone mass    Sebaceous cyst    Degenerative disc disease, cervical    Degenerative disc disease, lumbar    Degenerative disc disease, thoracic    Compression fracture of T12 vertebra with delayed healing    Age-related osteoporosis with current pathological fracture with delayed healing    Gait instability    Pain initiated by coughing    Hypoxia    Pneumonia    Closed fracture of multiple ribs of right side     Past Medical History:   Diagnosis Date    Arthritis     Back pain     Cancer     CHEST, SKIN CANCER    GERD (gastroesophageal reflux disease)     History of colon polyps     History  of prostate cancer     Hypertension     Low back pain     Macular degeneration     Osteopenia      Past Surgical History:   Procedure Laterality Date    APPENDECTOMY      CATARACT EXTRACTION, BILATERAL      COLONOSCOPY  09/17/2013    Dr. José-One 2-3mm polyp at 20cm; Otherwise normal; Repeat 3 years    COLONOSCOPY N/A 12/07/2022    Procedure: COLONOSCOPY WITH ANESTHESIA;  Surgeon: Bri Alexis MD;  Location: [facility] ENDOSCOPY;  Service: Gastroenterology;  Laterality: N/A;  pre: anemia. hx polyps.  post: polyps. diverticulosis.  no PCP    ENDOSCOPY N/A 12/07/2022    Procedure: ESOPHAGOGASTRODUODENOSCOPY WITH ANESTHESIA;  Surgeon: Bri Alexis MD;  Location: [facility] ENDOSCOPY;  Service: Gastroenterology;  Laterality: N/A;  pre: anemia  post: hiatal hernia. esophagitis.gastric erosions.  no PCP    FOOT SURGERY Right     KYPHOPLASTY Bilateral 07/17/2018    Procedure: L3 KYPHOPLASTY 1-2 LEVELS;  Surgeon: Vega Pandey MD;  Location:  [facility] OR;  Service: Neurosurgery    KYPHOPLASTY Bilateral 09/11/2018    Procedure: L4 KYPHOPLASTY WITH BIOPSY;  Surgeon: Vega Pandey MD;  Location:  [facility] OR;  Service: Neurosurgery    KYPHOPLASTY WITH BIOPSY N/A 01/25/2022    Procedure: KYPHOPLASTY WITH BIOPSY, THORACIC 6 and LUMBAR 5;  Surgeon: Nick Martínez MD;  Location:  [facility] OR;  Service: Neurosurgery;  Laterality: N/A;    KYPHOPLASTY WITH BIOPSY N/A 8/18/2023    Procedure: Thoracic 12, KYPHOPLASTY WITH BIOPSY;  Surgeon: Nick Martínez MD;  Location:  [facility] OR;  Service: Neurosurgery;  Laterality: N/A;    PROSTATECTOMY      TONSILLECTOMY      TOTAL SHOULDER REPLACEMENT Bilateral      PT Assessment (Last 12 Hours)       PT Evaluation and Treatment       Row Name 12/10/24 1519 12/10/24 0943       Physical Therapy Time and Intention    Subjective Information no complaints  - complains of;weakness;pain  -AH    Document Type therapy note (daily note)  - therapy note (daily note)  -     Mode of Treatment physical therapy  - physical therapy  -[facility] Name 12/10/24 1519 12/10/24 0943       General Information    Existing Precautions/Restrictions fall  - fall  -      Row Name 12/10/24 1519 12/10/24 0943       Pain    Pretreatment Pain Rating 5/10  - 5/10  -    Posttreatment Pain Rating 5/10  - 5/10  -    Pain Location chest  - flank;chest  -    Pain Side/Orientation left  - left  -    Pain Management Interventions premedicated for activity;exercise or physical activity utilized  - exercise or physical activity utilized  -    Response to Pain Interventions activity participation with tolerable pain  - activity participation with tolerable pain  -[facility] Name 12/10/24 0943          Bed Mobility    Comment, (Bed Mobility) CHAIR  -[facility] Name 12/10/24 1519 12/10/24 0943       Sit-Stand Transfer    Sit-Stand [place] (Transfers) standby assist;verbal cues  - standby assist;verbal cues  -    Assistive Device (Sit-Stand Transfers) walker, front-wheeled  - walker, front-wheeled  -[facility] Name 12/10/24 1519 12/10/24 0943       Stand-Sit Transfer    Stand-Sit [place] (Transfers) standby assist;verbal cues  - standby assist;verbal cues  -[facility] Name 12/10/24 1519 12/10/24 0943       Gait/Stairs (Locomotion)    [place] Level (Gait) verbal cues;contact guard  - verbal cues;contact guard  -    Assistive Device (Gait) --  - walker, front-wheeled  -    Distance in Feet (Gait) 50  50 x 4  - 50  50 X 4  -    Pattern (Gait) step-through  - step-through  -    Deviations/Abnormal Patterns (Gait) gait speed decreased;stride length decreased  - gait speed decreased;stride length decreased  -    Bilateral Gait Deviations forward flexed posture  - forward flexed posture  -    [place] Level (Stairs) --  - contact guard;verbal cues  -    Handrail Location (Stairs) --  - both sides  -    Number of Steps (Stairs) -- 18   -    Ascending Technique (Stairs) --  - step-over-step  -    Descending Technique (Stairs) --  - step-over-step  -    Comment, (Gait/Stairs) -- also worked on walking without AD cga  -      Row Name 12/10/24 0943          Balance    Comment, Balance side stepping, tandem, backwards min assist  -       Row Name 12/10/24 0943          Motor Skills    Comments, Therapeutic Exercise BLE AROM standing at rail  -       Row Name 12/10/24 0943          Plan of Care Review    Plan of Care Reviewed With patient  -     Progress improving  -     Outcome Evaluation pt in chair, trans sit-stand cga, pt amb 50 feet at a time rwx cga, worked on gait with rwx cga, stair training with 2 HR cga, balance activities cga-min, trans back to chair cga, pt would benefit from short term rehab for balance,strength and endurance  -[facility] Name 12/10/24 1519 12/10/24 0943       Positioning and Restraints    Pre-Treatment Position sitting in chair/recliner  - sitting in chair/recliner  -    Post Treatment Position chair  - chair  -    In Chair sitting;call light within reach;encouraged to call for assist;with family/caregiver  - reclined;call light within reach;encouraged to call for assist  -              User Key  (r) = Recorded By, (t) = Taken By, (c) = Cosigned By      Initials Name Provider Type     Aurora Yoder, PTA Physical Therapist Assistant                    Physical Therapy Education       Title: PT OT SLP Therapies (In Progress)       Topic: Physical Therapy (In Progress)       Point: Mobility training (Done)       Learning Progress Summary            Patient Acceptance, E, DU by HF at 12/5/2024 1034    Comment: Pt educated on role of PT in care plan                      Point: Home exercise program (Not Started)       Learner Progress:  Not documented in this visit.              Point: Body mechanics (Done)       Learning Progress Summary            Patient Acceptance, E, DU by HF at  12/5/2024 1034    Comment: Pt educated on role of PT in care plan                      Point: Precautions (Not Started)       Learner Progress:  Not documented in this visit.                              User Key       Initials Effective Dates Name Provider Type Discipline    HF 10/04/24 -  Chayo Tsang, PT Student PT Student PT                  PT Recommendation and Plan     Plan of Care Reviewed With: patient  Progress: improving  Outcome Evaluation: pt in chair, trans sit-stand cga, pt amb 50 feet at a time rwx cga, worked on gait with rwx cga, stair training with 2 HR cga, balance activities cga-min, trans back to chair cga, pt would benefit from short term rehab for balance,strength and endurance   Outcome Measures       Row Name 12/10/24 1000 12/09/24 0800          How much help from another person do you currently need...    Turning from your back to your side while in flat bed without using bedrails? 4  -AH 4  -AH     Moving from lying on back to sitting on the side of a flat bed without bedrails? 4  -AH 4  -AH     Moving to and from a bed to a chair (including a wheelchair)? 3  -AH 3  -AH     Standing up from a chair using your arms (e.g., wheelchair, bedside chair)? 3  -AH 3  -AH     Climbing 3-5 steps with a railing? 3  -AH 3  -AH     To walk in hospital room? 3  -AH 3  -AH     AM-PAC 6 Clicks Score (PT) 20  -AH 20  -AH        Functional Assessment    Outcome Measure Options AM-PAC 6 Clicks Basic Mobility (PT)  - AM-PAC 6 Clicks Basic Mobility (PT)  -               User Key  (r) = Recorded By, (t) = Taken By, (c) = Cosigned By      Initials Name Provider Type     Aurora Yoder, PTA Physical Therapist Assistant                     Time Calculation:    PT Charges       Row Name 12/10/24 1534 12/10/24 1012          Time Calculation    Start Time 1519  - 0943  -     Stop Time 1534  - 1010  -     Time Calculation (min) 15 min  - 27 min  -     PT Received On 12/10/24  - 12/10/24  -         Time Calculation- PT    Total Timed Code Minutes- PT 15 minute(s)  -AH 27 minute(s)  -AH        Timed Charges    87522 - Gait Training Minutes  15  -AH 27  -AH        Total Minutes    Timed Charges Total Minutes 15  -AH 27  -AH      Total Minutes 15  -AH 27  -AH               User Key  (r) = Recorded By, (t) = Taken By, (c) = Cosigned By      Initials Name Provider Type     Aurora Yoder PTA Physical Therapist Assistant                  Therapy Charges for Today       Code Description Service Date Service Provider Modifiers Qty    99295830513 HC GAIT TRAINING EA 15 MIN 12/9/2024 Aurora Yoder, PTA GP 1    39929692349 HC PT THER PROC EA 15 MIN 12/9/2024 Aurora Yoder, PTA GP 1    46128397481 HC GAIT TRAINING EA 15 MIN 12/9/2024 Aurora Yoder, PTA GP 1    40051300186 HC GAIT TRAINING EA 15 MIN 12/10/2024 Aurora Yoder, PTA GP 2    04459260431 HC GAIT TRAINING EA 15 MIN 12/10/2024 Aurora Yoder, PTA GP 1            PT G-Codes  Outcome Measure Options: AM-PAC 6 Clicks Daily Activity (OT)  AM-PAC 6 Clicks Score (PT): 20  AM-PAC 6 Clicks Score (OT): 24    Aurora Yoder PTA  12/10/2024

## 2024-12-10 NOTE — PLAN OF CARE
Goal Outcome Evaluation:  Plan of Care Reviewed With: patient        Progress: improving  Outcome Evaluation: pt in chair, trans sit-stand cga, pt amb 50 feet at a time rwx cga, worked on gait with rwx cga, stair training with 2 HR cga, balance activities cga-min, trans back to chair cga, pt would benefit from short term rehab for balance,strength and endurance

## 2024-12-10 NOTE — PROGRESS NOTES
Nutrition Services    Patient Name:  Juan Luis Collazo  YOB: 1940  MRN: [id number]  Admit Date:  12/4/2024      Nutrition follow up. Pt reports good appetite at this time. PO intake avg. 75% of the past seven meals. Boost Original BID. Weight stable. Pertinent labs Na 133 and glucose elevated at 217 mg/dl today. Pt may benefit from SC insulin if blood glucose continues to be elevated. Medications reviewed. Discharge plans are [place] swing bed when medically stable.Cont to follow for plan of care.         Electronically signed by:  Lise Natarajan RDN, LD  12/10/24 15:45 CST

## 2024-12-10 NOTE — PROGRESS NOTES
[facility]  INPATIENT PROGRESS NOTE    Patient Name: Juan Luis Collazo  Date of Admission: 12/4/2024  Today's Date: 12/10/24  Length of Stay: 6  Primary Care Physician: Santiago Aaron MD    Subjective   Chief Complaint: Shortness of breath  HPI   Sitting at the bedside in no distress.  States he could not sleep well last night though slept very well the prior night.    Review of Systems   All pertinent negatives and positives are as above. All other systems have been reviewed and are negative unless otherwise stated.     Objective    Temp:  [97.5 °F (36.4 °C)-97.9 °F (36.6 °C)] 97.8 °F (36.6 °C)  Heart Rate:  [70-85] 76  Resp:  [16-18] 16  BP: (126-174)/(68-90) 139/68  Physical Exam  Seated comfortably  In good spirit  No distress  Not requiring any oxygen  He appeared barrel chested to me.    Diminished breath sounds, diminished inspiratory effort.  Adequate air exchange.    S1-S2 regular rate and rhythm, no gross murmur  Soft abdomen, nontender, no organomegaly  No gross cyanosis or edema  Appropriate affect      Results Review:  I have reviewed the labs, radiology results, and diagnostic studies.    Laboratory Data:   Results from last 7 days   Lab Units 12/05/24  0930 12/04/24  1509   WBC 10*3/mm3 5.83 5.06   HEMOGLOBIN g/dL 11.6* 12.3*   HEMATOCRIT % 36.6* 38.1   PLATELETS 10*3/mm3 290 270        Results from last 7 days   Lab Units 12/05/24  0930 12/04/24  1509   SODIUM mmol/L 133* 137   POTASSIUM mmol/L 4.8 4.3   CHLORIDE mmol/L 94* 97*   CO2 mmol/L 30.0* 32.0*   BUN mg/dL 20 15   CREATININE mg/dL 0.78 0.60*   CALCIUM mg/dL 8.6 8.9   BILIRUBIN mg/dL 0.2 0.3   ALK PHOS U/L 118* 124*   ALT (SGPT) U/L 13 12   AST (SGOT) U/L 20 15   GLUCOSE mg/dL 217* 96       Culture Data:   Blood Culture   Date Value Ref Range Status   12/04/2024 No growth at 4 days  Preliminary   12/04/2024 No growth at 4 days  Preliminary       Radiology Data:   Imaging Results (Last  24 Hours)       ** No results found for the last 24 hours. **            I have reviewed the patient's current medications.     Assessment/Plan   Assessment  Active Hospital Problems    Diagnosis     **Pneumonia     Closed fracture of multiple ribs of right side     Gait instability     Benign hypertension        Treatment Plan  Vitals every 4 hours  Cardiac diet  IV fluids saline lock  Activity up with assistance and fall precautions  Continue PT OT    Pneumonia  Continue empiric antibiotic Augmentin every 12 hours  Cultures noted  Incentive spirometry  Oxygen as needed for oxygen saturation  88%    Hypertension  Continue home medications losartan    Falls, gait instability and deconditioning  Waiting for rehab referral to be approved by insurance, otherwise medically stable for discharge    DVT prophylaxis > Lovenox 30 mg subcutaneously daily    Medical Decision Making  Number and Complexity of problems: 4 complex medical problems  Differential Diagnosis: see above    Conditions and Status        Condition is improving.     [facility] Data  External documents reviewed: [facility] EHR  Cardiac tracing (EKG, telemetry) interpretation: NSR  Radiology interpretation: see above  Labs reviewed: see above  Any tests that were considered but not ordered: none     Decision rules/scores evaluated (example [id number], Wells, etc): N/A     Discussed with: Patient     Care Planning  Shared decision making: Patient  Code status and discussions: Full code    Disposition  Social Determinants of Health that impact treatment or disposition: none  I expect the patient to be discharged to SNF when approved subacute rehabilitation placement.         Electronically signed by Petr Hamlin MD, 12/10/24, 14:03 CST.

## 2024-12-10 NOTE — PLAN OF CARE
Goal Outcome Evaluation:  Plan of Care Reviewed With: patient        Progress: improving  Outcome Evaluation: Pt in chair reports mild pain at chest.  Spouse present and supportive.  OTR offered bathing assist - pt agreeable.  Mr. Collazo was able to stand at SBA and ambulate to roll in shower.  Pt DOFFed socks and gown at SBA prior to shower with grab bar, lh shower head, shower bench.  Mr. Collazo bathed himself at SBA except to dry off his back with Min A.  He was able to dress himself at SBA and return to his chair.  Mr. Collazo would cont to benefit from skilled OT tx to max his safety & indep    Anticipated Discharge Disposition (OT): home with outpatient therapy services, home with assist

## 2024-12-10 NOTE — PLAN OF CARE
Goal Outcome Evaluation:  Plan of Care Reviewed With: patient        Progress: improving  Outcome Evaluation: Pt A/Ox4. Hydralazine given for elevated BP x1 this shift. Pt did not sleep well, got up several times. Safety maintained. Call light in reach.

## 2024-12-10 NOTE — CASE MANAGEMENT/SOCIAL WORK
Continued Stay Note   Call     Patient Name: Juan Luis Collazo  MRN: [id number]  Today's Date: 12/10/2024    Admit Date: 12/4/2024    Plan: [facility] - pending insurance approval   Discharge Plan       Row Name 12/10/24 1037       Plan    Plan [facility] - pending insurance approval    Patient/Family in Agreement with Plan yes    Plan Comments Left message for admissions from [facility] to see if ins. precert decision made. Informed to call back upon completion so d/c can be initiated. Will follow.    Rose called back from [facility] and no precert decision yet but will call upon determination.                    Discharge Codes    No documentation.                 Expected Discharge Date and Time       Expected Discharge Date Expected Discharge Time    Dec 11, 2024               YOSELIN Ignacio

## 2024-12-10 NOTE — THERAPY TREATMENT NOTE
Patient Name: Juan Luis Collazo  : 1940    MRN: [id number]                              Today's Date: 12/10/2024       Admit Date: 2024    Visit Dx:     ICD-10-CM ICD-9-CM   1. Pneumonia of left lower lobe due to infectious organism  J18.9 486   2. Closed fracture of multiple ribs, unspecified laterality, initial encounter  S22.49XA 807.09   3. Chest pain, unspecified type  R07.9 786.50   4. Gait instability [R26.81]  R26.81 781.2     Patient Active Problem List   Diagnosis    Closed compression fracture of first lumbar vertebra    Current non-smoker    Lumbar compression fracture, closed, initial encounter    Lumbar compression fracture    S/P kyphoplasty    Numbness and tingling of right leg    Compression fracture of fourth lumbar vertebra with delayed healing    Benign hypertension    Flatback syndrome of lumbar region    Hammer toe of right foot    High cholesterol    Impaired fasting glucose    Malignant neoplasm of prostate    Osteopenia    Acute exacerbation of chronic obstructive airways disease    Back pain    Compression fracture of thoracic vertebra with routine healing    Normocytic anemia    Actinic keratosis    Hyperplastic colonic polyp    FH: colon cancer in first degree relative <60 years old    History of adenomatous polyp of colon    Decreased bone mass    Sebaceous cyst    Degenerative disc disease, cervical    Degenerative disc disease, lumbar    Degenerative disc disease, thoracic    Compression fracture of T12 vertebra with delayed healing    Age-related osteoporosis with current pathological fracture with delayed healing    Gait instability    Pain initiated by coughing    Hypoxia    Pneumonia    Closed fracture of multiple ribs of right side     Past Medical History:   Diagnosis Date    Arthritis     Back pain     Cancer     CHEST, SKIN CANCER    GERD (gastroesophageal reflux disease)     History of colon polyps     History of prostate cancer     Hypertension     Low back pain      Macular degeneration     Osteopenia      Past Surgical History:   Procedure Laterality Date    APPENDECTOMY      CATARACT EXTRACTION, BILATERAL      COLONOSCOPY  09/17/2013    Dr. José-One 2-3mm polyp at 20cm; Otherwise normal; Repeat 3 years    COLONOSCOPY N/A 12/07/2022    Procedure: COLONOSCOPY WITH ANESTHESIA;  Surgeon: Bri Alexis MD;  Location: [facility] ENDOSCOPY;  Service: Gastroenterology;  Laterality: N/A;  pre: anemia. hx polyps.  post: polyps. diverticulosis.  no PCP    ENDOSCOPY N/A 12/07/2022    Procedure: ESOPHAGOGASTRODUODENOSCOPY WITH ANESTHESIA;  Surgeon: Bri Alexis MD;  Location: [facility] ENDOSCOPY;  Service: Gastroenterology;  Laterality: N/A;  pre: anemia  post: hiatal hernia. esophagitis.gastric erosions.  no PCP    FOOT SURGERY Right     KYPHOPLASTY Bilateral 07/17/2018    Procedure: L3 KYPHOPLASTY 1-2 LEVELS;  Surgeon: Vega Pandey MD;  Location: [facility] OR;  Service: Neurosurgery    KYPHOPLASTY Bilateral 09/11/2018    Procedure: L4 KYPHOPLASTY WITH BIOPSY;  Surgeon: Vega Pandey MD;  Location: [facility] OR;  Service: Neurosurgery    KYPHOPLASTY WITH BIOPSY N/A 01/25/2022    Procedure: KYPHOPLASTY WITH BIOPSY, THORACIC 6 and LUMBAR 5;  Surgeon: Nick Martínez MD;  Location: [facility] OR;  Service: Neurosurgery;  Laterality: N/A;    KYPHOPLASTY WITH BIOPSY N/A 8/18/2023    Procedure: Thoracic 12, KYPHOPLASTY WITH BIOPSY;  Surgeon: Nick Martínez MD;  Location: [facility] OR;  Service: Neurosurgery;  Laterality: N/A;    PROSTATECTOMY      TONSILLECTOMY      TOTAL SHOULDER REPLACEMENT Bilateral       General Information       Row Name 12/10/24 1342          OT Time and Intention    Subjective Information complains of;pain  -CH     Document Type therapy note (daily note)  -CH     Mode of Treatment occupational therapy  -CH     Patient Effort excellent  -CH       Row Name 12/10/24 1342          General Information    Patient Profile Reviewed yes  -CH      Existing Precautions/Restrictions fall  -     Barriers to Rehab medically complex  -       Row Name 12/10/24 1342          Cognition    Orientation Status (Cognition) oriented x 4  -       Row Name 12/10/24 1342          Safety Issues/Impairments Affecting Functional Mobility    Safety Issues Affecting Function (Mobility) friction/shear risk  -     Impairments Affecting Function (Mobility) balance;pain  -               User Key  (r) = Recorded By, (t) = Taken By, (c) = Cosigned By      Initials Name Provider Type     Isabel Yates, OTR/L Occupational Therapist                     Mobility/ADL's       Row Name 12/10/24 1342          Transfers    Transfers sit-stand transfer;other (see comments);stand-sit transfer  -     Comment, (Transfers) roll in shower  -       Row Name 12/10/24 1342          Sit-Stand Transfer    Sit-Stand Meredith (Transfers) standby assist  [facility]     Assistive Device (Sit-Stand Transfers) walker, front-wheeled  [facility]       Row Name 12/10/24 1342          Stand-Sit Transfer    Stand-Sit Meredith (Transfers) standby assist  [facility]     Assistive Device (Stand-Sit Transfers) walker, front-wheeled  [facility]       Row Name 12/10/24 1342          Functional Mobility    Functional Mobility- Ind. Level standby assist  -     Functional Mobility- Device walker, front-wheeled  -     Functional Mobility- Comment chair <> shower  -       Row Name 12/10/24 1342          Activities of Daily Living    BADL Assessment/Intervention upper body dressing;lower body dressing;bathing;grooming  -       Row Name 12/10/24 1342          Upper Body Dressing Assessment/Training    Meredith Level (Upper Body Dressing) set up;don;doff  -     Position (Upper Body Dressing) supported standing;supported sitting  -     Comment, (Upper Body Dressing) hospital gown  -       Row Name 12/10/24 1342          Lower Body Dressing Assessment/Training    [place]eredith Level (Lower Body Dressing)  don;doff;socks;pants/bottoms  -     Position (Lower Body Dressing) unsupported sitting;supported standing  -       Row Name 12/10/24 1342          Bathing Assessment/Intervention    [place] Level (Bathing) upper body;minimum assist (75% patient effort);standby assist;lower body  -     Assistive Devices (Bathing) grab bar, tub/shower;hand-held shower spray hose;shower chair  -     Position (Bathing) unsupported sitting;supported standing  -       Row Name 12/10/24 1342          Grooming Assessment/Training    [place] Level (Grooming) standby assist;hair care, combing/brushing;oral care regimen;wash face, hands  -     Position (Grooming) supported standing  -               User Key  (r) = Recorded By, (t) = Taken By, (c) = Cosigned By      Initials Name Provider Type     Isabel Yates, OTR/L Occupational Therapist                   Obj/Interventions       Row Name 12/10/24 1342          Balance    Balance Interventions sitting;standing;sit to stand;supported;static;dynamic;occupation based/functional task  -               User Key  (r) = Recorded By, (t) = Taken By, (c) = Cosigned By      Initials Name Provider Type     Isabel Yates, OTR/L Occupational Therapist                   Goals/Plan    No documentation.                  Clinical Impression       Row Name 12/10/24 1342          Pain Assessment    Pretreatment Pain Rating 4/10  -     Posttreatment Pain Rating 4/10  -     Pain Location chest  -       Row Name 12/10/24 1342          Plan of Care Review    Plan of Care Reviewed With patient  -     Progress improving  -     Outcome Evaluation Pt in chair reports mild pain at chest.  Spouse present and supportive.  OTR offered bathing assist - pt agreeable.  Mr. Collazo was able to stand at SBA and ambulate to roll in shower.  Pt DOFFed socks and gown at SBA prior to shower with grab bar,  shower head, shower bench.  Mr. Collazo bathed himself at SBA except to dry off his  back with Min A.  He was able to dress himself at SBA and return to his chair.  Mr. Collazo would cont to benefit from skilled OT tx to max his safety & indep  -       Row Name 12/10/24 1342          Therapy Assessment/Plan (OT)    Rehab Potential (OT) good  -CH     Therapy Frequency (OT) 5 times/wk  -CH       Row Name 12/10/24 1342          Therapy Plan Review/Discharge Plan (OT)    Anticipated Discharge Disposition (OT) home with outpatient therapy services;home with assist  -       Row Name 12/10/24 1342          Positioning and Restraints    Pre-Treatment Position sitting in chair/recliner  -CH     Post Treatment Position chair  -CH     In Chair sitting;call light within reach;encouraged to call for assist;with family/caregiver;notified [facility]  -               User Key  (r) = Recorded By, (t) = Taken By, (c) = Cosigned By      Initials Name Provider Type    CH Isabel Yates, OTR/L Occupational Therapist                   Outcome Measures       Row Name 12/10/24 1342          How much help from another is currently needed...    Putting on and taking off regular lower body clothing? 4  -CH     Bathing (including washing, rinsing, and drying) 4  -CH     Toileting (which includes using toilet bed pan or urinal) 4  -CH     Putting on and taking off regular upper body clothing 4  -CH     Taking care of personal grooming (such as brushing teeth) 4  -CH     Eating meals 4  -CH     AM-PAC 6 Clicks Score (OT) 24  -CH       Row Name 12/10/24 1000 12/10/24 0748       How much help from another person do you currently need...    Turning from your back to your side while in flat bed without using bedrails? 4  -AH 4  -SD    Moving from lying on back to sitting on the side of a flat bed without bedrails? 4  -AH 4  -SD    Moving to and from a bed to a chair (including a wheelchair)? 3  -AH 3  -SD    Standing up from a chair using your arms (e.g., wheelchair, bedside chair)? 3  -AH 3  -SD    Climbing 3-5 steps with a railing?  3  -AH 3  -SD    To walk in hospital room? 3  -AH 3  -SD    AM-PAC 6 Clicks Score (PT) 20  - 20  -SD    Highest Level of Mobility Goal 6 --> Walk 10 steps or more  - 6 --> Walk 10 steps or more  -SD      Row Name 12/10/24 1342 12/10/24 1000       Functional Assessment    Outcome Measure Options AM-PAC 6 Clicks Daily Activity (OT)  - AM-PAC 6 Clicks Basic Mobility (PT)  -              User Key  (r) = Recorded By, (t) = Taken By, (c) = Cosigned By      Initials Name Provider Type     Aurora Yoder, PTA Physical Therapist Assistant     Isabel Yates, OTR/L Occupational Therapist    Radha Menchaca LPN Licensed Nurse                    Occupational Therapy Education       Title: PT OT SLP Therapies (In Progress)       Topic: Occupational Therapy (In Progress)       Point: ADL training (Done)       Description:   Instruct learner(s) on proper safety adaptation and remediation techniques during self care or transfers.   Instruct in proper use of assistive devices.                  Learning Progress Summary            Patient Acceptance, E,D, VU by  at 12/10/2024 1459    Acceptance, E,D,TB, VU,DU,NR by  at 12/6/2024 1139    Comment: OT role, safe transfer techniques, use of AD, home safety, OT POC    Acceptance, E, VU,DU,NR by  at 12/5/2024 1016    Comment: OT role, safe transfer techniques, PLB, OT POC   Family Acceptance, E,D, VU by  at 12/10/2024 1459    Acceptance, E,D,TB, VU,DU,NR by  at 12/6/2024 1139    Comment: OT role, safe transfer techniques, use of AD, home safety, OT POC                      Point: Home exercise program (Not Started)       Description:   Instruct learner(s) on appropriate technique for monitoring, assisting and/or progressing therapeutic exercises/activities.                  Learner Progress:  Not documented in this visit.              Point: Precautions (Done)       Description:   Instruct learner(s) on prescribed precautions during self-care and functional  transfers.                  Learning Progress Summary            Patient Acceptance, E,D, VU by  at 12/10/2024 1459    Acceptance, E,D,TB, VU,DU,NR by  at 12/6/2024 1139    Comment: OT role, safe transfer techniques, use of AD, home safety, OT POC    Acceptance, E, VU,DU,NR by  at 12/5/2024 1016    Comment: OT role, safe transfer techniques, PLB, OT POC   Family Acceptance, E,D, VU by  at 12/10/2024 1459    Acceptance, E,D,TB, VU,DU,NR by  at 12/6/2024 1139    Comment: OT role, safe transfer techniques, use of AD, home safety, OT POC                      Point: Body mechanics (Done)       Description:   Instruct learner(s) on proper positioning and spine alignment during self-care, functional mobility activities and/or exercises.                  Learning Progress Summary            Patient Acceptance, E,D, VU by  at 12/10/2024 1459    Acceptance, E,D,TB, VU,DU,NR by  at 12/6/2024 1139    Comment: OT role, safe transfer techniques, use of AD, home safety, OT POC    Acceptance, E, VU,DU,NR by  at 12/5/2024 1016    Comment: OT role, safe transfer techniques, PLB, OT POC   Family Acceptance, E,D, VU by  at 12/10/2024 1459    Acceptance, E,D,TB, VU,DU,NR by  at 12/6/2024 1139    Comment: OT role, safe transfer techniques, use of AD, home safety, OT POC                                      User Key       Initials Effective Dates Name Provider Type Discipline     07/11/23 -  Isabel Yates, OTR/L Occupational Therapist OT     09/11/24 -  Maria Esther Hogue OT Student OT Student OT                  OT Recommendation and Plan  Therapy Frequency (OT): 5 times/wk  Plan of Care Review  Plan of Care Reviewed With: patient  Progress: improving  Outcome Evaluation: Pt in chair reports mild pain at chest.  Spouse present and supportive.  OTR offered bathing assist - pt agreeable.  Mr. Collazo was able to stand at SBA and ambulate to roll in shower.  Pt DOFFed socks and gown at SBA prior to shower with grab bar,   shower head, shower bench.  Mr. Collazo bathed himself at SBA except to dry off his back with Min A.  He was able to dress himself at SBA and return to his chair.  Mr. Collazo would cont to benefit from skilled OT tx to max his safety & indep     Time Calculation:         Time Calculation- OT       Row Name 12/10/24 1342 12/10/24 1012          Time Calculation- OT    OT Start Time 1342  - --     OT Stop Time 1440  - --     OT Time Calculation (min) 58 min  - --     Total Timed Code Minutes- OT 58 minute(s)  - --     OT Received On 12/10/24  - --        Timed Charges    48809 - Gait Training Minutes  -- 27  -AH     45818 - OT Self Care/Mgmt Minutes 58  -CH --        Total Minutes    Timed Charges Total Minutes 58  -CH 27  -AH      Total Minutes 58  -CH 27  -AH               User Key  (r) = Recorded By, (t) = Taken By, (c) = Cosigned By      Initials Name Provider Type     Aurora Yoder, PTA Physical Therapist Assistant     Isabel Yates OTR/L Occupational Therapist                  Therapy Charges for Today       Code Description Service Date Service Provider Modifiers Qty    [id number] HC OT SELF CARE/MGMT/TRAIN EA 15 MIN 12/10/2024 Isabel Yates OTR/JOSE LUIS GO 4                 COURTNEY Liu/JOSE LUIS  12/10/2024

## 2024-12-11 ENCOUNTER — READMISSION MANAGEMENT (OUTPATIENT)
Dept: CALL CENTER | Facility: HOSPITAL | Age: 84
End: 2024-12-11
Payer: MEDICARE

## 2024-12-11 VITALS
TEMPERATURE: 97.8 F | WEIGHT: 158.8 LBS | OXYGEN SATURATION: 94 % | DIASTOLIC BLOOD PRESSURE: 68 MMHG | SYSTOLIC BLOOD PRESSURE: 146 MMHG | BODY MASS INDEX: 24.92 KG/M2 | HEIGHT: 67 IN | RESPIRATION RATE: 16 BRPM | HEART RATE: 79 BPM

## 2024-12-11 PROCEDURE — 97116 GAIT TRAINING THERAPY: CPT

## 2024-12-11 RX ORDER — LIDOCAINE 4 G/G
1 PATCH TOPICAL
Qty: 5 PATCH | Refills: 0 | Status: SHIPPED | OUTPATIENT
Start: 2024-12-12 | End: 2024-12-18

## 2024-12-11 RX ORDER — AMOXICILLIN 250 MG
2 CAPSULE ORAL 2 TIMES DAILY PRN
Qty: 30 TABLET | Refills: 0 | Status: SHIPPED | OUTPATIENT
Start: 2024-12-11 | End: 2024-12-26

## 2024-12-11 RX ORDER — OXYCODONE AND ACETAMINOPHEN 5; 325 MG/1; MG/1
1 TABLET ORAL EVERY 4 HOURS PRN
Qty: 18 TABLET | Refills: 0 | Status: SHIPPED | OUTPATIENT
Start: 2024-12-11 | End: 2024-12-14

## 2024-12-11 RX ORDER — OXYCODONE AND ACETAMINOPHEN 5; 325 MG/1; MG/1
1 TABLET ORAL ONCE
Status: COMPLETED | OUTPATIENT
Start: 2024-12-11 | End: 2024-12-11

## 2024-12-11 RX ADMIN — LIDOCAINE 1 PATCH: 0.04 PATCH TOPICAL at 08:35

## 2024-12-11 RX ADMIN — OXYCODONE HYDROCHLORIDE AND ACETAMINOPHEN 1 TABLET: 5; 325 TABLET ORAL at 11:39

## 2024-12-11 RX ADMIN — LOSARTAN POTASSIUM 50 MG: 50 TABLET, FILM COATED ORAL at 08:35

## 2024-12-11 RX ADMIN — OXYCODONE HYDROCHLORIDE AND ACETAMINOPHEN 1 TABLET: 5; 325 TABLET ORAL at 08:35

## 2024-12-11 RX ADMIN — Medication 1 TABLET: at 08:35

## 2024-12-11 RX ADMIN — OXYCODONE HYDROCHLORIDE AND ACETAMINOPHEN 1 TABLET: 5; 325 TABLET ORAL at 00:24

## 2024-12-11 RX ADMIN — Medication 10 ML: at 08:36

## 2024-12-11 RX ADMIN — DULOXETINE HYDROCHLORIDE 20 MG: 20 CAPSULE, DELAYED RELEASE ORAL at 08:35

## 2024-12-11 RX ADMIN — OXYCODONE HYDROCHLORIDE AND ACETAMINOPHEN 1 TABLET: 5; 325 TABLET ORAL at 16:00

## 2024-12-11 NOTE — PLAN OF CARE
Goal Outcome Evaluation:  Plan of Care Reviewed With: patient        Progress: improving  Outcome Evaluation: pt in chair, trans sit-stand cga, worked on gait/balance without rwx, pt amb 50 feet at a time, several LOB cga to correct, BLE AROM standing at rail x 20 reps, pt would benefit from rehab

## 2024-12-11 NOTE — TELEPHONE ENCOUNTER
LVM at this extension for a return call.  They should try just using the primary dx code on the order and see if it will go thru with that code and not the Z code.

## 2024-12-11 NOTE — DISCHARGE SUMMARY
[facility]  DISCHARGE SUMMARY       Date of Admission: 12/4/2024  Date of Discharge:  12/11/2024  Primary Care Physician: Santiago Aaron MD    Presenting Problem/History of Present Illness:  Patient is an 84-year-old male that presents to the emergency room with chest pain following a fall that occurred this past Thursday.  Patient has a history significant for broken ribs that occurred 3 weeks ago.  Patient states that his wife was turning on the lights and he just fell backwards.  He denies hitting his hips, possible head trauma.  Past medical history is significant for arthritis, back pain, skin cancer, GERD, hypertension, and osteopenia.  Patient denies fever or nasal congestion.  Patient states that his primary care provider discontinued his antihypertensive medications weeks ago.  Unclear reason.     Final Discharge Diagnoses:  Active Hospital Problems    Diagnosis     **Pneumonia     Closed fracture of multiple ribs of right side     Gait instability     Benign hypertension        Consults: -    Procedures Performed: -    Pertinent Test Results:   Results for orders placed during the hospital encounter of 11/22/24    Adult Transthoracic Echo Complete W/ Cont if Necessary Per Protocol    Interpretation Summary    Left ventricular systolic function is normal. Left ventricular ejection fraction appears to be 61 - 65%.    Left ventricular wall thickness is consistent with mild concentric hypertrophy.    Left ventricular diastolic function is consistent with (grade I) impaired relaxation.    Normal right ventricular cavity size and systolic function noted.    Mild aortic valve stenosis is present.    Estimated right ventricular systolic pressure from tricuspid regurgitation is mildly elevated (35-45 mmHg).      Imaging Results (All)       Procedure Component Value Units Date/Time    CT Thoracic Spine Without Contrast [[id number]] Collected: 12/04/24 4060      Updated: 12/04/24 1720    Narrative:      EXAM: CT THORACIC SPINE WO CONTRAST-      DATE: 12/4/2024 3:03 PM     HISTORY: fall; trauma       COMPARISON: None available.     DOSE LENGTH PRODUCT: 2028.77 mGy.cm  Automated exposure control was also  utilized to decrease patient radiation dose.     TECHNIQUE: Unenhanced CT images of the thoracic spine were obtained with  multiplanar reformats.     FINDINGS:     There is osteopenia. Patient is status post vertebroplasty at T12 and  T7. There is loss of height at the T1 and T3 vertebral bodies and  represent chronic compression fractures. These are chronic fractures and  unchanged from a prior CT of the chest from 1/28/2022. Facet joints are  aligned bilaterally. There is multilevel facet arthropathy. There is  chronic loss of height anteriorly at T9 and T8. No acute fracture is  identified. There is levoscoliosis.     The visualized paravertebral soft tissues are unremarkable.     Visualized lungs are clear.       Impression:         Osteopenia and levoscoliosis and chronic compression fractures as above.  No acute fracture or spondylolisthesis identified.     This report was signed and finalized on 12/4/2024 5:17 PM by Crow Alvarez.       CT Chest Without Contrast Diagnostic [[id number]] Collected: 12/04/24 1704     Updated: 12/04/24 1712    Narrative:      EXAM: CT CHEST WO CONTRAST DIAGNOSTIC-      DATE: 12/4/2024 3:03 PM     HISTORY: fall; trauma       COMPARISON: 1/28/2022.     DOSE LENGTH PRODUCT: 2028.77 mGy.cm mGy cm. Automatic exposure control  was utilized to make radiation dose as low as reasonably achievable.     TECHNIQUE: Unenhanced CT images of the chest obtained with multiplanar  reformats.     FINDINGS:     MEDIASTINUM/EXTRATHORACIC: There is calcification of the thoracic aorta  which is ectatic and torturous and coronary artery calcification. There  is calcification at the aortic valve. There is cardiomegaly without  pericardial effusion. No  significant pleural effusion is identified. No  thoracic lymphadenopathy is seen.     LUNGS/AIRWAYS: There is scarring at the lung apices. An area of opacity  and volume loss in the left lower lobe may be due to atelectasis or  pneumonia. There is mild atelectasis at the right lung base. There is no  pneumothorax.     INCLUDED UPPER ABDOMEN: The visualized portions of the upper abdomen are  within normal limits.     SOFT TISSUES: There is bilateral gynecomastia left greater than right  which is unchanged.     BONES: No suspicious osseous lesion identified.The there are fractures  of the right anterior 4, 5, 6, 7, and 8 ribs. Most of these fractures  appear subacute. The right sixth rib fracture may be acute.       Impression:      1. Opacity and volume loss at the left lung base medially may be  atelectasis or pneumonia.  2. Multi right rib fractures without significant displacement. The right  anterior sixth rib fracture may be acute.     This report was signed and finalized on 12/4/2024 5:09 PM by Crow Alvarez.       CT Head Without Contrast [[id number]] Collected: 12/04/24 1652     Updated: 12/04/24 1700    Narrative:      EXAM: CT HEAD WO CONTRAST-      DATE: 12/4/2024 3:03 PM     HISTORY: fall; trauma       COMPARISON: 12/14/2022.     DOSE LENGTH PRODUCT: 2029 mGy centimeters automated exposure control was  also utilized to decrease patient radiation dose.     TECHNIQUE: Unenhanced CT images obtained from vertex to skull base with  multiplanar reformats.     FINDINGS:  There is no acute intracranial hemorrhage, midline shift, mass effect,  or hydrocephalus. There is no CT evidence for acute infarct.     There are chronic changes with volume loss and chronic small vessel  ischemic change of the periventricular white matter.      SOFT TISSUES: There is a small right superior frontal scalp hematoma.        SINUS:The visualized paranasal sinuses and mastoid air cells are clear.      ORBITS: The visualized  orbits and globes are unremarkable. There is  bilateral lens extraction.          Impression:      1. Chronic changes and no acute intracranial findings.   2. Small right superior frontal scalp hematoma.     This report was signed and finalized on 12/4/2024 4:57 PM by Crow Alvarez.       CT Lumbar Spine Without Contrast [[id number]] Collected: 12/04/24 1628     Updated: 12/04/24 1639    Narrative:      CT LUMBAR SPINE WO CONTRAST- 12/4/2024 3:03 PM     HISTORY: fall; trauma      COMPARISON: 6/14/2018     TOTAL DOSE LENGTH PRODUCT: 2028.77 mGy.cm. Automated exposure control  was also utilized to decrease patient radiation dose.     TECHNIQUE: Axial images of the lumbar spine are obtained with sagittal  and coronal reconstructions     FINDINGS: A rudimentary disc at the S1/2 level is assumed when counting  vertebral bodies. There are kyphoplasty changes of the T12, L3, L4, and  L5 vertebra. Loss of height is greatest at L3 and L5 measuring  approximately 35%. Mild chronic compression of the superior plate of L2.  No L1 compression deformity. Old healed right sacral ala fracture. No  acute lumbar vertebral fracture identified. Moderate to advanced lower  lumbar facet osteoarthropathy.     Mild to moderate central lumbar canal stenosis with severe right and  moderate L5-S1 foraminal stenosis. Moderate right L4/5 foraminal  narrowing. Advanced degenerative disc changes at L5-S1.     Diffuse vascular calcification.          Impression:      1. Advanced osteopenia. Old compression deformities involving T12 L2,  L3, L4, and L5 with kyphoplasty changes at each level except for L2. No  convincing acute lumbar vertebral fracture or traumatic malalignment  2. Degenerative changes resulting in severe right L5-S1 foraminal  narrowing. Moderate right L4/5 and left L5-S1 foraminal stenosis also  present..     This report was signed and finalized on 12/4/2024 4:36 PM by Dr. Pascale Cade MD.       XR Chest 1 View [[id number]]  Collected: 12/04/24 1530     Updated: 12/04/24 1535    Narrative:      EXAMINATION:  XR CHEST 1 VW-  12/4/2024 2:05 PM     HISTORY: Chest pain.     COMPARISON: 10/24/2023.     TECHNIQUE: Single view AP image.     FINDINGS: There is patchy opacity within the left lung base. No other  focal infiltrate is seen. There is stable bronchial wall thickening.  There is mild cardiomegaly. There is a probable coronary artery stent.  There is atheromatous disease of the thoracic aorta. There are  degenerative changes of the spine with scoliosis. There are compression  deformities with prior kyphoplasty. Prior bilateral shoulder  arthroplasty.          Impression:      1. New opacities in the left lung base likely due to pneumonia or  atelectasis. Follow-up recommended to document resolution.  2. Stable bronchial wall thickening.  3. Mild cardiomegaly.           This report was signed and finalized on 12/4/2024 3:32 PM by Dr. Randy Chatterjee MD.             LAB RESULTS:      Lab 12/05/24  0930 12/04/24  1846 12/04/24  1509   WBC 5.83  --  5.06   HEMOGLOBIN 11.6*  --  12.3*   HEMATOCRIT 36.6*  --  38.1   PLATELETS 290  --  270   NEUTROS ABS 4.98  --  3.71   IMMATURE GRANS (ABS) 0.02  --  0.06*   LYMPHS ABS 0.78  --  0.63*   MONOS ABS 0.04*  --  0.58   EOS ABS 0.00  --  0.06   MCV 93.1  --  91.6   LACTATE  --  1.0  --    PROTIME  --   --  13.7         Lab 12/05/24  0930 12/04/24  1509   SODIUM 133* 137   POTASSIUM 4.8 4.3   CHLORIDE 94* 97*   CO2 30.0* 32.0*   ANION GAP 9.0 8.0   BUN 20 15   CREATININE 0.78 0.60*   EGFR 86.9 94.0   GLUCOSE 217* 96   CALCIUM 8.6 8.9         Lab 12/05/24  0930 12/04/24  1509   TOTAL PROTEIN 7.4 7.9   ALBUMIN 3.2* 3.5   GLOBULIN 4.2 4.4   ALT (SGPT) 13 12   AST (SGOT) 20 15   BILIRUBIN 0.2 0.3   ALK PHOS 118* 124*         Lab 12/04/24  1714 12/04/24  1509   HSTROP T 13 13   PROTIME  --  13.7   INR  --  1.01                 Brief Urine Lab Results  (Last result in the past 365 days)        Color    Clarity   Blood   Leuk Est   Nitrite   Protein   CREAT   Urine HCG        12/05/24 0522 Dark Yellow   Clear   Negative   Small (1+)   Negative   30 mg/dL (1+)                 Microbiology Results (last 10 days)       Procedure Component Value - Date/Time    Blood Culture - Blood, Arm, Left [[id number]]  (Normal) Collected: 12/04/24 1846    Lab Status: Final result Specimen: Blood from Arm, Left Updated: 12/09/24 1915     Blood Culture No growth at 5 days    Blood Culture - Blood, Arm, Right [[id number]]  (Normal) Collected: 12/04/24 1846    Lab Status: Final result Specimen: Blood from Arm, Right Updated: 12/09/24 1915     Blood Culture No growth at 5 days          Hospital Course:   84-year-old male with past medical history of hypertension, osteopenia, rib fractures that presented to the hospital after a fall.  CT chest shows right anterior fourth, fifth, sixth, seventh and eighth rib fractures subacute with a left lower lobe area concerning for pneumonia.  He was hypoxemic on admission and was admitted to med floor for treatment of pneumonia and hypoxemia.  Empiric antibiotics were started microbiology testing were negative.  His oxygen saturation continued to improve and he was weaned to room air maintaining normal oxygen saturation.  He had participated in activities with ambulation and incentive spirometry.    Due to concerns of falls and weakness as well as gait instability PT and OT were consulted and followed with recommendations to consider placement.  As patient has progressed she is now able to ambulate over 200 feet without assistance and the recommendation now is for home health and home PT.  The patient is amenable to this option and is stable for discharge home.  Also note the insurance refused placement in subacute lead due to the level of activity achieved by the patient.     Hypertension  Continue home medications losartan    Physical Exam on Discharge:  /71 (BP Location: Right arm, Patient  "Position: Standing)   Pulse 91   Temp 97.4 °F (36.3 °C) (Oral)   Resp 18   Ht 170.2 cm (67\")   Wt 72 kg (158 lb 12.8 oz)   SpO2 92%   BMI 24.87 kg/m²   Physical Exam  Seated comfortably  In good spirit  No distress  Not requiring any oxygen  He appeared barrel chested to me.    Diminished breath sounds, diminished inspiratory effort.  Adequate air exchange.    S1-S2 regular rate and rhythm, no gross murmur  Soft abdomen, nontender, no organomegaly  No gross cyanosis or edema  Appropriate affect    Condition on Discharge:   Stable    Discharge Disposition:  Home with home health care    Discharge Medications:     Discharge Medications        New Medications        Instructions Start Date   Lidocaine 4 %   1 patch, Transdermal, Every 24 Hours Scheduled, Remove & Discard patch within 12 hours or as directed by MD   Start Date: December 12, 2024     naloxone 4 MG/0.1ML nasal spray  Commonly known as: NARCAN   Call 911. Don't prime. [place] in 1 nostril for overdose. Repeat in 2-3 minutes in other nostril if no or minimal breathing/responsiveness.      oxyCODONE-acetaminophen 5-325 MG per tablet  Commonly known as: PERCOCET   1 tablet, Oral, Every 4 Hours PRN      sennosides-docusate 8.6-50 MG per tablet  Commonly known as: PERICOLACE   2 tablets, Oral, 2 Times Daily PRN             Continue These Medications        Instructions Start Date   albuterol sulfate  (90 Base) MCG/ACT inhaler  Commonly known as: PROVENTIL HFA;VENTOLIN HFA;PROAIR HFA   2 puffs, Inhalation, Every 6 Hours PRN      cetirizine 10 MG tablet  Commonly known as: zyrTEC   10 mg, Oral, Daily      denosumab 60 MG/ML solution prefilled syringe syringe  Commonly known as: PROLIA   60 mg, Subcutaneous, Every 6 Months      DULoxetine 20 MG capsule  Commonly known as: Cymbalta   20 mg, Oral, Daily      multivitamins-minerals capsule capsule   1 capsule, Oral, 2 Times Daily      olmesartan 20 MG tablet  Commonly known as: BENICAR   30 mg, Oral, " Daily      traMADol-acetaminophen 37.5-325 MG per tablet  Commonly known as: ULTRACET   1 tablet, Oral, Every 8 Hours PRN             Discharge Diet:   Cardiac    Activity at Discharge:   Resume usual activity    Follow-up Appointments:   Future Appointments   Date Time Provider Department Center   12/20/2024  1:30 PM  [facility]  [facility]   12/20/2024  2:00 PM [facility]  [facility]  [facility]   5/21/2025  2:00 PM Santiago Aaron MD [facility]       Test Results Pending at Discharge: None    Electronically signed by Petr Hamlin MD, 12/11/24, 10:56 CST.    Time: 31 minutes.

## 2024-12-11 NOTE — THERAPY TREATMENT NOTE
Acute Care - Physical Therapy Treatment Note  [facility]     Patient Name: Juan Luis Collazo  : 1940  MRN: [id number]  Today's Date: 2024   Onset of Illness/Injury or Date of Surgery: 24  Visit Dx:     ICD-10-CM ICD-9-CM   1. Pneumonia of left lower lobe due to infectious organism  J18.9 486   2. Closed fracture of multiple ribs, unspecified laterality, initial encounter  S22.49XA 807.09   3. Chest pain, unspecified type  R07.9 786.50   4. Gait instability [R26.81]  R26.81 781.2     Patient Active Problem List   Diagnosis    Closed compression fracture of first lumbar vertebra    Current non-smoker    Lumbar compression fracture, closed, initial encounter    Lumbar compression fracture    S/P kyphoplasty    Numbness and tingling of right leg    Compression fracture of fourth lumbar vertebra with delayed healing    Benign hypertension    Flatback syndrome of lumbar region    Hammer toe of right foot    High cholesterol    Impaired fasting glucose    Malignant neoplasm of prostate    Osteopenia    Acute exacerbation of chronic obstructive airways disease    Back pain    Compression fracture of thoracic vertebra with routine healing    Normocytic anemia    Actinic keratosis    Hyperplastic colonic polyp    FH: colon cancer in first degree relative <60 years old    History of adenomatous polyp of colon    Decreased bone mass    Sebaceous cyst    Degenerative disc disease, cervical    Degenerative disc disease, lumbar    Degenerative disc disease, thoracic    Compression fracture of T12 vertebra with delayed healing    Age-related osteoporosis with current pathological fracture with delayed healing    Gait instability    Pain initiated by coughing    Hypoxia    Pneumonia    Closed fracture of multiple ribs of right side     Past Medical History:   Diagnosis Date    Arthritis     Back pain     Cancer     CHEST, SKIN CANCER    GERD (gastroesophageal reflux disease)     History of colon polyps     History  of prostate cancer     Hypertension     Low back pain     Macular degeneration     Osteopenia      Past Surgical History:   Procedure Laterality Date    APPENDECTOMY      CATARACT EXTRACTION, BILATERAL      COLONOSCOPY  09/17/2013    Dr. José-One 2-3mm polyp at 20cm; Otherwise normal; Repeat 3 years    COLONOSCOPY N/A 12/07/2022    Procedure: COLONOSCOPY WITH ANESTHESIA;  Surgeon: Bri Alexis MD;  Location: [facility] ENDOSCOPY;  Service: Gastroenterology;  Laterality: N/A;  pre: anemia. hx polyps.  post: polyps. diverticulosis.  no PCP    ENDOSCOPY N/A 12/07/2022    Procedure: ESOPHAGOGASTRODUODENOSCOPY WITH ANESTHESIA;  Surgeon: Bri Alexis MD;  Location: [facility] ENDOSCOPY;  Service: Gastroenterology;  Laterality: N/A;  pre: anemia  post: hiatal hernia. esophagitis.gastric erosions.  no PCP    FOOT SURGERY Right     KYPHOPLASTY Bilateral 07/17/2018    Procedure: L3 KYPHOPLASTY 1-2 LEVELS;  Surgeon: Vega Pandey MD;  Location:  [facility] OR;  Service: Neurosurgery    KYPHOPLASTY Bilateral 09/11/2018    Procedure: L4 KYPHOPLASTY WITH BIOPSY;  Surgeon: Vega Pandey MD;  Location:  [facility] OR;  Service: Neurosurgery    KYPHOPLASTY WITH BIOPSY N/A 01/25/2022    Procedure: KYPHOPLASTY WITH BIOPSY, THORACIC 6 and LUMBAR 5;  Surgeon: Nick Martínez MD;  Location:  [facility] OR;  Service: Neurosurgery;  Laterality: N/A;    KYPHOPLASTY WITH BIOPSY N/A 8/18/2023    Procedure: Thoracic 12, KYPHOPLASTY WITH BIOPSY;  Surgeon: Nick Martínez MD;  Location:  [facility] OR;  Service: Neurosurgery;  Laterality: N/A;    PROSTATECTOMY      TONSILLECTOMY      TOTAL SHOULDER REPLACEMENT Bilateral      PT Assessment (Last 12 Hours)       PT Evaluation and Treatment       Row Name 12/11/24 1436 12/11/24 0835       Physical Therapy Time and Intention    Subjective Information no complaints  - complains of;weakness;fatigue;pain  -AH    Document Type therapy note (daily note)  - therapy note (daily note)   -    Mode of Treatment physical therapy  - physical therapy  -[facility] Name 12/11/24 1436 12/11/24 0835       General Information    Existing Precautions/Restrictions fall  - fall  -[facility] Name 12/11/24 1436 12/11/24 0835       Pain    Pretreatment Pain Rating 6/10  - 6/10  -    Posttreatment Pain Rating 6/10  - 6/10  -    Pain Location chest  - chest  -    Pain Side/Orientation left  - left  -    Pain Management Interventions exercise or physical activity utilized  - premedicated for activity  -    Response to Pain Interventions activity participation with tolerable pain  - activity participation with tolerable pain  -[facility] Name 12/11/24 1436 12/11/24 0835       Bed Mobility    Comment, (Bed Mobility) chair  - chair  -[facility] Name 12/11/24 1436 12/11/24 0835       Sit-Stand Transfer    Sit-Stand [place] (Transfers) standby assist;verbal cues  - standby assist;verbal cues  -    Assistive Device (Sit-Stand Transfers) walker, front-wheeled  - walker, front-wheeled  -[facility] Name 12/11/24 1436 12/11/24 0835       Stand-Sit Transfer    Stand-Sit [place] (Transfers) standby assist;verbal cues  - standby assist;verbal cues  -[facility] Name 12/11/24 1436 12/11/24 0835       Gait/Stairs (Locomotion)    [place] Level (Gait) verbal cues;contact guard  - verbal cues;contact guard  -    Distance in Feet (Gait) 250  - 50  50 x 4  -    Pattern (Gait) step-through  - step-through  -    Deviations/Abnormal Patterns (Gait) gait speed decreased;stride length decreased  - gait speed decreased;stride length decreased  -    Bilateral Gait Deviations forward flexed posture  - forward flexed posture  -    Comment, (Gait/Stairs) -- worked on gait/balance without rwx, pt had several LOB cga to correct  -[facility] Name 12/11/24 0835          Balance    Comment, Balance side stepping, backwards, stepping over and around glove boxes  -        Row Name 12/11/24 0835          Motor Skills    Comments, Therapeutic Exercise BLE AROM standing at rail x 20 reps  -       Row Name 12/11/24 0835          Plan of Care Review    Plan of Care Reviewed With patient  -     Progress improving  -     Outcome Evaluation pt in chair, trans sit-stand cga, worked on gait/balance without rwx, pt amb 50 feet at a time, several LOB cga to correct, BLE AROM standing at rail x 20 reps, pt would benefit from rehab  -       Row Name 12/11/24 1436 12/11/24 0835       Positioning and Restraints    Pre-Treatment Position sitting in chair/recliner  - sitting in chair/recliner  -    Post Treatment Position chair  - chair  -    In Chair notified nsg;sitting;call light within reach;encouraged to call for assist;with family/caregiver  - notified nsg;reclined;call light within reach;encouraged to call for assist  -              User Key  (r) = Recorded By, (t) = Taken By, (c) = Cosigned By      Initials Name Provider Type     Aurora Yoder, PTA Physical Therapist Assistant                    Physical Therapy Education       Title: PT OT SLP Therapies (In Progress)       Topic: Physical Therapy (In Progress)       Point: Mobility training (Done)       Learning Progress Summary            Patient Acceptance, E, DU by  at 12/5/2024 1034    Comment: Pt educated on role of PT in care plan                      Point: Home exercise program (Not Started)       Learner Progress:  Not documented in this visit.              Point: Body mechanics (Done)       Learning Progress Summary            Patient Acceptance, E, DU by  at 12/5/2024 1034    Comment: Pt educated on role of PT in care plan                      Point: Precautions (Not Started)       Learner Progress:  Not documented in this visit.                              User Key       Initials Effective Dates Name Provider Type [facility] 10/04/24 -  Chayo Tsang, PT Student PT Student PT                  PT  Recommendation and Plan     Plan of Care Reviewed With: patient  Progress: improving  Outcome Evaluation: pt in chair, trans sit-stand cga, worked on gait/balance without rwx, pt amb 50 feet at a time, several LOB cga to correct, BLE AROM standing at rail x 20 reps, pt would benefit from rehab   Outcome Measures       Row Name 12/11/24 0858 12/11/24 0830 12/10/24 1000       How much help from another person do you currently need...    Turning from your back to your side while in flat bed without using bedrails? 4  -AH -- 4  -AH    Moving from lying on back to sitting on the side of a flat bed without bedrails? 4  -AH -- 4  -AH    Moving to and from a bed to a chair (including a wheelchair)? 3  -AH -- 3  -AH    Standing up from a chair using your arms (e.g., wheelchair, bedside chair)? 3  -AH -- 3  -AH    Climbing 3-5 steps with a railing? 3  -AH -- 3  -AH    To walk in hospital room? 3  -AH -- 3  -AH    AM-PAC 6 Clicks Score (PT) 20  -AH -- 20  -       Functional Assessment    Outcome Measure Options AM-PAC 6 Clicks Basic Mobility (PT)  - AM-PAC 6 Clicks Basic Mobility (PT)  - AM-PAC 6 Clicks Basic Mobility (PT)  -      Row Name 12/09/24 0800             How much help from another person do you currently need...    Turning from your back to your side while in flat bed without using bedrails? 4  -AH      Moving from lying on back to sitting on the side of a flat bed without bedrails? 4  -AH      Moving to and from a bed to a chair (including a wheelchair)? 3  -AH      Standing up from a chair using your arms (e.g., wheelchair, bedside chair)? 3  -AH      Climbing 3-5 steps with a railing? 3  -AH      To walk in hospital room? 3  -      AM-PAC 6 Clicks Score (PT) 20  -         Functional Assessment    Outcome Measure Options AM-PAC 6 Clicks Basic Mobility (PT)  -                User Key  (r) = Recorded By, (t) = Taken By, (c) = Cosigned By      Initials Name Provider Type     Aurora Yoder, PTA  Physical Therapist Assistant                     Time Calculation:    PT Charges       Row Name 12/11/24 1449 12/11/24 0858          Time Calculation    Start Time 1436  - 0835  -     Stop Time 1449  - 0858  -     Time Calculation (min) 13 min  - 23 min  -     PT Received On -- 12/11/24  -        Time Calculation- PT    Total Timed Code Minutes- PT 13 minute(s)  - 23 minute(s)  -        Timed Charges    83416 - Gait Training Minutes  13  - 23  -        Total Minutes    Timed Charges Total Minutes 13  -AH 23  -AH      Total Minutes 13  -AH 23  -AH               User Key  (r) = Recorded By, (t) = Taken By, (c) = Cosigned By      Initials Name Provider Type    Aurora Winchester PTA Physical Therapist Assistant                  Therapy Charges for Today       Code Description Service Date Service Provider Modifiers Qty    10773395375 HC GAIT TRAINING EA 15 MIN 12/10/2024 Aurora Yoder, PTA GP 2    41352621542 HC GAIT TRAINING EA 15 MIN 12/10/2024 Aurora Yoder, PTA GP 1    16001619459 HC GAIT TRAINING EA 15 MIN 12/11/2024 Aurora Yoder, PTA GP 2    28090034852 HC GAIT TRAINING EA 15 MIN 12/11/2024 Aurora Yoder, PTA GP 1            PT G-Codes  Outcome Measure Options: AM-PAC 6 Clicks Basic Mobility (PT)  AM-PAC 6 Clicks Score (PT): 20  AM-PAC 6 Clicks Score (OT): 24    Aurora Yoder PTA  12/11/2024

## 2024-12-11 NOTE — PLAN OF CARE
Goal Outcome Evaluation:           Progress: improving  Outcome Evaluation: Pt A&Ox4. Room air. Up with standby assistance with walker to BR. Voiding. BM this shift. C/o pain, prn meds given with relief. Visitors at bedside intermittently. Awaiting insurance decision for discharge placement. Call light within reach.

## 2024-12-11 NOTE — THERAPY TREATMENT NOTE
Acute Care - Physical Therapy Treatment Note  [facility]     Patient Name: Juan Luis Collazo  : 1940  MRN: [id number]  Today's Date: 2024   Onset of Illness/Injury or Date of Surgery: 24  Visit Dx:     ICD-10-CM ICD-9-CM   1. Pneumonia of left lower lobe due to infectious organism  J18.9 486   2. Closed fracture of multiple ribs, unspecified laterality, initial encounter  S22.49XA 807.09   3. Chest pain, unspecified type  R07.9 786.50   4. Gait instability [R26.81]  R26.81 781.2     Patient Active Problem List   Diagnosis    Closed compression fracture of first lumbar vertebra    Current non-smoker    Lumbar compression fracture, closed, initial encounter    Lumbar compression fracture    S/P kyphoplasty    Numbness and tingling of right leg    Compression fracture of fourth lumbar vertebra with delayed healing    Benign hypertension    Flatback syndrome of lumbar region    Hammer toe of right foot    High cholesterol    Impaired fasting glucose    Malignant neoplasm of prostate    Osteopenia    Acute exacerbation of chronic obstructive airways disease    Back pain    Compression fracture of thoracic vertebra with routine healing    Normocytic anemia    Actinic keratosis    Hyperplastic colonic polyp    FH: colon cancer in first degree relative <60 years old    History of adenomatous polyp of colon    Decreased bone mass    Sebaceous cyst    Degenerative disc disease, cervical    Degenerative disc disease, lumbar    Degenerative disc disease, thoracic    Compression fracture of T12 vertebra with delayed healing    Age-related osteoporosis with current pathological fracture with delayed healing    Gait instability    Pain initiated by coughing    Hypoxia    Pneumonia    Closed fracture of multiple ribs of right side     Past Medical History:   Diagnosis Date    Arthritis     Back pain     Cancer     CHEST, SKIN CANCER    GERD (gastroesophageal reflux disease)     History of colon polyps     History  of prostate cancer     Hypertension     Low back pain     Macular degeneration     Osteopenia      Past Surgical History:   Procedure Laterality Date    APPENDECTOMY      CATARACT EXTRACTION, BILATERAL      COLONOSCOPY  09/17/2013    Dr. José-One 2-3mm polyp at 20cm; Otherwise normal; Repeat 3 years    COLONOSCOPY N/A 12/07/2022    Procedure: COLONOSCOPY WITH ANESTHESIA;  Surgeon: Bri Alexis MD;  Location: [facility] ENDOSCOPY;  Service: Gastroenterology;  Laterality: N/A;  pre: anemia. hx polyps.  post: polyps. diverticulosis.  no PCP    ENDOSCOPY N/A 12/07/2022    Procedure: ESOPHAGOGASTRODUODENOSCOPY WITH ANESTHESIA;  Surgeon: Bri Alexis MD;  Location: [facility] ENDOSCOPY;  Service: Gastroenterology;  Laterality: N/A;  pre: anemia  post: hiatal hernia. esophagitis.gastric erosions.  no PCP    FOOT SURGERY Right     KYPHOPLASTY Bilateral 07/17/2018    Procedure: L3 KYPHOPLASTY 1-2 LEVELS;  Surgeon: Vega Pandey MD;  Location:  [facility] OR;  Service: Neurosurgery    KYPHOPLASTY Bilateral 09/11/2018    Procedure: L4 KYPHOPLASTY WITH BIOPSY;  Surgeon: Vega Pandey MD;  Location:  [facility];  Service: Neurosurgery    KYPHOPLASTY WITH BIOPSY N/A 01/25/2022    Procedure: KYPHOPLASTY WITH BIOPSY, THORACIC 6 and LUMBAR 5;  Surgeon: Nick Martínez MD;  Location:  [facility] OR;  Service: Neurosurgery;  Laterality: N/A;    KYPHOPLASTY WITH BIOPSY N/A 8/18/2023    Procedure: Thoracic 12, KYPHOPLASTY WITH BIOPSY;  Surgeon: Nick Martínez MD;  Location:  [facility] OR;  Service: Neurosurgery;  Laterality: N/A;    PROSTATECTOMY      TONSILLECTOMY      TOTAL SHOULDER REPLACEMENT Bilateral      PT Assessment (Last 12 Hours)       PT Evaluation and Treatment       Row Name 12/11/24 0835          Physical Therapy Time and Intention    Subjective Information complains of;weakness;fatigue;pain  -AH     Document Type therapy note (daily note)  -AH     Mode of Treatment physical therapy  -       Row Name  12/11/24 0835          General Information    Existing Precautions/Restrictions fall  -[facility] Name 12/11/24 0835          Pain    Pretreatment Pain Rating 6/10  -     Posttreatment Pain Rating 6/10  -     Pain Location chest  -     Pain Side/Orientation left  -     Pain Management Interventions premedicated for activity  -     Response to Pain Interventions activity participation with tolerable pain  -[facility] Name 12/11/24 0835          Bed Mobility    Comment, (Bed Mobility) chair  -[facility] Name 12/11/24 0835          Sit-Stand Transfer    Sit-Stand [place] (Transfers) standby assist;verbal cues  -     Assistive Device (Sit-Stand Transfers) walker, front-wheeled  -[facility] Name 12/11/24 0835          Stand-Sit Transfer    Stand-Sit [place] (Transfers) standby assist;verbal cues  -[facility] Name 12/11/24 0835          Gait/Stairs (Locomotion)    [place] Level (Gait) verbal cues;contact guard  -     Distance in Feet (Gait) 50  50 x 4  -     Pattern (Gait) step-through  -     Deviations/Abnormal Patterns (Gait) gait speed decreased;stride length decreased  -     Bilateral Gait Deviations forward flexed posture  -     Comment, (Gait/Stairs) worked on gait/balance without rwx, pt had several LOB cga to correct  -[facility] Name 12/11/24 0835          Balance    Comment, Balance side stepping, backwards, stepping over and around glove boxes  -[facility] Name 12/11/24 0835          Motor Skills    Comments, Therapeutic Exercise BLE AROM standing at rail x 20 reps  -[facility] Name 12/11/24 0835          Plan of Care Review    Plan of Care Reviewed With patient  -     Progress improving  -     Outcome Evaluation pt in chair, trans sit-stand cga, worked on gait/balance without rwx, pt amb 50 feet at a time, several LOB cga to correct, BLE AROM standing at rail x 20 reps, pt would benefit from rehab  -[facility] Name 12/11/24 0835          Positioning  and Restraints    Pre-Treatment Position sitting in chair/recliner  -AH     Post Treatment Position chair  -AH     In Chair notified nsg;reclined;call light within reach;encouraged to call for assist  -AH               User Key  (r) = Recorded By, (t) = Taken By, (c) = Cosigned By      Initials Name Provider Type     Aurora Yoder, PTA Physical Therapist Assistant                    Physical Therapy Education       Title: PT OT SLP Therapies (In Progress)       Topic: Physical Therapy (In Progress)       Point: Mobility training (Done)       Learning Progress Summary            Patient Acceptance, E, DU by  at 12/5/2024 1034    Comment: Pt educated on role of PT in care plan                      Point: Home exercise program (Not Started)       Learner Progress:  Not documented in this visit.              Point: Body mechanics (Done)       Learning Progress Summary            Patient Acceptance, E, DU by  at 12/5/2024 1034    Comment: Pt educated on role of PT in care plan                      Point: Precautions (Not Started)       Learner Progress:  Not documented in this visit.                              User Key       Initials Effective Dates Name Provider Type [facility] 10/04/24 -  Chayo Tsang, PT Student PT Student PT                  PT Recommendation and Plan     Plan of Care Reviewed With: patient  Progress: improving  Outcome Evaluation: pt in chair, trans sit-stand cga, worked on gait/balance without rwx, pt amb 50 feet at a time, several LOB cga to correct, BLE AROM standing at rail x 20 reps, pt would benefit from rehab   Outcome Measures       Row Name 12/11/24 0858 12/11/24 0830 12/10/24 1000       How much help from another person do you currently need...    Turning from your back to your side while in flat bed without using bedrails? 4  -AH -- 4  -AH    Moving from lying on back to sitting on the side of a flat bed without bedrails? 4  -AH -- 4  -AH    Moving to and from a bed to a  chair (including a wheelchair)? 3  -AH -- 3  -AH    Standing up from a chair using your arms (e.g., wheelchair, bedside chair)? 3  -AH -- 3  -AH    Climbing 3-5 steps with a railing? 3  -AH -- 3  -AH    To walk in hospital room? 3  -AH -- 3  -AH    AM-PAC 6 Clicks Score (PT) 20  -AH -- 20  -       Functional Assessment    Outcome Measure Options AM-PAC 6 Clicks Basic Mobility (PT)  - AM-PAC 6 Clicks Basic Mobility (PT)  - AM-PAC 6 Clicks Basic Mobility (PT)  -      Row Name 12/09/24 0800             How much help from another person do you currently need...    Turning from your back to your side while in flat bed without using bedrails? 4  -AH      Moving from lying on back to sitting on the side of a flat bed without bedrails? 4  -AH      Moving to and from a bed to a chair (including a wheelchair)? 3  -AH      Standing up from a chair using your arms (e.g., wheelchair, bedside chair)? 3  -AH      Climbing 3-5 steps with a railing? 3  -AH      To walk in hospital room? 3  -      AM-PAC 6 Clicks Score (PT) 20  -         Functional Assessment    Outcome Measure Options AM-PAC 6 Clicks Basic Mobility (PT)  -                User Key  (r) = Recorded By, (t) = Taken By, (c) = Cosigned By      Initials Name Provider Type    Aurora Winchester PTA Physical Therapist Assistant                     Time Calculation:    PT Charges       Row Name 12/11/24 0858             Time Calculation    Start Time 0835  -      Stop Time 0858  -      Time Calculation (min) 23 min  -      PT Received On 12/11/24  -         Time Calculation- PT    Total Timed Code Minutes- PT 23 minute(s)  -         Timed Charges    00012 - Gait Training Minutes  23  -AH         Total Minutes    Timed Charges Total Minutes 23  -AH       Total Minutes 23  -                User Key  (r) = Recorded By, (t) = Taken By, (c) = Cosigned By      Initials Name Provider Type    Aurora Winchester PTA Physical Therapist Assistant                   Therapy Charges for Today       Code Description Service Date Service Provider Modifiers Qty    63185920918 HC GAIT TRAINING EA 15 MIN 12/10/2024 Aurora Yoder, PTA GP 2    19733205411 HC GAIT TRAINING EA 15 MIN 12/10/2024 Aurora Yoder, PTA GP 1    49190711066 HC GAIT TRAINING EA 15 MIN 12/11/2024 Aurora Yoder, PTA GP 2            PT G-Codes  Outcome Measure Options: AM-PAC 6 Clicks Basic Mobility (PT)  AM-PAC 6 Clicks Score (PT): 20  AM-PAC 6 Clicks Score (OT): 24    Aurora Yoder PTA  12/11/2024

## 2024-12-11 NOTE — OUTREACH NOTE
Prep Survey      Flowsheet Row Responses   [facility] patient discharged from? [place]   Is LACE score < 7 ? No   Eligibility [facility]   Date of Admission 12/04/24   Date of Discharge 12/11/24   Discharge Disposition Home-Health Care Sv   Discharge diagnosis Pneumonia, Hx fall with multiple rib fractures 3 weeks ago   Does the patient have one of the following disease processes/diagnoses(primary or secondary)? Pneumonia   Does the patient have Home health ordered? No   Is there a DME ordered? No   Prep survey completed? Yes            Leah VELAZQUEZ - Registered Nurse

## 2024-12-11 NOTE — PLAN OF CARE
Problem: Adult Inpatient Plan of Care  Goal: Plan of Care Review  Outcome: Progressing  Flowsheets (Taken 12/11/2024 1997)  Progress: no change  Outcome Evaluation: Pt is A&Ox4. PRN pain meds given with relief noted. Up x1 with walker to BR. Voiding. VSS. Safety maintained.  Plan of Care Reviewed With: patient

## 2024-12-11 NOTE — CASE MANAGEMENT/SOCIAL WORK
Continued Stay Note   [place]     Patient Name: Juan Luis Collazo  MRN: [id number]  Today's Date: 12/11/2024    Admit Date: 12/4/2024    Plan: [facility] - pending insurance approval   Discharge Plan       Row Name 12/11/24 1223       Plan    Plan Comments Insurance has denied rehab placement. Pt is ambulating 200ft with therapy. SW notified pt spouse (Jytoi) of denial but did inform her that HH for skilled nursing/therapy would be an option. Pt can also do outpt therapy as another option. SW will check and see which HH agencies are in network with pt insurance. Physician informed of denial.    Final Discharge Disposition Code 06 - home with home health care                   Discharge Codes    No documentation.                 Expected Discharge Date and Time       Expected Discharge Date Expected Discharge Time    Dec 11, 2024               MALKA Roman

## 2024-12-11 NOTE — NURSING NOTE
Pt D/C home via ride from spouse. Went over AVS with spouse and pt. Belongings sent home with pt. IV removed.

## 2024-12-12 ENCOUNTER — TRANSITIONAL CARE MANAGEMENT TELEPHONE ENCOUNTER (OUTPATIENT)
Dept: CALL CENTER | Facility: HOSPITAL | Age: 84
End: 2024-12-12
Payer: MEDICARE

## 2024-12-12 DIAGNOSIS — M85.80 DECREASED BONE MASS: Primary | ICD-10-CM

## 2024-12-12 NOTE — THERAPY DISCHARGE NOTE
Acute Care - Physical Therapy Discharge Summary  [facility]       Patient Name: Juan Luis Collazo  : 1940  MRN: [id number]    Today's Date: 2024  Onset of Illness/Injury or Date of Surgery: 24       Referring Physician: Dr. Lizarraga      Admit Date: 2024      PT Recommendation and Plan    Visit Dx:    ICD-10-CM ICD-9-CM   1. Pneumonia of left lower lobe due to infectious organism  J18.9 486   2. Closed fracture of multiple ribs, unspecified laterality, initial encounter  S22.49XA 807.09   3. Chest pain, unspecified type  R07.9 786.50   4. Gait instability [R26.81]  R26.81 781.2   5. Closed fracture of multiple ribs of right side, initial encounter  S22.41XA 807.09        Outcome Measures       Row Name 24 0858 24 0830 12/10/24 1000       How much help from another person do you currently need...    Turning from your back to your side while in flat bed without using bedrails? 4  -AH -- 4  -AH    Moving from lying on back to sitting on the side of a flat bed without bedrails? 4  -AH -- 4  -AH    Moving to and from a bed to a chair (including a wheelchair)? 3  -AH -- 3  -AH    Standing up from a chair using your arms (e.g., wheelchair, bedside chair)? 3  -AH -- 3  -AH    Climbing 3-5 steps with a railing? 3  -AH -- 3  -AH    To walk in hospital room? 3  -AH -- 3  -AH    AM-PAC 6 Clicks Score (PT) 20  -AH -- 20  -AH       Functional Assessment    Outcome Measure Options AM-PAC 6 Clicks Basic Mobility (PT)  -AH AM-PAC 6 Clicks Basic Mobility (PT)  - AM-PAC 6 Clicks Basic Mobility (PT)  -      Row Name 24 0800             How much help from another person do you currently need...    Turning from your back to your side while in flat bed without using bedrails? 4  -AH      Moving from lying on back to sitting on the side of a flat bed without bedrails? 4  -AH      Moving to and from a bed to a chair (including a wheelchair)? 3  -AH      Standing up from a chair using your arms  (e.g., wheelchair, bedside chair)? 3  -AH      Climbing 3-5 steps with a railing? 3  -AH      To walk in hospital room? 3  -AH      AM-PAC 6 Clicks Score (PT) 20  -         Functional Assessment    Outcome Measure Options AM-PAC 6 Clicks Basic Mobility (PT)  -                User Key  (r) = Recorded By, (t) = Taken By, (c) = Cosigned By      Initials Name Provider Type     Aurora Yoder, PTA Physical Therapist Assistant                         PT Rehab Goals       Row Name 12/12/24 0734             Bed Mobility Goal 1 (PT)    Activity/Assistive Device (Bed Mobility Goal 1, PT) bed mobility activities, all  -      Page Level/Cues Needed (Bed Mobility Goal 1, PT) independent  -      Time Frame (Bed Mobility Goal 1, PT) long term goal (LTG);by discharge  -      Progress/Outcomes (Bed Mobility Goal 1, PT) goal met  -         Transfer Goal 1 (PT)    Activity/Assistive Device (Transfer Goal 1, PT) sit-to-stand/stand-to-sit;bed-to-chair/chair-to-bed;walker, rolling  -      Page Level/Cues Needed (Transfer Goal 1, PT) modified independence  -      Time Frame (Transfer Goal 1, PT) long term goal (LTG);by discharge  -      Progress/Outcome (Transfer Goal 1, PT) goal met  -         Gait Training Goal 1 (PT)    Activity/Assistive Device (Gait Training Goal 1, PT) gait (walking locomotion);assistive device use;decrease fall risk;diminish gait deviation;improve balance and speed;increase endurance/gait distance;increase energy conservation;walker, rolling  -      Page Level (Gait Training Goal 1, PT) modified independence  -      Distance (Gait Training Goal 1, PT) 150  -      Time Frame (Gait Training Goal 1, PT) long term goal (LTG);by discharge  -      Progress/Outcome (Gait Training Goal 1, PT) goal not met  -         Balance Goal 1 (PT)    Activity/Assistive Device (Balance Goal) sitting dynamic balance;standing static balance;standing dynamic balance;walker, rolling   -      [place] Level/Cues Needed (Balance Goal 1, PT) modified independence  -      Time Frame (Balance Goal 1, PT) long-term goal (LTG);by discharge  -      Progress/Outcomes (Balance Goal 1, PT) goal not met  -         Stairs Goal 1 (PT)    Activity/Assistive Device (Stairs Goal 1, PT) ascending stairs;descending stairs;step-to-step  -      [place] Level/Cues Needed (Stairs Goal 1, PT) modified independence  -      Number of Stairs (Stairs Goal 1, PT) 14  -      Time Frame (Stairs Goal 1, PT) long term goal (LTG);by discharge  -      Progress/Outcome (Stairs Goal 1, PT) new goal;discharged from facility;goal met  -         Problem Specific Goal 1 (PT)    Problem Specific Goal 1 (PT) Pt will report a pain of less than or equal to 3-5/10 with ambulation or other mobility tasks  -      Time Frame (Problem Specific Goal 1, PT) long-term goal (LTG);by discharge  -      Progress/Outcome (Problem Specific Goal 1, PT) goal not met  -                User Key  (r) = Recorded By, (t) = Taken By, (c) = Cosigned By      Initials Name Provider Type Discipline     Aurora Yoder PTA Physical Therapist Assistant PT                    Therapy Charges for Today       Code Description Service Date Service Provider Modifiers Qty    81007764876 HC GAIT TRAINING EA 15 MIN 12/11/2024 Aurora Yoder PTA GP 2    14871897026 HC GAIT TRAINING EA 15 MIN 12/11/2024 Aurora Yoder PTA GP 1            PT Discharge Summary  Reason for Discharge: Discharge from facility  Outcomes Achieved: Refer to plan of care for updates on goals achieved  Discharge Destination: Home with assist      Aurora Yoder PTA   12/12/2024

## 2024-12-12 NOTE — PAYOR COMM NOTE
"DC HOME 12-11-24    Vale Rowan D \"ADAN\" (84 y.o. Male)       Date of Birth   1940    Social Security Number       Address   [address] [place] [address]    Home Phone   [phone]    MRN   [id number]       Gnosticist   Jehovah's witness    Marital Status                               Admission Date   12/4/24    Admission Type   Emergency    Admitting Provider   Petr Hamlin MD    Attending Provider       Department, Room/Bed   [facility] 3A, 328/1       Discharge Date   12/11/2024    Discharge Disposition   Home-Health Care [facility]    Discharge Destination                                 Attending Provider: (none)   Allergies: No Known Allergies    Isolation: None   Infection: None   Code Status: Prior    Ht: 170.2 cm (67\")   Wt: 72 kg (158 lb 12.8 oz)    Admission Cmt: None   Principal Problem: Pneumonia [J18.9]                   Active Insurance as of 12/4/2024       Primary Coverage       Payor Plan Insurance Group Employer/Plan Group    AETNA MEDICARE REPLACEMENT AETNA MEDICARE REPLACEMENT [id number]       Payor Plan Address Payor Plan Phone Number Payor Plan Fax Number Effective Dates    [address] [phone]  1/1/2023 - None Entered    [facility] [id number]         Subscriber Name Subscriber Birth Date Member ID       ROWAN STRATTON 1940 [id number]                     Emergency Contacts        (Rel.) Home Phone Work Phone Mobile Phone    Jyoti Stratton (Spouse) [phone] -- [phone]                 Discharge Summary        Petr Hamlin MD at 12/11/24 [address] [place] [address] Medicine Services  DISCHARGE SUMMARY       Date of Admission: 12/4/2024  Date of Discharge:  12/11/2024  Primary Care Physician: Santiago Aaron MD    Presenting Problem/History of Present Illness:  Patient is an 84-year-old male that presents to the emergency room with chest pain following a fall that occurred this past " Thursday.  Patient has a history significant for broken ribs that occurred 3 weeks ago.  Patient states that his wife was turning on the lights and he just fell backwards.  He denies hitting his hips, possible head trauma.  Past medical history is significant for arthritis, back pain, skin cancer, GERD, hypertension, and osteopenia.  Patient denies fever or nasal congestion.  Patient states that his primary care provider discontinued his antihypertensive medications weeks ago.  Unclear reason.     Final Discharge Diagnoses:  Active Hospital Problems    Diagnosis     **Pneumonia     Closed fracture of multiple ribs of right side     Gait instability     Benign hypertension        Consults: -    Procedures Performed: -    Pertinent Test Results:   Results for orders placed during the hospital encounter of 11/22/24    Adult Transthoracic Echo Complete W/ Cont if Necessary Per Protocol    Interpretation Summary    Left ventricular systolic function is normal. Left ventricular ejection fraction appears to be 61 - 65%.    Left ventricular wall thickness is consistent with mild concentric hypertrophy.    Left ventricular diastolic function is consistent with (grade I) impaired relaxation.    Normal right ventricular cavity size and systolic function noted.    Mild aortic valve stenosis is present.    Estimated right ventricular systolic pressure from tricuspid regurgitation is mildly elevated (35-45 mmHg).      Imaging Results (All)       Procedure Component Value Units Date/Time    CT Thoracic Spine Without Contrast [[id number]] Collected: 12/04/24 1709     Updated: 12/04/24 1720    Narrative:      EXAM: CT THORACIC SPINE WO CONTRAST-      DATE: 12/4/2024 3:03 PM     HISTORY: fall; trauma       COMPARISON: None available.     DOSE LENGTH PRODUCT: 2028.77 mGy.cm  Automated exposure control was also  utilized to decrease patient radiation dose.     TECHNIQUE: Unenhanced CT images of the thoracic spine were obtained  with  multiplanar reformats.     FINDINGS:     There is osteopenia. Patient is status post vertebroplasty at T12 and  T7. There is loss of height at the T1 and T3 vertebral bodies and  represent chronic compression fractures. These are chronic fractures and  unchanged from a prior CT of the chest from 1/28/2022. Facet joints are  aligned bilaterally. There is multilevel facet arthropathy. There is  chronic loss of height anteriorly at T9 and T8. No acute fracture is  identified. There is levoscoliosis.     The visualized paravertebral soft tissues are unremarkable.     Visualized lungs are clear.       Impression:         Osteopenia and levoscoliosis and chronic compression fractures as above.  No acute fracture or spondylolisthesis identified.     This report was signed and finalized on 12/4/2024 5:17 PM by Crow Alvarez.       CT Chest Without Contrast Diagnostic [[id number]] Collected: 12/04/24 1704     Updated: 12/04/24 1712    Narrative:      EXAM: CT CHEST WO CONTRAST DIAGNOSTIC-      DATE: 12/4/2024 3:03 PM     HISTORY: fall; trauma       COMPARISON: 1/28/2022.     DOSE LENGTH PRODUCT: 2028.77 mGy.cm mGy cm. Automatic exposure control  was utilized to make radiation dose as low as reasonably achievable.     TECHNIQUE: Unenhanced CT images of the chest obtained with multiplanar  reformats.     FINDINGS:     MEDIASTINUM/EXTRATHORACIC: There is calcification of the thoracic aorta  which is ectatic and torturous and coronary artery calcification. There  is calcification at the aortic valve. There is cardiomegaly without  pericardial effusion. No significant pleural effusion is identified. No  thoracic lymphadenopathy is seen.     LUNGS/AIRWAYS: There is scarring at the lung apices. An area of opacity  and volume loss in the left lower lobe may be due to atelectasis or  pneumonia. There is mild atelectasis at the right lung base. There is no  pneumothorax.     INCLUDED UPPER ABDOMEN: The visualized portions of  the upper abdomen are  within normal limits.     SOFT TISSUES: There is bilateral gynecomastia left greater than right  which is unchanged.     BONES: No suspicious osseous lesion identified.The there are fractures  of the right anterior 4, 5, 6, 7, and 8 ribs. Most of these fractures  appear subacute. The right sixth rib fracture may be acute.       Impression:      1. Opacity and volume loss at the left lung base medially may be  atelectasis or pneumonia.  2. Multi right rib fractures without significant displacement. The right  anterior sixth rib fracture may be acute.     This report was signed and finalized on 12/4/2024 5:09 PM by Crow Alvarez.       CT Head Without Contrast [[id number]] Collected: 12/04/24 1652     Updated: 12/04/24 1700    Narrative:      EXAM: CT HEAD WO CONTRAST-      DATE: 12/4/2024 3:03 PM     HISTORY: fall; trauma       COMPARISON: 12/14/2022.     DOSE LENGTH PRODUCT: 2029 mGy centimeters automated exposure control was  also utilized to decrease patient radiation dose.     TECHNIQUE: Unenhanced CT images obtained from vertex to skull base with  multiplanar reformats.     FINDINGS:  There is no acute intracranial hemorrhage, midline shift, mass effect,  or hydrocephalus. There is no CT evidence for acute infarct.     There are chronic changes with volume loss and chronic small vessel  ischemic change of the periventricular white matter.      SOFT TISSUES: There is a small right superior frontal scalp hematoma.        SINUS:The visualized paranasal sinuses and mastoid air cells are clear.      ORBITS: The visualized orbits and globes are unremarkable. There is  bilateral lens extraction.          Impression:      1. Chronic changes and no acute intracranial findings.   2. Small right superior frontal scalp hematoma.     This report was signed and finalized on 12/4/2024 4:57 PM by Crow Alvarez.       CT Lumbar Spine Without Contrast [[id number]] Collected: 12/04/24 1628     Updated:  12/04/24 1639    Narrative:      CT LUMBAR SPINE WO CONTRAST- 12/4/2024 3:03 PM     HISTORY: fall; trauma      COMPARISON: 6/14/2018     TOTAL DOSE LENGTH PRODUCT: 2028.77 mGy.cm. Automated exposure control  was also utilized to decrease patient radiation dose.     TECHNIQUE: Axial images of the lumbar spine are obtained with sagittal  and coronal reconstructions     FINDINGS: A rudimentary disc at the S1/2 level is assumed when counting  vertebral bodies. There are kyphoplasty changes of the T12, L3, L4, and  L5 vertebra. Loss of height is greatest at L3 and L5 measuring  approximately 35%. Mild chronic compression of the superior plate of L2.  No L1 compression deformity. Old healed right sacral ala fracture. No  acute lumbar vertebral fracture identified. Moderate to advanced lower  lumbar facet osteoarthropathy.     Mild to moderate central lumbar canal stenosis with severe right and  moderate L5-S1 foraminal stenosis. Moderate right L4/5 foraminal  narrowing. Advanced degenerative disc changes at L5-S1.     Diffuse vascular calcification.          Impression:      1. Advanced osteopenia. Old compression deformities involving T12 L2,  L3, L4, and L5 with kyphoplasty changes at each level except for L2. No  convincing acute lumbar vertebral fracture or traumatic malalignment  2. Degenerative changes resulting in severe right L5-S1 foraminal  narrowing. Moderate right L4/5 and left L5-S1 foraminal stenosis also  present..     This report was signed and finalized on 12/4/2024 4:36 PM by Dr. Pascale Cade MD.       XR Chest 1 View [[id number]] Collected: 12/04/24 1530     Updated: 12/04/24 1535    Narrative:      EXAMINATION:  XR CHEST 1 VW-  12/4/2024 2:05 PM     HISTORY: Chest pain.     COMPARISON: 10/24/2023.     TECHNIQUE: Single view AP image.     FINDINGS: There is patchy opacity within the left lung base. No other  focal infiltrate is seen. There is stable bronchial wall thickening.  There is mild  cardiomegaly. There is a probable coronary artery stent.  There is atheromatous disease of the thoracic aorta. There are  degenerative changes of the spine with scoliosis. There are compression  deformities with prior kyphoplasty. Prior bilateral shoulder  arthroplasty.          Impression:      1. New opacities in the left lung base likely due to pneumonia or  atelectasis. Follow-up recommended to document resolution.  2. Stable bronchial wall thickening.  3. Mild cardiomegaly.           This report was signed and finalized on 12/4/2024 3:32 PM by Dr. Randy Chatterjee MD.             LAB RESULTS:      Lab 12/05/24  0930 12/04/24  1846 12/04/24  1509   WBC 5.83  --  5.06   HEMOGLOBIN 11.6*  --  12.3*   HEMATOCRIT 36.6*  --  38.1   PLATELETS 290  --  270   NEUTROS ABS 4.98  --  3.71   IMMATURE GRANS (ABS) 0.02  --  0.06*   LYMPHS ABS 0.78  --  0.63*   MONOS ABS 0.04*  --  0.58   EOS ABS 0.00  --  0.06   MCV 93.1  --  91.6   LACTATE  --  1.0  --    PROTIME  --   --  13.7         Lab 12/05/24  0930 12/04/24  1509   SODIUM 133* 137   POTASSIUM 4.8 4.3   CHLORIDE 94* 97*   CO2 30.0* 32.0*   ANION GAP 9.0 8.0   BUN 20 15   CREATININE 0.78 0.60*   EGFR 86.9 94.0   GLUCOSE 217* 96   CALCIUM 8.6 8.9         Lab 12/05/24  0930 12/04/24  1509   TOTAL PROTEIN 7.4 7.9   ALBUMIN 3.2* 3.5   GLOBULIN 4.2 4.4   ALT (SGPT) 13 12   AST (SGOT) 20 15   BILIRUBIN 0.2 0.3   ALK PHOS 118* 124*         Lab 12/04/24  1714 12/04/24  1509   HSTROP T 13 13   PROTIME  --  13.7   INR  --  1.01                 Brief Urine Lab Results  (Last result in the past 365 days)        Color   Clarity   Blood   Leuk Est   Nitrite   Protein   CREAT   Urine HCG        12/05/24 0522 Dark Yellow   Clear   Negative   Small (1+)   Negative   30 mg/dL (1+)                 Microbiology Results (last 10 days)       Procedure Component Value - Date/Time    Blood Culture - Blood, Arm, Left [[id number]]  (Normal) Collected: 12/04/24 1846    Lab Status: Final result  "Specimen: Blood from Arm, Left Updated: 12/09/24 1915     Blood Culture No growth at 5 days    Blood Culture - Blood, Arm, Right [[id number]]  (Normal) Collected: 12/04/24 1846    Lab Status: Final result Specimen: Blood from Arm, Right Updated: 12/09/24 1915     Blood Culture No growth at 5 days          Hospital Course:   84-year-old male with past medical history of hypertension, osteopenia, rib fractures that presented to the hospital after a fall.  CT chest shows right anterior fourth, fifth, sixth, seventh and eighth rib fractures subacute with a left lower lobe area concerning for pneumonia.  He was hypoxemic on admission and was admitted to med floor for treatment of pneumonia and hypoxemia.  Empiric antibiotics were started microbiology testing were negative.  His oxygen saturation continued to improve and he was weaned to room air maintaining normal oxygen saturation.  He had participated in activities with ambulation and incentive spirometry.    Due to concerns of falls and weakness as well as gait instability PT and OT were consulted and followed with recommendations to consider placement.  As patient has progressed she is now able to ambulate over 200 feet without assistance and the recommendation now is for home health and home PT.  The patient is amenable to this option and is stable for discharge home.  Also note the insurance refused placement in subacute lead due to the level of activity achieved by the patient.     Hypertension  Continue home medications losartan    Physical Exam on Discharge:  /71 (BP Location: Right arm, Patient Position: Standing)   Pulse 91   Temp 97.4 °F (36.3 °C) (Oral)   Resp 18   Ht 170.2 cm (67\")   Wt 72 kg (158 lb 12.8 oz)   SpO2 92%   BMI 24.87 kg/m²   Physical Exam  Seated comfortably  In good spirit  No distress  Not requiring any oxygen  He appeared barrel chested to me.    Diminished breath sounds, diminished inspiratory effort.  Adequate air exchange.  "   S1-S2 regular rate and rhythm, no gross murmur  Soft abdomen, nontender, no organomegaly  No gross cyanosis or edema  Appropriate affect    Condition on Discharge:   Stable    Discharge Disposition:  Home with home health care    Discharge Medications:     Discharge Medications        New Medications        Instructions Start Date   Lidocaine 4 %   1 patch, Transdermal, Every 24 Hours Scheduled, Remove & Discard patch within 12 hours or as directed by MD   Start Date: December 12, 2024     naloxone 4 MG/0.1ML nasal spray  Commonly known as: NARCAN   Call 911. Don't prime. [place] in 1 nostril for overdose. Repeat in 2-3 minutes in other nostril if no or minimal breathing/responsiveness.      oxyCODONE-acetaminophen 5-325 MG per tablet  Commonly known as: PERCOCET   1 tablet, Oral, Every 4 Hours PRN      sennosides-docusate 8.6-50 MG per tablet  Commonly known as: PERICOLACE   2 tablets, Oral, 2 Times Daily PRN             Continue These Medications        Instructions Start Date   albuterol sulfate  (90 Base) MCG/ACT inhaler  Commonly known as: PROVENTIL HFA;VENTOLIN HFA;PROAIR HFA   2 puffs, Inhalation, Every 6 Hours PRN      cetirizine 10 MG tablet  Commonly known as: zyrTEC   10 mg, Oral, Daily      denosumab 60 MG/ML solution prefilled syringe syringe  Commonly known as: PROLIA   60 mg, Subcutaneous, Every 6 Months      DULoxetine 20 MG capsule  Commonly known as: Cymbalta   20 mg, Oral, Daily      multivitamins-minerals capsule capsule   1 capsule, Oral, 2 Times Daily      olmesartan 20 MG tablet  Commonly known as: BENICAR   30 mg, Oral, Daily      traMADol-acetaminophen 37.5-325 MG per tablet  Commonly known as: ULTRACET   1 tablet, Oral, Every 8 Hours PRN             Discharge Diet:   Cardiac    Activity at Discharge:   Resume usual activity    Follow-up Appointments:   Future Appointments   Date Time Provider Department Center   12/20/2024  1:30 PM  [facility]  [facility]   12/20/2024   2:00 PM CHA[facility]  [facility]  [facility]   5/21/2025  2:00 PM Santiago Aaron MD [facility]       Test Results Pending at Discharge: None    Electronically signed by Petr Hamlin MD, 12/11/24, 10:56 CST.    Time: 31 minutes.         Electronically signed by Petr Hamlin MD at 12/11/24 8541

## 2024-12-12 NOTE — THERAPY DISCHARGE NOTE
Acute Care - Occupational Therapy Discharge Summary  [facility]     Patient Name: Juan Luis Collazo  : 1940  MRN: [id number]    Today's Date: 2024  Onset of Illness/Injury or Date of Surgery: 24    Date of Referral to OT: 24  Referring Physician: Dr. Lizarraga      Admit Date: 2024        OT Recommendation and Plan    Visit Dx:    ICD-10-CM ICD-9-CM   1. Pneumonia of left lower lobe due to infectious organism  J18.9 486   2. Closed fracture of multiple ribs, unspecified laterality, initial encounter  S22.49XA 807.09   3. Chest pain, unspecified type  R07.9 786.50   4. Gait instability [R26.81]  R26.81 781.2   5. Closed fracture of multiple ribs of right side, initial encounter  S22.41XA 807.09                OT Rehab Goals       Row Name 24 0800             Bathing Goal 1 (OT)    Activity/Device (Bathing Goal 1, OT) bathing skills, all  -AC      [place] Level/Cues Needed (Bathing Goal 1, OT) supervision required  -AC      Time Frame (Bathing Goal 1, OT) 10 days;long term goal (LTG)  -AC      Progress/Outcomes (Bathing Goal 1, OT) goal not met  -AC         Toileting Goal 1 (OT)    Activity/Device (Toileting Goal 1, OT) toileting skills, all;commode  -AC      [place] Level/Cues Needed (Toileting Goal 1, OT) supervision required  -AC      Time Frame (Toileting Goal 1, OT) 10 days;long term goal (LTG)  -AC      Progress/Outcome (Toileting Goal 1, OT) goal not met  -AC         Balance Goal 1 (OT)    Activity/Assistive Device (Balance Goal 1, OT) standing dynamic balance;supported;with ADLs;with functional activities/occupations;with functional mobility activities;with functional reaching activities  -AC      [place] Level/Cues Needed (Balance Goal 1, OT) contact guard required  -AC      Time Frame (Balance Goal 1, OT) long-term goal (LTG);other (see comments)  -AC      Progress/Outcomes (Balance Goal 1, OT) goal not met  -AC         Problem Specific Goal 1 (OT)    Problem  Specific Goal 1 (OT) Pt will independently implement one pain mgmt strategy during functional activity.  -AC      Time Frame (Problem Specific Goal 1, OT) 10 days;long term goal (LTG)  -AC      Progress/Outcome (Problem Specific Goal 1, OT) goal not met  -AC                User Key  (r) = Recorded By, (t) = Taken By, (c) = Cosigned By      Initials Name Provider Type [facility] Alex Gonzalez, OTR/L, CNT Occupational Therapist OT                     Outcome Measures       Row Name 12/11/24 0858 12/11/24 0830 12/10/24 1000       How much help from another person do you currently need...    Turning from your back to your side while in flat bed without using bedrails? 4  -AH -- 4  -AH    Moving from lying on back to sitting on the side of a flat bed without bedrails? 4  -AH -- 4  -AH    Moving to and from a bed to a chair (including a wheelchair)? 3  -AH -- 3  -AH    Standing up from a chair using your arms (e.g., wheelchair, bedside chair)? 3  -AH -- 3  -AH    Climbing 3-5 steps with a railing? 3  -AH -- 3  -AH    To walk in hospital room? 3  -AH -- 3  -AH    AM-PAC 6 Clicks Score (PT) 20  -AH -- 20  -AH       Functional Assessment    Outcome Measure Options AM-PAC 6 Clicks Basic Mobility (PT)  - AM-PAC 6 Clicks Basic Mobility (PT)  - AM-PAC 6 Clicks Basic Mobility (PT)  -              User Key  (r) = Recorded By, (t) = Taken By, (c) = Cosigned By      Initials Name Provider Type     Aurora Yoder, PTA Physical Therapist Assistant                    Timed Therapy Charges  Total Units: 4      Suggested Charges  Total Units: 4      Procedure Name Documented Minutes Units Code    HC OT SELF CARE/MGMT/TRAIN EA 15 MIN 58 4   72571 (CPT®)                 Documented Minutes  Total Minutes: 58      Therapy Provided Minutes    44492 - OT Self Care/Mgmt Minutes 58                        OT Discharge Summary  Anticipated Discharge Disposition (OT): home with home health  Reason for Discharge: Discharge from  facility  Outcomes Achieved: Refer to plan of care for updates on goals achieved  Discharge Destination: Home with home health      Alex Gonzalez, HERMINIAR/L, CNT  12/12/2024

## 2024-12-12 NOTE — OUTREACH NOTE
Call Center TCM Note      Flowsheet Row Responses   [facility] patient discharged from? [place]   Does the patient have one of the following disease processes/diagnoses(primary or secondary)? Pneumonia   TCM attempt successful? Yes   Call start time 1350   Call end time 1353   Discharge diagnosis Pneumonia, Hx fall with multiple rib fractures 3 weeks ago   Person spoke with today (if not patient) and relationship wife   Meds reviewed with patient/caregiver? Yes   Is the patient having any side effects they believe may be caused by any medication additions or changes? No   Does the patient have all medications ordered at discharge? Yes   Is the patient taking all medications as directed (includes completed medication regime)? Yes   Comments HOSP DC FU appt 12/18/24 330 pm.   Does the patient have an appointment with their PCP within 7-14 days of discharge? Yes   Has home health visited the patient within 72 hours of discharge? N/A   Psychosocial issues? No   Did the patient receive a copy of their discharge instructions? Yes   Nursing interventions Reviewed instructions with patient   What is the patient's perception of their health status since discharge? Improving   Is the patient/caregiver able to teach back the hierarchy of who to call/visit for symptoms/problems? PCP, Specialist, Home health nurse, Urgent Care, ED, 911 Yes   Is the patient/caregiver able to teach back signs and symptoms of worsening condition: Fever/chills, Shortness of breath   TCM call completed? Yes   Wrap up additional comments Wife reports Pt is improving.   Call end time 1353            Jemma Pandey RN    12/12/2024, 13:53 CST

## 2024-12-18 ENCOUNTER — OFFICE VISIT (OUTPATIENT)
Dept: INTERNAL MEDICINE | Facility: CLINIC | Age: 84
End: 2024-12-18
Payer: MEDICARE

## 2024-12-18 VITALS
OXYGEN SATURATION: 96 % | DIASTOLIC BLOOD PRESSURE: 90 MMHG | BODY MASS INDEX: 24.17 KG/M2 | WEIGHT: 154 LBS | HEIGHT: 67 IN | SYSTOLIC BLOOD PRESSURE: 156 MMHG | HEART RATE: 74 BPM | TEMPERATURE: 98.3 F

## 2024-12-18 DIAGNOSIS — I10 BENIGN HYPERTENSION: ICD-10-CM

## 2024-12-18 DIAGNOSIS — S22.41XD CLOSED FRACTURE OF MULTIPLE RIBS OF RIGHT SIDE WITH ROUTINE HEALING, SUBSEQUENT ENCOUNTER: Primary | ICD-10-CM

## 2024-12-18 PROCEDURE — 1159F MED LIST DOCD IN RCRD: CPT

## 2024-12-18 PROCEDURE — 1111F DSCHRG MED/CURRENT MED MERGE: CPT

## 2024-12-18 PROCEDURE — 1126F AMNT PAIN NOTED NONE PRSNT: CPT

## 2024-12-18 PROCEDURE — 1160F RVW MEDS BY RX/DR IN RCRD: CPT

## 2024-12-18 PROCEDURE — 99495 TRANSJ CARE MGMT MOD F2F 14D: CPT

## 2024-12-18 RX ORDER — OLMESARTAN MEDOXOMIL 20 MG/1
30 TABLET ORAL DAILY
Qty: 45 TABLET | Refills: 0 | Status: SHIPPED | OUTPATIENT
Start: 2024-12-18

## 2024-12-18 RX ORDER — HYDROCODONE BITARTRATE AND ACETAMINOPHEN 5; 325 MG/1; MG/1
1 TABLET ORAL EVERY 4 HOURS PRN
Qty: 18 TABLET | Refills: 0 | Status: SHIPPED | OUTPATIENT
Start: 2024-12-18 | End: 2024-12-31 | Stop reason: SDUPTHER

## 2024-12-18 NOTE — PROGRESS NOTES
Transitional Care Follow Up Visit  Subjective     Juan Luis Collazo is a 84 y.o. male who presents for a transitional care management visit.    Within 48 business hours after discharge our office contacted him via telephone to coordinate his care and needs.      I reviewed and discussed the details of that call along with the discharge summary, hospital problems, inpatient lab results, inpatient diagnostic studies, and consultation reports with Juan Luis.     Current outpatient and discharge medications have been reconciled for the patient.  Reviewed by: DIEGO Gibbons          12/11/2024     4:53 PM   Date of TCM Phone Call   [facility]   Date of Admission 12/4/2024   Date of Discharge 12/11/2024   Discharge Disposition Home-Health Care Svc     Risk for Readmission (LACE) No data recorded      History of Present Illness   Course During Hospital Stay: Patient was admitted to [facility] from 12/4-12/11 for pneumonia.  Please see hospital course below per Dr. Hamlin.         History of Present Illness  The patient is an 84-year-old male who presents today for a transitional care management visit.    He reports experiencing severe pain in his chest wall. The pain is exacerbated by coughing.  He is not experiencing shortness of breath but reports dizziness upon standing.     His blood pressure medication had previously been discontinued, which initially seemed to alleviate the dizziness, but the symptom has since returned. He monitors his blood pressure at home, which he reports as being elevated, although not as high as the hospital readings. He recalls a hospital reading of 260 systolic, prompting the initiation of antihypertensive therapy. He is uncertain if Benicar was reintroduced into his regimen. He did not take his blood pressure medication today. He has been on Benicar for several years, with the dosage reduced from 50 mg to 37 mg.     He requests a prescription for the pain  "medication he received in the hospital. He has been taking tramadol regularly, which provides some relief but does not completely alleviate the pain.     The following portions of the patient's history were reviewed and updated as appropriate: allergies, current medications, past family history, past medical history, past social history, past surgical history, and problem list.    Review of Systems   Cardiovascular:  Positive for chest pain (chest wall pain).   All other systems reviewed and are negative.    Objective   /90 (BP Location: Left arm, Patient Position: Sitting, Cuff Size: Adult)   Pulse 74   Temp 98.3 °F (36.8 °C) (Temporal)   Ht 170.2 cm (67.01\")   Wt 69.9 kg (154 lb)   SpO2 96%   BMI 24.11 kg/m²   Physical Exam  Constitutional:       Appearance: Normal appearance. He is normal weight.      Comments: Standing due to pain   Cardiovascular:      Rate and Rhythm: Normal rate and regular rhythm.      Pulses: Normal pulses.   Pulmonary:      Effort: No respiratory distress.      Breath sounds: Normal breath sounds.      Comments: Shallow breathing  Neurological:      Mental Status: He is alert and oriented to person, place, and time. Mental status is at baseline.      Motor: No weakness.       Assessment & Plan   Problems Addressed this Visit       Benign hypertension    Relevant Medications    olmesartan (BENICAR) 20 MG tablet    Closed fracture of multiple ribs of right side - Primary    Relevant Medications    HYDROcodone-acetaminophen (Norco) 5-325 MG per tablet     Diagnoses         Codes Comments    Closed fracture of multiple ribs of right side with routine healing, subsequent encounter    -  Primary ICD-10-CM: S22.41XD  ICD-9-CM: V54.19     Benign hypertension     ICD-10-CM: I10  ICD-9-CM: 401.1           Assessment & Plan  1. Transitional care management.  His blood pressure is currently elevated.  A prescription for Benicar 30 mg will be sent to the pharmacy.  Discussed that his pain " is likely a significant contributing factor to his elevated blood pressure.    We discussed that it is not uncommon for pain to persist for 6-8 weeks but he should notice some gradual improvement.  Norco refill given today although I am only able to provide a 3-day supply.  Patient would like to follow-up closely which I feel is reasonable.  He is scheduled for a follow-up appointment with Dr. Aaron in 3 weeks to monitor his pain and blood pressure.    Follow-up  The patient will follow up in 3 weeks.

## 2024-12-30 DIAGNOSIS — S22.41XD CLOSED FRACTURE OF MULTIPLE RIBS OF RIGHT SIDE WITH ROUTINE HEALING, SUBSEQUENT ENCOUNTER: ICD-10-CM

## 2024-12-30 RX ORDER — HYDROCODONE BITARTRATE AND ACETAMINOPHEN 5; 325 MG/1; MG/1
1 TABLET ORAL EVERY 4 HOURS PRN
Qty: 18 TABLET | Refills: 0 | OUTPATIENT
Start: 2024-12-30

## 2024-12-30 NOTE — TELEPHONE ENCOUNTER
Caller: Jyoti Collazo    Relationship: Emergency Contact    Best call back number:  [phone]      Requested Prescriptions     Pending Prescriptions Disp Refills    HYDROcodone-acetaminophen (Norco) 5-325 MG per tablet 18 tablet 0     Sig: Take 1 tablet by mouth Every 4 (Four) Hours As Needed for Severe Pain.        Pharmacy where request should be sent: [facility] - [address] [phone] [facility] [phone]      Last office visit with prescribing clinician: 11/12/2024   Last telemedicine visit with prescribing clinician: Visit date not found   Next office visit with prescribing clinician: 1/9/2025     Additional details provided by patient:     STILL IN EXTREME PAIN - POST HOSPITAL FOLLOW UP PNEMONIA/FRACTURED RIBS    Does the patient have less than a 3 day supply:  [x] Yes  [] No    Would you like a call back once the refill request has been completed: [] Yes [] No    If the office needs to give you a call back, can they leave a voicemail: [] Yes [] No    Wander Noble Rep   12/30/24 09:35 CST

## 2024-12-31 DIAGNOSIS — S22.41XD CLOSED FRACTURE OF MULTIPLE RIBS OF RIGHT SIDE WITH ROUTINE HEALING, SUBSEQUENT ENCOUNTER: ICD-10-CM

## 2024-12-31 RX ORDER — HYDROCODONE BITARTRATE AND ACETAMINOPHEN 5; 325 MG/1; MG/1
1 TABLET ORAL EVERY 4 HOURS PRN
Qty: 18 TABLET | Refills: 0 | Status: SHIPPED | OUTPATIENT
Start: 2024-12-31 | End: 2025-01-03

## 2025-01-02 ENCOUNTER — TELEPHONE (OUTPATIENT)
Dept: INTERNAL MEDICINE | Facility: CLINIC | Age: 85
End: 2025-01-02

## 2025-01-02 NOTE — TELEPHONE ENCOUNTER
Caller: Jyoti Collazo    Relationship to patient: Emergency Contact    Best call back number: [phone]     Patient is needing: PATIENT'S WIFE HAS BEEN GIVING PATIENT 2 HYDROcodone-acetaminophen (Norco) 5-325 MG per tablet  EVERY 4 HRS. SHE WANTS TO KNOW IF THIS SAFE? HE IS IN A LOT OF PAIN.   HIS PRESCRIPTION WILL RUN OUT AND SHE IS WORRIED, BECAUSE SHE'S GIVING 2 PILLS, INSTEAD OF 1.  SO WHAT CAN SHE DO ABOUT THIS? PLEASE CALL WIFE.     PAIN IS SHOULDER BLADE, THROUGH CHEST (FRACTURED RIBS AREA)

## 2025-01-03 DIAGNOSIS — M51.34 DEGENERATIVE DISC DISEASE, THORACIC: ICD-10-CM

## 2025-01-03 DIAGNOSIS — M51.369 DEGENERATION OF INTERVERTEBRAL DISC OF LUMBAR REGION, UNSPECIFIED WHETHER PAIN PRESENT: ICD-10-CM

## 2025-01-03 DIAGNOSIS — M50.30 DEGENERATIVE DISC DISEASE, CERVICAL: Primary | ICD-10-CM

## 2025-01-03 RX ORDER — HYDROCODONE BITARTRATE AND ACETAMINOPHEN 5; 325 MG/1; MG/1
1 TABLET ORAL EVERY 6 HOURS PRN
Qty: 40 TABLET | Refills: 0 | Status: SHIPPED | OUTPATIENT
Start: 2025-01-03 | End: 2025-01-23

## 2025-01-03 NOTE — TELEPHONE ENCOUNTER
Notified patient's spouse of the information below, she voiced understanding. However, since the increase of Norco she states she only has two pills remaining and is needing a refill.   Please advise.

## 2025-01-09 ENCOUNTER — TELEPHONE (OUTPATIENT)
Dept: INTERNAL MEDICINE | Facility: CLINIC | Age: 85
End: 2025-01-09

## 2025-01-09 ENCOUNTER — OFFICE VISIT (OUTPATIENT)
Dept: INTERNAL MEDICINE | Facility: CLINIC | Age: 85
End: 2025-01-09
Payer: MEDICARE

## 2025-01-09 VITALS
TEMPERATURE: 98.4 F | HEIGHT: 67 IN | HEART RATE: 72 BPM | SYSTOLIC BLOOD PRESSURE: 128 MMHG | DIASTOLIC BLOOD PRESSURE: 76 MMHG | OXYGEN SATURATION: 91 % | BODY MASS INDEX: 24.11 KG/M2

## 2025-01-09 DIAGNOSIS — S22.41XS CLOSED FRACTURE OF MULTIPLE RIBS OF RIGHT SIDE, SEQUELA: Primary | ICD-10-CM

## 2025-01-09 DIAGNOSIS — R07.81 RIB PAIN ON RIGHT SIDE: ICD-10-CM

## 2025-01-09 DIAGNOSIS — M80.00XG AGE-RELATED OSTEOPOROSIS WITH CURRENT PATHOLOGICAL FRACTURE WITH DELAYED HEALING: ICD-10-CM

## 2025-01-09 DIAGNOSIS — I10 BENIGN HYPERTENSION: ICD-10-CM

## 2025-01-09 RX ORDER — OXYCODONE HYDROCHLORIDE 5 MG/1
1 TABLET, COATED ORAL EVERY 4 HOURS PRN
Qty: 120 TABLET | Refills: 0 | Status: SHIPPED | OUTPATIENT
Start: 2025-01-09 | End: 2025-01-09

## 2025-01-09 RX ORDER — OXYCODONE HYDROCHLORIDE 5 MG/1
5 TABLET ORAL EVERY 4 HOURS PRN
Qty: 120 TABLET | Refills: 0 | Status: SHIPPED | OUTPATIENT
Start: 2025-01-09

## 2025-01-09 NOTE — TELEPHONE ENCOUNTER
Caller: [facility] - [place], [place] - [facility] [address] - [phone]  - [phone]    Relationship: Pharmacy  SPOKE WITH BENIGNO (PHARMACIST)    Best call back number: [phone]     What is the best time to reach you: AS SOON AS POSSIBLE    Who are you requesting to speak with (clinical staff, provider,  specific staff member): CLINICAL     What was the call regarding: oxyCODONE HCl 5 MG tablet THIS IS THE ABUSE DETERRENT MEDICATION AND PHARMACY DOESN'T HAVE THESE. THEY CAN NOT GET THEM FOR THE PATIENT. THEY ARE NEEDING A REGULAR OXYCODONE PRESCRIPTION SENT OVER

## 2025-01-10 NOTE — PROGRESS NOTES
"      Chief Complaint  Hypertension (3 week follow up ) and Pain (Seen on 12/18/24 with tito for TCM /Pain is worsening over the last week right side right under breast straight through to shoulder /Has been taking norco every 4 hours due to pain getting so bad )    Subjective        Juan Luis Collazo presents to [facility]    HPI    Patient here for the above problems.  See Assessment and Plan for further HPI components.      Review of Systems    Objective   Vital Signs:  /76 (BP Location: Left arm, Patient Position: Sitting, Cuff Size: Adult)   Pulse 72   Temp 98.4 °F (36.9 °C) (Temporal)   Ht 170.2 cm (67.01\")   SpO2 91%   BMI 24.11 kg/m²   Estimated body mass index is 24.11 kg/m² as calculated from the following:    Height as of this encounter: 170.2 cm (67.01\").    Weight as of 12/18/24: 69.9 kg (154 lb).      Physical Exam  Vitals and nursing note reviewed.   Constitutional:       Appearance: He is not ill-appearing.   Eyes:      General: No scleral icterus.     Conjunctiva/sclera: Conjunctivae normal.   Pulmonary:      Effort: Pulmonary effort is normal. No respiratory distress.   Neurological:      General: No focal deficit present.      Mental Status: He is alert and oriented to person, place, and time.   Psychiatric:         Mood and Affect: Mood normal.         Behavior: Behavior normal.                   Assessment and Plan   Diagnoses and all orders for this visit:    1. Closed fracture of multiple ribs of right side, sequela (Primary)  -     Discontinue: oxyCODONE HCl 5 MG tablet ; Take 1 tablet by mouth Every 4 (Four) Hours As Needed (pain).  Dispense: 120 tablet; Refill: 0  -     oxyCODONE (Roxicodone) 5 MG immediate release tablet; Take 1 tablet by mouth Every 4 (Four) Hours As Needed for Moderate Pain.  Dispense: 120 tablet; Refill: 0    2. Rib pain on right side    3. Age-related osteoporosis with current pathological fracture with delayed healing    4. " Benign hypertension    Other orders  -     naloxone (NARCAN) 4 MG/0.1ML nasal spray; Call 911. Don't prime. [place] in 1 nostril for overdose. Repeat in 2-3 minutes in other nostril if no or minimal breathing/responsiveness.  Dispense: 2 each; Refill: 0        History of Present Illness  The patient is an 84-year-old male who presents for evaluation of rib pain.    He has been experiencing persistent rib pain, which is managed with medication administered every 4 hours, a deviation from the prescribed 6-hour interval due to the severity of the pain. He reports that the pain intensifies upon rising from a supine position, necessitating the use of over-the-counter lidocaine patches for relief. He has been referred to a pain management specialist but has been unable to establish contact despite repeated attempts over several days. He has not procured the prescribed Narcan, citing close monitoring of his condition as the reason. He maintains adequate hydration but has a reduced appetite, requiring encouragement to eat. He also reports occasional arm locking, which he manages with exercises. He is able to ambulate using a walker within his residence but experiences significant pain upon standing, particularly when transitioning from a seated to standing position. He reports normal bowel movements.    MEDICATIONS  Current: hydrocodone, lidocaine patches      Assessment & Plan  1. Rib pain.  He was informed that the healing process may be prolonged due to his age, but the pain should gradually subside. The potential risks associated with overlapping medications were discussed. He was advised to continue using lidocaine patches for pain management. The possibility of experiencing intermittent discomfort for up to a year was also discussed. A prescription for oxycodone 5 mg, to be taken every 4 hours, was provided. Additionally, he was instructed to take one tablet of extra strength Tylenol 500 mg between doses of oxycodone. A  prescription for Narcan was also issued. He was encouraged to perform deep breathing exercises to prevent pneumonia and to incorporate fiber and MiraLAX into his diet if necessary. He was also advised to use a pillow or towel for support during episodes of pain and to ensure proper use of his walker.    2. Opiate use  Narcan prescription sent    3. Hypertension  Well controlled with olmesartan.  I have decreased other medication previously.  Recommend monitoring BP at home.      4. Osteoporosis  Patient has osteoporosis.  I worry about the patient's ribs healing well and taking prolonged time.      Result Review :           BMI is within normal parameters. No other follow-up for BMI required.      BMI is within normal parameters. No other follow-up for BMI required.            Follow Up   Return in about 3 months (around 4/9/2025), or if symptoms worsen or fail to improve, for follow up for above problems. Longitudinal care..  Patient was given instructions and counseling regarding his condition or for health maintenance advice. Please see specific information pulled into the AVS if appropriate.       NATHAN Aaron MD, FACP, FH      Electronically signed by Santiago Aaron MD, 01/10/25, 11:58 AM CST.    Patient or patient representative verbalized consent for the use of Ambient Listening during the visit with  Santiago Aaron MD for chart documentation. 1/10/2025  12:01 CST

## 2025-01-15 ENCOUNTER — LAB (OUTPATIENT)
Dept: LAB | Facility: HOSPITAL | Age: 85
End: 2025-01-15
Payer: MEDICARE

## 2025-01-15 ENCOUNTER — INFUSION (OUTPATIENT)
Dept: ONCOLOGY | Facility: HOSPITAL | Age: 85
End: 2025-01-15
Payer: MEDICARE

## 2025-01-15 VITALS
SYSTOLIC BLOOD PRESSURE: 175 MMHG | HEART RATE: 85 BPM | DIASTOLIC BLOOD PRESSURE: 83 MMHG | RESPIRATION RATE: 18 BRPM | HEIGHT: 67 IN | WEIGHT: 153 LBS | OXYGEN SATURATION: 96 % | TEMPERATURE: 97.4 F | BODY MASS INDEX: 24.01 KG/M2

## 2025-01-15 DIAGNOSIS — M85.80 DECREASED BONE MASS: Primary | ICD-10-CM

## 2025-01-15 DIAGNOSIS — M85.80 DECREASED BONE MASS: ICD-10-CM

## 2025-01-15 LAB
CALCIUM SPEC-SCNC: 9.3 MG/DL (ref 8.6–10.5)
MAGNESIUM SERPL-MCNC: 1.9 MG/DL (ref 1.6–2.4)
PHOSPHATE SERPL-MCNC: 2.6 MG/DL (ref 2.5–4.5)

## 2025-01-15 PROCEDURE — 96372 THER/PROPH/DIAG INJ SC/IM: CPT

## 2025-01-15 PROCEDURE — 25010000002 DENOSUMAB 60 MG/ML SOLUTION PREFILLED SYRINGE: Performed by: INTERNAL MEDICINE

## 2025-01-15 PROCEDURE — 82310 ASSAY OF CALCIUM: CPT

## 2025-01-15 PROCEDURE — 83735 ASSAY OF MAGNESIUM: CPT

## 2025-01-15 PROCEDURE — 84100 ASSAY OF PHOSPHORUS: CPT

## 2025-01-15 RX ADMIN — DENOSUMAB 60 MG: 60 INJECTION SUBCUTANEOUS at 13:29

## 2025-01-20 DIAGNOSIS — I10 BENIGN HYPERTENSION: ICD-10-CM

## 2025-01-20 RX ORDER — OLMESARTAN MEDOXOMIL 20 MG/1
30 TABLET ORAL DAILY
Qty: 45 TABLET | Refills: 5 | Status: SHIPPED | OUTPATIENT
Start: 2025-01-20 | End: 2025-01-23 | Stop reason: DRUGHIGH

## 2025-01-22 ENCOUNTER — HOSPITAL ENCOUNTER (INPATIENT)
Facility: HOSPITAL | Age: 85
LOS: 2 days | Discharge: HOME | DRG: 178 | End: 2025-01-25
Admitting: FAMILY MEDICINE
Payer: MEDICARE

## 2025-01-22 ENCOUNTER — APPOINTMENT (OUTPATIENT)
Dept: GENERAL RADIOLOGY | Facility: HOSPITAL | Age: 85
DRG: 178 | End: 2025-01-22
Payer: MEDICARE

## 2025-01-22 ENCOUNTER — APPOINTMENT (OUTPATIENT)
Dept: CT IMAGING | Facility: HOSPITAL | Age: 85
DRG: 178 | End: 2025-01-22
Payer: MEDICARE

## 2025-01-22 DIAGNOSIS — Z78.9 DECREASED ACTIVITIES OF DAILY LIVING (ADL): ICD-10-CM

## 2025-01-22 DIAGNOSIS — R26.81 GAIT INSTABILITY: ICD-10-CM

## 2025-01-22 DIAGNOSIS — Z74.09 IMPAIRED MOBILITY: ICD-10-CM

## 2025-01-22 DIAGNOSIS — R73.01 IMPAIRED FASTING GLUCOSE: ICD-10-CM

## 2025-01-22 DIAGNOSIS — N39.0 ACUTE UTI: ICD-10-CM

## 2025-01-22 DIAGNOSIS — R13.10 DYSPHAGIA, UNSPECIFIED TYPE: ICD-10-CM

## 2025-01-22 DIAGNOSIS — J18.9 PNEUMONIA OF RIGHT LUNG DUE TO INFECTIOUS ORGANISM, UNSPECIFIED PART OF LUNG: Primary | ICD-10-CM

## 2025-01-22 LAB
ALBUMIN SERPL-MCNC: 3.4 G/DL (ref 3.5–5.2)
ALBUMIN/GLOB SERPL: 0.7 G/DL
ALP SERPL-CCNC: 103 U/L (ref 39–117)
ALT SERPL W P-5'-P-CCNC: 9 U/L (ref 1–41)
ANION GAP SERPL CALCULATED.3IONS-SCNC: 11 MMOL/L (ref 5–15)
AST SERPL-CCNC: 18 U/L (ref 1–40)
BASOPHILS # BLD AUTO: 0.01 10*3/MM3 (ref 0–0.2)
BASOPHILS NFR BLD AUTO: 0.1 % (ref 0–1.5)
BILIRUB SERPL-MCNC: 0.9 MG/DL (ref 0–1.2)
BUN SERPL-MCNC: 13 MG/DL (ref 8–23)
BUN/CREAT SERPL: 23.6 (ref 7–25)
CALCIUM SPEC-SCNC: 7.9 MG/DL (ref 8.6–10.5)
CHLORIDE SERPL-SCNC: 97 MMOL/L (ref 98–107)
CO2 SERPL-SCNC: 25 MMOL/L (ref 22–29)
CREAT SERPL-MCNC: 0.55 MG/DL (ref 0.76–1.27)
D DIMER PPP FEU-MCNC: 1.02 MCGFEU/ML (ref 0–0.86)
D-LACTATE SERPL-SCNC: 2 MMOL/L (ref 0.5–2)
DEPRECATED RDW RBC AUTO: 49.5 FL (ref 37–54)
EGFRCR SERPLBLD CKD-EPI 2021: 96.5 ML/MIN/1.73
EOSINOPHIL # BLD AUTO: 0 10*3/MM3 (ref 0–0.4)
EOSINOPHIL NFR BLD AUTO: 0 % (ref 0.3–6.2)
ERYTHROCYTE [DISTWIDTH] IN BLOOD BY AUTOMATED COUNT: 14.8 % (ref 12.3–15.4)
FLUAV RNA RESP QL NAA+PROBE: NOT DETECTED
FLUBV RNA RESP QL NAA+PROBE: NOT DETECTED
GLOBULIN UR ELPH-MCNC: 5 GM/DL
GLUCOSE SERPL-MCNC: 129 MG/DL (ref 65–99)
HCT VFR BLD AUTO: 37.5 % (ref 37.5–51)
HGB BLD-MCNC: 12.4 G/DL (ref 13–17.7)
HOLD SPECIMEN: NORMAL
IMM GRANULOCYTES # BLD AUTO: 0.12 10*3/MM3 (ref 0–0.05)
IMM GRANULOCYTES NFR BLD AUTO: 0.7 % (ref 0–0.5)
LYMPHOCYTES # BLD AUTO: 1.31 10*3/MM3 (ref 0.7–3.1)
LYMPHOCYTES NFR BLD AUTO: 7.1 % (ref 19.6–45.3)
MCH RBC QN AUTO: 30.2 PG (ref 26.6–33)
MCHC RBC AUTO-ENTMCNC: 33.1 G/DL (ref 31.5–35.7)
MCV RBC AUTO: 91.2 FL (ref 79–97)
MONOCYTES # BLD AUTO: 2.24 10*3/MM3 (ref 0.1–0.9)
MONOCYTES NFR BLD AUTO: 12.2 % (ref 5–12)
NEUTROPHILS NFR BLD AUTO: 14.72 10*3/MM3 (ref 1.7–7)
NEUTROPHILS NFR BLD AUTO: 79.9 % (ref 42.7–76)
NRBC BLD AUTO-RTO: 0 /100 WBC (ref 0–0.2)
NT-PROBNP SERPL-MCNC: 2971 PG/ML (ref 0–1800)
PLATELET # BLD AUTO: 245 10*3/MM3 (ref 140–450)
PMV BLD AUTO: 10.6 FL (ref 6–12)
POTASSIUM SERPL-SCNC: 3.7 MMOL/L (ref 3.5–5.2)
PROCALCITONIN SERPL-MCNC: 0.48 NG/ML (ref 0–0.25)
PROT SERPL-MCNC: 8.4 G/DL (ref 6–8.5)
RBC # BLD AUTO: 4.11 10*6/MM3 (ref 4.14–5.8)
SARS-COV-2 RNA RESP QL NAA+PROBE: NOT DETECTED
SODIUM SERPL-SCNC: 133 MMOL/L (ref 136–145)
TROPONIN T SERPL HS-MCNC: 24 NG/L
WBC NRBC COR # BLD AUTO: 18.4 10*3/MM3 (ref 3.4–10.8)
WHOLE BLOOD HOLD COAG: NORMAL
WHOLE BLOOD HOLD SPECIMEN: NORMAL

## 2025-01-22 PROCEDURE — 93010 ELECTROCARDIOGRAM REPORT: CPT | Performed by: INTERNAL MEDICINE

## 2025-01-22 PROCEDURE — 71046 X-RAY EXAM CHEST 2 VIEWS: CPT

## 2025-01-22 PROCEDURE — 84484 ASSAY OF TROPONIN QUANT: CPT | Performed by: STUDENT IN AN ORGANIZED HEALTH CARE EDUCATION/TRAINING PROGRAM

## 2025-01-22 PROCEDURE — 87636 SARSCOV2 & INF A&B AMP PRB: CPT

## 2025-01-22 PROCEDURE — 25010000002 ONDANSETRON PER 1 MG: Performed by: NURSE PRACTITIONER

## 2025-01-22 PROCEDURE — 80053 COMPREHEN METABOLIC PANEL: CPT | Performed by: STUDENT IN AN ORGANIZED HEALTH CARE EDUCATION/TRAINING PROGRAM

## 2025-01-22 PROCEDURE — 93005 ELECTROCARDIOGRAM TRACING: CPT

## 2025-01-22 PROCEDURE — 36415 COLL VENOUS BLD VENIPUNCTURE: CPT

## 2025-01-22 PROCEDURE — 85379 FIBRIN DEGRADATION QUANT: CPT | Performed by: NURSE PRACTITIONER

## 2025-01-22 PROCEDURE — 25810000003 LACTATED RINGERS SOLUTION: Performed by: NURSE PRACTITIONER

## 2025-01-22 PROCEDURE — 84145 PROCALCITONIN (PCT): CPT | Performed by: NURSE PRACTITIONER

## 2025-01-22 PROCEDURE — 85025 COMPLETE CBC W/AUTO DIFF WBC: CPT | Performed by: STUDENT IN AN ORGANIZED HEALTH CARE EDUCATION/TRAINING PROGRAM

## 2025-01-22 PROCEDURE — 0202U NFCT DS 22 TRGT SARS-COV-2: CPT | Performed by: STUDENT IN AN ORGANIZED HEALTH CARE EDUCATION/TRAINING PROGRAM

## 2025-01-22 PROCEDURE — 99285 EMERGENCY DEPT VISIT HI MDM: CPT

## 2025-01-22 PROCEDURE — 87040 BLOOD CULTURE FOR BACTERIA: CPT | Performed by: NURSE PRACTITIONER

## 2025-01-22 PROCEDURE — 83880 ASSAY OF NATRIURETIC PEPTIDE: CPT | Performed by: STUDENT IN AN ORGANIZED HEALTH CARE EDUCATION/TRAINING PROGRAM

## 2025-01-22 PROCEDURE — 83605 ASSAY OF LACTIC ACID: CPT | Performed by: NURSE PRACTITIONER

## 2025-01-22 RX ORDER — SODIUM CHLORIDE 0.9 % (FLUSH) 0.9 %
10 SYRINGE (ML) INJECTION AS NEEDED
Status: DISCONTINUED | OUTPATIENT
Start: 2025-01-22 | End: 2025-01-25 | Stop reason: HOSPADM

## 2025-01-22 RX ORDER — OLMESARTAN MEDOXOMIL 20 MG/1
20 TABLET ORAL DAILY
COMMUNITY
End: 2025-01-23 | Stop reason: SDUPTHER

## 2025-01-22 RX ORDER — ONDANSETRON 2 MG/ML
4 INJECTION INTRAMUSCULAR; INTRAVENOUS ONCE
Status: COMPLETED | OUTPATIENT
Start: 2025-01-22 | End: 2025-01-22

## 2025-01-22 RX ADMIN — ONDANSETRON 4 MG: 2 INJECTION INTRAMUSCULAR; INTRAVENOUS at 23:24

## 2025-01-22 RX ADMIN — SODIUM CHLORIDE, POTASSIUM CHLORIDE, SODIUM LACTATE AND CALCIUM CHLORIDE 500 ML: 600; 310; 30; 20 INJECTION, SOLUTION INTRAVENOUS at 23:24

## 2025-01-22 NOTE — Clinical Note
Level of Care: Telemetry [5]   Diagnosis: Pneumonia [467134]   Admitting Physician: GARRISON ESRTADA [[id number]]   Attending Physician: GARRISON ESTRADA [[id number]]

## 2025-01-23 ENCOUNTER — APPOINTMENT (OUTPATIENT)
Dept: CT IMAGING | Facility: HOSPITAL | Age: 85
DRG: 178 | End: 2025-01-23
Payer: MEDICARE

## 2025-01-23 LAB
ALBUMIN SERPL-MCNC: 3.2 G/DL (ref 3.5–5.2)
ALBUMIN/GLOB SERPL: 0.8 G/DL
ALP SERPL-CCNC: 88 U/L (ref 39–117)
ALT SERPL W P-5'-P-CCNC: 6 U/L (ref 1–41)
ANION GAP SERPL CALCULATED.3IONS-SCNC: 8 MMOL/L (ref 5–15)
ARTERIAL PATENCY WRIST A: ABNORMAL
AST SERPL-CCNC: 13 U/L (ref 1–40)
ATMOSPHERIC PRESS: 759 MMHG
B PARAPERT DNA SPEC QL NAA+PROBE: NOT DETECTED
B PERT DNA SPEC QL NAA+PROBE: NOT DETECTED
BACTERIA UR QL AUTO: ABNORMAL /HPF
BASE EXCESS BLDA CALC-SCNC: 4.2 MMOL/L (ref 0–2)
BDY SITE: ABNORMAL
BILIRUB SERPL-MCNC: 0.9 MG/DL (ref 0–1.2)
BILIRUB UR QL STRIP: NEGATIVE
BODY TEMPERATURE: 37
BUN SERPL-MCNC: 13 MG/DL (ref 8–23)
BUN/CREAT SERPL: 21 (ref 7–25)
C PNEUM DNA NPH QL NAA+NON-PROBE: NOT DETECTED
CALCIUM SPEC-SCNC: 7.5 MG/DL (ref 8.6–10.5)
CHLORIDE SERPL-SCNC: 98 MMOL/L (ref 98–107)
CLARITY UR: ABNORMAL
CO2 SERPL-SCNC: 28 MMOL/L (ref 22–29)
COHGB MFR BLD: 1 % (ref 0–5)
COLOR UR: ABNORMAL
CREAT SERPL-MCNC: 0.62 MG/DL (ref 0.76–1.27)
DEPRECATED RDW RBC AUTO: 50.8 FL (ref 37–54)
EGFRCR SERPLBLD CKD-EPI 2021: 93.1 ML/MIN/1.73
ERYTHROCYTE [DISTWIDTH] IN BLOOD BY AUTOMATED COUNT: 14.9 % (ref 12.3–15.4)
FLUAV SUBTYP SPEC NAA+PROBE: NOT DETECTED
FLUBV RNA ISLT QL NAA+PROBE: NOT DETECTED
GEN 5 1HR TROPONIN T REFLEX: 25 NG/L
GLOBULIN UR ELPH-MCNC: 4.1 GM/DL
GLUCOSE SERPL-MCNC: 117 MG/DL (ref 65–99)
GLUCOSE UR STRIP-MCNC: NEGATIVE MG/DL
HADV DNA SPEC NAA+PROBE: NOT DETECTED
HCO3 BLDA-SCNC: 28.9 MMOL/L (ref 20–26)
HCOV 229E RNA SPEC QL NAA+PROBE: NOT DETECTED
HCOV HKU1 RNA SPEC QL NAA+PROBE: NOT DETECTED
HCOV NL63 RNA SPEC QL NAA+PROBE: NOT DETECTED
HCOV OC43 RNA SPEC QL NAA+PROBE: NOT DETECTED
HCT VFR BLD AUTO: 35.3 % (ref 37.5–51)
HCT VFR BLD CALC: 33.9 % (ref 38–51)
HGB BLD-MCNC: 11.4 G/DL (ref 13–17.7)
HGB BLDA-MCNC: 11.1 G/DL (ref 14–18)
HGB UR QL STRIP.AUTO: ABNORMAL
HMPV RNA NPH QL NAA+NON-PROBE: NOT DETECTED
HPIV1 RNA ISLT QL NAA+PROBE: NOT DETECTED
HPIV2 RNA SPEC QL NAA+PROBE: NOT DETECTED
HPIV3 RNA NPH QL NAA+PROBE: NOT DETECTED
HPIV4 P GENE NPH QL NAA+PROBE: NOT DETECTED
HYALINE CASTS UR QL AUTO: ABNORMAL /LPF
KETONES UR QL STRIP: ABNORMAL
LEUKOCYTE ESTERASE UR QL STRIP.AUTO: ABNORMAL
Lab: ABNORMAL
Lab: ABNORMAL
M PNEUMO IGG SER IA-ACNC: NOT DETECTED
MCH RBC QN AUTO: 29.9 PG (ref 26.6–33)
MCHC RBC AUTO-ENTMCNC: 32.3 G/DL (ref 31.5–35.7)
MCV RBC AUTO: 92.7 FL (ref 79–97)
METHGB BLD QL: 0.6 % (ref 0–3)
MODALITY: ABNORMAL
MUCOUS THREADS URNS QL MICRO: ABNORMAL /HPF
NITRITE UR QL STRIP: NEGATIVE
NOTIFIED BY: ABNORMAL
NOTIFIED WHO: ABNORMAL
OXYHGB MFR BLDV: 88.5 % (ref 94–99)
PCO2 BLDA: 42.5 MM HG (ref 35–45)
PCO2 TEMP ADJ BLD: 42.5 MM HG (ref 35–45)
PH BLDA: 7.44 PH UNITS (ref 7.35–7.45)
PH UR STRIP.AUTO: 6 [PH] (ref 5–8)
PH, TEMP CORRECTED: 7.44 PH UNITS (ref 7.35–7.45)
PLATELET # BLD AUTO: 220 10*3/MM3 (ref 140–450)
PMV BLD AUTO: 10.2 FL (ref 6–12)
PO2 BLDA: 53 MM HG (ref 83–108)
PO2 TEMP ADJ BLD: 53 MM HG (ref 83–108)
POTASSIUM BLDA-SCNC: 3.4 MMOL/L (ref 3.5–5.2)
POTASSIUM SERPL-SCNC: 3.7 MMOL/L (ref 3.5–5.2)
PROT SERPL-MCNC: 7.3 G/DL (ref 6–8.5)
PROT UR QL STRIP: ABNORMAL
QT INTERVAL: 354 MS
QTC INTERVAL: 440 MS
RBC # BLD AUTO: 3.81 10*6/MM3 (ref 4.14–5.8)
RBC # UR STRIP: ABNORMAL /HPF
REF LAB TEST METHOD: ABNORMAL
RHINOVIRUS RNA SPEC NAA+PROBE: NOT DETECTED
RSV RNA NPH QL NAA+NON-PROBE: NOT DETECTED
SAO2 % BLDCOA: 89.9 % (ref 94–99)
SARS-COV-2 RNA RESP QL NAA+PROBE: NOT DETECTED
SODIUM BLDA-SCNC: 135 MMOL/L (ref 136–145)
SODIUM SERPL-SCNC: 134 MMOL/L (ref 136–145)
SP GR UR STRIP: >=1.03 (ref 1–1.03)
SQUAMOUS #/AREA URNS HPF: ABNORMAL /HPF
TROPONIN T % DELTA: 4
TROPONIN T NUMERIC DELTA: 1 NG/L
TSH SERPL DL<=0.05 MIU/L-ACNC: 0.31 UIU/ML (ref 0.27–4.2)
UROBILINOGEN UR QL STRIP: ABNORMAL
VENTILATOR MODE: ABNORMAL
VIT B12 BLD-MCNC: 279 PG/ML (ref 211–946)
WBC # UR STRIP: ABNORMAL /HPF
WBC NRBC COR # BLD AUTO: 14.73 10*3/MM3 (ref 3.4–10.8)
YEAST URNS QL MICRO: ABNORMAL /HPF

## 2025-01-23 PROCEDURE — 87641 MR-STAPH DNA AMP PROBE: CPT | Performed by: FAMILY MEDICINE

## 2025-01-23 PROCEDURE — 87086 URINE CULTURE/COLONY COUNT: CPT | Performed by: NURSE PRACTITIONER

## 2025-01-23 PROCEDURE — 36600 WITHDRAWAL OF ARTERIAL BLOOD: CPT

## 2025-01-23 PROCEDURE — 83050 HGB METHEMOGLOBIN QUAN: CPT

## 2025-01-23 PROCEDURE — 36415 COLL VENOUS BLD VENIPUNCTURE: CPT

## 2025-01-23 PROCEDURE — 82805 BLOOD GASES W/O2 SATURATION: CPT

## 2025-01-23 PROCEDURE — 71275 CT ANGIOGRAPHY CHEST: CPT

## 2025-01-23 PROCEDURE — 84443 ASSAY THYROID STIM HORMONE: CPT

## 2025-01-23 PROCEDURE — 81001 URINALYSIS AUTO W/SCOPE: CPT | Performed by: NURSE PRACTITIONER

## 2025-01-23 PROCEDURE — 25010000002 PIPERACILLIN SOD-TAZOBACTAM PER 1 G

## 2025-01-23 PROCEDURE — 97116 GAIT TRAINING THERAPY: CPT

## 2025-01-23 PROCEDURE — 82607 VITAMIN B-12: CPT

## 2025-01-23 PROCEDURE — 97162 PT EVAL MOD COMPLEX 30 MIN: CPT | Performed by: PHYSICAL THERAPIST

## 2025-01-23 PROCEDURE — 74177 CT ABD & PELVIS W/CONTRAST: CPT

## 2025-01-23 PROCEDURE — 82375 ASSAY CARBOXYHB QUANT: CPT

## 2025-01-23 PROCEDURE — 25510000001 IOPAMIDOL PER 1 ML: Performed by: NURSE PRACTITIONER

## 2025-01-23 PROCEDURE — 25010000002 PIPERACILLIN SOD-TAZOBACTAM PER 1 G: Performed by: NURSE PRACTITIONER

## 2025-01-23 PROCEDURE — 94799 UNLISTED PULMONARY SVC/PX: CPT

## 2025-01-23 PROCEDURE — 85027 COMPLETE CBC AUTOMATED: CPT

## 2025-01-23 PROCEDURE — 97110 THERAPEUTIC EXERCISES: CPT

## 2025-01-23 PROCEDURE — 80053 COMPREHEN METABOLIC PANEL: CPT

## 2025-01-23 PROCEDURE — 25010000002 ENOXAPARIN PER 10 MG

## 2025-01-23 PROCEDURE — 25010000002 ONDANSETRON PER 1 MG: Performed by: NURSE PRACTITIONER

## 2025-01-23 PROCEDURE — 97166 OT EVAL MOD COMPLEX 45 MIN: CPT

## 2025-01-23 RX ORDER — DULOXETIN HYDROCHLORIDE 20 MG/1
20 CAPSULE, DELAYED RELEASE ORAL DAILY
Status: DISCONTINUED | OUTPATIENT
Start: 2025-01-23 | End: 2025-01-25 | Stop reason: HOSPADM

## 2025-01-23 RX ORDER — ONDANSETRON 2 MG/ML
4 INJECTION INTRAMUSCULAR; INTRAVENOUS EVERY 6 HOURS PRN
Status: DISCONTINUED | OUTPATIENT
Start: 2025-01-23 | End: 2025-01-25 | Stop reason: HOSPADM

## 2025-01-23 RX ORDER — IOPAMIDOL 755 MG/ML
100 INJECTION, SOLUTION INTRAVASCULAR
Status: COMPLETED | OUTPATIENT
Start: 2025-01-23 | End: 2025-01-23

## 2025-01-23 RX ORDER — SODIUM CHLORIDE 0.9 % (FLUSH) 0.9 %
10 SYRINGE (ML) INJECTION AS NEEDED
Status: DISCONTINUED | OUTPATIENT
Start: 2025-01-23 | End: 2025-01-25 | Stop reason: HOSPADM

## 2025-01-23 RX ORDER — SODIUM CHLORIDE 9 MG/ML
40 INJECTION, SOLUTION INTRAVENOUS AS NEEDED
Status: DISCONTINUED | OUTPATIENT
Start: 2025-01-23 | End: 2025-01-25 | Stop reason: HOSPADM

## 2025-01-23 RX ORDER — NITROGLYCERIN 0.4 MG/1
0.4 TABLET SUBLINGUAL
Status: DISCONTINUED | OUTPATIENT
Start: 2025-01-23 | End: 2025-01-25 | Stop reason: HOSPADM

## 2025-01-23 RX ORDER — OXYCODONE HYDROCHLORIDE 5 MG/1
5 TABLET ORAL EVERY 6 HOURS PRN
Status: DISCONTINUED | OUTPATIENT
Start: 2025-01-23 | End: 2025-01-25 | Stop reason: HOSPADM

## 2025-01-23 RX ORDER — IPRATROPIUM BROMIDE AND ALBUTEROL SULFATE 2.5; .5 MG/3ML; MG/3ML
3 SOLUTION RESPIRATORY (INHALATION) EVERY 6 HOURS PRN
Status: DISCONTINUED | OUTPATIENT
Start: 2025-01-23 | End: 2025-01-25 | Stop reason: HOSPADM

## 2025-01-23 RX ORDER — LOSARTAN POTASSIUM 25 MG/1
25 TABLET ORAL
Status: DISCONTINUED | OUTPATIENT
Start: 2025-01-23 | End: 2025-01-25 | Stop reason: HOSPADM

## 2025-01-23 RX ORDER — ACETAMINOPHEN 160 MG/5ML
650 SOLUTION ORAL EVERY 4 HOURS PRN
Status: DISCONTINUED | OUTPATIENT
Start: 2025-01-23 | End: 2025-01-25 | Stop reason: HOSPADM

## 2025-01-23 RX ORDER — ACETAMINOPHEN 325 MG/1
650 TABLET ORAL EVERY 4 HOURS PRN
Status: DISCONTINUED | OUTPATIENT
Start: 2025-01-23 | End: 2025-01-25 | Stop reason: HOSPADM

## 2025-01-23 RX ORDER — OLMESARTAN MEDOXOMIL 40 MG/1
40 TABLET ORAL DAILY
COMMUNITY

## 2025-01-23 RX ORDER — ONDANSETRON 2 MG/ML
4 INJECTION INTRAMUSCULAR; INTRAVENOUS ONCE
Status: COMPLETED | OUTPATIENT
Start: 2025-01-23 | End: 2025-01-23

## 2025-01-23 RX ORDER — ONDANSETRON 4 MG/1
4 TABLET, ORALLY DISINTEGRATING ORAL EVERY 6 HOURS PRN
Status: DISCONTINUED | OUTPATIENT
Start: 2025-01-23 | End: 2025-01-25 | Stop reason: HOSPADM

## 2025-01-23 RX ORDER — ACETAMINOPHEN 650 MG/1
650 SUPPOSITORY RECTAL EVERY 4 HOURS PRN
Status: DISCONTINUED | OUTPATIENT
Start: 2025-01-23 | End: 2025-01-25 | Stop reason: HOSPADM

## 2025-01-23 RX ORDER — SODIUM CHLORIDE 0.9 % (FLUSH) 0.9 %
10 SYRINGE (ML) INJECTION EVERY 12 HOURS SCHEDULED
Status: DISCONTINUED | OUTPATIENT
Start: 2025-01-23 | End: 2025-01-25 | Stop reason: HOSPADM

## 2025-01-23 RX ORDER — ENOXAPARIN SODIUM 100 MG/ML
40 INJECTION SUBCUTANEOUS DAILY
Status: DISCONTINUED | OUTPATIENT
Start: 2025-01-23 | End: 2025-01-25 | Stop reason: HOSPADM

## 2025-01-23 RX ADMIN — PIPERACILLIN AND TAZOBACTAM 4.5 G: 4; .5 INJECTION, POWDER, FOR SOLUTION INTRAVENOUS at 21:20

## 2025-01-23 RX ADMIN — PIPERACILLIN AND TAZOBACTAM 4.5 G: 4; .5 INJECTION, POWDER, FOR SOLUTION INTRAVENOUS at 00:30

## 2025-01-23 RX ADMIN — PIPERACILLIN AND TAZOBACTAM 4.5 G: 4; .5 INJECTION, POWDER, FOR SOLUTION INTRAVENOUS at 06:11

## 2025-01-23 RX ADMIN — ONDANSETRON 4 MG: 2 INJECTION INTRAMUSCULAR; INTRAVENOUS at 01:16

## 2025-01-23 RX ADMIN — DOXYCYCLINE 100 MG: 100 INJECTION, POWDER, LYOPHILIZED, FOR SOLUTION INTRAVENOUS at 16:14

## 2025-01-23 RX ADMIN — Medication 10 ML: at 07:07

## 2025-01-23 RX ADMIN — ENOXAPARIN SODIUM 40 MG: 100 INJECTION SUBCUTANEOUS at 09:00

## 2025-01-23 RX ADMIN — IOPAMIDOL 100 ML: 755 INJECTION, SOLUTION INTRAVENOUS at 00:22

## 2025-01-23 RX ADMIN — Medication 10 ML: at 21:22

## 2025-01-23 RX ADMIN — Medication 10 ML: at 01:16

## 2025-01-23 RX ADMIN — PIPERACILLIN AND TAZOBACTAM 4.5 G: 4; .5 INJECTION, POWDER, FOR SOLUTION INTRAVENOUS at 13:58

## 2025-01-23 RX ADMIN — DULOXETINE 20 MG: 20 CAPSULE, DELAYED RELEASE ORAL at 09:00

## 2025-01-23 RX ADMIN — LOSARTAN POTASSIUM 25 MG: 50 TABLET, FILM COATED ORAL at 09:00

## 2025-01-23 RX ADMIN — Medication 10 ML: at 09:00

## 2025-01-23 RX ADMIN — DOXYCYCLINE 100 MG: 100 INJECTION, POWDER, LYOPHILIZED, FOR SOLUTION INTRAVENOUS at 03:34

## 2025-01-23 NOTE — THERAPY TREATMENT NOTE
Acute Care - Physical Therapy Treatment Note  [facility]     Patient Name: Juan Luis Collazo  : 1940  MRN: [id number]  Today's Date: 2025      Visit Dx:     ICD-10-CM ICD-9-CM   1. Pneumonia of right lung due to infectious organism, unspecified part of lung  J18.9 483.8   2. Acute UTI  N39.0 599.0   3. Gait instability [R26.81]  R26.81 781.2     Patient Active Problem List   Diagnosis    Closed compression fracture of first lumbar vertebra    Current non-smoker    Lumbar compression fracture, closed, initial encounter    Lumbar compression fracture    S/P kyphoplasty    Numbness and tingling of right leg    Compression fracture of fourth lumbar vertebra with delayed healing    Benign hypertension    Flatback syndrome of lumbar region    Hammer toe of right foot    High cholesterol    Impaired fasting glucose    Malignant neoplasm of prostate    Osteopenia    Acute exacerbation of chronic obstructive airways disease    Back pain    Compression fracture of thoracic vertebra with routine healing    Normocytic anemia    Actinic keratosis    Hyperplastic colonic polyp    FH: colon cancer in first degree relative <60 years old    History of adenomatous polyp of colon    Decreased bone mass    Sebaceous cyst    Degenerative disc disease, cervical    Degenerative disc disease, lumbar    Degenerative disc disease, thoracic    Compression fracture of T12 vertebra with delayed healing    Age-related osteoporosis with current pathological fracture with delayed healing    Gait instability    Pain initiated by coughing    Hypoxia    Pneumonia    Closed fracture of multiple ribs of right side     Past Medical History:   Diagnosis Date    Arthritis     Back pain     Cancer     CHEST, SKIN CANCER    GERD (gastroesophageal reflux disease)     History of colon polyps     History of prostate cancer     Hypertension     Low back pain     Macular degeneration     Osteopenia      Past Surgical History:   Procedure Laterality  Date    APPENDECTOMY      CATARACT EXTRACTION, BILATERAL      COLONOSCOPY  09/17/2013    Dr. José-One 2-3mm polyp at 20cm; Otherwise normal; Repeat 3 years    COLONOSCOPY N/A 12/07/2022    Procedure: COLONOSCOPY WITH ANESTHESIA;  Surgeon: Bri Alexis MD;  Location: [facility] ENDOSCOPY;  Service: Gastroenterology;  Laterality: N/A;  pre: anemia. hx polyps.  post: polyps. diverticulosis.  no PCP    ENDOSCOPY N/A 12/07/2022    Procedure: ESOPHAGOGASTRODUODENOSCOPY WITH ANESTHESIA;  Surgeon: Bri Alexis MD;  Location: [facility] ENDOSCOPY;  Service: Gastroenterology;  Laterality: N/A;  pre: anemia  post: hiatal hernia. esophagitis.gastric erosions.  no PCP    FOOT SURGERY Right     KYPHOPLASTY Bilateral 07/17/2018    Procedure: L3 KYPHOPLASTY 1-2 LEVELS;  Surgeon: Vega Pandey MD;  Location:  [facility] OR;  Service: Neurosurgery    KYPHOPLASTY Bilateral 09/11/2018    Procedure: L4 KYPHOPLASTY WITH BIOPSY;  Surgeon: Vega Pandey MD;  Location:  [facility] OR;  Service: Neurosurgery    KYPHOPLASTY WITH BIOPSY N/A 01/25/2022    Procedure: KYPHOPLASTY WITH BIOPSY, THORACIC 6 and LUMBAR 5;  Surgeon: Nick Martínez MD;  Location:  [facility] OR;  Service: Neurosurgery;  Laterality: N/A;    KYPHOPLASTY WITH BIOPSY N/A 8/18/2023    Procedure: Thoracic 12, KYPHOPLASTY WITH BIOPSY;  Surgeon: Nick Martínez MD;  Location:  [facility] OR;  Service: Neurosurgery;  Laterality: N/A;    PROSTATECTOMY      TONSILLECTOMY      TOTAL SHOULDER REPLACEMENT Bilateral      PT Assessment (Last 12 Hours)       PT Evaluation and Treatment       Row Name 01/23/25 0808          Physical Therapy Time and Intention    Subjective Information complains of;fatigue  -PILO     Document Type therapy note (daily note)  -PILO     Mode of Treatment physical therapy  -PILO       Row Name 01/23/25 7292          General Information    Existing Precautions/Restrictions fall;oxygen therapy device and L/min  -PILO       Row Name 01/23/25 7170           Pain    Pretreatment Pain Rating 4/10  -PILO     Posttreatment Pain Rating 4/10  -PILO     Pain Location --  ribs  -PILO     Pain Management Interventions exercise or physical activity utilized  -PILO     Response to Pain Interventions activity participation with tolerable pain  -PILO       Row Name 01/23/25 1354          Bed Mobility    Supine-Sit [place] (Bed Mobility) verbal cues;contact guard  -PILO     Sit-Supine [place] (Bed Mobility) verbal cues;contact guard  -PILO     Assistive Device (Bed Mobility) bed rails;head of bed elevated  -PILO       Row Name 01/23/25 1354          Transfers    Transfers sit-stand transfer;stand-sit transfer  -PILO     Comment, (Transfers) cues for hand placement  -PILO       Row Name 01/23/25 1354          Sit-Stand Transfer    Sit-Stand [place] (Transfers) verbal cues;contact guard  -PILO     Assistive Device (Sit-Stand Transfers) walker, front-wheeled  -PILO       Row Name 01/23/25 1354          Stand-Sit Transfer    Stand-Sit [place] (Transfers) verbal cues;contact guard  -PILO     Assistive Device (Stand-Sit Transfers) walker, front-wheeled  -PILO       Row Name 01/23/25 1355          Gait/Stairs (Locomotion)    [place] Level (Gait) contact guard;verbal cues  -PILO     Assistive Device (Gait) walker, front-wheeled  -PILO     Distance in Feet (Gait) 80  -PILO     Deviations/Abnormal Patterns (Gait) gait speed decreased  -PILO     Bilateral Gait Deviations forward flexed posture  -PILO       Row Name 01/23/25 1354          Motor Skills    Comments, Therapeutic Exercise sitting AROM BLE X 20  -IPLO     Additional Documentation Comments, Therapeutic Exercise (Row)  -       Row Name 01/23/25 1354          Positioning and Restraints    Pre-Treatment Position in bed  -PILO     Post Treatment Position bed  -PILO     In Bed fowlers;call light within reach;encouraged to call for assist;side rails up x2  -PILO               User Key  (r) = Recorded By, (t) = Taken By, (c) = Cosigned By      Initials  Name Provider Type    Levar Vinson PTA Physical Therapist Assistant                    Physical Therapy Education       Title: PT OT SLP Therapies (In Progress)       Topic: Physical Therapy (In Progress)       Point: Mobility training (In Progress)       Learning Progress Summary            Patient Acceptance, E, NR by SB at 1/23/2025 1042    Comment: pt edu on POC, benefits of act and d/c plans                      Point: Home exercise program (Not Started)       Learner Progress:  Not documented in this visit.              Point: Body mechanics (Not Started)       Learner Progress:  Not documented in this visit.              Point: Precautions (In Progress)       Learning Progress Summary            Patient Acceptance, E, NR by SB at 1/23/2025 1042    Comment: pt edu on POC, benefits of act and d/c plans                                      User Key       Initials Effective Dates Name Provider Type Discipline     07/11/23 -  Racquel Choi, PT DPT Physical Therapist PT                  PT Recommendation and Plan             Time Calculation:    PT Charges       Row Name 01/23/25 1354             Time Calculation    Start Time 1354  -PIOL      Stop Time 1420  -PILO      Time Calculation (min) 26 min  -PILO      PT Received On 01/23/25  -PILO         Time Calculation- PT    Total Timed Code Minutes- PT 26 minute(s)  -PILO         Timed Charges    91913 - PT Therapeutic Exercise Minutes 10  -PILO      38171 - Gait Training Minutes  16  -PILO         Total Minutes    Timed Charges Total Minutes 26  -PILO       Total Minutes 26  -PILO                User Key  (r) = Recorded By, (t) = Taken By, (c) = Cosigned By      Initials Name Provider Type    Levar Vinson PTA Physical Therapist Assistant                  Therapy Charges for Today       Code Description Service Date Service Provider Modifiers Qty    44088104994 HC GAIT TRAINING EA 15 MIN 1/23/2025 Levar Bullock PTA GP 1    02760738212 HC PT THER PROC EA 15  MIN 1/23/2025 Levar Bullock, PTA GP 1            PT G-Codes  Outcome Measure Options: AM-PAC 6 Clicks Daily Activity (OT)  AM-PAC 6 Clicks Score (PT): 22  AM-PAC 6 Clicks Score (OT): 21    Levar Bullock PTA  1/23/2025

## 2025-01-23 NOTE — PLAN OF CARE
Goal Outcome Evaluation:  Plan of Care Reviewed With: patient        Progress: no change  Outcome Evaluation: PT eval completed. Pt alert and oriented x4 and very [place]. Pt reports independence at home with use of rollator. Pt reports SOA and is on 2.5L O2 with sats in 90s. Pt performs sup to sit with SBA and demos generalized weakness and inc in SOA. Pt performs sit to stand t/f with CGA and takes a few steps at EOB with CGA-min A due to decreased balance and post lean. Pt returns to supine with SBA. Pt will benefit from skilled PT to improve fxl mob, balance and endurance. Rec d/c SNF.    Anticipated Discharge Disposition (PT): skilled nursing facility

## 2025-01-23 NOTE — PAYOR COMM NOTE
"1/23/25 [facility] [phone]  [phone]    ER ADMIT TO INPATIENT ON 1/22/25. FAXING FOR INPATIENT REVIEW.          Rowan Stratton \"ADAN\" (84 y.o. Male)       Date of Birth   1940    Social Security Number       Address   [address] [place] [address]    Home Phone   [phone]    MRN   [id number]       Taoist   Jew    Marital Status                               Admission Date   1/22/25    Admission Type   Emergency    Admitting Provider   Art Lizarraga MD    Attending Provider   Art Lizarraga MD    Department, Room/Bed   [facility] EMERGENCY DEPARTMENT, 42/42       Discharge Date       Discharge Disposition       Discharge Destination                                 Attending Provider: Art Lizarraga MD    Allergies: No Known Allergies    Isolation: None   Infection: None   Code Status: CPR    Ht: 165.1 cm (65\")   Wt: 79.3 kg (174 lb 14.4 oz)    Admission Cmt: None   Principal Problem: Pneumonia [J18.9]                   Active Insurance as of 1/22/2025       Primary Coverage       Payor Plan Insurance Group Employer/Plan Group    AETNA MEDICARE REPLACEMENT AETNA MEDICARE REPLACEMENT [id number]       Payor Plan Address Payor Plan Phone Number Payor Plan Fax Number Effective Dates    [address] [phone]  1/1/2023 - None Entered    [facility] [address]         Subscriber Name Subscriber Birth Date Member ID       ROWAN STRATTON 1940 [id number]                     Emergency Contacts        (Rel.) Home Phone Work Phone Mobile Phone    Jyoti Stratton (Spouse) [phone] -- [phone]             [facility] Encounter Date/Time: 1/22/2025 2154   Hospital Account: [id number]    MRN: [id number]   Patient:  Rowan Stratton   Contact Serial #: [id number]   SSN:          ENCOUNTER             Patient Class: Inpatient   Unit: [facility] ED   Hospital Service: Medicine     Bed: 42/42 "   Admitting Provider: Art Lizarraga MD   Referring Physician: Ciaran Seo   Attending Provider: Art Lizarraga MD   Adm Diagnosis: Pneumonia [J18.9]               PATIENT             Name: Rowan Stratton : 1940 (84 yrs)   Address: [place] [address] Sex: Male   City: [place] [id number]   County: [place]   Marital Status:  Ethnicity: NOT                                                                         Race: WHITE   Primary Care Provider: Santiago Aaron MD Patients Phone: Home Phone: [phone]     Mobile Phone: [phone]     EMERGENCY CONTACT   Contact Name Legal Guardian? Relationship to Patient Home Phone Work Phone Mobile Phone   1. Jyoti Stratton  2. *No Contact Specified* No    Spouse    [phone]      GUARANTOR             Guarantor: Rowan Stratton     : 1940   Address: [place] [address] Sex: [place] [address]     Relation to Patient: Self       Home Phone: [phone]   Guarantor ID: [id number]       Work Phone:     GUARANTOR EMPLOYER   Employer:           Status: SELF EMPL*   COVERAGE          PRIMARY INSURANCE   Payor: AETNA MEDICARE REPLACEMENT Plan: AETNA MEDICARE REPLACEMENT   Group Number: [id number] Insurance Type: INDEMNITY   Subscriber Name: ROWAN STRATTON Subscriber : 1940   Subscriber ID: [id number] Coverage Address: [facility] [address]  [place] [address]   Pat. Rel. to Subscriber: Self Coverage Phone: [phone]   SECONDARY INSURANCE   Payor: N/A Plan: N/A   Group Number:   Insurance Type:     Subscriber Name:   Subscriber :     Subscriber ID:   Coverage Address:     Pat. Rel. to Subscriber:   Coverage Phone:        Contact Serial # ([id number])         2025    Chart ID (No chart ID available)              [facility] Encounter Date/Time: 2025   Hospital Account: [id number]    MRN: [id number]   Patient:  Rowan Stratton   Contact Serial #: [id number]   SSN:           ENCOUNTER             Patient Class: Inpatient   Unit: [facility] ED   Hospital Service: Medicine     Bed: 42/42   Admitting Provider: Art Lizarraga MD   Referring Physician: Ciaran Seo   Attending Provider: Art Lizarraga MD   Adm Diagnosis: Pneumonia [J18.9]               PATIENT             Name: Rowan Stratton : 1940 (84 yrs)   Address: [place] [address] Sex: Male   City: [place] [address]   County: [place]   Marital Status:  Ethnicity: NOT                                                                         Race: WHITE   Primary Care Provider: Santiago Aaron MD Patients Phone: Home Phone: [phone]     Mobile Phone: [phone]     EMERGENCY CONTACT   Contact Name Legal Guardian? Relationship to Patient Home Phone Work Phone Mobile Phone   1. Jyoti Stratton  2. *No Contact Specified* No    Spouse    [phone]      GUARANTOR             Guarantor: Rowan Stratton     : 1940   Address: [place] [address] Sex: [place] [address]     Relation to Patient: Self       Home Phone: [phone]   Guarantor ID: [id number]       Work Phone:     GUARANTOR EMPLOYER   Employer:           Status: SELF EMPL*   COVERAGE          PRIMARY INSURANCE   Payor: AETNA MEDICARE REPLACEMENT Plan: AETNA MEDICARE REPLACEMENT   Group Number: [id number] Insurance Type: INDEMNITY   Subscriber Name: RWOAN STRATTON Subscriber : 1940   Subscriber ID: [id number] Coverage Address: [facility] [address]  [place] [address]   Pat. Rel. to Subscriber: Self Coverage Phone: [phone]   SECONDARY INSURANCE   Payor: N/A Plan: N/A   Group Number:   Insurance Type:     Subscriber Name:   Subscriber :     Subscriber ID:   Coverage Address:     Pat. Rel. to Subscriber:   Coverage Phone:        Contact Serial # ([id number])         2025    Chart ID (No chart ID available)             Paris Ball APRN   Nurse Practitioner  Emergency Medicine     ED Provider Notes      Attested     Date of Service: 01/22/25 2245  Creation Time: 01/22/25 2302     Attested           Attestation signed by Ray Kelley MD at 01/23/25 0226           SUPERVISE: For this patient encounter, I reviewed the APC's documentation, treatment plan, and medical decision making.  Ray Kelley MD 1/23/2025 02:26 CST                                   Expand All Collapse All       Subjective  History of Present Illness  Patient is a 84-year-old male presents the emergency department with nausea, vomiting, fever, shortness of breath.  He has right-sided chest wall pain from fractured ribs which he sustained from a fall in November which is chronic.  He states he started having fever and vomiting and some episodes of confusion earlier today.  He states his pain in his right side has been worse as well.  He has been more short of breath today as well.  He has had cough as well.  No diarrhea.  No headache.     History provided by:  Patient   used: No          Review of Systems   Constitutional:  Positive for fever.   HENT:  Positive for congestion.    Eyes: Negative.    Respiratory:  Positive for cough and shortness of breath.    Cardiovascular: Negative.    Gastrointestinal:  Positive for nausea and vomiting.   Endocrine: Negative.    Genitourinary: Negative.    Musculoskeletal: Negative.    Skin: Negative.    Allergic/Immunologic: Negative.    Neurological: Negative.    Hematological: Negative.    Psychiatric/Behavioral: Negative.     All other systems reviewed and are negative.        Medical History[]Expand by Default        Past Medical History:   Diagnosis Date    Arthritis      Back pain      Cancer       CHEST, SKIN CANCER    GERD (gastroesophageal reflux disease)      History of colon polyps      History of prostate cancer      Hypertension      Low back pain      Macular degeneration      Osteopenia                                 Review of Systems   Constitutional:  Positive for  fever.   HENT:  Positive for congestion.    Eyes: Negative.    Respiratory:  Positive for cough and shortness of breath.    Cardiovascular: Negative.    Gastrointestinal:  Positive for nausea and vomiting.   Endocrine: Negative.    Genitourinary: Negative.    Musculoskeletal: Negative.    Skin: Negative.    Allergic/Immunologic: Negative.    Neurological: Negative.    Hematological: Negative.    Psychiatric/Behavioral: Negative.     All other systems reviewed and are negative.              Attested           Attestation signed by Ray Kelley MD at 01/23/25 0226           SUPERVISE: For this patient encounter, I reviewed the APC's documentation, treatment plan, and medical decision making.  Ray Kelley MD 1/23/2025 02:26 CST                                   Expand All Collapse All       Subjective  History of Present Illness  Patient is a 84-year-old male presents the emergency department with nausea, vomiting, fever, shortness of breath.  He has right-sided chest wall pain from fractured ribs which he sustained from a fall in November which is chronic.  He states he started having fever and vomiting and some episodes of confusion earlier today.  He states his pain in his right side has been worse as well.  He has been more short of breath today as well.  He has had cough as well.  No diarrhea.  No headache.     History provided by:  Patient   used: No          Review of Systems   Constitutional:  Positive for fever.   HENT:  Positive for congestion.    Eyes: Negative.    Respiratory:  Positive for cough and shortness of breath.    Cardiovascular: Negative.    Gastrointestinal:  Positive for nausea and vomiting.   Endocrine: Negative.    Genitourinary: Negative.    Musculoskeletal: Negative.    Skin: Negative.    Allergic/Immunologic: Negative.    Neurological: Negative.    Hematological: Negative.    Psychiatric/Behavioral: Negative.     All other systems reviewed and are negative.         Medical History[]Expand by Default        Past Medical History:   Diagnosis Date    Arthritis      Back pain      Cancer       CHEST, SKIN CANCER    GERD (gastroesophageal reflux disease)      History of colon polyps      History of prostate cancer      Hypertension      Low back pain      Macular degeneration      Osteopenia              Allergies   No Known Allergies        Surgical History         Past Surgical History:   Procedure Laterality Date    APPENDECTOMY        CATARACT EXTRACTION, BILATERAL        COLONOSCOPY   09/17/2013     Dr. José-One 2-3mm polyp at 20cm; Otherwise normal; Repeat 3 years    COLONOSCOPY N/A 12/07/2022     Procedure: COLONOSCOPY WITH ANESTHESIA;  Surgeon: Bri Alexis MD;  Location: [facility] ENDOSCOPY;  Service: Gastroenterology;  Laterality: N/A;  pre: anemia. hx polyps.  post: polyps. diverticulosis.  no PCP    ENDOSCOPY N/A 12/07/2022     Procedure: ESOPHAGOGASTRODUODENOSCOPY WITH ANESTHESIA;  Surgeon: Bri Alexis MD;  Location: [facility] ENDOSCOPY;  Service: Gastroenterology;  Laterality: N/A;  pre: anemia  post: hiatal hernia. esophagitis.gastric erosions.  no PCP    FOOT SURGERY Right      KYPHOPLASTY Bilateral 07/17/2018     Procedure: L3 KYPHOPLASTY 1-2 LEVELS;  Surgeon: Vega Pandey MD;  Location: [facility] OR;  Service: Neurosurgery    KYPHOPLASTY Bilateral 09/11/2018     Procedure: L4 KYPHOPLASTY WITH BIOPSY;  Surgeon: Vega Pandey MD;  Location: [facility] OR;  Service: Neurosurgery    KYPHOPLASTY WITH BIOPSY N/A 01/25/2022     Procedure: KYPHOPLASTY WITH BIOPSY, THORACIC 6 and LUMBAR 5;  Surgeon: Nick Martínez MD;  Location: [facility] OR;  Service: Neurosurgery;  Laterality: N/A;    KYPHOPLASTY WITH BIOPSY N/A 8/18/2023     Procedure: Thoracic 12, KYPHOPLASTY WITH BIOPSY;  Surgeon: Nick Martínez MD;  Location: [facility] OR;  Service: Neurosurgery;  Laterality: N/A;    PROSTATECTOMY        TONSILLECTOMY        TOTAL SHOULDER REPLACEMENT  Bilateral                    Family History   Problem Relation Age of Onset    No Known Problems Mother      Prostate cancer Father           Social History   Social History            Socioeconomic History    Marital status:    Tobacco Use    Smoking status: Former       Current packs/day: 0.00       Average packs/day: 1 pack/day for 30.0 years (30.0 ttl pk-yrs)       Types: Cigarettes       Start date: 9/10/1961       Quit date: 9/10/1991       Years since quittin.3    Smokeless tobacco: Never    Tobacco comments:       1991   Vaping Use    Vaping status: Never Used   Substance and Sexual Activity    Alcohol use: Not Currently    Drug use: No    Sexual activity: Not Currently       Partners: Female       Birth control/protection: Post-menopausal                    Prior to Admission medications    Medication Sig Start Date End Date Taking? Authorizing Provider   albuterol sulfate  (90 Base) MCG/ACT inhaler Inhale 2 puffs Every 6 (Six) Hours As Needed for Wheezing or Shortness of Air. 5/15/24     Aleida Valle APRN   cetirizine (zyrTEC) 10 MG tablet Take 1 tablet by mouth Daily.       ProviderRosa Elena MD   denosumab (PROLIA) 60 MG/ML solution prefilled syringe syringe Inject 1 mL under the skin into the appropriate area as directed Every 6 (Six) Months. 5/3/23     Christie Oconnell APRN   DULoxetine (Cymbalta) 20 MG capsule Take 1 capsule by mouth Daily. 24     Santiago Aaron MD   HYDROcodone-acetaminophen (NORCO) 5-325 MG per tablet Take 1 tablet by mouth Every 6 (Six) Hours As Needed for Severe Pain. 1/3/25     Santiago Aaron MD   multivitamins-minerals (PRESERVISION AREDS 2) capsule capsule Take 1 capsule by mouth 2 (Two) Times a Day.       ProviderRosa Elena MD   naloxone (NARCAN) 4 MG/0.1ML nasal spray Call 911. Don't prime. [place] in 1 nostril for overdose. Repeat in 2-3 minutes in other nostril if no or minimal breathing/responsiveness. 25      "Santiago Aaron MD   olmesartan (BENICAR) 20 MG tablet TAKE 1.5 TABLETS BY MOUTH DAILY. 1/20/25     Santiago Aaron MD   olmesartan (BENICAR) 20 MG tablet Take 1 tablet by mouth Daily.       ProviderRosa Elena MD   oxyCODONE (Roxicodone) 5 MG immediate release tablet Take 1 tablet by mouth Every 4 (Four) Hours As Needed for Moderate Pain. 1/9/25     Santiago Aaron MD   traMADol-acetaminophen (ULTRACET) 37.5-325 MG per tablet Take 1 tablet by mouth Every 8 (Eight) Hours As Needed for Moderate Pain. 11/12/24     Santiago Aaron MD         /75   Pulse 100 Comment: sinus  Temp 98.6 °F (37 °C) (Oral)   Resp 23   Ht 165.1 cm (65\")   Wt 71 kg (156 lb 9.6 oz)   SpO2 95%   BMI 26.06 kg/m²         Objective  Physical Exam  Vitals and nursing note reviewed.   Constitutional:       Appearance: He is well-developed.      Comments: Chronically ill appearing. No acute distress   HENT:      Head: Normocephalic and atraumatic.   Eyes:      Conjunctiva/sclera: Conjunctivae normal.      Pupils: Pupils are equal, round, and reactive to light.   Cardiovascular:      Rate and Rhythm: Normal rate and regular rhythm.      Heart sounds: Normal heart sounds.   Pulmonary:      Effort: Pulmonary effort is normal.      Comments: Diminished lung sounds bilateral bases.  Chest:      Chest wall: Tenderness present.      Comments: Right lateral chest wall tenderness.  No crepitus.  Abdominal:      General: Bowel sounds are normal.      Palpations: Abdomen is soft.      Comments: Abdomen is soft, nondistended.  He has some tenderness in the right upper quadrant abdomen right lateral chest wall.  No guarding or rebound.   Musculoskeletal:         General: Normal range of motion.      Cervical back: Normal range of motion and neck supple.   Skin:     General: Skin is warm and dry.   Neurological:      Mental Status: He is alert and oriented to person, place, and time.      Deep Tendon Reflexes: Reflexes are normal and " symmetric.      Comments: forgeful   Psychiatric:         Behavior: Behavior normal.         Thought Content: Thought content normal.         Judgment: Judgment normal.            Procedures              Lab Results (last 24 hours)         Procedure Component Value Units Date/Time     CBC & Differential [[id number]]  (Abnormal) Collected: 01/22/25 2221     Specimen: Blood Updated: 01/22/25 2300     Narrative:       The following orders were created for panel order CBC & Differential.  Procedure                               Abnormality         Status                     ---------                               -----------         ------                     CBC Auto Differential[[id number]]        Abnormal            Final result                  Please view results for these tests on the individual orders.     Comprehensive Metabolic Panel [[id number]]  (Abnormal) Collected: 01/22/25 2221     Specimen: Blood Updated: 01/22/25 2306       Glucose 129 mg/dL         BUN 13 mg/dL         Creatinine 0.55 mg/dL         Sodium 133 mmol/L         Potassium 3.7 mmol/L         Comment: Slight hemolysis detected by analyzer. Result may be falsely elevated.          Chloride 97 mmol/L         CO2 25.0 mmol/L         Calcium 7.9 mg/dL         Total Protein 8.4 g/dL         Albumin 3.4 g/dL         ALT (SGPT) 9 U/L         AST (SGOT) 18 U/L         Comment: Slight hemolysis detected by analyzer. Result may be falsely elevated.          Alkaline Phosphatase 103 U/L         Total Bilirubin 0.9 mg/dL         Globulin 5.0 gm/dL         A/G Ratio 0.7 g/dL         BUN/Creatinine Ratio 23.6       Anion Gap 11.0 mmol/L         eGFR 96.5 mL/min/1.73       Narrative:       GFR Categories in Chronic Kidney Disease (CKD)                         GFR Category          GFR (mL/min/1.73)    Interpretation  G1                       90 or greater              Normal or high (1)  G2                               60-89                Mild decrease  (1)  G3a                   45-59                Mild to moderate decrease  G3b                   30-44                Moderate to severe decrease  G4                    15-29                Severe decrease  G5                    14 or less           Kidney failure                                                 (1)In the absence of evidence of kidney disease, neither GFR category G1 or G2 fulfill the criteria for CKD.     eGFR calculation 2021 CKD-EPI creatinine equation, which does not include race as a factor     BNP [[id number]]  (Abnormal) Collected: 01/22/25 2221     Specimen: Blood Updated: 01/22/25 2258       proBNP 2,971.0 pg/mL       Narrative:       This assay is used as an aid in the diagnosis of individuals suspected of having heart failure. It can be used as an aid in the diagnosis of acute decompensated heart failure (ADHF) in patients presenting with signs and symptoms of ADHF to the emergency department (ED). In addition, NT-proBNP of <300 pg/mL indicates ADHF is not likely.     Age Range         Result Interpretation  NT-proBNP Concentration (pg/mL:        <50             Positive            >450                         Hernandez                           300-450                           Negative               <300     50-75           Positive            >900                  Gray                300-900                  Negative            <300        >75             Positive            >1800                  Gray                300-1800                  Negative            <300     High Sensitivity Troponin T [[id number]]  (Abnormal) Collected: 01/22/25 2221     Specimen: Blood Updated: 01/22/25 2258       HS Troponin T 24 ng/L       Narrative:       High Sensitive Troponin T Reference Range:  <14.0 ng/L- Negative Female for AMI  <22.0 ng/L- Negative Male for AMI  >=14 - Abnormal Female indicating possible myocardial injury.  >=22 - Abnormal Male indicating possible myocardial injury.   Clinicians would  "have to utilize clinical acumen, EKG, Troponin, and serial changes to determine if it is an Acute Myocardial Infarction or myocardial injury due to an underlying chronic condition.           CBC Auto Differential [[id number]]  (Abnormal) Collected: 01/22/25 2221     Specimen: Blood Updated: 01/22/25 2300       WBC 18.40 10*3/mm3         RBC 4.11 10*6/mm3         Hemoglobin 12.4 g/dL         Hematocrit 37.5 %         MCV 91.2 fL         MCH 30.2 pg         MCHC 33.1 g/dL         RDW 14.8 %         RDW-SD 49.5 fl         MPV 10.6 fL         Platelets 245 10*3/mm3         Neutrophil % 79.9 %         Lymphocyte % 7.1 %         Monocyte % 12.2 %         Eosinophil % 0.0 %         Basophil % 0.1 %         Immature Grans % 0.7 %         Neutrophils, Absolute 14.72 10*3/mm3         Lymphocytes, Absolute 1.31 10*3/mm3         Monocytes, Absolute 2.24 10*3/mm3         Eosinophils, Absolute 0.00 10*3/mm3         Basophils, Absolute 0.01 10*3/mm3         Immature Grans, Absolute 0.12 10*3/mm3         nRBC 0.0 /100 WBC       Lactic Acid, Plasma [[id number]]  (Normal) Collected: 01/22/25 2221     Specimen: Blood Updated: 01/22/25 2324       Lactate 2.0 mmol/L       Procalcitonin [[id number]]  (Abnormal) Collected: 01/22/25 2221     Specimen: Blood Updated: 01/22/25 2338       Procalcitonin 0.48 ng/mL       Narrative:       As a Marker for Sepsis (Non-Neonates):     1. <0.5 ng/mL represents a low risk of severe sepsis and/or septic shock.  2. >2 ng/mL represents a high risk of severe sepsis and/or septic shock.     As a Marker for Lower Respiratory Tract Infections that require antibiotic therapy:     PCT on Admission    Antibiotic Therapy       6-12 Hrs later     >0.5                Strongly Recommended  >0.25 - <0.5        Recommended   0.1 - 0.25          Discouraged              Remeasure/reassess PCT  <0.1                Strongly Discouraged     Remeasure/reassess PCT     As 28 day mortality risk marker: \"Change in Procalcitonin " "Result\" (>80% or <=80%) if Day 0 (or Day 1) and Day 4 values are available. Refer to http://www.[facility]-pct-calculator.com     Change in PCT <=80%  A decrease of PCT levels below or equal to 80% defines a positive change in PCT test result representing a higher risk for 28-day all-cause mortality of patients diagnosed with severe sepsis for septic shock.     Change in PCT >80%  A decrease of PCT levels of more than 80% defines a negative change in PCT result representing a lower risk for 28-day all-cause mortality of patients diagnosed with severe sepsis or septic shock.         D-dimer, Quantitative [[id number]]  (Abnormal) Collected: 01/22/25 2221     Specimen: Blood Updated: 01/22/25 2323       D-Dimer, Quantitative 1.02 MCGFEU/mL       Narrative:       According to the assay 's published package insert, a normal (<0.50 MCGFEU/mL) D-dimer result in conjunction with a non-high clinical probability assessment, excludes deep vein thrombosis (DVT) and pulmonary embolism (PE) with high sensitivity.     D-dimer values increase with age and this can make VTE exclusion of an older population difficult. To address this, the American College of Phys[employer], based on best available evidence and recent guidelines, recommends that clinicians use age-adjusted D-dimer thresholds in patients greater than 50 years of age with: a) a low probability of PE who do not meet all Pulmonary Embolism Rule Out Criteria, or b) in those with intermediate probability of PE.   The formula for an age-adjusted D-dimer cut-off is \"age/100\".  For example, a 60 year old patient would have an age-adjusted cut-off of 0.60 MCGFEU/mL and an 80 year old 0.80 MCGFEU/mL.     COVID PRE-OP / PRE-PROCEDURE SCREENING ORDER (NO ISOLATION) - Swab, Nasopharynx [[id number]]  (Normal) Collected: 01/22/25 2222     Specimen: Swab from Nasopharynx Updated: 01/22/25 2245     Narrative:       The following orders were created for panel order COVID PRE-OP / " PRE-PROCEDURE SCREENING ORDER (NO ISOLATION) - Swab, Nasopharynx.  Procedure                               Abnormality         Status                     ---------                               -----------         ------                     COVID-19 and FLU A/B PCR...[[id number]]  Normal              Final result                  Please view results for these tests on the individual orders.     COVID-19 and FLU A/B PCR, 1 HR TAT - Swab, Nasopharynx [[id number]]  (Normal) Collected: 01/22/25 2222     Specimen: Swab from Nasopharynx Updated: 01/22/25 2245       COVID19 Not Detected       Influenza A PCR Not Detected       Influenza B PCR Not Detected     Narrative:       Fact sheet for providers: https://www.fda.gov/media/645614/download     Fact sheet for patients: https://www.fda.gov/media/354015/download     Test performed by PCR.     Respiratory Panel PCR w/COVID-19(SARS-CoV-2) [facility] In-House, NP Swab in UTM/VTM, 2 HR TAT - Swab, Nasopharynx [[id number]]  (Normal) Collected: 01/22/25 2222     Specimen: Swab from Nasopharynx Updated: 01/23/25 0006       ADENOVIRUS, PCR Not Detected       Coronavirus 229E Not Detected       Coronavirus HKU1 Not Detected       Coronavirus NL63 Not Detected       Coronavirus OC43 Not Detected       COVID19 Not Detected       Human Metapneumovirus Not Detected       Human Rhinovirus/Enterovirus Not Detected       Influenza A PCR Not Detected       Influenza B PCR Not Detected       Parainfluenza Virus 1 Not Detected       Parainfluenza Virus 2 Not Detected       Parainfluenza Virus 3 Not Detected       Parainfluenza Virus 4 Not Detected       RSV, PCR Not Detected       Bordetella pertussis pcr Not Detected       Bordetella parapertussis PCR Not Detected       Chlamydophila pneumoniae PCR Not Detected       Mycoplasma pneumo by PCR Not Detected     Narrative:       In the setting of a positive respiratory panel with a viral infection PLUS a negative procalcitonin without  other underlying concern for bacterial infection, consider observing off antibiotics or discontinuation of antibiotics and continue supportive care. If the respiratory panel is positive for atypical bacterial infection (Bordetella pertussis, Chlamydophila pneumoniae, or Mycoplasma pneumoniae), consider antibiotic de-escalation to target atypical bacterial infection.     High Sensitivity Troponin T 1Hr [[id number]]  (Abnormal) Collected: 01/22/25 2334     Specimen: Blood Updated: 01/23/25 0007       HS Troponin T 25 ng/L         Troponin T Numeric Delta 1 ng/L         Troponin T % Delta 4     Narrative:       High Sensitive Troponin T Reference Range:  <14.0 ng/L- Negative Female for AMI  <22.0 ng/L- Negative Male for AMI  >=14 - Abnormal Female indicating possible myocardial injury.  >=22 - Abnormal Male indicating possible myocardial injury.   Clinicians would have to utilize clinical acumen, EKG, Troponin, and serial changes to determine if it is an Acute Myocardial Infarction or myocardial injury due to an underlying chronic condition.           Blood Culture - Blood, Arm, Left [[id number]] Collected: 01/22/25 2334     Specimen: Blood from Arm, Left Updated: 01/22/25 2339     Blood Culture - Blood, Arm, Right [[id number]] Collected: 01/22/25 2336     Specimen: Blood from Arm, Right Updated: 01/22/25 2345     Urinalysis With Culture If Indicated - Urine, Clean Catch [[id number]]  (Abnormal) Collected: 01/23/25 0034     Specimen: Urine, Clean Catch Updated: 01/23/25 0101       Color, UA Dark Yellow       Appearance, UA Cloudy       pH, UA 6.0       Specific Gravity, UA >=1.030       Glucose, UA Negative       Ketones, UA Trace       Bilirubin, UA Negative       Blood, UA Small (1+)       Protein, UA >=300 mg/dL (3+)       Leuk Esterase, UA Moderate (2+)       Nitrite, UA Negative       Urobilinogen, UA 1.0 E.U./dL     Narrative:       In absence of clinical symptoms, the presence of pyuria, bacteria, and/or nitrites on  the urinalysis result does not correlate with infection.     Urinalysis, Microscopic Only - Urine, Clean Catch [[id number]]  (Abnormal) Collected: 01/23/25 0034     Specimen: Urine, Clean Catch Updated: 01/23/25 0101       RBC, UA 0-2 /HPF         WBC, UA 11-20 /HPF         Bacteria, UA 1+ /HPF         Squamous Epithelial Cells, UA 0-2 /HPF         Yeast, UA Moderate/2+ Budding Yeast /HPF         Hyaline Casts, UA 0-2 /LPF         Mucus, UA Small/1+ /HPF         Methodology Manual Light Microscopy     Urine Culture - Urine, Urine, Clean Catch [[id number]] Collected: 01/23/25 0034     Specimen: Urine, Clean Catch Updated: 01/23/25 0101     Blood Gas, Arterial With Co-Ox [[id number]]  (Abnormal) Collected: 01/23/25 0100     Specimen: Arterial Blood Updated: 01/23/25 0059       Site Right Brachial       Bj's Test N/A       pH, Arterial 7.440 pH units         pCO2, Arterial 42.5 mm Hg         pO2, Arterial 53.0 mm Hg         Comment: 85 Value below critical limit          HCO3, Arterial 28.9 mmol/L         Comment: 83 Value above reference range          Base Excess, Arterial 4.2 mmol/L         Comment: 83 Value above reference range          O2 Saturation, Arterial 89.9 %         Comment: 84 Value below reference range          Hemoglobin, Blood Gas 11.1 g/dL         Comment: 84 Value below reference range          Hematocrit, Blood Gas 33.9 %         Comment: 84 Value below reference range          Oxyhemoglobin 88.5 %         Comment: 84 Value below reference range          Methemoglobin 0.60 %         Carboxyhemoglobin 1.0 %         Temperature 37.0       Sodium, Arterial 135 mmol/L         Comment: 84 Value below reference range          Potassium, Arterial 3.4 mmol/L         Comment: 84 Value below reference range          Barometric Pressure for Blood Gas 759 mmHg         Modality Room Air       Ventilator Mode NA       Notified Who RN MILTON MCKEON       Notified By Gayle Jeter, RRT       Notified Time  01/23/2025 01:01       Collected by [id number]       Comment: Meter: [id number]     :  Gayle Jeter, RRT          pH, Temp Corrected 7.440 pH Units         pCO2, Temperature Corrected 42.5 mm Hg         pO2, Temperature Corrected 53.0 mm Hg                  XR Chest 2 View    (Results Pending)   CT Abdomen Pelvis With Contrast    (Results Pending)   CT Angiogram Chest    (Results Pending)         ED Course      ED Course as of 01/23/25 0116   Thu Jan 23, 2025   0055 Respiratory panel negative.  Pro-Franco 0.48 lactate 2 troponin 25 dimer 1.02 CBC white count 18.4 hemoglobin 12.4 hematocrit 37 CMP glucose 129 BUN 13 creatinine 0.55 sodium 133 potassium 3.7 chloride 97 calcium 7.9 BMP 2971 blood cultures have been obtained and will pending urinalysis chest x-ray showed a right upper lobe pneumonia.  Had ordered CTA of the chest which showed right upper lobe pneumonia no pulmonary embolus multiple incidental findings including severe coronary artery calcifications degenerative spine changes multiple remote lower lumbar compression fractures remote rib fractures and gynecomastia.  CT of abdomen pelvis no acute abnormality of the abdomen or pelvis 1.6 cm left adrenal body nodule recommend outpatient imaging.  Patient received a 500 mm bolus of lactated Ringer's.  When he came back from CT his O2 sat was 88% on room air.  Have ordered blood gases on the patient.  Zosyn has been ordered for the patient's pneumonia as well.  Patient's been placed on O2 at 2 L as well.  Patient's had no further nausea or vomiting while generalized weakness in the emergency department.  call has been placed to hospitalist at this time for further. [CW]   0103 Spoke with Dr. Seo- hospitalist on call- has graciously accepted pt for admission. Reviewed results of testing with pt and pt family. They are in agreement with care plan. Will be admitted shortly in stable condition  [CW]       ED Course User Index  [CW] Paris Ball  DIEGO Grady         Medical Decision Making  Patient is a 84-year-old male presents the emergency department with nausea, vomiting, fever, shortness of breath.  He has right-sided chest wall pain from fractured ribs which he sustained from a fall in November which is chronic.  He states he started having fever and vomiting and some episodes of confusion earlier today.  He states his pain in his right side has been worse as well.  He has been more short of breath today as well.  He has had cough as well.  No diarrhea.  No headache.  Course of treatment in the er: Nontoxic-appearing.  No acute distress. Lungs with diminished lung sounds bilat bases. Tenderness to right lateral chest wall and ruq abd. No guarding or rebound. Labs, cxr, dimer, ivf, zofran, inflammatory markers, have been ordered  Differential diagnosis to include but not limited to: pneumonia; uti; colitis; diverticulitis; viral illness; volume depletion; electrolyte imablance; and others  Labs Reviewed  COMPREHENSIVE METABOLIC PANEL - Abnormal; Notable for the following components:     Glucose                       129 (*)                Creatinine                    0.55 (*)               Sodium                        133 (*)                Chloride                      97 (*)                 Calcium                       7.9 (*)                Albumin                       3.4 (*)             All other components within normal limits          Narrative: GFR Categories in Chronic Kidney Disease (CKD)                                                 GFR Category          GFR (mL/min/1.73)    Interpretation                  G1                       90 or greater               Normal or high (1)                  G2                                   60-89                Mild decrease (1)                  G3a                   45-59                Mild to moderate decrease                  G3b                   30-44                Moderate to severe  decrease                  G4                    15-29                Severe decrease                  G5                    14 or less           Kidney failure                                                                                                  (1)In the absence of evidence of kidney disease, neither GFR category G1 or G2 fulfill the criteria for CKD.                                    eGFR calculation 2021 CKD-EPI creatinine equation, which does not include race as a factor  BNP (IN-HOUSE) - Abnormal; Notable for the following components:     proBNP                        2,971.0 (*)            All other components within normal limits          Narrative: This assay is used as an aid in the diagnosis of individuals suspected of having heart failure. It can be used as an aid in the diagnosis of acute decompensated heart failure (ADHF) in patients presenting with signs and symptoms of ADHF to the emergency department (ED). In addition, NT-proBNP of <300 pg/mL indicates ADHF is not likely.                                    Age Range  Result Interpretation  NT-proBNP Concentration (pg/mL:                                                      <50             Positive            >450                                         Gray                 300-450                                           Negative                 <300                                    50-75           Positive            >900                                  Hernandez                300-900                                  Negative            <300                                                      >75             Positive            >1800                                  Hernandez                300-1800                                  Negative            <300  TROPONIN - Abnormal; Notable for the following components:     HS Troponin T                 24 (*)              All other components within normal limits          Narrative: High  Sensitive Troponin T Reference Range:                  <14.0 ng/L- Negative Female for AMI                  <22.0 ng/L- Negative Male for AMI                  >=14 - Abnormal Female indicating possible myocardial injury.                  >=22 - Abnormal Male indicating possible myocardial injury.                   Clinicians would have to utilize clinical acumen, EKG, Troponin, and serial changes to determine if it is an Acute Myocardial Infarction or myocardial injury due to an underlying chronic condition.                                       CBC WITH AUTO DIFFERENTIAL - Abnormal; Notable for the following components:     WBC                           18.40 (*)               RBC                           4.11 (*)               Hemoglobin                    12.4 (*)               Neutrophil %                  79.9 (*)               Lymphocyte %                  7.1 (*)                Monocyte %                    12.2 (*)               Eosinophil %                  0.0 (*)                Immature Grans %              0.7 (*)                Neutrophils, Absolute         14.72 (*)               Monocytes, Absolute           2.24 (*)               Immature Grans, Absolute      0.12 (*)            All other components within normal limits  HIGH SENSITIVITIY TROPONIN T 1HR - Abnormal; Notable for the following components:     HS Troponin T                 25 (*)              All other components within normal limits          Narrative: High Sensitive Troponin T Reference Range:                  <14.0 ng/L- Negative Female for AMI                  <22.0 ng/L- Negative Male for AMI                  >=14 - Abnormal Female indicating possible myocardial injury.                  >=22 - Abnormal Male indicating possible myocardial injury.                   Clinicians would have to utilize clinical acumen, EKG, Troponin, and serial changes to determine if it is an Acute Myocardial Infarction or myocardial injury due to an  "underlying chronic condition.                                       PROCALCITONIN - Abnormal; Notable for the following components:     Procalcitonin                 0.48 (*)            All other components within normal limits          Narrative: As a Marker for Sepsis (Non-Neonates):                                    1. <0.5 ng/mL represents a low risk of severe sepsis and/or septic shock.                  2. >2 ng/mL represents a high risk of severe sepsis and/or septic shock.                                    As a Marker for Lower Respiratory Tract Infections that require antibiotic therapy:                                    PCT on Admission    Antibiotic Therapy       6-12 Hrs later                                    >0.5                Strongly Recommended                  >0.25 - <0.5        Recommended                   0.1 - 0.25          Discouraged              Remeasure/reassess PCT                  <0.1                Strongly Discouraged     Remeasure/reassess PCT                                    As 28 day mortality risk marker: \"Change in Procalcitonin Result\" (>80% or <=80%) if Day 0 (or Day 1) and Day 4 values are available. Refer to [email or web address]-pct-[email or web address]                                    Change in PCT <=80%                  A decrease of PCT levels below or equal to 80% defines a positive change in PCT test result representing a higher risk for 28-day all-cause mortality of patients diagnosed with severe sepsis for septic shock.                                    Change in PCT >80%                  A decrease of PCT levels of more than 80% defines a negative change in PCT result representing a lower risk for 28-day all-cause mortality of patients diagnosed with severe sepsis or septic shock.                     D-DIMER, QUANTITATIVE - Abnormal; Notable for the following components:     D-Dimer, Quantitative         1.02 (*)            All other components within normal limits   " "       Narrative: According to the assay 's published package insert, a normal (<0.50 MCGFEU/mL) D-dimer result in conjunction with a non-high clinical probability assessment, excludes deep vein thrombosis (DVT) and pulmonary embolism (PE) with high sensitivity.                                    D-dimer values increase with age and this can make VTE exclusion of an older population difficult. To address this, the [employer], based on best available evidence and recent guidelines, recommends that clinicians use age-adjusted D-dimer thresholds in patients greater than 50 years of age with: a) a low probability of PE who do not meet all Pulmonary Embolism Rule Out Criteria, or b) in those with intermediate probability of PE.                   The formula for an age-adjusted D-dimer cut-off is \"age/100\".                  For example, a 60 year old patient would have an age-adjusted cut-off of 0.60 MCGFEU/mL and an 80 year old 0.80 MCGFEU/mL.  BLOOD GAS, ARTERIAL W/CO-OXIMETRY - Abnormal; Notable for the following components:     pO2, Arterial                 53.0 (*)               HCO3, Arterial                28.9 (*)               Base Excess, Arterial         4.2 (*)                O2 Saturation, Arterial       89.9 (*)               Hemoglobin, Blood Gas         11.1 (*)               Hematocrit, Blood Gas         33.9 (*)               Oxyhemoglobin                 88.5 (*)               Sodium, Arterial              135 (*)                Potassium, Arterial           3.4 (*)                pO2, Temperature Corrected    53.0 (*)            All other components within normal limits  COVID-19 AND FLU A/B, NP SWAB IN TRANSPORT MEDIA 1 HR TAT - Normal          Narrative: Fact sheet for providers: https://www.fda.gov/media/414676/download                                    Fact sheet for patients: https://www.fda.gov/media/242138/download                                    Test " performed by PCR.  RESPIRATORY PANEL PCR W/ COVID-19 (SARS-COV-2), NP SWAB IN UTM/VTP, 2 HR TAT - Normal          Narrative: In the setting of a positive respiratory panel with a viral infection PLUS a negative procalcitonin without other underlying concern for bacterial infection, consider observing off antibiotics or discontinuation of antibiotics and continue supportive care. If the respiratory panel is positive for atypical bacterial infection (Bordetella pertussis, Chlamydophila pneumoniae, or Mycoplasma pneumoniae), consider antibiotic de-escalation to target atypical bacterial infection.  LACTIC ACID, PLASMA - Normal  COVID PRE-OP / PRE-PROCEDURE SCREENING ORDER (NO ISOLATION)          Narrative: The following orders were created for panel order COVID PRE-OP / PRE-PROCEDURE SCREENING ORDER (NO ISOLATION) - Swab, Nasopharynx.                  Procedure                               Abnormality         Status                                     ---------                               -----------         ------                                     COVID-19 and FLU A/B PCR...[[id number]]  Normal              Final result                                                 Please view results for these tests on the individual orders.  BLOOD CULTURE  BLOOD CULTURE  RAINBOW DRAW          Narrative: The following orders were created for panel order [place] Draw.                  Procedure                               Abnormality         Status                                     ---------                               -----------         ------                                     Green Top (Gel)[[id number]]                                  Final result                               Lavender Top[[id number]]                                     Final result                               Red Top[[id number]]                                          Final result                               Hernandez Top[[id number]]                                          Final result                               Light Blue Top[[id number]]                                   Final result                                                 Please view results for these tests on the individual orders.  BLOOD GAS, ARTERIAL W/CO-OXIMETRY  URINALYSIS W/ CULTURE IF INDICATED  URINALYSIS, MICROSCOPIC ONLY  GREEN TOP  LAVENDER TOP  RED TOP  GRAY TOP  LIGHT BLUE TOP  CBC AND DIFFERENTIAL  XR Chest 2 View    (Results Pending)  CT Abdomen Pelvis With Contrast    (Results Pending)  CT Angiogram Chest    (Results Pending)  Respiratory panel negative.  Pro-Frnaco 0.48 lactate 2 troponin 25 dimer 1.02 CBC white count 18.4 hemoglobin 12.4 hematocrit 37 CMP glucose 129 BUN 13 creatinine 0.55 sodium 133 potassium 3.7 chloride 97 calcium 7.9 BMP 2971 blood cultures have been obtained and will pending urinalysis chest x-ray showed a right upper lobe pneumonia.  Had ordered CTA of the chest which showed right upper lobe pneumonia no pulmonary embolus multiple incidental findings including severe coronary artery calcifications degenerative spine changes multiple remote lower lumbar compression fractures remote rib fractures and gynecomastia.  CT of abdomen pelvis no acute abnormality of the abdomen or pelvis 1.6 cm left adrenal body nodule recommend outpatient imaging.  Patient received a 500 mm bolus of lactated Ringer's.  When he came back from CT his O2 sat was 88% on room air.  Have ordered blood gases on the patient.  Zosyn has been ordered for the patient's pneumonia as well.  Patient's been placed on O2 at 2 L as well.  Patient's had no further nausea or vomiting while generalized weakness in the emergency department.  call has been placed to hospitalist at this time for further.  Spoke with Dr. Seo- hospitalist on call- has graciously accepted pt for admission. Reviewed results of testing with pt and pt family. They are in agreement with care plan. Will be admitted shortly in stable  condition         Problems Addressed:  Acute UTI: complicated acute illness or injury  Pneumonia of right lung due to infectious organism, unspecified part of lung: complicated acute illness or injury     Amount and/or Complexity of Data Reviewed  Labs: ordered. Decision-making details documented in ED Course.  Radiology: ordered. Decision-making details documented in ED Course.     Risk  Prescription drug management.  Decision regarding hospitalization.           Final diagnoses:   Pneumonia of right lung due to infectious organism, unspecified part of lung   Acute UTI            Paris Ball Ysabel, APRN  01/23/25 0116               Cosigned by: Ray Kelley MD at 01/23/25 0226         Ciaran Seo MD   Physician  Specialty: Hospitalist     H&P     Signed     Date of Service: 01/23/25 0108  Creation Time: 01/23/25 0108     Signed       Expand All Collapse [facility]  HISTORY AND PHYSICAL     Date of Admission: 1/22/2025  Primary Care Physician: Santiago Aaron MD     Subjective   Primary Historian: Patient and daughter at bedside     Chief Complaint: Shortness of breath     History of Present Illness  This is an 84-year-old male patient with a medical history of hypertension, presenting to the ED for the evaluation of shortness of breath.  As reported, he was having progressive shortness of breath for the last few days, associated with some episodes of confusion earlier today, fever and vomiting.  To note that he had fall in November and it resulted in several ribs fractures.  Patient denies any abdominal pain at the moment.           Review of Systems   Otherwise complete ROS reviewed and negative except as mentioned in the HPI.     Past Medical History:   Medical History        Past Medical History:   Diagnosis Date    Arthritis      Back pain      Cancer       CHEST, SKIN CANCER    GERD (gastroesophageal reflux disease)      History of  colon polyps      History of prostate cancer      Hypertension      Low back pain      Macular degeneration      Osteopenia           Past Surgical History:  Surgical History         Past Surgical History:   Procedure Laterality Date    APPENDECTOMY        CATARACT EXTRACTION, BILATERAL        COLONOSCOPY   09/17/2013     Dr. José-One 2-3mm polyp at 20cm; Otherwise normal; Repeat 3 years    COLONOSCOPY N/A 12/07/2022     Procedure: COLONOSCOPY WITH ANESTHESIA;  Surgeon: Bri Alexis MD;  Location: [facility] ENDOSCOPY;  Service: Gastroenterology;  Laterality: N/A;  pre: anemia. hx polyps.  post: polyps. diverticulosis.  no PCP    ENDOSCOPY N/A 12/07/2022     Procedure: ESOPHAGOGASTRODUODENOSCOPY WITH ANESTHESIA;  Surgeon: Bri Alexis MD;  Location: [facility] ENDOSCOPY;  Service: Gastroenterology;  Laterality: N/A;  pre: anemia  post: hiatal hernia. esophagitis.gastric erosions.  no PCP    FOOT SURGERY Right      KYPHOPLASTY Bilateral 07/17/2018     Procedure: L3 KYPHOPLASTY 1-2 LEVELS;  Surgeon: Vega Pandey MD;  Location:  [facility] OR;  Service: Neurosurgery    KYPHOPLASTY Bilateral 09/11/2018     Procedure: L4 KYPHOPLASTY WITH BIOPSY;  Surgeon: Vega Pandey MD;  Location:  [facility] OR;  Service: Neurosurgery    KYPHOPLASTY WITH BIOPSY N/A 01/25/2022     Procedure: KYPHOPLASTY WITH BIOPSY, THORACIC 6 and LUMBAR 5;  Surgeon: Nick Martínez MD;  Location:  [facility] OR;  Service: Neurosurgery;  Laterality: N/A;    KYPHOPLASTY WITH BIOPSY N/A 8/18/2023     Procedure: Thoracic 12, KYPHOPLASTY WITH BIOPSY;  Surgeon: Nick Martínez MD;  Location: [facility] OR;  Service: Neurosurgery;  Laterality: N/A;    PROSTATECTOMY        TONSILLECTOMY        TOTAL SHOULDER REPLACEMENT Bilateral           Social History:  reports that he quit smoking about 33 years ago. His smoking use included cigarettes. He started smoking about 63 years ago. He has a 30 pack-year smoking history. He has never used  smokeless tobacco. He reports that he does not currently use alcohol. He reports that he does not use drugs.     Family History: family history includes No Known Problems in his mother; Prostate cancer in his father.        Allergies:  Allergies   No Known Allergies        Medications:          Prior to Admission medications    Medication Sig Start Date End Date Taking? Authorizing Provider   albuterol sulfate  (90 Base) MCG/ACT inhaler Inhale 2 puffs Every 6 (Six) Hours As Needed for Wheezing or Shortness of Air. 5/15/24     Aleida Valle APRN   cetirizine (zyrTEC) 10 MG tablet Take 1 tablet by mouth Daily.       ProviderRosa Elena MD   denosumab (PROLIA) 60 MG/ML solution prefilled syringe syringe Inject 1 mL under the skin into the appropriate area as directed Every 6 (Six) Months. 5/3/23     Christie Oconnell APRN   DULoxetine (Cymbalta) 20 MG capsule Take 1 capsule by mouth Daily. 11/12/24     Santiago Aaron MD   HYDROcodone-acetaminophen (NORCO) 5-325 MG per tablet Take 1 tablet by mouth Every 6 (Six) Hours As Needed for Severe Pain. 1/3/25     Santiago Aaron MD   multivitamins-minerals (PRESERVISION AREDS 2) capsule capsule Take 1 capsule by mouth 2 (Two) Times a Day.       ProviderRosa Elena MD   naloxone (NARCAN) 4 MG/0.1ML nasal spray Call 911. Don't prime. Otto in 1 nostril for overdose. Repeat in 2-3 minutes in other nostril if no or minimal breathing/responsiveness. 1/9/25     Santiago Aaron MD   olmesartan (BENICAR) 20 MG tablet TAKE 1.5 TABLETS BY MOUTH DAILY. 1/20/25     Santiago Aaron MD   olmesartan (BENICAR) 20 MG tablet Take 1 tablet by mouth Daily.       ProviderRosa Elena MD   oxyCODONE (Roxicodone) 5 MG immediate release tablet Take 1 tablet by mouth Every 4 (Four) Hours As Needed for Moderate Pain. 1/9/25     Santiago Aaron MD   traMADol-acetaminophen (ULTRACET) 37.5-325 MG per tablet Take 1 tablet by mouth Every 8 (Eight) Hours As Needed for  "Moderate Pain. 11/12/24     Santiago Aaron MD      I have utilized all available immediate resources to obtain, update, or review the patient's current medications (including all prescriptions, over-the-counter products, herbals, cannabis/cannabidiol products, and vitamin/mineral/dietary (nutritional) supplements).     Objective      Vital Signs: /74   Pulse 93   Temp 98.6 °F (37 °C) (Oral)   Resp 20   Ht 165.1 cm (65\")   Wt 71 kg (156 lb 9.6 oz)   SpO2 97%   BMI 26.06 kg/m²   Physical Exam  Constitutional:       Appearance: Normal appearance.   Cardiovascular:      Rate and Rhythm: Normal rate and regular rhythm.      Pulses: Normal pulses.      Heart sounds: Normal heart sounds. No murmur heard.  Pulmonary:      Effort: Pulmonary effort is normal. No respiratory distress.      Breath sounds: Normal breath sounds. No wheezing or rales.   Abdominal:      General: Bowel sounds are normal. There is no distension.      Palpations: Abdomen is soft.      Tenderness: There is no abdominal tenderness. There is no guarding.   Musculoskeletal:      Right lower leg: No edema.      Left lower leg: No edema.   Neurological:      Mental Status: He is alert and oriented to person, place, and time. Mental status is at baseline.                  Results Reviewed:  Lab Results (last 24 hours)         Procedure Component Value Units Date/Time     Urinalysis With Culture If Indicated - Urine, Clean Catch [[id number]]  (Abnormal) Collected: 01/23/25 0034     Specimen: Urine, Clean Catch Updated: 01/23/25 0101       Color, UA Dark Yellow       Appearance, UA Cloudy       pH, UA 6.0       Specific Gravity, UA >=1.030       Glucose, UA Negative       Ketones, UA Trace       Bilirubin, UA Negative       Blood, UA Small (1+)       Protein, UA >=300 mg/dL (3+)       Leuk Esterase, UA Moderate (2+)       Nitrite, UA Negative       Urobilinogen, UA 1.0 E.U./dL     Narrative:       In absence of clinical symptoms, the presence " of pyuria, bacteria, and/or nitrites on the urinalysis result does not correlate with infection.     Urinalysis, Microscopic Only - Urine, Clean Catch [[id number]]  (Abnormal) Collected: 01/23/25 0034     Specimen: Urine, Clean Catch Updated: 01/23/25 0101       RBC, UA 0-2 /HPF         WBC, UA 11-20 /HPF         Bacteria, UA 1+ /HPF         Squamous Epithelial Cells, UA 0-2 /HPF         Yeast, UA Moderate/2+ Budding Yeast /HPF         Hyaline Casts, UA 0-2 /LPF         Mucus, UA Small/1+ /HPF         Methodology Manual Light Microscopy     Urine Culture - Urine, Urine, Clean Catch [[id number]] Collected: 01/23/25 0034     Specimen: Urine, Clean Catch Updated: 01/23/25 0101     Blood Gas, Arterial With Co-Ox [[id number]]  (Abnormal) Collected: 01/23/25 0100     Specimen: Arterial Blood Updated: 01/23/25 0059       Site Right Brachial       Bj's Test N/A       pH, Arterial 7.440 pH units         pCO2, Arterial 42.5 mm Hg         pO2, Arterial 53.0 mm Hg         Comment: 85 Value below critical limit          HCO3, Arterial 28.9 mmol/L         Comment: 83 Value above reference range          Base Excess, Arterial 4.2 mmol/L         Comment: 83 Value above reference range          O2 Saturation, Arterial 89.9 %         Comment: 84 Value below reference range          Hemoglobin, Blood Gas 11.1 g/dL         Comment: 84 Value below reference range          Hematocrit, Blood Gas 33.9 %         Comment: 84 Value below reference range          Oxyhemoglobin 88.5 %         Comment: 84 Value below reference range          Methemoglobin 0.60 %         Carboxyhemoglobin 1.0 %         Temperature 37.0       Sodium, Arterial 135 mmol/L         Comment: 84 Value below reference range          Potassium, Arterial 3.4 mmol/L         Comment: 84 Value below reference range          Barometric Pressure for Blood Gas 759 mmHg         Modality Room Air       Ventilator Mode NA       Notified Who ELE MCKEON       Notified By  Gayle Jeter, NOREEN       Notified Time 01/23/2025 01:01       Collected by [id number]       Comment: Meter: [id number]     :  Gayle Jeter RRT          pH, Temp Corrected 7.440 pH Units         pCO2, Temperature Corrected 42.5 mm Hg         pO2, Temperature Corrected 53.0 mm Hg       High Sensitivity Troponin T 1Hr [[id number]]  (Abnormal) Collected: 01/22/25 2334     Specimen: Blood Updated: 01/23/25 0007       HS Troponin T 25 ng/L         Troponin T Numeric Delta 1 ng/L         Troponin T % Delta 4     Narrative:       High Sensitive Troponin T Reference Range:  <14.0 ng/L- Negative Female for AMI  <22.0 ng/L- Negative Male for AMI  >=14 - Abnormal Female indicating possible myocardial injury.  >=22 - Abnormal Male indicating possible myocardial injury.   Clinicians would have to utilize clinical acumen, EKG, Troponin, and serial changes to determine if it is an Acute Myocardial Infarction or myocardial injury due to an underlying chronic condition.           Respiratory Panel PCR w/COVID-19(SARS-CoV-2) CASANDRA/CANDY/[place]NA/PAD/COR/CHLOÉ In-House, NP Swab in UTM/VTM, 2 HR TAT - Swab, Nasopharynx [[id number]]  (Normal) Collected: 01/22/25 2222     Specimen: Swab from Nasopharynx Updated: 01/23/25 0006       ADENOVIRUS, PCR Not Detected       Coronavirus 229E Not Detected       Coronavirus HKU1 Not Detected       Coronavirus NL63 Not Detected       Coronavirus OC43 Not Detected       COVID19 Not Detected       Human Metapneumovirus Not Detected       Human Rhinovirus/Enterovirus Not Detected       Influenza A PCR Not Detected       Influenza B PCR Not Detected       Parainfluenza Virus 1 Not Detected       Parainfluenza Virus 2 Not Detected       Parainfluenza Virus 3 Not Detected       Parainfluenza Virus 4 Not Detected       RSV, PCR Not Detected       Bordetella pertussis pcr Not Detected       Bordetella parapertussis PCR Not Detected       Chlamydophila pneumoniae PCR Not Detected       Mycoplasma pneumo by  "PCR Not Detected     Narrative:       In the setting of a positive respiratory panel with a viral infection PLUS a negative procalcitonin without other underlying concern for bacterial infection, consider observing off antibiotics or discontinuation of antibiotics and continue supportive care. If the respiratory panel is positive for atypical bacterial infection (Bordetella pertussis, Chlamydophila pneumoniae, or Mycoplasma pneumoniae), consider antibiotic de-escalation to target atypical bacterial infection.     Blood Culture - Blood, Arm, Right [[id number]] Collected: 01/22/25 2336     Specimen: Blood from Arm, Right Updated: 01/22/25 2345     Blood Culture - Blood, Arm, Left [[id number]] Collected: 01/22/25 2334     Specimen: Blood from Arm, Left Updated: 01/22/25 2339     Procalcitonin [[id number]]  (Abnormal) Collected: 01/22/25 2221     Specimen: Blood Updated: 01/22/25 2338       Procalcitonin 0.48 ng/mL       Narrative:       As a Marker for Sepsis (Non-Neonates):     1. <0.5 ng/mL represents a low risk of severe sepsis and/or septic shock.  2. >2 ng/mL represents a high risk of severe sepsis and/or septic shock.     As a Marker for Lower Respiratory Tract Infections that require antibiotic therapy:     PCT on Admission    Antibiotic Therapy       6-12 Hrs later     >0.5                Strongly Recommended  >0.25 - <0.5        Recommended   0.1 - 0.25          Discouraged              Remeasure/reassess PCT  <0.1                Strongly Discouraged     Remeasure/reassess PCT     As 28 day mortality risk marker: \"Change in Procalcitonin Result\" (>80% or <=80%) if Day 0 (or Day 1) and Day 4 values are available. Refer to http://www.[facility]-pct-calculator.com     Change in PCT <=80%  A decrease of PCT levels below or equal to 80% defines a positive change in PCT test result representing a higher risk for 28-day all-cause mortality of patients diagnosed with severe sepsis for septic shock.     Change in PCT >80%  A " "decrease of PCT levels of more than 80% defines a negative change in PCT result representing a lower risk for 28-day all-cause mortality of patients diagnosed with severe sepsis or septic shock.         Lactic Acid, Plasma [[id number]]  (Normal) Collected: 01/22/25 2221     Specimen: Blood Updated: 01/22/25 2324       Lactate 2.0 mmol/L       D-dimer, Quantitative [[id number]]  (Abnormal) Collected: 01/22/25 2221     Specimen: Blood Updated: 01/22/25 2323       D-Dimer, Quantitative 1.02 MCGFEU/mL       Narrative:       According to the assay 's published package insert, a normal (<0.50 MCGFEU/mL) D-dimer result in conjunction with a non-high clinical probability assessment, excludes deep vein thrombosis (DVT) and pulmonary embolism (PE) with high sensitivity.     D-dimer values increase with age and this can make VTE exclusion of an older population difficult. To address this, the [employer], based on best available evidence and recent guidelines, recommends that clinicians use age-adjusted D-dimer thresholds in patients greater than 50 years of age with: a) a low probability of PE who do not meet all Pulmonary Embolism Rule Out Criteria, or b) in those with intermediate probability of PE.   The formula for an age-adjusted D-dimer cut-off is \"age/100\".  For example, a 60 year old patient would have an age-adjusted cut-off of 0.60 MCGFEU/mL and an 80 year old 0.80 MCGFEU/mL.     Comprehensive Metabolic Panel [[id number]]  (Abnormal) Collected: 01/22/25 2221     Specimen: Blood Updated: 01/22/25 2306       Glucose 129 mg/dL         BUN 13 mg/dL         Creatinine 0.55 mg/dL         Sodium 133 mmol/L         Potassium 3.7 mmol/L         Comment: Slight hemolysis detected by analyzer. Result may be falsely elevated.          Chloride 97 mmol/L         CO2 25.0 mmol/L         Calcium 7.9 mg/dL         Total Protein 8.4 g/dL         Albumin 3.4 g/dL         ALT (SGPT) 9 U/L         AST (SGOT) " 18 U/L         Comment: Slight hemolysis detected by analyzer. Result may be falsely elevated.          Alkaline Phosphatase 103 U/L         Total Bilirubin 0.9 mg/dL         Globulin 5.0 gm/dL         A/G Ratio 0.7 g/dL         BUN/Creatinine Ratio 23.6       Anion Gap 11.0 mmol/L         eGFR 96.5 mL/min/1.73       Narrative:       GFR Categories in Chronic Kidney Disease (CKD)                         GFR Category          GFR (mL/min/1.73)    Interpretation  G1                       90 or greater              Normal or high (1)  G2                               60-89                Mild decrease (1)  G3a                   45-59                Mild to moderate decrease  G3b                   30-44                Moderate to severe decrease  G4                    15-29                Severe decrease  G5                    14 or less           Kidney failure                                                 (1)In the absence of evidence of kidney disease, neither GFR category G1 or G2 fulfill the criteria for CKD.     eGFR calculation 2021 CKD-EPI creatinine equation, which does not include race as a factor     CBC & Differential [[id number]]  (Abnormal) Collected: 01/22/25 2221     Specimen: Blood Updated: 01/22/25 2300     Narrative:       The following orders were created for panel order CBC & Differential.  Procedure                               Abnormality         Status                     ---------                               -----------         ------                     CBC Auto Differential[[id number]]        Abnormal            Final result                  Please view results for these tests on the individual orders.     CBC Auto Differential [[id number]]  (Abnormal) Collected: 01/22/25 2221     Specimen: Blood Updated: 01/22/25 2300       WBC 18.40 10*3/mm3         RBC 4.11 10*6/mm3         Hemoglobin 12.4 g/dL         Hematocrit 37.5 %         MCV 91.2 fL         MCH 30.2 pg         MCHC 33.1 g/dL          RDW 14.8 %         RDW-SD 49.5 fl         MPV 10.6 fL         Platelets 245 10*3/mm3         Neutrophil % 79.9 %         Lymphocyte % 7.1 %         Monocyte % 12.2 %         Eosinophil % 0.0 %         Basophil % 0.1 %         Immature Grans % 0.7 %         Neutrophils, Absolute 14.72 10*3/mm3         Lymphocytes, Absolute 1.31 10*3/mm3         Monocytes, Absolute 2.24 10*3/mm3         Eosinophils, Absolute 0.00 10*3/mm3         Basophils, Absolute 0.01 10*3/mm3         Immature Grans, Absolute 0.12 10*3/mm3         nRBC 0.0 /100 WBC       BNP [[id number]]  (Abnormal) Collected: 01/22/25 2221     Specimen: Blood Updated: 01/22/25 2258       proBNP 2,971.0 pg/mL       Narrative:       This assay is used as an aid in the diagnosis of individuals suspected of having heart failure. It can be used as an aid in the diagnosis of acute decompensated heart failure (ADHF) in patients presenting with signs and symptoms of ADHF to the emergency department (ED). In addition, NT-proBNP of <300 pg/mL indicates ADHF is not likely.     Age Range         Result Interpretation  NT-proBNP Concentration (pg/mL:        <50             Positive            >450                         Hernandez                           300-450                           Negative               <300     50-75           Positive            >900                  Gray                300-900                  Negative            <300        >75             Positive            >1800                  Gray                300-1800                  Negative            <300     High Sensitivity Troponin T [[id number]]  (Abnormal) Collected: 01/22/25 2221     Specimen: Blood Updated: 01/22/25 2258       HS Troponin T 24 ng/L       Narrative:       High Sensitive Troponin T Reference Range:  <14.0 ng/L- Negative Female for AMI  <22.0 ng/L- Negative Male for AMI  >=14 - Abnormal Female indicating possible myocardial injury.  >=22 - Abnormal Male indicating possible myocardial  injury.   Clinicians would have to utilize clinical acumen, EKG, Troponin, and serial changes to determine if it is an Acute Myocardial Infarction or myocardial injury due to an underlying chronic condition.           COVID PRE-OP / PRE-PROCEDURE SCREENING ORDER (NO ISOLATION) - Swab, Nasopharynx [[id number]]  (Normal) Collected: 01/22/25 2222     Specimen: Swab from Nasopharynx Updated: 01/22/25 2245     Narrative:       The following orders were created for panel order COVID PRE-OP / PRE-PROCEDURE SCREENING ORDER (NO ISOLATION) - Swab, Nasopharynx.  Procedure                               Abnormality         Status                     ---------                               -----------         ------                     COVID-19 and FLU A/B PCR...[[id number]]  Normal              Final result                  Please view results for these tests on the individual orders.     COVID-19 and FLU A/B PCR, 1 HR TAT - Swab, Nasopharynx [[id number]]  (Normal) Collected: 01/22/25 2222     Specimen: Swab from Nasopharynx Updated: 01/22/25 2245       COVID19 Not Detected       Influenza A PCR Not Detected       Influenza B PCR Not Detected     Narrative:       Fact sheet for providers: https://www.fda.gov/media/887829/download     Fact sheet for patients: https://www.fda.gov/media/634605/download     Test performed by PCR.     [place] Draw [[id number]] Collected: 01/22/25 2221     Specimen: Blood Updated: 01/22/25 2230     Narrative:       The following orders were created for panel order [place] Draw.  Procedure                               Abnormality         Status                     ---------                               -----------         ------                     Green Top (Gel)[[id number]]                                  Final result               Lavender Top[[id number]]                                     Final result               Red Top[[id number]]                                          Final result                Hernandez Top[[id number]]                                         Final result               Light Blue Top[[id number]]                                   Final result                  Please view results for these tests on the individual orders.     Lavender Top [[id number]] Collected: 01/22/25 2221     Specimen: Blood Updated: 01/22/25 2230       Extra Tube hold for add-on       Comment: Auto resulted        Gray Top [[id number]] Collected: 01/22/25 2221     Specimen: Blood Updated: 01/22/25 2230       Extra Tube Hold for add-ons.       Comment: Auto resulted.        Green Top (Gel) [[id number]] Collected: 01/22/25 2221     Specimen: Blood Updated: 01/22/25 2230       Extra Tube Hold for add-ons.       Comment: Auto resulted.        Red Top [[id number]] Collected: 01/22/25 2221     Specimen: Blood Updated: 01/22/25 2230       Extra Tube Hold for add-ons.       Comment: Auto resulted.        Light Blue Top [[id number]] Collected: 01/22/25 2221     Specimen: Blood Updated: 01/22/25 2230       Extra Tube Hold for add-ons.       Comment: Auto resulted                Imaging Results (Last 24 Hours)         Procedure Component Value Units Date/Time     CT Abdomen Pelvis With Contrast [[id number]] Resulted: 01/23/25 0007       Updated: 01/23/25 0022     CT Angiogram Chest [[id number]] Resulted: 01/23/25 0007       Updated: 01/23/25 0022     XR Chest 2 View [[id number]] Resulted: 01/22/25 2230       Updated: 01/22/25 2240             I have personally reviewed and interpreted the radiology studies and ECG obtained at time of admission.      Assessment / Plan   Assessment:        Active Hospital Problems     Diagnosis      **Pneumonia           Treatment Plan  The patient will be admitted to my service here at [facility].      Assessment and plan:  #Right upper lobe pneumonia.  #UTI  #Recurrent falls.     -Admitting to floor.  -ED started him on Zosyn covering both UTI and pneumonia, will continue the same, adding  doxycycline for the pneumonia.  Pending cultures.  -PT and OT ordered.  -Orthostatic vitals ordered.  -Fall precautions applied.  -DVT prophylaxis with Lovenox subcu.  -Nebulizers as needed.  Incentive spirometry.  Oxygen as needed.     Medical Decision Making  Number and Complexity of problems: 3 and moderate  Differential Diagnosis: As above     Conditions and Status        Condition is worsening.     [facility] Data  External documents reviewed: Yes  Cardiac tracing (EKG, telemetry) interpretation: Yes, EKG interpretation reviewed  Radiology interpretation: Yes scans reviewed  Labs reviewed: Yes  Any tests that were considered but not ordered: None     Decision rules/scores evaluated (example [id number], Wells, etc): None     Discussed with: Patient, daughter at bedside, ED provider, and nurses     Care Planning  Shared decision making: Yes explained the plan to the patient and family and they are agreeable with plan  Code status and discussions: I discussed CODE STATUS with the patient he wants to be full code     Disposition  Social Determinants of Health that impact treatment or disposition: None at the moment  Estimated length of stay is 2 to 3 days     I confirmed that the patient's advanced care plan is present, code status is documented, and a surrogate decision maker is listed in the patient's medical record.         The patient was seen and examined by me on 1/23 at 1 AM.     Electronically signed by Ciaran Seo MD, 01/23/25, 03:15 CST.                 Mak, Rob L, PharmD   Pharmacist  Pharmacy     Consults     Signed     Date of Service: 01/23/25 0319  Creation Time: 01/23/25 0319     Signed         Pharmacy Dosing Service  Antimicrobial Dosing  Zosyn     Assessment/Action/Plan:  Based on indication and renal function, initiated extended-infusion Zosyn 4.5 gm IV every 8 hours. Pharmacy will continue to monitor daily and make further adjustment(s) accordingly.      Subjective:  Juan Luis Collazo is a  "84 y.o. male with a  \"Pharmacy to Dose Zosyn\" consult for the treatment of Pneumonia , day 1 of 7 of treatment.                 Comprehensive Metabolic Panel [LAB17] (Order [id number])  Order  Date: 1/23/2025 Department: [facility] EMERGENCY DEPARTMENT Released By: Vi Tay RN (auto-released) Authorizing: Ciaran Seo MD     Reprint Order Requisition    Comprehensive Metabolic Panel (Order #[id number]) on 1/23/25         Contains abnormal data Comprehensive Metabolic Panel  Order: [id number]  Status: Final result       Visible to patient: No (scheduled for 1/23/2025  7:31 AM)       Next appt: 05/21/2025 at 02:00 PM in Internal Medicine (Santiago Aaron MD)    Specimen Information: Blood   0 Result Notes            Component  Ref Range & Units 05:51  (1/23/25) 1 d ago  (1/22/25) 8 d ago  (1/15/25) 1 mo ago  (12/5/24) 1 mo ago  (12/4/24) 2 mo ago  (11/8/24) 4 mo ago  (8/30/24)   Glucose  65 - 99 mg/dL 117 High  129 High   217 High  96 84 R    BUN  8 - 23 mg/dL 13 13  20 15 12 R    Creatinine  0.76 - 1.27 mg/dL 0.62 Low  0.55 Low   0.78 0.60 Low  0.91    Sodium  136 - 145 mmol/L 134 Low  133 Low   133 Low  137 137 R    Potassium  3.5 - 5.2 mmol/L 3.7 3.7 CM  4.8 CM 4.3 4.3 4.1 R   Chloride  98 - 107 mmol/L 98 97 Low   94 Low  97 Low  97 R    CO2  22.0 - 29.0 mmol/L 28.0 25.0  30.0 High  32.0 High  26 R    Calcium  8.6 - 10.5 mg/dL 7.5 Low  7.9 Low  9.3 8.6 8.9 9.2 R    Total Protein  6.0 - 8.5 g/dL 7.3 8.4  7.4 7.9 7.6    Albumin  3.5 - 5.2 g/dL 3.2 Low  3.4 Low   3.2 Low  3.5 3.7 R    ALT (SGPT)  1 - 41 U/L 6              23/25 0746 -- -- -- -- -- nasal cannula 2 --   01/23/25 05:27:27 -- 93 20 147/69 Lying nasal cannula 2 96   01/23/25 0501 -- 84 20 135/72 -- nasal cannula 2 94   01/23/25 0410 97.8 (36.6) 88 -- 148/77 Standing -- -- --   01/23/25 0408 -- 95 -- 153/79 Sitting -- -- --   01/23/25 0406 -- 93 -- 105/75 Lying -- -- --   01/23/25 0400 -- 90 -- 141/79 -- -- -- 97   01/23/25 0323 -- " -- 25 -- -- nasal cannula 2 --   01/23/25 0230 -- 93 -- -- -- -- 2 97   01/23/25 0201 -- 96 20 141/74 -- -- 2 96   01/23/25 00:37:07 -- 100  23 124/75 -- nasal cannula 2 95   Pulse: sinus at 01/23/25 0037   01/23/25 0030 -- 99 -- -- -- nasal cannula 2 88 Abnormal    01/22/                       Current Facility-Administered Medications   Medication Dose Route Frequency Provider Last Rate Last Admin    acetaminophen (TYLENOL) tablet 650 mg  650 mg Oral Q4H PRN Ciaran Seo MD        Or    acetaminophen (TYLENOL) 160 MG/5ML oral solution 650 mg  650 mg Oral Q4H PRN Ciaran Seo MD        Or    acetaminophen (TYLENOL) suppository 650 mg  650 mg Rectal Q4H PRN Ciaran Seo MD        doxycycline (VIBRAMYCIN) 100 mg in sodium chloride 0.9 % 100 mL MBP  100 mg Intravenous Q12H Ciaran Seo MD   Stopped at 01/23/25 0501    DULoxetine (CYMBALTA) DR capsule 20 mg  20 mg Oral Daily Ciaran Seo MD   20 mg at 01/23/25 0900    Enoxaparin Sodium (LOVENOX) syringe 40 mg  40 mg Subcutaneous Daily Ciaran Seo MD   40 mg at 01/23/25 0900    ipratropium-albuterol (DUO-NEB) nebulizer solution 3 mL  3 mL Nebulization Q6H PRN Ciaran Seo MD        losartan (COZAAR) tablet 25 mg  25 mg Oral Q24H Art Lizarraga MD   25 mg at 01/23/25 0900    melatonin tablet 5 mg  5 mg Oral Once PRN Ciaran Seo MD        nitroglycerin (NITROSTAT) SL tablet 0.4 mg  0.4 mg Sublingual Q5 Min PRN Ciaran Seo MD        ondansetron ODT (ZOFRAN-ODT) disintegrating tablet 4 mg  4 mg Oral Q6H PRN Ciaran Seo MD        Or    ondansetron (ZOFRAN) injection 4 mg  4 mg Intravenous Q6H PRN Ciaran Seo MD        oxyCODONE (ROXICODONE) immediate release tablet 5 mg  5 mg Oral Q6H PRN Art Lizarraga MD        Pharmacy to Dose Zosyn   Not Applicable Continuous PRN Ciaran Seo MD        piperacillin-tazobactam (ZOSYN) 4.5 g IVPB in 100 mL NS MBP (CD)  4.5 g Intravenous Q8H Ciaran Seo MD   Stopped at 01/23/25 0707     sodium chloride 0.9 % flush 10 mL  10 mL Intravenous PRN Ciaran Seo MD   10 mL at 01/23/25 0707    sodium chloride 0.9 % flush 10 mL  10 mL Intravenous Q12H Ciaran Seo MD   10 mL at 01/23/25 0900    sodium chloride 0.9 % flush 10 mL  10 mL Intravenous PRN Ciaran Seo MD        sodium chloride 0.9 % infusion 40 mL  40 mL Intravenous PRN Ciaran Seo MD         Current Outpatient Medications   Medication Sig Dispense Refill    cetirizine (zyrTEC) 10 MG tablet Take 1 tablet by mouth Daily.      DULoxetine (Cymbalta) 20 MG capsule Take 1 capsule by mouth Daily. 30 capsule 2    multivitamins-minerals (PRESERVISION AREDS 2) capsule capsule Take 1 capsule by mouth 2 (Two) Times a Day.      olmesartan (Benicar) 40 MG tablet Take 1 tablet by mouth Daily.      albuterol sulfate  (90 Base) MCG/ACT inhaler Inhale 2 puffs Every 6 (Six) Hours As Needed for Wheezing or Shortness of Air. 8 g 2    denosumab (PROLIA) 60 MG/ML solution prefilled syringe syringe Inject 1 mL under the skin into the appropriate area as directed Every 6 (Six) Months. 180 mL 1    naloxone (NARCAN) 4 MG/0.1ML nasal spray Administer 1 spray into the nostril(s) as directed by provider As Needed for Opioid Reversal. Call 911. Don't prime. [place] in 1 nostril for overdose. Repeat in 2-3 minutes in other nostril if no or minimal breathing/responsiveness.      oxyCODONE (Roxicodone) 5 MG immediate release tablet Take 1 tablet by mouth Every 4 (Four) Hours As Needed for Moderate Pain. 120 tablet 0

## 2025-01-23 NOTE — PROGRESS NOTES
Admitted after midnight, evaluated by night physician    Patient seen in the emergency department bed 42  Patient with a dense consolidation of the right upper lobe posterior segment.      Sodium 134.  Potassium 3.7.  Creatinine 0.62.  White blood cell count down from 18,400 to 14,700    On oxygen nasal cannula 2 to 3 L/min.  Sleeping comfortably on my evaluation  Currently on antibiotic management, Zosyn IV.  And doxycycline IV.    MRSA screen  Streptococcus antigen in urine  Continue antibiotic management.  Follow urine culture

## 2025-01-23 NOTE — PLAN OF CARE
Goal Outcome Evaluation:  Plan of Care Reviewed With: patient        Progress: no change  Outcome Evaluation: OT eval completed. Pt in fowlers upon therapist arrival; A&Ox4; No c/o pain; 2.5L BNC with SpO2 99% at rest. Pt reports Mod I-I with all BADLs including fxl ambulation at rest. Today, Pt performed supine>sit utilizing bedrail with HOB elevated with CGA; sit>supine SBA. Pt adjusted B socks while seated EOB with Min A and verbal cues for safety awareness. Pt performed sit<>stand with CGA and ambulated short distance at bedside requiring CGA-Min A due to unsteadiness on feet. Pt additionally demos BUE weakness. SpO2 remained WNL while Pt remained on 2.5L throughout eval. Skilled OT intervention indicated in order to address deficits in fxl mobility, fxl activity tolerance, balance, strength, and use of adaptive techniques/equipment during performance of BADLs. Recommend SNF at discharge.    Anticipated Discharge Disposition (OT): skilled nursing facility

## 2025-01-23 NOTE — THERAPY EVALUATION
Patient Name: Juan Luis Collazo  : 1940    MRN: [id number]                              Today's Date: 2025       Admit Date: 2025    Visit Dx:     ICD-10-CM ICD-9-CM   1. Pneumonia of right lung due to infectious organism, unspecified part of lung  J18.9 483.8   2. Acute UTI  N39.0 599.0     Patient Active Problem List   Diagnosis    Closed compression fracture of first lumbar vertebra    Current non-smoker    Lumbar compression fracture, closed, initial encounter    Lumbar compression fracture    S/P kyphoplasty    Numbness and tingling of right leg    Compression fracture of fourth lumbar vertebra with delayed healing    Benign hypertension    Flatback syndrome of lumbar region    Hammer toe of right foot    High cholesterol    Impaired fasting glucose    Malignant neoplasm of prostate    Osteopenia    Acute exacerbation of chronic obstructive airways disease    Back pain    Compression fracture of thoracic vertebra with routine healing    Normocytic anemia    Actinic keratosis    Hyperplastic colonic polyp    FH: colon cancer in first degree relative <60 years old    History of adenomatous polyp of colon    Decreased bone mass    Sebaceous cyst    Degenerative disc disease, cervical    Degenerative disc disease, lumbar    Degenerative disc disease, thoracic    Compression fracture of T12 vertebra with delayed healing    Age-related osteoporosis with current pathological fracture with delayed healing    Gait instability    Pain initiated by coughing    Hypoxia    Pneumonia    Closed fracture of multiple ribs of right side     Past Medical History:   Diagnosis Date    Arthritis     Back pain     Cancer     CHEST, SKIN CANCER    GERD (gastroesophageal reflux disease)     History of colon polyps     History of prostate cancer     Hypertension     Low back pain     Macular degeneration     Osteopenia      Past Surgical History:   Procedure Laterality Date    APPENDECTOMY      CATARACT EXTRACTION,  BILATERAL      COLONOSCOPY  09/17/2013    Dr. José-One 2-3mm polyp at 20cm; Otherwise normal; Repeat 3 years    COLONOSCOPY N/A 12/07/2022    Procedure: COLONOSCOPY WITH ANESTHESIA;  Surgeon: Bri Alexis MD;  Location: [facility] ENDOSCOPY;  Service: Gastroenterology;  Laterality: N/A;  pre: anemia. hx polyps.  post: polyps. diverticulosis.  no PCP    ENDOSCOPY N/A 12/07/2022    Procedure: ESOPHAGOGASTRODUODENOSCOPY WITH ANESTHESIA;  Surgeon: Bri Alexis MD;  Location: [facility] ENDOSCOPY;  Service: Gastroenterology;  Laterality: N/A;  pre: anemia  post: hiatal hernia. esophagitis.gastric erosions.  no PCP    FOOT SURGERY Right     KYPHOPLASTY Bilateral 07/17/2018    Procedure: L3 KYPHOPLASTY 1-2 LEVELS;  Surgeon: Vega Pandey MD;  Location: [facility] OR;  Service: Neurosurgery    KYPHOPLASTY Bilateral 09/11/2018    Procedure: L4 KYPHOPLASTY WITH BIOPSY;  Surgeon: Vega aPndey MD;  Location:  [facility] OR;  Service: Neurosurgery    KYPHOPLASTY WITH BIOPSY N/A 01/25/2022    Procedure: KYPHOPLASTY WITH BIOPSY, THORACIC 6 and LUMBAR 5;  Surgeon: Nick Martínez MD;  Location:  [facility] OR;  Service: Neurosurgery;  Laterality: N/A;    KYPHOPLASTY WITH BIOPSY N/A 8/18/2023    Procedure: Thoracic 12, KYPHOPLASTY WITH BIOPSY;  Surgeon: Nick Martínez MD;  Location: [facility] OR;  Service: Neurosurgery;  Laterality: N/A;    PROSTATECTOMY      TONSILLECTOMY      TOTAL SHOULDER REPLACEMENT Bilateral       General Information       Row Name 01/23/25 1026          OT Time and Intention    Subjective Information complains of;dyspnea  -LS     Document Type evaluation  cc: SOB. Dx: Pneumonia. H/o HTN, back pain, cancer, osteopenia  -LS     Mode of Treatment occupational therapy  -LS     Patient Effort good  -LS       Row Name 01/23/25 1026          General Information    Patient Profile Reviewed yes  -LS     Prior Level of Function independent:;ADL's;all household mobility;dependent:;home  management;cooking;cleaning;driving;shopping  Utilized rollator or SC during ambulation  -     Existing Precautions/Restrictions fall;oxygen therapy device and L/min  2.5L  -     Barriers to Rehab medically complex;previous functional deficit;cognitive status  -       Row Name 01/23/25 1026          Occupational Profile    Environmental Supports and Barriers (Occupational Profile) Walk in shower with built in seat and grab bars. Grab bars next to toilet. Other AD/DME: rollator, SC  -       Row Name 01/23/25 1026          Living Environment    People in Home spouse  -       Row Name 01/23/25 1026          Home Main Entrance    Number of Stairs, Main Entrance two  -LS     Stair Railings, Main Entrance railing on right side (ascending)  -       Row Name 01/23/25 1026          Stairs Within Home, Primary    Number of Stairs, Within Home, Primary other (see comments)  14  -LS     Stair Railings, Within Home, Primary railing on right side (ascending)  -       Row Name 01/23/25 1026          Cognition    Orientation Status (Cognition) oriented x 4  -       Row Name 01/23/25 1026          Safety Issues/Impairments Affecting Functional Mobility    Safety Issues Affecting Function (Mobility) safety precaution awareness;safety precautions follow-through/compliance;insight into deficits/self-awareness;friction/shear risk;awareness of need for assistance;judgment  -     Impairments Affecting Function (Mobility) endurance/activity tolerance;shortness of breath;strength;balance;cognition  -     Cognitive Impairments, Mobility Safety/Performance awareness, need for assistance;insight into deficits/self-awareness;judgment;problem-solving/reasoning;safety precaution awareness;safety precaution follow-through  -               User Key  (r) = Recorded By, (t) = Taken By, (c) = Cosigned By      Initials Name Provider Type    LS Peggy Heaton, OTR/L Occupational Therapist                     Mobility/ADL's       Row  Name 01/23/25 1026          Bed Mobility    Bed Mobility supine-sit;sit-supine  -     Supine-Sit Bureau (Bed Mobility) contact guard  -     Sit-Supine Bureau (Bed Mobility) standby assist  -     Assistive Device (Bed Mobility) bed rails;head of bed elevated  -       Row Name 01/23/25 1026          Transfers    Transfers sit-stand transfer;stand-sit transfer  -       Row Name 01/23/25 1026          Sit-Stand Transfer    Sit-Stand Bureau (Transfers) contact guard  -LS       Row Name 01/23/25 1026          Stand-Sit Transfer    Stand-Sit Bureau (Transfers) contact guard  -LS       Row Name 01/23/25 1026          Functional Mobility    Functional Mobility- Ind. Level contact guard assist;minimum assist (75% patient effort);verbal cues required  -       Row Name 01/23/25 1026          Activities of Daily Living    BADL Assessment/Intervention lower body dressing  -       Row Name 01/23/25 1026          Lower Body Dressing Assessment/Training    Bureau Level (Lower Body Dressing) don;socks;minimum assist (75% patient effort)  -     Position (Lower Body Dressing) edge of bed sitting  -               User Key  (r) = Recorded By, (t) = Taken By, (c) = Cosigned By      Initials Name Provider Type     Peggy Heaton OTR/L Occupational Therapist                   Obj/Interventions       Row Name 01/23/25 1026          Sensory Assessment (Somatosensory)    Sensory Assessment (Somatosensory) UE sensation intact  -       Row Name 01/23/25 1026          Vision Assessment/Intervention    Visual Impairment/Limitations WFL;corrective lenses full-time  -       Row Name 01/23/25 1026          Range of Motion Comprehensive    Comment, General Range of Motion B shoulder AROM impaired 50%; BUE AROM/PROM otherwise WFL  -       Row Name 01/23/25 1026          Strength Comprehensive (MMT)    General Manual Muscle Testing (MMT) Assessment upper extremity strength deficits identified  -      Comment, General Manual Muscle Testing (MMT) Assessment B shoulders 3-/5; BUE strength grossly 4/5 at all other joints  -LS       Row Name 01/23/25 1026          Balance    Balance Assessment sitting static balance;sitting dynamic balance;standing static balance;standing dynamic balance  -LS     Static Sitting Balance standby assist  -LS     Dynamic Sitting Balance contact guard  -LS     Position, Sitting Balance sitting edge of bed;unsupported  -LS     Static Standing Balance contact guard  -LS     Dynamic Standing Balance contact guard;minimal assist  -LS     Position/Device Used, Standing Balance unsupported  -LS               User Key  (r) = Recorded By, (t) = Taken By, (c) = Cosigned By      Initials Name Provider Type    LS Peggy Heaton, OTR/L Occupational Therapist                   Goals/Plan       Row Name 01/23/25 1026          Transfer Goal 1 (OT)    Activity/Assistive Device (Transfer Goal 1, OT) toilet;shower chair  -LS     [place] Level/Cues Needed (Transfer Goal 1, OT) standby assist  -LS     Time Frame (Transfer Goal 1, OT) long term goal (LTG);10 days  -LS     Progress/Outcome (Transfer Goal 1, OT) new goal  -       Row Name 01/23/25 1026          Dressing Goal 1 (OT)    Activity/Device (Dressing Goal 1, OT) dressing skills, all  -LS     [place]/Cues Needed (Dressing Goal 1, OT) standby assist  -LS     Time Frame (Dressing Goal 1, OT) long term goal (LTG);10 days  -LS     Progress/Outcome (Dressing Goal 1, OT) new goal  -       Row Name 01/23/25 1026          Toileting Goal 1 (OT)    [place] Level/Cues Needed (Toileting Goal 1, OT) standby assist  -LS     Time Frame (Toileting Goal 1, OT) long term goal (LTG);10 days  -LS     Progress/Outcome (Toileting Goal 1, OT) new goal  -       Row Name 01/23/25 1026          Therapy Assessment/Plan (OT)    Planned Therapy Interventions (OT) activity tolerance training;functional balance retraining;occupation/activity based  interventions;ROM/therapeutic exercise;strengthening exercise;transfer/mobility retraining;patient/caregiver education/training;adaptive equipment training;BADL retraining;cognitive/visual perception retraining  -               User Key  (r) = Recorded By, (t) = Taken By, (c) = Cosigned By      Initials Name Provider Type    LS Peggy Heaton, OTR/L Occupational Therapist                   Clinical Impression       Row Name 01/23/25 1026          Pain Assessment    Pretreatment Pain Rating 0/10 - no pain  -LS     Posttreatment Pain Rating 0/10 - no pain  -LS       Row Name 01/23/25 1026          Plan of Care Review    Plan of Care Reviewed With patient  -LS     Progress no change  -LS     Outcome Evaluation OT eval completed. Pt in fowlers upon therapist arrival; A&Ox4; No c/o pain; 2.5L BNC with SpO2 99% at rest. Pt reports Mod I-I with all BADLs including fxl ambulation at rest. Today, Pt performed supine>sit utilizing bedrail with HOB elevated with CGA; sit>supine SBA. Pt adjusted B socks while seated EOB with Min A and verbal cues for safety awareness. Pt performed sit<>stand with CGA and ambulated short distance at bedside requiring CGA-Min A due to unsteadiness on feet. Pt additionally demos BUE weakness. SpO2 remained WNL while Pt remained on 2.5L throughout eval. Skilled OT intervention indicated in order to address deficits in fxl mobility, fxl activity tolerance, balance, strength, and use of adaptive techniques/equipment during performance of BADLs. Recommend SNF at discharge.  -       Row Name 01/23/25 1026          Therapy Assessment/Plan (OT)    Rehab Potential (OT) good  -     Criteria for Skilled Therapeutic Interventions Met (OT) yes;skilled treatment is necessary  -     Therapy Frequency (OT) 5 times/wk  -       Row Name 01/23/25 1026          Therapy Plan Review/Discharge Plan (OT)    Anticipated Discharge Disposition (OT) skilled nursing facility  -       Row Name 01/23/25 1026           Positioning and Restraints    Pre-Treatment Position in bed  -LS     Post Treatment Position bed  -LS     In Bed fowlers;side rails up x2;call light within reach;encouraged to call for assist;exit alarm on  -LS               User Key  (r) = Recorded By, (t) = Taken By, (c) = Cosigned By      Initials Name Provider Type    Peggy Stevenson OTR/L Occupational Therapist                   Outcome Measures       Row Name 01/23/25 1026          How much help from another is currently needed...    Putting on and taking off regular lower body clothing? 3  -LS     Bathing (including washing, rinsing, and drying) 3  -LS     Toileting (which includes using toilet bed pan or urinal) 3  -LS     Putting on and taking off regular upper body clothing 4  -LS     Taking care of personal grooming (such as brushing teeth) 4  -LS     Eating meals 4  -LS     AM-PAC 6 Clicks Score (OT) 21  -LS       Row Name 01/23/25 0746          How much help from another person do you currently need...    Turning from your back to your side while in flat bed without using bedrails? 4  -MS     Moving from lying on back to sitting on the side of a flat bed without bedrails? 4  -MS     Moving to and from a bed to a chair (including a wheelchair)? 4  -MS     Standing up from a chair using your arms (e.g., wheelchair, bedside chair)? 4  -MS     Climbing 3-5 steps with a railing? 3  -MS     To walk in hospital room? 3  -MS     AM-PAC 6 Clicks Score (PT) 22  -MS     Highest Level of Mobility Goal 7 --> Walk 25 feet or more  -MS       Row Name 01/23/25 1026 01/23/25 1025       Functional Assessment    Outcome Measure Options AM-PAC 6 Clicks Daily Activity (OT)  -LS AM-PAC 6 Clicks Basic Mobility (PT)  -SB              User Key  (r) = Recorded By, (t) = Taken By, (c) = Cosigned By      Initials Name Provider Type    Racquel Jaime, PT DPT Physical Therapist    Tatyana Ponce, RN Registered Nurse    Peggy Stevenson, OTR/L Occupational Therapist                     Occupational Therapy Education       Title: PT OT SLP Therapies (In Progress)       Topic: Occupational Therapy (In Progress)       Point: ADL training (Done)       Description:   Instruct learner(s) on proper safety adaptation and remediation techniques during self care or transfers.   Instruct in proper use of assistive devices.                  Learning Progress Summary            Patient Acceptance, E, VU,NR by  at 1/23/2025 1224                      Point: Home exercise program (Not Started)       Description:   Instruct learner(s) on appropriate technique for monitoring, assisting and/or progressing therapeutic exercises/activities.                  Learner Progress:  Not documented in this visit.              Point: Precautions (Done)       Description:   Instruct learner(s) on prescribed precautions during self-care and functional transfers.                  Learning Progress Summary            Patient Acceptance, E, VU,NR by  at 1/23/2025 1224                      Point: Body mechanics (Done)       Description:   Instruct learner(s) on proper positioning and spine alignment during self-care, functional mobility activities and/or exercises.                  Learning Progress Summary            Patient Acceptance, E, VU,NR by  at 1/23/2025 1224                                      User Key       Initials Effective Dates Name Provider Type Discipline     06/20/22 -  Peggy Heaton, OTR/L Occupational Therapist OT                  OT Recommendation and Plan  Planned Therapy Interventions (OT): activity tolerance training, functional balance retraining, occupation/activity based interventions, ROM/therapeutic exercise, strengthening exercise, transfer/mobility retraining, patient/caregiver education/training, adaptive equipment training, BADL retraining, cognitive/visual perception retraining  Therapy Frequency (OT): 5 times/wk  Plan of Care Review  Plan of Care Reviewed With:  patient  Progress: no change  Outcome Evaluation: OT eval completed. Pt in fowlers upon therapist arrival; A&Ox4; No c/o pain; 2.5L BNC with SpO2 99% at rest. Pt reports Mod I-I with all BADLs including fxl ambulation at rest. Today, Pt performed supine>sit utilizing bedrail with HOB elevated with CGA; sit>supine SBA. Pt adjusted B socks while seated EOB with Min A and verbal cues for safety awareness. Pt performed sit<>stand with CGA and ambulated short distance at bedside requiring CGA-Min A due to unsteadiness on feet. Pt additionally demos BUE weakness. SpO2 remained WNL while Pt remained on 2.5L throughout eval. Skilled OT intervention indicated in order to address deficits in fxl mobility, fxl activity tolerance, balance, strength, and use of adaptive techniques/equipment during performance of BADLs. Recommend SNF at discharge.     Time Calculation:         Time Calculation- OT       Row Name 01/23/25 1026             Time Calculation- OT    OT Start Time 1026  +5 minutes chart review  -      OT Stop Time 1100  -      OT Time Calculation (min) 34 min  -      OT Non-Billable Time (min) 39 min  -      OT Received On 01/23/25  -      OT Goal Re-Cert Due Date 02/02/25  -                User Key  (r) = Recorded By, (t) = Taken By, (c) = Cosigned By      Initials Name Provider Type     Peggy Heaton OTR/L Occupational Therapist                  Therapy Charges for Today       Code Description Service Date Service Provider Modifiers Qty    [id number]  OT EVAL MOD COMPLEXITY 3 1/23/2025 Peggy Heaton OTR/L GO 1                 Peggy Heaton OTR/JOSE LUIS  1/23/2025

## 2025-01-23 NOTE — PLAN OF CARE
Goal Outcome Evaluation:      Pt is A&Ox4 with bouts of confusion. He is on 2L of O2. He is sepsis positive and is positive for pneumonia. Up with assistx1 to bedside commode.

## 2025-01-23 NOTE — NURSING NOTE
Received report from ED nurse.  Patient dropped off to room with no notification.  Patient in bed, breathing even and regular on 3L NC.  Tele monitor placed.  VSS.  Skin intact.  No complaints of pain.  Safety precautions initiated, bed alarm on.  No acute distress or discomfort at this time.

## 2025-01-23 NOTE — THERAPY EVALUATION
Patient Name: Juan Luis Collazo  : 1940    MRN: [id number]                              Today's Date: 2025       Admit Date: 2025    Visit Dx:     ICD-10-CM ICD-9-CM   1. Pneumonia of right lung due to infectious organism, unspecified part of lung  J18.9 483.8   2. Acute UTI  N39.0 599.0   3. Gait instability [R26.81]  R26.81 781.2   4. Impaired mobility [Z74.09]  Z74.09 799.89     Patient Active Problem List   Diagnosis    Closed compression fracture of first lumbar vertebra    Current non-smoker    Lumbar compression fracture, closed, initial encounter    Lumbar compression fracture    S/P kyphoplasty    Numbness and tingling of right leg    Compression fracture of fourth lumbar vertebra with delayed healing    Benign hypertension    Flatback syndrome of lumbar region    Hammer toe of right foot    High cholesterol    Impaired fasting glucose    Malignant neoplasm of prostate    Osteopenia    Acute exacerbation of chronic obstructive airways disease    Back pain    Compression fracture of thoracic vertebra with routine healing    Normocytic anemia    Actinic keratosis    Hyperplastic colonic polyp    FH: colon cancer in first degree relative <60 years old    History of adenomatous polyp of colon    Decreased bone mass    Sebaceous cyst    Degenerative disc disease, cervical    Degenerative disc disease, lumbar    Degenerative disc disease, thoracic    Compression fracture of T12 vertebra with delayed healing    Age-related osteoporosis with current pathological fracture with delayed healing    Gait instability    Pain initiated by coughing    Hypoxia    Pneumonia    Closed fracture of multiple ribs of right side     Past Medical History:   Diagnosis Date    Arthritis     Back pain     Cancer     CHEST, SKIN CANCER    GERD (gastroesophageal reflux disease)     History of colon polyps     History of prostate cancer     Hypertension     Low back pain     Macular degeneration     Osteopenia      Past  Surgical History:   Procedure Laterality Date    APPENDECTOMY      CATARACT EXTRACTION, BILATERAL      COLONOSCOPY  09/17/2013    Dr. José-One 2-3mm polyp at 20cm; Otherwise normal; Repeat 3 years    COLONOSCOPY N/A 12/07/2022    Procedure: COLONOSCOPY WITH ANESTHESIA;  Surgeon: Bri Alexis MD;  Location: [facility] ENDOSCOPY;  Service: Gastroenterology;  Laterality: N/A;  pre: anemia. hx polyps.  post: polyps. diverticulosis.  no PCP    ENDOSCOPY N/A 12/07/2022    Procedure: ESOPHAGOGASTRODUODENOSCOPY WITH ANESTHESIA;  Surgeon: Bri Alexis MD;  Location: [facility] ENDOSCOPY;  Service: Gastroenterology;  Laterality: N/A;  pre: anemia  post: hiatal hernia. esophagitis.gastric erosions.  no PCP    FOOT SURGERY Right     KYPHOPLASTY Bilateral 07/17/2018    Procedure: L3 KYPHOPLASTY 1-2 LEVELS;  Surgeon: Vega Pandey MD;  Location: [facility] OR;  Service: Neurosurgery    KYPHOPLASTY Bilateral 09/11/2018    Procedure: L4 KYPHOPLASTY WITH BIOPSY;  Surgeon: Vega Pandey MD;  Location: [facility] OR;  Service: Neurosurgery    KYPHOPLASTY WITH BIOPSY N/A 01/25/2022    Procedure: KYPHOPLASTY WITH BIOPSY, THORACIC 6 and LUMBAR 5;  Surgeon: Nick Martínez MD;  Location: [facility] OR;  Service: Neurosurgery;  Laterality: N/A;    KYPHOPLASTY WITH BIOPSY N/A 8/18/2023    Procedure: Thoracic 12, KYPHOPLASTY WITH BIOPSY;  Surgeon: Nick Martínez MD;  Location: [facility] OR;  Service: Neurosurgery;  Laterality: N/A;    PROSTATECTOMY      TONSILLECTOMY      TOTAL SHOULDER REPLACEMENT Bilateral       General Information       Row Name 01/23/25 1025          Physical Therapy Time and Intention    Document Type evaluation  cc: SOB. Dx: Pneumonia. H/o HTN, back pain, cancer, osteopenia  -SB     Mode of Treatment physical therapy  -SB       Row Name 01/23/25 1025          General Information    Patient Profile Reviewed yes  -SB     Prior Level of Function independent:;all household  mobility;ADL's;dependent:;cooking;cleaning;driving  walk in shower, shower chair, grab bars, rollator  -SB     Existing Precautions/Restrictions fall;oxygen therapy device and L/min  2.5L  -SB     Barriers to Rehab hearing deficit;cognitive status;previous functional deficit  -SB       Row Name 01/23/25 1025          Living Environment    People in Home spouse  -SB       Row Name 01/23/25 1025          Home Main Entrance    Number of Stairs, Main Entrance two  -SB     Stair Railings, Main Entrance railing on right side (ascending)  -SB       Row Name 01/23/25 1025          Stairs Within Home, Primary    Number of Stairs, Within Home, Primary other (see comments)  14  -SB     Stair Railings, Within Home, Primary railing on right side (ascending)  -SB       Row Name 01/23/25 1025          Cognition    Orientation Status (Cognition) oriented x 4  -SB       Row Name 01/23/25 1025          Safety Issues/Impairments Affecting Functional Mobility    Safety Issues Affecting Function (Mobility) friction/shear risk;impulsivity;insight into deficits/self-awareness;judgment;safety precaution awareness;safety precautions follow-through/compliance  -SB     Impairments Affecting Function (Mobility) endurance/activity tolerance;shortness of breath;strength;cognition;balance  -SB     Cognitive Impairments, Mobility Safety/Performance awareness, need for assistance;impulsivity;insight into deficits/self-awareness;judgment;problem-solving/reasoning;safety precaution awareness;safety precaution follow-through  -SB               User Key  (r) = Recorded By, (t) = Taken By, (c) = Cosigned By      Initials Name Provider Type    SB Racquel Choi PT DPT Physical Therapist                   Mobility       Row Name 01/23/25 1025          Bed Mobility    Bed Mobility supine-sit;sit-supine  -SB     Supine-Sit [place] (Bed Mobility) verbal cues;contact guard  -SB     Sit-Supine [place] (Bed Mobility) verbal cues;contact guard  -SB      Assistive Device (Bed Mobility) bed rails;head of bed elevated  -SB       Row Name 01/23/25 1025          Sit-Stand Transfer    Sit-Stand [place] (Transfers) verbal cues;contact guard;minimum assist (75% patient effort)  -SB     Assistive Device (Sit-Stand Transfers) other (see comments)  HHA  -SB     Comment, (Sit-Stand Transfer) takes foroward and backward steps at EOB with post lean and dec balance  -SB               User Key  (r) = Recorded By, (t) = Taken By, (c) = Cosigned By      Initials Name Provider Type    Racquel Jaime PT DPT Physical Therapist                   Obj/Interventions       Row Name 01/23/25 1025          Range of Motion Comprehensive    General Range of Motion bilateral lower extremity ROM WFL  -SB       Row Name 01/23/25 1025          Strength Comprehensive (MMT)    General Manual Muscle Testing (MMT) Assessment lower extremity strength deficits identified  -SB     Comment, General Manual Muscle Testing (MMT) Assessment BLE 4-/5  -SB       Row Name 01/23/25 1025          Balance    Balance Assessment sitting static balance;sitting dynamic balance;standing static balance;standing dynamic balance  -SB     Static Sitting Balance standby assist  -SB     Dynamic Sitting Balance standby assist  -SB     Position, Sitting Balance sitting edge of bed;unsupported  -SB     Static Standing Balance contact guard  -SB     Dynamic Standing Balance contact guard  -SB     Position/Device Used, Standing Balance supported  -SB       Row Name 01/23/25 1025          Sensory Assessment (Somatosensory)    Sensory Assessment (Somatosensory) LE sensation intact  -SB               User Key  (r) = Recorded By, (t) = Taken By, (c) = Cosigned By      Initials Name Provider Type    Racquel Jaime PT DPT Physical Therapist                   Goals/Plan       Row Name 01/23/25 1025          Bed Mobility Goal 1 (PT)    Activity/Assistive Device (Bed Mobility Goal 1, PT) sit to supine;supine to sit;rolling  to left;rolling to right  -SB     [place] Level/Cues Needed (Bed Mobility Goal 1, PT) modified independence  -SB     Time Frame (Bed Mobility Goal 1, PT) long term goal (LTG)  -SB     Progress/Outcomes (Bed Mobility Goal 1, PT) new goal  -SB       Row Name 01/23/25 1025          Transfer Goal 1 (PT)    Activity/Assistive Device (Transfer Goal 1, PT) sit-to-stand/stand-to-sit;bed-to-chair/chair-to-bed;walker, rolling  -SB     [place] Level/Cues Needed (Transfer Goal 1, PT) standby assist  -SB     Time Frame (Transfer Goal 1, PT) long term goal (LTG)  -SB     Progress/Outcome (Transfer Goal 1, PT) new goal  -SB       Row Name 01/23/25 1025          Gait Training Goal 1 (PT)    Activity/Assistive Device (Gait Training Goal 1, PT) gait (walking locomotion);increase endurance/gait distance;increase energy conservation;decrease fall risk;improve balance and speed;walker, rolling  -SB     [place] Level (Gait Training Goal 1, PT) contact guard required  -SB     Distance (Gait Training Goal 1, PT) 60 feet without LOB  -SB     Time Frame (Gait Training Goal 1, PT) long term goal (LTG)  -SB     Progress/Outcome (Gait Training Goal 1, PT) new goal  -SB       Row Name 01/23/25 1025          Therapy Assessment/Plan (PT)    Planned Therapy Interventions (PT) balance training;strengthening;bed mobility training;gait training;patient/family education;transfer training  -SB               User Key  (r) = Recorded By, (t) = Taken By, (c) = Cosigned By      Initials Name Provider Type    Racquel Jaime, PT DPT Physical Therapist                   Clinical Impression       Row Name 01/23/25 1025          Pain    Pretreatment Pain Rating 0/10 - no pain  -SB     Posttreatment Pain Rating 0/10 - no pain  -SB     Pre/Posttreatment Pain Comment c/o SOA  -SB       Row Name 01/23/25 1025          Plan of Care Review    Plan of Care Reviewed With patient  -SB     Progress no change  -SB     Outcome Evaluation PT eval completed.  Pt alert and oriented x4 and very [place]. Pt reports independence at home with use of rollator. Pt reports SOA and is on 2.5L O2 with sats in 90s. Pt performs sup to sit with SBA and demos generalized weakness and inc in SOA. Pt performs sit to stand t/f with CGA and takes a few steps at EOB with CGA-min A due to decreased balance and post lean. Pt returns to supine with SBA. Pt will benefit from skilled PT to improve fxl mob, balance and endurance. Rec d/c SNF.  -SB       Row Name 01/23/25 1025          Therapy Assessment/Plan (PT)    Patient/Family Therapy Goals Statement (PT) improve function  -SB     Rehab Potential (PT) good  -SB     Criteria for Skilled Interventions Met (PT) yes;meets criteria;skilled treatment is necessary  -SB     Therapy Frequency (PT) 2 times/day  -SB     Predicted Duration of Therapy Intervention (PT) until d/c or goals met  -SB       Row Name 01/23/25 1025          Vital Signs    Pretreatment Heart Rate (beats/min) 87  -SB     Pre SpO2 (%) 95  -SB     O2 Delivery Pre Treatment nasal cannula  2.5L  -SB     O2 Delivery Intra Treatment nasal cannula  -SB     Post SpO2 (%) 92  -SB     O2 Delivery Post Treatment nasal cannula  -SB       Row Name 01/23/25 1025          Positioning and Restraints    Pre-Treatment Position in bed  -SB     Post Treatment Position bed  -SB     In Bed fowlers;call light within reach;encouraged to call for assist;exit alarm on;side rails up x2  -SB               User Key  (r) = Recorded By, (t) = Taken By, (c) = Cosigned By      Initials Name Provider Type    Racquel Jaime, PT DPT Physical Therapist                   Outcome Measures       Row Name 01/23/25 1400 01/23/25 1025       How much help from another person do you currently need...    Turning from your back to your side while in flat bed without using bedrails? 4  -KE 4  -SB    Moving from lying on back to sitting on the side of a flat bed without bedrails? 4  -KE 4  -SB    Moving to and from a bed to a  chair (including a wheelchair)? 4  -KE 3  -SB    Standing up from a chair using your arms (e.g., wheelchair, bedside chair)? 4  -KE 3  -SB    Climbing 3-5 steps with a railing? 3  -KE 2  -SB    To walk in hospital room? 3  -KE 3  -SB    AM-PAC 6 Clicks Score (PT) 22  -KE 19  -SB    Highest Level of Mobility Goal 7 --> Walk 25 feet or more  -KE 6 --> Walk 10 steps or more  -SB      Row Name 01/23/25 0746          How much help from another person do you currently need...    Turning from your back to your side while in flat bed without using bedrails? 4  -MS     Moving from lying on back to sitting on the side of a flat bed without bedrails? 4  -MS     Moving to and from a bed to a chair (including a wheelchair)? 4  -MS     Standing up from a chair using your arms (e.g., wheelchair, bedside chair)? 4  -MS     Climbing 3-5 steps with a railing? 3  -MS     To walk in hospital room? 3  -MS     AM-PAC 6 Clicks Score (PT) 22  -MS     Highest Level of Mobility Goal 7 --> Walk 25 feet or more  -MS       Row Name 01/23/25 1026 01/23/25 1025       Functional Assessment    Outcome Measure Options AM-PAC 6 Clicks Daily Activity (OT)  -LS AM-PAC 6 Clicks Basic Mobility (PT)  -SB              User Key  (r) = Recorded By, (t) = Taken By, (c) = Cosigned By      Initials Name Provider Type    Racquel Jaime, PT DPT Physical Therapist    Tatyana Ponce, RN Registered Nurse    Peggy Stevenson, OTR/L Occupational Therapist    Theresa Rodriguez RN Registered Nurse                                 Physical Therapy Education       Title: PT OT SLP Therapies (In Progress)       Topic: Physical Therapy (In Progress)       Point: Mobility training (In Progress)       Learning Progress Summary            Patient Acceptance, E, NR by SB at 1/23/2025 1042    Comment: pt edu on POC, benefits of act and d/c plans                      Point: Home exercise program (Not Started)       Learner Progress:  Not documented in this visit.               Point: Body mechanics (Not Started)       Learner Progress:  Not documented in this visit.              Point: Precautions (In Progress)       Learning Progress Summary            Patient Acceptance, E, NR by SB at 1/23/2025 1042    Comment: pt edu on POC, benefits of act and d/c plans                                      User Key       Initials Effective Dates Name Provider Type Discipline    SB 07/11/23 -  Racquel Choi, PT DPT Physical Therapist PT                  PT Recommendation and Plan  Planned Therapy Interventions (PT): balance training, strengthening, bed mobility training, gait training, patient/family education, transfer training  Progress: no change  Outcome Evaluation: PT eval completed. Pt alert and oriented x4 and very [place]. Pt reports independence at home with use of rollator. Pt reports SOA and is on 2.5L O2 with sats in 90s. Pt performs sup to sit with SBA and demos generalized weakness and inc in SOA. Pt performs sit to stand t/f with CGA and takes a few steps at EOB with CGA-min A due to decreased balance and post lean. Pt returns to supine with SBA. Pt will benefit from skilled PT to improve fxl mob, balance and endurance. Rec d/c SNF.     Time Calculation:         PT Charges       Row Name 01/23/25 1505 01/23/25 1354          Time Calculation    Start Time 1025  -SB 1354  -PILO     Stop Time 1105  -SB 1420  -PILO     Time Calculation (min) 40 min  -SB 26 min  -PILO     PT Received On 01/23/25  -SB 01/23/25  -PILO     PT Goal Re-Cert Due Date 02/02/25  -SB --        Time Calculation- PT    Total Timed Code Minutes- PT -- 26 minute(s)  -PILO        Timed Charges    56625 - PT Therapeutic Exercise Minutes -- 10  -PILO     81321 - Gait Training Minutes  -- 16  -PILO        Untimed Charges    PT Eval/Re-eval Minutes 40  -SB --        Total Minutes    Timed Charges Total Minutes -- 26  -PILO     Untimed Charges Total Minutes 40  -SB --      Total Minutes 40  -SB 26  -PILO               User Key  (r) =  Recorded By, (t) = Taken By, (c) = Cosigned By      Initials Name Provider Type    PILO Levar Bullock, PTA Physical Therapist Assistant    Racquel Jaime, PT DPT Physical Therapist                  Therapy Charges for Today       Code Description Service Date Service Provider Modifiers Qty    [id number] HC PT EVAL MOD COMPLEXITY 3 1/23/2025 Racquel Choi PT DPT GP 1            PT G-Codes  Outcome Measure Options: AM-PAC 6 Clicks Daily Activity (OT)  AM-PAC 6 Clicks Score (PT): 22  AM-PAC 6 Clicks Score (OT): 21  PT Discharge Summary  Anticipated Discharge Disposition (PT): skilled nursing facility    Racquel Choi PT DPT  1/23/2025

## 2025-01-23 NOTE — CONSULTS
"Pharmacy Dosing Service  Antimicrobial Dosing  Zosyn    Assessment/Action/Plan:  Based on indication and renal function, initiated extended-infusion Zosyn 4.5 gm IV every 8 hours. Pharmacy will continue to monitor daily and make further adjustment(s) accordingly.     Subjective:  Juan Luis Collazo is a 84 y.o. male with a  \"Pharmacy to Dose Zosyn\" consult for the treatment of Pneumonia , day 1 of 7 of treatment.    Objective:  Ht: 165.1 cm (65\"); Wt: 71 kg (156 lb 9.6 oz); BMI: Body mass index is 26.06 kg/m².  Estimated Creatinine Clearance: 96.8 mL/min (A) (by C-G formula based on SCr of 0.55 mg/dL (L)).   Creatinine   Date Value Ref Range Status   01/22/2025 0.55 (L) 0.76 - 1.27 mg/dL Final      Lab Results   Component Value Date    WBC 18.40 (H) 01/22/2025      Baseline culture results:  Microbiology Results (last 10 days)       Procedure Component Value - Date/Time    COVID PRE-OP / PRE-PROCEDURE SCREENING ORDER (NO ISOLATION) - Swab, Nasopharynx [[id number]]  (Normal) Collected: 01/22/25 2222    Lab Status: Final result Specimen: Swab from Nasopharynx Updated: 01/22/25 2245    Narrative:      The following orders were created for panel order COVID PRE-OP / PRE-PROCEDURE SCREENING ORDER (NO ISOLATION) - Swab, Nasopharynx.  Procedure                               Abnormality         Status                     ---------                               -----------         ------                     COVID-19 and FLU A/B PCR...[[id number]]  Normal              Final result                 Please view results for these tests on the individual orders.    COVID-19 and FLU A/B PCR, 1 HR TAT - Swab, Nasopharynx [[id number]]  (Normal) Collected: 01/22/25 2222    Lab Status: Final result Specimen: Swab from Nasopharynx Updated: 01/22/25 2245     COVID19 Not Detected     Influenza A PCR Not Detected     Influenza B PCR Not Detected    Narrative:      Fact sheet for providers: https://www.fda.gov/media/648258/download    Fact " sheet for patients: https://www.fda.gov/media/737856/download    Test performed by PCR.    Respiratory Panel PCR w/COVID-19(SARS-CoV-2) [facility] In-House, NP Swab in UTM/VTM, 2 HR TAT - Swab, Nasopharynx [[id number]]  (Normal) Collected: 01/22/25 2222    Lab Status: Final result Specimen: Swab from Nasopharynx Updated: 01/23/25 0006     ADENOVIRUS, PCR Not Detected     Coronavirus 229E Not Detected     Coronavirus HKU1 Not Detected     Coronavirus NL63 Not Detected     Coronavirus OC43 Not Detected     COVID19 Not Detected     Human Metapneumovirus Not Detected     Human Rhinovirus/Enterovirus Not Detected     Influenza A PCR Not Detected     Influenza B PCR Not Detected     Parainfluenza Virus 1 Not Detected     Parainfluenza Virus 2 Not Detected     Parainfluenza Virus 3 Not Detected     Parainfluenza Virus 4 Not Detected     RSV, PCR Not Detected     Bordetella pertussis pcr Not Detected     Bordetella parapertussis PCR Not Detected     Chlamydophila pneumoniae PCR Not Detected     Mycoplasma pneumo by PCR Not Detected    Narrative:      In the setting of a positive respiratory panel with a viral infection PLUS a negative procalcitonin without other underlying concern for bacterial infection, consider observing off antibiotics or discontinuation of antibiotics and continue supportive care. If the respiratory panel is positive for atypical bacterial infection (Bordetella pertussis, Chlamydophila pneumoniae, or Mycoplasma pneumoniae), consider antibiotic de-escalation to target atypical bacterial infection.            Rob Corado, Laci  01/23/25 03:18 CST

## 2025-01-23 NOTE — H&P
[facility] Medicine Services  HISTORY AND PHYSICAL    Date of Admission: 1/22/2025  Primary Care Physician: Santiago Aaron MD    Subjective   Primary Historian: Patient and daughter at bedside    Chief Complaint: Shortness of breath    History of Present Illness  This is an 84-year-old male patient with a medical history of hypertension, presenting to the ED for the evaluation of shortness of breath.  As reported, he was having progressive shortness of breath for the last few days, associated with some episodes of confusion earlier today, fever and vomiting.  To note that he had fall in November and it resulted in several ribs fractures.  Patient denies any abdominal pain at the moment.        Review of Systems   Otherwise complete ROS reviewed and negative except as mentioned in the HPI.    Past Medical History:   Past Medical History:   Diagnosis Date    Arthritis     Back pain     Cancer     CHEST, SKIN CANCER    GERD (gastroesophageal reflux disease)     History of colon polyps     History of prostate cancer     Hypertension     Low back pain     Macular degeneration     Osteopenia      Past Surgical History:  Past Surgical History:   Procedure Laterality Date    APPENDECTOMY      CATARACT EXTRACTION, BILATERAL      COLONOSCOPY  09/17/2013    Dr. José-One 2-3mm polyp at 20cm; Otherwise normal; Repeat 3 years    COLONOSCOPY N/A 12/07/2022    Procedure: COLONOSCOPY WITH ANESTHESIA;  Surgeon: Bri Alexis MD;  Location: [facility] ENDOSCOPY;  Service: Gastroenterology;  Laterality: N/A;  pre: anemia. hx polyps.  post: polyps. diverticulosis.  no PCP    ENDOSCOPY N/A 12/07/2022    Procedure: ESOPHAGOGASTRODUODENOSCOPY WITH ANESTHESIA;  Surgeon: Bri Alexis MD;  Location: [facility] ENDOSCOPY;  Service: Gastroenterology;  Laterality: N/A;  pre: anemia  post: hiatal hernia. esophagitis.gastric erosions.  no PCP    FOOT SURGERY Right     KYPHOPLASTY Bilateral 07/17/2018     Procedure: L3 KYPHOPLASTY 1-2 LEVELS;  Surgeon: Vega Pandey MD;  Location:  [facility] OR;  Service: Neurosurgery    KYPHOPLASTY Bilateral 09/11/2018    Procedure: L4 KYPHOPLASTY WITH BIOPSY;  Surgeon: Vega Pandey MD;  Location:  [facility] OR;  Service: Neurosurgery    KYPHOPLASTY WITH BIOPSY N/A 01/25/2022    Procedure: KYPHOPLASTY WITH BIOPSY, THORACIC 6 and LUMBAR 5;  Surgeon: Nick Martínez MD;  Location:  [facility] OR;  Service: Neurosurgery;  Laterality: N/A;    KYPHOPLASTY WITH BIOPSY N/A 8/18/2023    Procedure: Thoracic 12, KYPHOPLASTY WITH BIOPSY;  Surgeon: Nick Martínez MD;  Location:  [facility] OR;  Service: Neurosurgery;  Laterality: N/A;    PROSTATECTOMY      TONSILLECTOMY      TOTAL SHOULDER REPLACEMENT Bilateral      Social History:  reports that he quit smoking about 33 years ago. His smoking use included cigarettes. He started smoking about 63 years ago. He has a 30 pack-year smoking history. He has never used smokeless tobacco. He reports that he does not currently use alcohol. He reports that he does not use drugs.    Family History: family history includes No Known Problems in his mother; Prostate cancer in his father.       Allergies:  No Known Allergies    Medications:  Prior to Admission medications    Medication Sig Start Date End Date Taking? Authorizing Provider   albuterol sulfate  (90 Base) MCG/ACT inhaler Inhale 2 puffs Every 6 (Six) Hours As Needed for Wheezing or Shortness of Air. 5/15/24   Aleida Valle APRN   cetirizine (zyrTEC) 10 MG tablet Take 1 tablet by mouth Daily.    Provider, MD Rosa Elena   denosumab (PROLIA) 60 MG/ML solution prefilled syringe syringe Inject 1 mL under the skin into the appropriate area as directed Every 6 (Six) Months. 5/3/23   Christie Oconnell APRN   DULoxetine (Cymbalta) 20 MG capsule Take 1 capsule by mouth Daily. 11/12/24   Santiago Aaron MD   HYDROcodone-acetaminophen (NORCO) 5-325 MG per tablet Take 1  "tablet by mouth Every 6 (Six) Hours As Needed for Severe Pain. 1/3/25   Santiago Aaron MD   multivitamins-minerals (PRESERVISION AREDS 2) capsule capsule Take 1 capsule by mouth 2 (Two) Times a Day.    Provider, MD Rosa Elena   naloxone (NARCAN) 4 MG/0.1ML nasal spray Call 911. Don't prime. [place] in 1 nostril for overdose. Repeat in 2-3 minutes in other nostril if no or minimal breathing/responsiveness. 1/9/25   Santiago Aaron MD   olmesartan (BENICAR) 20 MG tablet TAKE 1.5 TABLETS BY MOUTH DAILY. 1/20/25   Santiago Aaron MD   olmesartan (BENICAR) 20 MG tablet Take 1 tablet by mouth Daily.    Provider, MD Rosa Elena   oxyCODONE (Roxicodone) 5 MG immediate release tablet Take 1 tablet by mouth Every 4 (Four) Hours As Needed for Moderate Pain. 1/9/25   Santiago Aaron MD   traMADol-acetaminophen (ULTRACET) 37.5-325 MG per tablet Take 1 tablet by mouth Every 8 (Eight) Hours As Needed for Moderate Pain. 11/12/24   Santiago Aaron MD     I have utilized all available immediate resources to obtain, update, or review the patient's current medications (including all prescriptions, over-the-counter products, herbals, cannabis/cannabidiol products, and vitamin/mineral/dietary (nutritional) supplements).    Objective     Vital Signs: /74   Pulse 93   Temp 98.6 °F (37 °C) (Oral)   Resp 20   Ht 165.1 cm (65\")   Wt 71 kg (156 lb 9.6 oz)   SpO2 97%   BMI 26.06 kg/m²   Physical Exam  Constitutional:       Appearance: Normal appearance.   Cardiovascular:      Rate and Rhythm: Normal rate and regular rhythm.      Pulses: Normal pulses.      Heart sounds: Normal heart sounds. No murmur heard.  Pulmonary:      Effort: Pulmonary effort is normal. No respiratory distress.      Breath sounds: Normal breath sounds. No wheezing or rales.   Abdominal:      General: Bowel sounds are normal. There is no distension.      Palpations: Abdomen is soft.      Tenderness: There is no abdominal tenderness. There " is no guarding.   Musculoskeletal:      Right lower leg: No edema.      Left lower leg: No edema.   Neurological:      Mental Status: He is alert and oriented to person, place, and time. Mental status is at baseline.              Results Reviewed:  Lab Results (last 24 hours)       Procedure Component Value Units Date/Time    Urinalysis With Culture If Indicated - Urine, Clean Catch [[id number]]  (Abnormal) Collected: 01/23/25 0034    Specimen: Urine, Clean Catch Updated: 01/23/25 0101     Color, UA Dark Yellow     Appearance, UA Cloudy     pH, UA 6.0     Specific Gravity, UA >=1.030     Glucose, UA Negative     Ketones, UA Trace     Bilirubin, UA Negative     Blood, UA Small (1+)     Protein, UA >=300 mg/dL (3+)     Leuk Esterase, UA Moderate (2+)     Nitrite, UA Negative     Urobilinogen, UA 1.0 E.U./dL    Narrative:      In absence of clinical symptoms, the presence of pyuria, bacteria, and/or nitrites on the urinalysis result does not correlate with infection.    Urinalysis, Microscopic Only - Urine, Clean Catch [[id number]]  (Abnormal) Collected: 01/23/25 0034    Specimen: Urine, Clean Catch Updated: 01/23/25 0101     RBC, UA 0-2 /HPF      WBC, UA 11-20 /HPF      Bacteria, UA 1+ /HPF      Squamous Epithelial Cells, UA 0-2 /HPF      Yeast, UA Moderate/2+ Budding Yeast /HPF      Hyaline Casts, UA 0-2 /LPF      Mucus, UA Small/1+ /HPF      Methodology Manual Light Microscopy    Urine Culture - Urine, Urine, Clean Catch [[id number]] Collected: 01/23/25 0034    Specimen: Urine, Clean Catch Updated: 01/23/25 0101    Blood Gas, Arterial With Co-Ox [[id number]]  (Abnormal) Collected: 01/23/25 0100    Specimen: Arterial Blood Updated: 01/23/25 0059     Site Right Brachial     Bj's Test N/A     pH, Arterial 7.440 pH units      pCO2, Arterial 42.5 mm Hg      pO2, Arterial 53.0 mm Hg      Comment: 85 Value below critical limit        HCO3, Arterial 28.9 mmol/L      Comment: 83 Value above reference range        Base Excess,  Arterial 4.2 mmol/L      Comment: 83 Value above reference range        O2 Saturation, Arterial 89.9 %      Comment: 84 Value below reference range        Hemoglobin, Blood Gas 11.1 g/dL      Comment: 84 Value below reference range        Hematocrit, Blood Gas 33.9 %      Comment: 84 Value below reference range        Oxyhemoglobin 88.5 %      Comment: 84 Value below reference range        Methemoglobin 0.60 %      Carboxyhemoglobin 1.0 %      Temperature 37.0     Sodium, Arterial 135 mmol/L      Comment: 84 Value below reference range        Potassium, Arterial 3.4 mmol/L      Comment: 84 Value below reference range        Barometric Pressure for Blood Gas 759 mmHg      Modality Room Air     Ventilator Mode NA     Notified Who RN MILTON MCKEON     Notified By Gayle Jeter, RRT     Notified Time 01/23/2025 01:01     Collected by [id number]     Comment: Meter: [id number]     :  Gayle Jeter, NOREEN        pH, Temp Corrected 7.440 pH Units      pCO2, Temperature Corrected 42.5 mm Hg      pO2, Temperature Corrected 53.0 mm Hg     High Sensitivity Troponin T 1Hr [[id number]]  (Abnormal) Collected: 01/22/25 2334    Specimen: Blood Updated: 01/23/25 0007     HS Troponin T 25 ng/L      Troponin T Numeric Delta 1 ng/L      Troponin T % Delta 4    Narrative:      High Sensitive Troponin T Reference Range:  <14.0 ng/L- Negative Female for AMI  <22.0 ng/L- Negative Male for AMI  >=14 - Abnormal Female indicating possible myocardial injury.  >=22 - Abnormal Male indicating possible myocardial injury.   Clinicians would have to utilize clinical acumen, EKG, Troponin, and serial changes to determine if it is an Acute Myocardial Infarction or myocardial injury due to an underlying chronic condition.         Respiratory Panel PCR w/COVID-19(SARS-CoV-2) [facility] In-House, NP Swab in [facility], 2 HR TAT - Swab, Nasopharynx [[id number]]  (Normal) Collected: 01/22/25 2222    Specimen: Swab from Nasopharynx  Updated: 01/23/25 0006     ADENOVIRUS, PCR Not Detected     Coronavirus 229E Not Detected     Coronavirus HKU1 Not Detected     Coronavirus NL63 Not Detected     Coronavirus OC43 Not Detected     COVID19 Not Detected     Human Metapneumovirus Not Detected     Human Rhinovirus/Enterovirus Not Detected     Influenza A PCR Not Detected     Influenza B PCR Not Detected     Parainfluenza Virus 1 Not Detected     Parainfluenza Virus 2 Not Detected     Parainfluenza Virus 3 Not Detected     Parainfluenza Virus 4 Not Detected     RSV, PCR Not Detected     Bordetella pertussis pcr Not Detected     Bordetella parapertussis PCR Not Detected     Chlamydophila pneumoniae PCR Not Detected     Mycoplasma pneumo by PCR Not Detected    Narrative:      In the setting of a positive respiratory panel with a viral infection PLUS a negative procalcitonin without other underlying concern for bacterial infection, consider observing off antibiotics or discontinuation of antibiotics and continue supportive care. If the respiratory panel is positive for atypical bacterial infection (Bordetella pertussis, Chlamydophila pneumoniae, or Mycoplasma pneumoniae), consider antibiotic de-escalation to target atypical bacterial infection.    Blood Culture - Blood, Arm, Right [[id number]] Collected: 01/22/25 2336    Specimen: Blood from Arm, Right Updated: 01/22/25 2345    Blood Culture - Blood, Arm, Left [[id number]] Collected: 01/22/25 2334    Specimen: Blood from Arm, Left Updated: 01/22/25 2339    Procalcitonin [[id number]]  (Abnormal) Collected: 01/22/25 2221    Specimen: Blood Updated: 01/22/25 2338     Procalcitonin 0.48 ng/mL     Narrative:      As a Marker for Sepsis (Non-Neonates):    1. <0.5 ng/mL represents a low risk of severe sepsis and/or septic shock.  2. >2 ng/mL represents a high risk of severe sepsis and/or septic shock.    As a Marker for Lower Respiratory Tract Infections that require antibiotic therapy:    PCT on Admission     "Antibiotic Therapy       6-12 Hrs later    >0.5                Strongly Recommended  >0.25 - <0.5        Recommended   0.1 - 0.25          Discouraged              Remeasure/reassess PCT  <0.1                Strongly Discouraged     Remeasure/reassess PCT    As 28 day mortality risk marker: \"Change in Procalcitonin Result\" (>80% or <=80%) if Day 0 (or Day 1) and Day 4 values are available. Refer to http://www.[email or web address] [email or web address]pct[place]cal[email or web address]    Change in PCT <=80%  A decrease of PCT levels below or equal to 80% defines a positive change in PCT test result representing a higher risk for 28-day all-cause mortality of patients diagnosed with severe sepsis for septic shock.    Change in PCT >80%  A decrease of PCT levels of more than 80% defines a negative change in PCT result representing a lower risk for 28-day all-cause mortality of patients diagnosed with severe sepsis or septic shock.       Lactic Acid, Plasma [[id number]]  (Normal) Collected: 01/22/25 2221    Specimen: Blood Updated: 01/22/25 2324     Lactate 2.0 mmol/L     D-dimer, Quantitative [[id number]]  (Abnormal) Collected: 01/22/25 2221    Specimen: Blood Updated: 01/22/25 2323     D-Dimer, Quantitative 1.02 MCGFEU/mL     Narrative:      According to the assay 's published package insert, a normal (<0.50 MCGFEU/mL) D-dimer result in conjunction with a non-high clinical probability assessment, excludes deep vein thrombosis (DVT) and pulmonary embolism (PE) with high sensitivity.    D-dimer values increase with age and this can make VTE exclusion of an older population difficult. To address this, the [employer] College [employer], based on best available evidence and recent guidelines, recommends that clinicians use age-adjusted D-dimer thresholds in patients greater than 50 years of age with: a) a low probability of PE who do not meet all Pulmonary Embolism Rule Out Criteria, or b) in those with intermediate probability of PE.   The formula for an " "age-adjusted D-dimer cut-off is \"age/100\".  For example, a 60 year old patient would have an age-adjusted cut-off of 0.60 MCGFEU/mL and an 80 year old 0.80 MCGFEU/mL.    Comprehensive Metabolic Panel [[id number]]  (Abnormal) Collected: 01/22/25 2221    Specimen: Blood Updated: 01/22/25 2306     Glucose 129 mg/dL      BUN 13 mg/dL      Creatinine 0.55 mg/dL      Sodium 133 mmol/L      Potassium 3.7 mmol/L      Comment: Slight hemolysis detected by analyzer. Result may be falsely elevated.        Chloride 97 mmol/L      CO2 25.0 mmol/L      Calcium 7.9 mg/dL      Total Protein 8.4 g/dL      Albumin 3.4 g/dL      ALT (SGPT) 9 U/L      AST (SGOT) 18 U/L      Comment: Slight hemolysis detected by analyzer. Result may be falsely elevated.        Alkaline Phosphatase 103 U/L      Total Bilirubin 0.9 mg/dL      Globulin 5.0 gm/dL      A/G Ratio 0.7 g/dL      BUN/Creatinine Ratio 23.6     Anion Gap 11.0 mmol/L      eGFR 96.5 mL/min/1.73     Narrative:      GFR Categories in Chronic Kidney Disease (CKD)      GFR Category          GFR (mL/min/1.73)    Interpretation  G1                     90 or greater         Normal or high (1)  G2                      60-89                Mild decrease (1)  G3a                   45-59                Mild to moderate decrease  G3b                   30-44                Moderate to severe decrease  G4                    15-29                Severe decrease  G5                    14 or less           Kidney failure          (1)In the absence of evidence of kidney disease, neither GFR category G1 or G2 fulfill the criteria for CKD.    eGFR calculation 2021 CKD-EPI creatinine equation, which does not include race as a factor    CBC & Differential [[id number]]  (Abnormal) Collected: 01/22/25 2221    Specimen: Blood Updated: 01/22/25 2300    Narrative:      The following orders were created for panel order CBC & Differential.  Procedure                               Abnormality         Status        "              ---------                               -----------         ------                     CBC Auto Differential[[id number]]        Abnormal            Final result                 Please view results for these tests on the individual orders.    CBC Auto Differential [[id number]]  (Abnormal) Collected: 01/22/25 2221    Specimen: Blood Updated: 01/22/25 2300     WBC 18.40 10*3/mm3      RBC 4.11 10*6/mm3      Hemoglobin 12.4 g/dL      Hematocrit 37.5 %      MCV 91.2 fL      MCH 30.2 pg      MCHC 33.1 g/dL      RDW 14.8 %      RDW-SD 49.5 fl      MPV 10.6 fL      Platelets 245 10*3/mm3      Neutrophil % 79.9 %      Lymphocyte % 7.1 %      Monocyte % 12.2 %      Eosinophil % 0.0 %      Basophil % 0.1 %      Immature Grans % 0.7 %      Neutrophils, Absolute 14.72 10*3/mm3      Lymphocytes, Absolute 1.31 10*3/mm3      Monocytes, Absolute 2.24 10*3/mm3      Eosinophils, Absolute 0.00 10*3/mm3      Basophils, Absolute 0.01 10*3/mm3      Immature Grans, Absolute 0.12 10*3/mm3      nRBC 0.0 /100 WBC     BNP [[id number]]  (Abnormal) Collected: 01/22/25 2221    Specimen: Blood Updated: 01/22/25 2258     proBNP 2,971.0 pg/mL     Narrative:      This assay is used as an aid in the diagnosis of individuals suspected of having heart failure. It can be used as an aid in the diagnosis of acute decompensated heart failure (ADHF) in patients presenting with signs and symptoms of ADHF to the emergency department (ED). In addition, NT-proBNP of <300 pg/mL indicates ADHF is not likely.    Age Range Result Interpretation  NT-proBNP Concentration (pg/mL:      <50             Positive            >450                   Gray                 300-450                    Negative             <300    50-75           Positive            >900                  Gray                300-900                  Negative            <300      >75             Positive            >1800                  Gray                300-1800                  Negative             <300    High Sensitivity Troponin T [[id number]]  (Abnormal) Collected: 01/22/25 2221    Specimen: Blood Updated: 01/22/25 2258     HS Troponin T 24 ng/L     Narrative:      High Sensitive Troponin T Reference Range:  <14.0 ng/L- Negative Female for AMI  <22.0 ng/L- Negative Male for AMI  >=14 - Abnormal Female indicating possible myocardial injury.  >=22 - Abnormal Male indicating possible myocardial injury.   Clinicians would have to utilize clinical acumen, EKG, Troponin, and serial changes to determine if it is an Acute Myocardial Infarction or myocardial injury due to an underlying chronic condition.         COVID PRE-OP / PRE-PROCEDURE SCREENING ORDER (NO ISOLATION) - Swab, Nasopharynx [[id number]]  (Normal) Collected: 01/22/25 2222    Specimen: Swab from Nasopharynx Updated: 01/22/25 2245    Narrative:      The following orders were created for panel order COVID PRE-OP / PRE-PROCEDURE SCREENING ORDER (NO ISOLATION) - Swab, Nasopharynx.  Procedure                               Abnormality         Status                     ---------                               -----------         ------                     COVID-19 and FLU A/B PCR...[[id number]]  Normal              Final result                 Please view results for these tests on the individual orders.    COVID-19 and FLU A/B PCR, 1 HR TAT - Swab, Nasopharynx [[id number]]  (Normal) Collected: 01/22/25 2222    Specimen: Swab from Nasopharynx Updated: 01/22/25 2245     COVID19 Not Detected     Influenza A PCR Not Detected     Influenza B PCR Not Detected    Narrative:      Fact sheet for providers: https://www.fda.gov/media/324305/download    Fact sheet for patients: https://www.fda.gov/media/615278/download    Test performed by PCR.    [place] Draw [[id number]] Collected: 01/22/25 2221    Specimen: Blood Updated: 01/22/25 2230    Narrative:      The following orders were created for panel order [place] Draw.  Procedure                                Abnormality         Status                     ---------                               -----------         ------                     Green Top (Gel)[[id number]]                                  Final result               Lavender Top[[id number]]                                     Final result               Red Top[[id number]]                                          Final result               Hernandez Top[[id number]]                                         Final result               Light Blue Top[[id number]]                                   Final result                 Please view results for these tests on the individual orders.    Lavender Top [[id number]] Collected: 01/22/25 2221    Specimen: Blood Updated: 01/22/25 2230     Extra Tube hold for add-on     Comment: Auto resulted       Gray Top [[id number]] Collected: 01/22/25 2221    Specimen: Blood Updated: 01/22/25 2230     Extra Tube Hold for add-ons.     Comment: Auto resulted.       Green Top (Gel) [[id number]] Collected: 01/22/25 2221    Specimen: Blood Updated: 01/22/25 2230     Extra Tube Hold for add-ons.     Comment: Auto resulted.       Red Top [[id number]] Collected: 01/22/25 2221    Specimen: Blood Updated: 01/22/25 2230     Extra Tube Hold for add-ons.     Comment: Auto resulted.       Light Blue Top [[id number]] Collected: 01/22/25 2221    Specimen: Blood Updated: 01/22/25 2230     Extra Tube Hold for add-ons.     Comment: Auto resulted             Imaging Results (Last 24 Hours)       Procedure Component Value Units Date/Time    CT Abdomen Pelvis With Contrast [[id number]] Resulted: 01/23/25 0007     Updated: 01/23/25 0022    CT Angiogram Chest [[id number]] Resulted: 01/23/25 0007     Updated: 01/23/25 0022    XR Chest 2 View [[id number]] Resulted: 01/22/25 2230     Updated: 01/22/25 2240          I have personally reviewed and interpreted the radiology studies and ECG obtained at time of admission.     Assessment / Plan   Assessment:   Active Hospital Problems     Diagnosis     **Pneumonia        Treatment Plan  The patient will be admitted to my service here at [facility].     Assessment and plan:  #Right upper lobe pneumonia.  #UTI  #Recurrent falls.    -Admitting to floor.  -ED started him on Zosyn covering both UTI and pneumonia, will continue the same, adding doxycycline for the pneumonia.  Pending cultures.  -PT and OT ordered.  -Orthostatic vitals ordered.  -Fall precautions applied.  -DVT prophylaxis with Lovenox subcu.  -Nebulizers as needed.  Incentive spirometry.  Oxygen as needed.    Medical Decision Making  Number and Complexity of problems: 3 and moderate  Differential Diagnosis: As above    Conditions and Status        Condition is worsening.     MDM Data  External documents reviewed: Yes  Cardiac tracing (EKG, telemetry) interpretation: Yes, EKG interpretation reviewed  Radiology interpretation: Yes scans reviewed  Labs reviewed: Yes  Any tests that were considered but not ordered: None     Decision rules/scores evaluated (example [id number], [place], etc): None     Discussed with: Patient, daughter at bedside, ED provider, and nurses     Care Planning  Shared decision making: Yes explained the plan to the patient and family and they are agreeable with plan  Code status and discussions: I discussed CODE STATUS with the patient he wants to be full code    Disposition  Social Determinants of Health that impact treatment or disposition: None at the moment  Estimated length of stay is 2 to 3 days    I confirmed that the patient's advanced care plan is present, code status is documented, and a surrogate decision maker is listed in the patient's medical record.       The patient was seen and examined by me on 1/23 at 1 AM.    Electronically signed by Ciaran Seo MD, 01/23/25, 03:15 CST.

## 2025-01-23 NOTE — ED PROVIDER NOTES
Subjective   History of Present Illness  Patient is a 84-year-old male presents the emergency department with nausea, vomiting, fever, shortness of breath.  He has right-sided chest wall pain from fractured ribs which he sustained from a fall in November which is chronic.  He states he started having fever and vomiting and some episodes of confusion earlier today.  He states his pain in his right side has been worse as well.  He has been more short of breath today as well.  He has had cough as well.  No diarrhea.  No headache.    History provided by:  Patient   used: No        Review of Systems   Constitutional:  Positive for fever.   HENT:  Positive for congestion.    Eyes: Negative.    Respiratory:  Positive for cough and shortness of breath.    Cardiovascular: Negative.    Gastrointestinal:  Positive for nausea and vomiting.   Endocrine: Negative.    Genitourinary: Negative.    Musculoskeletal: Negative.    Skin: Negative.    Allergic/Immunologic: Negative.    Neurological: Negative.    Hematological: Negative.    Psychiatric/Behavioral: Negative.     All other systems reviewed and are negative.      Past Medical History:   Diagnosis Date    Arthritis     Back pain     Cancer     CHEST, SKIN CANCER    GERD (gastroesophageal reflux disease)     History of colon polyps     History of prostate cancer     Hypertension     Low back pain     Macular degeneration     Osteopenia        No Known Allergies    Past Surgical History:   Procedure Laterality Date    APPENDECTOMY      CATARACT EXTRACTION, BILATERAL      COLONOSCOPY  09/17/2013    Dr. José-One 2-3mm polyp at 20cm; Otherwise normal; Repeat 3 years    COLONOSCOPY N/A 12/07/2022    Procedure: COLONOSCOPY WITH ANESTHESIA;  Surgeon: Bri Alexis MD;  Location: [facility] ENDOSCOPY;  Service: Gastroenterology;  Laterality: N/A;  pre: anemia. hx polyps.  post: polyps. diverticulosis.  no PCP    ENDOSCOPY N/A 12/07/2022    Procedure:  ESOPHAGOGASTRODUODENOSCOPY WITH ANESTHESIA;  Surgeon: Bri Alexis MD;  Location: [facility] ENDOSCOPY;  Service: Gastroenterology;  Laterality: N/A;  pre: anemia  post: hiatal hernia. esophagitis.gastric erosions.  no PCP    FOOT SURGERY Right     KYPHOPLASTY Bilateral 2018    Procedure: L3 KYPHOPLASTY 1-2 LEVELS;  Surgeon: Vega Pandey MD;  Location:  [facility] OR;  Service: Neurosurgery    KYPHOPLASTY Bilateral 2018    Procedure: L4 KYPHOPLASTY WITH BIOPSY;  Surgeon: Vega Pandey MD;  Location:  [facility] OR;  Service: Neurosurgery    KYPHOPLASTY WITH BIOPSY N/A 2022    Procedure: KYPHOPLASTY WITH BIOPSY, THORACIC 6 and LUMBAR 5;  Surgeon: Nick Martínez MD;  Location:  [facility] OR;  Service: Neurosurgery;  Laterality: N/A;    KYPHOPLASTY WITH BIOPSY N/A 2023    Procedure: Thoracic 12, KYPHOPLASTY WITH BIOPSY;  Surgeon: Nick Martínez MD;  Location:  [facility] OR;  Service: Neurosurgery;  Laterality: N/A;    PROSTATECTOMY      TONSILLECTOMY      TOTAL SHOULDER REPLACEMENT Bilateral        Family History   Problem Relation Age of Onset    No Known Problems Mother     Prostate cancer Father        Social History     Socioeconomic History    Marital status:    Tobacco Use    Smoking status: Former     Current packs/day: 0.00     Average packs/day: 1 pack/day for 30.0 years (30.0 ttl pk-yrs)     Types: Cigarettes     Start date: 9/10/1961     Quit date: 9/10/1991     Years since quittin.3    Smokeless tobacco: Never    Tobacco comments:     1991   Vaping Use    Vaping status: Never Used   Substance and Sexual Activity    Alcohol use: Not Currently    Drug use: No    Sexual activity: Not Currently     Partners: Female     Birth control/protection: Post-menopausal       Prior to Admission medications    Medication Sig Start Date End Date Taking? Authorizing Provider   albuterol sulfate  (90 Base) MCG/ACT inhaler Inhale 2 puffs Every 6 (Six) Hours As  "Needed for Wheezing or Shortness of Air. 5/15/24   Aleida Valle APRN   cetirizine (zyrTEC) 10 MG tablet Take 1 tablet by mouth Daily.    ProviderRosa Elena MD   denosumab (PROLIA) 60 MG/ML solution prefilled syringe syringe Inject 1 mL under the skin into the appropriate area as directed Every 6 (Six) Months. 5/3/23   Christie Oconnell APRN   DULoxetine (Cymbalta) 20 MG capsule Take 1 capsule by mouth Daily. 11/12/24   Santiago Aaron MD   HYDROcodone-acetaminophen (NORCO) 5-325 MG per tablet Take 1 tablet by mouth Every 6 (Six) Hours As Needed for Severe Pain. 1/3/25   Santiago Aaron MD   multivitamins-minerals (PRESERVISION AREDS 2) capsule capsule Take 1 capsule by mouth 2 (Two) Times a Day.    Provider, MD Rosa Elena   naloxone (NARCAN) 4 MG/0.1ML nasal spray Call 911. Don't prime. [place] in 1 nostril for overdose. Repeat in 2-3 minutes in other nostril if no or minimal breathing/responsiveness. 1/9/25   Santiago Aaron MD   olmesartan (BENICAR) 20 MG tablet TAKE 1.5 TABLETS BY MOUTH DAILY. 1/20/25   Santiago Aaron MD   olmesartan (BENICAR) 20 MG tablet Take 1 tablet by mouth Daily.    Provider, MD Rosa Elena   oxyCODONE (Roxicodone) 5 MG immediate release tablet Take 1 tablet by mouth Every 4 (Four) Hours As Needed for Moderate Pain. 1/9/25   Santiago Aaron MD   traMADol-acetaminophen (ULTRACET) 37.5-325 MG per tablet Take 1 tablet by mouth Every 8 (Eight) Hours As Needed for Moderate Pain. 11/12/24   Santiago Aaron MD       /75   Pulse 100 Comment: sinus  Temp 98.6 °F (37 °C) (Oral)   Resp 23   Ht 165.1 cm (65\")   Wt 71 kg (156 lb 9.6 oz)   SpO2 95%   BMI 26.06 kg/m²     Objective   Physical Exam  Vitals and nursing note reviewed.   Constitutional:       Appearance: He is well-developed.      Comments: Chronically ill appearing. No acute distress   HENT:      Head: Normocephalic and atraumatic.   Eyes:      Conjunctiva/sclera: Conjunctivae normal.      " Pupils: Pupils are equal, round, and reactive to light.   Cardiovascular:      Rate and Rhythm: Normal rate and regular rhythm.      Heart sounds: Normal heart sounds.   Pulmonary:      Effort: Pulmonary effort is normal.      Comments: Diminished lung sounds bilateral bases.  Chest:      Chest wall: Tenderness present.      Comments: Right lateral chest wall tenderness.  No crepitus.  Abdominal:      General: Bowel sounds are normal.      Palpations: Abdomen is soft.      Comments: Abdomen is soft, nondistended.  He has some tenderness in the right upper quadrant abdomen right lateral chest wall.  No guarding or rebound.   Musculoskeletal:         General: Normal range of motion.      Cervical back: Normal range of motion and neck supple.   Skin:     General: Skin is warm and dry.   Neurological:      Mental Status: He is alert and oriented to person, place, and time.      Deep Tendon Reflexes: Reflexes are normal and symmetric.      Comments: forgeful   Psychiatric:         Behavior: Behavior normal.         Thought Content: Thought content normal.         Judgment: Judgment normal.         Procedures         Lab Results (last 24 hours)       Procedure Component Value Units Date/Time    CBC & Differential [[id number]]  (Abnormal) Collected: 01/22/25 2221    Specimen: Blood Updated: 01/22/25 2300    Narrative:      The following orders were created for panel order CBC & Differential.  Procedure                               Abnormality         Status                     ---------                               -----------         ------                     CBC Auto Differential[[id number]]        Abnormal            Final result                 Please view results for these tests on the individual orders.    Comprehensive Metabolic Panel [[id number]]  (Abnormal) Collected: 01/22/25 2221    Specimen: Blood Updated: 01/22/25 2306     Glucose 129 mg/dL      BUN 13 mg/dL      Creatinine 0.55 mg/dL      Sodium 133 mmol/L       Potassium 3.7 mmol/L      Comment: Slight hemolysis detected by analyzer. Result may be falsely elevated.        Chloride 97 mmol/L      CO2 25.0 mmol/L      Calcium 7.9 mg/dL      Total Protein 8.4 g/dL      Albumin 3.4 g/dL      ALT (SGPT) 9 U/L      AST (SGOT) 18 U/L      Comment: Slight hemolysis detected by analyzer. Result may be falsely elevated.        Alkaline Phosphatase 103 U/L      Total Bilirubin 0.9 mg/dL      Globulin 5.0 gm/dL      A/G Ratio 0.7 g/dL      BUN/Creatinine Ratio 23.6     Anion Gap 11.0 mmol/L      eGFR 96.5 mL/min/1.73     Narrative:      GFR Categories in Chronic Kidney Disease (CKD)      GFR Category          GFR (mL/min/1.73)    Interpretation  G1                     90 or greater         Normal or high (1)  G2                      60-89                Mild decrease (1)  G3a                   45-59                Mild to moderate decrease  G3b                   30-44                Moderate to severe decrease  G4                    15-29                Severe decrease  G5                    14 or less           Kidney failure          (1)In the absence of evidence of kidney disease, neither GFR category G1 or G2 fulfill the criteria for CKD.    eGFR calculation 2021 CKD-EPI creatinine equation, which does not include race as a factor    BNP [[id number]]  (Abnormal) Collected: 01/22/25 2221    Specimen: Blood Updated: 01/22/25 2258     proBNP 2,971.0 pg/mL     Narrative:      This assay is used as an aid in the diagnosis of individuals suspected of having heart failure. It can be used as an aid in the diagnosis of acute decompensated heart failure (ADHF) in patients presenting with signs and symptoms of ADHF to the emergency department (ED). In addition, NT-proBNP of <300 pg/mL indicates ADHF is not likely.    Age Range Result Interpretation  NT-proBNP Concentration (pg/mL:      <50             Positive            >450                   Gray                 300-450                     Negative             <300    50-75           Positive            >900                  Gray                300-900                  Negative            <300      >75             Positive            >1800                  Gray                300-1800                  Negative            <300    High Sensitivity Troponin T [[id number]]  (Abnormal) Collected: 01/22/25 2221    Specimen: Blood Updated: 01/22/25 2258     HS Troponin T 24 ng/L     Narrative:      High Sensitive Troponin T Reference Range:  <14.0 ng/L- Negative Female for AMI  <22.0 ng/L- Negative Male for AMI  >=14 - Abnormal Female indicating possible myocardial injury.  >=22 - Abnormal Male indicating possible myocardial injury.   Clinicians would have to utilize clinical acumen, EKG, Troponin, and serial changes to determine if it is an Acute Myocardial Infarction or myocardial injury due to an underlying chronic condition.         CBC Auto Differential [[id number]]  (Abnormal) Collected: 01/22/25 2221    Specimen: Blood Updated: 01/22/25 2300     WBC 18.40 10*3/mm3      RBC 4.11 10*6/mm3      Hemoglobin 12.4 g/dL      Hematocrit 37.5 %      MCV 91.2 fL      MCH 30.2 pg      MCHC 33.1 g/dL      RDW 14.8 %      RDW-SD 49.5 fl      MPV 10.6 fL      Platelets 245 10*3/mm3      Neutrophil % 79.9 %      Lymphocyte % 7.1 %      Monocyte % 12.2 %      Eosinophil % 0.0 %      Basophil % 0.1 %      Immature Grans % 0.7 %      Neutrophils, Absolute 14.72 10*3/mm3      Lymphocytes, Absolute 1.31 10*3/mm3      Monocytes, Absolute 2.24 10*3/mm3      Eosinophils, Absolute 0.00 10*3/mm3      Basophils, Absolute 0.01 10*3/mm3      Immature Grans, Absolute 0.12 10*3/mm3      nRBC 0.0 /100 WBC     Lactic Acid, Plasma [[id number]]  (Normal) Collected: 01/22/25 2221    Specimen: Blood Updated: 01/22/25 2324     Lactate 2.0 mmol/L     Procalcitonin [[id number]]  (Abnormal) Collected: 01/22/25 2221    Specimen: Blood Updated: 01/22/25 2338     Procalcitonin 0.48 ng/mL      "Narrative:      As a Marker for Sepsis (Non-Neonates):    1. <0.5 ng/mL represents a low risk of severe sepsis and/or septic shock.  2. >2 ng/mL represents a high risk of severe sepsis and/or septic shock.    As a Marker for Lower Respiratory Tract Infections that require antibiotic therapy:    PCT on Admission    Antibiotic Therapy       6-12 Hrs later    >0.5                Strongly Recommended  >0.25 - <0.5        Recommended   0.1 - 0.25          Discouraged              Remeasure/reassess PCT  <0.1                Strongly Discouraged     Remeasure/reassess PCT    As 28 day mortality risk marker: \"Change in Procalcitonin Result\" (>80% or <=80%) if Day 0 (or Day 1) and Day 4 values are available. Refer to [email or web address]-pct-[email or web address]    Change in PCT <=80%  A decrease of PCT levels below or equal to 80% defines a positive change in PCT test result representing a higher risk for 28-day all-cause mortality of patients diagnosed with severe sepsis for septic shock.    Change in PCT >80%  A decrease of PCT levels of more than 80% defines a negative change in PCT result representing a lower risk for 28-day all-cause mortality of patients diagnosed with severe sepsis or septic shock.       D-dimer, Quantitative [[id number]]  (Abnormal) Collected: 01/22/25 2221    Specimen: Blood Updated: 01/22/25 2323     D-Dimer, Quantitative 1.02 MCGFEU/mL     Narrative:      According to the assay 's published package insert, a normal (<0.50 MCGFEU/mL) D-dimer result in conjunction with a non-high clinical probability assessment, excludes deep vein thrombosis (DVT) and pulmonary embolism (PE) with high sensitivity.    D-dimer values increase with age and this can make VTE exclusion of an older population difficult. To address this, the [employer], based on best available evidence and recent guidelines, recommends that clinicians use age-adjusted D-dimer thresholds in patients greater than 50 " "years of age with: a) a low probability of PE who do not meet all Pulmonary Embolism Rule Out Criteria, or b) in those with intermediate probability of PE.   The formula for an age-adjusted D-dimer cut-off is \"age/100\".  For example, a 60 year old patient would have an age-adjusted cut-off of 0.60 MCGFEU/mL and an 80 year old 0.80 MCGFEU/mL.    COVID PRE-OP / PRE-PROCEDURE SCREENING ORDER (NO ISOLATION) - Swab, Nasopharynx [[id number]]  (Normal) Collected: 01/22/25 2222    Specimen: Swab from Nasopharynx Updated: 01/22/25 2245    Narrative:      The following orders were created for panel order COVID PRE-OP / PRE-PROCEDURE SCREENING ORDER (NO ISOLATION) - Swab, Nasopharynx.  Procedure                               Abnormality         Status                     ---------                               -----------         ------                     COVID-19 and FLU A/B PCR...[[id number]]  Normal              Final result                 Please view results for these tests on the individual orders.    COVID-19 and FLU A/B PCR, 1 HR TAT - Swab, Nasopharynx [[id number]]  (Normal) Collected: 01/22/25 2222    Specimen: Swab from Nasopharynx Updated: 01/22/25 2245     COVID19 Not Detected     Influenza A PCR Not Detected     Influenza B PCR Not Detected    Narrative:      Fact sheet for providers: https://www.fda.gov/media/199153/download    Fact sheet for patients: https://www.fda.gov/media/288785/download    Test performed by PCR.    Respiratory Panel PCR w/COVID-19(SARS-CoV-2) [facility] In-House, NP Swab in UTM/VTM, 2 HR TAT - Swab, Nasopharynx [[id number]]  (Normal) Collected: 01/22/25 2222    Specimen: Swab from Nasopharynx Updated: 01/23/25 0006     ADENOVIRUS, PCR Not Detected     Coronavirus 229E Not Detected     Coronavirus HKU1 Not Detected     Coronavirus NL63 Not Detected     Coronavirus OC43 Not Detected     COVID19 Not Detected     Human Metapneumovirus Not Detected     Human Rhinovirus/Enterovirus " Not Detected     Influenza A PCR Not Detected     Influenza B PCR Not Detected     Parainfluenza Virus 1 Not Detected     Parainfluenza Virus 2 Not Detected     Parainfluenza Virus 3 Not Detected     Parainfluenza Virus 4 Not Detected     RSV, PCR Not Detected     Bordetella pertussis pcr Not Detected     Bordetella parapertussis PCR Not Detected     Chlamydophila pneumoniae PCR Not Detected     Mycoplasma pneumo by PCR Not Detected    Narrative:      In the setting of a positive respiratory panel with a viral infection PLUS a negative procalcitonin without other underlying concern for bacterial infection, consider observing off antibiotics or discontinuation of antibiotics and continue supportive care. If the respiratory panel is positive for atypical bacterial infection (Bordetella pertussis, Chlamydophila pneumoniae, or Mycoplasma pneumoniae), consider antibiotic de-escalation to target atypical bacterial infection.    High Sensitivity Troponin T 1Hr [[id number]]  (Abnormal) Collected: 01/22/25 2334    Specimen: Blood Updated: 01/23/25 0007     HS Troponin T 25 ng/L      Troponin T Numeric Delta 1 ng/L      Troponin T % Delta 4    Narrative:      High Sensitive Troponin T Reference Range:  <14.0 ng/L- Negative Female for AMI  <22.0 ng/L- Negative Male for AMI  >=14 - Abnormal Female indicating possible myocardial injury.  >=22 - Abnormal Male indicating possible myocardial injury.   Clinicians would have to utilize clinical acumen, EKG, Troponin, and serial changes to determine if it is an Acute Myocardial Infarction or myocardial injury due to an underlying chronic condition.         Blood Culture - Blood, Arm, Left [[id number]] Collected: 01/22/25 2334    Specimen: Blood from Arm, Left Updated: 01/22/25 2339    Blood Culture - Blood, Arm, Right [[id number]] Collected: 01/22/25 2336    Specimen: Blood from Arm, Right Updated: 01/22/25 2345    Urinalysis With Culture If Indicated - Urine, Clean Catch [[id number]]   (Abnormal) Collected: 01/23/25 0034    Specimen: Urine, Clean Catch Updated: 01/23/25 0101     Color, UA Dark Yellow     Appearance, UA Cloudy     pH, UA 6.0     Specific Gravity, UA >=1.030     Glucose, UA Negative     Ketones, UA Trace     Bilirubin, UA Negative     Blood, UA Small (1+)     Protein, UA >=300 mg/dL (3+)     Leuk Esterase, UA Moderate (2+)     Nitrite, UA Negative     Urobilinogen, UA 1.0 E.U./dL    Narrative:      In absence of clinical symptoms, the presence of pyuria, bacteria, and/or nitrites on the urinalysis result does not correlate with infection.    Urinalysis, Microscopic Only - Urine, Clean Catch [[id number]]  (Abnormal) Collected: 01/23/25 0034    Specimen: Urine, Clean Catch Updated: 01/23/25 0101     RBC, UA 0-2 /HPF      WBC, UA 11-20 /HPF      Bacteria, UA 1+ /HPF      Squamous Epithelial Cells, UA 0-2 /HPF      Yeast, UA Moderate/2+ Budding Yeast /HPF      Hyaline Casts, UA 0-2 /LPF      Mucus, UA Small/1+ /HPF      Methodology Manual Light Microscopy    Urine Culture - Urine, Urine, Clean Catch [[id number]] Collected: 01/23/25 0034    Specimen: Urine, Clean Catch Updated: 01/23/25 0101    Blood Gas, Arterial With Co-Ox [[id number]]  (Abnormal) Collected: 01/23/25 0100    Specimen: Arterial Blood Updated: 01/23/25 0059     Site Right Brachial     Bj's Test N/A     pH, Arterial 7.440 pH units      pCO2, Arterial 42.5 mm Hg      pO2, Arterial 53.0 mm Hg      Comment: 85 Value below critical limit        HCO3, Arterial 28.9 mmol/L      Comment: 83 Value above reference range        Base Excess, Arterial 4.2 mmol/L      Comment: 83 Value above reference range        O2 Saturation, Arterial 89.9 %      Comment: 84 Value below reference range        Hemoglobin, Blood Gas 11.1 g/dL      Comment: 84 Value below reference range        Hematocrit, Blood Gas 33.9 %      Comment: 84 Value below reference range        Oxyhemoglobin 88.5 %      Comment: 84 Value below reference range         Methemoglobin 0.60 %      Carboxyhemoglobin 1.0 %      Temperature 37.0     Sodium, Arterial 135 mmol/L      Comment: 84 Value below reference range        Potassium, Arterial 3.4 mmol/L      Comment: 84 Value below reference range        Barometric Pressure for Blood Gas 759 mmHg      Modality Room Air     Ventilator Mode NA     Notified Who ELE MCKEON     Notified By Gayle Jeter RRT     Notified Time 01/23/2025 01:01     Collected by [id number]     Comment: Meter: [id number]     :  Gayle Jeter RRT        pH, Temp Corrected 7.440 pH Units      pCO2, Temperature Corrected 42.5 mm Hg      pO2, Temperature Corrected 53.0 mm Hg             XR Chest 2 View    (Results Pending)   CT Abdomen Pelvis With Contrast    (Results Pending)   CT Angiogram Chest    (Results Pending)       ED Course  ED Course as of 01/23/25 0116   Thu Jan 23, 2025   0055 Respiratory panel negative.  Pro-Franco 0.48 lactate 2 troponin 25 dimer 1.02 CBC white count 18.4 hemoglobin 12.4 hematocrit 37 CMP glucose 129 BUN 13 creatinine 0.55 sodium 133 potassium 3.7 chloride 97 calcium 7.9 BMP 2971 blood cultures have been obtained and will pending urinalysis chest x-ray showed a right upper lobe pneumonia.  Had ordered CTA of the chest which showed right upper lobe pneumonia no pulmonary embolus multiple incidental findings including severe coronary artery calcifications degenerative spine changes multiple remote lower lumbar compression fractures remote rib fractures and gynecomastia.  CT of abdomen pelvis no acute abnormality of the abdomen or pelvis 1.6 cm left adrenal body nodule recommend outpatient imaging.  Patient received a 500 mm bolus of lactated Ringer's.  When he came back from CT his O2 sat was 88% on room air.  Have ordered blood gases on the patient.  Zosyn has been ordered for the patient's pneumonia as well.  Patient's been placed on O2 at 2 L as well.  Patient's had no further nausea or vomiting while  generalized weakness in the emergency department.  call has been placed to hospitalist at this time for further. [CW]   0103 Spoke with Dr. Seo- hospitalist on call- has graciously accepted pt for admission. Reviewed results of testing with pt and pt family. They are in agreement with care plan. Will be admitted shortly in stable condition  [CW]      ED Course User Index  [CW] QuinnParis APRN        Medical Decision Making  Patient is a 84-year-old male presents the emergency department with nausea, vomiting, fever, shortness of breath.  He has right-sided chest wall pain from fractured ribs which he sustained from a fall in November which is chronic.  He states he started having fever and vomiting and some episodes of confusion earlier today.  He states his pain in his right side has been worse as well.  He has been more short of breath today as well.  He has had cough as well.  No diarrhea.  No headache.  Course of treatment in the er: Nontoxic-appearing.  No acute distress. Lungs with diminished lung sounds bilat bases. Tenderness to right lateral chest wall and ruq abd. No guarding or rebound. Labs, cxr, dimer, ivf, zofran, inflammatory markers, have been ordered  Differential diagnosis to include but not limited to: pneumonia; uti; colitis; diverticulitis; viral illness; volume depletion; electrolyte imablance; and others  Labs Reviewed  COMPREHENSIVE METABOLIC PANEL - Abnormal; Notable for the following components:     Glucose                       129 (*)                Creatinine                    0.55 (*)               Sodium                        133 (*)                Chloride                      97 (*)                 Calcium                       7.9 (*)                Albumin                       3.4 (*)             All other components within normal limits         Narrative: GFR Categories in Chronic Kidney Disease (CKD)                                      GFR Category          GFR  (mL/min/1.73)    Interpretation                  G1                     90 or greater         Normal or high (1)                  G2                      60-89                Mild decrease (1)                  G3a                   45-59                Mild to moderate decrease                  G3b                   30-44                Moderate to severe decrease                  G4                    15-29                Severe decrease                  G5                    14 or less           Kidney failure                                          (1)In the absence of evidence of kidney disease, neither GFR category G1 or G2 fulfill the criteria for CKD.                                    eGFR calculation 2021 CKD-EPI creatinine equation, which does not include race as a factor  BNP (IN-HOUSE) - Abnormal; Notable for the following components:     proBNP                        2,971.0 (*)            All other components within normal limits         Narrative: This assay is used as an aid in the diagnosis of individuals suspected of having heart failure. It can be used as an aid in the diagnosis of acute decompensated heart failure (ADHF) in patients presenting with signs and symptoms of ADHF to the emergency department (ED). In addition, NT-proBNP of <300 pg/mL indicates ADHF is not likely.                                    Age Range Result Interpretation  NT-proBNP Concentration (pg/mL:                                                      <50             Positive            >450                                   Gray                 300-450                                    Negative             <300                                    50-75           Positive            >900                                  Hernandez                300-900                                  Negative            <300                                                      >75             Positive            >1800                                   Hernandez                300-1800                                  Negative            <300  TROPONIN - Abnormal; Notable for the following components:     HS Troponin T                 24 (*)              All other components within normal limits         Narrative: High Sensitive Troponin T Reference Range:                  <14.0 ng/L- Negative Female for AMI                  <22.0 ng/L- Negative Male for AMI                  >=14 - Abnormal Female indicating possible myocardial injury.                  >=22 - Abnormal Male indicating possible myocardial injury.                   Clinicians would have to utilize clinical acumen, EKG, Troponin, and serial changes to determine if it is an Acute Myocardial Infarction or myocardial injury due to an underlying chronic condition.                                       CBC WITH AUTO DIFFERENTIAL - Abnormal; Notable for the following components:     WBC                           18.40 (*)               RBC                           4.11 (*)               Hemoglobin                    12.4 (*)               Neutrophil %                  79.9 (*)               Lymphocyte %                  7.1 (*)                Monocyte %                    12.2 (*)               Eosinophil %                  0.0 (*)                Immature Grans %              0.7 (*)                Neutrophils, Absolute         14.72 (*)               Monocytes, Absolute           2.24 (*)               Immature Grans, Absolute      0.12 (*)            All other components within normal limits  HIGH SENSITIVITIY TROPONIN T 1HR - Abnormal; Notable for the following components:     HS Troponin T                 25 (*)              All other components within normal limits         Narrative: High Sensitive Troponin T Reference Range:                  <14.0 ng/L- Negative Female for AMI                  <22.0 ng/L- Negative Male for AMI                  >=14 - Abnormal Female indicating possible myocardial  "injury.                  >=22 - Abnormal Male indicating possible myocardial injury.                   Clinicians would have to utilize clinical acumen, EKG, Troponin, and serial changes to determine if it is an Acute Myocardial Infarction or myocardial injury due to an underlying chronic condition.                                       PROCALCITONIN - Abnormal; Notable for the following components:     Procalcitonin                 0.48 (*)            All other components within normal limits         Narrative: As a Marker for Sepsis (Non-Neonates):                                    1. <0.5 ng/mL represents a low risk of severe sepsis and/or septic shock.                  2. >2 ng/mL represents a high risk of severe sepsis and/or septic shock.                                    As a Marker for Lower Respiratory Tract Infections that require antibiotic therapy:                                    PCT on Admission    Antibiotic Therapy       6-12 Hrs later                                    >0.5                Strongly Recommended                  >0.25 - <0.5        Recommended                   0.1 - 0.25          Discouraged              Remeasure/reassess PCT                  <0.1                Strongly Discouraged     Remeasure/reassess PCT                                    As 28 day mortality risk marker: \"Change in Procalcitonin Result\" (>80% or <=80%) if Day 0 (or Day 1) and Day 4 values are available. Refer to http://www.[facility]-pct-calculator.com                                    Change in PCT <=80%                  A decrease of PCT levels below or equal to 80% defines a positive change in PCT test result representing a higher risk for 28-day all-cause mortality of patients diagnosed with severe sepsis for septic shock.                                    Change in PCT >80%                  A decrease of PCT levels of more than 80% defines a negative change in PCT result representing a lower risk for " "28-day all-cause mortality of patients diagnosed with severe sepsis or septic shock.                     D-DIMER, QUANTITATIVE - Abnormal; Notable for the following components:     D-Dimer, Quantitative         1.02 (*)            All other components within normal limits         Narrative: According to the assay 's published package insert, a normal (<0.50 MCGFEU/mL) D-dimer result in conjunction with a non-high clinical probability assessment, excludes deep vein thrombosis (DVT) and pulmonary embolism (PE) with high sensitivity.                                    D-dimer values increase with age and this can make VTE exclusion of an older population difficult. To address this, the [employer], based on best available evidence and recent guidelines, recommends that clinicians use age-adjusted D-dimer thresholds in patients greater than 50 years of age with: a) a low probability of PE who do not meet all Pulmonary Embolism Rule Out Criteria, or b) in those with intermediate probability of PE.                   The formula for an age-adjusted D-dimer cut-off is \"age/100\".                  For example, a 60 year old patient would have an age-adjusted cut-off of 0.60 MCGFEU/mL and an 80 year old 0.80 MCGFEU/mL.  BLOOD GAS, ARTERIAL W/CO-OXIMETRY - Abnormal; Notable for the following components:     pO2, Arterial                 53.0 (*)               HCO3, Arterial                28.9 (*)               Base Excess, Arterial         4.2 (*)                O2 Saturation, Arterial       89.9 (*)               Hemoglobin, Blood Gas         11.1 (*)               Hematocrit, Blood Gas         33.9 (*)               Oxyhemoglobin                 88.5 (*)               Sodium, Arterial              135 (*)                Potassium, Arterial           3.4 (*)                pO2, Temperature Corrected    53.0 (*)            All other components within normal limits  COVID-19 AND FLU A/B, NP SWAB " IN TRANSPORT MEDIA 1 HR TAT - Normal         Narrative: Fact sheet for providers: https://www.fda.gov/media/334862/download                                    Fact sheet for patients: https://www.fda.gov/media/263908/download                                    Test performed by PCR.  RESPIRATORY PANEL PCR W/ COVID-19 (SARS-COV-2), NP SWAB IN UTM/VTP, 2 HR TAT - Normal         Narrative: In the setting of a positive respiratory panel with a viral infection PLUS a negative procalcitonin without other underlying concern for bacterial infection, consider observing off antibiotics or discontinuation of antibiotics and continue supportive care. If the respiratory panel is positive for atypical bacterial infection (Bordetella pertussis, Chlamydophila pneumoniae, or Mycoplasma pneumoniae), consider antibiotic de-escalation to target atypical bacterial infection.  LACTIC ACID, PLASMA - Normal  COVID PRE-OP / PRE-PROCEDURE SCREENING ORDER (NO ISOLATION)         Narrative: The following orders were created for panel order COVID PRE-OP / PRE-PROCEDURE SCREENING ORDER (NO ISOLATION) - Swab, Nasopharynx.                  Procedure                               Abnormality         Status                                     ---------                               -----------         ------                                     COVID-19 and FLU A/B PCR...[[id number]]  Normal              Final result                                                 Please view results for these tests on the individual orders.  BLOOD CULTURE  BLOOD CULTURE  RAINBOW DRAW         Narrative: The following orders were created for panel order Freeman Draw.                  Procedure                               Abnormality         Status                                     ---------                               -----------         ------                                     Green Top (Gel)[[id number]]                                  Final result                                Lavender Top[[id number]]                                     Final result                               Red Top[[id number]]                                          Final result                               Hernandez Top[[id number]]                                         Final result                               Light Blue Top[[id number]]                                   Final result                                                 Please view results for these tests on the individual orders.  BLOOD GAS, ARTERIAL W/CO-OXIMETRY  URINALYSIS W/ CULTURE IF INDICATED  URINALYSIS, MICROSCOPIC ONLY  GREEN TOP  LAVENDER TOP  RED TOP  GRAY TOP  LIGHT BLUE TOP  CBC AND DIFFERENTIAL  XR Chest 2 View    (Results Pending)  CT Abdomen Pelvis With Contrast    (Results Pending)  CT Angiogram Chest    (Results Pending)  Respiratory panel negative.  Pro-Franco 0.48 lactate 2 troponin 25 dimer 1.02 CBC white count 18.4 hemoglobin 12.4 hematocrit 37 CMP glucose 129 BUN 13 creatinine 0.55 sodium 133 potassium 3.7 chloride 97 calcium 7.9 BMP 2971 blood cultures have been obtained and will pending urinalysis chest x-ray showed a right upper lobe pneumonia.  Had ordered CTA of the chest which showed right upper lobe pneumonia no pulmonary embolus multiple incidental findings including severe coronary artery calcifications degenerative spine changes multiple remote lower lumbar compression fractures remote rib fractures and gynecomastia.  CT of abdomen pelvis no acute abnormality of the abdomen or pelvis 1.6 cm left adrenal body nodule recommend outpatient imaging.  Patient received a 500 mm bolus of lactated Ringer's.  When he came back from CT his O2 sat was 88% on room air.  Have ordered blood gases on the patient.  Zosyn has been ordered for the patient's pneumonia as well.  Patient's been placed on O2 at 2 L as well.  Patient's had no further nausea or vomiting while generalized weakness in the emergency department.  call has  been placed to hospitalist at this time for further.  Spoke with Dr. Seo- hospitalist on call- has graciously accepted pt for admission. Reviewed results of testing with pt and pt family. They are in agreement with care plan. Will be admitted shortly in stable condition       Problems Addressed:  Acute UTI: complicated acute illness or injury  Pneumonia of right lung due to infectious organism, unspecified part of lung: complicated acute illness or injury    Amount and/or Complexity of Data Reviewed  Labs: ordered. Decision-making details documented in ED Course.  Radiology: ordered. Decision-making details documented in ED Course.    Risk  Prescription drug management.  Decision regarding hospitalization.         Final diagnoses:   Pneumonia of right lung due to infectious organism, unspecified part of lung   Acute UTI          Paris Ball, APRN  01/23/25 0116

## 2025-01-23 NOTE — ED NOTES
Pt placed on a hospital bed.  Will continue to monitor in er until a bed becomes available.  Call light in reach.    Warm blanket given

## 2025-01-24 LAB
ANION GAP SERPL CALCULATED.3IONS-SCNC: 8 MMOL/L (ref 5–15)
BACTERIA SPEC AEROBE CULT: NORMAL
BASOPHILS # BLD AUTO: 0.01 10*3/MM3 (ref 0–0.2)
BASOPHILS NFR BLD AUTO: 0.1 % (ref 0–1.5)
BUN SERPL-MCNC: 14 MG/DL (ref 8–23)
BUN/CREAT SERPL: 20.9 (ref 7–25)
CALCIUM SPEC-SCNC: 7.4 MG/DL (ref 8.6–10.5)
CHLORIDE SERPL-SCNC: 102 MMOL/L (ref 98–107)
CO2 SERPL-SCNC: 29 MMOL/L (ref 22–29)
CREAT SERPL-MCNC: 0.67 MG/DL (ref 0.76–1.27)
DEPRECATED RDW RBC AUTO: 51.8 FL (ref 37–54)
EGFRCR SERPLBLD CKD-EPI 2021: 90.9 ML/MIN/1.73
EOSINOPHIL # BLD AUTO: 0.04 10*3/MM3 (ref 0–0.4)
EOSINOPHIL NFR BLD AUTO: 0.4 % (ref 0.3–6.2)
ERYTHROCYTE [DISTWIDTH] IN BLOOD BY AUTOMATED COUNT: 15 % (ref 12.3–15.4)
GLUCOSE SERPL-MCNC: 116 MG/DL (ref 65–99)
HCT VFR BLD AUTO: 31.4 % (ref 37.5–51)
HGB BLD-MCNC: 10 G/DL (ref 13–17.7)
IMM GRANULOCYTES # BLD AUTO: 0.04 10*3/MM3 (ref 0–0.05)
IMM GRANULOCYTES NFR BLD AUTO: 0.4 % (ref 0–0.5)
LYMPHOCYTES # BLD AUTO: 1.32 10*3/MM3 (ref 0.7–3.1)
LYMPHOCYTES NFR BLD AUTO: 14.2 % (ref 19.6–45.3)
MCH RBC QN AUTO: 29.9 PG (ref 26.6–33)
MCHC RBC AUTO-ENTMCNC: 31.8 G/DL (ref 31.5–35.7)
MCV RBC AUTO: 93.7 FL (ref 79–97)
MONOCYTES # BLD AUTO: 0.98 10*3/MM3 (ref 0.1–0.9)
MONOCYTES NFR BLD AUTO: 10.5 % (ref 5–12)
MRSA DNA SPEC QL NAA+PROBE: NORMAL
NEUTROPHILS NFR BLD AUTO: 6.91 10*3/MM3 (ref 1.7–7)
NEUTROPHILS NFR BLD AUTO: 74.4 % (ref 42.7–76)
NRBC BLD AUTO-RTO: 0 /100 WBC (ref 0–0.2)
PLATELET # BLD AUTO: 210 10*3/MM3 (ref 140–450)
PMV BLD AUTO: 10.5 FL (ref 6–12)
POTASSIUM SERPL-SCNC: 3.1 MMOL/L (ref 3.5–5.2)
RBC # BLD AUTO: 3.35 10*6/MM3 (ref 4.14–5.8)
S PNEUM AG SPEC QL LA: NEGATIVE
SODIUM SERPL-SCNC: 139 MMOL/L (ref 136–145)
WBC NRBC COR # BLD AUTO: 9.3 10*3/MM3 (ref 3.4–10.8)

## 2025-01-24 PROCEDURE — 25010000002 PIPERACILLIN SOD-TAZOBACTAM PER 1 G

## 2025-01-24 PROCEDURE — 97530 THERAPEUTIC ACTIVITIES: CPT

## 2025-01-24 PROCEDURE — 97535 SELF CARE MNGMENT TRAINING: CPT

## 2025-01-24 PROCEDURE — 80048 BASIC METABOLIC PNL TOTAL CA: CPT | Performed by: FAMILY MEDICINE

## 2025-01-24 PROCEDURE — 97116 GAIT TRAINING THERAPY: CPT

## 2025-01-24 PROCEDURE — 36415 COLL VENOUS BLD VENIPUNCTURE: CPT | Performed by: FAMILY MEDICINE

## 2025-01-24 PROCEDURE — 85025 COMPLETE CBC W/AUTO DIFF WBC: CPT | Performed by: FAMILY MEDICINE

## 2025-01-24 PROCEDURE — 92610 EVALUATE SWALLOWING FUNCTION: CPT | Performed by: SPEECH-LANGUAGE PATHOLOGIST

## 2025-01-24 PROCEDURE — 25010000002 ENOXAPARIN PER 10 MG

## 2025-01-24 PROCEDURE — 87899 AGENT NOS ASSAY W/OPTIC: CPT | Performed by: FAMILY MEDICINE

## 2025-01-24 PROCEDURE — 97110 THERAPEUTIC EXERCISES: CPT

## 2025-01-24 PROCEDURE — 25010000002 CALCIUM GLUCONATE-NACL 1-0.675 GM/50ML-% SOLUTION: Performed by: FAMILY MEDICINE

## 2025-01-24 RX ORDER — CALCIUM GLUCONATE 20 MG/ML
1000 INJECTION, SOLUTION INTRAVENOUS ONCE
Status: COMPLETED | OUTPATIENT
Start: 2025-01-24 | End: 2025-01-24

## 2025-01-24 RX ORDER — POTASSIUM CHLORIDE 750 MG/1
40 CAPSULE, EXTENDED RELEASE ORAL ONCE
Status: COMPLETED | OUTPATIENT
Start: 2025-01-24 | End: 2025-01-24

## 2025-01-24 RX ADMIN — Medication 5 MG: at 22:02

## 2025-01-24 RX ADMIN — PIPERACILLIN AND TAZOBACTAM 4.5 G: 4; .5 INJECTION, POWDER, FOR SOLUTION INTRAVENOUS at 14:41

## 2025-01-24 RX ADMIN — DOXYCYCLINE 100 MG: 100 INJECTION, POWDER, LYOPHILIZED, FOR SOLUTION INTRAVENOUS at 03:50

## 2025-01-24 RX ADMIN — PIPERACILLIN AND TAZOBACTAM 4.5 G: 4; .5 INJECTION, POWDER, FOR SOLUTION INTRAVENOUS at 06:26

## 2025-01-24 RX ADMIN — ENOXAPARIN SODIUM 40 MG: 100 INJECTION SUBCUTANEOUS at 09:19

## 2025-01-24 RX ADMIN — Medication 10 ML: at 22:02

## 2025-01-24 RX ADMIN — DULOXETINE 20 MG: 20 CAPSULE, DELAYED RELEASE ORAL at 09:19

## 2025-01-24 RX ADMIN — LOSARTAN POTASSIUM 25 MG: 50 TABLET, FILM COATED ORAL at 09:18

## 2025-01-24 RX ADMIN — PIPERACILLIN AND TAZOBACTAM 4.5 G: 4; .5 INJECTION, POWDER, FOR SOLUTION INTRAVENOUS at 22:02

## 2025-01-24 RX ADMIN — POTASSIUM CHLORIDE 40 MEQ: 750 CAPSULE, EXTENDED RELEASE ORAL at 09:18

## 2025-01-24 RX ADMIN — CALCIUM GLUCONATE 1000 MG: 20 INJECTION, SOLUTION INTRAVENOUS at 10:40

## 2025-01-24 NOTE — THERAPY TREATMENT NOTE
Acute Care - Occupational Therapy Treatment Note  [facility]     Patient Name: Juan Luis Collazo  : 1940  MRN: [id number]  Today's Date: 2025             Admit Date: 2025       ICD-10-CM ICD-9-CM   1. Pneumonia of right lung due to infectious organism, unspecified part of lung  J18.9 483.8   2. Acute UTI  N39.0 599.0   3. Gait instability [R26.81]  R26.81 781.2   4. Impaired mobility [Z74.09]  Z74.09 799.89   5. Dysphagia, unspecified type  R13.10 787.20   6. Decreased activities of daily living (ADL)  Z78.9 V49.89     Patient Active Problem List   Diagnosis    Closed compression fracture of first lumbar vertebra    Current non-smoker    Lumbar compression fracture, closed, initial encounter    Lumbar compression fracture    S/P kyphoplasty    Numbness and tingling of right leg    Compression fracture of fourth lumbar vertebra with delayed healing    Benign hypertension    Flatback syndrome of lumbar region    Hammer toe of right foot    High cholesterol    Impaired fasting glucose    Malignant neoplasm of prostate    Osteopenia    Acute exacerbation of chronic obstructive airways disease    Back pain    Compression fracture of thoracic vertebra with routine healing    Normocytic anemia    Actinic keratosis    Hyperplastic colonic polyp    FH: colon cancer in first degree relative <60 years old    History of adenomatous polyp of colon    Decreased bone mass    Sebaceous cyst    Degenerative disc disease, cervical    Degenerative disc disease, lumbar    Degenerative disc disease, thoracic    Compression fracture of T12 vertebra with delayed healing    Age-related osteoporosis with current pathological fracture with delayed healing    Gait instability    Pain initiated by coughing    Hypoxia    Pneumonia    Closed fracture of multiple ribs of right side     Past Medical History:   Diagnosis Date    Arthritis     Back pain     Cancer     CHEST, SKIN CANCER    GERD (gastroesophageal reflux disease)      History of colon polyps     History of prostate cancer     Hypertension     Low back pain     Macular degeneration     Osteopenia      Past Surgical History:   Procedure Laterality Date    APPENDECTOMY      CATARACT EXTRACTION, BILATERAL      COLONOSCOPY  09/17/2013    Dr. José-One 2-3mm polyp at 20cm; Otherwise normal; Repeat 3 years    COLONOSCOPY N/A 12/07/2022    Procedure: COLONOSCOPY WITH ANESTHESIA;  Surgeon: Bri Alexis MD;  Location: [facility] ENDOSCOPY;  Service: Gastroenterology;  Laterality: N/A;  pre: anemia. hx polyps.  post: polyps. diverticulosis.  no PCP    ENDOSCOPY N/A 12/07/2022    Procedure: ESOPHAGOGASTRODUODENOSCOPY WITH ANESTHESIA;  Surgeon: Bri Alexis MD;  Location: [facility] ENDOSCOPY;  Service: Gastroenterology;  Laterality: N/A;  pre: anemia  post: hiatal hernia. esophagitis.gastric erosions.  no PCP    FOOT SURGERY Right     KYPHOPLASTY Bilateral 07/17/2018    Procedure: L3 KYPHOPLASTY 1-2 LEVELS;  Surgeon: Vega Pandey MD;  Location:  [facility] OR;  Service: Neurosurgery    KYPHOPLASTY Bilateral 09/11/2018    Procedure: L4 KYPHOPLASTY WITH BIOPSY;  Surgeon: Vega Pandey MD;  Location: [facility] OR;  Service: Neurosurgery    KYPHOPLASTY WITH BIOPSY N/A 01/25/2022    Procedure: KYPHOPLASTY WITH BIOPSY, THORACIC 6 and LUMBAR 5;  Surgeon: Nick Martínez MD;  Location:  [facility] OR;  Service: Neurosurgery;  Laterality: N/A;    KYPHOPLASTY WITH BIOPSY N/A 8/18/2023    Procedure: Thoracic 12, KYPHOPLASTY WITH BIOPSY;  Surgeon: Nick Martínez MD;  Location:  [facility] OR;  Service: Neurosurgery;  Laterality: N/A;    PROSTATECTOMY      TONSILLECTOMY      TOTAL SHOULDER REPLACEMENT Bilateral          OT ASSESSMENT FLOWSHEET (Last 12 Hours)       OT Evaluation and Treatment       Row Name 01/24/25 1438                   OT Time and Intention    Subjective Information no complaints  -LS        Document Type therapy note (daily note)  -LS        Mode of Treatment  occupational therapy  -LS        Patient Effort good  -LS           General Information    Patient Profile Reviewed yes  -LS        Existing Precautions/Restrictions fall  -LS        Barriers to Rehab medically complex  -LS           Cognition    Orientation Status (Cognition) oriented x 4  -LS           Plan of Care Review    Plan of Care Reviewed With patient;spouse  -LS        Progress improving  -LS        Outcome Evaluation OT POC to continue. Pt A&Ox4 with spouse present. Pt agreeable to therapy. Pt completed all aspects of bed mobility req SBA. Pt completed functional mobility in room to and from bathroom, toilet transfer and sit to stands/dynamic standing tasks req SBA with RW. Pt educated on safety with RW and utilization of RW due to balance deficits. Pt worked through BUE exercises with good endurance. HH recommended at discharge. OT POC to contiue.  -LS           Positioning and Restraints    Pre-Treatment Position in bed  -LS        Post Treatment Position bed  -LS        In Bed fowlers;call light within reach;encouraged to call for assist;with family/caregiver  -LS           Therapy Plan Review/Discharge Plan (OT)    Anticipated Discharge Disposition (OT) home with 24/7 care;home with assist  -LS                  User Key  (r) = Recorded By, (t) = Taken By, (c) = Cosigned By      Initials Name Effective Dates     Chantal Oconnell COTA 09/22/22 -                      Occupational Therapy Education       Title: PT OT SLP Therapies (In Progress)       Topic: Occupational Therapy (In Progress)       Point: ADL training (Done)       Description:   Instruct learner(s) on proper safety adaptation and remediation techniques during self care or transfers.   Instruct in proper use of assistive devices.                  Learning Progress Summary            Patient Acceptance, E, VU by LS at 1/24/2025 1444    Comment: safety with ADLs    Eager, RADHA,TB,D, NR by BJ at 1/24/2025 0046    Acceptance, E, VU,NR by LS1 at  1/23/2025 1224   Significant Other Acceptance, E, VU by  at 1/24/2025 1444    Comment: safety with ADLs                      Point: Home exercise program (In Progress)       Description:   Instruct learner(s) on appropriate technique for monitoring, assisting and/or progressing therapeutic exercises/activities.                  Learning Progress Summary            Patient Eager, E,TB,D, NR by  at 1/24/2025 0046                      Point: Precautions (In Progress)       Description:   Instruct learner(s) on prescribed precautions during self-care and functional transfers.                  Learning Progress Summary            Patient Eager, E,TB,D, NR by  at 1/24/2025 0046    Acceptance, E, VU,NR by [facility] at 1/23/2025 1224                      Point: Body mechanics (In Progress)       Description:   Instruct learner(s) on proper positioning and spine alignment during self-care, functional mobility activities and/or exercises.                  Learning Progress Summary            Patient Eager, E,TB,D, NR by  at 1/24/2025 0046    Acceptance, E, VU,NR by [facility] at 1/23/2025 1224                                      User Key       Initials Effective Dates Name Provider Type Discipline     09/22/22 -  Chantal Oconnell COTA Occupational Therapist Assistant THERAPIES    [facility] 06/20/22 -  Peggy Heaton OTR/L Occupational Therapist OT     12/18/24 -  Duyen Garcia RN Registered Nurse Nurse                      OT Recommendation and Plan     Plan of Care Review  Plan of Care Reviewed With: patient, spouse  Progress: improving  Outcome Evaluation: OT POC to continue. Pt A&Ox4 with spouse present. Pt agreeable to therapy. Pt completed all aspects of bed mobility req SBA. Pt completed functional mobility in room to and from bathroom, toilet transfer and sit to stands/dynamic standing tasks req SBA with RW. Pt educated on safety with RW and utilization of RW due to balance deficits. Pt worked through BUE exercises with good  endurance. HH recommended at discharge. OT POC to contiue.  Plan of Care Reviewed With: patient, spouse  Outcome Evaluation: OT POC to continue. Pt A&Ox4 with spouse present. Pt agreeable to therapy. Pt completed all aspects of bed mobility req SBA. Pt completed functional mobility in room to and from bathroom, toilet transfer and sit to stands/dynamic standing tasks req SBA with RW. Pt educated on safety with RW and utilization of RW due to balance deficits. Pt worked through BUE exercises with good endurance. HH recommended at discharge. OT POC to contiue.     Outcome Measures       Row Name 01/24/25 1400 01/24/25 1125          How much help from another person do you currently need...    Turning from your back to your side while in flat bed without using bedrails? -- 4  -MF     Moving from lying on back to sitting on the side of a flat bed without bedrails? -- 4  -MF     Moving to and from a bed to a chair (including a wheelchair)? -- 3  -MF     Standing up from a chair using your arms (e.g., wheelchair, bedside chair)? -- 3  -MF     Climbing 3-5 steps with a railing? -- 3  -MF     To walk in hospital room? -- 3  -MF     AM-PAC 6 Clicks Score (PT) -- 20  -MF        How much help from another is currently needed...    Putting on and taking off regular lower body clothing? 3  -LS --     Bathing (including washing, rinsing, and drying) 3  -LS --     Toileting (which includes using toilet bed pan or urinal) 3  -LS --     Putting on and taking off regular upper body clothing 4  -LS --     Taking care of personal grooming (such as brushing teeth) 4  -LS --     Eating meals 4  -LS --     AM-PAC 6 Clicks Score (OT) 21  -LS --        Functional Assessment    Outcome Measure Options AM-PAC 6 Clicks Daily Activity (OT)  -LS AM-PAC 6 Clicks Basic Mobility (PT)  -               User Key  (r) = Recorded By, (t) = Taken By, (c) = Cosigned By      Initials Name Provider Type    Veronika Troy PTA Physical Therapist  Assistant    Chantal Barrios COTA Occupational Therapist Assistant                    Time Calculation:    Time Calculation- OT       Row Name 01/24/25 1536 01/24/25 1201          Time Calculation- OT    OT Start Time 1438  -LS --     OT Stop Time 1533  -LS --     OT Time Calculation (min) 55 min  -LS --     Total Timed Code Minutes- OT 55 minute(s)  - --     OT Received On 01/24/25  - --        Timed Charges    98582 - Gait Training Minutes  -- 18  -MF        Total Minutes    Timed Charges Total Minutes -- 18  -MF      Total Minutes -- 18  -MF               User Key  (r) = Recorded By, (t) = Taken By, (c) = Cosigned By      Initials Name Provider Type    Veronika Troy PTA Physical Therapist Assistant    Chantal Barrios COTA Occupational Therapist Assistant                  Therapy Charges for Today       Code Description Service Date Service Provider Modifiers Qty    60906398689 HC OT THERAPEUTIC ACT EA 15 MIN 1/24/2025 Chantal Oconnell COTA GO 3    86059189141 HC OT SELF CARE/MGMT/TRAIN EA 15 MIN 1/24/2025 Chantal Oconnell COTA GO 1                 MELISSA Alcantar  1/24/2025

## 2025-01-24 NOTE — PLAN OF CARE
Goal Outcome Evaluation:  Plan of Care Reviewed With: patient   Patient A&O x 4. O2 sat 98% on 1L continuous Nasal Canula. PNA TX Doxy/Zosyn IVPB. Voided 500 . Up to bathroom x 1 assist. MRSA nare swab (-). Awaiting result of Strep Antigen Urine test.

## 2025-01-24 NOTE — THERAPY TREATMENT NOTE
Acute Care - Physical Therapy Treatment Note  [facility]     Patient Name: Juan Luis Collazo  : 1940  MRN: [id number]  Today's Date: 2025      Visit Dx:     ICD-10-CM ICD-9-CM   1. Pneumonia of right lung due to infectious organism, unspecified part of lung  J18.9 483.8   2. Acute UTI  N39.0 599.0   3. Gait instability [R26.81]  R26.81 781.2   4. Impaired mobility [Z74.09]  Z74.09 799.89   5. Dysphagia, unspecified type  R13.10 787.20     Patient Active Problem List   Diagnosis    Closed compression fracture of first lumbar vertebra    Current non-smoker    Lumbar compression fracture, closed, initial encounter    Lumbar compression fracture    S/P kyphoplasty    Numbness and tingling of right leg    Compression fracture of fourth lumbar vertebra with delayed healing    Benign hypertension    Flatback syndrome of lumbar region    Hammer toe of right foot    High cholesterol    Impaired fasting glucose    Malignant neoplasm of prostate    Osteopenia    Acute exacerbation of chronic obstructive airways disease    Back pain    Compression fracture of thoracic vertebra with routine healing    Normocytic anemia    Actinic keratosis    Hyperplastic colonic polyp    FH: colon cancer in first degree relative <60 years old    History of adenomatous polyp of colon    Decreased bone mass    Sebaceous cyst    Degenerative disc disease, cervical    Degenerative disc disease, lumbar    Degenerative disc disease, thoracic    Compression fracture of T12 vertebra with delayed healing    Age-related osteoporosis with current pathological fracture with delayed healing    Gait instability    Pain initiated by coughing    Hypoxia    Pneumonia    Closed fracture of multiple ribs of right side     Past Medical History:   Diagnosis Date    Arthritis     Back pain     Cancer     CHEST, SKIN CANCER    GERD (gastroesophageal reflux disease)     History of colon polyps     History of prostate cancer     Hypertension     Low back  pain     Macular degeneration     Osteopenia      Past Surgical History:   Procedure Laterality Date    APPENDECTOMY      CATARACT EXTRACTION, BILATERAL      COLONOSCOPY  09/17/2013    Dr. José-One 2-3mm polyp at 20cm; Otherwise normal; Repeat 3 years    COLONOSCOPY N/A 12/07/2022    Procedure: COLONOSCOPY WITH ANESTHESIA;  Surgeon: Bri Alexis MD;  Location: [facility] ENDOSCOPY;  Service: Gastroenterology;  Laterality: N/A;  pre: anemia. hx polyps.  post: polyps. diverticulosis.  no PCP    ENDOSCOPY N/A 12/07/2022    Procedure: ESOPHAGOGASTRODUODENOSCOPY WITH ANESTHESIA;  Surgeon: Bri Alexis MD;  Location: [facility] ENDOSCOPY;  Service: Gastroenterology;  Laterality: N/A;  pre: anemia  post: hiatal hernia. esophagitis.gastric erosions.  no PCP    FOOT SURGERY Right     KYPHOPLASTY Bilateral 07/17/2018    Procedure: L3 KYPHOPLASTY 1-2 LEVELS;  Surgeon: Vega Pandey MD;  Location: [facility] OR;  Service: Neurosurgery    KYPHOPLASTY Bilateral 09/11/2018    Procedure: L4 KYPHOPLASTY WITH BIOPSY;  Surgeon: Vega Pandey MD;  Location: [facility] OR;  Service: Neurosurgery    KYPHOPLASTY WITH BIOPSY N/A 01/25/2022    Procedure: KYPHOPLASTY WITH BIOPSY, THORACIC 6 and LUMBAR 5;  Surgeon: Nick Martínez MD;  Location: [facility] OR;  Service: Neurosurgery;  Laterality: N/A;    KYPHOPLASTY WITH BIOPSY N/A 8/18/2023    Procedure: Thoracic 12, KYPHOPLASTY WITH BIOPSY;  Surgeon: Nick Martínez MD;  Location: [facility] OR;  Service: Neurosurgery;  Laterality: N/A;    PROSTATECTOMY      TONSILLECTOMY      TOTAL SHOULDER REPLACEMENT Bilateral      PT Assessment (Last 12 Hours)       PT Evaluation and Treatment       Row Name 01/24/25 1125          Physical Therapy Time and Intention    Subjective Information no complaints  -MF     Document Type therapy note (daily note)  -MF     Mode of Treatment physical therapy  -       Row Name 01/24/25 1126          General Information    Existing  Precautions/Restrictions fall  -     Limitations/Impairments hearing  -       Row Name 01/24/25 1125          Pain    Pretreatment Pain Rating 0/10 - no pain  -     Posttreatment Pain Rating 0/10 - no pain  -       Row Name 01/24/25 1125          Bed Mobility    Supine-Sit Lefor (Bed Mobility) standby assist  -     Assistive Device (Bed Mobility) head of bed elevated;bed rails  -       Row Name 01/24/25 1125          Transfers    Comment, (Transfers) LOB posteriorly when pt let go of walker to blow his nose.   -       Row Name 01/24/25 1125          Sit-Stand Transfer    Sit-Stand Lefor (Transfers) contact guard  -       Row Name 01/24/25 1125          Stand-Sit Transfer    Stand-Sit Lefor (Transfers) contact guard  -       Row Name 01/24/25 1125          Gait/Stairs (Locomotion)    Lefor Level (Gait) contact guard  -     Assistive Device (Gait) walker, front-wheeled  -     Distance in Feet (Gait) 150  -       Row Name 01/24/25 1125          Motor Skills    Comments, Therapeutic Exercise AROM B LE x 20 reps in sitting  -       Row Name 01/24/25 1125          Plan of Care Review    Plan of Care Reviewed With patient  -     Progress improving  -     Outcome Evaluation PT tx completed. Pt without complaints this morning. He was SBA for bed mobility. He actively participated with LE exercises while sitting EOB. Pt. stood with CGA, but did have a posterior LOB when letting go of the walker to blow his nose-MIN assist to correct. He was able to walk 150' with CGA while using RWX. Pt. states he normally uses a cane at home. He would benefit from a rolling walker for balance. His O2 sat upon returning to room was 92% on RA. Will continue to work on strengthening, mobility, and balance. Pt. plans to return home with spouse.  -       Row Name 01/24/25 1125          Vital Signs    Pre SpO2 (%) 96  -     O2 Delivery Pre Treatment supplemental O2  1l  -     O2  Delivery Intra Treatment room air  -MF     Post SpO2 (%) 92  -MF     O2 Delivery Post Treatment room air  -MF     Pre Patient Position Sitting  -MF     Intra Patient Position Standing  -MF     Post Patient Position Sitting  -MF       Row Name 01/24/25 1125          Positioning and Restraints    Pre-Treatment Position in bed  -MF     Post Treatment Position chair  -MF     In Chair sitting;call light within reach;encouraged to call for assist  -MF               User Key  (r) = Recorded By, (t) = Taken By, (c) = Cosigned By      Initials Name Provider Type    Veronika Troy PTA Physical Therapist Assistant                    Physical Therapy Education       Title: PT OT SLP Therapies (In Progress)       Topic: Physical Therapy (In Progress)       Point: Mobility training (In Progress)       Learning Progress Summary            Patient Eager, E,TB,D, NR by  at 1/24/2025 0046    Acceptance, E, NR by  at 1/23/2025 1042    Comment: pt edu on POC, benefits of act and d/c plans                      Point: Home exercise program (In Progress)       Learning Progress Summary            Patient Eager, E,TB,D, NR by  at 1/24/2025 0046                      Point: Body mechanics (In Progress)       Learning Progress Summary            Patient Eager, E,TB,D, NR by  at 1/24/2025 0046                      Point: Precautions (In Progress)       Learning Progress Summary            Patient Eager, E,TB,D, NR by  at 1/24/2025 0046    Acceptance, E, NR by  at 1/23/2025 1042    Comment: pt edu on POC, benefits of act and d/c plans                                      User Key       Initials Effective Dates Name Provider Type Discipline     07/11/23 -  Racquel Choi PT DPT Physical Therapist PT     12/18/24 -  Duyen Garcia, LEE Registered Nurse Nurse                  PT Recommendation and Plan     Plan of Care Reviewed With: patient  Progress: improving  Outcome Evaluation: PT tx completed. Pt without complaints  this morning. He was SBA for bed mobility. He actively participated with LE exercises while sitting EOB. Pt. stood with CGA, but did have a posterior LOB when letting go of the walker to blow his nose-MIN assist to correct. He was able to walk 150' with CGA while using RWX. Pt. states he normally uses a cane at home. He would benefit from a rolling walker for balance. His O2 sat upon returning to room was 92% on RA. Will continue to work on strengthening, mobility, and balance. Pt. plans to return home with spouse.   Outcome Measures       Row Name 01/24/25 1125             How much help from another person do you currently need...    Turning from your back to your side while in flat bed without using bedrails? 4  -MF      Moving from lying on back to sitting on the side of a flat bed without bedrails? 4  -MF      Moving to and from a bed to a chair (including a wheelchair)? 3  -MF      Standing up from a chair using your arms (e.g., wheelchair, bedside chair)? 3  -MF      Climbing 3-5 steps with a railing? 3  -MF      To walk in hospital room? 3  -MF      AM-PAC 6 Clicks Score (PT) 20  -MF         Functional Assessment    Outcome Measure Options AM-PAC 6 Clicks Basic Mobility (PT)  -MF                User Key  (r) = Recorded By, (t) = Taken By, (c) = Cosigned By      Initials Name Provider Type    Veronika Troy, PTA Physical Therapist Assistant                     Time Calculation:    PT Charges       Row Name 01/24/25 1201             Time Calculation    Start Time 1125  -MF      Stop Time 1158  -MF      Time Calculation (min) 33 min  -      PT Received On 01/24/25  -MF         Time Calculation- PT    Total Timed Code Minutes- PT 33 minute(s)  -MF         Timed Charges    85138 - PT Therapeutic Exercise Minutes 15  -MF      12299 - Gait Training Minutes  18  -MF         Total Minutes    Timed Charges Total Minutes 33  -MF       Total Minutes 33  -MF                User Key  (r) = Recorded By, (t) =  Taken By, (c) = Cosigned By      Initials Name Provider Type     Veronika Simmons PTA Physical Therapist Assistant                  Therapy Charges for Today       Code Description Service Date Service Provider Modifiers Qty    20104763996 HC PT THER PROC EA 15 MIN 1/24/2025 Veronika Simmons PTA GP 1    16768395729 HC GAIT TRAINING EA 15 MIN 1/24/2025 Veronika Simmons PTA GP 1            PT G-Codes  Outcome Measure Options: AM-PAC 6 Clicks Basic Mobility (PT)  AM-PAC 6 Clicks Score (PT): 20  AM-PAC 6 Clicks Score (OT): 21    Veronika Simmons PTA  1/24/2025

## 2025-01-24 NOTE — PLAN OF CARE
Goal Outcome Evaluation:  Plan of Care Reviewed With: patient, caregiver (ELE Easton)        Progress: no change (Initial Evaluation)       Anticipated Discharge Disposition (SLP): No further SLP services warranted          SLP Swallowing Diagnosis: swallow WFL/no suspected pharyngeal impairment (01/24/25 2010)               SPEECH-LANGUAGE PATHOLOGY EVALUATION - SWALLOW  Subjective: The patient was seen on this date for a Clinical Swallow evaluation.  Patient was alert and cooperative. Independently repositioned himself upright on the edge of the bed. He also stood at the bedside independently as well. I encouraged him to ask for assistance in standing if no one is present. Bed alarm was engaged at the end of the session.   Significant history: Presented due to shortness of breath, episode of confusion, fever, and vomiting. Dx with RUL pneumonia and UTI. Medical history of recent fall in Nov with rib fracture, GERD, prior endo with HH and esophagitis. SLP consulted for swallow evalauation.   Objective: Oral motor examination results: WFL.  Textures given during assessment of swallow function included thin liquid and regular consistency.  Assessment: Difficulties were noted with none of the above consistencies.  Observations: No overt s/s of aspiration. Functional rotary chew given solid textures.   SLP Findings:  Patient presents with functional swallow, without esophageal component.   Recommendations: Diet Textures: regular and thin liquids  Medications should be taken whole as tolerated.   Recommended Strategies: upright for PO and small bites and sips. Oral care 2x a day.  Other Recommended Evaluations: None    Dysphagia therapy is not recommended.    Do not suspect current dx of pneumonia is related to dysphagia. Appears to be related to reduced mobility following fall and rib fractures. SLP signing off. If any change or concerns please re-consult.     Marjorie Morrison MS CCC-SLP 1/24/2025 09:06 CST

## 2025-01-24 NOTE — PROGRESS NOTES
[facility] Medicine Services  INPATIENT PROGRESS NOTE    Patient Name: Juan Luis Collazo  Date of Admission: 1/22/2025  Today's Date: 01/24/25  Length of Stay: 1  Primary Care Physician: Santiago Aaron MD    Subjective   Chief Complaint: Shortness of breath  HPI   84-year-old male with history of hypertension, GERD, history of prostate cancer, chronic low back pain, osteopenia, with prior admission after a fall, right anterior fourth fifth sixth seventh and eighth rib fractures that were found subacute, with associated pneumonia.  He came to the hospital and was admitted on 1/22/2025 with worsening shortness of breath and confusion, also reported vomiting.  Patient reports no recent trauma.  He was started on antibiotic treatment.  Diagnosed with a right upper lobe pneumonia.    Today, found with no family members present, reports feeling better, shortness of breath has improved, and he reports no chest pain.  No fevers documented.        Review of Systems   All pertinent negatives and positives are as above. All other systems have been reviewed and are negative unless otherwise stated.     Objective    Temp:  [97.5 °F (36.4 °C)-98.1 °F (36.7 °C)] 97.7 °F (36.5 °C)  Heart Rate:  [56-76] 75  Resp:  [16-19] 16  BP: (120-147)/(59-65) 144/65  Physical Exam  Constitutional:       Appearance: Normal appearance.  Alert oriented x 3.  No significant respiratory distress on oxygen via nasal cannula.  HENT:      Head: Normocephalic and atraumatic.      Nose: Nose normal.      Mouth/Throat:      Mouth: Mucous membranes are moist.   Eyes:      Extraocular Movements: Extraocular movements intact.      Conjunctiva/sclera: Conjunctivae normal.      Pupils: Pupils are equal, round.  Cardiovascular:      Rate and Rhythm: Normal rate and regular rhythm.      Pulses: Normal pulses.   Pulmonary:      Effort: No respiratory distress.  On oxygen via nasal cannula.     Breath sounds: Right field  decreased breath sounds.  Scant Rales  Abdominal:      General: Abdomen is flat. Bowel sounds are normal.      Palpations: Abdomen is soft.   Musculoskeletal:         General: Normal range of motion.      Cervical back: Normal range of motion and neck supple.   Extremities:  No lower extremity edema.  Skin:     Capillary Refill: Capillary refill takes less than 2 seconds.      Coloration: Skin is not jaundiced.      Findings: No rash.   Neurological:      General: No focal deficit present.      Mental Status: Patient is alert, oriented to place time and person.        Results Review:  I have reviewed the labs, radiology results, and diagnostic studies.    Laboratory Data:   Results from last 7 days   Lab Units 01/24/25  0254 01/23/25  0551 01/22/25  2221   WBC 10*3/mm3 9.30 14.73* 18.40*   HEMOGLOBIN g/dL 10.0* 11.4* 12.4*   HEMATOCRIT % 31.4* 35.3* 37.5   PLATELETS 10*3/mm3 210 220 245        Results from last 7 days   Lab Units 01/24/25  0254 01/23/25  0551 01/23/25  0100 01/22/25  2221   SODIUM mmol/L 139 134*  --  133*   SODIUM, ARTERIAL mmol/L  --   --  135*  --    POTASSIUM mmol/L 3.1* 3.7  --  3.7   CHLORIDE mmol/L 102 98  --  97*   CO2 mmol/L 29.0 28.0  --  25.0   BUN mg/dL 14 13  --  13   CREATININE mg/dL 0.67* 0.62*  --  0.55*   CALCIUM mg/dL 7.4* 7.5*  --  7.9*   BILIRUBIN mg/dL  --  0.9  --  0.9   ALK PHOS U/L  --  88  --  103   ALT (SGPT) U/L  --  6  --  9   AST (SGOT) U/L  --  13  --  18   GLUCOSE mg/dL 116* 117*  --  129*       Culture Data:   Blood Culture   Date Value Ref Range Status   01/22/2025 No growth at 24 hours  Preliminary   01/22/2025 No growth at 24 hours  Preliminary       Radiology Data:   Imaging Results (Last 24 Hours)       ** No results found for the last 24 hours. **            I have reviewed the patient's current medications.     Assessment/Plan   Assessment  Active Hospital Problems    Diagnosis     **Pneumonia      Community-acquired pneumonia, right upper  lobe  Hypocalcemia  Hypokalemia  History of right-sided rib fractures  Hypertension  Chronic diastolic cardiomyopathy  Mild aortic stenosis  History of prostate cancer      Corrected calcium low.  Sodium 139, potassium 3.1.  Creatinine 0.67.    White blood cell count has improved from 18,400, to 9,300 today.  Hemoglobin 10      CT angiogram chest performed on admission  1. No evidence of pulmonary embolism. Mild ectasia of the ascending   thoracic aorta. No dissection.   2. Severe atheromatous changes of coronary arteries.   3. Pulmonary arterial hypertension and right ventricular strain.   4. Dense consolidation of the right upper lobe posterior segment   representing an acute inflammatory/infectious process.   5. Multiple thoracolumbar compression fractures and kyphoplasties.     Blood cultures negative so far  Urine culture normal urogenital elizabeth  Strep pneumo antigen negative  Respiratory panel negative      Treatment Plan  Continue oxygen supplementation, wean down as per response   Currently on antibiotic management, Zosyn IV day #2.    Discontinue doxycycline, MRSA screen negative  Replace calcium and potassium.  Follow electrolytes tomorrow morning.  Continue losartan 25 p.o. daily.  Follow speech therapy recommendations regarding diet  PT OT evaluation took place.  Rolling walker ordered on discharge      Medical Decision Making  Number and Complexity of problems: 8, moderate complexity  Differential Diagnosis: See above    Conditions and Status        Condition is improving.     [facility] Data  External documents reviewed: None  Cardiac tracing (EKG, telemetry) interpretation: Sinus rhythm  Radiology interpretation: Radiology reports reviewed  Labs reviewed: Yes  Any tests that were considered but not ordered: No     Decision rules/scores evaluated (example [id number], Wells, etc): None     Discussed with: With patient     Care Planning  Shared decision making: With patient  Code status and discussions: Full  code    Disposition  Social Determinants of Health that impact treatment or disposition: None  I expect the patient to be discharged to home in 1 days.         Electronically signed by Art Lizarraga MD, 01/24/25, 09:34 CST.

## 2025-01-24 NOTE — THERAPY DISCHARGE NOTE
Acute Care - Speech Language Pathology   Swallow Initial Evaluation/Discharge [facility]     Patient Name: Juan Luis Collazo  : 1940  MRN: [id number]  Today's Date: 2025               Admit Date: 2025  SPEECH-LANGUAGE PATHOLOGY EVALUATION - SWALLOW  Subjective: The patient was seen on this date for a Clinical Swallow evaluation.  Patient was alert and cooperative. Independently repositioned himself upright on the edge of the bed. He also stood at the bedside independently as well. I encouraged him to ask for assistance in standing if no one is present. Bed alarm was engaged at the end of the session.   Significant history: Presented due to shortness of breath, episode of confusion, fever, and vomiting. Dx with RUL pneumonia and UTI. Medical history of recent fall in Nov with rib fracture, GERD, prior endo with HH and esophagitis. SLP consulted for swallow evalauation.   Objective: Oral motor examination results: WFL.  Textures given during assessment of swallow function included thin liquid and regular consistency.  Assessment: Difficulties were noted with none of the above consistencies.  Observations: No overt s/s of aspiration. Functional rotary chew given solid textures.   SLP Findings:  Patient presents with functional swallow, without esophageal component.   Recommendations: Diet Textures: regular and thin liquids  Medications should be taken whole as tolerated.   Recommended Strategies: upright for PO and small bites and sips. Oral care 2x a day.  Other Recommended Evaluations: None    Dysphagia therapy is not recommended.    Do not suspect current dx of pneumonia is related to dysphagia. Appears to be related to reduced mobility following fall and rib fractures. SLP signing off. If any change or concerns please re-consult.     Marjorie Morrison MS CCC-SLP 2025 09:07 CST    Visit Dx:    ICD-10-CM ICD-9-CM   1. Pneumonia of right lung due to infectious organism, unspecified part of lung   J18.9 483.8   2. Acute UTI  N39.0 599.0   3. Gait instability [R26.81]  R26.81 781.2   4. Impaired mobility [Z74.09]  Z74.09 799.89   5. Dysphagia, unspecified type  R13.10 787.20     Patient Active Problem List   Diagnosis    Closed compression fracture of first lumbar vertebra    Current non-smoker    Lumbar compression fracture, closed, initial encounter    Lumbar compression fracture    S/P kyphoplasty    Numbness and tingling of right leg    Compression fracture of fourth lumbar vertebra with delayed healing    Benign hypertension    Flatback syndrome of lumbar region    Hammer toe of right foot    High cholesterol    Impaired fasting glucose    Malignant neoplasm of prostate    Osteopenia    Acute exacerbation of chronic obstructive airways disease    Back pain    Compression fracture of thoracic vertebra with routine healing    Normocytic anemia    Actinic keratosis    Hyperplastic colonic polyp    FH: colon cancer in first degree relative <60 years old    History of adenomatous polyp of colon    Decreased bone mass    Sebaceous cyst    Degenerative disc disease, cervical    Degenerative disc disease, lumbar    Degenerative disc disease, thoracic    Compression fracture of T12 vertebra with delayed healing    Age-related osteoporosis with current pathological fracture with delayed healing    Gait instability    Pain initiated by coughing    Hypoxia    Pneumonia    Closed fracture of multiple ribs of right side     Past Medical History:   Diagnosis Date    Arthritis     Back pain     Cancer     CHEST, SKIN CANCER    GERD (gastroesophageal reflux disease)     History of colon polyps     History of prostate cancer     Hypertension     Low back pain     Macular degeneration     Osteopenia      Past Surgical History:   Procedure Laterality Date    APPENDECTOMY      CATARACT EXTRACTION, BILATERAL      COLONOSCOPY  09/17/2013    Dr. José-One 2-3mm polyp at 20cm; Otherwise normal; Repeat 3 years    COLONOSCOPY  N/A 12/07/2022    Procedure: COLONOSCOPY WITH ANESTHESIA;  Surgeon: Bri Alexis MD;  Location: [facility] ENDOSCOPY;  Service: Gastroenterology;  Laterality: N/A;  pre: anemia. hx polyps.  post: polyps. diverticulosis.  no PCP    ENDOSCOPY N/A 12/07/2022    Procedure: ESOPHAGOGASTRODUODENOSCOPY WITH ANESTHESIA;  Surgeon: Bri Alexis MD;  Location: [facility] ENDOSCOPY;  Service: Gastroenterology;  Laterality: N/A;  pre: anemia  post: hiatal hernia. esophagitis.gastric erosions.  no PCP    FOOT SURGERY Right     KYPHOPLASTY Bilateral 07/17/2018    Procedure: L3 KYPHOPLASTY 1-2 LEVELS;  Surgeon: Vega Pandey MD;  Location:  [facility] OR;  Service: Neurosurgery    KYPHOPLASTY Bilateral 09/11/2018    Procedure: L4 KYPHOPLASTY WITH BIOPSY;  Surgeon: Vega Pandye MD;  Location:  [facility] OR;  Service: Neurosurgery    KYPHOPLASTY WITH BIOPSY N/A 01/25/2022    Procedure: KYPHOPLASTY WITH BIOPSY, THORACIC 6 and LUMBAR 5;  Surgeon: Nick Martínez MD;  Location:  [facility] OR;  Service: Neurosurgery;  Laterality: N/A;    KYPHOPLASTY WITH BIOPSY N/A 8/18/2023    Procedure: Thoracic 12, KYPHOPLASTY WITH BIOPSY;  Surgeon: Nick Martínez MD;  Location: [facility] OR;  Service: Neurosurgery;  Laterality: N/A;    PROSTATECTOMY      TONSILLECTOMY      TOTAL SHOULDER REPLACEMENT Bilateral        SLP Recommendation and Plan  SLP Swallowing Diagnosis: swallow WFL/no suspected pharyngeal impairment (01/24/25 0739)  SLP Diet Recommendation: regular textures, thin liquids (01/24/25 0739)     Monitor for Signs of Aspiration: yes, notify SLP if any concerns (01/24/25 0739)  Recommended Diagnostics: No further SLP services recommended (01/24/25 0739)  Swallow Criteria for Skilled Therapeutic Interventions Met: no problems identified which require skilled intervention (01/24/25 0739)  Anticipated Discharge Disposition (SLP): No further SLP services warranted (01/24/25 0906)     Therapy Frequency (Swallow): evaluation only  (01/24/25 0739)  Predicted Duration Therapy Intervention (Days): until discharge (01/24/25 0739)           Anticipated Discharge Disposition (SLP): No further SLP services warranted (01/24/25 0906)           Reason for Discharge: all goals and outcomes met, no further needs identified (01/24/25 0906)                Progress: no change (Initial Evaluation) (01/24/25 0902)    SWALLOW EVALUATION (Last 72 Hours)       SLP Adult Swallow Evaluation       Row Name 01/24/25 0739                   Rehab Evaluation    Document Type discharge evaluation/summary  -MG        Subjective Information no complaints  -MG        Patient Observations alert;cooperative;agree to therapy  -MG        Patient/Family/Caregiver Comments/Observations No family present.  -MG        Patient Effort good  -MG        Symptoms Noted During/After Treatment none  -MG           General Information    Patient Profile Reviewed yes  -MG        Pertinent History Of Current Problem Presented due to shortness of breath, episode of confusion, fever, and vomiting. Dx with RUL pneumonia and UTI. Medical history of recent fall in Nov with rib fracture, GERD, prior endo with HH and esophagitis. SLP consulted for swallow evalauation.  -MG        Current Method of Nutrition regular textures;thin liquids  -MG        Precautions/Limitations, Vision WFL;for purposes of eval  -MG        Precautions/Limitations, Hearing WFL  -MG        Prior Level of Function-Communication WFL  -MG        Prior Level of Function-Swallowing no diet consistency restrictions  -MG        Plans/Goals Discussed with patient;agreed upon  -MG        Barriers to Rehab none identified  -MG        Patient's Goals for Discharge patient did not state  -MG           Pain    Additional Documentation Pain Scale: FACES Pre/Post-Treatment (Group)  -MG           Pain Scale: FACES Pre/Post-Treatment    Pain: FACES Scale, Pretreatment 0-->no hurt  -MG        Posttreatment Pain Rating 0-->no hurt  -MG            Oral Motor Structure and Function    Dentition Assessment teeth are in poor condition  -MG        Secretion Management WNL/WFL  -MG        Mucosal Quality moist, healthy  -MG        Volitional Swallow WFL  -MG        Volitional Cough WFL  -MG           Oral Musculature and Cranial Nerve Assessment    Oral Motor General Assessment WFL  -MG           General Eating/Swallowing Observations    Respiratory Support Currently in Use nasal cannula  -MG        O2 Liters 1L  -MG        Eating/Swallowing Skills self-fed  -MG        Positioning During Eating upright in bed  edge of bed  -MG        Utensils Used straw  -MG        Consistencies Trialed regular textures;thin liquids  -MG           Clinical Swallow Eval    Oral Prep Phase WFL  -MG        Oral Transit WFL  -MG        Oral Residue WFL  -MG        Pharyngeal Phase no overt signs/symptoms of pharyngeal impairment  -MG        Esophageal Phase unremarkable  -MG        Clinical Swallow Evaluation Summary See note  -MG           SLP Evaluation Clinical Impression    SLP Swallowing Diagnosis swallow WFL/no suspected pharyngeal impairment  -MG        Functional Impact no impact on function  -MG        Swallow Criteria for Skilled Therapeutic Interventions Met no problems identified which require skilled intervention  -MG           Recommendations    Therapy Frequency (Swallow) evaluation only  -MG        Predicted Duration Therapy Intervention (Days) until discharge  -MG        SLP Diet Recommendation regular textures;thin liquids  -MG        Recommended Diagnostics No further SLP services recommended  -MG        Recommended Precautions and Strategies upright posture during/after eating;small bites of food and sips of liquid;general aspiration precautions  -MG        Oral Care Recommendations Oral Care BID/PRN;Toothbrush  -MG        SLP Rec. for Method of Medication Administration as tolerated  -MG        Monitor for Signs of Aspiration yes;notify SLP if any concerns  -MG         Anticipated Discharge Disposition (SLP) No further SLP services warranted  -MG                  User Key  (r) = Recorded By, (t) = Taken By, (c) = Cosigned By      Initials Name Effective Dates    Marjorie Power MS CCC-SLP 07/11/23 -                     EDUCATION  The patient has been educated in the following areas:   Dysphagia (Swallowing Impairment) Oral Care/Hydration.                   Time Calculation:    Time Calculation- SLP       Row Name 01/24/25 0906             Time Calculation- SLP    SLP Start Time 0739  -MG      SLP Stop Time 0817  -MG      SLP Time Calculation (min) 38 min  -MG      SLP Received On 01/24/25  -MG         Untimed Charges    SLP Eval/Re-eval  ST Eval Oral Pharyng Swallow - 72410  -MG      38064-[id number] Eval Oral Pharyng Swallow Minutes 38  -MG         Total Minutes    Untimed Charges Total Minutes 38  -MG       Total Minutes 38  -MG                User Key  (r) = Recorded By, (t) = Taken By, (c) = Cosigned By      Initials Name Provider Type    Marjorie Power MS CCC-SLP Speech and Language Pathologist                    Therapy Charges for Today       Code Description Service Date Service Provider Modifiers Qty    [id number]  ST EVAL ORAL PHARYNG SWALLOW 3 1/24/2025 Marjorie Morrison MS CCC-SLP GN 1                 SLP Discharge Summary  Anticipated Discharge Disposition (SLP): No further SLP services warranted  Reason for Discharge: all goals and outcomes met, no further needs identified  Progress Toward Achieving Short/long Term Goals: all goals met within established timelines, discharge on same date as initial evaluation  Discharge Destination: other (see comments) (Remains in acute care)    Marjorie Morrison MS CCC-SLP  1/24/2025

## 2025-01-24 NOTE — CASE MANAGEMENT/SOCIAL WORK
Discharge Planning Assessment  [facility]     Patient Name: Juan Luis Collazo  MRN: [id number]  Today's Date: 1/24/2025    Admit Date: 1/22/2025        Discharge Needs Assessment       Row Name 01/24/25 0912       Living Environment    People in Home spouse    Name(s) of People in Home Jyoti    Current Living Arrangements home    Primary Care Provided by self    Provides Primary Care For no one    Family Caregiver if Needed spouse    Family Caregiver Names Jyoti    Able to Return to Prior Arrangements yes       Resource/Environmental Concerns    Resource/Environmental Concerns none       Transition Planning    Patient/Family Anticipates Transition to home with family    Transportation Anticipated family or friend will provide       Discharge Needs Assessment    Readmission Within the Last 30 Days no previous admission in last 30 days    Equipment Currently Used at Home walker, rolling    Concerns to be Addressed no discharge needs identified    Equipment Needed After Discharge none    Discharge Coordination/Progress spoke to patient who lives with spouse patient is independent at home prior to illness; has RX coverage and PCP; will follow for DC needs                   Discharge Plan    No documentation.                 Continued Care and Services - Admitted Since 1/22/2025    No active coordination exists for this encounter.          Demographic Summary    No documentation.                  Functional Status    No documentation.                  Psychosocial    No documentation.                  Abuse/Neglect    No documentation.                  Legal    No documentation.                  Substance Abuse    No documentation.                  Patient Forms    No documentation.                     Sangeeta Acosta, ELE

## 2025-01-24 NOTE — PLAN OF CARE
Goal Outcome Evaluation:  Plan of Care Reviewed With: patient, spouse        Progress: no change  Outcome Evaluation: Patient admitted this shift.  A&Ox4, 3L NC.  NSR tele.  VSS.  Up standby to BR.  Continent x2.  No complaints of pain.  Safety precautions maintained, bed alarm on.  No acute distress or discomfort at this time.  Report to be given to night shift nurse for continuation of care.

## 2025-01-24 NOTE — PLAN OF CARE
Goal Outcome Evaluation:  Plan of Care Reviewed With: patient        Progress: improving  Outcome Evaluation: PT tx completed. Pt without complaints this morning. He was SBA for bed mobility. He actively participated with LE exercises while sitting EOB. Pt. stood with CGA, but did have a posterior LOB when letting go of the walker to blow his nose-MIN assist to correct. He was able to walk 150' with CGA while using RWX. Pt. states he normally uses a cane at home. He would benefit from a rolling walker for balance. His O2 sat upon returning to room was 92% on RA. Will continue to work on strengthening, mobility, and balance. Pt. plans to return home with spouse.

## 2025-01-24 NOTE — PLAN OF CARE
Goal Outcome Evaluation:  Plan of Care Reviewed With: patient, spouse        Progress: improving  Outcome Evaluation: OT POC to continue. Pt A&Ox4 with spouse present. Pt agreeable to therapy. Pt completed all aspects of bed mobility req SBA. Pt completed functional mobility in room to and from bathroom, toilet transfer and sit to stands/dynamic standing tasks req SBA with RW. Pt educated on safety with RW and utilization of RW due to balance deficits. Pt worked through BUE exercises with good endurance. HH recommended at discharge. OT POC to contiue.    Anticipated Discharge Disposition (OT): home with 24/7 care, home with assist

## 2025-01-25 ENCOUNTER — READMISSION MANAGEMENT (OUTPATIENT)
Dept: CALL CENTER | Facility: HOSPITAL | Age: 85
End: 2025-01-25
Payer: MEDICARE

## 2025-01-25 VITALS
WEIGHT: 174.9 LBS | HEART RATE: 76 BPM | HEIGHT: 65 IN | TEMPERATURE: 98.1 F | RESPIRATION RATE: 16 BRPM | SYSTOLIC BLOOD PRESSURE: 153 MMHG | BODY MASS INDEX: 29.14 KG/M2 | OXYGEN SATURATION: 96 % | DIASTOLIC BLOOD PRESSURE: 66 MMHG

## 2025-01-25 PROBLEM — J18.9 PNEUMONIA: Status: RESOLVED | Noted: 2024-12-04 | Resolved: 2025-01-25

## 2025-01-25 PROBLEM — I50.32 CHRONIC HEART FAILURE WITH PRESERVED EJECTION FRACTION (HFPEF): Status: ACTIVE | Noted: 2025-01-25

## 2025-01-25 PROBLEM — J15.69 PNEUMONIA DUE TO OTHER GRAM-NEGATIVE BACTERIA: Status: ACTIVE | Noted: 2025-01-25

## 2025-01-25 PROBLEM — I50.32 CHRONIC DIASTOLIC CHF (CONGESTIVE HEART FAILURE): Chronic | Status: ACTIVE | Noted: 2025-01-25

## 2025-01-25 LAB
ALBUMIN SERPL-MCNC: 2.8 G/DL (ref 3.5–5.2)
ANION GAP SERPL CALCULATED.3IONS-SCNC: 9 MMOL/L (ref 5–15)
BUN SERPL-MCNC: 16 MG/DL (ref 8–23)
BUN/CREAT SERPL: 28.6 (ref 7–25)
CALCIUM SPEC-SCNC: 7.5 MG/DL (ref 8.6–10.5)
CHLORIDE SERPL-SCNC: 103 MMOL/L (ref 98–107)
CO2 SERPL-SCNC: 27 MMOL/L (ref 22–29)
CREAT SERPL-MCNC: 0.56 MG/DL (ref 0.76–1.27)
EGFRCR SERPLBLD CKD-EPI 2021: 96 ML/MIN/1.73
GLUCOSE SERPL-MCNC: 103 MG/DL (ref 65–99)
MAGNESIUM SERPL-MCNC: 2.1 MG/DL (ref 1.6–2.4)
POTASSIUM SERPL-SCNC: 3.7 MMOL/L (ref 3.5–5.2)
SODIUM SERPL-SCNC: 139 MMOL/L (ref 136–145)

## 2025-01-25 PROCEDURE — 82040 ASSAY OF SERUM ALBUMIN: CPT | Performed by: FAMILY MEDICINE

## 2025-01-25 PROCEDURE — 83735 ASSAY OF MAGNESIUM: CPT | Performed by: FAMILY MEDICINE

## 2025-01-25 PROCEDURE — 80048 BASIC METABOLIC PNL TOTAL CA: CPT | Performed by: FAMILY MEDICINE

## 2025-01-25 PROCEDURE — 25010000002 PIPERACILLIN SOD-TAZOBACTAM PER 1 G

## 2025-01-25 RX ADMIN — Medication 10 ML: at 08:50

## 2025-01-25 RX ADMIN — PIPERACILLIN AND TAZOBACTAM 4.5 G: 4; .5 INJECTION, POWDER, FOR SOLUTION INTRAVENOUS at 05:51

## 2025-01-25 RX ADMIN — DULOXETINE 20 MG: 20 CAPSULE, DELAYED RELEASE ORAL at 08:49

## 2025-01-25 RX ADMIN — PIPERACILLIN AND TAZOBACTAM 4.5 G: 4; .5 INJECTION, POWDER, FOR SOLUTION INTRAVENOUS at 14:04

## 2025-01-25 RX ADMIN — LOSARTAN POTASSIUM 25 MG: 50 TABLET, FILM COATED ORAL at 08:50

## 2025-01-25 NOTE — PLAN OF CARE
Goal Outcome Evaluation:  Plan of Care Reviewed With: patient    Continue oxygen supplementation, wean down as per response   Currently on antibiotic management, Zosyn IV day #2.    Discontinue doxycycline, MRSA screen negative  Replace calcium and potassium.  Follow electrolytes tomorrow morning.  Continue losartan 25 p.o. daily.  Follow speech therapy recommendations regarding diet  PT OT evaluation took place.  Rolling walker ordered on discharge        Progress: improving

## 2025-01-25 NOTE — DISCHARGE SUMMARY
[facility] Medicine Services  DISCHARGE SUMMARY       Date of Admission: 1/22/2025  Date of Discharge:  1/25/2025  Primary Care Physician: Santiago Aaron MD    Presenting Problem/History of Present Illness:  84-year-old male with history of hypertension, GERD, history of prostate cancer, chronic low back pain, osteopenia, with prior admission after a fall, right anterior fourth fifth sixth seventh and eighth rib fractures that were found subacute, with associated pneumonia.  He came to the hospital and was admitted on 1/22/2025 with worsening shortness of breath and confusion, also reported vomiting.      Final Discharge Diagnoses:  Active Hospital Problems    Diagnosis     **Pneumonia due to other gram-negative bacteria     Chronic diastolic CHF (congestive heart failure)     Chronic heart failure with preserved ejection fraction (HFpEF)     Benign hypertension        Consults: None    Procedures Performed: None    Pertinent Test Results:   Results for orders placed during the hospital encounter of 11/22/24    Adult Transthoracic Echo Complete W/ Cont if Necessary Per Protocol    Interpretation Summary    Left ventricular systolic function is normal. Left ventricular ejection fraction appears to be 61 - 65%.    Left ventricular wall thickness is consistent with mild concentric hypertrophy.    Left ventricular diastolic function is consistent with (grade I) impaired relaxation.    Normal right ventricular cavity size and systolic function noted.    Mild aortic valve stenosis is present.    Estimated right ventricular systolic pressure from tricuspid regurgitation is mildly elevated (35-45 mmHg).      Imaging Results (All)       Procedure Component Value Units Date/Time    CT Abdomen Pelvis With Contrast [[id number]] Collected: 01/23/25 0553     Updated: 01/23/25 0805    Narrative:      EXAMINATION: CT ABDOMEN PELVIS W CONTRAST-      1/22/2025 11:07 PM     HISTORY: vomiting;  J18.9-Pneumonia, unspecified organism; N39.0-Urinary  tract infection, site not specified     In order to have a CT radiation dose as low as reasonably achievable  Automated Exposure Control was utilized for adjustment of the mA and/or  KV according to patient size.     Total DLP = 686.57 mGy.cm     The CT scan of the abdomen and pelvis is performed after intravenous  contrast enhancement.     Images are acquired in axial plane with subsequent reconstruction in  coronal and sagittal planes.     There is no previous similar study for comparison.     This study is of limited diagnostic value due to significant motion  artifacts resulting in poor outlining of the abdominal solid organs. A  subtle abnormality or lesion may be obscured and not excluded.     The chest is separately reviewed and reported.     There is fatty infiltration of the liver. No significant focal  abnormality noted. The spleen is normal.     No radiopaque gallstones.     Relatively small pancreas appears unremarkable.     Moderate fullness of left adrenal gland is seen. Right adrenal gland is  normal.     Moderate lobulation of renal contour bilaterally is seen. No significant  mass. No radiopaque calculi. The nephrogram is normal and symmetrical.  No hydronephrosis. The ureters are not well visualized or evaluated.  Urinary bladder is moderately distended. No intrinsic abnormality.     The prostate is surgically absent. No focal/regional complication.     The stomach is poorly distended with moderate wall thickening.  Possibility of a mass/abnormality/lesion may not be evaluated or  excluded due to significant artifacts. Small bowel is nondistended.  Appendix is not visualized or evaluated. No significant stool in the  colon. There is diverticulosis of the colon. No finding to suggest  diverticulitis.     Severe atheromatous changes of the abdominal aorta and iliac arteries.  No aneurysmal dilatation.     No evidence of abdominal  lymphadenopathy.     Images reviewed in bone window show compression fractures involving  vertebrae T12, L1 3, L4 and L5 with kyphoplasty. Severe degeneration of  the disc L5-S1. Marked diffuse osteopenia.       Impression:      1. A very limited diagnostic study due to extensive motion artifacts. No  acute abnormality of the abdomen or pelvis.  2. Diffuse bony demineralization. Multiple compression fractures and  kyphoplasties.     The above study was initially reviewed and reported by [employer]. I do not  find any discrepancies.                                                        This report was signed and finalized on 1/23/2025 8:02 AM by Dr. Sarah Mora MD.       CT Angiogram Chest [[id number]] Collected: 01/23/25 0548     Updated: 01/23/25 0717    Narrative:      EXAMINATION: CT ANGIOGRAM CHEST-      1/22/2025 11:07 PM     HISTORY: dyspnea/ elevated dimer; J18.9-Pneumonia, unspecified organism;  N39.0-Urinary tract infection, site not specified     In order to have a CT radiation dose as low as reasonably achievable  Automated Exposure Control was utilized for adjustment of the mA and/or  KV according to patient size.     Total DLP = 686.57 mGy.cm     CT angiography of the chest tube performed after intravenous contrast  enhancement.     The images are acquired in axial plane and subsequent 2D reconstruction  in coronal and sagittal planes and 3D maximum intensity projection image  reconstruction.     There is no previous similar study for comparison. Correlation is made  with CT scan of the chest dated 12/4/2024.     There is normal opacification of the pulmonary arteries and branches  bilaterally. No filling defects in the opacified pulmonary arterial bed.     RV/LV ratio is 60/50 suggesting right heart strain.     There is moderate dilatation of the central pulmonary arteries, the main  pulmonary artery measuring 3.4 cm. This may suggest pulmonary arterial  hypertension.     Severe atheromatous change  of thoracic aorta noted. There is mild  ectasia of the ascending thoracic aorta which measures 4 cm at the mid  ascending level. No dissection.     Severe atheromatous changes of coronary arteries are noted.     There is no evidence of mediastinal or hilar mass or lymphadenopathy.     The soft tissues of the neck are not optimally visualized or evaluated  due to extensive artifacts from the left shoulder arthroplasty hardware.  Thyroid gland is not optimally visualized or evaluated.     The lungs are poorly expanded.     There is an area of consolidation with air bronchogram in the right  upper lobe posterior segment representing acute pneumonic consolidation.     Scattered areas of atelectasis are seen in the lower lungs bilaterally.     Central airway is patent.     Moderately dilated fluid-filled esophagus is noted.     The abdomen is separately reviewed and reported.     Images reviewed in bone window show compression fractures involving  vertebrae T7, T12 and L3 with evidence of kyphoplasty. Mild to moderate  compression deformity of several other vertebrae are seen, most severely  the vertebra at T5 and T3. There is accentuated thoracic kyphosis. There  are deformities of the ribs bilaterally which are partly due to  significant motion artifact. Nondisplaced or minimally displaced  fractures are not excluded.       Impression:      1. No evidence of pulmonary embolism. Mild ectasia of the ascending  thoracic aorta. No dissection.  2. Severe atheromatous changes of coronary arteries.  3. Pulmonary arterial hypertension and right ventricular strain.  4. Dense consolidation of the right upper lobe posterior segment  representing an acute inflammatory/infectious process.  5. Multiple thoracolumbar compression fractures and kyphoplasties.     The above study was initially reviewed and reported by [employer]. I do not  find any discrepancies.                                         This report was signed and finalized  on 1/23/2025 7:14 AM by Dr. Sarah Mora MD.       XR Chest 2 View [[id number]] Collected: 01/23/25 0559     Updated: 01/23/25 0605    Narrative:      EXAMINATION: XR CHEST 2 VW-     1/22/2025 9:30 PM     HISTORY: sob     2 images of the chest are compared with the previous study dated  12/4/2024.     The lungs are moderately well-expanded.     There is an area of consolidation in the posterior segment of the right  upper lobe which probably represent an acute inflammatory/infectious  process. This was not seen in the previous study.     There is a trace right basal pleural effusion.     There are chronic inflammatory and obstructive lung changes similar to  the previous study.     The heart size is not optimally evaluated. There is possible moderate  cardiomegaly. Atheromatous change of thoracic aorta.     Compression fracture with kyphoplasty of a lower thoracic vertebra is  similar to the previous study. Bilateral shoulder arthroplasty is noted.       Impression:      1. Right upper lobe consolidation representing an acute  inflammatory/infectious process.  2. Chronic inflammatory and obstructive lung changes.        This report was signed and finalized on 1/23/2025 6:02 AM by Dr. Sarah Mora MD.             LAB RESULTS:      Lab 01/24/25  0254 01/23/25  0551 01/22/25  2221   WBC 9.30 14.73* 18.40*   HEMOGLOBIN 10.0* 11.4* 12.4*   HEMATOCRIT 31.4* 35.3* 37.5   PLATELETS 210 220 245   NEUTROS ABS 6.91  --  14.72*   IMMATURE GRANS (ABS) 0.04  --  0.12*   LYMPHS ABS 1.32  --  1.31   MONOS ABS 0.98*  --  2.24*   EOS ABS 0.04  --  0.00   MCV 93.7 92.7 91.2   PROCALCITONIN  --   --  0.48*   LACTATE  --   --  2.0   D DIMER QUANT  --   --  1.02*         Lab 01/25/25  0112 01/24/25  0254 01/23/25  0551 01/23/25  0100 01/22/25  2221   SODIUM 139 139 134*  --  133*   SODIUM, ARTERIAL  --   --   --  135*  --    POTASSIUM 3.7 3.1* 3.7  --  3.7   CHLORIDE 103 102 98  --  97*   CO2 27.0 29.0 28.0  --  25.0   ANION  GAP 9.0 8.0 8.0  --  11.0   BUN 16 14 13  --  13   CREATININE 0.56* 0.67* 0.62*  --  0.55*   EGFR 96.0 90.9 93.1  --  96.5   GLUCOSE 103* 116* 117*  --  129*   CALCIUM 7.5* 7.4* 7.5*  --  7.9*   MAGNESIUM 2.1  --   --   --   --    TSH  --   --  0.309  --   --          Lab 01/25/25  0112 01/23/25  0551 01/22/25  2221   TOTAL PROTEIN  --  7.3 8.4   ALBUMIN 2.8* 3.2* 3.4*   GLOBULIN  --  4.1 5.0   ALT (SGPT)  --  6 9   AST (SGOT)  --  13 18   BILIRUBIN  --  0.9 0.9   ALK PHOS  --  88 103         Lab 01/22/25  2334 01/22/25  2221   PROBNP  --  2,971.0*   HSTROP T 25* 24*             Lab 01/23/25  0551   VITAMIN B 12 279         Lab 01/23/25  0100   PH, ARTERIAL 7.440   PCO2, ARTERIAL 42.5   PO2 ART 53.0*   O2 SATURATION ART 89.9*   HCO3 ART 28.9*   BASE EXCESS ART 4.2*   CARBOXYHEMOGLOBIN 1.0     Brief Urine Lab Results  (Last result in the past 365 days)        Color   Clarity   Blood   Leuk Est   Nitrite   Protein   CREAT   Urine HCG        01/23/25 0034 Dark Yellow   Cloudy   Small (1+)   Moderate (2+)   Negative   >=300 mg/dL (3+)                 Microbiology Results (last 10 days)       Procedure Component Value - Date/Time    S. Pneumo Ag Urine or CSF - Urine, Urine, Clean Catch [[id number]]  (Normal) Collected: 01/24/25 0433    Lab Status: Final result Specimen: Urine, Clean Catch Updated: 01/24/25 0546     Strep Pneumo Ag Negative    MRSA Screen, PCR (Inpatient) - Swab, Nares [[id number]]  (Normal) Collected: 01/23/25 2251    Lab Status: Final result Specimen: Swab from Nares Updated: 01/24/25 0049     MRSA PCR No MRSA Detected    Narrative:      The negative predictive value of this diagnostic test is high and should only be used to consider de-escalating anti-MRSA therapy. A positive result may indicate colonization with MRSA and must be correlated clinically.    Urine Culture - Urine, Urine, Clean Catch [[id number]] Collected: 01/23/25 0034    Lab Status: Final result Specimen: Urine, Clean Catch Updated:  01/24/25 1107     Urine Culture 50,000 CFU/mL Normal Urogenital Samaria    Narrative:      Colonization of the urinary tract without infection is common. Treatment is discouraged unless the patient is symptomatic, pregnant, or undergoing an invasive urologic procedure.    Blood Culture - Blood, Arm, Right [[id number]]  (Normal) Collected: 01/22/25 2336    Lab Status: Preliminary result Specimen: Blood from Arm, Right Updated: 01/25/25 0000     Blood Culture No growth at 2 days    Blood Culture - Blood, Arm, Left [[id number]]  (Normal) Collected: 01/22/25 2334    Lab Status: Preliminary result Specimen: Blood from Arm, Left Updated: 01/24/25 2345     Blood Culture No growth at 2 days    COVID PRE-OP / PRE-PROCEDURE SCREENING ORDER (NO ISOLATION) - Swab, Nasopharynx [[id number]]  (Normal) Collected: 01/22/25 2222    Lab Status: Final result Specimen: Swab from Nasopharynx Updated: 01/22/25 2245    Narrative:      The following orders were created for panel order COVID PRE-OP / PRE-PROCEDURE SCREENING ORDER (NO ISOLATION) - Swab, Nasopharynx.  Procedure                               Abnormality         Status                     ---------                               -----------         ------                     COVID-19 and FLU A/B PCR...[[id number]]  Normal              Final result                 Please view results for these tests on the individual orders.    COVID-19 and FLU A/B PCR, 1 HR TAT - Swab, Nasopharynx [[id number]]  (Normal) Collected: 01/22/25 2222    Lab Status: Final result Specimen: Swab from Nasopharynx Updated: 01/22/25 2245     COVID19 Not Detected     Influenza A PCR Not Detected     Influenza B PCR Not Detected    Narrative:      Fact sheet for providers: https://www.fda.gov/media/714617/download    Fact sheet for patients: https://www.fda.gov/media/920203/download    Test performed by PCR.    Respiratory Panel PCR w/COVID-19(SARS-CoV-2) [facility] In-House, NP Swab in UTM/VTM, 2 HR TAT  - Swab, Nasopharynx [[id number]]  (Normal) Collected: 01/22/25 2222    Lab Status: Final result Specimen: Swab from Nasopharynx Updated: 01/23/25 0006     ADENOVIRUS, PCR Not Detected     Coronavirus 229E Not Detected     Coronavirus HKU1 Not Detected     Coronavirus NL63 Not Detected     Coronavirus OC43 Not Detected     COVID19 Not Detected     Human Metapneumovirus Not Detected     Human Rhinovirus/Enterovirus Not Detected     Influenza A PCR Not Detected     Influenza B PCR Not Detected     Parainfluenza Virus 1 Not Detected     Parainfluenza Virus 2 Not Detected     Parainfluenza Virus 3 Not Detected     Parainfluenza Virus 4 Not Detected     RSV, PCR Not Detected     Bordetella pertussis pcr Not Detected     Bordetella parapertussis PCR Not Detected     Chlamydophila pneumoniae PCR Not Detected     Mycoplasma pneumo by PCR Not Detected    Narrative:      In the setting of a positive respiratory panel with a viral infection PLUS a negative procalcitonin without other underlying concern for bacterial infection, consider observing off antibiotics or discontinuation of antibiotics and continue supportive care. If the respiratory panel is positive for atypical bacterial infection (Bordetella pertussis, Chlamydophila pneumoniae, or Mycoplasma pneumoniae), consider antibiotic de-escalation to target atypical bacterial infection.            Hospital Course: The patient was admitted to the hospital for management of pneumonia.  Imaging studies showed a right upper lobe pneumonia.  He was started on antibiotic treatment with Zosyn and doxycycline.  MRSA screen was negative and doxycycline were discontinued.  On reassessment, the patient's mental status had improved.  He was alert and oriented.  He reported feeling much better.  He was evaluated by physical therapy, recommending home discharged with a new rolling walker.  This was ordered.  On 1/25/2025, the patient evaluated, reporting no significant dyspnea, improvement  "in symptomatology.  Blood cultures were negative after 2 days.  Streptococcus urine antigen was negative.  Respiratory panel negative.  Patient to complete 7 days of antibiotics, prescribed Augmentin on discharge.  Home health requested by family members.  Ordered and arranged by ; regarding hypertension, continue ARB.  Follow with primary care provider soon.  Recommended to continue incentive inspirometer, and to use of walker at all times, to avoid additional falls at home.      Physical Exam on Discharge:  /69 (BP Location: Left arm, Patient Position: Lying)   Pulse 77   Temp 98 °F (36.7 °C) (Oral)   Resp 16   Ht 165.1 cm (65\")   Wt 79.3 kg (174 lb 14.4 oz)   SpO2 94%   BMI 29.10 kg/m²   Physical Exam  Constitutional:       Appearance: Normal appearance.  Alert oriented x 3.  No significant respiratory distress   HENT:      Head: Normocephalic and atraumatic.      Nose: Nose normal.      Mouth/Throat:      Mouth: Mucous membranes are moist.   Eyes:      Extraocular Movements: Extraocular movements intact.      Conjunctiva/sclera: Conjunctivae normal.      Pupils: Pupils are equal, round.  Cardiovascular:      Rate and Rhythm: Normal rate and regular rhythm.      Pulses: Normal pulses.   Pulmonary:      Effort: No respiratory distress.     Breath sounds: Symmetric expansion.  Bilateral breath sounds.  Abdominal:      General: Abdomen is flat. Bowel sounds are normal.      Palpations: Abdomen is soft.   Extremities:  No lower extremity edema.  Skin:     Capillary Refill: Capillary refill takes less than 2 seconds.      Coloration: Skin is not jaundiced.      Findings: No rash.   Neurological:      General: No focal deficit present.      Mental Status: Patient is alert, oriented to place time and person    Condition on Discharge: Stable    Discharge Disposition:  Home or Self Care    Discharge Medications:     Discharge Medications        New Medications        Instructions Start Date "   amoxicillin-clavulanate 875-125 MG per tablet  Commonly known as: AUGMENTIN   1 tablet, Oral, 2 Times Daily             Continue These Medications        Instructions Start Date   albuterol sulfate  (90 Base) MCG/ACT inhaler  Commonly known as: PROVENTIL HFA;VENTOLIN HFA;PROAIR HFA   2 puffs, Inhalation, Every 6 Hours PRN      Benicar 40 MG tablet  Generic drug: olmesartan   40 mg, Oral, Daily      cetirizine 10 MG tablet  Commonly known as: zyrTEC   10 mg, Oral, Daily      denosumab 60 MG/ML solution prefilled syringe syringe  Commonly known as: PROLIA   60 mg, Subcutaneous, Every 6 Months      DULoxetine 20 MG capsule  Commonly known as: Cymbalta   20 mg, Oral, Daily      multivitamins-minerals capsule capsule   1 capsule, 2 Times Daily      naloxone 4 MG/0.1ML nasal spray  Commonly known as: NARCAN   1 spray, Nasal, As Needed, Call 911. Don't prime. Bernard in 1 nostril for overdose. Repeat in 2-3 minutes in other nostril if no or minimal breathing/responsiveness.      oxyCODONE 5 MG immediate release tablet  Commonly known as: Roxicodone   5 mg, Oral, Every 4 Hours PRN               Discharge Diet:   Diet Instructions       Diet: Cardiac Diets; Healthy Heart (2-3 Na+); Regular (IDDSI 7); Thin (IDDSI 0)      Discharge Diet: Cardiac Diets    Cardiac Diet: Healthy Heart (2-3 Na+)    Texture: Regular (IDDSI 7)    Fluid Consistency: Thin (IDDSI 0)            Activity at Discharge: As tolerated.  Use of rolling walker at all times    Follow-up Appointments:   Future Appointments   Date Time Provider Department Center   5/21/2025  2:00 PM Santiago Aaron MD [facility]       Test Results Pending at Discharge: None    Electronically signed by Art Lizarraga MD, 01/25/25, 08:55 CST.    Time: 50 minutes.

## 2025-01-25 NOTE — CASE MANAGEMENT/SOCIAL WORK
Case Management Discharge Note                Selected Continued Care - Admitted Since 1/22/2025       Destination    No services have been selected for the patient.                Durable Medical Equipment       Service Provider Services Address Phone Fax Patient Preferred    [facility] [address], [facility] [address] [phone] --              Dialysis/Infusion    No services have been selected for the patient.                Home Medical Care    No services have been selected for the patient.                Therapy    No services have been selected for the patient.                Community Resources    No services have been selected for the patient.                Community & DME    No services have been selected for the patient.                         Final Discharge Disposition Code: 01 - home or self-care

## 2025-01-25 NOTE — PLAN OF CARE
Goal Outcome Evaluation:      Pt up to chair-ambulating to bathroom-feeling better

## 2025-01-25 NOTE — CASE MANAGEMENT/SOCIAL WORK
Continued Stay Note   [place]     Patient Name: Juan Luis Collazo  MRN: [id number]  Today's Date: 1/25/2025    Admit Date: 1/22/2025        Discharge Plan       Row Name 01/25/25 1349       Plan    Final Discharge Disposition Code 06 - home with home health care    Final Note  submitted referral to Eduin navarro.      Row Name 01/25/25 1051       Plan    Final Discharge Disposition Code 01 - home or self-care                   Discharge Codes    No documentation.                 Expected Discharge Date and Time       Expected Discharge Date Expected Discharge Time    Jan 25, 2025               Srini Burrell

## 2025-01-25 NOTE — PLAN OF CARE
Goal Outcome Evaluation:      Pt has walker for home-home rehab order enter by dr irving-finishing last dose of zosyn-daughter already picked up the pt's medication from the pharmacy-wife will be coming and taking the pt home when discharged

## 2025-01-26 LAB — BACTERIA SPEC AEROBE CULT: NORMAL

## 2025-01-26 NOTE — PAYOR COMM NOTE
"DC HOME 1-25-25    ValeRowan \"ADAN\" (84 y.o. Male)       Date of Birth   1940    Social Security Number       Address   [address] [place] [address]    Home Phone   [phone]    MRN   [id number]       Spiritism   Scientology    Marital Status                               Admission Date   1/22/25    Admission Type   Emergency    Admitting Provider   Art Lizarraga MD    Attending Provider       Department, Room/Bed   [facility] 4B, 462/1       Discharge Date   1/25/2025    Discharge Disposition   Home or Self Care    Discharge Destination                                 Attending Provider: (none)   Allergies: No Known Allergies    Isolation: None   Infection: None   Code Status: Prior    Ht: 165.1 cm (65\")   Wt: 79.3 kg (174 lb 14.4 oz)    Admission Cmt: None   Principal Problem: Pneumonia due to other gram-negative bacteria [J15.69]                   Active Insurance as of 1/22/2025       Primary Coverage       Payor Plan Insurance Group Employer/Plan Group    AETNA MEDICARE REPLACEMENT AETNA MEDICARE REPLACEMENT [id number]       Payor Plan Address Payor Plan Phone Number Payor Plan Fax Number Effective Dates    [address] [phone]  1/1/2023 - None Entered    [place] [address]         Subscriber Name Subscriber Birth Date Member ID       ROWAN STRATTON KALEN 1940 [id number]                     Emergency Contacts        (Rel.) Home Phone Work Phone Mobile Phone    Jyoti Stratton (Spouse) [phone] -- [phone]                 Discharge Summary        Art Lizarraga MD at 01/25/25 0855                [facility] Medicine Services  DISCHARGE SUMMARY       Date of Admission: 1/22/2025  Date of Discharge:  1/25/2025  Primary Care Physician: Santiago Aaron MD    Presenting Problem/History of Present Illness:  84-year-old male with history of hypertension, GERD, history of prostate cancer, chronic low back pain, " osteopenia, with prior admission after a fall, right anterior fourth fifth sixth seventh and eighth rib fractures that were found subacute, with associated pneumonia.  He came to the hospital and was admitted on 1/22/2025 with worsening shortness of breath and confusion, also reported vomiting.      Final Discharge Diagnoses:  Active Hospital Problems    Diagnosis     **Pneumonia due to other gram-negative bacteria     Chronic diastolic CHF (congestive heart failure)     Chronic heart failure with preserved ejection fraction (HFpEF)     Benign hypertension        Consults: None    Procedures Performed: None    Pertinent Test Results:   Results for orders placed during the hospital encounter of 11/22/24    Adult Transthoracic Echo Complete W/ Cont if Necessary Per Protocol    Interpretation Summary    Left ventricular systolic function is normal. Left ventricular ejection fraction appears to be 61 - 65%.    Left ventricular wall thickness is consistent with mild concentric hypertrophy.    Left ventricular diastolic function is consistent with (grade I) impaired relaxation.    Normal right ventricular cavity size and systolic function noted.    Mild aortic valve stenosis is present.    Estimated right ventricular systolic pressure from tricuspid regurgitation is mildly elevated (35-45 mmHg).      Imaging Results (All)       Procedure Component Value Units Date/Time    CT Abdomen Pelvis With Contrast [[id number]] Collected: 01/23/25 0553     Updated: 01/23/25 0805    Narrative:      EXAMINATION: CT ABDOMEN PELVIS W CONTRAST-      1/22/2025 11:07 PM     HISTORY: vomiting; J18.9-Pneumonia, unspecified organism; N39.0-Urinary  tract infection, site not specified     In order to have a CT radiation dose as low as reasonably achievable  Automated Exposure Control was utilized for adjustment of the mA and/or  KV according to patient size.     Total DLP = 686.57 mGy.cm     The CT scan of the abdomen and pelvis is performed  after intravenous  contrast enhancement.     Images are acquired in axial plane with subsequent reconstruction in  coronal and sagittal planes.     There is no previous similar study for comparison.     This study is of limited diagnostic value due to significant motion  artifacts resulting in poor outlining of the abdominal solid organs. A  subtle abnormality or lesion may be obscured and not excluded.     The chest is separately reviewed and reported.     There is fatty infiltration of the liver. No significant focal  abnormality noted. The spleen is normal.     No radiopaque gallstones.     Relatively small pancreas appears unremarkable.     Moderate fullness of left adrenal gland is seen. Right adrenal gland is  normal.     Moderate lobulation of renal contour bilaterally is seen. No significant  mass. No radiopaque calculi. The nephrogram is normal and symmetrical.  No hydronephrosis. The ureters are not well visualized or evaluated.  Urinary bladder is moderately distended. No intrinsic abnormality.     The prostate is surgically absent. No focal/regional complication.     The stomach is poorly distended with moderate wall thickening.  Possibility of a mass/abnormality/lesion may not be evaluated or  excluded due to significant artifacts. Small bowel is nondistended.  Appendix is not visualized or evaluated. No significant stool in the  colon. There is diverticulosis of the colon. No finding to suggest  diverticulitis.     Severe atheromatous changes of the abdominal aorta and iliac arteries.  No aneurysmal dilatation.     No evidence of abdominal lymphadenopathy.     Images reviewed in bone window show compression fractures involving  vertebrae T12, L1 3, L4 and L5 with kyphoplasty. Severe degeneration of  the disc L5-S1. Marked diffuse osteopenia.       Impression:      1. A very limited diagnostic study due to extensive motion artifacts. No  acute abnormality of the abdomen or pelvis.  2. Diffuse bony  demineralization. Multiple compression fractures and  kyphoplasties.     The above study was initially reviewed and reported by [employer]. I do not  find any discrepancies.                                                        This report was signed and finalized on 1/23/2025 8:02 AM by Dr. Sarah Mora MD.       CT Angiogram Chest [[id number]] Collected: 01/23/25 0548     Updated: 01/23/25 0717    Narrative:      EXAMINATION: CT ANGIOGRAM CHEST-      1/22/2025 11:07 PM     HISTORY: dyspnea/ elevated dimer; J18.9-Pneumonia, unspecified organism;  N39.0-Urinary tract infection, site not specified     In order to have a CT radiation dose as low as reasonably achievable  Automated Exposure Control was utilized for adjustment of the mA and/or  KV according to patient size.     Total DLP = 686.57 mGy.cm     CT angiography of the chest tube performed after intravenous contrast  enhancement.     The images are acquired in axial plane and subsequent 2D reconstruction  in coronal and sagittal planes and 3D maximum intensity projection image  reconstruction.     There is no previous similar study for comparison. Correlation is made  with CT scan of the chest dated 12/4/2024.     There is normal opacification of the pulmonary arteries and branches  bilaterally. No filling defects in the opacified pulmonary arterial bed.     RV/LV ratio is 60/50 suggesting right heart strain.     There is moderate dilatation of the central pulmonary arteries, the main  pulmonary artery measuring 3.4 cm. This may suggest pulmonary arterial  hypertension.     Severe atheromatous change of thoracic aorta noted. There is mild  ectasia of the ascending thoracic aorta which measures 4 cm at the mid  ascending level. No dissection.     Severe atheromatous changes of coronary arteries are noted.     There is no evidence of mediastinal or hilar mass or lymphadenopathy.     The soft tissues of the neck are not optimally visualized or evaluated  due  to extensive artifacts from the left shoulder arthroplasty hardware.  Thyroid gland is not optimally visualized or evaluated.     The lungs are poorly expanded.     There is an area of consolidation with air bronchogram in the right  upper lobe posterior segment representing acute pneumonic consolidation.     Scattered areas of atelectasis are seen in the lower lungs bilaterally.     Central airway is patent.     Moderately dilated fluid-filled esophagus is noted.     The abdomen is separately reviewed and reported.     Images reviewed in bone window show compression fractures involving  vertebrae T7, T12 and L3 with evidence of kyphoplasty. Mild to moderate  compression deformity of several other vertebrae are seen, most severely  the vertebra at T5 and T3. There is accentuated thoracic kyphosis. There  are deformities of the ribs bilaterally which are partly due to  significant motion artifact. Nondisplaced or minimally displaced  fractures are not excluded.       Impression:      1. No evidence of pulmonary embolism. Mild ectasia of the ascending  thoracic aorta. No dissection.  2. Severe atheromatous changes of coronary arteries.  3. Pulmonary arterial hypertension and right ventricular strain.  4. Dense consolidation of the right upper lobe posterior segment  representing an acute inflammatory/infectious process.  5. Multiple thoracolumbar compression fractures and kyphoplasties.     The above study was initially reviewed and reported by [facility]. I do not  find any discrepancies.                                         This report was signed and finalized on 1/23/2025 7:14 AM by Dr. Sarah Mora MD.       XR Chest 2 View [[id number]] Collected: 01/23/25 0559     Updated: 01/23/25 0605    Narrative:      EXAMINATION: XR CHEST 2 VW-     1/22/2025 9:30 PM     HISTORY: sob     2 images of the chest are compared with the previous study dated  12/4/2024.     The lungs are moderately well-expanded.     There is  an area of consolidation in the posterior segment of the right  upper lobe which probably represent an acute inflammatory/infectious  process. This was not seen in the previous study.     There is a trace right basal pleural effusion.     There are chronic inflammatory and obstructive lung changes similar to  the previous study.     The heart size is not optimally evaluated. There is possible moderate  cardiomegaly. Atheromatous change of thoracic aorta.     Compression fracture with kyphoplasty of a lower thoracic vertebra is  similar to the previous study. Bilateral shoulder arthroplasty is noted.       Impression:      1. Right upper lobe consolidation representing an acute  inflammatory/infectious process.  2. Chronic inflammatory and obstructive lung changes.        This report was signed and finalized on 1/23/2025 6:02 AM by Dr. Sarah Mora MD.             LAB RESULTS:      Lab 01/24/25  0254 01/23/25  0551 01/22/25  2221   WBC 9.30 14.73* 18.40*   HEMOGLOBIN 10.0* 11.4* 12.4*   HEMATOCRIT 31.4* 35.3* 37.5   PLATELETS 210 220 245   NEUTROS ABS 6.91  --  14.72*   IMMATURE GRANS (ABS) 0.04  --  0.12*   LYMPHS ABS 1.32  --  1.31   MONOS ABS 0.98*  --  2.24*   EOS ABS 0.04  --  0.00   MCV 93.7 92.7 91.2   PROCALCITONIN  --   --  0.48*   LACTATE  --   --  2.0   D DIMER QUANT  --   --  1.02*         Lab 01/25/25  0112 01/24/25  0254 01/23/25  0551 01/23/25  0100 01/22/25  2221   SODIUM 139 139 134*  --  133*   SODIUM, ARTERIAL  --   --   --  135*  --    POTASSIUM 3.7 3.1* 3.7  --  3.7   CHLORIDE 103 102 98  --  97*   CO2 27.0 29.0 28.0  --  25.0   ANION GAP 9.0 8.0 8.0  --  11.0   BUN 16 14 13  --  13   CREATININE 0.56* 0.67* 0.62*  --  0.55*   EGFR 96.0 90.9 93.1  --  96.5   GLUCOSE 103* 116* 117*  --  129*   CALCIUM 7.5* 7.4* 7.5*  --  7.9*   MAGNESIUM 2.1  --   --   --   --    TSH  --   --  0.309  --   --          Lab 01/25/25  0112 01/23/25  0551 01/22/25 2221   TOTAL PROTEIN  --  7.3 8.4   ALBUMIN 2.8*  3.2* 3.4*   GLOBULIN  --  4.1 5.0   ALT (SGPT)  --  6 9   AST (SGOT)  --  13 18   BILIRUBIN  --  0.9 0.9   ALK PHOS  --  88 103         Lab 01/22/25  2334 01/22/25  2221   PROBNP  --  2,971.0*   HSTROP T 25* 24*             Lab 01/23/25  0551   VITAMIN B 12 279         Lab 01/23/25  0100   PH, ARTERIAL 7.440   PCO2, ARTERIAL 42.5   PO2 ART 53.0*   O2 SATURATION ART 89.9*   HCO3 ART 28.9*   BASE EXCESS ART 4.2*   CARBOXYHEMOGLOBIN 1.0     Brief Urine Lab Results  (Last result in the past 365 days)        Color   Clarity   Blood   Leuk Est   Nitrite   Protein   CREAT   Urine HCG        01/23/25 0034 Dark Yellow   Cloudy   Small (1+)   Moderate (2+)   Negative   >=300 mg/dL (3+)                 Microbiology Results (last 10 days)       Procedure Component Value - Date/Time    S. Pneumo Ag Urine or CSF - Urine, Urine, Clean Catch [[id number]]  (Normal) Collected: 01/24/25 0433    Lab Status: Final result Specimen: Urine, Clean Catch Updated: 01/24/25 0546     Strep Pneumo Ag Negative    MRSA Screen, PCR (Inpatient) - Swab, Nares [[id number]]  (Normal) Collected: 01/23/25 2251    Lab Status: Final result Specimen: Swab from Nares Updated: 01/24/25 0049     MRSA PCR No MRSA Detected    Narrative:      The negative predictive value of this diagnostic test is high and should only be used to consider de-escalating anti-MRSA therapy. A positive result may indicate colonization with MRSA and must be correlated clinically.    Urine Culture - Urine, Urine, Clean Catch [[id number]] Collected: 01/23/25 0034    Lab Status: Final result Specimen: Urine, Clean Catch Updated: 01/24/25 1107     Urine Culture 50,000 CFU/mL Normal Urogenital Samaria    Narrative:      Colonization of the urinary tract without infection is common. Treatment is discouraged unless the patient is symptomatic, pregnant, or undergoing an invasive urologic procedure.    Blood Culture - Blood, Arm, Right [[id number]]  (Normal) Collected: 01/22/25 2336    Lab Status:  Preliminary result Specimen: Blood from Arm, Right Updated: 01/25/25 0000     Blood Culture No growth at 2 days    Blood Culture - Blood, Arm, Left [[id number]]  (Normal) Collected: 01/22/25 2334    Lab Status: Preliminary result Specimen: Blood from Arm, Left Updated: 01/24/25 2345     Blood Culture No growth at 2 days    COVID PRE-OP / PRE-PROCEDURE SCREENING ORDER (NO ISOLATION) - Swab, Nasopharynx [[id number]]  (Normal) Collected: 01/22/25 2222    Lab Status: Final result Specimen: Swab from Nasopharynx Updated: 01/22/25 2245    Narrative:      The following orders were created for panel order COVID PRE-OP / PRE-PROCEDURE SCREENING ORDER (NO ISOLATION) - Swab, Nasopharynx.  Procedure                               Abnormality         Status                     ---------                               -----------         ------                     COVID-19 and FLU A/B PCR...[[id number]]  Normal              Final result                 Please view results for these tests on the individual orders.    COVID-19 and FLU A/B PCR, 1 HR TAT - Swab, Nasopharynx [[id number]]  (Normal) Collected: 01/22/25 2222    Lab Status: Final result Specimen: Swab from Nasopharynx Updated: 01/22/25 2245     COVID19 Not Detected     Influenza A PCR Not Detected     Influenza B PCR Not Detected    Narrative:      Fact sheet for providers: https://www.fda.gov/media/463769/download    Fact sheet for patients: https://www.fda.gov/media/204745/download    Test performed by PCR.    Respiratory Panel PCR w/COVID-19(SARS-CoV-2) [facility] In-House, NP Swab in UTM/VTM, 2 HR TAT - Swab, Nasopharynx [[id number]]  (Normal) Collected: 01/22/25 2222    Lab Status: Final result Specimen: Swab from Nasopharynx Updated: 01/23/25 0006     ADENOVIRUS, PCR Not Detected     Coronavirus 229E Not Detected     Coronavirus HKU1 Not Detected     Coronavirus NL63 Not Detected     Coronavirus OC43 Not Detected     COVID19 Not Detected     Human  Metapneumovirus Not Detected     Human Rhinovirus/Enterovirus Not Detected     Influenza A PCR Not Detected     Influenza B PCR Not Detected     Parainfluenza Virus 1 Not Detected     Parainfluenza Virus 2 Not Detected     Parainfluenza Virus 3 Not Detected     Parainfluenza Virus 4 Not Detected     RSV, PCR Not Detected     Bordetella pertussis pcr Not Detected     Bordetella parapertussis PCR Not Detected     Chlamydophila pneumoniae PCR Not Detected     Mycoplasma pneumo by PCR Not Detected    Narrative:      In the setting of a positive respiratory panel with a viral infection PLUS a negative procalcitonin without other underlying concern for bacterial infection, consider observing off antibiotics or discontinuation of antibiotics and continue supportive care. If the respiratory panel is positive for atypical bacterial infection (Bordetella pertussis, Chlamydophila pneumoniae, or Mycoplasma pneumoniae), consider antibiotic de-escalation to target atypical bacterial infection.            Hospital Course: The patient was admitted to the hospital for management of pneumonia.  Imaging studies showed a right upper lobe pneumonia.  He was started on antibiotic treatment with Zosyn and doxycycline.  MRSA screen was negative and doxycycline were discontinued.  On reassessment, the patient's mental status had improved.  He was alert and oriented.  He reported feeling much better.  He was evaluated by physical therapy, recommending home discharged with a new rolling walker.  This was ordered.  On 1/25/2025, the patient evaluated, reporting no significant dyspnea, improvement in symptomatology.  Blood cultures were negative after 2 days.  Streptococcus urine antigen was negative.  Respiratory panel negative.  Patient to complete 7 days of antibiotics, prescribed Augmentin on discharge.  Home health requested by family members.  Ordered and arranged by ; regarding hypertension, continue ARB.  Follow with  "primary care provider soon.  Recommended to continue incentive inspirometer, and to use of walker at all times, to avoid additional falls at home.      Physical Exam on Discharge:  /69 (BP Location: Left arm, Patient Position: Lying)   Pulse 77   Temp 98 °F (36.7 °C) (Oral)   Resp 16   Ht 165.1 cm (65\")   Wt 79.3 kg (174 lb 14.4 oz)   SpO2 94%   BMI 29.10 kg/m²   Physical Exam  Constitutional:       Appearance: Normal appearance.  Alert oriented x 3.  No significant respiratory distress   HENT:      Head: Normocephalic and atraumatic.      Nose: Nose normal.      Mouth/Throat:      Mouth: Mucous membranes are moist.   Eyes:      Extraocular Movements: Extraocular movements intact.      Conjunctiva/sclera: Conjunctivae normal.      Pupils: Pupils are equal, round.  Cardiovascular:      Rate and Rhythm: Normal rate and regular rhythm.      Pulses: Normal pulses.   Pulmonary:      Effort: No respiratory distress.     Breath sounds: Symmetric expansion.  Bilateral breath sounds.  Abdominal:      General: Abdomen is flat. Bowel sounds are normal.      Palpations: Abdomen is soft.   Extremities:  No lower extremity edema.  Skin:     Capillary Refill: Capillary refill takes less than 2 seconds.      Coloration: Skin is not jaundiced.      Findings: No rash.   Neurological:      General: No focal deficit present.      Mental Status: Patient is alert, oriented to place time and person    Condition on Discharge: Stable    Discharge Disposition:  Home or Self Care    Discharge Medications:     Discharge Medications        New Medications        Instructions Start Date   amoxicillin-clavulanate 875-125 MG per tablet  Commonly known as: AUGMENTIN   1 tablet, Oral, 2 Times Daily             Continue These Medications        Instructions Start Date   albuterol sulfate  (90 Base) MCG/ACT inhaler  Commonly known as: PROVENTIL HFA;VENTOLIN HFA;PROAIR HFA   2 puffs, Inhalation, Every 6 Hours PRN      Benicar 40 MG " tablet  Generic drug: olmesartan   40 mg, Oral, Daily      cetirizine 10 MG tablet  Commonly known as: zyrTEC   10 mg, Oral, Daily      denosumab 60 MG/ML solution prefilled syringe syringe  Commonly known as: PROLIA   60 mg, Subcutaneous, Every 6 Months      DULoxetine 20 MG capsule  Commonly known as: Cymbalta   20 mg, Oral, Daily      multivitamins-minerals capsule capsule   1 capsule, 2 Times Daily      naloxone 4 MG/0.1ML nasal spray  Commonly known as: NARCAN   1 spray, Nasal, As Needed, Call 911. Don't prime. [place] in 1 nostril for overdose. Repeat in 2-3 minutes in other nostril if no or minimal breathing/responsiveness.      oxyCODONE 5 MG immediate release tablet  Commonly known as: Roxicodone   5 mg, Oral, Every 4 Hours PRN               Discharge Diet:   Diet Instructions       Diet: Cardiac Diets; Healthy Heart (2-3 Na+); Regular (IDDSI 7); Thin (IDDSI 0)      Discharge Diet: Cardiac Diets    Cardiac Diet: Healthy Heart (2-3 Na+)    Texture: Regular (IDDSI 7)    Fluid Consistency: Thin (IDDSI 0)            Activity at Discharge: As tolerated.  Use of rolling walker at all times    Follow-up Appointments:   Future Appointments   Date Time Provider Department Center   5/21/2025  2:00 PM Santiago Aaron MD [facility]       Test Results Pending at Discharge: None    Electronically signed by Art Lizarraga MD, 01/25/25, 08:55 CST.    Time: 50 minutes.          Electronically signed by Art Lizarraga MD at 01/25/25 3376

## 2025-01-26 NOTE — THERAPY DISCHARGE NOTE
Acute Care - Occupational Therapy Discharge Summary  [facility]     Patient Name: Juan Luis Collazo  : 1940  MRN: [id number]    Today's Date: 2025                 Admit Date: 2025        OT Recommendation and Plan    Visit Dx:    ICD-10-CM ICD-9-CM   1. Pneumonia of right lung due to infectious organism, unspecified part of lung  J18.9 483.8   2. Acute UTI  N39.0 599.0   3. Gait instability [R26.81]  R26.81 781.2   4. Impaired mobility [Z74.09]  Z74.09 799.89   5. Dysphagia, unspecified type  R13.10 787.20   6. Decreased activities of daily living (ADL)  Z78.9 V49.89   7. Impaired fasting glucose  R73.01 790.21                OT Rehab Goals       Row Name 25 1400             Transfer Goal 1 (OT)    Activity/Assistive Device (Transfer Goal 1, OT) toilet;shower chair  -CS      [place] Level/Cues Needed (Transfer Goal 1, OT) standby assist  -CS      Time Frame (Transfer Goal 1, OT) long term goal (LTG);10 days  -CS      Progress/Outcome (Transfer Goal 1, OT) goal not met  -CS         Dressing Goal 1 (OT)    Activity/Device (Dressing Goal 1, OT) dressing skills, all  -CS      [place]/Cues Needed (Dressing Goal 1, OT) standby assist  -CS      Time Frame (Dressing Goal 1, OT) long term goal (LTG);10 days  -CS      Progress/Outcome (Dressing Goal 1, OT) goal not met  -CS         Toileting Goal 1 (OT)    [place] Level/Cues Needed (Toileting Goal 1, OT) standby assist  -CS      Time Frame (Toileting Goal 1, OT) long term goal (LTG);10 days  -CS      Progress/Outcome (Toileting Goal 1, OT) goal not met  -CS                User Key  (r) = Recorded By, (t) = Taken By, (c) = Cosigned By      Initials Name Provider Type Discipline    CS Brenda Guevara, OTR/L, CNT Occupational Therapist OT                     Outcome Measures       Row Name 25 1400 25 1125          How much help from another person do you currently need...    Turning from your back to your side while in flat bed  without using bedrails? -- 4  -MF     Moving from lying on back to sitting on the side of a flat bed without bedrails? -- 4  -MF     Moving to and from a bed to a chair (including a wheelchair)? -- 3  -MF     Standing up from a chair using your arms (e.g., wheelchair, bedside chair)? -- 3  -MF     Climbing 3-5 steps with a railing? -- 3  -MF     To walk in hospital room? -- 3  -MF     AM-PAC 6 Clicks Score (PT) -- 20  -MF        How much help from another is currently needed...    Putting on and taking off regular lower body clothing? 3  -LS --     Bathing (including washing, rinsing, and drying) 3  -LS --     Toileting (which includes using toilet bed pan or urinal) 3  -LS --     Putting on and taking off regular upper body clothing 4  -LS --     Taking care of personal grooming (such as brushing teeth) 4  -LS --     Eating meals 4  -LS --     AM-PAC 6 Clicks Score (OT) 21  -LS --        Functional Assessment    Outcome Measure Options AM-PAC 6 Clicks Daily Activity (OT)  -LS AM-PAC 6 Clicks Basic Mobility (PT)  -MF               User Key  (r) = Recorded By, (t) = Taken By, (c) = Cosigned By      Initials Name Provider Type    Veronika Troy PTA Physical Therapist Assistant    Chantal Barrios COTA Occupational Therapist Assistant                            OT Discharge Summary  Anticipated Discharge Disposition (OT): home with 24/7 care, home with assist  Reason for Discharge: Discharge from facility  Outcomes Achieved: Refer to plan of care for updates on goals achieved  Discharge Destination: Home with assist      Brenda Guevara, COURTNEY/L, CNT  1/26/2025

## 2025-01-26 NOTE — THERAPY DISCHARGE NOTE
Acute Care - Physical Therapy Discharge Summary  [facility]       Patient Name: Juan Luis Collazo  : 1940  MRN: [id number]    Today's Date: 2025                 Admit Date: 2025      PT Recommendation and Plan    Visit Dx:    ICD-10-CM ICD-9-CM   1. Pneumonia of right lung due to infectious organism, unspecified part of lung  J18.9 483.8   2. Acute UTI  N39.0 599.0   3. Gait instability [R26.81]  R26.81 781.2   4. Impaired mobility [Z74.09]  Z74.09 799.89   5. Dysphagia, unspecified type  R13.10 787.20   6. Decreased activities of daily living (ADL)  Z78.9 V49.89   7. Impaired fasting glucose  R73.01 790.21        Outcome Measures       Row Name 25 1400 25 1125          How much help from another person do you currently need...    Turning from your back to your side while in flat bed without using bedrails? -- 4  -MF     Moving from lying on back to sitting on the side of a flat bed without bedrails? -- 4  -MF     Moving to and from a bed to a chair (including a wheelchair)? -- 3  -MF     Standing up from a chair using your arms (e.g., wheelchair, bedside chair)? -- 3  -MF     Climbing 3-5 steps with a railing? -- 3  -MF     To walk in hospital room? -- 3  -MF     AM-PAC 6 Clicks Score (PT) -- 20  -MF        How much help from another is currently needed...    Putting on and taking off regular lower body clothing? 3  -LS --     Bathing (including washing, rinsing, and drying) 3  -LS --     Toileting (which includes using toilet bed pan or urinal) 3  -LS --     Putting on and taking off regular upper body clothing 4  -LS --     Taking care of personal grooming (such as brushing teeth) 4  -LS --     Eating meals 4  -LS --     AM-PAC 6 Clicks Score (OT) 21  -LS --        Functional Assessment    Outcome Measure Options AM-PAC 6 Clicks Daily Activity (OT)  -LS AM-PAC 6 Clicks Basic Mobility (PT)  -MF               User Key  (r) = Recorded By, (t) = Taken By, (c) = Cosigned By      Initials  Name Provider Type    Veronika Troy PTA Physical Therapist Assistant    Chantal Barrios COTA Occupational Therapist Assistant                         PT Rehab Goals       Row Name 01/26/25 1041             Bed Mobility Goal 1 (PT)    Activity/Assistive Device (Bed Mobility Goal 1, PT) sit to supine;supine to sit;rolling to left;rolling to right  -MF      [place] Level/Cues Needed (Bed Mobility Goal 1, PT) modified independence  -MF      Time Frame (Bed Mobility Goal 1, PT) long term goal (LTG)  -MF      Progress/Outcomes (Bed Mobility Goal 1, PT) goal not met  -MF         Transfer Goal 1 (PT)    Activity/Assistive Device (Transfer Goal 1, PT) sit-to-stand/stand-to-sit;bed-to-chair/chair-to-bed;walker, rolling  -MF      [place] Level/Cues Needed (Transfer Goal 1, PT) standby assist  -MF      Time Frame (Transfer Goal 1, PT) long term goal (LTG)  -MF      Progress/Outcome (Transfer Goal 1, PT) goal not met  -MF         Gait Training Goal 1 (PT)    Activity/Assistive Device (Gait Training Goal 1, PT) gait (walking locomotion);increase endurance/gait distance;increase energy conservation;decrease fall risk;improve balance and speed;walker, rolling  -MF      [place] Level (Gait Training Goal 1, PT) contact guard required  -MF      Distance (Gait Training Goal 1, PT) 60 feet without LOB  -MF      Time Frame (Gait Training Goal 1, PT) long term goal (LTG)  -MF      Progress/Outcome (Gait Training Goal 1, PT) goal not met  -MF                User Key  (r) = Recorded By, (t) = Taken By, (c) = Cosigned By      Initials Name Provider Type Discipline    Veronika Troy PTA Physical Therapist Assistant PT                        PT Discharge Summary  Anticipated Discharge Disposition (PT): home with home health  Reason for Discharge: Discharge from facility  Outcomes Achieved: Unable to make functional progress toward goals at this time  Discharge Destination: Home with home health      Veronika JARQUIN  Troy, PTA   1/26/2025

## 2025-01-26 NOTE — NURSING NOTE
Pt a/ox4-calm and cooperative-respirations unlabored and even-written and verbal d/c and rx instructions given to pt and his wife with successful teach back-denies any further needs, questions or concerns at this tome-Martha CNA taking pt down to main entrance where his wife will be waiting to take the pt home

## 2025-01-27 ENCOUNTER — TRANSITIONAL CARE MANAGEMENT TELEPHONE ENCOUNTER (OUTPATIENT)
Dept: CALL CENTER | Facility: HOSPITAL | Age: 85
End: 2025-01-27
Payer: MEDICARE

## 2025-01-27 LAB — BACTERIA SPEC AEROBE CULT: NORMAL

## 2025-01-27 NOTE — OUTREACH NOTE
Call Center TCM Note      Flowsheet Row Responses   [facility] patient discharged from? [place]   Does the patient have one of the following disease processes/diagnoses(primary or secondary)? Pneumonia   TCM attempt successful? Yes  [VR_ spouse]   Call start time 1444   Call end time 1451   Discharge diagnosis Pneumonia   Person spoke with today (if not patient) and relationship wife   Meds reviewed with patient/caregiver? Yes   Is the patient having any side effects they believe may be caused by any medication additions or changes? No   Does the patient have all medications ordered at discharge? Yes   Is the patient taking all medications as directed (includes completed medication regime)? Yes   Comments HOSP DC FU appt 2/6/25 1115 am   Does the patient have an appointment with their PCP within 7-14 days of discharge? Yes   Has home health visited the patient within 72 hours of discharge? Unsure   Home health interventions Called Home Health agency   Home health comments Home Health reports they are processing the referral.   Has all DME been delivered? Yes   Psychosocial issues? No   Did the patient receive a copy of their discharge instructions? Yes   Nursing interventions Reviewed instructions with patient   What is the patient's perception of their health status since discharge? Improving   Nursing Interventions Nurse provided patient education   Is the patient/caregiver able to teach back the hierarchy of who to call/visit for symptoms/problems? PCP, Specialist, Home health nurse, Urgent Care, ED, 911 Yes   Is the patient/caregiver able to teach back signs and symptoms of worsening condition: Fever/chills, Shortness of breath, Chest pain   Is the patient/caregiver able to teach back importance of completing antibiotic course of treatment? Yes   TCM call completed? Yes   Wrap up additional comments Wife reports some improvement.   Call end time 1451            Jemma Pandey RN    1/27/2025, 14:51 CST

## 2025-01-28 ENCOUNTER — TRANSCRIBE ORDERS (OUTPATIENT)
Dept: HOME HEALTH SERVICES | Facility: HOME HEALTHCARE | Age: 85
End: 2025-01-28
Payer: MEDICARE

## 2025-01-28 DIAGNOSIS — R73.01 IMPAIRED FASTING GLUCOSE: Primary | ICD-10-CM

## 2025-01-28 LAB — BACTERIA SPEC AEROBE CULT: NORMAL

## 2025-02-05 ENCOUNTER — READMISSION MANAGEMENT (OUTPATIENT)
Dept: CALL CENTER | Facility: HOSPITAL | Age: 85
End: 2025-02-05
Payer: MEDICARE

## 2025-02-05 NOTE — OUTREACH NOTE
COPD/PN Week 2 Survey      Flowsheet Row Responses   [facility] patient discharged from? [place]   Does the patient have one of the following disease processes/diagnoses(primary or secondary)? Pneumonia   Week 2 attempt successful? Yes   Call start time 1710   Call end time 1713   Discharge diagnosis Pneumonia   Person spoke with today (if not patient) and relationship Patient   Meds reviewed with patient/caregiver? Yes   Does the patient have all medications ordered at discharge? Yes   Is the patient taking all medications as directed (includes completed medication regime)? Yes   Does the patient have a primary care provider?  Yes   Comments regarding PCP PCP appt 2/6   Has the patient kept scheduled appointments due by today? Yes   Psychosocial issues? No   What is the patient's perception of their health status since discharge? Improving   If the patient is a current smoker, are they able to teach back resources for cessation? Not a smoker   Is the patient/caregiver able to teach back the hierarchy of who to call/visit for symptoms/problems? PCP, Specialist, Home health nurse, Urgent Care, ED, 911 Yes   Is the patient/caregiver able to teach back signs and symptoms of worsening condition: Fever/chills, Shortness of breath, Chest pain   Is the patient/caregiver able to teach back importance of completing antibiotic course of treatment? Yes   Week 2 call completed? Yes   Is the patient interested in additional calls from an ambulatory ? No   Would this patient benefit from a Referral to Amb Social Work? No   Call end time 1713            HERB KUMARI - Registered Nurse

## 2025-02-06 ENCOUNTER — OFFICE VISIT (OUTPATIENT)
Dept: INTERNAL MEDICINE | Facility: CLINIC | Age: 85
End: 2025-02-06
Payer: MEDICARE

## 2025-02-06 VITALS
HEIGHT: 65 IN | WEIGHT: 153.2 LBS | SYSTOLIC BLOOD PRESSURE: 132 MMHG | DIASTOLIC BLOOD PRESSURE: 64 MMHG | BODY MASS INDEX: 25.52 KG/M2 | TEMPERATURE: 97.3 F | HEART RATE: 72 BPM | OXYGEN SATURATION: 97 %

## 2025-02-06 DIAGNOSIS — M50.30 DEGENERATIVE DISC DISEASE, CERVICAL: ICD-10-CM

## 2025-02-06 DIAGNOSIS — M80.00XG AGE-RELATED OSTEOPOROSIS WITH CURRENT PATHOLOGICAL FRACTURE WITH DELAYED HEALING: ICD-10-CM

## 2025-02-06 DIAGNOSIS — R26.81 GAIT INSTABILITY: ICD-10-CM

## 2025-02-06 DIAGNOSIS — R45.89 DEPRESSED MOOD: ICD-10-CM

## 2025-02-06 DIAGNOSIS — R29.6 RECURRENT FALLS: ICD-10-CM

## 2025-02-06 DIAGNOSIS — R77.0 LOW SERUM ALBUMIN: ICD-10-CM

## 2025-02-06 DIAGNOSIS — J18.9 PNEUMONIA OF RIGHT UPPER LOBE DUE TO INFECTIOUS ORGANISM: ICD-10-CM

## 2025-02-06 DIAGNOSIS — M51.34 DEGENERATIVE DISC DISEASE, THORACIC: ICD-10-CM

## 2025-02-06 DIAGNOSIS — Z09 HOSPITAL DISCHARGE FOLLOW-UP: Primary | ICD-10-CM

## 2025-02-06 DIAGNOSIS — D64.9 ANEMIA, UNSPECIFIED TYPE: ICD-10-CM

## 2025-02-06 DIAGNOSIS — F51.04 PSYCHOPHYSIOLOGICAL INSOMNIA: ICD-10-CM

## 2025-02-06 DIAGNOSIS — M51.369 DEGENERATION OF INTERVERTEBRAL DISC OF LUMBAR REGION, UNSPECIFIED WHETHER PAIN PRESENT: ICD-10-CM

## 2025-02-06 PROCEDURE — 99495 TRANSJ CARE MGMT MOD F2F 14D: CPT

## 2025-02-06 PROCEDURE — 1111F DSCHRG MED/CURRENT MED MERGE: CPT

## 2025-02-06 PROCEDURE — 1125F AMNT PAIN NOTED PAIN PRSNT: CPT

## 2025-02-06 PROCEDURE — 1159F MED LIST DOCD IN RCRD: CPT

## 2025-02-06 PROCEDURE — 1160F RVW MEDS BY RX/DR IN RCRD: CPT

## 2025-02-06 RX ORDER — GUAIFENESIN 600 MG/1
600 TABLET, EXTENDED RELEASE ORAL 2 TIMES DAILY
Qty: 28 TABLET | Refills: 0 | Status: SHIPPED | OUTPATIENT
Start: 2025-02-06 | End: 2025-02-20

## 2025-02-06 RX ORDER — DULOXETIN HYDROCHLORIDE 30 MG/1
30 CAPSULE, DELAYED RELEASE ORAL NIGHTLY
Qty: 90 CAPSULE | Refills: 0 | Status: SHIPPED | OUTPATIENT
Start: 2025-02-06

## 2025-02-06 NOTE — PROGRESS NOTES
Transitional Care Follow Up Visit  Subjective     Juan Luis Collazo is a 84 y.o. male who presents for a transitional care management visit.    Within 48 business hours after discharge our office contacted him via telephone to coordinate his care and needs.      I reviewed and discussed the details of that call along with the discharge summary, hospital problems, inpatient lab results, inpatient diagnostic studies, and consultation reports with Juan Luis.     Current outpatient and discharge medications have been reconciled for the patient.  Reviewed by: DIEGO Anderson          1/25/2025     9:18 PM   Date of TCM Phone Call   [facility]   Date of Admission 1/22/2025   Date of Discharge 1/25/2025   Discharge Disposition Home or Self Care     Risk for Readmission (LACE) Score: 11 (1/25/2025  5:00 AM)      History of Present Illness   Course During Hospital Stay:  The patient was admitted to the hospital for management of pneumonia.  Imaging studies showed a right upper lobe pneumonia.  He was started on antibiotic treatment with Zosyn and doxycycline.  MRSA screen was negative and doxycycline were discontinued.  On reassessment, the patient's mental status had improved.  He was alert and oriented.  He reported feeling much better.  He was evaluated by physical therapy, recommending home discharged with a new rolling walker.  This was ordered.  On 1/25/2025, the patient evaluated, reporting no significant dyspnea, improvement in symptomatology.  Blood cultures were negative after 2 days.  Streptococcus urine antigen was negative.  Respiratory panel negative.  Patient to complete 7 days of antibiotics, prescribed Augmentin on discharge.  Home health requested by family members.  Ordered and arranged by ; regarding hypertension, continue ARB.  Follow with primary care provider soon.  Recommended to continue incentive inspirometer, and to use of walker at all times, to avoid additional falls at  home.           History of Present Illness  The patient is an 84-year-old male who is seen here today for a hospital discharge follow-up. He was admitted to [facility] on 01/22/2025 and discharged on 01/25/2025. He was ultimately diagnosed with right upper lobe pneumonia, treated appropriately, and prescribed Augmentin upon discharge. He has home health services in place. His comorbidities include hypertension, a history of recurrent falls, osteoporosis with multiple compression fractures, and a recent history of rib fractures.    He reports satisfactory progress since his hospital discharge, although at a slower pace than desired. He continues to perform muscle-strengthening exercises initiated during his hospital stay. He experiences shortness of breath, particularly when lying down, which resolves within a minute. He uses a breathing device, which he finds beneficial, and occasionally expectorates thick, grayish sputum. He experiences pain when breathing or coughing. He has completed a residual 4-day course of Augmentin, taking two doses daily. He reports poor appetite and irregular eating habits. He has not yet received home health services. He has not experienced any fevers since returning home.    He has a history of recurrent falls, often finding himself on the ground without warning. He experiences mild dizziness when lying down but not while walking. He reports no ear fullness or popping sensations. He has a history of vertigo.    He has a history of osteoporosis with multiple compression fractures and recent rib fractures. He has a history of shoulder replacements and takes tramadol as needed for pain management, typically one dose per day, although he is prescribed two doses daily. He reports generalized pain.    He has a history of hypertension.    He has been experiencing mood issues, including restlessness and negative thinking, which have been attributed to his antidepressant medication, Cymbalta,  "not working as effectively as it should. He takes Cymbalta every morning and has approximately 10 doses remaining at home.    He has a history of ankle swelling, which has improved in recent days. His diet includes vegetables, salad, and gravy, and he reports no difficulty chewing or swallowing food. He feels satiated quickly due to his sedentary lifestyle. He has regular bowel movements, occurring two to three times daily. He takes multivitamins for men, Ensure shakes, and occasionally uses protein supplements.    FAMILY HISTORY  His daughter had a history of vertigo.    MEDICATIONS  Current: Augmentin, duloxetine, tramadol     The following portions of the patient's history were reviewed and updated as appropriate: allergies, current medications, past family history, past medical history, past social history, past surgical history, and problem list.    Review of Systems    Objective   /64 (BP Location: Left arm, Patient Position: Sitting, Cuff Size: Adult)   Pulse 72   Temp 97.3 °F (36.3 °C) (Infrared)   Ht 165.1 cm (65\")   Wt 69.5 kg (153 lb 3.2 oz)   SpO2 97%   BMI 25.49 kg/m²   Physical Exam  Vitals and nursing note reviewed.   Constitutional:       General: He is not in acute distress.     Appearance: Normal appearance. He is not ill-appearing, toxic-appearing or diaphoretic.   HENT:      Head: Normocephalic and atraumatic.   Eyes:      Extraocular Movements: Extraocular movements intact.      Conjunctiva/sclera: Conjunctivae normal.      Pupils: Pupils are equal, round, and reactive to light.   Cardiovascular:      Rate and Rhythm: Normal rate and regular rhythm.      Pulses: Normal pulses.      Heart sounds: Normal heart sounds.   Pulmonary:      Effort: Pulmonary effort is normal. No respiratory distress.      Breath sounds: No stridor. Rales (mid chest) present. No wheezing or rhonchi.   Chest:      Chest wall: No tenderness.   Abdominal:      General: Bowel sounds are normal.      Palpations: " Abdomen is soft.   Musculoskeletal:         General: Tenderness (Neck, back, chronic) present.      Cervical back: Normal range of motion.      Right lower leg: No edema.      Left lower leg: No edema.   Skin:     General: Skin is warm and dry.   Neurological:      General: No focal deficit present.      Mental Status: He is alert and oriented to person, place, and time. Mental status is at baseline.      Motor: Weakness present.      Gait: Gait abnormal (using roller walker).   Psychiatric:         Mood and Affect: Mood normal.         Behavior: Behavior normal.         Thought Content: Thought content normal.         Judgment: Judgment normal.         Assessment & Plan   Diagnoses and all orders for this visit:    1. Hospital discharge follow-up (Primary)    2. Pneumonia of right upper lobe due to infectious organism  -     guaiFENesin (Mucinex) 600 MG 12 hr tablet; Take 1 tablet by mouth 2 (Two) Times a Day for 14 days.  Dispense: 28 tablet; Refill: 0    3. Gait instability  -     Ambulatory Referral to Physical Therapy for Evaluation & Treatment    4. Recurrent falls  -     Ambulatory Referral to Physical Therapy for Evaluation & Treatment    5. Age-related osteoporosis with current pathological fracture with delayed healing  -     Ambulatory Referral to Physical Therapy for Evaluation & Treatment    6. Low serum albumin    7. Anemia, unspecified type    8. Depressed mood  -     DULoxetine (Cymbalta) 30 MG capsule; Take 1 capsule by mouth Every Night.  Dispense: 90 capsule; Refill: 0    9. Psychophysiological insomnia    10. Degenerative disc disease, cervical  -     traMADol-acetaminophen (Ultracet) 37.5-325 MG per tablet; Take 1 tablet by mouth Every 12 (Twelve) Hours As Needed for Moderate Pain.  Dispense: 180 tablet; Refill: 0    11. Degeneration of intervertebral disc of lumbar region, unspecified whether pain present  -     traMADol-acetaminophen (Ultracet) 37.5-325 MG per tablet; Take 1 tablet by mouth  Every 12 (Twelve) Hours As Needed for Moderate Pain.  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  Dispense: 180 tablet; Refill: 0           Assessment & Plan  1. Post-hospitalization follow-up.  He was admitted to [facility] on 01/22/2025, and discharged on 01/25/2025, diagnosed with right upper lobe pneumonia. He was treated with Zosyn and doxycycline initially, transition to Augmentin and has completed the course. He reports gradual improvement but still experiences shortness of breath and occasional grayish sputum. A prescription for Mucinex will be provided to help thin secretions and ease expectoration.  He has been encouraged to continue with good pulmonary toileting, daily utilization of incentive spirometry, deep breathing coughing exercises.  He is advised to continue using the incentive spirometer and to monitor for any worsening symptoms.    2. Hypertension.  His blood pressure is currently well-controlled at 132/64 mmHg. He should continue his current antihypertensive regimen and monitor his blood pressure regularly.    3. Osteoporosis with multiple compression fractures.  He has a history of osteoporosis with multiple compression fractures and recent rib fractures. A referral for physical therapy, including water therapy, will be made to help strengthen his legs and improve balance. He is advised to continue his current pain management regimen, including tramadol 37.5 mg as needed, and to avoid high-impact activities.    4. Anemia.  His albumin levels were noted to be low during his hospital stay, anemia also worsened slightly.  He is advised to maintain a balanced diet rich in protein and to consume Ensure shakes daily to help improve his nutritional status.    5. Depression.  He is currently on duloxetine (Cymbalta) 20 mg and  reports that it initially worked well but has become less effective. The dosage will be increased to 30 mg, to be taken nightly about an hour before bed. A 3-month supply will be sent to [facility]. He is advised to monitor for any negative side effects and to report any worsening mood symptoms.  I did advise to send us a [facility] message in a couple weeks to update us on efficacy.    6. Vertigo.  He reports occasional dizziness when lying down, which may indicate vertigo. He is advised to monitor his symptoms and report any changes.    Patient or patient representative verbalized consent for the use of Ambient Listening during the visit with  DIEGO Anderson for chart documentation. 2/6/2025  11:57 CST

## 2025-02-06 NOTE — PROGRESS NOTES
Subjective   Juan Luis Collazo is a 84 y.o. male.   No chief complaint on file.      History of Present Illness   History of Present Illness  The patient is an 84-year-old male who is seen here today for a hospital discharge follow-up. He was admitted to [facility] on 01/22/2025 and discharged on 01/25/2025. He was ultimately diagnosed with right upper lobe pneumonia, treated appropriately, and prescribed Augmentin upon discharge. He has home health services in place. His comorbidities include hypertension, a history of recurrent falls, osteoporosis with multiple compression fractures, and a recent history of rib fractures. Nutrition may also be an issue as his albumin is low, which may be causing hypocalcemia and possibly anemia. These levels will be rechecked today.    He reports satisfactory progress since his hospital discharge, although at a slower pace than desired. He continues to perform muscle-strengthening exercises initiated during his hospital stay. He experiences shortness of breath, particularly when lying down, which resolves within a minute. He uses a breathing device, which he finds beneficial, and occasionally expectorates thick, grayish sputum. He experiences pain when breathing or coughing. He has completed a 4-day course of Augmentin, taking two doses daily. He reports poor appetite and irregular eating habits. He has not yet received home health services. He has not experienced any fevers since returning home.    He has a history of recurrent falls, often finding himself on the ground without warning. He experiences mild dizziness when lying down but not while walking. He reports no ear fullness or popping sensations. He has a history of vertigo.    He has a history of osteoporosis with multiple compression fractures and recent rib fractures. He has a history of shoulder replacements and takes tramadol as needed for pain management, typically one dose per day, although he is prescribed two  doses daily. He reports generalized pain.    He has a history of hypertension.    He has been experiencing mood issues, including restlessness and negative thinking, which have been attributed to his antidepressant medication, Cymbalta, not working as effectively as it should. He takes Cymbalta every morning and has approximately 10 doses remaining at home.    He has a history of ankle swelling, which has improved in recent days. His diet includes vegetables, salad, and gravy, and he reports no difficulty chewing or swallowing food. He feels satiated quickly due to his sedentary lifestyle. He has regular bowel movements, occurring two to three times daily. He takes multivitamins for men, Ensure shakes, and occasionally uses protein supplements.    FAMILY HISTORY  His daughter had a history of vertigo.    MEDICATIONS  Current: Augmentin, duloxetine, tramadol       The following portions of the patient's history were reviewed and updated as appropriate: allergies, current medications, past family history, past medical history, past social history, past surgical history and problem list.    Review of Systems    Objective   Past Medical History:   Diagnosis Date    Arthritis     Back pain     Cancer     CHEST, SKIN CANCER    GERD (gastroesophageal reflux disease)     History of colon polyps     History of prostate cancer     Hypertension     Low back pain     Macular degeneration     Osteopenia       Past Surgical History:   Procedure Laterality Date    APPENDECTOMY      CATARACT EXTRACTION, BILATERAL      COLONOSCOPY  09/17/2013    Dr. José-One 2-3mm polyp at 20cm; Otherwise normal; Repeat 3 years    COLONOSCOPY N/A 12/07/2022    Procedure: COLONOSCOPY WITH ANESTHESIA;  Surgeon: Bri Alexis MD;  Location: [facility];  Service: Gastroenterology;  Laterality: N/A;  pre: anemia. hx polyps.  post: polyps. diverticulosis.  no PCP    ENDOSCOPY N/A 12/07/2022    Procedure: ESOPHAGOGASTRODUODENOSCOPY WITH  ANESTHESIA;  Surgeon: Bri Alexis MD;  Location:  [facility];  Service: Gastroenterology;  Laterality: N/A;  pre: anemia  post: hiatal hernia. esophagitis.gastric erosions.  no PCP    FOOT SURGERY Right     KYPHOPLASTY Bilateral 07/17/2018    Procedure: L3 KYPHOPLASTY 1-2 LEVELS;  Surgeon: Vega Pandey MD;  Location:  [facility] OR;  Service: Neurosurgery    KYPHOPLASTY Bilateral 09/11/2018    Procedure: L4 KYPHOPLASTY WITH BIOPSY;  Surgeon: Vega Pandey MD;  Location:  [facility] OR;  Service: Neurosurgery    KYPHOPLASTY WITH BIOPSY N/A 01/25/2022    Procedure: KYPHOPLASTY WITH BIOPSY, THORACIC 6 and LUMBAR 5;  Surgeon: Nick Martínez MD;  Location:  [facility] OR;  Service: Neurosurgery;  Laterality: N/A;    KYPHOPLASTY WITH BIOPSY N/A 8/18/2023    Procedure: Thoracic 12, KYPHOPLASTY WITH BIOPSY;  Surgeon: Nick Martínez MD;  Location:  [facility] OR;  Service: Neurosurgery;  Laterality: N/A;    PROSTATECTOMY      TONSILLECTOMY      TOTAL SHOULDER REPLACEMENT Bilateral         Current Outpatient Medications:     albuterol sulfate  (90 Base) MCG/ACT inhaler, Inhale 2 puffs Every 6 (Six) Hours As Needed for Wheezing or Shortness of Air., Disp: 8 g, Rfl: 2    cetirizine (zyrTEC) 10 MG tablet, Take 1 tablet by mouth Daily., Disp: , Rfl:     denosumab (PROLIA) 60 MG/ML solution prefilled syringe syringe, Inject 1 mL under the skin into the appropriate area as directed Every 6 (Six) Months., Disp: 180 mL, Rfl: 1    DULoxetine (Cymbalta) 20 MG capsule, Take 1 capsule by mouth Daily., Disp: 30 capsule, Rfl: 2    multivitamins-minerals (PRESERVISION AREDS 2) capsule capsule, Take 1 capsule by mouth 2 (Two) Times a Day., Disp: , Rfl:     naloxone (NARCAN) 4 MG/0.1ML nasal spray, Administer 1 spray into the nostril(s) as directed by provider As Needed for Opioid Reversal. Call 911. Don't prime. [place] in 1 nostril for overdose. Repeat in 2-3 minutes in other nostril if no or minimal  breathing/responsiveness., Disp: , Rfl:     olmesartan (Benicar) 40 MG tablet, Take 1 tablet by mouth Daily., Disp: , Rfl:     oxyCODONE (Roxicodone) 5 MG immediate release tablet, Take 1 tablet by mouth Every 4 (Four) Hours As Needed for Moderate Pain., Disp: 120 tablet, Rfl: 0      There were no vitals taken for this visit.     There is no height or weight on file to calculate BMI.          Physical Exam          Assessment & Plan   There are no diagnoses linked to this encounter.             Assessment & Plan  1. Post-hospitalization follow-up.  He was admitted to [facility] on 01/22/2025, and discharged on 01/25/2025, after being diagnosed with right upper lobe pneumonia. He was treated with Augmentin and has completed the course. He reports gradual improvement but still experiences shortness of breath and occasional grayish sputum. A prescription for Mucinex will be provided to help thin secretions and ease expectoration. He is advised to continue using the incentive spirometer and to monitor for any worsening symptoms.    2. Hypertension.  His blood pressure is currently well-controlled at 132/64 mmHg. He should continue his current antihypertensive regimen and monitor his blood pressure regularly.    3. Osteoporosis with multiple compression fractures.  He has a history of osteoporosis with multiple compression fractures and recent rib fractures. A referral for physical therapy, including water therapy, will be made to help strengthen his legs and improve balance. He is advised to continue his current pain management regimen, including tramadol 37.5 mg as needed, and to avoid high-impact activities.    4. Anemia.  His albumin levels were noted to be low during his hospital stay, which may contribute to anemia. A recheck of his albumin levels will be done today. He is advised to maintain a balanced diet rich in protein and to consume Ensure shakes daily to help improve his nutritional status.    5.  Depression.  He is currently on duloxetine (Cymbalta) 20 mg and reports that it initially worked well but has become less effective. The dosage will be increased to 30 mg, to be taken nightly about an hour before bed. A 3-month supply will be sent to [facility]. He is advised to monitor for any negative side effects and to report any worsening mood symptoms.    6. Vertigo.  He reports occasional dizziness when lying down, which may indicate vertigo. He is advised to monitor his symptoms and report any changes.    {[employer]ER[employer] Provider Statement:[id number]}    Please note that portions of this note were completed with a voice recognition program.     Electronically signed by DIEGO Anderson, 02/06/25, 10:34 CST.

## 2025-02-14 ENCOUNTER — READMISSION MANAGEMENT (OUTPATIENT)
Dept: CALL CENTER | Facility: HOSPITAL | Age: 85
End: 2025-02-14
Payer: MEDICARE

## 2025-02-14 NOTE — OUTREACH NOTE
COPD/PN Week 3 Survey      Flowsheet Row Responses   [facility], [place] patient discharged from? [place]   Does the patient have one of the following disease processes/diagnoses(primary or secondary)? Pneumonia   Week 3 attempt successful? Yes   Call start time 1350   Call end time 1351   Discharge diagnosis Pneumonia   Person spoke with today (if not patient) and relationship Patient and spouse.   What is the patient's perception of their health status since discharge? Improving   Is the patient/caregiver able to teach back the hierarchy of who to call/visit for symptoms/problems? PCP, Specialist, Home health nurse, Urgent Care, ED, 911 Yes   Week 3 call completed? Yes   Graduated Yes   Wrap up additional comments Spouse and patient state patient is doing well. No concerns or questions noted.   Call end time 1351            Sangeeta HIGUERA - Registered Nurse

## 2025-02-17 RX ORDER — DULOXETIN HYDROCHLORIDE 20 MG/1
20 CAPSULE, DELAYED RELEASE ORAL DAILY
Qty: 30 CAPSULE | Refills: 2 | OUTPATIENT
Start: 2025-02-17

## 2025-02-26 ENCOUNTER — OFFICE VISIT (OUTPATIENT)
Dept: INTERNAL MEDICINE | Facility: CLINIC | Age: 85
End: 2025-02-26
Payer: MEDICARE

## 2025-02-26 ENCOUNTER — HOSPITAL ENCOUNTER (OUTPATIENT)
Dept: CT IMAGING | Facility: HOSPITAL | Age: 85
Discharge: HOME | End: 2025-02-26
Payer: MEDICARE

## 2025-02-26 ENCOUNTER — LAB (OUTPATIENT)
Dept: LAB | Facility: HOSPITAL | Age: 85
End: 2025-02-26
Payer: MEDICARE

## 2025-02-26 VITALS
HEIGHT: 65 IN | WEIGHT: 152 LBS | BODY MASS INDEX: 25.33 KG/M2 | DIASTOLIC BLOOD PRESSURE: 54 MMHG | SYSTOLIC BLOOD PRESSURE: 112 MMHG | HEART RATE: 76 BPM | OXYGEN SATURATION: 95 % | TEMPERATURE: 97.4 F

## 2025-02-26 DIAGNOSIS — R07.89 RIGHT-SIDED CHEST WALL PAIN: Primary | ICD-10-CM

## 2025-02-26 DIAGNOSIS — R05.2 SUBACUTE COUGH: ICD-10-CM

## 2025-02-26 DIAGNOSIS — M54.50 RIGHT-SIDED LOW BACK PAIN WITHOUT SCIATICA, UNSPECIFIED CHRONICITY: ICD-10-CM

## 2025-02-26 PROCEDURE — 71250 CT THORAX DX C-: CPT

## 2025-02-26 NOTE — PROGRESS NOTES
"Chief Complaint  Cough and Chest wall pain    Subjective        Juan Luis Collazo presents to [facility]  History of Present Illness  Fracture (Went to PT and was told that he might have a fracture on right rib that radiates to the left.)      Objective   Vital Signs:  /54 (BP Location: Left arm, Patient Position: Sitting, Cuff Size: Adult)   Pulse 76   Temp 97.4 °F (36.3 °C) (Temporal)   Ht 165.1 cm (65\")   Wt 68.9 kg (152 lb)   SpO2 95%   BMI 25.29 kg/m²   Estimated body mass index is 25.29 kg/m² as calculated from the following:    Height as of this encounter: 165.1 cm (65\").    Weight as of this encounter: 68.9 kg (152 lb).    Physical Exam  Vitals and nursing note reviewed.   Constitutional:       General: He is not in acute distress.     Appearance: Normal appearance. He is not toxic-appearing or diaphoretic.   HENT:      Head: Normocephalic and atraumatic.      Right Ear: External ear normal.      Left Ear: External ear normal.      Nose: Nose normal.      Mouth/Throat:      Mouth: Mucous membranes are moist.   Eyes:      Conjunctiva/sclera: Conjunctivae normal.   Cardiovascular:      Rate and Rhythm: Normal rate and regular rhythm.      Pulses: Normal pulses.      Heart sounds: Normal heart sounds.   Pulmonary:      Effort: Pulmonary effort is normal. No respiratory distress.      Breath sounds: No stridor. Wheezing present. No rhonchi or rales.      Comments: Mildly diminished lung sounds to the BEATRIS anteriorly, mild wheeze noted to RUL posteriorly  Skin:     General: Skin is warm and dry.   Neurological:      Mental Status: He is alert and oriented to person, place, and time. Mental status is at baseline.      GCS: GCS eye subscore is 4. GCS verbal subscore is 5. GCS motor subscore is 6.   Psychiatric:         Mood and Affect: Mood normal.         Behavior: Behavior normal.         Thought Content: Thought content normal.         Judgment: Judgment normal.      "   Result Review :                  Assessment and Plan   Diagnoses and all orders for this visit:    1. Right-sided chest wall pain (Primary)  -     CT Chest Without Contrast    2. Subacute cough  -     CT Chest Without Contrast    3. Right-sided low back pain without sciatica, unspecified chronicity  -     Urinalysis With Culture If Indicated -      Patient presents with his wife and is seen today complaining of right mid axillary chest wall pain associated with cough that has been going on for about 2 days now.  He had pneumonia last month.  He denies any recent trauma but states he has still been coughing quite a bit.  On exam there is a mild expiratory wheeze noted to the right upper lobe posteriorly and the left upper lobe anteriorly sounds diminished.  I have recommended CT chest which she will get done at [facility], we will call with results.  He also reports some pain just above his right hip area, denies any dysuria, nausea, vomiting, or fever.  Will proceed with UA to determine further as wife states he had a UTI during his last trip to the ER that he was unaware of.  Patient is otherwise at his baseline.    Plan:  1.  CT chest pending  2.  UA pending  3.  Follow-up as needed         Follow Up   Return if symptoms worsen or fail to improve.  Patient was given instructions and counseling regarding his condition or for health maintenance advice. Please see specific information pulled into the AVS if appropriate.

## 2025-02-27 DIAGNOSIS — J18.9 PNEUMONIA OF RIGHT UPPER LOBE DUE TO INFECTIOUS ORGANISM: Primary | ICD-10-CM

## 2025-02-27 DIAGNOSIS — N39.0 URINARY TRACT INFECTION WITHOUT HEMATURIA, SITE UNSPECIFIED: ICD-10-CM

## 2025-02-27 LAB
BACTERIA UR QL AUTO: ABNORMAL /HPF
BILIRUB UR QL STRIP: NEGATIVE
CLARITY UR: CLEAR
COLOR UR: YELLOW
GLUCOSE UR STRIP-MCNC: NEGATIVE MG/DL
HGB UR QL STRIP.AUTO: NEGATIVE
HYALINE CASTS UR QL AUTO: ABNORMAL /LPF
KETONES UR QL STRIP: ABNORMAL
LEUKOCYTE ESTERASE UR QL STRIP.AUTO: ABNORMAL
NITRITE UR QL STRIP: NEGATIVE
PH UR STRIP.AUTO: 5.5 [PH] (ref 5–8)
PROT UR QL STRIP: ABNORMAL
RBC # UR STRIP: ABNORMAL /HPF
REF LAB TEST METHOD: ABNORMAL
SP GR UR STRIP: 1.02 (ref 1–1.03)
SQUAMOUS #/AREA URNS HPF: ABNORMAL /HPF
UROBILINOGEN UR QL STRIP: ABNORMAL
WBC # UR STRIP: ABNORMAL /HPF

## 2025-02-27 PROCEDURE — 87086 URINE CULTURE/COLONY COUNT: CPT

## 2025-02-27 PROCEDURE — 81001 URINALYSIS AUTO W/SCOPE: CPT

## 2025-02-27 PROCEDURE — 87077 CULTURE AEROBIC IDENTIFY: CPT

## 2025-02-27 PROCEDURE — 87186 SC STD MICRODIL/AGAR DIL: CPT

## 2025-02-27 RX ORDER — CEFDINIR 300 MG/1
300 CAPSULE ORAL 2 TIMES DAILY
Qty: 14 CAPSULE | Refills: 0 | Status: SHIPPED | OUTPATIENT
Start: 2025-02-27 | End: 2025-03-03

## 2025-03-01 LAB — BACTERIA SPEC AEROBE CULT: ABNORMAL

## 2025-03-03 DIAGNOSIS — N39.0 URINARY TRACT INFECTION WITHOUT HEMATURIA, SITE UNSPECIFIED: Primary | ICD-10-CM

## 2025-03-03 DIAGNOSIS — J18.9 PNEUMONIA OF RIGHT UPPER LOBE DUE TO INFECTIOUS ORGANISM: ICD-10-CM

## 2025-04-24 DIAGNOSIS — M50.30 DEGENERATIVE DISC DISEASE, CERVICAL: ICD-10-CM

## 2025-04-24 DIAGNOSIS — M51.34 DEGENERATIVE DISC DISEASE, THORACIC: ICD-10-CM

## 2025-04-24 DIAGNOSIS — M51.369 DEGENERATION OF INTERVERTEBRAL DISC OF LUMBAR REGION, UNSPECIFIED WHETHER PAIN PRESENT: ICD-10-CM

## 2025-05-08 DIAGNOSIS — R45.89 DEPRESSED MOOD: ICD-10-CM

## 2025-05-08 RX ORDER — DULOXETIN HYDROCHLORIDE 30 MG/1
30 CAPSULE, DELAYED RELEASE ORAL NIGHTLY
Qty: 90 CAPSULE | Refills: 3 | Status: SHIPPED | OUTPATIENT
Start: 2025-05-08

## 2025-05-19 ENCOUNTER — APPOINTMENT (OUTPATIENT)
Dept: CT IMAGING | Facility: HOSPITAL | Age: 85
End: 2025-05-19
Payer: MEDICARE

## 2025-05-19 ENCOUNTER — APPOINTMENT (OUTPATIENT)
Dept: GENERAL RADIOLOGY | Facility: HOSPITAL | Age: 85
End: 2025-05-19
Payer: MEDICARE

## 2025-05-19 ENCOUNTER — HOSPITAL ENCOUNTER (EMERGENCY)
Facility: HOSPITAL | Age: 85
Discharge: HOME | End: 2025-05-19
Attending: EMERGENCY MEDICINE | Admitting: EMERGENCY MEDICINE
Payer: MEDICARE

## 2025-05-19 VITALS
WEIGHT: 151.9 LBS | HEIGHT: 65 IN | BODY MASS INDEX: 25.31 KG/M2 | HEART RATE: 97 BPM | DIASTOLIC BLOOD PRESSURE: 78 MMHG | TEMPERATURE: 98.3 F | RESPIRATION RATE: 18 BRPM | SYSTOLIC BLOOD PRESSURE: 132 MMHG | OXYGEN SATURATION: 93 %

## 2025-05-19 DIAGNOSIS — T14.8XXA SKIN AVULSION: ICD-10-CM

## 2025-05-19 DIAGNOSIS — S01.81XA FACIAL LACERATION, INITIAL ENCOUNTER: Primary | ICD-10-CM

## 2025-05-19 DIAGNOSIS — S00.03XA CONTUSION OF SCALP, INITIAL ENCOUNTER: ICD-10-CM

## 2025-05-19 LAB
ALBUMIN SERPL-MCNC: 3.6 G/DL (ref 3.5–5.2)
ALBUMIN/GLOB SERPL: 0.8 G/DL
ALP SERPL-CCNC: 68 U/L (ref 39–117)
ALT SERPL W P-5'-P-CCNC: 17 U/L (ref 1–41)
ANION GAP SERPL CALCULATED.3IONS-SCNC: 11 MMOL/L (ref 5–15)
AST SERPL-CCNC: 26 U/L (ref 1–40)
BASOPHILS # BLD AUTO: 0.02 10*3/MM3 (ref 0–0.2)
BASOPHILS NFR BLD AUTO: 0.3 % (ref 0–1.5)
BILIRUB SERPL-MCNC: 0.4 MG/DL (ref 0–1.2)
BUN SERPL-MCNC: 16 MG/DL (ref 8–23)
BUN/CREAT SERPL: 20 (ref 7–25)
CALCIUM SPEC-SCNC: 8.7 MG/DL (ref 8.6–10.5)
CHLORIDE SERPL-SCNC: 98 MMOL/L (ref 98–107)
CO2 SERPL-SCNC: 30 MMOL/L (ref 22–29)
CREAT SERPL-MCNC: 0.8 MG/DL (ref 0.76–1.27)
DEPRECATED RDW RBC AUTO: 45.8 FL (ref 37–54)
EGFRCR SERPLBLD CKD-EPI 2021: 86.2 ML/MIN/1.73
EOSINOPHIL # BLD AUTO: 0.1 10*3/MM3 (ref 0–0.4)
EOSINOPHIL NFR BLD AUTO: 1.5 % (ref 0.3–6.2)
ERYTHROCYTE [DISTWIDTH] IN BLOOD BY AUTOMATED COUNT: 13.6 % (ref 12.3–15.4)
GLOBULIN UR ELPH-MCNC: 4.3 GM/DL
GLUCOSE SERPL-MCNC: 131 MG/DL (ref 65–99)
HCT VFR BLD AUTO: 35.9 % (ref 37.5–51)
HGB BLD-MCNC: 11.9 G/DL (ref 13–17.7)
IMM GRANULOCYTES # BLD AUTO: 0.04 10*3/MM3 (ref 0–0.05)
IMM GRANULOCYTES NFR BLD AUTO: 0.6 % (ref 0–0.5)
INR PPP: 1.05 (ref 0.91–1.09)
LYMPHOCYTES # BLD AUTO: 1.23 10*3/MM3 (ref 0.7–3.1)
LYMPHOCYTES NFR BLD AUTO: 18.6 % (ref 19.6–45.3)
MCH RBC QN AUTO: 30.4 PG (ref 26.6–33)
MCHC RBC AUTO-ENTMCNC: 33.1 G/DL (ref 31.5–35.7)
MCV RBC AUTO: 91.6 FL (ref 79–97)
MONOCYTES # BLD AUTO: 0.49 10*3/MM3 (ref 0.1–0.9)
MONOCYTES NFR BLD AUTO: 7.4 % (ref 5–12)
NEUTROPHILS NFR BLD AUTO: 4.73 10*3/MM3 (ref 1.7–7)
NEUTROPHILS NFR BLD AUTO: 71.6 % (ref 42.7–76)
NRBC BLD AUTO-RTO: 0 /100 WBC (ref 0–0.2)
PLATELET # BLD AUTO: 207 10*3/MM3 (ref 140–450)
PMV BLD AUTO: 10.3 FL (ref 6–12)
POTASSIUM SERPL-SCNC: 4.2 MMOL/L (ref 3.5–5.2)
PROT SERPL-MCNC: 7.9 G/DL (ref 6–8.5)
PROTHROMBIN TIME: 14.2 SECONDS (ref 11.8–14.8)
RBC # BLD AUTO: 3.92 10*6/MM3 (ref 4.14–5.8)
SODIUM SERPL-SCNC: 139 MMOL/L (ref 136–145)
WBC NRBC COR # BLD AUTO: 6.61 10*3/MM3 (ref 3.4–10.8)

## 2025-05-19 PROCEDURE — 70450 CT HEAD/BRAIN W/O DYE: CPT

## 2025-05-19 PROCEDURE — 90471 IMMUNIZATION ADMIN: CPT | Performed by: NURSE PRACTITIONER

## 2025-05-19 PROCEDURE — 80053 COMPREHEN METABOLIC PANEL: CPT | Performed by: NURSE PRACTITIONER

## 2025-05-19 PROCEDURE — 25010000002 TETANUS-DIPHTH-ACELL PERTUSSIS 5-2.5-18.5 LF-MCG/0.5 SUSPENSION PREFILLED SYRINGE: Performed by: NURSE PRACTITIONER

## 2025-05-19 PROCEDURE — 85610 PROTHROMBIN TIME: CPT | Performed by: NURSE PRACTITIONER

## 2025-05-19 PROCEDURE — 36415 COLL VENOUS BLD VENIPUNCTURE: CPT

## 2025-05-19 PROCEDURE — 85025 COMPLETE CBC W/AUTO DIFF WBC: CPT | Performed by: NURSE PRACTITIONER

## 2025-05-19 PROCEDURE — 25010000002 LIDOCAINE-EPINEPHRINE 2 %-1:100000 SOLUTION: Performed by: NURSE PRACTITIONER

## 2025-05-19 PROCEDURE — 90715 TDAP VACCINE 7 YRS/> IM: CPT | Performed by: NURSE PRACTITIONER

## 2025-05-19 PROCEDURE — 70486 CT MAXILLOFACIAL W/O DYE: CPT

## 2025-05-19 PROCEDURE — 73130 X-RAY EXAM OF HAND: CPT

## 2025-05-19 PROCEDURE — 99284 EMERGENCY DEPT VISIT MOD MDM: CPT | Performed by: EMERGENCY MEDICINE

## 2025-05-19 PROCEDURE — 73090 X-RAY EXAM OF FOREARM: CPT

## 2025-05-19 PROCEDURE — 72125 CT NECK SPINE W/O DYE: CPT

## 2025-05-19 RX ORDER — LIDOCAINE HYDROCHLORIDE AND EPINEPHRINE BITARTRATE 20; .01 MG/ML; MG/ML
10 INJECTION, SOLUTION SUBCUTANEOUS ONCE
Status: COMPLETED | OUTPATIENT
Start: 2025-05-19 | End: 2025-05-19

## 2025-05-19 RX ADMIN — TETANUS TOXOID, REDUCED DIPHTHERIA TOXOID AND ACELLULAR PERTUSSIS VACCINE, ADSORBED 0.5 ML: 5; 2.5; 8; 8; 2.5 SUSPENSION INTRAMUSCULAR at 17:29

## 2025-05-19 RX ADMIN — LIDOCAINE HYDROCHLORIDE,EPINEPHRINE BITARTRATE 10 ML: 20; .01 INJECTION, SOLUTION INFILTRATION; PERINEURAL at 17:27

## 2025-05-19 NOTE — ED PROVIDER NOTES
Subjective   History of Present Illness  Patient is a 84-year-old male presents to the emergency department after tripping over concrete in a parking lot just prior to arrival and falling and striking his head on concrete.  No loss of consciousness.  He is not on blood thinner.  No chest pain or syncope prior to the fall.  He has a history of arthritis, back pain, chest and skin cancer, GERD, prostate cancer, hypertension, low back pain, macular degeneration.  He has a large laceration above the right eyebrow.  He has skin tears to the right hand and the left forearm.  He denies any neck pain.  He denies any loss of conscious.  No numbness or focal weakness.  No abdominal pain.  No back pain.  Unsure of his last tetanus immunization.    History provided by:  Patient   used: No        Review of Systems   Constitutional: Negative.    HENT:          Patient is a 84-year-old male presents to the emergency department after tripping over concrete in a parking lot just prior to arrival and falling and striking his head on concrete.  No loss of consciousness.  He is not on blood thinner.  No chest pain or syncope prior to the fall.  He has a history of arthritis, back pain, chest and skin cancer, GERD, prostate cancer, hypertension, low back pain, macular degeneration.  He has a large laceration above the right eyebrow.  He has skin tears to the right hand and the left forearm.  He denies any neck pain.  He denies any loss of conscious.  No numbness or focal weakness.  No abdominal pain.  No back pain.  Unsure of his last tetanus immunization.     Eyes: Negative.    Respiratory: Negative.     Cardiovascular: Negative.    Gastrointestinal: Negative.    Endocrine: Negative.    Genitourinary: Negative.    Musculoskeletal: Negative.    Skin: Negative.    Allergic/Immunologic: Negative.    Neurological: Negative.    Hematological: Negative.    Psychiatric/Behavioral: Negative.     All other systems reviewed  and are negative.      Past Medical History:   Diagnosis Date    Arthritis     Back pain     Cancer     CHEST, SKIN CANCER    GERD (gastroesophageal reflux disease)     History of colon polyps     History of prostate cancer     Hypertension     Low back pain     Macular degeneration     Osteopenia        No Known Allergies    Past Surgical History:   Procedure Laterality Date    APPENDECTOMY      CATARACT EXTRACTION, BILATERAL      COLONOSCOPY  09/17/2013    Dr. José-One 2-3mm polyp at 20cm; Otherwise normal; Repeat 3 years    COLONOSCOPY N/A 12/07/2022    Procedure: COLONOSCOPY WITH ANESTHESIA;  Surgeon: Bri Alexis MD;  Location: [facility] ENDOSCOPY;  Service: Gastroenterology;  Laterality: N/A;  pre: anemia. hx polyps.  post: polyps. diverticulosis.  no PCP    ENDOSCOPY N/A 12/07/2022    Procedure: ESOPHAGOGASTRODUODENOSCOPY WITH ANESTHESIA;  Surgeon: Bri Alexis MD;  Location: [facility] ENDOSCOPY;  Service: Gastroenterology;  Laterality: N/A;  pre: anemia  post: hiatal hernia. esophagitis.gastric erosions.  no PCP    FOOT SURGERY Right     KYPHOPLASTY Bilateral 07/17/2018    Procedure: L3 KYPHOPLASTY 1-2 LEVELS;  Surgeon: Vega Pandey MD;  Location: [facility] OR;  Service: Neurosurgery    KYPHOPLASTY Bilateral 09/11/2018    Procedure: L4 KYPHOPLASTY WITH BIOPSY;  Surgeon: Vega Pandey MD;  Location: [facility] OR;  Service: Neurosurgery    KYPHOPLASTY WITH BIOPSY N/A 01/25/2022    Procedure: KYPHOPLASTY WITH BIOPSY, THORACIC 6 and LUMBAR 5;  Surgeon: Nick Martínez MD;  Location: [facility] OR;  Service: Neurosurgery;  Laterality: N/A;    KYPHOPLASTY WITH BIOPSY N/A 8/18/2023    Procedure: Thoracic 12, KYPHOPLASTY WITH BIOPSY;  Surgeon: Nick Martínez MD;  Location: [facility] OR;  Service: Neurosurgery;  Laterality: N/A;    PROSTATECTOMY      TONSILLECTOMY      TOTAL SHOULDER REPLACEMENT Bilateral        Family History   Problem Relation Age of Onset    No Known Problems Mother      Prostate cancer Father        Social History     Socioeconomic History    Marital status:    Tobacco Use    Smoking status: Former     Current packs/day: 0.00     Average packs/day: 1 pack/day for 30.0 years (30.0 ttl pk-yrs)     Types: Cigarettes     Start date: 9/10/1961     Quit date: 9/10/1991     Years since quittin.7    Smokeless tobacco: Never    Tobacco comments:     1991   Vaping Use    Vaping status: Never Used   Substance and Sexual Activity    Alcohol use: Not Currently    Drug use: No    Sexual activity: Not Currently     Partners: Female     Birth control/protection: Post-menopausal       Prior to Admission medications    Medication Sig Start Date End Date Taking? Authorizing Provider   albuterol sulfate  (90 Base) MCG/ACT inhaler Inhale 2 puffs Every 6 (Six) Hours As Needed for Wheezing or Shortness of Air. 5/15/24   Aleida Valle APRN   cetirizine (zyrTEC) 10 MG tablet Take 1 tablet by mouth Daily.    ProviderRosa Elena MD   denosumab (PROLIA) 60 MG/ML solution prefilled syringe syringe Inject 1 mL under the skin into the appropriate area as directed Every 6 (Six) Months. 5/3/23   Christie Oconnell APRN   DULoxetine (CYMBALTA) 30 MG capsule TAKE 1 CAPSULE BY MOUTH EVERY NIGHT. 25   Santiago Aaron MD   multivitamins-minerals (PRESERVISION AREDS 2) capsule capsule Take 1 capsule by mouth 2 (Two) Times a Day.    ProviderRosa Elena MD   naloxone (NARCAN) 4 MG/0.1ML nasal spray Administer 1 spray into the nostril(s) as directed by provider As Needed for Opioid Reversal. Call 911. Don't prime. [place] in 1 nostril for overdose. Repeat in 2-3 minutes in other nostril if no or minimal breathing/responsiveness.    ProviderRosa Elena MD   olmesartan (Benicar) 40 MG tablet Take 1 tablet by mouth Daily.    Rosa Elena Slade MD   oxyCODONE (Roxicodone) 5 MG immediate release tablet Take 1 tablet by mouth Every 4 (Four) Hours As Needed for Moderate Pain. 25  "  Santiago Aaron MD   traMADol-acetaminophen (ULTRACET) 37.5-325 MG per tablet TAKE 1 TABLET BY MOUTH EVERY 12 (TWELVE) HOURS AS NEEDED FOR MODERATE PAIN. 4/24/25   Santiago Aaron MD       /88   Pulse 88   Temp 97.8 °F (36.6 °C)   Resp 18   Ht 165.1 cm (65\")   Wt 68.9 kg (151 lb 14.4 oz)   SpO2 98%   BMI 25.28 kg/m²     Objective   Physical Exam  Vitals and nursing note reviewed.   Constitutional:       Appearance: He is well-developed.   HENT:      Head: Normocephalic.      Comments: Large laceration noted to right forehead above right eyebrow- see photo for documentation  Eyes:      Extraocular Movements: Extraocular movements intact.      Conjunctiva/sclera: Conjunctivae normal.      Pupils: Pupils are equal, round, and reactive to light.   Cardiovascular:      Rate and Rhythm: Normal rate and regular rhythm.      Heart sounds: Normal heart sounds.   Pulmonary:      Effort: Pulmonary effort is normal.      Breath sounds: Normal breath sounds.   Abdominal:      General: Bowel sounds are normal.      Palpations: Abdomen is soft.   Musculoskeletal:      Cervical back: Normal range of motion and neck supple.      Comments: Right hand: skin tear to palmar aspect of right hand at base of 5th digit. Flexion extension intact. Moves hand without diffic.   Left forearm: Skin tear noted to volar aspect of left forearm. Moves arm without diffic.  strong, equal. Dtrs. Intact    Skin:     General: Skin is warm and dry.   Neurological:      Mental Status: He is alert and oriented to person, place, and time.      Deep Tendon Reflexes: Reflexes are normal and symmetric.   Psychiatric:         Behavior: Behavior normal.         Thought Content: Thought content normal.         Judgment: Judgment normal.         Laceration Repair    Date/Time: 5/19/2025 6:08 PM    Performed by: Robi Claros MD  Authorized by: Robi Claros MD    Consent:     Consent obtained:  Written    Consent given by:  Patient and " spouse    Risks, benefits, and alternatives were discussed: yes      Risks discussed:  Infection, need for additional repair, nerve damage, poor cosmetic result, poor wound healing and retained foreign body    Alternatives discussed:  Delayed treatment  Universal protocol:     Immediately prior to procedure, a time out was called: yes      Patient identity confirmed:  Hospital-assigned identification number  Anesthesia:     Anesthesia method:  Local infiltration    Local anesthetic:  Lidocaine 1% w/o epi  Laceration details:     Location:  Face    Face location:  R eyebrow    Length (cm):  7.5  Pre-procedure details:     Preparation:  Patient was prepped and draped in usual sterile fashion and imaging obtained to evaluate for foreign bodies  Exploration:     Limited defect created (wound extended): no      Hemostasis achieved with:  Direct pressure    Imaging outcome: foreign body not noted      Wound exploration: wound explored through full range of motion      Wound extent: areolar tissue violated and fascia violated      Wound extent: no foreign bodies/material noted, no muscle damage noted, no nerve damage noted, no tendon damage noted, no underlying fracture noted and no vascular damage noted      Contaminated: no    Treatment:     Area cleansed with:  Chlorhexidine    Amount of cleaning:  Standard    Irrigation solution:  Sterile saline    Irrigation volume:  200    Irrigation method:  Syringe    Debridement:  None    Undermining:  None    Scar revision: no      Layers/structures repaired:  Deep subcutaneous  Deep subcutaneous:     Suture size:  4-0    Suture material:  Vicryl    Suture technique:  Simple interrupted    Number of sutures:  3  Skin repair:     Repair method:  Sutures    Suture size:  4-0    Suture material:  Nylon    Suture technique:  Running locked    Number of sutures:  8  Approximation:     Approximation:  Close  Repair type:     Repair type:  Intermediate  Post-procedure details:      Dressing:  Non-adherent dressing    Procedure completion:  Tolerated           Lab Results (last 24 hours)       Procedure Component Value Units Date/Time    CBC & Differential [[id number]]  (Abnormal) Collected: 05/19/25 1730    Specimen: Blood Updated: 05/19/25 1738    Narrative:      The following orders were created for panel order CBC & Differential.  Procedure                               Abnormality         Status                     ---------                               -----------         ------                     CBC Auto Differential[[id number]]        Abnormal            Final result                 Please view results for these tests on the individual orders.    Comprehensive Metabolic Panel [[id number]]  (Abnormal) Collected: 05/19/25 1730    Specimen: Blood Updated: 05/19/25 1758     Glucose 131 mg/dL      BUN 16 mg/dL      Creatinine 0.80 mg/dL      Sodium 139 mmol/L      Potassium 4.2 mmol/L      Comment: Slight hemolysis detected by analyzer. Result may be falsely elevated.        Chloride 98 mmol/L      CO2 30.0 mmol/L      Calcium 8.7 mg/dL      Total Protein 7.9 g/dL      Albumin 3.6 g/dL      ALT (SGPT) 17 U/L      AST (SGOT) 26 U/L      Alkaline Phosphatase 68 U/L      Total Bilirubin 0.4 mg/dL      Globulin 4.3 gm/dL      A/G Ratio 0.8 g/dL      BUN/Creatinine Ratio 20.0     Anion Gap 11.0 mmol/L      eGFR 86.2 mL/min/1.73     Narrative:      GFR Categories in Chronic Kidney Disease (CKD)              GFR Category          GFR (mL/min/1.73)    Interpretation  G1                    90 or greater        Normal or high (1)  G2                    60-89                Mild decrease (1)  G3a                   45-59                Mild to moderate decrease  G3b                   30-44                Moderate to severe decrease  G4                    15-29                Severe decrease  G5                    14 or less           Kidney failure    (1)In the absence of evidence of kidney disease,  neither GFR category G1 or G2 fulfill the criteria for CKD.    eGFR calculation 2021 CKD-EPI creatinine equation, which does not include race as a factor    Protime-INR [[id number]]  (Normal) Collected: 05/19/25 1730    Specimen: Blood Updated: 05/19/25 1747     Protime 14.2 Seconds      INR 1.05    CBC Auto Differential [[id number]]  (Abnormal) Collected: 05/19/25 1730    Specimen: Blood Updated: 05/19/25 1738     WBC 6.61 10*3/mm3      RBC 3.92 10*6/mm3      Hemoglobin 11.9 g/dL      Hematocrit 35.9 %      MCV 91.6 fL      MCH 30.4 pg      MCHC 33.1 g/dL      RDW 13.6 %      RDW-SD 45.8 fl      MPV 10.3 fL      Platelets 207 10*3/mm3      Neutrophil % 71.6 %      Lymphocyte % 18.6 %      Monocyte % 7.4 %      Eosinophil % 1.5 %      Basophil % 0.3 %      Immature Grans % 0.6 %      Neutrophils, Absolute 4.73 10*3/mm3      Lymphocytes, Absolute 1.23 10*3/mm3      Monocytes, Absolute 0.49 10*3/mm3      Eosinophils, Absolute 0.10 10*3/mm3      Basophils, Absolute 0.02 10*3/mm3      Immature Grans, Absolute 0.04 10*3/mm3      nRBC 0.0 /100 WBC             CT Head Without Contrast   Final Result       1. Moderate cerebral and cerebellar atrophy with chronic microvascular   disease but no evidence of acute intracranial process.               This report was signed and finalized on 5/19/2025 5:28 PM by Dr. Harry Dow MD.          CT Facial Bones Without Contrast   Final Result   1.. No acute facial fracture.   2. Soft tissue laceration involving the right supraorbital soft tissues.   No associated calvarial injury.   3. Visualized paranasal sinuses and mastoid air cells are normally   aerated. The orbits are intact.       This report was signed and finalized on 5/19/2025 5:32 PM by Dr. Harry Dow MD.          CT Cervical Spine Without Contrast   Final Result   1. Compression fractures at C7 and T1 which appear to be chronic in   nature. No acute fracture or listhesis. Foraminal narrowing at several   levels  related to facet arthropathy and uncinate spurring.               This report was signed and finalized on 5/19/2025 5:37 PM by Dr. Harry Dow MD.          XR Hand 3+ View Right   Final Result   No fracture, no acute osseous abnormality. Degenerative   changes of the carpus as detailed above.       This report was signed and finalized on 5/19/2025 5:18 PM by Dr. Salty Lovett MD.          XR Forearm 2 View Left   Final Result   1. No acute fracture..           This report was signed and finalized on 5/19/2025 5:16 PM by Dr. Harry Dow MD.              ED Course  ED Course as of 05/19/25 1844   Mon May 19, 2025   1815 IMPRESSION:  1. Compression fractures at C7 and T1 which appear to be chronic in  nature. No acute fracture or listhesis. Foraminal narrowing at several  levels related to facet arthropathy and uncinate spurring.     CT of the head negative for anything acute.  CT facial bones no acute fracture.  X-ray of the right hand is negative for acute fracture x-ray of the left forearm is negative for any acute fracture.  Laboratory studies call the CBC white count is 6.61 hemoglobin 11.9 hematocrit 35.9. please see Dr. Claros's note for laceration repair. Pt to return to er in 10 days for suture removal. Pt in agreement with care plan and voices understanding of instructions. Pt will be discharged shortly in stable condition.  Advised to apply ice to the affected areas.  Return emergency department if has redness, drainage, swelling to the area.  Patient in agreement. [CW]      ED Course User Index  [CW] Paris Ball APRN        Medical Decision Making  Patient is a 84-year-old male presents to the emergency department after tripping over concrete in a parking lot just prior to arrival and falling and striking his head on concrete.  No loss of consciousness.  He is not on blood thinner.  No chest pain or syncope prior to the fall.  He has a history of arthritis, back pain, chest and  skin cancer, GERD, prostate cancer, hypertension, low back pain, macular degeneration.  He has a large laceration above the right eyebrow.  He has skin tears to the right hand and the left forearm.  He denies any neck pain.  He denies any loss of conscious.  No numbness or focal weakness.  No abdominal pain.  No back pain.  Unsure of his last tetanus immunization.  Course of treatment in the er: Nontoxic-appearing.  Alert and oriented x 3.  Vitals are stable.  Large laceration noted to the right forehead above the right eyebrow see photo for documentation.  Skin tear noted to the palmar aspect of the right hand.  Flexion extension intact to all digits.  Moves hand without difficulty.  Skin tear noted to the volar aspect of the left forearm.  Moves the forearm without difficulty.   are strong and equal.  Peripheral pulses are palpable.  Flexion extension intact to all digits.  Lungs clear to auscultation.  CV normal sinus rhythm.  Cervical spine no step-off or laxity noted.  No tenderness.  CT of the head, facial bones, cervical spine, laboratory studies have been ordered. Reviewed pt with Dr. Claros- also assessed pt and in agreement with care plan.   Differential diagnosis to include but not limited to: skull fracture; ich; facial bone fracture; hand fracture; left forearm fracture; and other   Labs Reviewed  COMPREHENSIVE METABOLIC PANEL - Abnormal; Notable for the following components:     Glucose                       131 (*)                CO2                           30.0 (*)            All other components within normal limits         Narrative: GFR Categories in Chronic Kidney Disease (CKD)                                              GFR Category          GFR (mL/min/1.73)    Interpretation                  G1                    90 or greater        Normal or high (1)                  G2                    60-89                Mild decrease (1)                  G3a                   45-59                 Mild to moderate decrease                  G3b                   30-44                Moderate to severe decrease                  G4                    15-29                Severe decrease                  G5                    14 or less           Kidney failure                                    (1)In the absence of evidence of kidney disease, neither GFR category G1 or G2 fulfill the criteria for CKD.                                    eGFR calculation 2021 CKD-EPI creatinine equation, which does not include race as a factor  CBC WITH AUTO DIFFERENTIAL - Abnormal; Notable for the following components:     RBC                           3.92 (*)               Hemoglobin                    11.9 (*)               Hematocrit                    35.9 (*)               Lymphocyte %                  18.6 (*)               Immature Grans %              0.6 (*)             All other components within normal limits  PROTIME-INR - Normal  CBC AND DIFFERENTIAL  CT Head Without Contrast   Final Result         1. Moderate cerebral and cerebellar atrophy with chronic microvascular    disease but no evidence of acute intracranial process.                   This report was signed and finalized on 5/19/2025 5:28 PM by Dr. Harry Dow MD.          CT Facial Bones Without Contrast   Final Result    1.. No acute facial fracture.    2. Soft tissue laceration involving the right supraorbital soft tissues.    No associated calvarial injury.    3. Visualized paranasal sinuses and mastoid air cells are normally    aerated. The orbits are intact.         This report was signed and finalized on 5/19/2025 5:32 PM by Dr. Harry Dow MD.          CT Cervical Spine Without Contrast   Final Result    1. Compression fractures at C7 and T1 which appear to be chronic in    nature. No acute fracture or listhesis. Foraminal narrowing at several    levels related to facet arthropathy and uncinate spurring.                   This  report was signed and finalized on 5/19/2025 5:37 PM by Dr. Harry Dow MD.          XR Hand 3+ View Right   Final Result    No fracture, no acute osseous abnormality. Degenerative    changes of the carpus as detailed above.         This report was signed and finalized on 5/19/2025 5:18 PM by Dr. Salty Lovett MD.          XR Forearm 2 View Left   Final Result    1. No acute fracture..              This report was signed and finalized on 5/19/2025 5:16 PM by Dr. Harry Dow MD.          MPRESSION:  1. Compression fractures at C7 and T1 which appear to be chronic in  nature. No acute fracture or listhesis. Foraminal narrowing at several  levels related to facet arthropathy and uncinate spurring.     CT of the head negative for anything acute.  CT facial bones no acute fracture.  X-ray of the right hand is negative for acute fracture x-ray of the left forearm is negative for any acute fracture.  Laboratory studies call the CBC white count is 6.61 hemoglobin 11.9 hematocrit 35.9. please see Dr. Claros's note for laceration repair. Pt to return to er in 10 days for suture removal. Pt in agreement with care plan and voices understanding of instructions. Pt will be discharged shortly in stable condition.  Advised to apply ice to the affected areas.  Return emergency department if has redness, drainage, swelling to the area.  Patient in agreement.      Amount and/or Complexity of Data Reviewed  Labs: ordered. Decision-making details documented in ED Course.  Radiology: ordered. Decision-making details documented in ED Course.    Risk  Prescription drug management.         Final diagnoses:   Facial laceration, initial encounter   Skin avulsion   Contusion of scalp, initial encounter          Robi Claros MD  05/19/25 1811       Paris Ball, APRN  05/19/25 9944

## 2025-05-19 NOTE — DISCHARGE INSTRUCTIONS
Return to ER if symptoms worsen   Clean wound twice daily with soap and water and apply bacitriaicin  Ice to area  Clean wounds to hands and forearm with soap and water and apply bacitiraicin and non stick dressing  Return to er in 7-10 days for suture removal  Return before if signs of infection- redness, swelling, drainage from the wound

## 2025-05-21 ENCOUNTER — OFFICE VISIT (OUTPATIENT)
Dept: INTERNAL MEDICINE | Facility: CLINIC | Age: 85
End: 2025-05-21
Payer: MEDICARE

## 2025-05-21 VITALS
BODY MASS INDEX: 25.52 KG/M2 | HEIGHT: 65 IN | DIASTOLIC BLOOD PRESSURE: 70 MMHG | OXYGEN SATURATION: 92 % | WEIGHT: 153.2 LBS | TEMPERATURE: 97.2 F | SYSTOLIC BLOOD PRESSURE: 126 MMHG | HEART RATE: 77 BPM

## 2025-05-21 DIAGNOSIS — M54.9 BACK PAIN, UNSPECIFIED BACK LOCATION, UNSPECIFIED BACK PAIN LATERALITY, UNSPECIFIED CHRONICITY: ICD-10-CM

## 2025-05-21 DIAGNOSIS — I50.32 CHRONIC HEART FAILURE WITH PRESERVED EJECTION FRACTION (HFPEF): ICD-10-CM

## 2025-05-21 DIAGNOSIS — S01.81XD FACIAL LACERATION, SUBSEQUENT ENCOUNTER: ICD-10-CM

## 2025-05-21 DIAGNOSIS — R26.81 GAIT INSTABILITY: ICD-10-CM

## 2025-05-21 DIAGNOSIS — I50.32 CHRONIC DIASTOLIC CHF (CONGESTIVE HEART FAILURE): Chronic | ICD-10-CM

## 2025-05-21 DIAGNOSIS — I10 BENIGN HYPERTENSION: Primary | ICD-10-CM
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
## 2025-05-21 DIAGNOSIS — J44.9 CHRONIC OBSTRUCTIVE PULMONARY DISEASE, UNSPECIFIED COPD TYPE: ICD-10-CM

## 2025-05-21 PROCEDURE — G2211 COMPLEX E/M VISIT ADD ON: HCPCS | Performed by: INTERNAL MEDICINE

## 2025-05-21 PROCEDURE — 99214 OFFICE O/P EST MOD 30 MIN: CPT | Performed by: INTERNAL MEDICINE

## 2025-05-21 PROCEDURE — 1126F AMNT PAIN NOTED NONE PRSNT: CPT | Performed by: INTERNAL MEDICINE

## 2025-05-21 RX ORDER — GUAIFENESIN 600 MG/1
1200 TABLET, EXTENDED RELEASE ORAL 2 TIMES DAILY
Qty: 120 TABLET | Refills: 2 | Status: SHIPPED | OUTPATIENT
Start: 2025-05-21

## 2025-05-21 RX ORDER — ALBUTEROL SULFATE 90 UG/1
2 INHALANT RESPIRATORY (INHALATION) EVERY 6 HOURS PRN
Qty: 8 G | Refills: 2 | Status: SHIPPED | OUTPATIENT
Start: 2025-05-21

## 2025-05-26 NOTE — PROGRESS NOTES
"      Chief Complaint  Hypertension (6 month follow up ) and Fall (Fell on concrete 5/19/25 was seen In ER at [facility] )    Subjective        Juan Luis Collazo presents to [facility]    HPI    Patient here for the above problems.  See Assessment and Plan for further HPI components.      Review of Systems    Objective   Vital Signs:  /70 (BP Location: Left arm, Patient Position: Sitting, Cuff Size: Adult)   Pulse 77   Temp 97.2 °F (36.2 °C) (Temporal)   Ht 165.1 cm (65\")   Wt 69.5 kg (153 lb 3.2 oz)   SpO2 92%   BMI 25.49 kg/m²   Estimated body mass index is 25.49 kg/m² as calculated from the following:    Height as of this encounter: 165.1 cm (65\").    Weight as of this encounter: 69.5 kg (153 lb 3.2 oz).      Physical Exam  Vitals and nursing note reviewed.   Constitutional:       Appearance: He is not ill-appearing.      Comments: Facial laceration over right eye  Skin tears on arms bilaterally  Kyphosis    Eyes:      General: No scleral icterus.     Conjunctiva/sclera: Conjunctivae normal.   Pulmonary:      Effort: Pulmonary effort is normal. No respiratory distress.   Neurological:      General: No focal deficit present.      Mental Status: He is alert and oriented to person, place, and time.   Psychiatric:         Mood and Affect: Mood normal.         Behavior: Behavior normal.                       Assessment and Plan   Diagnoses and all orders for this visit:    1. Benign hypertension (Primary)    2. Chronic diastolic CHF (congestive heart failure)    3. Chronic heart failure with preserved ejection fraction (HFpEF)    4. Back pain, unspecified back location, unspecified back pain laterality, unspecified chronicity    5. Facial laceration, subsequent encounter    6. Gait instability    7. Fall, subsequent encounter    8. Chronic obstructive pulmonary disease, unspecified COPD type  -     guaiFENesin (Mucinex) 600 MG 12 hr tablet; Take 2 tablets by mouth 2 (Two) Times a " Day.  Dispense: 120 tablet; Refill: 2  -     albuterol sulfate  (90 Base) MCG/ACT inhaler; Inhale 2 puffs Every 6 (Six) Hours As Needed for Wheezing or Shortness of Air.  Dispense: 8 g; Refill: 2      BP controlled.  Continue current regimen.  Continue olmesartan 40 mg.  No signs or symptoms of orthostasis.    Patient had HFpEF/diastolic heart failure.  Patient appears euvolemic at present time.  Continue following with heart failure team.      Reviewed skin tears.  Discussed dressing and care with the patient and his wife.    Patient likely had COPD, has not been confirmed on PFTs but the patient reports that his wheezing and mucus clearance was better on albuterol and mucinex previously.  Will sent in new script.      Patient was recently in the ER for a fall.  The patient tripped and his head and did not lose consciousness.  The patient was found to have a laceration around his browline and required sutures.  Patient also suffered some skin tears which we dressed today.      Imaging from the ER reviewed:  XR Forearm 2 View Left (05/19/2025 17:13) - No acute fracture  XR Hand 3+ View Right (05/19/2025 17:14) - No acute fracture  CT Head Without Contrast (05/19/2025 17:19) - nothing acute. Chronic cerebral and cerebellar atrophy  CT Facial Bones Without Contrast (05/19/2025 17:19) - soft tissue laceration. No injury. No fractures  CT Cervical Spine Without Contrast (05/19/2025 17:19) - chronic compression fractures on C7-T1    Labs from ER reviewed:     Latest Reference Range & Units 05/19/25 17:30   Sodium 136 - 145 mmol/L 139   Potassium 3.5 - 5.2 mmol/L 4.2   Chloride 98 - 107 mmol/L 98   CO2 22.0 - 29.0 mmol/L 30.0 (H)   Anion Gap 5.0 - 15.0 mmol/L 11.0   BUN 8 - 23 mg/dL 16   Creatinine 0.76 - 1.27 mg/dL 0.80   BUN/Creatinine Ratio 7.0 - 25.0  20.0   eGFR >60.0 mL/min/1.73 86.2   Glucose 65 - 99 mg/dL 131 (H)   Calcium 8.6 - 10.5 mg/dL 8.7   Alkaline Phosphatase 39 - 117 U/L 68   Total Protein 6.0 - 8.5  g/dL 7.9   Albumin 3.5 - 5.2 g/dL 3.6   Globulin gm/dL 4.3   A/G Ratio g/dL 0.8   AST (SGOT) 1 - 40 U/L 26   ALT (SGPT) 1 - 41 U/L 17   Total Bilirubin 0.0 - 1.2 mg/dL 0.4   Protime 11.8 - 14.8 Seconds 14.2   INR 0.91 - 1.09  1.05   WBC 3.40 - 10.80 10*3/mm3 6.61   RBC 4.14 - 5.80 10*6/mm3 3.92 (L)   Hemoglobin 13.0 - 17.7 g/dL 11.9 (L)   Hematocrit 37.5 - 51.0 % 35.9 (L)   Platelets 140 - 450 10*3/mm3 207   RDW 12.3 - 15.4 % 13.6   MCV 79.0 - 97.0 fL 91.6   MCH 26.6 - 33.0 pg 30.4   MCHC 31.5 - 35.7 g/dL 33.1   MPV 6.0 - 12.0 fL 10.3   RDW-SD 37.0 - 54.0 fl 45.8   Neutrophil Rel % 42.7 - 76.0 % 71.6   Lymphocyte Rel % 19.6 - 45.3 % 18.6 (L)   Monocyte Rel % 5.0 - 12.0 % 7.4   Eosinophil Rel % 0.3 - 6.2 % 1.5   Basophil Rel % 0.0 - 1.5 % 0.3   Immature Granulocyte Rel % 0.0 - 0.5 % 0.6 (H)   Neutrophils Absolute 1.70 - 7.00 10*3/mm3 4.73   Lymphocytes Absolute 0.70 - 3.10 10*3/mm3 1.23   Monocytes Absolute 0.10 - 0.90 10*3/mm3 0.49   Eosinophils Absolute 0.00 - 0.40 10*3/mm3 0.10   Basophils Absolute 0.00 - 0.20 10*3/mm3 0.02   Immature Grans, Absolute 0.00 - 0.05 10*3/mm3 0.04   nRBC 0.0 - 0.2 /100 WBC 0.0   (H): Data is abnormally high  (L): Data is abnormally low    Result Review :                               Follow Up   Return in about 6 months (around 11/21/2025), or if symptoms worsen or fail to improve, for follow up for above problems. [facility] care., Medicare Wellness - Labs prior to visit.  Patient was given instructions and counseling regarding his condition or for health maintenance advice. Please see specific information pulled into the AVS if appropriate.       NATHAN Aaron MD, FACP, [facility]      Electronically signed by Santiago Aaron MD, 05/26/25, 11:31 AM CDT.

## 2025-05-30 ENCOUNTER — HOSPITAL ENCOUNTER (OUTPATIENT)
Dept: GENERAL RADIOLOGY | Facility: HOSPITAL | Age: 85
Discharge: HOME | End: 2025-05-30
Payer: MEDICARE

## 2025-05-30 ENCOUNTER — OFFICE VISIT (OUTPATIENT)
Dept: INTERNAL MEDICINE | Facility: CLINIC | Age: 85
End: 2025-05-30
Payer: MEDICARE

## 2025-05-30 VITALS
OXYGEN SATURATION: 95 % | SYSTOLIC BLOOD PRESSURE: 168 MMHG | DIASTOLIC BLOOD PRESSURE: 88 MMHG | HEART RATE: 77 BPM | HEIGHT: 65 IN | BODY MASS INDEX: 25.49 KG/M2 | TEMPERATURE: 97.3 F

## 2025-05-30 DIAGNOSIS — S01.81XD FACIAL LACERATION, SUBSEQUENT ENCOUNTER: Primary | ICD-10-CM

## 2025-05-30 DIAGNOSIS — R05.1 ACUTE COUGH: ICD-10-CM
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
## 2025-05-30 PROCEDURE — 71046 X-RAY EXAM CHEST 2 VIEWS: CPT

## 2025-05-30 NOTE — PROGRESS NOTES
"Chief Complaint  Suture / Staple Removal (10 days from injury, continuous stitch removed.)    Subjective    History of Present Illness      Patient presents to [facility] for   History of Present Illness   Patient is seen in the clinic today for suture removal.  The sutures could not properly be removed by MA.  The sutures were placed 10 days ago in the ED after tripping over concrete in a parking lot and falling and striking his head on concrete.  Area has healed well.  He still has lacerations on his forearms bilaterally.  He has also developed a cough with the pain.      I have reviewed and agree with the HPI and ROS information as above.  DIEGO Cruz     Objective   Vital Signs:   /88 (BP Location: Right arm, Patient Position: Sitting, Cuff Size: Adult) Comment: Took medication around 11  Pulse 77   Temp 97.3 °F (36.3 °C) (Temporal) Comment (Src): not taken  Ht 165.1 cm (65\")   SpO2 95%   BMI 25.49 kg/m²            Physical Exam  Vitals and nursing note reviewed.   Constitutional:       Appearance: Normal appearance.   Eyes:      Pupils: Pupils are equal, round, and reactive to light.        Comments: Scar, stitches removed today.   Cardiovascular:      Rate and Rhythm: Normal rate and regular rhythm.      Heart sounds: Normal heart sounds.   Pulmonary:      Effort: Pulmonary effort is normal.      Breath sounds: Normal breath sounds.   Musculoskeletal:         General: Normal range of motion.   Skin:     Findings: Bruising present.             Comments: Skin tears   Neurological:      General: No focal deficit present.      Mental Status: He is alert and oriented to person, place, and time. Mental status is at baseline.   Psychiatric:         Mood and Affect: Mood normal.         Behavior: Behavior normal.         Thought Content: Thought content normal.         Judgment: Judgment normal.               Assessment and Plan      Diagnoses and all orders for this " visit:    1. Facial laceration, subsequent encounter (Primary)    2. Fall, subsequent encounter    3. Acute cough    Facial laceration healing well.  Removed stitches in office today because they had been in place for 10 days.    Skin tears on bilateral arms.  These areas appear to be healing well.  Bactroban ointment prescribed.  Will continue to monitor for signs of infection.    Patient has since developed a harsh cough.  He is experiencing pain when he coughs, a chest x-ray was ordered.  Chest x-ray appeared normal.      Next OV 11/13/2025 with Dr. Aaron.  Follow Up   Return if symptoms worsen or fail to improve.  Patient was given instructions and counseling regarding his condition or for health maintenance advice. Please see specific information pulled into the AVS if appropriate.

## 2025-06-04 DIAGNOSIS — M50.30 DEGENERATIVE DISC DISEASE, CERVICAL: ICD-10-CM

## 2025-06-04 DIAGNOSIS — M51.369 DEGENERATION OF INTERVERTEBRAL DISC OF LUMBAR REGION, UNSPECIFIED WHETHER PAIN PRESENT: ICD-10-CM

## 2025-06-04 DIAGNOSIS — M51.34 DEGENERATIVE DISC DISEASE, THORACIC: ICD-10-CM

## 2025-06-05 DIAGNOSIS — M81.0 AGE-RELATED OSTEOPOROSIS WITHOUT CURRENT PATHOLOGICAL FRACTURE: Primary | ICD-10-CM

## 2025-06-05 DIAGNOSIS — Z87.81 HISTORY OF VERTEBRAL COMPRESSION FRACTURE: ICD-10-CM

## 2025-06-09 DIAGNOSIS — M50.30 DEGENERATIVE DISC DISEASE, CERVICAL: ICD-10-CM

## 2025-06-09 DIAGNOSIS — M51.369 DEGENERATION OF INTERVERTEBRAL DISC OF LUMBAR REGION, UNSPECIFIED WHETHER PAIN PRESENT: ICD-10-CM

## 2025-06-09 DIAGNOSIS — M51.34 DEGENERATIVE DISC DISEASE, THORACIC: ICD-10-CM

## 2025-06-17 ENCOUNTER — HOSPITAL ENCOUNTER (OUTPATIENT)
Dept: BONE DENSITY | Facility: HOSPITAL | Age: 85
Discharge: HOME | End: 2025-06-17
Admitting: INTERNAL MEDICINE
Payer: MEDICARE

## 2025-06-17 DIAGNOSIS — Z87.81 HISTORY OF VERTEBRAL COMPRESSION FRACTURE: ICD-10-CM

## 2025-06-17 DIAGNOSIS — M81.0 AGE-RELATED OSTEOPOROSIS WITHOUT CURRENT PATHOLOGICAL FRACTURE: ICD-10-CM

## 2025-06-17 PROCEDURE — 77080 DXA BONE DENSITY AXIAL: CPT

## 2025-07-07 DIAGNOSIS — M50.30 DEGENERATIVE DISC DISEASE, CERVICAL: ICD-10-CM

## 2025-07-07 DIAGNOSIS — M51.369 DEGENERATION OF INTERVERTEBRAL DISC OF LUMBAR REGION, UNSPECIFIED WHETHER PAIN PRESENT: ICD-10-CM

## 2025-07-07 DIAGNOSIS — M51.34 DEGENERATIVE DISC DISEASE, THORACIC: ICD-10-CM

## 2025-07-08 DIAGNOSIS — M85.80 DECREASED BONE MASS: Primary | ICD-10-CM

## 2025-08-11 DIAGNOSIS — M51.369 DEGENERATION OF INTERVERTEBRAL DISC OF LUMBAR REGION, UNSPECIFIED WHETHER PAIN PRESENT: ICD-10-CM

## 2025-08-11 DIAGNOSIS — I10 BENIGN HYPERTENSION: ICD-10-CM

## 2025-08-11 DIAGNOSIS — M51.34 DEGENERATIVE DISC DISEASE, THORACIC: ICD-10-CM

## 2025-08-11 DIAGNOSIS — M50.30 DEGENERATIVE DISC DISEASE, CERVICAL: ICD-10-CM

## 2025-08-12 ENCOUNTER — OFFICE VISIT (OUTPATIENT)
Dept: INTERNAL MEDICINE | Facility: CLINIC | Age: 85
End: 2025-08-12
Payer: MEDICARE

## 2025-08-12 VITALS
WEIGHT: 154.4 LBS | OXYGEN SATURATION: 97 % | BODY MASS INDEX: 25.72 KG/M2 | HEART RATE: 86 BPM | TEMPERATURE: 98.9 F | DIASTOLIC BLOOD PRESSURE: 70 MMHG | HEIGHT: 65 IN | SYSTOLIC BLOOD PRESSURE: 120 MMHG

## 2025-08-12 DIAGNOSIS — M79.631 PAIN AND SWELLING OF FOREARM, RIGHT: ICD-10-CM

## 2025-08-12 DIAGNOSIS — L03.113 CELLULITIS OF RIGHT UPPER EXTREMITY: Primary | ICD-10-CM

## 2025-08-12 DIAGNOSIS — M79.89 PAIN AND SWELLING OF FOREARM, RIGHT: ICD-10-CM

## 2025-08-12 RX ORDER — CEPHALEXIN 500 MG/1
500 CAPSULE ORAL 3 TIMES DAILY
Qty: 30 CAPSULE | Refills: 0 | Status: SHIPPED | OUTPATIENT
Start: 2025-08-12 | End: 2025-08-12

## 2025-08-12 RX ORDER — CEPHALEXIN 500 MG/1
500 CAPSULE ORAL 3 TIMES DAILY
Qty: 30 CAPSULE | Refills: 0 | Status: SHIPPED | OUTPATIENT
Start: 2025-08-12 | End: 2025-08-22

## 2025-08-12 RX ORDER — OLMESARTAN MEDOXOMIL 20 MG/1
30 TABLET ORAL DAILY
Qty: 45 TABLET | Refills: 5 | Status: SHIPPED | OUTPATIENT
Start: 2025-08-12

## (undated) DEVICE — SKIN AFFIX SURG ADHESIVE 72/CS 0.55ML: Brand: MEDLINE

## (undated) DEVICE — PACK,UNIVERSAL,NO GOWNS: Brand: MEDLINE

## (undated) DEVICE — BAPTIST TURNOVER KIT: Brand: MEDLINE INDUSTRIES, INC.

## (undated) DEVICE — PK TURNOVER RM ADV

## (undated) DEVICE — APPL CHLORAPREP W/TINT 26ML ORNG

## (undated) DEVICE — GLV SURG DERMASSURE GRN LF PF 7.0

## (undated) DEVICE — CEMENT GUN AND BONE FILLER CDS2A SISE 2: Brand: MEDTRONIC REUSABLE INSTRUMENTS

## (undated) DEVICE — PK KYPHOPLASTY 30

## (undated) DEVICE — CONN FLX BREATHE CIRCT

## (undated) DEVICE — GLV SURG SENSICARE W/ALOE PF LF 7.5 STRL

## (undated) DEVICE — DEV CUT BIOP BONE KYPHX

## (undated) DEVICE — INTENDED FOR TISSUE SEPARATION, AND OTHER PROCEDURES THAT REQUIRE A SHARP SURGICAL BLADE TO PUNCTURE OR CUT.: Brand: BARD-PARKER ® STAINLESS STEEL BLADES

## (undated) DEVICE — CEMENT CARTRIDGES CC02A CDS: Brand: KYPHON® CEMENT DELIVERY SYSTEM

## (undated) DEVICE — ADHS SKIN PREMIERPRO EXOFIN TOPICAL HI/VISC .5ML

## (undated) DEVICE — TRAP FLD MINIVAC MEGADYNE 100ML

## (undated) DEVICE — TOWEL,OR,DSP,ST,BLUE,STD,4/PK,20PK/CS: Brand: MEDLINE

## (undated) DEVICE — GLV SURG SIGNATURE TOUCH PF LTX 7.5 STRL

## (undated) DEVICE — DECANTER: Brand: UNBRANDED

## (undated) DEVICE — CUFF,BP,DISP,1 TUBE,ADULT,HP: Brand: MEDLINE

## (undated) DEVICE — APPL CHLORAPREP HI/LITE 26ML ORNG

## (undated) DEVICE — SPK10277 JACKSON/PRO-AXIS KIT: Brand: SPK10277 JACKSON/PRO-AXIS KIT

## (undated) DEVICE — GLV SURG BIOGEL LTX PF 7 1/2

## (undated) DEVICE — BONE BIOPSY DEVICE F07A TAPERED SIZE 2: Brand: MEDTRONIC REUSABLE INSTRUMENTS

## (undated) DEVICE — CONTAINER,SPECIMEN,OR STERILE,4OZ: Brand: MEDLINE

## (undated) DEVICE — GLV SURG TRIUMPH GREEN W/ALOE PF LTX 7 STRL

## (undated) DEVICE — BONE TAMP KIT KPX203PB FF X2 20/3 1 STP: Brand: KYPHOPAK™ TRAY

## (undated) DEVICE — THE EXACTO COLD SNARE IS INTENDED TO BE USED WITHOUT DIATHERMIC ENERGY FOR THE ENDOSCOPIC RESECTION OF POLYP TISSUE IN THE GASTROINTESTINAL TRACT.: Brand: EXACTO

## (undated) DEVICE — NEEDLE, QUINCKE 22GX3.5": Brand: MEDLINE INDUSTRIES, INC.

## (undated) DEVICE — GLV SURG BIOGEL LTX PF 6 1/2

## (undated) DEVICE — HDRST INTUB GENTLETOUCH SLOT 7IN RT

## (undated) DEVICE — TOOLKIT VPT02A VP NEEDLE SET CURV 13 GA: Brand: KYPHON KURVE™ CURVED BONE FILLER DEVICE

## (undated) DEVICE — CONMED SCOPE SAVER BITE BLOCK, 20X27 MM: Brand: SCOPE SAVER

## (undated) DEVICE — GLV SURG GRN DERMASSURE LF PF 7.5

## (undated) DEVICE — UNDERGLV SURG BIOGEL INDICATOR LF PF 7.5

## (undated) DEVICE — TBG PENCL TELESCP MEGADYNE SMOKE EVAC 10FT

## (undated) DEVICE — CVR UNIV C/ARM

## (undated) DEVICE — THE CHANNEL CLEANING BRUSH IS A NYLON FLEXI BRUSH ATTACHED TO A FLEXIBLE PLASTIC SHEATH DESIGNED TO SAFELY REMOVE DEBRIS FROM FLEXIBLE ENDOSCOPES.

## (undated) DEVICE — GLV SURG SIGNATURE TOUCH PF LTX 6.5 STRL

## (undated) DEVICE — MASK,OXYGEN,MED CONC,ADLT,7' TUB, UC: Brand: PENDING

## (undated) DEVICE — BONE BIOPSY DEVICE F05A BBD SIZE 3: Brand: MEDTRONIC REUSABLE INSTRUMENTS

## (undated) DEVICE — YANKAUER,BULB TIP WITH VENT: Brand: ARGYLE

## (undated) DEVICE — GLV SURG TRIUMPH GREEN W/ALOE PF LTX 8 STRL

## (undated) DEVICE — FRCP BIOP ENDO CAPTURAPRO SPK SERR 2.8MM 230CM

## (undated) DEVICE — SENSR O2 OXIMAX FNGR A/ 18IN NONSTR

## (undated) DEVICE — SUT MNCRYL 4/0 PS2 27IN UD MCP426H

## (undated) DEVICE — BONE TAMP KIT KEX152EB FF E2 15/2 OI: Brand: KYPHON EXPRESS II KYPHOPAK TRAY

## (undated) DEVICE — Device: Brand: DEFENDO AIR/WATER/SUCTION AND BIOPSY VALVE